# Patient Record
Sex: FEMALE | Race: WHITE | Employment: OTHER | URBAN - METROPOLITAN AREA
[De-identification: names, ages, dates, MRNs, and addresses within clinical notes are randomized per-mention and may not be internally consistent; named-entity substitution may affect disease eponyms.]

---

## 2017-10-18 ENCOUNTER — IP HISTORICAL/CONVERTED ENCOUNTER (OUTPATIENT)
Dept: OTHER | Age: 82
End: 2017-10-18

## 2017-10-21 ENCOUNTER — HOSPITAL ENCOUNTER (OUTPATIENT)
Age: 82
Discharge: HOME | End: 2017-11-04
Attending: PHYSICAL MEDICINE & REHABILITATION | Admitting: PHYSICAL MEDICINE & REHABILITATION

## 2017-10-21 LAB
APPEARANCE UR: ABNORMAL
BACTERIA URNS QL MICRO: NEGATIVE /HPF
BILIRUB UR QL: NEGATIVE
COLOR UR: ABNORMAL
EPITH CASTS URNS QL MICRO: ABNORMAL /LPF
GLUCOSE UR STRIP.AUTO-MCNC: NEGATIVE MG/DL
HGB UR QL STRIP: NEGATIVE
KETONES UR QL STRIP.AUTO: NEGATIVE MG/DL
LEUKOCYTE ESTERASE UR QL STRIP.AUTO: ABNORMAL
NITRITE UR QL STRIP.AUTO: NEGATIVE
PH UR STRIP: 5.5 [PH] (ref 5–8)
PROT UR STRIP-MCNC: ABNORMAL MG/DL
RBC #/AREA URNS HPF: ABNORMAL /HPF (ref 0–5)
SP GR UR REFRACTOMETRY: 1.02 (ref 1–1.03)
UROBILINOGEN UR QL STRIP.AUTO: 0.2 EU/DL (ref 0.2–1)
WBC URNS QL MICRO: ABNORMAL /HPF (ref 0–4)

## 2017-10-21 PROCEDURE — 81001 URINALYSIS AUTO W/SCOPE: CPT | Performed by: PHYSICAL MEDICINE & REHABILITATION

## 2017-10-21 PROCEDURE — 87086 URINE CULTURE/COLONY COUNT: CPT | Performed by: PHYSICAL MEDICINE & REHABILITATION

## 2017-10-21 PROCEDURE — 74011250637 HC RX REV CODE- 250/637: Performed by: PHYSICAL MEDICINE & REHABILITATION

## 2017-10-21 RX ORDER — DEXTROSE 50 % IN WATER (D50W) INTRAVENOUS SYRINGE
25 AS NEEDED
Status: DISCONTINUED | OUTPATIENT
Start: 2017-10-21 | End: 2017-11-04 | Stop reason: HOSPADM

## 2017-10-21 RX ORDER — INSULIN LISPRO 100 [IU]/ML
INJECTION, SOLUTION INTRAVENOUS; SUBCUTANEOUS
Status: DISCONTINUED | OUTPATIENT
Start: 2017-10-21 | End: 2017-11-04 | Stop reason: HOSPADM

## 2017-10-21 RX ORDER — DIAZEPAM 2 MG/1
2 TABLET ORAL
Status: DISCONTINUED | OUTPATIENT
Start: 2017-10-21 | End: 2017-11-04 | Stop reason: HOSPADM

## 2017-10-21 RX ORDER — FACIAL-BODY WIPES
10 EACH TOPICAL DAILY PRN
Status: DISCONTINUED | OUTPATIENT
Start: 2017-10-21 | End: 2017-11-04 | Stop reason: HOSPADM

## 2017-10-21 RX ORDER — TRAMADOL HYDROCHLORIDE 50 MG/1
50 TABLET ORAL
Status: DISCONTINUED | OUTPATIENT
Start: 2017-10-21 | End: 2017-11-04 | Stop reason: HOSPADM

## 2017-10-21 RX ORDER — GLIMEPIRIDE 2 MG/1
2 TABLET ORAL
Status: DISCONTINUED | OUTPATIENT
Start: 2017-10-22 | End: 2017-10-26

## 2017-10-21 RX ORDER — DOCUSATE SODIUM 100 MG/1
100 CAPSULE, LIQUID FILLED ORAL 2 TIMES DAILY
Status: DISCONTINUED | OUTPATIENT
Start: 2017-10-21 | End: 2017-10-30

## 2017-10-21 RX ORDER — POLYETHYLENE GLYCOL 3350 17 G/17G
17 POWDER, FOR SOLUTION ORAL DAILY
Status: DISCONTINUED | OUTPATIENT
Start: 2017-10-22 | End: 2017-10-30

## 2017-10-21 RX ORDER — AMLODIPINE BESYLATE 5 MG/1
5 TABLET ORAL DAILY
Status: DISCONTINUED | OUTPATIENT
Start: 2017-10-22 | End: 2017-10-26

## 2017-10-21 RX ORDER — AMOXICILLIN 250 MG
1 CAPSULE ORAL 2 TIMES DAILY
Status: DISCONTINUED | OUTPATIENT
Start: 2017-10-21 | End: 2017-10-30

## 2017-10-21 RX ORDER — ONDANSETRON 4 MG/1
4 TABLET, ORALLY DISINTEGRATING ORAL
Status: DISCONTINUED | OUTPATIENT
Start: 2017-10-21 | End: 2017-11-04 | Stop reason: HOSPADM

## 2017-10-21 RX ORDER — PIOGLITAZONEHYDROCHLORIDE 15 MG/1
15 TABLET ORAL DAILY
Status: DISCONTINUED | OUTPATIENT
Start: 2017-10-22 | End: 2017-10-23

## 2017-10-21 RX ORDER — TRAMADOL HYDROCHLORIDE 50 MG/1
100 TABLET ORAL
Status: DISCONTINUED | OUTPATIENT
Start: 2017-10-21 | End: 2017-11-04 | Stop reason: HOSPADM

## 2017-10-21 RX ORDER — ACETAMINOPHEN 500 MG
1000 TABLET ORAL EVERY 6 HOURS
Status: DISCONTINUED | OUTPATIENT
Start: 2017-10-21 | End: 2017-11-04 | Stop reason: HOSPADM

## 2017-10-21 RX ORDER — METOPROLOL SUCCINATE 50 MG/1
50 TABLET, EXTENDED RELEASE ORAL 2 TIMES DAILY
Status: DISCONTINUED | OUTPATIENT
Start: 2017-10-21 | End: 2017-11-04 | Stop reason: HOSPADM

## 2017-10-21 RX ORDER — ENOXAPARIN SODIUM 100 MG/ML
40 INJECTION SUBCUTANEOUS EVERY 24 HOURS
Status: DISCONTINUED | OUTPATIENT
Start: 2017-10-22 | End: 2017-10-22

## 2017-10-21 RX ORDER — MAGNESIUM SULFATE 100 %
16 CRYSTALS MISCELLANEOUS AS NEEDED
Status: DISCONTINUED | OUTPATIENT
Start: 2017-10-21 | End: 2017-11-04 | Stop reason: HOSPADM

## 2017-10-21 RX ADMIN — METOPROLOL SUCCINATE 50 MG: 50 TABLET, FILM COATED, EXTENDED RELEASE ORAL at 21:26

## 2017-10-21 RX ADMIN — ACETAMINOPHEN 1000 MG: 500 TABLET ORAL at 17:34

## 2017-10-21 RX ADMIN — DOCUSATE SODIUM 100 MG: 100 CAPSULE, LIQUID FILLED ORAL at 21:26

## 2017-10-21 RX ADMIN — DOCUSATE SODIUM AND SENNOSIDES 1 TABLET: 8.6; 5 TABLET, FILM COATED ORAL at 21:26

## 2017-10-21 RX ADMIN — TRAMADOL HYDROCHLORIDE 50 MG: 50 TABLET, FILM COATED ORAL at 21:26

## 2017-10-22 LAB
25(OH)D3 SERPL-MCNC: 9.9 NG/ML (ref 30–100)
ALBUMIN SERPL-MCNC: 2.6 G/DL (ref 3.5–5)
ALBUMIN/GLOB SERPL: 0.8 {RATIO} (ref 1.1–2.2)
ALP SERPL-CCNC: 48 U/L (ref 45–117)
ALT SERPL-CCNC: 13 U/L (ref 12–78)
ANION GAP SERPL CALC-SCNC: 6 MMOL/L (ref 5–15)
AST SERPL-CCNC: 23 U/L (ref 15–37)
BILIRUB SERPL-MCNC: 0.4 MG/DL (ref 0.2–1)
BUN SERPL-MCNC: 31 MG/DL (ref 6–20)
BUN/CREAT SERPL: 16 (ref 12–20)
CALCIUM SERPL-MCNC: 8.4 MG/DL (ref 8.5–10.1)
CHLORIDE SERPL-SCNC: 107 MMOL/L (ref 97–108)
CO2 SERPL-SCNC: 25 MMOL/L (ref 21–32)
CREAT SERPL-MCNC: 1.92 MG/DL (ref 0.55–1.02)
ERYTHROCYTE [DISTWIDTH] IN BLOOD BY AUTOMATED COUNT: 13.9 % (ref 11.5–14.5)
GLOBULIN SER CALC-MCNC: 3.4 G/DL (ref 2–4)
GLUCOSE SERPL-MCNC: 55 MG/DL (ref 65–100)
HCT VFR BLD AUTO: 29.1 % (ref 35–47)
HGB BLD-MCNC: 9.6 G/DL (ref 11.5–16)
MAGNESIUM SERPL-MCNC: 2.2 MG/DL (ref 1.6–2.4)
MCH RBC QN AUTO: 29.8 PG (ref 26–34)
MCHC RBC AUTO-ENTMCNC: 33 G/DL (ref 30–36.5)
MCV RBC AUTO: 90.4 FL (ref 80–99)
PLATELET # BLD AUTO: 147 K/UL (ref 150–400)
POTASSIUM SERPL-SCNC: 3.6 MMOL/L (ref 3.5–5.1)
PROT SERPL-MCNC: 6 G/DL (ref 6.4–8.2)
RBC # BLD AUTO: 3.22 M/UL (ref 3.8–5.2)
SODIUM SERPL-SCNC: 138 MMOL/L (ref 136–145)
WBC # BLD AUTO: 7 K/UL (ref 3.6–11)

## 2017-10-22 PROCEDURE — 80053 COMPREHEN METABOLIC PANEL: CPT | Performed by: PHYSICAL MEDICINE & REHABILITATION

## 2017-10-22 PROCEDURE — 74011250636 HC RX REV CODE- 250/636: Performed by: PHYSICAL MEDICINE & REHABILITATION

## 2017-10-22 PROCEDURE — 36415 COLL VENOUS BLD VENIPUNCTURE: CPT | Performed by: PHYSICAL MEDICINE & REHABILITATION

## 2017-10-22 PROCEDURE — 83735 ASSAY OF MAGNESIUM: CPT | Performed by: PHYSICAL MEDICINE & REHABILITATION

## 2017-10-22 PROCEDURE — 82306 VITAMIN D 25 HYDROXY: CPT | Performed by: PHYSICAL MEDICINE & REHABILITATION

## 2017-10-22 PROCEDURE — 74011250637 HC RX REV CODE- 250/637: Performed by: PHYSICAL MEDICINE & REHABILITATION

## 2017-10-22 PROCEDURE — 85027 COMPLETE CBC AUTOMATED: CPT | Performed by: PHYSICAL MEDICINE & REHABILITATION

## 2017-10-22 RX ORDER — ERGOCALCIFEROL 1.25 MG/1
50000 CAPSULE ORAL
Status: DISCONTINUED | OUTPATIENT
Start: 2017-10-22 | End: 2017-11-04 | Stop reason: HOSPADM

## 2017-10-22 RX ORDER — ENOXAPARIN SODIUM 100 MG/ML
30 INJECTION SUBCUTANEOUS EVERY 24 HOURS
Status: DISCONTINUED | OUTPATIENT
Start: 2017-10-23 | End: 2017-11-04 | Stop reason: HOSPADM

## 2017-10-22 RX ADMIN — DOCUSATE SODIUM 100 MG: 100 CAPSULE, LIQUID FILLED ORAL at 09:01

## 2017-10-22 RX ADMIN — ACETAMINOPHEN 1000 MG: 500 TABLET ORAL at 23:46

## 2017-10-22 RX ADMIN — ACETAMINOPHEN 1000 MG: 500 TABLET ORAL at 17:20

## 2017-10-22 RX ADMIN — ACETAMINOPHEN 1000 MG: 500 TABLET ORAL at 05:40

## 2017-10-22 RX ADMIN — GLIMEPIRIDE 2 MG: 2 TABLET ORAL at 09:01

## 2017-10-22 RX ADMIN — METOPROLOL SUCCINATE 50 MG: 50 TABLET, FILM COATED, EXTENDED RELEASE ORAL at 21:04

## 2017-10-22 RX ADMIN — ENOXAPARIN SODIUM 40 MG: 40 INJECTION SUBCUTANEOUS at 09:01

## 2017-10-22 RX ADMIN — ERGOCALCIFEROL 50000 UNITS: 1.25 CAPSULE ORAL at 17:20

## 2017-10-22 RX ADMIN — ACETAMINOPHEN 1000 MG: 500 TABLET ORAL at 12:31

## 2017-10-22 RX ADMIN — AMLODIPINE BESYLATE 5 MG: 5 TABLET ORAL at 09:01

## 2017-10-22 RX ADMIN — DOCUSATE SODIUM AND SENNOSIDES 1 TABLET: 8.6; 5 TABLET, FILM COATED ORAL at 09:01

## 2017-10-22 RX ADMIN — TRAMADOL HYDROCHLORIDE 50 MG: 50 TABLET, FILM COATED ORAL at 21:04

## 2017-10-22 RX ADMIN — TRAMADOL HYDROCHLORIDE 50 MG: 50 TABLET, FILM COATED ORAL at 11:06

## 2017-10-22 RX ADMIN — PIOGLITAZONE 15 MG: 15 TABLET ORAL at 09:00

## 2017-10-22 RX ADMIN — METOPROLOL SUCCINATE 50 MG: 50 TABLET, FILM COATED, EXTENDED RELEASE ORAL at 09:00

## 2017-10-23 LAB
BACTERIA SPEC CULT: NORMAL
CC UR VC: NORMAL
SERVICE CMNT-IMP: NORMAL

## 2017-10-23 PROCEDURE — 74011250637 HC RX REV CODE- 250/637: Performed by: PHYSICAL MEDICINE & REHABILITATION

## 2017-10-23 PROCEDURE — 74011250636 HC RX REV CODE- 250/636: Performed by: PHYSICAL MEDICINE & REHABILITATION

## 2017-10-23 RX ADMIN — TRAMADOL HYDROCHLORIDE 50 MG: 50 TABLET, FILM COATED ORAL at 09:26

## 2017-10-23 RX ADMIN — ENOXAPARIN SODIUM 30 MG: 30 INJECTION SUBCUTANEOUS at 09:25

## 2017-10-23 RX ADMIN — METOPROLOL SUCCINATE 50 MG: 50 TABLET, FILM COATED, EXTENDED RELEASE ORAL at 21:09

## 2017-10-23 RX ADMIN — AMLODIPINE BESYLATE 5 MG: 5 TABLET ORAL at 09:25

## 2017-10-23 RX ADMIN — ACETAMINOPHEN 1000 MG: 500 TABLET ORAL at 05:54

## 2017-10-23 RX ADMIN — ACETAMINOPHEN 1000 MG: 500 TABLET ORAL at 23:28

## 2017-10-23 RX ADMIN — ACETAMINOPHEN 1000 MG: 500 TABLET ORAL at 17:21

## 2017-10-23 RX ADMIN — TRAMADOL HYDROCHLORIDE 50 MG: 50 TABLET, FILM COATED ORAL at 15:02

## 2017-10-23 RX ADMIN — METOPROLOL SUCCINATE 50 MG: 50 TABLET, FILM COATED, EXTENDED RELEASE ORAL at 09:26

## 2017-10-23 RX ADMIN — ACETAMINOPHEN 1000 MG: 500 TABLET ORAL at 12:49

## 2017-10-23 RX ADMIN — DOCUSATE SODIUM AND SENNOSIDES 1 TABLET: 8.6; 5 TABLET, FILM COATED ORAL at 09:26

## 2017-10-23 RX ADMIN — GLIMEPIRIDE 2 MG: 2 TABLET ORAL at 09:26

## 2017-10-23 RX ADMIN — PIOGLITAZONE 15 MG: 15 TABLET ORAL at 09:26

## 2017-10-24 PROCEDURE — 74011636637 HC RX REV CODE- 636/637: Performed by: PHYSICAL MEDICINE & REHABILITATION

## 2017-10-24 PROCEDURE — 74011250637 HC RX REV CODE- 250/637: Performed by: PHYSICAL MEDICINE & REHABILITATION

## 2017-10-24 PROCEDURE — 74011250636 HC RX REV CODE- 250/636: Performed by: PHYSICAL MEDICINE & REHABILITATION

## 2017-10-24 RX ADMIN — TRAMADOL HYDROCHLORIDE 50 MG: 50 TABLET, FILM COATED ORAL at 01:00

## 2017-10-24 RX ADMIN — METOPROLOL SUCCINATE 50 MG: 50 TABLET, FILM COATED, EXTENDED RELEASE ORAL at 21:17

## 2017-10-24 RX ADMIN — ENOXAPARIN SODIUM 30 MG: 30 INJECTION SUBCUTANEOUS at 08:47

## 2017-10-24 RX ADMIN — TRAMADOL HYDROCHLORIDE 50 MG: 50 TABLET, FILM COATED ORAL at 09:26

## 2017-10-24 RX ADMIN — ACETAMINOPHEN 1000 MG: 500 TABLET ORAL at 05:45

## 2017-10-24 RX ADMIN — ACETAMINOPHEN 1000 MG: 500 TABLET ORAL at 12:28

## 2017-10-24 RX ADMIN — ACETAMINOPHEN 1000 MG: 500 TABLET ORAL at 23:33

## 2017-10-24 RX ADMIN — TRAMADOL HYDROCHLORIDE 100 MG: 50 TABLET, FILM COATED ORAL at 21:17

## 2017-10-24 RX ADMIN — DOCUSATE SODIUM AND SENNOSIDES 1 TABLET: 8.6; 5 TABLET, FILM COATED ORAL at 21:16

## 2017-10-24 RX ADMIN — METOPROLOL SUCCINATE 50 MG: 50 TABLET, FILM COATED, EXTENDED RELEASE ORAL at 08:48

## 2017-10-24 RX ADMIN — INSULIN LISPRO 2 UNITS: 100 INJECTION, SOLUTION INTRAVENOUS; SUBCUTANEOUS at 21:36

## 2017-10-24 RX ADMIN — DOCUSATE SODIUM 100 MG: 100 CAPSULE, LIQUID FILLED ORAL at 21:16

## 2017-10-24 RX ADMIN — INSULIN LISPRO 2 UNITS: 100 INJECTION, SOLUTION INTRAVENOUS; SUBCUTANEOUS at 17:16

## 2017-10-24 RX ADMIN — GLIMEPIRIDE 2 MG: 2 TABLET ORAL at 08:48

## 2017-10-24 RX ADMIN — AMLODIPINE BESYLATE 5 MG: 5 TABLET ORAL at 08:48

## 2017-10-24 RX ADMIN — DOCUSATE SODIUM 100 MG: 100 CAPSULE, LIQUID FILLED ORAL at 08:47

## 2017-10-24 RX ADMIN — ACETAMINOPHEN 1000 MG: 500 TABLET ORAL at 17:16

## 2017-10-25 LAB
ALBUMIN SERPL-MCNC: 2.6 G/DL (ref 3.5–5)
ANION GAP SERPL CALC-SCNC: 8 MMOL/L (ref 5–15)
BUN SERPL-MCNC: 25 MG/DL (ref 6–20)
BUN/CREAT SERPL: 17 (ref 12–20)
CALCIUM SERPL-MCNC: 8.9 MG/DL (ref 8.5–10.1)
CHLORIDE SERPL-SCNC: 109 MMOL/L (ref 97–108)
CO2 SERPL-SCNC: 25 MMOL/L (ref 21–32)
CREAT SERPL-MCNC: 1.46 MG/DL (ref 0.55–1.02)
GLUCOSE SERPL-MCNC: 109 MG/DL (ref 65–100)
PHOSPHATE SERPL-MCNC: 3.2 MG/DL (ref 2.6–4.7)
POTASSIUM SERPL-SCNC: 3.9 MMOL/L (ref 3.5–5.1)
SODIUM SERPL-SCNC: 142 MMOL/L (ref 136–145)

## 2017-10-25 PROCEDURE — 74011250636 HC RX REV CODE- 250/636: Performed by: PHYSICAL MEDICINE & REHABILITATION

## 2017-10-25 PROCEDURE — 74011250637 HC RX REV CODE- 250/637: Performed by: PHYSICAL MEDICINE & REHABILITATION

## 2017-10-25 PROCEDURE — 80069 RENAL FUNCTION PANEL: CPT | Performed by: PHYSICAL MEDICINE & REHABILITATION

## 2017-10-25 RX ADMIN — DOCUSATE SODIUM AND SENNOSIDES 1 TABLET: 8.6; 5 TABLET, FILM COATED ORAL at 21:01

## 2017-10-25 RX ADMIN — TRAMADOL HYDROCHLORIDE 50 MG: 50 TABLET, FILM COATED ORAL at 21:00

## 2017-10-25 RX ADMIN — ACETAMINOPHEN 1000 MG: 500 TABLET ORAL at 06:02

## 2017-10-25 RX ADMIN — ENOXAPARIN SODIUM 30 MG: 30 INJECTION SUBCUTANEOUS at 08:32

## 2017-10-25 RX ADMIN — METOPROLOL SUCCINATE 50 MG: 50 TABLET, FILM COATED, EXTENDED RELEASE ORAL at 21:01

## 2017-10-25 RX ADMIN — TRAMADOL HYDROCHLORIDE 50 MG: 50 TABLET, FILM COATED ORAL at 08:31

## 2017-10-25 RX ADMIN — AMLODIPINE BESYLATE 5 MG: 5 TABLET ORAL at 08:32

## 2017-10-25 RX ADMIN — GLIMEPIRIDE 2 MG: 2 TABLET ORAL at 08:43

## 2017-10-25 RX ADMIN — DOCUSATE SODIUM 100 MG: 100 CAPSULE, LIQUID FILLED ORAL at 08:31

## 2017-10-25 RX ADMIN — DOCUSATE SODIUM AND SENNOSIDES 1 TABLET: 8.6; 5 TABLET, FILM COATED ORAL at 08:32

## 2017-10-25 RX ADMIN — ACETAMINOPHEN 1000 MG: 500 TABLET ORAL at 17:22

## 2017-10-25 RX ADMIN — METOPROLOL SUCCINATE 50 MG: 50 TABLET, FILM COATED, EXTENDED RELEASE ORAL at 08:32

## 2017-10-25 RX ADMIN — ACETAMINOPHEN 1000 MG: 500 TABLET ORAL at 12:50

## 2017-10-25 RX ADMIN — ACETAMINOPHEN 1000 MG: 500 TABLET ORAL at 23:16

## 2017-10-26 PROCEDURE — 74011250637 HC RX REV CODE- 250/637: Performed by: PHYSICAL MEDICINE & REHABILITATION

## 2017-10-26 PROCEDURE — 74011250636 HC RX REV CODE- 250/636: Performed by: PHYSICAL MEDICINE & REHABILITATION

## 2017-10-26 RX ORDER — HYDRALAZINE HYDROCHLORIDE 25 MG/1
25 TABLET, FILM COATED ORAL
Status: DISCONTINUED | OUTPATIENT
Start: 2017-10-26 | End: 2017-11-04 | Stop reason: HOSPADM

## 2017-10-26 RX ORDER — HYDRALAZINE HYDROCHLORIDE 10 MG/1
10 TABLET, FILM COATED ORAL
Status: DISCONTINUED | OUTPATIENT
Start: 2017-10-26 | End: 2017-10-26

## 2017-10-26 RX ORDER — AMLODIPINE BESYLATE 5 MG/1
5 TABLET ORAL
Status: COMPLETED | OUTPATIENT
Start: 2017-10-26 | End: 2017-10-26

## 2017-10-26 RX ORDER — AMLODIPINE BESYLATE 5 MG/1
10 TABLET ORAL DAILY
Status: DISCONTINUED | OUTPATIENT
Start: 2017-10-27 | End: 2017-11-04 | Stop reason: HOSPADM

## 2017-10-26 RX ORDER — GLIMEPIRIDE 2 MG/1
4 TABLET ORAL
Status: DISCONTINUED | OUTPATIENT
Start: 2017-10-27 | End: 2017-10-30

## 2017-10-26 RX ORDER — HYDRALAZINE HYDROCHLORIDE 10 MG/1
10 TABLET, FILM COATED ORAL
Status: COMPLETED | OUTPATIENT
Start: 2017-10-26 | End: 2017-10-26

## 2017-10-26 RX ADMIN — ENOXAPARIN SODIUM 30 MG: 30 INJECTION SUBCUTANEOUS at 08:34

## 2017-10-26 RX ADMIN — GLIMEPIRIDE 2 MG: 2 TABLET ORAL at 08:34

## 2017-10-26 RX ADMIN — ACETAMINOPHEN 1000 MG: 500 TABLET ORAL at 06:53

## 2017-10-26 RX ADMIN — HYDRALAZINE HYDROCHLORIDE 10 MG: 10 TABLET ORAL at 15:02

## 2017-10-26 RX ADMIN — METOPROLOL SUCCINATE 50 MG: 50 TABLET, FILM COATED, EXTENDED RELEASE ORAL at 22:01

## 2017-10-26 RX ADMIN — ACETAMINOPHEN 1000 MG: 500 TABLET ORAL at 13:37

## 2017-10-26 RX ADMIN — ACETAMINOPHEN 1000 MG: 500 TABLET ORAL at 17:28

## 2017-10-26 RX ADMIN — HYDRALAZINE HYDROCHLORIDE 10 MG: 10 TABLET ORAL at 12:14

## 2017-10-26 RX ADMIN — METOPROLOL SUCCINATE 50 MG: 50 TABLET, FILM COATED, EXTENDED RELEASE ORAL at 01:00

## 2017-10-26 RX ADMIN — AMLODIPINE BESYLATE 5 MG: 5 TABLET ORAL at 08:34

## 2017-10-26 RX ADMIN — AMLODIPINE BESYLATE 5 MG: 5 TABLET ORAL at 12:14

## 2017-10-26 RX ADMIN — TRAMADOL HYDROCHLORIDE 100 MG: 50 TABLET, FILM COATED ORAL at 11:00

## 2017-10-27 LAB
ALBUMIN SERPL-MCNC: 2.9 G/DL (ref 3.5–5)
ANION GAP SERPL CALC-SCNC: 8 MMOL/L (ref 5–15)
BUN SERPL-MCNC: 22 MG/DL (ref 6–20)
BUN/CREAT SERPL: 16 (ref 12–20)
CALCIUM SERPL-MCNC: 9.2 MG/DL (ref 8.5–10.1)
CHLORIDE SERPL-SCNC: 108 MMOL/L (ref 97–108)
CO2 SERPL-SCNC: 27 MMOL/L (ref 21–32)
CREAT SERPL-MCNC: 1.41 MG/DL (ref 0.55–1.02)
ERYTHROCYTE [DISTWIDTH] IN BLOOD BY AUTOMATED COUNT: 14.5 % (ref 11.5–14.5)
GLUCOSE SERPL-MCNC: 87 MG/DL (ref 65–100)
HCT VFR BLD AUTO: 32.4 % (ref 35–47)
HGB BLD-MCNC: 10.3 G/DL (ref 11.5–16)
MCH RBC QN AUTO: 29.3 PG (ref 26–34)
MCHC RBC AUTO-ENTMCNC: 31.8 G/DL (ref 30–36.5)
MCV RBC AUTO: 92 FL (ref 80–99)
PHOSPHATE SERPL-MCNC: 3.2 MG/DL (ref 2.6–4.7)
PLATELET # BLD AUTO: 269 K/UL (ref 150–400)
POTASSIUM SERPL-SCNC: 3.4 MMOL/L (ref 3.5–5.1)
RBC # BLD AUTO: 3.52 M/UL (ref 3.8–5.2)
SODIUM SERPL-SCNC: 143 MMOL/L (ref 136–145)
WBC # BLD AUTO: 9.1 K/UL (ref 3.6–11)

## 2017-10-27 PROCEDURE — 85027 COMPLETE CBC AUTOMATED: CPT | Performed by: PHYSICAL MEDICINE & REHABILITATION

## 2017-10-27 PROCEDURE — 74011250637 HC RX REV CODE- 250/637: Performed by: PHYSICAL MEDICINE & REHABILITATION

## 2017-10-27 PROCEDURE — 80069 RENAL FUNCTION PANEL: CPT | Performed by: PHYSICAL MEDICINE & REHABILITATION

## 2017-10-27 PROCEDURE — 74011250636 HC RX REV CODE- 250/636: Performed by: PHYSICAL MEDICINE & REHABILITATION

## 2017-10-27 PROCEDURE — 36415 COLL VENOUS BLD VENIPUNCTURE: CPT | Performed by: PHYSICAL MEDICINE & REHABILITATION

## 2017-10-27 RX ORDER — LOPERAMIDE HYDROCHLORIDE 2 MG/1
4 CAPSULE ORAL AS NEEDED
Status: DISCONTINUED | OUTPATIENT
Start: 2017-10-27 | End: 2017-11-04 | Stop reason: HOSPADM

## 2017-10-27 RX ORDER — LOPERAMIDE HYDROCHLORIDE 2 MG/1
2 CAPSULE ORAL AS NEEDED
Status: DISCONTINUED | OUTPATIENT
Start: 2017-10-27 | End: 2017-11-04 | Stop reason: HOSPADM

## 2017-10-27 RX ADMIN — METOPROLOL SUCCINATE 50 MG: 50 TABLET, FILM COATED, EXTENDED RELEASE ORAL at 21:50

## 2017-10-27 RX ADMIN — LOPERAMIDE HYDROCHLORIDE 2 MG: 2 CAPSULE ORAL at 13:10

## 2017-10-27 RX ADMIN — ENOXAPARIN SODIUM 30 MG: 30 INJECTION SUBCUTANEOUS at 09:24

## 2017-10-27 RX ADMIN — TRAMADOL HYDROCHLORIDE 100 MG: 50 TABLET, FILM COATED ORAL at 21:50

## 2017-10-27 RX ADMIN — AMLODIPINE BESYLATE 10 MG: 5 TABLET ORAL at 09:25

## 2017-10-27 RX ADMIN — GLIMEPIRIDE 4 MG: 2 TABLET ORAL at 09:25

## 2017-10-27 RX ADMIN — ACETAMINOPHEN 1000 MG: 500 TABLET ORAL at 13:10

## 2017-10-27 RX ADMIN — ACETAMINOPHEN 1000 MG: 500 TABLET ORAL at 00:06

## 2017-10-27 RX ADMIN — HYDRALAZINE HYDROCHLORIDE 25 MG: 25 TABLET, FILM COATED ORAL at 00:11

## 2017-10-27 RX ADMIN — ACETAMINOPHEN 1000 MG: 500 TABLET ORAL at 23:40

## 2017-10-27 RX ADMIN — METOPROLOL SUCCINATE 50 MG: 50 TABLET, FILM COATED, EXTENDED RELEASE ORAL at 09:25

## 2017-10-27 RX ADMIN — ACETAMINOPHEN 1000 MG: 500 TABLET ORAL at 06:33

## 2017-10-28 PROCEDURE — 74011250637 HC RX REV CODE- 250/637: Performed by: PHYSICAL MEDICINE & REHABILITATION

## 2017-10-28 PROCEDURE — 74011250636 HC RX REV CODE- 250/636: Performed by: PHYSICAL MEDICINE & REHABILITATION

## 2017-10-28 PROCEDURE — 74011636637 HC RX REV CODE- 636/637: Performed by: PHYSICAL MEDICINE & REHABILITATION

## 2017-10-28 RX ADMIN — TRAMADOL HYDROCHLORIDE 100 MG: 50 TABLET, FILM COATED ORAL at 21:39

## 2017-10-28 RX ADMIN — ENOXAPARIN SODIUM 30 MG: 30 INJECTION SUBCUTANEOUS at 08:00

## 2017-10-28 RX ADMIN — TRAMADOL HYDROCHLORIDE 50 MG: 50 TABLET, FILM COATED ORAL at 08:25

## 2017-10-28 RX ADMIN — INSULIN LISPRO 4 UNITS: 100 INJECTION, SOLUTION INTRAVENOUS; SUBCUTANEOUS at 21:39

## 2017-10-28 RX ADMIN — AMLODIPINE BESYLATE 10 MG: 5 TABLET ORAL at 08:26

## 2017-10-28 RX ADMIN — ACETAMINOPHEN 1000 MG: 500 TABLET ORAL at 16:57

## 2017-10-28 RX ADMIN — METOPROLOL SUCCINATE 50 MG: 50 TABLET, FILM COATED, EXTENDED RELEASE ORAL at 21:39

## 2017-10-28 RX ADMIN — GLIMEPIRIDE 4 MG: 2 TABLET ORAL at 08:26

## 2017-10-28 RX ADMIN — METOPROLOL SUCCINATE 50 MG: 50 TABLET, FILM COATED, EXTENDED RELEASE ORAL at 08:26

## 2017-10-29 PROCEDURE — 74011250637 HC RX REV CODE- 250/637: Performed by: PHYSICAL MEDICINE & REHABILITATION

## 2017-10-29 PROCEDURE — 74011250636 HC RX REV CODE- 250/636: Performed by: PHYSICAL MEDICINE & REHABILITATION

## 2017-10-29 RX ADMIN — ENOXAPARIN SODIUM 30 MG: 30 INJECTION SUBCUTANEOUS at 08:44

## 2017-10-29 RX ADMIN — AMLODIPINE BESYLATE 10 MG: 5 TABLET ORAL at 08:45

## 2017-10-29 RX ADMIN — GLIMEPIRIDE 4 MG: 2 TABLET ORAL at 08:45

## 2017-10-29 RX ADMIN — ERGOCALCIFEROL 50000 UNITS: 1.25 CAPSULE ORAL at 08:45

## 2017-10-29 RX ADMIN — METOPROLOL SUCCINATE 50 MG: 50 TABLET, FILM COATED, EXTENDED RELEASE ORAL at 21:13

## 2017-10-29 RX ADMIN — DOCUSATE SODIUM 100 MG: 100 CAPSULE, LIQUID FILLED ORAL at 21:13

## 2017-10-29 RX ADMIN — DOCUSATE SODIUM AND SENNOSIDES 1 TABLET: 8.6; 5 TABLET, FILM COATED ORAL at 08:45

## 2017-10-29 RX ADMIN — DOCUSATE SODIUM AND SENNOSIDES 1 TABLET: 8.6; 5 TABLET, FILM COATED ORAL at 21:13

## 2017-10-29 RX ADMIN — TRAMADOL HYDROCHLORIDE 100 MG: 50 TABLET, FILM COATED ORAL at 06:03

## 2017-10-29 RX ADMIN — METOPROLOL SUCCINATE 50 MG: 50 TABLET, FILM COATED, EXTENDED RELEASE ORAL at 08:45

## 2017-10-29 RX ADMIN — ACETAMINOPHEN 1000 MG: 500 TABLET ORAL at 17:10

## 2017-10-29 RX ADMIN — ACETAMINOPHEN 1000 MG: 500 TABLET ORAL at 06:03

## 2017-10-29 RX ADMIN — TRAMADOL HYDROCHLORIDE 50 MG: 50 TABLET, FILM COATED ORAL at 21:13

## 2017-10-30 LAB
ALBUMIN SERPL-MCNC: 2.6 G/DL (ref 3.5–5)
ANION GAP SERPL CALC-SCNC: 11 MMOL/L (ref 5–15)
BUN SERPL-MCNC: 32 MG/DL (ref 6–20)
BUN/CREAT SERPL: 21 (ref 12–20)
CALCIUM SERPL-MCNC: 8.7 MG/DL (ref 8.5–10.1)
CHLORIDE SERPL-SCNC: 109 MMOL/L (ref 97–108)
CO2 SERPL-SCNC: 24 MMOL/L (ref 21–32)
CREAT SERPL-MCNC: 1.56 MG/DL (ref 0.55–1.02)
ERYTHROCYTE [DISTWIDTH] IN BLOOD BY AUTOMATED COUNT: 14.8 % (ref 11.5–14.5)
GLUCOSE SERPL-MCNC: 62 MG/DL (ref 65–100)
HCT VFR BLD AUTO: 27.6 % (ref 35–47)
HGB BLD-MCNC: 9 G/DL (ref 11.5–16)
MCH RBC QN AUTO: 29.8 PG (ref 26–34)
MCHC RBC AUTO-ENTMCNC: 32.6 G/DL (ref 30–36.5)
MCV RBC AUTO: 91.4 FL (ref 80–99)
PHOSPHATE SERPL-MCNC: 4.3 MG/DL (ref 2.6–4.7)
PLATELET # BLD AUTO: 223 K/UL (ref 150–400)
POTASSIUM SERPL-SCNC: 3.9 MMOL/L (ref 3.5–5.1)
RBC # BLD AUTO: 3.02 M/UL (ref 3.8–5.2)
SODIUM SERPL-SCNC: 144 MMOL/L (ref 136–145)
WBC # BLD AUTO: 6.1 K/UL (ref 3.6–11)

## 2017-10-30 PROCEDURE — 36415 COLL VENOUS BLD VENIPUNCTURE: CPT | Performed by: PHYSICAL MEDICINE & REHABILITATION

## 2017-10-30 PROCEDURE — 74011250637 HC RX REV CODE- 250/637: Performed by: PHYSICAL MEDICINE & REHABILITATION

## 2017-10-30 PROCEDURE — 74011250636 HC RX REV CODE- 250/636: Performed by: PHYSICAL MEDICINE & REHABILITATION

## 2017-10-30 PROCEDURE — 85027 COMPLETE CBC AUTOMATED: CPT | Performed by: PHYSICAL MEDICINE & REHABILITATION

## 2017-10-30 PROCEDURE — 80069 RENAL FUNCTION PANEL: CPT | Performed by: PHYSICAL MEDICINE & REHABILITATION

## 2017-10-30 RX ORDER — GLIMEPIRIDE 2 MG/1
2 TABLET ORAL
Status: DISCONTINUED | OUTPATIENT
Start: 2017-10-31 | End: 2017-10-31 | Stop reason: DRUGHIGH

## 2017-10-30 RX ORDER — LISINOPRIL 5 MG/1
10 TABLET ORAL DAILY
Status: DISCONTINUED | OUTPATIENT
Start: 2017-10-31 | End: 2017-11-02

## 2017-10-30 RX ADMIN — ENOXAPARIN SODIUM 30 MG: 30 INJECTION SUBCUTANEOUS at 09:03

## 2017-10-30 RX ADMIN — METOPROLOL SUCCINATE 50 MG: 50 TABLET, FILM COATED, EXTENDED RELEASE ORAL at 21:41

## 2017-10-30 RX ADMIN — ACETAMINOPHEN 1000 MG: 500 TABLET ORAL at 05:29

## 2017-10-30 RX ADMIN — ACETAMINOPHEN 1000 MG: 500 TABLET ORAL at 17:39

## 2017-10-30 RX ADMIN — TRAMADOL HYDROCHLORIDE 50 MG: 50 TABLET, FILM COATED ORAL at 09:59

## 2017-10-30 RX ADMIN — TRAMADOL HYDROCHLORIDE 100 MG: 50 TABLET, FILM COATED ORAL at 21:41

## 2017-10-30 RX ADMIN — ACETAMINOPHEN 1000 MG: 500 TABLET ORAL at 12:07

## 2017-10-30 RX ADMIN — METOPROLOL SUCCINATE 50 MG: 50 TABLET, FILM COATED, EXTENDED RELEASE ORAL at 09:03

## 2017-10-30 RX ADMIN — AMLODIPINE BESYLATE 10 MG: 5 TABLET ORAL at 09:04

## 2017-10-30 RX ADMIN — ACETAMINOPHEN 1000 MG: 500 TABLET ORAL at 00:13

## 2017-10-30 RX ADMIN — GLIMEPIRIDE 4 MG: 2 TABLET ORAL at 09:04

## 2017-10-31 PROCEDURE — 74011250637 HC RX REV CODE- 250/637: Performed by: PHYSICAL MEDICINE & REHABILITATION

## 2017-10-31 PROCEDURE — 74011250636 HC RX REV CODE- 250/636: Performed by: PHYSICAL MEDICINE & REHABILITATION

## 2017-10-31 RX ORDER — GLIMEPIRIDE 2 MG/1
2 TABLET ORAL
Status: DISCONTINUED | OUTPATIENT
Start: 2017-11-01 | End: 2017-11-04 | Stop reason: HOSPADM

## 2017-10-31 RX ADMIN — LISINOPRIL 10 MG: 5 TABLET ORAL at 08:49

## 2017-10-31 RX ADMIN — METOPROLOL SUCCINATE 50 MG: 50 TABLET, FILM COATED, EXTENDED RELEASE ORAL at 08:50

## 2017-10-31 RX ADMIN — METOPROLOL SUCCINATE 50 MG: 50 TABLET, FILM COATED, EXTENDED RELEASE ORAL at 21:46

## 2017-10-31 RX ADMIN — ACETAMINOPHEN 1000 MG: 500 TABLET ORAL at 21:48

## 2017-10-31 RX ADMIN — ACETAMINOPHEN 1000 MG: 500 TABLET ORAL at 17:13

## 2017-10-31 RX ADMIN — ENOXAPARIN SODIUM 30 MG: 30 INJECTION SUBCUTANEOUS at 08:15

## 2017-10-31 RX ADMIN — ACETAMINOPHEN 1000 MG: 500 TABLET ORAL at 05:00

## 2017-10-31 RX ADMIN — ACETAMINOPHEN 1000 MG: 500 TABLET ORAL at 12:29

## 2017-10-31 RX ADMIN — AMLODIPINE BESYLATE 10 MG: 5 TABLET ORAL at 08:50

## 2017-10-31 RX ADMIN — TRAMADOL HYDROCHLORIDE 50 MG: 50 TABLET, FILM COATED ORAL at 08:51

## 2017-11-01 LAB
ANION GAP SERPL CALC-SCNC: 8 MMOL/L (ref 5–15)
BUN SERPL-MCNC: 28 MG/DL (ref 6–20)
BUN/CREAT SERPL: 19 (ref 12–20)
CALCIUM SERPL-MCNC: 9.2 MG/DL (ref 8.5–10.1)
CHLORIDE SERPL-SCNC: 110 MMOL/L (ref 97–108)
CO2 SERPL-SCNC: 24 MMOL/L (ref 21–32)
CREAT SERPL-MCNC: 1.48 MG/DL (ref 0.55–1.02)
GLUCOSE SERPL-MCNC: 94 MG/DL (ref 65–100)
POTASSIUM SERPL-SCNC: 3.8 MMOL/L (ref 3.5–5.1)
SODIUM SERPL-SCNC: 142 MMOL/L (ref 136–145)

## 2017-11-01 PROCEDURE — 36415 COLL VENOUS BLD VENIPUNCTURE: CPT | Performed by: PHYSICAL MEDICINE & REHABILITATION

## 2017-11-01 PROCEDURE — 80048 BASIC METABOLIC PNL TOTAL CA: CPT | Performed by: PHYSICAL MEDICINE & REHABILITATION

## 2017-11-01 PROCEDURE — 85027 COMPLETE CBC AUTOMATED: CPT | Performed by: PHYSICAL MEDICINE & REHABILITATION

## 2017-11-01 PROCEDURE — 74011250636 HC RX REV CODE- 250/636: Performed by: PHYSICAL MEDICINE & REHABILITATION

## 2017-11-01 PROCEDURE — 80069 RENAL FUNCTION PANEL: CPT | Performed by: PHYSICAL MEDICINE & REHABILITATION

## 2017-11-01 PROCEDURE — 74011250637 HC RX REV CODE- 250/637: Performed by: PHYSICAL MEDICINE & REHABILITATION

## 2017-11-01 RX ADMIN — ACETAMINOPHEN 1000 MG: 500 TABLET ORAL at 05:56

## 2017-11-01 RX ADMIN — LISINOPRIL 10 MG: 5 TABLET ORAL at 08:40

## 2017-11-01 RX ADMIN — METOPROLOL SUCCINATE 50 MG: 50 TABLET, FILM COATED, EXTENDED RELEASE ORAL at 08:39

## 2017-11-01 RX ADMIN — TRAMADOL HYDROCHLORIDE 100 MG: 50 TABLET, FILM COATED ORAL at 12:00

## 2017-11-01 RX ADMIN — METOPROLOL SUCCINATE 50 MG: 50 TABLET, FILM COATED, EXTENDED RELEASE ORAL at 21:48

## 2017-11-01 RX ADMIN — ENOXAPARIN SODIUM 30 MG: 30 INJECTION SUBCUTANEOUS at 08:39

## 2017-11-01 RX ADMIN — TRAMADOL HYDROCHLORIDE 100 MG: 50 TABLET, FILM COATED ORAL at 18:07

## 2017-11-01 RX ADMIN — AMLODIPINE BESYLATE 10 MG: 5 TABLET ORAL at 08:40

## 2017-11-01 RX ADMIN — GLIMEPIRIDE 2 MG: 2 TABLET ORAL at 08:40

## 2017-11-02 PROCEDURE — 74011250637 HC RX REV CODE- 250/637: Performed by: PHYSICAL MEDICINE & REHABILITATION

## 2017-11-02 PROCEDURE — 74011250636 HC RX REV CODE- 250/636: Performed by: PHYSICAL MEDICINE & REHABILITATION

## 2017-11-02 RX ORDER — LISINOPRIL 5 MG/1
5 TABLET ORAL ONCE
Status: COMPLETED | OUTPATIENT
Start: 2017-11-02 | End: 2017-11-02

## 2017-11-02 RX ORDER — LISINOPRIL 5 MG/1
15 TABLET ORAL DAILY
Status: DISCONTINUED | OUTPATIENT
Start: 2017-11-03 | End: 2017-11-04 | Stop reason: HOSPADM

## 2017-11-02 RX ADMIN — ACETAMINOPHEN 1000 MG: 500 TABLET ORAL at 00:11

## 2017-11-02 RX ADMIN — ENOXAPARIN SODIUM 30 MG: 30 INJECTION SUBCUTANEOUS at 08:38

## 2017-11-02 RX ADMIN — METOPROLOL SUCCINATE 50 MG: 50 TABLET, FILM COATED, EXTENDED RELEASE ORAL at 20:40

## 2017-11-02 RX ADMIN — ACETAMINOPHEN 1000 MG: 500 TABLET ORAL at 05:52

## 2017-11-02 RX ADMIN — METOPROLOL SUCCINATE 50 MG: 50 TABLET, FILM COATED, EXTENDED RELEASE ORAL at 08:41

## 2017-11-02 RX ADMIN — LISINOPRIL 10 MG: 5 TABLET ORAL at 08:41

## 2017-11-02 RX ADMIN — GLIMEPIRIDE 2 MG: 2 TABLET ORAL at 08:41

## 2017-11-02 RX ADMIN — LISINOPRIL 5 MG: 5 TABLET ORAL at 12:08

## 2017-11-02 RX ADMIN — ACETAMINOPHEN 1000 MG: 500 TABLET ORAL at 12:08

## 2017-11-02 RX ADMIN — TRAMADOL HYDROCHLORIDE 100 MG: 50 TABLET, FILM COATED ORAL at 14:41

## 2017-11-02 RX ADMIN — AMLODIPINE BESYLATE 10 MG: 5 TABLET ORAL at 08:40

## 2017-11-02 RX ADMIN — ACETAMINOPHEN 1000 MG: 500 TABLET ORAL at 17:15

## 2017-11-03 LAB
ALBUMIN SERPL-MCNC: 3 G/DL (ref 3.5–5)
ANION GAP SERPL CALC-SCNC: 11 MMOL/L (ref 5–15)
BUN SERPL-MCNC: 25 MG/DL (ref 6–20)
BUN/CREAT SERPL: 17 (ref 12–20)
CALCIUM SERPL-MCNC: 9.5 MG/DL (ref 8.5–10.1)
CHLORIDE SERPL-SCNC: 109 MMOL/L (ref 97–108)
CO2 SERPL-SCNC: 22 MMOL/L (ref 21–32)
CREAT SERPL-MCNC: 1.51 MG/DL (ref 0.55–1.02)
ERYTHROCYTE [DISTWIDTH] IN BLOOD BY AUTOMATED COUNT: 14.9 % (ref 11.5–14.5)
GLUCOSE SERPL-MCNC: 61 MG/DL (ref 65–100)
HCT VFR BLD AUTO: 29.2 % (ref 35–47)
HGB BLD-MCNC: 9.6 G/DL (ref 11.5–16)
MCH RBC QN AUTO: 29.9 PG (ref 26–34)
MCHC RBC AUTO-ENTMCNC: 32.9 G/DL (ref 30–36.5)
MCV RBC AUTO: 91 FL (ref 80–99)
PHOSPHATE SERPL-MCNC: 4.1 MG/DL (ref 2.6–4.7)
PLATELET # BLD AUTO: 247 K/UL (ref 150–400)
POTASSIUM SERPL-SCNC: 4 MMOL/L (ref 3.5–5.1)
RBC # BLD AUTO: 3.21 M/UL (ref 3.8–5.2)
SODIUM SERPL-SCNC: 142 MMOL/L (ref 136–145)
WBC # BLD AUTO: 7.6 K/UL (ref 3.6–11)

## 2017-11-03 PROCEDURE — 74011250637 HC RX REV CODE- 250/637: Performed by: PHYSICAL MEDICINE & REHABILITATION

## 2017-11-03 PROCEDURE — 74011250636 HC RX REV CODE- 250/636: Performed by: PHYSICAL MEDICINE & REHABILITATION

## 2017-11-03 RX ADMIN — METOPROLOL SUCCINATE 50 MG: 50 TABLET, FILM COATED, EXTENDED RELEASE ORAL at 21:41

## 2017-11-03 RX ADMIN — AMLODIPINE BESYLATE 10 MG: 5 TABLET ORAL at 09:24

## 2017-11-03 RX ADMIN — ACETAMINOPHEN 1000 MG: 500 TABLET ORAL at 17:20

## 2017-11-03 RX ADMIN — TRAMADOL HYDROCHLORIDE 50 MG: 50 TABLET, FILM COATED ORAL at 12:27

## 2017-11-03 RX ADMIN — GLIMEPIRIDE 2 MG: 2 TABLET ORAL at 09:24

## 2017-11-03 RX ADMIN — ACETAMINOPHEN 1000 MG: 500 TABLET ORAL at 06:26

## 2017-11-03 RX ADMIN — ACETAMINOPHEN 1000 MG: 500 TABLET ORAL at 00:09

## 2017-11-03 RX ADMIN — METOPROLOL SUCCINATE 50 MG: 50 TABLET, FILM COATED, EXTENDED RELEASE ORAL at 09:25

## 2017-11-03 RX ADMIN — ENOXAPARIN SODIUM 30 MG: 30 INJECTION SUBCUTANEOUS at 09:26

## 2017-11-03 RX ADMIN — LISINOPRIL 15 MG: 5 TABLET ORAL at 09:24

## 2017-11-04 VITALS
HEIGHT: 63 IN | WEIGHT: 175.31 LBS | SYSTOLIC BLOOD PRESSURE: 166 MMHG | DIASTOLIC BLOOD PRESSURE: 67 MMHG | BODY MASS INDEX: 31.06 KG/M2 | HEART RATE: 66 BPM

## 2017-11-04 PROCEDURE — 74011250637 HC RX REV CODE- 250/637: Performed by: PHYSICAL MEDICINE & REHABILITATION

## 2017-11-04 PROCEDURE — 74011250636 HC RX REV CODE- 250/636: Performed by: PHYSICAL MEDICINE & REHABILITATION

## 2017-11-04 RX ADMIN — AMLODIPINE BESYLATE 10 MG: 5 TABLET ORAL at 10:06

## 2017-11-04 RX ADMIN — METOPROLOL SUCCINATE 50 MG: 50 TABLET, FILM COATED, EXTENDED RELEASE ORAL at 10:05

## 2017-11-04 RX ADMIN — LOPERAMIDE HYDROCHLORIDE 2 MG: 2 CAPSULE ORAL at 10:05

## 2017-11-04 RX ADMIN — ACETAMINOPHEN 1000 MG: 500 TABLET ORAL at 00:11

## 2017-11-04 RX ADMIN — ENOXAPARIN SODIUM 30 MG: 30 INJECTION SUBCUTANEOUS at 10:06

## 2017-11-04 RX ADMIN — GLIMEPIRIDE 2 MG: 2 TABLET ORAL at 10:06

## 2017-11-04 RX ADMIN — LISINOPRIL 15 MG: 5 TABLET ORAL at 10:05

## 2017-11-04 RX ADMIN — ACETAMINOPHEN 1000 MG: 500 TABLET ORAL at 05:10

## 2021-07-25 ENCOUNTER — APPOINTMENT (OUTPATIENT)
Dept: GENERAL RADIOLOGY | Age: 86
End: 2021-07-25
Attending: EMERGENCY MEDICINE
Payer: MEDICARE

## 2021-07-25 ENCOUNTER — HOSPITAL ENCOUNTER (EMERGENCY)
Age: 86
Discharge: HOME | End: 2021-07-25
Attending: EMERGENCY MEDICINE
Payer: MEDICARE

## 2021-07-25 VITALS
OXYGEN SATURATION: 96 % | HEART RATE: 79 BPM | WEIGHT: 145 LBS | HEIGHT: 62 IN | SYSTOLIC BLOOD PRESSURE: 138 MMHG | RESPIRATION RATE: 18 BRPM | TEMPERATURE: 98.3 F | BODY MASS INDEX: 26.68 KG/M2 | DIASTOLIC BLOOD PRESSURE: 71 MMHG

## 2021-07-25 DIAGNOSIS — J20.9 ACUTE BRONCHITIS, UNSPECIFIED ORGANISM: Primary | ICD-10-CM

## 2021-07-25 LAB
ALBUMIN SERPL-MCNC: 3.7 G/DL (ref 3.5–5)
ALBUMIN/GLOB SERPL: 1.1 {RATIO} (ref 1.1–2.2)
ALP SERPL-CCNC: 73 U/L (ref 45–117)
ALT SERPL-CCNC: 18 U/L (ref 12–78)
ANION GAP SERPL CALC-SCNC: 4 MMOL/L (ref 5–15)
AST SERPL-CCNC: 19 U/L (ref 15–37)
BASOPHILS # BLD: 0.1 K/UL (ref 0–0.1)
BASOPHILS NFR BLD: 1 % (ref 0–1)
BILIRUB SERPL-MCNC: 0.5 MG/DL (ref 0.2–1)
BUN SERPL-MCNC: 21 MG/DL (ref 6–20)
BUN/CREAT SERPL: 9 (ref 12–20)
CALCIUM SERPL-MCNC: 8.5 MG/DL (ref 8.5–10.1)
CHLORIDE SERPL-SCNC: 107 MMOL/L (ref 97–108)
CO2 SERPL-SCNC: 30 MMOL/L (ref 21–32)
COMMENT, HOLDF: NORMAL
CREAT SERPL-MCNC: 2.32 MG/DL (ref 0.55–1.02)
DIFFERENTIAL METHOD BLD: NORMAL
EOSINOPHIL # BLD: 0.3 K/UL (ref 0–0.4)
EOSINOPHIL NFR BLD: 4 % (ref 0–7)
ERYTHROCYTE [DISTWIDTH] IN BLOOD BY AUTOMATED COUNT: 13 % (ref 11.5–14.5)
GLOBULIN SER CALC-MCNC: 3.5 G/DL (ref 2–4)
GLUCOSE SERPL-MCNC: 162 MG/DL (ref 65–100)
HCT VFR BLD AUTO: 36.7 % (ref 35–47)
HGB BLD-MCNC: 12 G/DL (ref 11.5–16)
IMM GRANULOCYTES # BLD AUTO: 0 K/UL (ref 0–0.04)
IMM GRANULOCYTES NFR BLD AUTO: 0 % (ref 0–0.5)
LYMPHOCYTES # BLD: 1.7 K/UL (ref 0.8–3.5)
LYMPHOCYTES NFR BLD: 24 % (ref 12–49)
MAGNESIUM SERPL-MCNC: 2.8 MG/DL (ref 1.6–2.4)
MCH RBC QN AUTO: 29.9 PG (ref 26–34)
MCHC RBC AUTO-ENTMCNC: 32.7 G/DL (ref 30–36.5)
MCV RBC AUTO: 91.3 FL (ref 80–99)
MONOCYTES # BLD: 0.6 K/UL (ref 0–1)
MONOCYTES NFR BLD: 8 % (ref 5–13)
NEUTS SEG # BLD: 4.7 K/UL (ref 1.8–8)
NEUTS SEG NFR BLD: 63 % (ref 32–75)
NRBC # BLD: 0 K/UL (ref 0–0.01)
NRBC BLD-RTO: 0 PER 100 WBC
PLATELET # BLD AUTO: 175 K/UL (ref 150–400)
PMV BLD AUTO: 11.5 FL (ref 8.9–12.9)
POTASSIUM SERPL-SCNC: 3.6 MMOL/L (ref 3.5–5.1)
PROT SERPL-MCNC: 7.2 G/DL (ref 6.4–8.2)
RBC # BLD AUTO: 4.02 M/UL (ref 3.8–5.2)
SAMPLES BEING HELD,HOLD: NORMAL
SODIUM SERPL-SCNC: 141 MMOL/L (ref 136–145)
TROPONIN I SERPL-MCNC: <0.05 NG/ML
WBC # BLD AUTO: 7.4 K/UL (ref 3.6–11)

## 2021-07-25 PROCEDURE — 74011636637 HC RX REV CODE- 636/637: Performed by: EMERGENCY MEDICINE

## 2021-07-25 PROCEDURE — 83735 ASSAY OF MAGNESIUM: CPT

## 2021-07-25 PROCEDURE — 71046 X-RAY EXAM CHEST 2 VIEWS: CPT

## 2021-07-25 PROCEDURE — 84484 ASSAY OF TROPONIN QUANT: CPT

## 2021-07-25 PROCEDURE — 85025 COMPLETE CBC W/AUTO DIFF WBC: CPT

## 2021-07-25 PROCEDURE — 74011250637 HC RX REV CODE- 250/637: Performed by: EMERGENCY MEDICINE

## 2021-07-25 PROCEDURE — 80053 COMPREHEN METABOLIC PANEL: CPT

## 2021-07-25 PROCEDURE — 99284 EMERGENCY DEPT VISIT MOD MDM: CPT

## 2021-07-25 PROCEDURE — 93005 ELECTROCARDIOGRAM TRACING: CPT

## 2021-07-25 PROCEDURE — 36415 COLL VENOUS BLD VENIPUNCTURE: CPT

## 2021-07-25 RX ORDER — PREDNISONE 20 MG/1
60 TABLET ORAL
Status: COMPLETED | OUTPATIENT
Start: 2021-07-25 | End: 2021-07-25

## 2021-07-25 RX ORDER — PREDNISONE 10 MG/1
TABLET ORAL
Qty: 21 TABLET | Refills: 0 | Status: SHIPPED | OUTPATIENT
Start: 2021-07-25 | End: 2021-10-18

## 2021-07-25 RX ORDER — ALBUTEROL SULFATE 90 UG/1
6 AEROSOL, METERED RESPIRATORY (INHALATION) ONCE
Status: COMPLETED | OUTPATIENT
Start: 2021-07-25 | End: 2021-07-25

## 2021-07-25 RX ORDER — ALBUTEROL SULFATE 90 UG/1
2 AEROSOL, METERED RESPIRATORY (INHALATION)
Qty: 1 INHALER | Refills: 0 | Status: SHIPPED | OUTPATIENT
Start: 2021-07-25 | End: 2022-06-09

## 2021-07-25 RX ADMIN — ALBUTEROL SULFATE 6 PUFF: 90 AEROSOL, METERED RESPIRATORY (INHALATION) at 21:11

## 2021-07-25 RX ADMIN — PREDNISONE 60 MG: 20 TABLET ORAL at 21:11

## 2021-07-26 LAB
ATRIAL RATE: 78 BPM
CALCULATED P AXIS, ECG09: 46 DEGREES
CALCULATED R AXIS, ECG10: -8 DEGREES
CALCULATED T AXIS, ECG11: 30 DEGREES
DIAGNOSIS, 93000: NORMAL
P-R INTERVAL, ECG05: 172 MS
Q-T INTERVAL, ECG07: 446 MS
QRS DURATION, ECG06: 94 MS
QTC CALCULATION (BEZET), ECG08: 508 MS
VENTRICULAR RATE, ECG03: 78 BPM

## 2021-07-26 NOTE — ED NOTES
Pulse oximetry assessment   94% at rest on room air (if 88% or less, skip next steps)  94% while ambulating on room air.

## 2021-07-26 NOTE — ED PROVIDER NOTES
The history is provided by the patient. Shortness of Breath  This is a new problem. The problem occurs continuously. The current episode started yesterday. The problem has been gradually worsening. Associated symptoms include sore throat, cough and wheezing. Pertinent negatives include no fever, no chest pain, no vomiting, no abdominal pain and no leg swelling. It is unknown what precipitated the problem. She has tried nothing for the symptoms. Associated medical issues include CAD. Associated medical issues do not include asthma or COPD. No past medical history on file. No past surgical history on file. No family history on file. Social History     Socioeconomic History    Marital status:      Spouse name: Not on file    Number of children: Not on file    Years of education: Not on file    Highest education level: Not on file   Occupational History    Not on file   Tobacco Use    Smoking status: Not on file   Substance and Sexual Activity    Alcohol use: Not on file    Drug use: Not on file    Sexual activity: Not on file   Other Topics Concern    Not on file   Social History Narrative    Not on file     Social Determinants of Health     Financial Resource Strain:     Difficulty of Paying Living Expenses:    Food Insecurity:     Worried About Running Out of Food in the Last Year:     [address] in the Last Year:    Transportation Needs:     Lack of Transportation (Medical):      Lack of Transportation (Non-Medical):    Physical Activity:     Days of Exercise per Week:     Minutes of Exercise per Session:    Stress:     Feeling of Stress :    Social Connections:     Frequency of Communication with Friends and Family:     Frequency of Social Gatherings with Friends and Family:     Attends [employer] Services:     Active Member of Clubs or Organizations:     Attends Club or Organization Meetings:     Marital Status:    Intimate Partner Violence:     Fear of Current or Ex-Partner:     Emotionally Abused:     Physically Abused:     Sexually Abused: ALLERGIES: Codeine, Compazine [prochlorperazine], Dilaudid [hydromorphone], Morphine, and Sulfa (sulfonamide antibiotics)    Review of Systems   Constitutional: Negative for chills and fever. HENT: Positive for sore throat. Respiratory: Positive for cough, shortness of breath and wheezing. Cardiovascular: Negative for chest pain and leg swelling. Gastrointestinal: Negative for abdominal pain, constipation, diarrhea and vomiting. Neurological: Negative for dizziness and light-headedness. All other systems reviewed and are negative. Vitals:    07/25/21 2029   BP: (!) 191/79   Pulse: 81   Resp: 20   Temp: 97.5 °F (36.4 °C)   SpO2: 97%   Weight: 65.8 kg (145 lb)   Height: 5' 2\" (1.575 m)            Physical Exam  Vitals and nursing note reviewed. Constitutional:       Appearance: She is well-developed. HENT:      Head: Normocephalic and atraumatic. Eyes:      Pupils: Pupils are equal, round, and reactive to light. Cardiovascular:      Rate and Rhythm: Normal rate and regular rhythm. Pulmonary:      Effort: Tachypnea and accessory muscle usage present. Breath sounds: Examination of the right-upper field reveals wheezing. Examination of the left-upper field reveals wheezing. Examination of the right-lower field reveals decreased breath sounds. Examination of the left-lower field reveals decreased breath sounds. Decreased breath sounds and wheezing present. Abdominal:      General: There is no distension. Palpations: Abdomen is soft. Tenderness: There is no abdominal tenderness. Musculoskeletal:      Cervical back: Normal range of motion and neck supple. Right lower leg: No edema. Left lower leg: No edema. Skin:     General: Skin is warm and dry. Capillary Refill: Capillary refill takes less than 2 seconds.    Neurological:      General: No focal deficit present. Mental Status: She is alert and oriented to person, place, and time. Psychiatric:         Mood and Affect: Mood normal.         Behavior: Behavior normal.          MDM       Procedures    EKG performed at 8:37 PM  Normal sinus rhythm, frequent PVCs, 78 bpm, prolonged QT, no STEMI  EKG interpreted by Hafsa Patton MD          The patient is resting comfortably and feels better, is alert and in no distress. The repeat examination is unremarkable and benign. The electrocardiogram shows no signs of acute ischemia and the history, exam, diagnostic testing and current condition do not suggest that this patient is having an acute myocardial infarction, significant arrhythmia, unstable angina, esophageal perforation, pulmonary embolism, aortic dissection, pneumothorax, severe pneumonia, sepsis or other significant pathology that would warrant further testing, continued ED treatment, admission, or cardiology or other specialist consultation at this point. The patient is ambulatory without severe shortness of breath or desaturation. The vital signs have been stable. The patient's condition is stable and appropriate for discharge. The patient will pursue further outpatient evaluation with the primary care physician, other designated physician or cardiologist. The patient and/or caregivers have expressed a clear and thorough understanding and agree to follow up as instructed. MEDICATIONS GIVEN:  Medications   predniSONE (DELTASONE) tablet 60 mg (60 mg Oral Given 7/25/21 2111)   albuterol (PROVENTIL HFA, VENTOLIN HFA, PROAIR HFA) inhaler 6 Puff (6 Puffs Inhalation Given 7/25/21 2111)       IMPRESSION:  1.  Acute bronchitis, unspecified organism

## 2021-07-26 NOTE — ED TRIAGE NOTES
Pt ambulatory to triage with mask, c/o SOB. Family reports recent sore throat, treated with OTC medications and relieved. Daughter reports giving her neb at home without relief. Unknown sick contacts, states family had nasal congestion recently. States covid vaccines UTD.   States hx of HTN, took medications today

## 2021-10-17 ENCOUNTER — HOSPITAL ENCOUNTER (INPATIENT)
Age: 86
LOS: 11 days | Discharge: NURSING, REHAB OR LONG-TERM CARE | DRG: 958 | End: 2021-10-29
Attending: EMERGENCY MEDICINE | Admitting: INTERNAL MEDICINE
Payer: MEDICARE

## 2021-10-17 DIAGNOSIS — N18.9 CHRONIC RENAL FAILURE, UNSPECIFIED CKD STAGE: ICD-10-CM

## 2021-10-17 DIAGNOSIS — S32.591A CLOSED FRACTURE OF MULTIPLE PUBIC RAMI, RIGHT, INITIAL ENCOUNTER (HCC): ICD-10-CM

## 2021-10-17 DIAGNOSIS — S42.211A CLOSED DISPLACED FRACTURE OF SURGICAL NECK OF RIGHT HUMERUS, UNSPECIFIED FRACTURE MORPHOLOGY, INITIAL ENCOUNTER: ICD-10-CM
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
## 2021-10-17 PROCEDURE — 99283 EMERGENCY DEPT VISIT LOW MDM: CPT

## 2021-10-18 ENCOUNTER — APPOINTMENT (OUTPATIENT)
Dept: CT IMAGING | Age: 86
DRG: 958 | End: 2021-10-18
Attending: EMERGENCY MEDICINE
Payer: MEDICARE

## 2021-10-18 ENCOUNTER — APPOINTMENT (OUTPATIENT)
Dept: GENERAL RADIOLOGY | Age: 86
DRG: 958 | End: 2021-10-18
Attending: EMERGENCY MEDICINE
Payer: MEDICARE

## 2021-10-18 ENCOUNTER — APPOINTMENT (OUTPATIENT)
Dept: GENERAL RADIOLOGY | Age: 86
DRG: 958 | End: 2021-10-18
Attending: INTERNAL MEDICINE
Payer: MEDICARE

## 2021-10-18 PROBLEM — S42.213A FX HUMERAL NECK: Status: ACTIVE | Noted: 2021-10-18

## 2021-10-18 PROBLEM — N18.30 CKD (CHRONIC KIDNEY DISEASE) STAGE 3, GFR 30-59 ML/MIN (HCC): Status: ACTIVE | Noted: 2021-10-18

## 2021-10-18 PROBLEM — E11.9 DM (DIABETES MELLITUS) (HCC): Status: ACTIVE | Noted: 2021-10-18

## 2021-10-18 PROBLEM — I10 HTN (HYPERTENSION): Status: ACTIVE | Noted: 2021-10-18

## 2021-10-18 PROBLEM — S42.213A HUMERAL SURGICAL NECK FRACTURE: Status: ACTIVE | Noted: 2021-10-18

## 2021-10-18 PROBLEM — E87.6 HYPOKALEMIA: Status: ACTIVE | Noted: 2021-10-18

## 2021-10-18 PROBLEM — N18.9 CKD (CHRONIC KIDNEY DISEASE): Status: ACTIVE | Noted: 2021-10-18

## 2021-10-18 PROBLEM — D72.829 LEUKOCYTOSIS: Status: ACTIVE | Noted: 2021-10-18

## 2021-10-18 PROBLEM — S32.599A CLOSED FRACTURE OF MULTIPLE PUBIC RAMI (HCC): Status: ACTIVE | Noted: 2021-10-18

## 2021-10-18 LAB
ALBUMIN SERPL-MCNC: 3.5 G/DL (ref 3.5–5)
ALBUMIN/GLOB SERPL: 0.9 {RATIO} (ref 1.1–2.2)
ALP SERPL-CCNC: 67 U/L (ref 45–117)
ALT SERPL-CCNC: 29 U/L (ref 12–78)
ANION GAP SERPL CALC-SCNC: 8 MMOL/L (ref 5–15)
AST SERPL-CCNC: 22 U/L (ref 15–37)
BASOPHILS # BLD: 0 K/UL (ref 0–0.1)
BASOPHILS NFR BLD: 0 % (ref 0–1)
BILIRUB SERPL-MCNC: 1 MG/DL (ref 0.2–1)
BUN SERPL-MCNC: 23 MG/DL (ref 6–20)
BUN/CREAT SERPL: 11 (ref 12–20)
CALCIUM SERPL-MCNC: 8.3 MG/DL (ref 8.5–10.1)
CHLORIDE SERPL-SCNC: 98 MMOL/L (ref 97–108)
CO2 SERPL-SCNC: 28 MMOL/L (ref 21–32)
COMMENT, HOLDF: NORMAL
CREAT SERPL-MCNC: 2.15 MG/DL (ref 0.55–1.02)
DIFFERENTIAL METHOD BLD: ABNORMAL
EOSINOPHIL # BLD: 0.1 K/UL (ref 0–0.4)
EOSINOPHIL NFR BLD: 1 % (ref 0–7)
ERYTHROCYTE [DISTWIDTH] IN BLOOD BY AUTOMATED COUNT: 12.6 % (ref 11.5–14.5)
EST. AVERAGE GLUCOSE BLD GHB EST-MCNC: 128 MG/DL
GLOBULIN SER CALC-MCNC: 3.9 G/DL (ref 2–4)
GLUCOSE BLD STRIP.AUTO-MCNC: 150 MG/DL (ref 65–117)
GLUCOSE BLD STRIP.AUTO-MCNC: 160 MG/DL (ref 65–117)
GLUCOSE BLD STRIP.AUTO-MCNC: 80 MG/DL (ref 65–117)
GLUCOSE BLD STRIP.AUTO-MCNC: 90 MG/DL (ref 65–117)
GLUCOSE SERPL-MCNC: 167 MG/DL (ref 65–100)
HBA1C MFR BLD: 6.1 % (ref 4–5.6)
HCT VFR BLD AUTO: 36.8 % (ref 35–47)
HGB BLD-MCNC: 12.6 G/DL (ref 11.5–16)
IMM GRANULOCYTES # BLD AUTO: 0.1 K/UL (ref 0–0.04)
IMM GRANULOCYTES NFR BLD AUTO: 1 % (ref 0–0.5)
LYMPHOCYTES # BLD: 1.2 K/UL (ref 0.8–3.5)
LYMPHOCYTES NFR BLD: 10 % (ref 12–49)
MAGNESIUM SERPL-MCNC: 2.4 MG/DL (ref 1.6–2.4)
MCH RBC QN AUTO: 31.1 PG (ref 26–34)
MCHC RBC AUTO-ENTMCNC: 34.2 G/DL (ref 30–36.5)
MCV RBC AUTO: 90.9 FL (ref 80–99)
MONOCYTES # BLD: 0.5 K/UL (ref 0–1)
MONOCYTES NFR BLD: 4 % (ref 5–13)
NEUTS SEG # BLD: 10.2 K/UL (ref 1.8–8)
NEUTS SEG NFR BLD: 84 % (ref 32–75)
NRBC # BLD: 0 K/UL (ref 0–0.01)
NRBC BLD-RTO: 0 PER 100 WBC
PLATELET # BLD AUTO: 173 K/UL (ref 150–400)
PMV BLD AUTO: 11.5 FL (ref 8.9–12.9)
POTASSIUM SERPL-SCNC: 2.8 MMOL/L (ref 3.5–5.1)
PROCALCITONIN SERPL-MCNC: <0.05 NG/ML
PROT SERPL-MCNC: 7.4 G/DL (ref 6.4–8.2)
RBC # BLD AUTO: 4.05 M/UL (ref 3.8–5.2)
SAMPLES BEING HELD,HOLD: NORMAL
SERVICE CMNT-IMP: ABNORMAL
SERVICE CMNT-IMP: ABNORMAL
SERVICE CMNT-IMP: NORMAL
SERVICE CMNT-IMP: NORMAL
SODIUM SERPL-SCNC: 134 MMOL/L (ref 136–145)
WBC # BLD AUTO: 12.1 K/UL (ref 3.6–11)

## 2021-10-18 PROCEDURE — 74011636637 HC RX REV CODE- 636/637: Performed by: INTERNAL MEDICINE

## 2021-10-18 PROCEDURE — 72192 CT PELVIS W/O DYE: CPT

## 2021-10-18 PROCEDURE — 83735 ASSAY OF MAGNESIUM: CPT

## 2021-10-18 PROCEDURE — 83036 HEMOGLOBIN GLYCOSYLATED A1C: CPT

## 2021-10-18 PROCEDURE — 73030 X-RAY EXAM OF SHOULDER: CPT

## 2021-10-18 PROCEDURE — 74011250636 HC RX REV CODE- 250/636: Performed by: INTERNAL MEDICINE

## 2021-10-18 PROCEDURE — 84145 PROCALCITONIN (PCT): CPT

## 2021-10-18 PROCEDURE — 80053 COMPREHEN METABOLIC PANEL: CPT

## 2021-10-18 PROCEDURE — 65270000029 HC RM PRIVATE

## 2021-10-18 PROCEDURE — 72190 X-RAY EXAM OF PELVIS: CPT

## 2021-10-18 PROCEDURE — 74011250637 HC RX REV CODE- 250/637: Performed by: INTERNAL MEDICINE

## 2021-10-18 PROCEDURE — 36415 COLL VENOUS BLD VENIPUNCTURE: CPT

## 2021-10-18 PROCEDURE — 85025 COMPLETE CBC W/AUTO DIFF WBC: CPT

## 2021-10-18 PROCEDURE — 73200 CT UPPER EXTREMITY W/O DYE: CPT

## 2021-10-18 PROCEDURE — 71045 X-RAY EXAM CHEST 1 VIEW: CPT

## 2021-10-18 PROCEDURE — 73502 X-RAY EXAM HIP UNI 2-3 VIEWS: CPT

## 2021-10-18 PROCEDURE — 82962 GLUCOSE BLOOD TEST: CPT

## 2021-10-18 PROCEDURE — 74011250637 HC RX REV CODE- 250/637: Performed by: EMERGENCY MEDICINE

## 2021-10-18 RX ORDER — HYDRALAZINE HYDROCHLORIDE 20 MG/ML
10 INJECTION INTRAMUSCULAR; INTRAVENOUS
Status: DISCONTINUED | OUTPATIENT
Start: 2021-10-18 | End: 2021-10-29 | Stop reason: HOSPADM

## 2021-10-18 RX ORDER — MAGNESIUM SULFATE 100 %
4 CRYSTALS MISCELLANEOUS AS NEEDED
Status: DISCONTINUED | OUTPATIENT
Start: 2021-10-18 | End: 2021-10-23

## 2021-10-18 RX ORDER — DEXTROSE 50 % IN WATER (D50W) INTRAVENOUS SYRINGE
12.5-25 AS NEEDED
Status: DISCONTINUED | OUTPATIENT
Start: 2021-10-18 | End: 2021-10-29 | Stop reason: HOSPADM

## 2021-10-18 RX ORDER — FUROSEMIDE 40 MG/1
40 TABLET ORAL 2 TIMES DAILY
COMMUNITY
End: 2021-12-27

## 2021-10-18 RX ORDER — SODIUM CHLORIDE 0.9 % (FLUSH) 0.9 %
5-40 SYRINGE (ML) INJECTION EVERY 8 HOURS
Status: DISCONTINUED | OUTPATIENT
Start: 2021-10-18 | End: 2021-10-29 | Stop reason: HOSPADM

## 2021-10-18 RX ORDER — INSULIN LISPRO 100 [IU]/ML
INJECTION, SOLUTION INTRAVENOUS; SUBCUTANEOUS
Status: DISCONTINUED | OUTPATIENT
Start: 2021-10-18 | End: 2021-10-21

## 2021-10-18 RX ORDER — MORPHINE SULFATE 2 MG/ML
2 INJECTION, SOLUTION INTRAMUSCULAR; INTRAVENOUS
Status: DISCONTINUED | OUTPATIENT
Start: 2021-10-18 | End: 2021-10-29 | Stop reason: HOSPADM

## 2021-10-18 RX ORDER — SODIUM CHLORIDE 0.9 % (FLUSH) 0.9 %
5-40 SYRINGE (ML) INJECTION AS NEEDED
Status: DISCONTINUED | OUTPATIENT
Start: 2021-10-18 | End: 2021-10-29 | Stop reason: HOSPADM

## 2021-10-18 RX ORDER — AMLODIPINE BESYLATE 2.5 MG/1
2.5 TABLET ORAL DAILY
Status: ON HOLD | COMMUNITY
End: 2021-12-25 | Stop reason: SDUPTHER

## 2021-10-18 RX ORDER — ALBUTEROL SULFATE 0.83 MG/ML
2.5 SOLUTION RESPIRATORY (INHALATION)
COMMUNITY
End: 2022-06-09

## 2021-10-18 RX ORDER — HEPARIN SODIUM 5000 [USP'U]/ML
5000 INJECTION, SOLUTION INTRAVENOUS; SUBCUTANEOUS EVERY 8 HOURS
Status: DISPENSED | OUTPATIENT
Start: 2021-10-18 | End: 2021-10-24

## 2021-10-18 RX ORDER — ACETAMINOPHEN 325 MG/1
650 TABLET ORAL EVERY 6 HOURS
Status: DISCONTINUED | OUTPATIENT
Start: 2021-10-18 | End: 2021-10-29 | Stop reason: HOSPADM

## 2021-10-18 RX ORDER — AMLODIPINE BESYLATE 5 MG/1
2.5 TABLET ORAL DAILY
Status: DISCONTINUED | OUTPATIENT
Start: 2021-10-18 | End: 2021-10-21

## 2021-10-18 RX ORDER — OXYCODONE HYDROCHLORIDE 5 MG/1
5 TABLET ORAL
Status: DISCONTINUED | OUTPATIENT
Start: 2021-10-18 | End: 2021-10-29 | Stop reason: HOSPADM

## 2021-10-18 RX ORDER — ONDANSETRON 2 MG/ML
4 INJECTION INTRAMUSCULAR; INTRAVENOUS
Status: DISCONTINUED | OUTPATIENT
Start: 2021-10-18 | End: 2021-10-29 | Stop reason: HOSPADM

## 2021-10-18 RX ORDER — GLIMEPIRIDE 4 MG/1
4 TABLET ORAL
COMMUNITY
End: 2021-12-27

## 2021-10-18 RX ORDER — SODIUM CHLORIDE 9 MG/ML
125 INJECTION, SOLUTION INTRAVENOUS ONCE
Status: DISCONTINUED | OUTPATIENT
Start: 2021-10-18 | End: 2021-10-18

## 2021-10-18 RX ORDER — SODIUM CHLORIDE 9 MG/ML
75 INJECTION, SOLUTION INTRAVENOUS CONTINUOUS
Status: DISCONTINUED | OUTPATIENT
Start: 2021-10-18 | End: 2021-10-18

## 2021-10-18 RX ORDER — POTASSIUM CHLORIDE 750 MG/1
40 TABLET, FILM COATED, EXTENDED RELEASE ORAL
Status: COMPLETED | OUTPATIENT
Start: 2021-10-18 | End: 2021-10-18

## 2021-10-18 RX ORDER — ACETAMINOPHEN 500 MG
1000 TABLET ORAL
Status: COMPLETED | OUTPATIENT
Start: 2021-10-18 | End: 2021-10-18

## 2021-10-18 RX ORDER — NALOXONE HYDROCHLORIDE 0.4 MG/ML
0.4 INJECTION, SOLUTION INTRAMUSCULAR; INTRAVENOUS; SUBCUTANEOUS AS NEEDED
Status: DISCONTINUED | OUTPATIENT
Start: 2021-10-18 | End: 2021-10-26 | Stop reason: SDUPTHER

## 2021-10-18 RX ADMIN — ACETAMINOPHEN 1000 MG: 500 TABLET ORAL at 04:13

## 2021-10-18 RX ADMIN — INSULIN LISPRO 2 UNITS: 100 INJECTION, SOLUTION INTRAVENOUS; SUBCUTANEOUS at 16:30

## 2021-10-18 RX ADMIN — OXYCODONE 5 MG: 5 TABLET ORAL at 20:49

## 2021-10-18 RX ADMIN — HEPARIN SODIUM 5000 UNITS: 5000 INJECTION INTRAVENOUS; SUBCUTANEOUS at 05:14

## 2021-10-18 RX ADMIN — INSULIN LISPRO 2 UNITS: 100 INJECTION, SOLUTION INTRAVENOUS; SUBCUTANEOUS at 07:30

## 2021-10-18 RX ADMIN — POTASSIUM CHLORIDE 40 MEQ: 750 TABLET, FILM COATED, EXTENDED RELEASE ORAL at 04:12

## 2021-10-18 RX ADMIN — Medication 10 ML: at 23:19

## 2021-10-18 RX ADMIN — ONDANSETRON 4 MG: 2 INJECTION INTRAMUSCULAR; INTRAVENOUS at 20:48

## 2021-10-18 RX ADMIN — SODIUM CHLORIDE 75 ML/HR: 9 INJECTION, SOLUTION INTRAVENOUS at 17:22

## 2021-10-18 RX ADMIN — SODIUM CHLORIDE 75 ML/HR: 9 INJECTION, SOLUTION INTRAVENOUS at 02:38

## 2021-10-18 RX ADMIN — ACETAMINOPHEN 650 MG: 325 TABLET ORAL at 13:05

## 2021-10-18 RX ADMIN — AMLODIPINE BESYLATE 2.5 MG: 5 TABLET ORAL at 13:05

## 2021-10-18 RX ADMIN — ONDANSETRON 4 MG: 2 INJECTION INTRAMUSCULAR; INTRAVENOUS at 04:12

## 2021-10-18 NOTE — PROGRESS NOTES
[facility]: MED RECONCILIATION    Comments/Recommendations:   PTA medications reviewed with patient at bedside, patient is a good historian  Reviewed history lisinopril 5 mg; metoprolol XL 50 mg; HCTZ 12.5 mg; Actos 15 mg--> Patient states these medications have been discontinued. Patient does not use a maintenance inhaler    Medications added:     Albuterol nebs  Amlodipine 2.5 mg daily  Lasix 40 mg BID  Amaryl 4 mg daily    Medications removed:    Prednisone    Information obtained from: Patient, RxQuery, Chart review    Allergies: Codeine, Compazine [prochlorperazine], Dilaudid [hydromorphone], Morphine, and Sulfa (sulfonamide antibiotics)    Prior to Admission Medications:     Medication Documentation Review Audit       Reviewed by Lark Kussmaul, PHARMD (Pharmacist) on 10/18/21 at 7286      Medication Sig Documenting Provider Last Dose Status Taking? albuterol (PROVENTIL HFA, VENTOLIN HFA, PROAIR HFA) 90 mcg/actuation inhaler Take 2 Puffs by inhalation every four (4) hours as needed for Wheezing. Allan Perla MD  Active Yes   albuterol (PROVENTIL VENTOLIN) 2.5 mg /3 mL (0.083 %) nebu 2.5 mg by Nebulization route every four (4) hours as needed for Wheezing or Shortness of Breath. Provider, Historical  Active Yes   amLODIPine (NORVASC) 2.5 mg tablet Take 2.5 mg by mouth daily. Provider, Historical  Active Yes   furosemide (Lasix) 40 mg tablet Take 40 mg by mouth two (2) times a day. Provider, Historical  Active Yes   glimepiride (AMARYL) 4 mg tablet Take 4 mg by mouth every morning.  Provider, Historical  Active Yes                  Kelly Ewing [facility]   Contact: [phone]

## 2021-10-18 NOTE — ED NOTES
Bedside and Verbal shift change report given to Prasad Verdin (oncoming nurse) by Julio C Morales (offgoing nurse). Report included the following information SBAR, Kardex, ED Summary, Intake/Output, MAR and Recent Results.

## 2021-10-18 NOTE — H&P
[facility]  [address], [facility] [address]  [phone]    Admission History and Physical      NAME:  Maria Eugenia Boyce   :   1934   MRN:  [id number]     PCP:  Clyde Luo NP     Date/Time:  10/18/2021         Subjective:     CHIEF COMPLAINT: \"I fell\"     HISTORY OF PRESENT ILLNESS:     Ms. Moses Azul is a 80 y.o.  female with PMH of DM, CKD admitted s/p fall for eval. Noted to have femoral neck fracture and pubic rami fracture. Pt's only complaint is RUE pain that is currently controlled with Tylenol. Lives with her daughter. Typically walks with a walker. PMH  DM   CKD   HTN     No past surgical history on file. SH:  Denies tobacco abuse     FH  XOL     Allergies   Allergen Reactions    Codeine Nausea and Vomiting    Compazine [Prochlorperazine] Other (comments)     Facial droop    Dilaudid [Hydromorphone] Nausea and Vomiting    Morphine Nausea and Vomiting    Sulfa (Sulfonamide Antibiotics) Nausea and Vomiting        Prior to Admission medications    Medication Sig Start Date End Date Taking? Authorizing Provider   albuterol (PROVENTIL HFA, VENTOLIN HFA, PROAIR HFA) 90 mcg/actuation inhaler Take 2 Puffs by inhalation every four (4) hours as needed for Wheezing. 21   Adriana Barlow MD   inhalational spacing device 1 Each by Does Not Apply route as needed (wheezing).  21   Adriana Barlow MD   predniSONE (STERAPRED DS) 10 mg dose pack See administration instruction per 10mg dose pack 21   Adriana Barlow MD         Review of Systems:  (bold if positive, if negative)    Gen:  Eyes:  ENT:  CVS:  Pulm:  GI:    :    MS:  Skin:  Psych:  Endo:    Hem:  Renal:    Neuro:     R arm pain        Objective:      VITALS:    Vital signs reviewed; most recent are:    Visit Vitals  BP (!) 172/89 (BP 1 Location: Left upper arm, BP Patient Position: At rest)   Pulse 83   Temp 97.7 °F (36.5 °C)   Resp 20   Ht 5' 2\" (1.575 m)   Wt 63.5 kg (140 lb)   SpO2 98%   BMI 25.61 kg/m²     SpO2 Readings from Last 6 Encounters:   10/17/21 98%   07/25/21 96%        No intake or output data in the 24 hours ending 10/18/21 0520         Exam:     Physical Exam:    Gen:  Well-developed, well-nourished, in no acute distress  HEENT: Slight right sided eyelid droop. No facial asymmetry otherwise   Neck:  Supple, without masses, thyroid non-tender  Resp:  No accessory muscle use, clear breath sounds without wheezes rales or rhonchi  Card:  No murmurs, normal S1, S2 without thrills, bruits or peripheral edema  Abd:  Soft, non-tender, non-distended, normoactive bowel sounds are present, no palpable organomegaly  Lymph:  No cervical adenopathy  Musc:  No cyanosis or clubbing  Skin:  No rashes or ulcers, skin turgor is good  Neuro:  RUE in sling. Note eyelid drooping above. No other focal deficits   Psych:  Alert with good insight. Oriented to person, place, and time       Labs:    Recent Labs     10/18/21  0222   WBC 12.1*   HGB 12.6   HCT 36.8        Recent Labs     10/18/21  0222   *   K 2.8*   CL 98   CO2 28   *   BUN 23*   CREA 2.15*   CA 8.3*   MG 2.4   ALB 3.5   ALT 29     No components found for: GLPOC  No results for input(s): PH, PCO2, PO2, HCO3, FIO2 in the last 72 hours. No results for input(s): INR, INREXT in the last 72 hours. CT pelvis =>   Acute nondisplaced right superior and inferior pubic rami fractures. Proximal  right femoral intramedullary hardware is intact. XR shoulder =>   1. Acute comminuted right humeral neck and greater tuberosity fracture with mild  displacement.     2. Age-indeterminate nondisplaced fractures of the superior and inferior right  pubic rami that are new since 10/18/2017. Intact proximal femoral intramedullary  hardware.      Assessment/Plan:     Humeral surgical neck fracture (10/18/2021)  -defer surgical repair to ortho  -immobilize in sling for now   -scheduled Tylenol       Closed fracture of multiple pubic rami ([facility] [address] (10/18/2021)  -supportive care   -pain management   -PT/OT   -likely will need placement after #1 repair       HTN (hypertension) (10/18/2021)  -confirm outpt meds; per [facility] on metoprolol, ACE I, HCTZ.  If this is the case, with CKD, would stop HCTZ       DM (diabetes mellitus) ([facility] [address] (10/18/2021)  -ISS   -BG checks AC TID and qHS       Leukocytosis (10/18/2021) - ?reactive to fracture   -check UA, CXR   -procalcitonin       CKD (chronic kidney disease) (10/18/2021) - ?baseline   -hold HCTZ for now and would not continue considering CKD  -on ACE I; hold for now until able to delineate baseline (appears to be 1.4-1.5)       Hypokalemia (10/18/2021)  -replete     Surrogate decision maker: Daughter     Total time spent with patient: 79 [address] discussed with: Patient and Nursing Staff    Discussed:  Care Plan    Prophylaxis:  Hep SQ    Probable Disposition:  SNF/LTC           ___________________________________________________    Attending Physician: Domenica Li MD

## 2021-10-18 NOTE — ED PROVIDER NOTES
Patient is an 80-year-old female who presents emergency department for evaluation of right shoulder and hip pain after a mechanical fall. She states she slipped causing her to fall on her right side. Denies hitting her head or having any neck or back pain. Denies any shortness of breath or chest pain. Denies any abdominal pain. She states she was helped up and able to bear weight with some pain on her right leg. No past medical history on file. No past surgical history on file. No family history on file. Social History     Socioeconomic History    Marital status:      Spouse name: Not on file    Number of children: Not on file    Years of education: Not on file    Highest education level: Not on file   Occupational History    Not on file   Tobacco Use    Smoking status: Not on file   Substance and Sexual Activity    Alcohol use: Not on file    Drug use: Not on file    Sexual activity: Not on file   Other Topics Concern    Not on file   Social History Narrative    Not on file     Social Determinants of Health     Financial Resource Strain:     Difficulty of Paying Living Expenses:    Food Insecurity:     Worried About Running Out of Food in the Last Year:     [address] in the Last Year:    Transportation Needs:     Lack of Transportation (Medical):  Lack of Transportation (Non-Medical):    Physical Activity:     Days of Exercise per Week:     Minutes of Exercise per Session:    Stress:     Feeling of Stress :    Social Connections:     Frequency of Communication with Friends and Family:     Frequency of Social Gatherings with Friends and Family:     Attends [employer] Services:     Active Member of Clubs or Organizations:     Attends Club or Organization Meetings:     Marital Status:    Intimate Partner Violence:     Fear of Current or Ex-Partner:     Emotionally Abused:     Physically Abused:     Sexually Abused:           ALLERGIES: Codeine, Compazine [prochlorperazine], Dilaudid [hydromorphone], Morphine, and Sulfa (sulfonamide antibiotics)    Review of Systems   Constitutional: Negative for diaphoresis and fever. HENT: Negative for drooling. Eyes: Negative for photophobia. Respiratory: Negative for shortness of breath and stridor. Cardiovascular: Negative for chest pain. Gastrointestinal: Negative for abdominal pain, nausea and vomiting. Musculoskeletal: Positive for arthralgias. Negative for back pain, neck pain and neck stiffness. Skin: Negative for rash and wound. Neurological: Negative for dizziness, seizures, syncope, speech difficulty, weakness, light-headedness, numbness and headaches. Psychiatric/Behavioral: Negative. Vitals:    10/17/21 2354   BP: (!) 172/89   Pulse: 83   Resp: 20   Temp: 97.7 °F (36.5 °C)   SpO2: 98%   Weight: 63.5 kg (140 lb)   Height: 5' 2\" (1.575 m)            Physical Exam  Vitals and nursing note reviewed. Constitutional:       Appearance: She is not toxic-appearing or diaphoretic. HENT:      Head: Normocephalic and atraumatic. Right Ear: External ear normal.      Left Ear: External ear normal.      Nose: Nose normal.      Mouth/Throat:      Comments: Mask in place  Eyes:      General: No scleral icterus. Cardiovascular:      Rate and Rhythm: Normal rate. Pulses: Normal pulses. Pulmonary:      Effort: Pulmonary effort is normal.      Breath sounds: Normal breath sounds. Chest:      Chest wall: No tenderness. Abdominal:      Palpations: Abdomen is soft. Tenderness: There is no abdominal tenderness. There is no guarding or rebound. Musculoskeletal:         General: No deformity. Right shoulder: Tenderness present. No deformity or crepitus. Decreased range of motion.       Left shoulder: Normal.      Right elbow: Normal.      Left elbow: Normal.      Right wrist: Normal.      Left wrist: Normal.      Right hand: Normal.      Left hand: Normal.      Cervical back: Normal range of motion. No tenderness. Right hip: Tenderness present. No deformity, lacerations or crepitus. Decreased range of motion. Left hip: Normal.      Right knee: Normal range of motion. No tenderness. Left knee: Normal range of motion. No tenderness. Right lower leg: Edema present. Left lower leg: Edema present. Comments: 1 + pitting edema bilaterally   Neurological:      Mental Status: She is alert and oriented to person, place, and time. Psychiatric:         Mood and Affect: Mood normal.         Behavior: Behavior normal.         Thought Content: Thought content normal.         Judgment: Judgment normal.          MDM  Number of Diagnoses or Management Options  Chronic renal failure, unspecified CKD stage  Closed displaced fracture of surgical neck of right humerus, unspecified fracture morphology, initial encounter  Closed fracture of multiple pubic rami, right, initial encounter ([facility] [address]  Fall, initial encounter  Diagnosis management comments: Discussed with hospitalist to admit the patient for orthopedics.        Amount and/or Complexity of Data Reviewed  Clinical lab tests: ordered and reviewed  Tests in the radiology section of CPT®: ordered and reviewed  Decide to obtain previous medical records or to obtain history from someone other than the patient: yes  Discuss the patient with other providers: yes           Procedures

## 2021-10-18 NOTE — PROGRESS NOTES
Paged Derek via [employer], \"Pt requesting diet order, no note from ortho re status of sx and/or plan of care\"

## 2021-10-18 NOTE — PROGRESS NOTES
Occupational Therapy Note:    Orders acknowledged and chart reviewed, spoke to pt's nurse. Pt with R humeral neck fx and pubic rami fx after GLF, pending ortho consult. Will defer OT evaluation at this time and follow up as appropriate. Thank you.     Slick Gómez, OTR/L

## 2021-10-18 NOTE — CONSULTS
ORTHOPAEDIC CONSULT NOTE    Subjective:     Date of Consultation:  October 18, 2021      Carol Christy is a 80 y.o. female who is being seen for R shoulder pain and R hip pain s/p GLF last PM. Work up has reveled R proximal humeral neck with greater tuberosity fracture with R sup/inf pubic rami fracture. Pt reports she lives at home one of her daughters and is active and independent with ADLs normally. R hand dominant, denies R hand numbness/tingling. Pt's family at bedside during exam. Plan of care discussed with pt and family, all questions/concerns addressed and pt and family verbalized understanding of plan of care. PMH of R IMN and R sided pubuc rami fx in 2010    Patient Active Problem List    Diagnosis Date Noted    Humeral surgical neck fracture 10/18/2021    Closed fracture of multiple pubic rami ([facility] [address] 10/18/2021    Fx humeral neck 10/18/2021    HTN (hypertension) 10/18/2021    DM (diabetes mellitus) ([facility] [address] 10/18/2021    Leukocytosis 10/18/2021    CKD (chronic kidney disease) 10/18/2021    Hypokalemia 10/18/2021     No family history on file. Social History     Tobacco Use    Smoking status: Not on file   Substance Use Topics    Alcohol use: Not on file     No past medical history on file. No past surgical history on file. Prior to Admission medications    Medication Sig Start Date End Date Taking? Authorizing Provider   albuterol (PROVENTIL VENTOLIN) 2.5 mg /3 mL (0.083 %) nebu 2.5 mg by Nebulization route every four (4) hours as needed for Wheezing or Shortness of Breath. Yes Provider, Historical   amLODIPine (NORVASC) 2.5 mg tablet Take 2.5 mg by mouth daily. Yes Provider, Historical   furosemide (Lasix) 40 mg tablet Take 40 mg by mouth two (2) times a day. Yes Provider, Historical   glimepiride (AMARYL) 4 mg tablet Take 4 mg by mouth every morning.    Yes Provider, Historical   albuterol (PROVENTIL HFA, VENTOLIN HFA, PROAIR HFA) 90 mcg/actuation inhaler Take 2 Puffs by inhalation every four (4) hours as needed for Wheezing. 7/25/21  Yes Claudia Cartwright MD     Current Facility-Administered Medications   Medication Dose Route Frequency    acetaminophen (TYLENOL) tablet 650 mg  650 mg Oral Q6H    sodium chloride (NS) flush 5-40 mL  5-40 mL IntraVENous Q8H    sodium chloride (NS) flush 5-40 mL  5-40 mL IntraVENous PRN    morphine injection 2 mg  2 mg IntraVENous Q4H PRN    naloxone (NARCAN) injection 0.4 mg  0.4 mg IntraVENous PRN    ondansetron (ZOFRAN) injection 4 mg  4 mg IntraVENous Q4H PRN    [Held by provider] heparin (porcine) injection 5,000 Units  5,000 Units SubCUTAneous Q8H    0.9% sodium chloride infusion  75 mL/hr IntraVENous CONTINUOUS    oxyCODONE IR (ROXICODONE) tablet 5 mg  5 mg Oral Q4H PRN    insulin lispro (HUMALOG) injection   SubCUTAneous AC&HS    glucose chewable tablet 16 g  4 Tablet Oral PRN    dextrose (D50W) injection syrg 12.5-25 g  12.5-25 g IntraVENous PRN    glucagon (GLUCAGEN) injection 1 mg  1 mg IntraMUSCular PRN    hydrALAZINE (APRESOLINE) 20 mg/mL injection 10 mg  10 mg IntraVENous Q6H PRN     Current Outpatient Medications   Medication Sig    albuterol (PROVENTIL VENTOLIN) 2.5 mg /3 mL (0.083 %) nebu 2.5 mg by Nebulization route every four (4) hours as needed for Wheezing or Shortness of Breath.  amLODIPine (NORVASC) 2.5 mg tablet Take 2.5 mg by mouth daily.  furosemide (Lasix) 40 mg tablet Take 40 mg by mouth two (2) times a day.  glimepiride (AMARYL) 4 mg tablet Take 4 mg by mouth every morning.  albuterol (PROVENTIL HFA, VENTOLIN HFA, PROAIR HFA) 90 mcg/actuation inhaler Take 2 Puffs by inhalation every four (4) hours as needed for Wheezing.       Allergies   Allergen Reactions    Codeine Nausea and Vomiting    Compazine [Prochlorperazine] Other (comments)     Facial droop    Dilaudid [Hydromorphone] Nausea and Vomiting    Morphine Nausea and Vomiting    Sulfa (Sulfonamide Antibiotics) Nausea and Vomiting Review of Systems:  A comprehensive review of systems was negative except for that written in the HPI. Mental Status: no dementia    Objective:     No data found. Temp (24hrs), Av.7 °F (36.5 °C), Min:97.7 °F (36.5 °C), Max:97.7 °F (36.5 °C)      Gen: Well-developed,  in no acute distress    Musc: RUE - sling in place, no hand edema noted with + ROM of fingers and wrist, + mild shoulder edema, NVI     + TTP of R pelvis with pain of ROM RLE, bilat LE NVI    Skin: No skin breakdown noted. Skin warm, pink, dry  Neuro: Cranial nerves are grossly intact, no focal motor weakness, follows commands appropriately   Psych: Good insight, oriented to person, place and time, alert    Imaging Review:   Narrative & Impression   EXAM:  XR PELV 3 V     INDICATION: Right hip pain after fall     COMPARISON: CT and radiographs 10/18/2021.     TECHNIQUE: 3 views pelvis     FINDINGS: There are acute nondisplaced fractures of the right superior and  inferior pubic rami. Sacroiliac and hip joint spaces are maintained. Proximal  right femoral intramedullary hardware is intact.     IMPRESSION  Acute nondisplaced fractures of the right superior and inferior  pubic rami.         Narrative & Impression   EXAM:  CR hip right with without pelvis 2-3 views     INDICATION: Right shoulder and hip pain after fall     COMPARISON: Chest radiographs 2021. Right hip CT 10/18/2017.     FINDINGS:   3 views right shoulder. There is an acute comminuted mildly displaced humeral  neck and greater tuberosity fracture. The joint spaces are maintained.     Frontal pelvis view and lateral view right hip. There are age-indeterminate  nondisplaced fractures of the superior and inferior right pubic rami that are  new since 10/18/2017. Proximal femoral intramedullary hardware is intact. The  sacroiliac hip joint spaces are maintained. Pelvic phleboliths are noted.     IMPRESSION  1.  Acute comminuted right humeral neck and greater tuberosity fracture with mild  displacement.     2. Age-indeterminate nondisplaced fractures of the superior and inferior right  pubic rami that are new since 10/18/2017. Intact proximal femoral intramedullary  hardware. Labs:   Recent Results (from the past 24 hour(s))   CBC WITH AUTOMATED DIFF    Collection Time: 10/18/21  2:22 AM   Result Value Ref Range    WBC 12.1 (H) 3.6 - 11.0 K/uL    RBC 4.05 3.80 - 5.20 M/uL    HGB 12.6 11.5 - 16.0 g/dL    HCT 36.8 35.0 - 47.0 %    MCV 90.9 80.0 - 99.0 FL    MCH 31.1 26.0 - 34.0 PG    MCHC 34.2 30.0 - 36.5 g/dL    RDW 12.6 11.5 - 14.5 %    PLATELET 254 133 - 939 K/uL    MPV 11.5 8.9 - 12.9 FL    NRBC 0.0 0  WBC    ABSOLUTE NRBC 0.00 0.00 - 0.01 K/uL    NEUTROPHILS 84 (H) 32 - 75 %    LYMPHOCYTES 10 (L) 12 - 49 %    MONOCYTES 4 (L) 5 - 13 %    EOSINOPHILS 1 0 - 7 %    BASOPHILS 0 0 - 1 %    IMMATURE GRANULOCYTES 1 (H) 0.0 - 0.5 %    ABS. NEUTROPHILS 10.2 (H) 1.8 - 8.0 K/UL    ABS. LYMPHOCYTES 1.2 0.8 - 3.5 K/UL    ABS. MONOCYTES 0.5 0.0 - 1.0 K/UL    ABS. EOSINOPHILS 0.1 0.0 - 0.4 K/UL    ABS. BASOPHILS 0.0 0.0 - 0.1 K/UL    ABS. IMM. GRANS. 0.1 (H) 0.00 - 0.04 K/UL    DF AUTOMATED     METABOLIC PANEL, COMPREHENSIVE    Collection Time: 10/18/21  2:22 AM   Result Value Ref Range    Sodium 134 (L) 136 - 145 mmol/L    Potassium 2.8 (L) 3.5 - 5.1 mmol/L    Chloride 98 97 - 108 mmol/L    CO2 28 21 - 32 mmol/L    Anion gap 8 5 - 15 mmol/L    Glucose 167 (H) 65 - 100 mg/dL    BUN 23 (H) 6 - 20 MG/DL    Creatinine 2.15 (H) 0.55 - 1.02 MG/DL    BUN/Creatinine ratio 11 (L) 12 - 20      GFR est AA 26 (L) >60 ml/min/1.73m2    GFR est non-AA 22 (L) >60 ml/min/1.73m2    Calcium 8.3 (L) 8.5 - 10.1 MG/DL    Bilirubin, total 1.0 0.2 - 1.0 MG/DL    ALT (SGPT) 29 12 - 78 U/L    AST (SGOT) 22 15 - 37 U/L    Alk.  phosphatase 67 45 - 117 U/L    Protein, total 7.4 6.4 - 8.2 g/dL    Albumin 3.5 3.5 - 5.0 g/dL    Globulin 3.9 2.0 - 4.0 g/dL    A-G Ratio 0.9 (L) 1.1 - 2.2     MAGNESIUM    Collection Time: 10/18/21  2:22 AM   Result Value Ref Range    Magnesium 2.4 1.6 - 2.4 mg/dL   SAMPLES BEING HELD    Collection Time: 10/18/21  2:22 AM   Result Value Ref Range    SAMPLES BEING HELD 1SST,1RED,DRKGRN     COMMENT        Add-on orders for these samples will be processed based on acceptable specimen integrity and analyte stability, which may vary by analyte. PROCALCITONIN    Collection Time: 10/18/21  2:22 AM   Result Value Ref Range    Procalcitonin <0.05 ng/mL   GLUCOSE, POC    Collection Time: 10/18/21  8:50 AM   Result Value Ref Range    Glucose (POC) 160 (H) 65 - 117 mg/dL    Performed by Netta Garcia          Impression:     Patient Active Problem List    Diagnosis Date Noted    Humeral surgical neck fracture 10/18/2021    Closed fracture of multiple pubic rami ([address] 10/18/2021    Fx humeral neck 10/18/2021    HTN (hypertension) 10/18/2021    DM (diabetes mellitus) ([address] 10/18/2021    Leukocytosis 10/18/2021    CKD (chronic kidney disease) 10/18/2021    Hypokalemia 10/18/2021     Principal Problem:    Humeral surgical neck fracture (10/18/2021)    Active Problems:    Closed fracture of multiple pubic rami (HCC) (10/18/2021)      Fx humeral neck (10/18/2021)      HTN (hypertension) (10/18/2021)      DM (diabetes mellitus) ([address] (10/18/2021)      Leukocytosis (10/18/2021)      CKD (chronic kidney disease) (10/18/2021)      Hypokalemia (10/18/2021)        Plan:   -  Pt is stable orthopaedically, Non-Operative management at this time  -  R proximal fracture - continue sling and NWB RUE with plan for Dr. Janelle Vásquez to see the pt to discuss reverse TSA with timing to be determined   -  R sup/inf pubic rami fracture - WBAT with alesia Graham aware and agrees with plan as above.         Gennaro Jon, NP  Orthopedic Nurse Practitioner   [place]

## 2021-10-18 NOTE — PROGRESS NOTES
Chart reviewed. Awaiting orthopedics consult. Reviewed home med list; add home amlodipine due to hypertension. Continue pain control.

## 2021-10-19 LAB
ANION GAP SERPL CALC-SCNC: 7 MMOL/L (ref 5–15)
BASOPHILS # BLD: 0.1 K/UL (ref 0–0.1)
BASOPHILS NFR BLD: 1 % (ref 0–1)
BUN SERPL-MCNC: 30 MG/DL (ref 6–20)
BUN/CREAT SERPL: 11 (ref 12–20)
CALCIUM SERPL-MCNC: 8 MG/DL (ref 8.5–10.1)
CHLORIDE SERPL-SCNC: 107 MMOL/L (ref 97–108)
CO2 SERPL-SCNC: 26 MMOL/L (ref 21–32)
CREAT SERPL-MCNC: 2.8 MG/DL (ref 0.55–1.02)
DIFFERENTIAL METHOD BLD: ABNORMAL
EOSINOPHIL # BLD: 0.1 K/UL (ref 0–0.4)
EOSINOPHIL NFR BLD: 1 % (ref 0–7)
ERYTHROCYTE [DISTWIDTH] IN BLOOD BY AUTOMATED COUNT: 13 % (ref 11.5–14.5)
GLUCOSE BLD STRIP.AUTO-MCNC: 102 MG/DL (ref 65–117)
GLUCOSE BLD STRIP.AUTO-MCNC: 111 MG/DL (ref 65–117)
GLUCOSE BLD STRIP.AUTO-MCNC: 135 MG/DL (ref 65–117)
GLUCOSE BLD STRIP.AUTO-MCNC: 178 MG/DL (ref 65–117)
GLUCOSE BLD STRIP.AUTO-MCNC: 56 MG/DL (ref 65–117)
GLUCOSE BLD STRIP.AUTO-MCNC: 62 MG/DL (ref 65–117)
GLUCOSE SERPL-MCNC: 127 MG/DL (ref 65–100)
HCT VFR BLD AUTO: 31.2 % (ref 35–47)
HGB BLD-MCNC: 10.3 G/DL (ref 11.5–16)
IMM GRANULOCYTES # BLD AUTO: 0 K/UL (ref 0–0.04)
IMM GRANULOCYTES NFR BLD AUTO: 0 % (ref 0–0.5)
LYMPHOCYTES # BLD: 1.3 K/UL (ref 0.8–3.5)
LYMPHOCYTES NFR BLD: 14 % (ref 12–49)
MAGNESIUM SERPL-MCNC: 2.6 MG/DL (ref 1.6–2.4)
MCH RBC QN AUTO: 29.9 PG (ref 26–34)
MCHC RBC AUTO-ENTMCNC: 33 G/DL (ref 30–36.5)
MCV RBC AUTO: 90.7 FL (ref 80–99)
MONOCYTES # BLD: 0.6 K/UL (ref 0–1)
MONOCYTES NFR BLD: 6 % (ref 5–13)
NEUTS SEG # BLD: 7.1 K/UL (ref 1.8–8)
NEUTS SEG NFR BLD: 78 % (ref 32–75)
NRBC # BLD: 0 K/UL (ref 0–0.01)
NRBC BLD-RTO: 0 PER 100 WBC
PLATELET # BLD AUTO: 162 K/UL (ref 150–400)
PMV BLD AUTO: 12 FL (ref 8.9–12.9)
POTASSIUM SERPL-SCNC: 3.5 MMOL/L (ref 3.5–5.1)
RBC # BLD AUTO: 3.44 M/UL (ref 3.8–5.2)
SERVICE CMNT-IMP: ABNORMAL
SERVICE CMNT-IMP: NORMAL
SERVICE CMNT-IMP: NORMAL
SODIUM SERPL-SCNC: 140 MMOL/L (ref 136–145)
WBC # BLD AUTO: 9.2 K/UL (ref 3.6–11)

## 2021-10-19 PROCEDURE — 74011250637 HC RX REV CODE- 250/637: Performed by: INTERNAL MEDICINE

## 2021-10-19 PROCEDURE — 80048 BASIC METABOLIC PNL TOTAL CA: CPT

## 2021-10-19 PROCEDURE — 97165 OT EVAL LOW COMPLEX 30 MIN: CPT

## 2021-10-19 PROCEDURE — 74011000250 HC RX REV CODE- 250: Performed by: INTERNAL MEDICINE

## 2021-10-19 PROCEDURE — 77030038269 HC DRN EXT URIN PURWCK BARD -A

## 2021-10-19 PROCEDURE — 85025 COMPLETE CBC W/AUTO DIFF WBC: CPT

## 2021-10-19 PROCEDURE — 97110 THERAPEUTIC EXERCISES: CPT

## 2021-10-19 PROCEDURE — 74011250636 HC RX REV CODE- 250/636: Performed by: INTERNAL MEDICINE

## 2021-10-19 PROCEDURE — 97535 SELF CARE MNGMENT TRAINING: CPT

## 2021-10-19 PROCEDURE — 65270000029 HC RM PRIVATE

## 2021-10-19 PROCEDURE — 36415 COLL VENOUS BLD VENIPUNCTURE: CPT

## 2021-10-19 PROCEDURE — 82962 GLUCOSE BLOOD TEST: CPT

## 2021-10-19 PROCEDURE — 83735 ASSAY OF MAGNESIUM: CPT

## 2021-10-19 RX ORDER — SODIUM CHLORIDE 9 MG/ML
75 INJECTION, SOLUTION INTRAVENOUS CONTINUOUS
Status: DISPENSED | OUTPATIENT
Start: 2021-10-19 | End: 2021-10-20

## 2021-10-19 RX ADMIN — Medication 16 G: at 16:50

## 2021-10-19 RX ADMIN — ACETAMINOPHEN 650 MG: 325 TABLET ORAL at 12:00

## 2021-10-19 RX ADMIN — Medication 10 ML: at 07:09

## 2021-10-19 RX ADMIN — ACETAMINOPHEN 650 MG: 325 TABLET ORAL at 22:39

## 2021-10-19 RX ADMIN — ACETAMINOPHEN 650 MG: 325 TABLET ORAL at 07:09

## 2021-10-19 RX ADMIN — SODIUM CHLORIDE 75 ML/HR: 9 INJECTION, SOLUTION INTRAVENOUS at 10:42

## 2021-10-19 RX ADMIN — AMLODIPINE BESYLATE 2.5 MG: 5 TABLET ORAL at 10:07

## 2021-10-19 RX ADMIN — Medication 10 ML: at 13:21

## 2021-10-19 RX ADMIN — CEFTRIAXONE 1 G: 1 INJECTION, POWDER, FOR SOLUTION INTRAMUSCULAR; INTRAVENOUS at 11:56

## 2021-10-19 NOTE — PROGRESS NOTES
CARE MANAGEMENT INITIAL ASSESSEMENT      NAME:   Samy Díaz   :     1934   MRN:     [id number]       Emergency Contact:  Extended Emergency Contact Information  Primary Emergency Contact: Cassia Lentz, [address] Phone: [phone]  Relation: Daughter   needed? No  Secondary Emergency Contact: [address] Phone: [phone]  Relation: Spouse   needed? No    Advance Directive:  Full Code, has an advance directive. Copy not available. [facility] Decision Maker:   Dorna Homans- daughter- [phone]    Reason for Admission:  Ms. John Paul Patel is a 80 y.o. female with history that includes DM, CKD and HTN  who was emergently admitted for:  humeral surgical neck fracture    Patient Active Problem List   Diagnosis Code    Humeral surgical neck fracture S42.213A    Closed fracture of multiple pubic rami ([facility] [address] S32.599A    HTN (hypertension), benign I10    DM (diabetes mellitus), type 2 ([facility] [address] E11.9    Leukocytosis D72.829    CKD (chronic kidney disease) stage 3, GFR 30-59 ml/min ([facility]) N18.30    Hypokalemia due to loss of potassium E87.6       Assessment: In person with patient. RUR:  16%  Risk Level:  Low  Value-based purchasing:   No  Bundle patient:  No    Residency:  Private residence  Exterior Steps:  None  Interior Steps:  None    Lives With:  Adult children Melodie    Prior functioning:  Independent. Patient requires assistance with:  N/A    Prior DME required:  Rolling walker    DME available:  Rolling walker    Rehab history:  IPR:  Provider - [facility]  Date - 4 years ago    Discharge Concerns:  None      Insurer:  Payor: Haydee Aguilar / Plan: [address] [place] [address] / Product Type: Medicare /     PCP: Adriana Hutton NP   Name of Practice:  Pending sale to [facility]   Current patient: Yes   Approximate date of last visit: 6 months   Access to virtual PCP visits:  Yes    Pharmacy:  [facility]. Unsure which locations. Pt denies any problems obtaining/taking medications. COVID-19 vaccination status:  Fully vaccinated- Pt can't recall date    DC Transport:  TBD      Transition of care plan:  Unable to determine at this time. Awaiting clinical progress, and disposition recommendations     Comments:   CM completed initial assessment with Pt. Pt states that she lives at home with her daughter, Sol Khalil. Pt has no hx of HH or home O2. Pt has a walker that she uses at all times to assist with ambulation. Pt reports that she is independent with ADLs. Pt denies problems with ADLs and ambulation. CM discussed likely need for rehab upon discharge. Pt states she would like to return to [address] [place] [address]. CM informed Pt that PT and OT would make recommendations after evaluation. Pt voiced understanding. CM will continue to follow. _____________________________________  ISMAEL Blanc - Care Management  10/18/2021   11:22 PM      Care Management Interventions  PCP Verified by CM: Yes Ashanti Hickey)  Mode of Transport at Discharge:  Other (see comment) (TBD)  Transition of Care Consult (CM Consult): Discharge Planning  MyChart Signup: No  Discharge Durable Medical Equipment: No  Physical Therapy Consult: Yes  Occupational Therapy Consult: Yes  Speech Therapy Consult: No  Support Systems: Child(watson)  Confirm Follow Up Transport: Family  Discharge Location  Discharge Placement: Rehab hospital/unit acute

## 2021-10-19 NOTE — PROGRESS NOTES
Problem: Self Care Deficits Care Plan (Adult)  Goal: *Acute Goals and Plan of Care (Insert Text)  Description:   FUNCTIONAL STATUS PRIOR TO ADMISSION: Patient was modified independent using a rollator for functional mobility. HOME SUPPORT: The patient lived with daughter but did not require assist.    Occupational Therapy Goals  Initiated 10/19/2021  1. Patient will perform lower body dressing with minimal assistance/contact guard assist within 7 day(s). 2.  Patient will perform grooming with supervision/set-up within 7 day(s). 3.  Patient will perform anterior body bathing with minimal assistance/contact guard assist within 7 day(s). 4.  Patient will perform toilet transfers with minimal assistance/contact guard assist within 7 day(s). 5.  Patient will perform all aspects of toileting with minimal assistance/contact guard assist within 7 day(s). Outcome: Progressing Towards Goal   OCCUPATIONAL THERAPY EVALUATION  Patient: Adolph Garcia (89 y.o. female)  Date: 10/19/2021  Primary Diagnosis: Fx humeral neck [S42.213A]        Precautions: fall, NWB RUE        ASSESSMENT  Based on the objective data described below, the patient presents with hospital admission secondary to fall resulting in R inferior and superior pubic rami fractures and R humerus fracture. Patient today demonstrates decreased functional mobility, decreased activity tolerance, general weakness and pain with mobility. Patient requires mod x 2 to EOB and able to henrry with min x 2. She has difficulty performing side steps with use of alondra walker and assist x 2. She reports dizziness in standing and reports need to sit. Patient reports only minimally improved symptoms in sitting and agreeable to return to supine. She requires max x 2 to do so. Patient very pleasant and motivated throughout session.    .    Current Level of Function Impacting Discharge (ADLs/self-care): mod assist x 2 bed mobility, up to max assist for ADLs    Functional Outcome Measure: The patient scored 20/100 on the Barthel INdex  outcome measure. Other factors to consider for discharge: lives with daughter     Patient will benefit from skilled therapy intervention to address the above noted impairments. PLAN :  Recommendations and Planned Interventions: self care training, functional mobility training, therapeutic exercise, balance training, therapeutic activities, endurance activities, patient education, home safety training, and family training/education    Frequency/Duration: Patient will be followed by occupational therapy 5 times a week to address goals. Recommendation for discharge: (in order for the patient to meet his/her long term goals)  Therapy 3 hours per day 5-7 days per week    This discharge recommendation:  Has been made in collaboration with the attending provider and/or case management    IF patient discharges home will need the following DME: TBD       SUBJECTIVE:   Patient stated I think they may do surgery on my arm.     OBJECTIVE DATA SUMMARY:   HISTORY:   No past medical history on file. No past surgical history on file.     Expanded or extensive additional review of patient history:     Home Situation  Home Environment:  (condo)  # Steps to Enter: 1  One/Two Story Residence: Two story, live on 1st floor  Living Alone: No  Support Systems:  (daughter)  Current DME Used/Available at Home: Walker, rolling, Cane, straight, Safety frame toliet  Tub or Shower Type: Shower    Hand dominance: Right    EXAMINATION OF PERFORMANCE DEFICITS:  Cognitive/Behavioral Status:  Neurologic State: Alert  Orientation Level: Oriented X4  Cognition: Follows commands  Perception: Appears intact  Perseveration: No perseveration noted  Safety/Judgement: Awareness of environment    Skin: intact as seen    Edema: none noted     Hearing:       Vision/Perceptual:                                     Range of Motion:  AROM: Generally decreased, functional  PROM: Generally decreased, functional                      Strength:  Strength: Generally decreased, functional                Coordination:  Coordination: Within functional limits            Tone & Sensation:  Tone: Normal  Sensation: Intact                      Balance:  Sitting: Intact  Standing: Impaired  Standing - Static: Constant support; Fair  Standing - Dynamic : Constant support; Fair    Functional Mobility and Transfers for ADLs:  Bed Mobility:  Supine to Sit: Moderate assistance;Assist x2  Sit to Supine: Maximum assistance;Assist x2  Scooting: Minimum assistance    Transfers:  Sit to Stand: Minimum assistance;Assist x2  Stand to Sit: Minimum assistance;Assist x2    ADL Assessment:  Feeding: Setup    Oral Facial Hygiene/Grooming: Minimum assistance    Bathing: Moderate assistance    Upper Body Dressing: Maximum assistance    Lower Body Dressing: Maximum assistance    Toileting: Maximum assistance                ADL Intervention and task modifications:                                     Cognitive Retraining  Safety/Judgement: Awareness of environment    Therapeutic Exercise:     Functional Measure:    Barthel Index:  Bathin  Bladder: 5  Bowels: 5  Groomin  Dressin  Feedin  Mobility: 0  Stairs: 0  Toilet Use: 0  Transfer (Bed to Chair and Back): 0  Total: 20/100      The Barthel ADL Index: Guidelines  1. The index should be used as a record of what a patient does, not as a record of what a patient could do. 2. The main aim is to establish degree of independence from any help, physical or verbal, however minor and for whatever reason. 3. The need for supervision renders the patient not independent. 4. A patient's performance should be established using the best available evidence. Asking the patient, friends/relatives and nurses are the usual sources, but direct observation and common sense are also important. However direct testing is not needed.   5. Usually the patient's performance over the preceding 24-48 hours is important, but occasionally longer periods will be relevant. 6. Middle categories imply that the patient supplies over 50 per cent of the effort. 7. Use of aids to be independent is allowed. Score Interpretation (from [address] [facility])    Independent   60-79 Minimally independent   40-59 Partially dependent   20-39 Very dependent   <20 Totally dependent     -Hua Aden., BarthelCHANDANA. (1965). Functional evaluation: the Barthel Index. [address] ([address] (1997). The Barthel activities of daily living index: self-reporting versus actual performance in the old (> or = 75 years). Journal of [address] [place] [address] 45(7), [facility], RIGOBERTO, Maciel Mckeon., May Thanh. (1999). Measuring the change in disability after inpatient rehabilitation; comparison of the responsiveness of the Barthel Index and Functional [place] Measure. Journal of Neurology, Neurosurgery, and Psychiatry, 66(4), 755-355. MARNIE Rodriguez, MARLYS Dotson, & Zoya Nowak M.A. (2004) Assessment of post-stroke quality of life in cost-effectiveness studies: The usefulness of the Barthel Index and the EuroQoL-5D.  Quality of Life Research, 15, 153-61         Occupational Therapy Evaluation Charge Determination   History Examination Decision-Making   LOW Complexity : Brief history review  LOW Complexity : 1-3 performance deficits relating to physical, cognitive , or psychosocial skils that result in activity limitations and / or participation restrictions  LOW Complexity : No comorbidities that affect functional and no verbal or physical assistance needed to complete eval tasks       Based on the above components, the patient evaluation is determined to be of the following complexity level: LOW   Pain Rating:      Activity Tolerance:   Good    After treatment patient left in no apparent distress:    Supine in bed, Call bell within reach, Bed / chair alarm activated, and Side rails x 3    COMMUNICATION/EDUCATION:   The patients plan of care was discussed with: Physical therapist and Registered nurse. Home safety education was provided and the patient/caregiver indicated understanding., Patient/family have participated as able in goal setting and plan of care. , and Patient/family agree to work toward stated goals and plan of care. This patients plan of care is appropriate for delegation to [facility].     Thank you for this referral.  Philip Fortune OTR/L  Time Calculation: 26 mins

## 2021-10-19 NOTE — PROGRESS NOTES
[facility] [address]  [address] [facility], [place], [address] [facility]  [phone]      Medical Progress Note      NAME:         Shirley Cao   :        1934  MRM:        [id number]    Date of service: 10/19/2021      Subjective: Patient has been seen and examined as a follow up for multiple medical issues. Chart, labs, diagnostics reviewed. She says she has some aching discomfort right shoulder area, with movement. No fever or chills. Objective:    Vital Signs:    Visit Vitals  BP (!) 115/51 (BP 1 Location: Left upper arm, BP Patient Position: At rest) Comment: RN Bruce Colbert Notified    Pulse 64   Temp 99 °F (37.2 °C)   Resp 18   Ht 5' 2\" (1.575 m)   Wt 63.5 kg (140 lb)   SpO2 92%   BMI 25.61 kg/m²      No intake or output data in the 24 hours ending 10/19/21 1012     Physical Examination:    General:   Weak and frail looking, not in any distress   Eyes:   pink conjunctivae, PERRLA with no discharge. ENT:   no ottorrhea or rhinorrhea with dry mucous membranes  Neck: no masses, thyroid non-tender and trachea central.  Pulm:  decreased but clear breath sounds without crackles or wheezes  Card:  no JVD or murmurs, has regular and normal S1, S2 without thrills, bruits or peripheral edema  Abd:  Soft, non-tender, non-distended, normoactive bowel sounds with no palpable organomegaly  Musc:  No cyanosis, clubbing, atrophy or deformities. Arm sling RUE  Skin:  No rashes, bruising or ulcers. Neuro: Awake and alert.  Generally a non focal exam. Follows commands appropriately  Psych:  Has a fair insight to her illness     Current Facility-Administered Medications   Medication Dose Route Frequency    acetaminophen (TYLENOL) tablet 650 mg  650 mg Oral Q6H    sodium chloride (NS) flush 5-40 mL  5-40 mL IntraVENous Q8H    sodium chloride (NS) flush 5-40 mL  5-40 mL IntraVENous PRN    morphine injection 2 mg  2 mg IntraVENous Q4H PRN    naloxone (NARCAN) injection 0.4 mg  0.4 mg IntraVENous PRN    ondansetron (ZOFRAN) injection 4 mg  4 mg IntraVENous Q4H PRN    [Held by provider] heparin (porcine) injection 5,000 Units  5,000 Units SubCUTAneous Q8H    oxyCODONE IR (ROXICODONE) tablet 5 mg  5 mg Oral Q4H PRN    insulin lispro (HUMALOG) injection   SubCUTAneous AC&HS    glucose chewable tablet 16 g  4 Tablet Oral PRN    dextrose (D50W) injection syrg 12.5-25 g  12.5-25 g IntraVENous PRN    glucagon (GLUCAGEN) injection 1 mg  1 mg IntraMUSCular PRN    hydrALAZINE (APRESOLINE) 20 mg/mL injection 10 mg  10 mg IntraVENous Q6H PRN    amLODIPine (NORVASC) tablet 2.5 mg  2.5 mg Oral DAILY        Laboratory data and review:    Recent Labs     10/19/21  0449 10/18/21  0222   WBC 9.2 12.1*   HGB 10.3* 12.6   HCT 31.2* 36.8    173     Recent Labs     10/19/21  0449 10/18/21  0222    134*   K 3.5 2.8*    98   CO2 26 28   * 167*   BUN 30* 23*   CREA 2.80* 2.15*   CA 8.0* 8.3*   MG 2.6* 2.4   ALB  --  3.5   ALT  --  29     No components found for: [place]    Diagnostics: Imaging studies have been reviewed    Assessment and Plan:    Humeral surgical neck fracture (10/18/2021) POA: CT upper extremity showed a comminuted fracture involving the humeral neck and the greater tuberosity. Seen by orthopedic surgery who deem her stable. Non operative management recommended at this time. Continue an arm sling and NWB RUE. Follow up with Dr Mary Vásquez for further management    Closed fracture of multiple pubic rami ([address] (10/18/2021) POA: CT pelvis showed an acute nondisplaced right superior and inferior pubic rami fractures. Proximal right femoral intramedullary hardware is intact. Reviewed by ortho. Continue WBAT with walker. PT, OT following. CM for discharge planning    HTN (hypertension), benign (10/18/2021) POA: BP stable. Continue Amlodipine    DM (diabetes mellitus), type 2 ([facility] (10/18/2021) POA: A1c 6.1. SSi per protocol for now. Resume Amaryl at discharge    Leukocytosis (10/18/2021) POA: CXR clear. WBCs better. Afebrile. Monitor for now. CKD (chronic kidney disease) stage 3, GFR 30-59 ml/min ([facility]) (10/18/2021) / Hypokalemia due to loss of potassium (10/18/2021) POA: K+ better. SCr slightly high and likely has stage 4 disease.  Start gentle hydration for the next day    Total time spent for the patient's care: [address] discussed with: Patient, Care Manager and Nursing Staff    Discussed:  Care Plan and D/C Planning    Prophylaxis:  SCD's    Anticipated Disposition:  SNF/LTC           ___________________________________________________    Attending Physician:   Jose Merritt MD

## 2021-10-19 NOTE — PROGRESS NOTES
Bedside and Verbal shift change report given to Chelly Ozuna RN (oncoming nurse) by Aide Edwards RN (offgoing nurse). Report included the following information SBAR, Kardex, Intake/Output, MAR, Accordion, Recent Results and Med Rec Status.

## 2021-10-19 NOTE — PROGRESS NOTES
Problem: Mobility Impaired (Adult and Pediatric)  Goal: *Acute Goals and Plan of Care (Insert Text)  Description: FUNCTIONAL STATUS PRIOR TO ADMISSION: Patient was modified independent using a rolling walker for functional mobility. HOME SUPPORT PRIOR TO ADMISSION: The patient lived with daughter but did not require assist.    Physical Therapy Goals  Initiated 10/19/2021  1. Patient will move from supine to sit and sit to supine  in bed with minimal assistance/contact guard assist within 7 day(s). 2.  Patient will transfer from bed to chair and chair to bed with minimal assistance/contact guard assist using the least restrictive device within 7 day(s). 3.  Patient will perform sit to stand with supervision/set-up within 7 day(s). 4.  Patient will ambulate with minimal assistance/contact guard assist for 50 feet with the least restrictive device within 7 day(s). 5.  Patient will ascend/descend 1 stairs with 1 handrail(s) with minimal assistance/contact guard assist within 7 day(s). 10/19/2021 1519 by Italo Solo  Outcome: Progressing Towards Goal    PHYSICAL THERAPY EVALUATION  Patient: Shirley Cao (78 y.o. female)  Date: 10/19/2021  Primary Diagnosis: Fx humeral neck [S42.213A]        Precautions: WBAT BLE, NWB RUE       ASSESSMENT  Based on the objective data described below, the patient presents with pain limiting function, generalized weakness, decreased activity tolerance and mobility limitation d/t new WBing precautions-NWB RUE. Pt admitted to the acute setting s/p fall with found R inf and sup pubic rami fractures and R humeral fracture. Currently, pt is requiring Mod A x 2 for supine to sit transfers, and Min A x 2 for sit<>stand transfers with use of hemiwalker on L. After standing and attempting steps (1 min in standing position), pt reported dizziness and requested to sit. Once sitting symptoms minimally improved so she returned to supine with Max A x 2.  Once supine, dizziness symptoms resolved. She was positioned up in bed with all needs in reach. Acute PT will continue to follow pt while she remains in the acute setting. Rec IPR for continued therapy once medically stable to maximize function, safety, independence and tolerance prior to dc to home. Current Level of Function Impacting Discharge (mobility/balance): Mod A x 2 bed mobility, Min A x 2 sit<>stand    Functional Outcome Measure: The patient scored 20/100 on the Barthel Index outcome measure which is indicative of 80% functional impairment. Other factors to consider for discharge:      Patient will benefit from skilled therapy intervention to address the above noted impairments. PLAN :  Recommendations and Planned Interventions: bed mobility training, transfer training, gait training, therapeutic exercises, patient and family training/education, and therapeutic activities      Frequency/Duration: Patient will be followed by physical therapy:  twice daily to address goals. Recommendation for discharge: (in order for the patient to meet his/her long term goals)  Therapy 3 hours per day 5-7 days per week    This discharge recommendation:  Has been made in collaboration with the attending provider and/or case management    IF patient discharges home will need the following DME: to be determined (TBD)         SUBJECTIVE:   Patient stated I don't think I can walk but I'll do what I can.     OBJECTIVE DATA SUMMARY:   HISTORY:    No past medical history on file. No past surgical history on file.     Personal factors and/or comorbidities impacting plan of care:     Home Situation  Home Environment:  (condo)  # Steps to Enter: 1  One/Two Story Residence: Two story, live on 1st floor  Living Alone: No  Support Systems:  (daughter)  Current DME Used/Available at Home: Walker, rolling, Cane, straight, Safety frame toliet  Tub or Shower Type: Shower    EXAMINATION/PRESENTATION/DECISION MAKING:   Critical Behavior:  Neurologic State: Alert  Orientation Level: Oriented X4        Hearing:     Skin:  Defer to RN notes  Edema: Defer to RN notes  Range Of Motion:  AROM: Generally decreased, functional           PROM: Generally decreased, functional           Strength:    Strength: Generally decreased, functional                    Tone & Sensation:   Tone: Normal              Sensation: Intact               Coordination:  Coordination: Within functional limits  Vision:      Functional Mobility:  Bed Mobility:     Supine to Sit: Moderate assistance;Assist x2  Sit to Supine: Maximum assistance;Assist x2  Scooting: Minimum assistance  Transfers:  Sit to Stand: Minimum assistance;Assist x2  Stand to Sit: Minimum assistance;Assist x2                       Balance:   Sitting: Intact  Standing: Impaired  Standing - Static: Constant support; Fair  Standing - Dynamic : Constant support; Fair  Ambulation/Gait Training:                    Right Side Weight Bearing: As tolerated          Functional Measure:  Barthel Index:    Bathin  Bladder: 5  Bowels: 5  Groomin  Dressin  Feedin  Mobility: 0  Stairs: 0  Toilet Use: 0  Transfer (Bed to Chair and Back): 0  Total: 20/100       The Barthel ADL Index: Guidelines  1. The index should be used as a record of what a patient does, not as a record of what a patient could do. 2. The main aim is to establish degree of independence from any help, physical or verbal, however minor and for whatever reason. 3. The need for supervision renders the patient not independent. 4. A patient's performance should be established using the best available evidence. Asking the patient, friends/relatives and nurses are the usual sources, but direct observation and common sense are also important. However direct testing is not needed. 5. Usually the patient's performance over the preceding 24-48 hours is important, but occasionally longer periods will be relevant.   6. Middle categories imply that the patient supplies over 50 per cent of the effort. 7. Use of aids to be independent is allowed. Woody Amos., Barthel, D.W. (3529). Functional evaluation: the Barthel Index. [facility] (14)2. RIGOBERTO Davenport, Cayden Worley., Gareth Greyon., Neil, [address] Measuring the change indisability after inpatient rehabilitation; comparison of the responsiveness of the Barthel Index and Functional [place] Measure. Journal of Neurology, Neurosurgery, and Psychiatry, 66(4), 598-256. MARNIE Adrian, MARLYS Dotson, & Dre Mortensen M.A. (2004.) Assessment of post-stroke quality of life in cost-effectiveness studies: The usefulness of the Barthel Index and the EuroQoL-5D. Quality of Life Research, 15, 439-20           Physical Therapy Evaluation Charge Determination   History Examination Presentation Decision-Making   MEDIUM  Complexity : 1-2 comorbidities / personal factors will impact the outcome/ POC  MEDIUM Complexity : 3 Standardized tests and measures addressing body structure, function, activity limitation and / or participation in recreation  MEDIUM Complexity : Evolving with changing characteristics  MEDIUM Complexity : FOTO score of 26-74      Based on the above components, the patient evaluation is determined to be of the following complexity level: MEDIUM    Pain Ratin/10 RUE pain    Activity Tolerance:   Fair, requires frequent rest breaks, and signs and symptoms of orthostatic hypotension    After treatment patient left in no apparent distress:   Supine in bed and Call bell within reach    COMMUNICATION/EDUCATION:   The patients plan of care was discussed with: Occupational therapist and Registered nurse. Fall prevention education was provided and the patient/caregiver indicated understanding., Patient/family have participated as able in goal setting and plan of care. , and Patient/family agree to work toward stated goals and plan of care.     Thank you for this referral.  [place]rier Snowball Ethan PT,DPT   Time Calculation: 27 mins

## 2021-10-19 NOTE — PROGRESS NOTES
Problem: Mobility Impaired (Adult and Pediatric)  Goal: *Acute Goals and Plan of Care (Insert Text)  Description: FUNCTIONAL STATUS PRIOR TO ADMISSION: Patient was modified independent using a rolling walker for functional mobility. HOME SUPPORT PRIOR TO ADMISSION: The patient lived with daughter but did not require assist.    Physical Therapy Goals  Initiated 10/19/2021  1. Patient will move from supine to sit and sit to supine  in bed with minimal assistance/contact guard assist within 7 day(s). 2.  Patient will transfer from bed to chair and chair to bed with minimal assistance/contact guard assist using the least restrictive device within 7 day(s). 3.  Patient will perform sit to stand with supervision/set-up within 7 day(s). 4.  Patient will ambulate with minimal assistance/contact guard assist for 50 feet with the least restrictive device within 7 day(s). 5.  Patient will ascend/descend 1 stairs with 1 handrail(s) with minimal assistance/contact guard assist within 7 day(s). Note:   PHYSICAL THERAPY TREATMENT  Patient: Bekah Goldberg (83 y.o. female)  Date: 10/19/2021  Diagnosis: Fx humeral neck [S42.213A] Humeral surgical neck fracture       Precautions:    Chart, physical therapy assessment, plan of care and goals were reviewed. ASSESSMENT  Patient continues with skilled PT services. Pt agreeable to LE heel slides ankle pumps and hip abd/add to right LE with min assist and cues. Pt tolerated treatment well. PT will follow in AM.         PLAN :  Patient continues to benefit from skilled intervention to address the above impairments. Continue treatment per established plan of care. to address goals.     Recommendation for discharge: (in order for the patient to meet his/her long term goals)  To be determined    This discharge recommendation:  Has been made in collaboration with the attending provider and/or case management    IF patient discharges home will need the following DME: rolling walker       SUBJECTIVE:       OBJECTIVE DATA SUMMARY:   Critical Behavior:  Neurologic State: Alert  Orientation Level: Oriented X4  Cognition: Follows commands  Safety/Judgement: Awareness of environment               Therapeutic Exercises:   Heel slides ankle pumps hip abd/add      Activity Tolerance:   Good    After treatment patient left in no apparent distress:   Supine in bed    COMMUNICATION/COLLABORATION:   The patients plan of care was discussed with: Physical therapist.     Jj Hernandes PTA   Time Calculation: 12 mins

## 2021-10-19 NOTE — PROGRESS NOTES
10/19/2021   CARE MANAGEMENT NOTE:  CM reviewed EMR and handoff received from ER  Kamaljit Mckinney). Pt was admitted with humeral neck fx, closed fx of multiple pubic rami, HTN, DM, and CKD3. Pt's  is . Reportedly, pt resides with her dtr Cinthia Gupta ([phone]). Chanoerd Flow will be out of town in [place] until this weekend. Hien Menchaca ([phone]) is pt's granddtr and a secondary family contact. RUR 18%    Transition Plan of Care:  1. Ortho is following for medical management  2. PT/OT evals are pending; await recs  Dtr would like pt to go to [facility] for inpt rehab (pt has been there in the past). Dtr states that she will decline any other options. 3.  Outpt f/u  4. Mode of transportation upon discharge to be determined. CM will continue to follow pt until discharged.   Joana

## 2021-10-19 NOTE — PROGRESS NOTES
Paged Mart Sosa via [address] [place] [address] \"Pt's daughter asking about Lasix, reports she takes 40mg BID.  Has not been given here\"

## 2021-10-19 NOTE — ED NOTES
TRANSFER - OUT REPORT:    Verbal report given to Sujatha(name) on Manuel Mullen  being transferred to [facility](unit) for routine progression of care       Report consisted of patients Situation, Background, Assessment and   Recommendations(SBAR). Information from the following report(s) SBAR, ED Summary, [facility] and Recent Results was reviewed with the receiving nurse. Lines:   Peripheral IV 10/18/21 Left Antecubital (Active)   Site Assessment Clean, dry, & intact 10/18/21 0240   Phlebitis Assessment 0 10/18/21 0240   Infiltration Assessment 0 10/18/21 0240   Dressing Status Clean, dry, & intact 10/18/21 0240   Dressing Type Tape;Transparent 10/18/21 0240   Hub Color/Line Status Pink;Flushed;Patent 10/18/21 0240   Action Taken Blood drawn 10/18/21 0240        Opportunity for questions and clarification was provided.       Patient transported with:   [facility]

## 2021-10-20 LAB
ANION GAP SERPL CALC-SCNC: 9 MMOL/L (ref 5–15)
BUN SERPL-MCNC: 36 MG/DL (ref 6–20)
BUN/CREAT SERPL: 13 (ref 12–20)
CALCIUM SERPL-MCNC: 7.7 MG/DL (ref 8.5–10.1)
CHLORIDE SERPL-SCNC: 107 MMOL/L (ref 97–108)
CO2 SERPL-SCNC: 25 MMOL/L (ref 21–32)
COMMENT, HOLDF: NORMAL
CREAT SERPL-MCNC: 2.77 MG/DL (ref 0.55–1.02)
GLUCOSE BLD STRIP.AUTO-MCNC: 102 MG/DL (ref 65–117)
GLUCOSE BLD STRIP.AUTO-MCNC: 62 MG/DL (ref 65–117)
GLUCOSE BLD STRIP.AUTO-MCNC: 70 MG/DL (ref 65–117)
GLUCOSE BLD STRIP.AUTO-MCNC: 91 MG/DL (ref 65–117)
GLUCOSE BLD STRIP.AUTO-MCNC: 95 MG/DL (ref 65–117)
GLUCOSE BLD STRIP.AUTO-MCNC: 97 MG/DL (ref 65–117)
GLUCOSE SERPL-MCNC: 93 MG/DL (ref 65–100)
POTASSIUM SERPL-SCNC: 3.2 MMOL/L (ref 3.5–5.1)
SAMPLES BEING HELD,HOLD: NORMAL
SERVICE CMNT-IMP: ABNORMAL
SERVICE CMNT-IMP: NORMAL
SODIUM SERPL-SCNC: 141 MMOL/L (ref 136–145)

## 2021-10-20 PROCEDURE — 74011250636 HC RX REV CODE- 250/636: Performed by: INTERNAL MEDICINE

## 2021-10-20 PROCEDURE — 97110 THERAPEUTIC EXERCISES: CPT

## 2021-10-20 PROCEDURE — 74011250637 HC RX REV CODE- 250/637: Performed by: INTERNAL MEDICINE

## 2021-10-20 PROCEDURE — 77030038269 HC DRN EXT URIN PURWCK BARD -A

## 2021-10-20 PROCEDURE — 80048 BASIC METABOLIC PNL TOTAL CA: CPT

## 2021-10-20 PROCEDURE — 82962 GLUCOSE BLOOD TEST: CPT

## 2021-10-20 PROCEDURE — 36415 COLL VENOUS BLD VENIPUNCTURE: CPT

## 2021-10-20 PROCEDURE — 65270000029 HC RM PRIVATE

## 2021-10-20 RX ADMIN — HEPARIN SODIUM 5000 UNITS: 5000 INJECTION INTRAVENOUS; SUBCUTANEOUS at 14:13

## 2021-10-20 RX ADMIN — OXYCODONE 5 MG: 5 TABLET ORAL at 14:12

## 2021-10-20 RX ADMIN — Medication 10 ML: at 14:13

## 2021-10-20 RX ADMIN — HEPARIN SODIUM 5000 UNITS: 5000 INJECTION INTRAVENOUS; SUBCUTANEOUS at 21:42

## 2021-10-20 RX ADMIN — AMLODIPINE BESYLATE 2.5 MG: 5 TABLET ORAL at 09:52

## 2021-10-20 RX ADMIN — OXYCODONE 5 MG: 5 TABLET ORAL at 09:51

## 2021-10-20 RX ADMIN — ACETAMINOPHEN 650 MG: 325 TABLET ORAL at 23:05

## 2021-10-20 NOTE — PROGRESS NOTES
10/20/2021   CARE MANAGEMENT NOTE:  CM reviewed EMR. Pt was admitted with humeral neck fx, closed fx of multiple pubic rami, HTN, DM, and CKD3. Pt's  is . Reportedly, pt resides with her dtr Amalia Javier ([phone]). Amalia Javier will be out of town in [place] until this weekend. Alfa Bernstein ([phone]) is pt's granddtr and a secondary family contact.      RUR 18%     Transition Plan of Care:  1. Ortho is following for medical management - OR to be posted (pt will remain inpt until surgery)  2. PT/OT evals complete; pt was mod assist with bed mobility on 10/19 and IPR was recommended  Dtr would like pt to go to [facility] for inpt rehab. 3.  PCR Covid 19 test for placement will be needed closer to  discharge  4. Outpt f/u  5. Mode of transportation upon discharge to be determined.     CM will continue to follow pt until discharged.   Joana

## 2021-10-20 NOTE — PROGRESS NOTES
Problem: Self Care Deficits Care Plan (Adult)  Goal: *Acute Goals and Plan of Care (Insert Text)  Description:   FUNCTIONAL STATUS PRIOR TO ADMISSION: Patient was modified independent using a rollator for functional mobility. HOME SUPPORT: The patient lived with daughter but did not require assist.    Occupational Therapy Goals  Initiated 10/19/2021  1. Patient will perform lower body dressing with minimal assistance/contact guard assist within 7 day(s). 2.  Patient will perform grooming with supervision/set-up within 7 day(s). 3.  Patient will perform anterior body bathing with minimal assistance/contact guard assist within 7 day(s). 4.  Patient will perform toilet transfers with minimal assistance/contact guard assist within 7 day(s). 5.  Patient will perform all aspects of toileting with minimal assistance/contact guard assist within 7 day(s). Outcome: Progressing Towards Goal   OCCUPATIONAL THERAPY TREATMENT  Patient: Bekah Goldberg (86 y.o. female)  Date: 10/20/2021  Diagnosis: Fx humeral neck [S42.213A] Humeral surgical neck fracture  Procedure(s) (LRB):  RIGHT REVERSE TOTAL SHOULDER ARTHROPLASTY (GENERAL WITH REGIONAL BLOCK) (Right)    Precautions:    Chart, occupational therapy assessment, plan of care, and goals were reviewed. ASSESSMENT  Patient continues with skilled OT services and is not progressing towards goals. Pt having increased pain R knee, communicated to RN. R knee edematous, daughter present and confirmed this. Pt declined sitting edge of bed or to transfer to chair as she was having increased fatigue and pain today. Pt was able to participate with R hand flex/ext as well as wrist flex/ext, she was able to perform L UE shoulder flex/ext as well. Sling present on R UE with pt declining elevation on pillow but prefers towel for support instead.     Current Level of Function Impacting Discharge (ADLs): max assist bathe and dress UB, max assist toileting    Other factors to consider for discharge:          PLAN :  Patient continues to benefit from skilled intervention to address the above impairments. Continue treatment per established plan of care to address goals. Recommend with staff: out of bed to chair assist x 2 for ADL's, meals, there ex, there act    Recommend next OT session: cont towards goals    Recommendation for discharge: (in order for the patient to meet his/her long term goals)  Therapy 3 hours per day 5-7 days per week    This discharge recommendation:  Has not yet been discussed the attending provider and/or case management    IF patient discharges home will need the following DME:        SUBJECTIVE:   Patient stated I have pain after moving around .     OBJECTIVE DATA SUMMARY:   Cognitive/Behavioral Status:  Neurologic State: Alert  Orientation Level: Oriented X4  Cognition: Appropriate decision making; Appropriate for age attention/concentration; Appropriate safety awareness; Follows commands             Functional Mobility and Transfers for ADLs:  Bed Mobility:   Not tested    Transfers:      Not tested       Balance: impaired       ADL Intervention:     Max assist UB bathe and dress  Max assist toileting  Max assist LB bathe and dress          Therapeutic Exercises:     EXERCISE   Sets   Reps   Active Active Assist   Passive   Comments   Shoulder flex/ext 1 10 [x]           []           []           L UE only   Right hand flex/ext 1 10 [x]           []           []              Right wrist flex/ext 1 10 [x]           []           []                 []           []           []                 []           []           []                 []           []           []                 []           []           []                 []           []           []                 []           []           []                 []           []           []                 []           []           []                   Activity Tolerance:   Poor    After treatment patient left in no apparent distress:   Supine in bed    COMMUNICATION/COLLABORATION:   The patients plan of care was discussed with: Physical therapist, Occupational therapist, and Registered nurse.      MONICA Lou/L  Time Calculation: 22 mins

## 2021-10-20 NOTE — PROGRESS NOTES
Problem: Mobility Impaired (Adult and Pediatric)  Goal: *Acute Goals and Plan of Care (Insert Text)  Description: FUNCTIONAL STATUS PRIOR TO ADMISSION: Patient was modified independent using a rolling walker for functional mobility. HOME SUPPORT PRIOR TO ADMISSION: The patient lived with daughter but did not require assist.    Physical Therapy Goals  Initiated 10/19/2021  1. Patient will move from supine to sit and sit to supine  in bed with minimal assistance/contact guard assist within 7 day(s). 2.  Patient will transfer from bed to chair and chair to bed with minimal assistance/contact guard assist using the least restrictive device within 7 day(s). 3.  Patient will perform sit to stand with supervision/set-up within 7 day(s). 4.  Patient will ambulate with minimal assistance/contact guard assist for 50 feet with the least restrictive device within 7 day(s). 5.  Patient will ascend/descend 1 stairs with 1 handrail(s) with minimal assistance/contact guard assist within 7 day(s). Outcome: Progressing Towards Goal   PHYSICAL THERAPY TREATMENT  Patient: Samy Díaz (57 y.o. female)  Date: 10/20/2021  Diagnosis: Fx humeral neck [S42.213A] Humeral surgical neck fracture  Procedure(s) (LRB):  RIGHT REVERSE TOTAL SHOULDER ARTHROPLASTY (GENERAL WITH REGIONAL BLOCK) (Right)    Precautions:  NWB RUE  Chart, physical therapy assessment, plan of care and goals were reviewed. ASSESSMENT  Patient continues with skilled PT services and is not progressing towards goals today due to new onset L knee pain with edema. Pt received supine in bed with R sling on UE/NWB. Sup/inf R pubic rami fx. Pt c/o 10/10 pain with attempted knee flexion. RN alerted to request xray. Performed bed ex of L heel slides, B quad/glut set and modified SLR's on L only AAROM. Handout provided with dtr present. Rehab indicated.   Current Level of Function Impacting Discharge (mobility/balance): did not perform today    Other factors to consider for discharge: per above         PLAN :  Patient continues to benefit from skilled intervention to address the above impairments. Continue treatment per established plan of care. to address goals. Recommendation for discharge: (in order for the patient to meet his/her long term goals)  Therapy up to 5 days/week in SNF setting    This discharge recommendation:  Has been made in collaboration with the attending provider and/or case management    IF patient discharges home will need the following DME: to be determined (TBD)       SUBJECTIVE:   Patient stated I cant do rehab now.     OBJECTIVE DATA SUMMARY:   Critical Behavior:  Neurologic State: Alert  Orientation Level: Oriented X4  Cognition: Appropriate decision making, Appropriate for age attention/concentration, Appropriate safety awareness, Follows commands  Safety/Judgement: Awareness of environment  Functional Mobility Training:  Min A x2 last tx      Therapeutic Exercises:   Per above  Pain Rating:  10/10 R knee    Activity Tolerance:   Poor    After treatment patient left in no apparent distress:   Supine in bed    COMMUNICATION/COLLABORATION:   The patients plan of care was discussed with: Registered nurse.      Fortino Brittle, PT   Time Calculation: 18 mins

## 2021-10-20 NOTE — PROGRESS NOTES
Bedside and Verbal shift change report given to eDnnys Olson RN (oncoming nurse) by Tamia Montez RN (offgoing nurse). Report included the following information SBAR, Kardex, Intake/Output, MAR, Accordion, Recent Results and Med Rec Status.

## 2021-10-20 NOTE — PROGRESS NOTES
[facility] [place] [facility]  [address] [facility], [address] [place] [address]  [phone]      Medical Progress Note      NAME:         Kemar Landon   :        1934  MRM:        [id number]    Date of service: 10/20/2021      Subjective: Patient has been seen and examined as a follow up for multiple medical issues. Chart, labs, diagnostics reviewed. She feels well, weak with a mild aching discomfort right shoulder. No fever or chills. Objective:    Vital Signs:    Visit Vitals  BP (!) 155/69 (BP 1 Location: Left upper arm, BP Patient Position: At rest)   Pulse 88   Temp 97.6 °F (36.4 °C)   Resp 18   Ht 5' 2\" (1.575 m)   Wt 63.5 kg (140 lb)   SpO2 94%   BMI 25.61 kg/m²          Intake/Output Summary (Last 24 hours) at 10/20/2021 0848  Last data filed at 10/19/2021 2048  Gross per 24 hour   Intake 757.5 ml   Output 0 ml   Net 757.5 ml        Physical Examination:    General:   Weak and frail looking, not in any distress   Eyes:   pink conjunctivae, PERRLA with no discharge. ENT:   no ottorrhea or rhinorrhea with dry mucous membranes  Neck: no masses, thyroid non-tender and trachea central.  Pulm:  decreased but clear breath sounds without crackles or wheezes  Card:  has regular and normal S1, S2 without thrills, bruits or peripheral edema  Abd:  Soft, non-tender, non-distended, normoactive bowel sounds   Musc:  No cyanosis, clubbing, atrophy or deformities. Arm sling RUE  Skin:  No rashes, bruising or ulcers. Neuro: Awake and alert.  Generally a non focal exam. Follows commands appropriately  Psych:  Has a fair insight to her illness     Current Facility-Administered Medications   Medication Dose Route Frequency    0.9% sodium chloride infusion  75 mL/hr IntraVENous CONTINUOUS    acetaminophen (TYLENOL) tablet 650 mg  650 mg Oral Q6H    sodium chloride (NS) flush 5-40 mL  5-40 mL IntraVENous Q8H    sodium chloride (NS) flush 5-40 mL  5-40 mL IntraVENous PRN    morphine injection 2 mg  2 mg IntraVENous Q4H PRN    naloxone (NARCAN) injection 0.4 mg  0.4 mg IntraVENous PRN    ondansetron (ZOFRAN) injection 4 mg  4 mg IntraVENous Q4H PRN    [Held by provider] heparin (porcine) injection 5,000 Units  5,000 Units SubCUTAneous Q8H    oxyCODONE IR (ROXICODONE) tablet 5 mg  5 mg Oral Q4H PRN    insulin lispro (HUMALOG) injection   SubCUTAneous AC&HS    glucose chewable tablet 16 g  4 Tablet Oral PRN    dextrose (D50W) injection syrg 12.5-25 g  12.5-25 g IntraVENous PRN    glucagon (GLUCAGEN) injection 1 mg  1 mg IntraMUSCular PRN    hydrALAZINE (APRESOLINE) 20 mg/mL injection 10 mg  10 mg IntraVENous Q6H PRN    amLODIPine (NORVASC) tablet 2.5 mg  2.5 mg Oral DAILY        Laboratory data and review:    Recent Labs     10/19/21  0449 10/18/21  0222   WBC 9.2 12.1*   HGB 10.3* 12.6   HCT 31.2* 36.8    173     Recent Labs     10/20/21  0800 10/19/21  0449 10/18/21  0222    140 134*   K 3.2* 3.5 2.8*    107 98   CO2 25 26 28   GLU 93 127* 167*   BUN 36* 30* 23*   CREA 2.77* 2.80* 2.15*   CA 7.7* 8.0* 8.3*   MG  --  2.6* 2.4   ALB  --   --  3.5   ALT  --   --  29     No components found for: [place]    Diagnostics: Imaging studies have been reviewed    Assessment and Plan:    Humeral surgical neck fracture (10/18/2021) POA: CT upper extremity showed a comminuted fracture involving the humeral neck and the greater tuberosity. Seen by orthopedic surgery who deem her stable. Non operative management recommended at this time but I have discussed with ortho and Dr Lyndon Lemon for surgery on Monday 10/25. Continue an arm sling and NWB RUE, pain control. PT, OT. CM for discharge planning     Closed fracture of multiple pubic rami ([address] (10/18/2021) POA: CT pelvis showed an acute nondisplaced right superior and inferior pubic rami fractures. Proximal right femoral intramedullary hardware is intact. Reviewed by ortho.  Continue WBAT with walker. PT, OT following. HTN (hypertension), benign (10/18/2021) POA: BP stable. Continue Amlodipine    DM (diabetes mellitus), type 2 ([address] (10/18/2021) POA: A1c 6.1. SSi per protocol. Resume Amaryl at discharge    Leukocytosis (10/18/2021) POA: CXR clear. UA not impressive. WBCs better. Afebrile. Monitor. CKD (chronic kidney disease) stage 3, GFR 30-59 ml/min ([facility]) (10/18/2021) / Hypokalemia due to loss of potassium (10/18/2021) POA: K+ better. SCr slightly high and likely has stage 4 disease.  Start gentle hydration for the next day    Total time spent for the patient's care: [facility] discussed with: Patient, Care Manager and Nursing Staff    Discussed:  Care Plan and D/C Planning    Prophylaxis:  SCD's    Anticipated Disposition:  SNF/LTC vs [facility]           ___________________________________________________    Attending Physician:   Emir Gutierrez MD

## 2021-10-20 NOTE — PROGRESS NOTES
ORTHO PROGRESS NOTE      SUBJECTIVE:  York Tkn c/o right shoulder pain but analgesics help. Denies hip/groin pain at rest.    OBJECTIVE:  Patient Vitals for the past 24 hrs:   Temp Pulse BP   10/20/21 0731 97.6 °F (36.4 °C) 88 (!) 155/69   10/20/21 0355 98.2 °F (36.8 °C) 83 (!) 142/57   10/19/21 2341 99.2 °F (37.3 °C) 83 (!) 125/49   10/19/21 1954 98.4 °F (36.9 °C) 81 (!) 143/58   10/19/21 1526 98.2 °F (36.8 °C) 70 124/69   10/19/21 1110 98.2 °F (36.8 °C) (!) 57 (!) 117/54       Alert, no distress. Lying in bed. No family present. Respirations unlabored. Sling RUE intact. Skin intact. Obvious ecchymosis. Compartments compressible. Moves right digits/wrist OK. Hand/forearm SILT. Strong radial pulse. Some pain with gentle PROM right hip. SILT bilat foot. Calves chronically TTP. No lower leg edema. Moves ankles OK. Recent Labs     10/20/21  0800 10/19/21  0449 10/19/21  0449   HGB  --   --  10.3*   HCT  --   --  31.2*   PLT  --   --  162   BUN 36*   < > 30*   CREA 2.77*   < > 2.80*   GFRAA 20*   < > 19*   GFRNA 16*   < > 16*    < > = values in this interval not displayed. ASSESSMENT:  Right proximal humerus fracture  Right pubic rami fractures    PLAN:  Plan for reverse TSA 10/25 Dr. Yuki Montoya. His office will post with OR. D/W Dr. Senthil Harry who plans to keep patient as inpatient until surgery completed, then placement. PT/OT: NWB RUE. OK wrist/digit A/PROM. WBAT RLE with assistive device LUE. Analgesics: Tylenol scheduled. Limited Oxycodone prn. DVT proph: SCDs. Heparin held at this time. I d/w Dr. Senthil Harry, [facility] to cont proph Heparin dosing pre-op from Ortho perspective with last pre-op dose 10/24 evening. If converted to lovenox, recommend final pre-op dose 10/24 am.    Will follow peripherally. Please call if questions prior to 10/25.     LEE Escalera  Orthopedic Trauma Service  [facility]

## 2021-10-21 ENCOUNTER — APPOINTMENT (OUTPATIENT)
Dept: GENERAL RADIOLOGY | Age: 86
DRG: 958 | End: 2021-10-21
Attending: INTERNAL MEDICINE
Payer: MEDICARE

## 2021-10-21 ENCOUNTER — APPOINTMENT (OUTPATIENT)
Dept: VASCULAR SURGERY | Age: 86
DRG: 958 | End: 2021-10-21
Attending: INTERNAL MEDICINE
Payer: MEDICARE

## 2021-10-21 PROBLEM — M25.561 RIGHT KNEE PAIN: Status: ACTIVE | Noted: 2021-10-21

## 2021-10-21 LAB
GLUCOSE BLD STRIP.AUTO-MCNC: 100 MG/DL (ref 65–117)
GLUCOSE BLD STRIP.AUTO-MCNC: 215 MG/DL (ref 65–117)
GLUCOSE BLD STRIP.AUTO-MCNC: 45 MG/DL (ref 65–117)
GLUCOSE BLD STRIP.AUTO-MCNC: 46 MG/DL (ref 65–117)
GLUCOSE BLD STRIP.AUTO-MCNC: 81 MG/DL (ref 65–117)
GLUCOSE BLD STRIP.AUTO-MCNC: 95 MG/DL (ref 65–117)
SERVICE CMNT-IMP: ABNORMAL
SERVICE CMNT-IMP: NORMAL

## 2021-10-21 PROCEDURE — 97530 THERAPEUTIC ACTIVITIES: CPT

## 2021-10-21 PROCEDURE — 74011250636 HC RX REV CODE- 250/636: Performed by: INTERNAL MEDICINE

## 2021-10-21 PROCEDURE — 74011000258 HC RX REV CODE- 258: Performed by: INTERNAL MEDICINE

## 2021-10-21 PROCEDURE — 82962 GLUCOSE BLOOD TEST: CPT

## 2021-10-21 PROCEDURE — 97535 SELF CARE MNGMENT TRAINING: CPT

## 2021-10-21 PROCEDURE — 73560 X-RAY EXAM OF KNEE 1 OR 2: CPT

## 2021-10-21 PROCEDURE — 93971 EXTREMITY STUDY: CPT

## 2021-10-21 PROCEDURE — 74011000250 HC RX REV CODE- 250: Performed by: INTERNAL MEDICINE

## 2021-10-21 PROCEDURE — 74011250637 HC RX REV CODE- 250/637: Performed by: INTERNAL MEDICINE

## 2021-10-21 PROCEDURE — 2709999900 HC NON-CHARGEABLE SUPPLY

## 2021-10-21 PROCEDURE — 65270000029 HC RM PRIVATE

## 2021-10-21 RX ORDER — DEXTROSE MONOHYDRATE AND SODIUM CHLORIDE 5; .9 G/100ML; G/100ML
75 INJECTION, SOLUTION INTRAVENOUS CONTINUOUS
Status: DISCONTINUED | OUTPATIENT
Start: 2021-10-21 | End: 2021-10-23

## 2021-10-21 RX ORDER — AMLODIPINE BESYLATE 5 MG/1
10 TABLET ORAL DAILY
Status: DISCONTINUED | OUTPATIENT
Start: 2021-10-21 | End: 2021-10-29 | Stop reason: HOSPADM

## 2021-10-21 RX ADMIN — OXYCODONE 5 MG: 5 TABLET ORAL at 15:05

## 2021-10-21 RX ADMIN — ACETAMINOPHEN 650 MG: 325 TABLET ORAL at 12:43

## 2021-10-21 RX ADMIN — DEXTROSE AND SODIUM CHLORIDE 75 ML/HR: 5; 900 INJECTION, SOLUTION INTRAVENOUS at 10:34

## 2021-10-21 RX ADMIN — ACETAMINOPHEN 650 MG: 325 TABLET ORAL at 06:47

## 2021-10-21 RX ADMIN — DEXTROSE MONOHYDRATE 25 G: 25 INJECTION, SOLUTION INTRAVENOUS at 07:14

## 2021-10-21 RX ADMIN — Medication 10 ML: at 14:20

## 2021-10-21 RX ADMIN — Medication 10 ML: at 22:57

## 2021-10-21 RX ADMIN — OXYCODONE 5 MG: 5 TABLET ORAL at 10:43

## 2021-10-21 RX ADMIN — AMLODIPINE BESYLATE 10 MG: 5 TABLET ORAL at 10:35

## 2021-10-21 RX ADMIN — HEPARIN SODIUM 5000 UNITS: 5000 INJECTION INTRAVENOUS; SUBCUTANEOUS at 22:56

## 2021-10-21 RX ADMIN — ACETAMINOPHEN 650 MG: 325 TABLET ORAL at 17:33

## 2021-10-21 RX ADMIN — ACETAMINOPHEN 650 MG: 325 TABLET ORAL at 23:42

## 2021-10-21 RX ADMIN — AMLODIPINE BESYLATE 2.5 MG: 5 TABLET ORAL at 08:21

## 2021-10-21 RX ADMIN — HEPARIN SODIUM 5000 UNITS: 5000 INJECTION INTRAVENOUS; SUBCUTANEOUS at 14:19

## 2021-10-21 RX ADMIN — HEPARIN SODIUM 5000 UNITS: 5000 INJECTION INTRAVENOUS; SUBCUTANEOUS at 06:47

## 2021-10-21 NOTE — PROGRESS NOTES
Nutrition Note    RD added NCS restriction to diet order in attempt to aid in blood glucose management.      Electronically signed by Radha Covarrubias RD on 96/64/0608   Contact: [phone] or via [employer]

## 2021-10-21 NOTE — PROGRESS NOTES
10/21/2021   CARE MANAGEMENT NOTE:  CM reviewed EMR.  Pt was admitted with humeral neck fx, closed fx of multiple pubic rami, HTN, DM, and CKD3. Pt's  is . Reportedly, pt resides with her dtr Declan Weaver ([phone]).   Serene will be out of town in [place] until this weekend. Uri August ([phone]) is pt's granddtr and a secondary family contact.      RUR 15%     Transition Plan of Care:  1.  Ortho is following for medical management - reverse total shoulder arthroplasty planned Monday  2.  PT/OT is ongoing. Dtr would like pt to go to [employer] for [employer] 3.  PCR Covid 19 test for placement will be needed closer to  discharge  4.  Outpt f/u  5.  Mode of transportation upon discharge to be determined.     CM will continue to follow pt until discharged.   Joana

## 2021-10-21 NOTE — PROGRESS NOTES
Spiritual Care Assessment/Progress Note  [address]      NAME: Kayy Soler      MRN: [id number]  AGE: 80 y.o. SEX: female  Yarsanism Affiliation: Unknown   Language: English     10/21/2021     Total Time (in minutes): 11     Spiritual Assessment begun in [facility]  MED SURG 2 through conversation with:         [x]Patient        [] Family    [] Friend(s)        Reason for Consult: Initial/Spiritual assessment, patient floor     Spiritual beliefs: (Please include comment if needed)     [] Identifies with a kareem tradition:         [] Supported by a kareem community:            [] Claims no spiritual orientation:           [] Seeking spiritual identity:                [] Adheres to an individual form of spirituality:           [x] Not able to assess:                           Identified resources for coping:      [] Prayer                               [] Music                  [] Guided Imagery     [] Family/friends                 [] Pet visits     [] Devotional reading                         [x] Unknown     [] Other:                                               Interventions offered during this visit: (See comments for more details)    Patient Interventions: Other (comment) (unable to assess)           Plan of Care:     [] Support spiritual and/or cultural needs    [] Support AMD and/or advance care planning process      [] Support grieving process   [] Coordinate Rites and/or Rituals    [] Coordination with community clergy   [] No spiritual needs identified at this time   [] Detailed Plan of Care below (See Comments)  [] Make referral to Music Therapy  [] Make referral to Pet Therapy     [] Make referral to Addiction services  [] Make referral to [facility]  [] Make referral to Spiritual Care Partner  [] No future visits requested        [x] Follow up upon further referrals     Comments:  attempted visiting patient, Miss Zeke Gonzalez on 5 Med Surg unit, for initial spiritual assessment.  PT staff were working with patient when  arrived at her door. Spiritual care is available for support upon further staff referrals. Visited by: Zeina Connell.    Paging Service: Trino-LAUREN ([phone])

## 2021-10-21 NOTE — PROGRESS NOTES
Problem: Mobility Impaired (Adult and Pediatric)  Goal: *Acute Goals and Plan of Care (Insert Text)  Description: FUNCTIONAL STATUS PRIOR TO ADMISSION: Patient was modified independent using a rolling walker for functional mobility. HOME SUPPORT PRIOR TO ADMISSION: The patient lived with daughter but did not require assist.    Physical Therapy Goals  Initiated 10/19/2021  1. Patient will move from supine to sit and sit to supine  in bed with minimal assistance/contact guard assist within 7 day(s). 2.  Patient will transfer from bed to chair and chair to bed with minimal assistance/contact guard assist using the least restrictive device within 7 day(s). 3.  Patient will perform sit to stand with supervision/set-up within 7 day(s). 4.  Patient will ambulate with minimal assistance/contact guard assist for 50 feet with the least restrictive device within 7 day(s). 5.  Patient will ascend/descend 1 stairs with 1 handrail(s) with minimal assistance/contact guard assist within 7 day(s). Outcome: Progressing Towards Goal   PHYSICAL THERAPY TREATMENT  Patient: Leida Mccarty (3 y.o. female)  Date: 10/21/2021  Diagnosis: Fx humeral neck [S42.213A] Humeral surgical neck fracture  Procedure(s) (LRB):  RIGHT REVERSE TOTAL SHOULDER ARTHROPLASTY (GENERAL WITH REGIONAL BLOCK) (Right)    Precautions:    Chart, physical therapy assessment, plan of care and goals were reviewed. ASSESSMENT  Patient continues with skilled PT services and is progressing towards goals. She is received supine in bed. Waffle cushion is provided in order to decreased pain in sitting but patient declines attempting to use it. She is provided Max Ax2 for supine to sit with HOB elevated about 90*. Once in sitting patient demonstrates fair dynamic sitting balance while completing ADLs. VC are provided for anterior weight shift. Patient tolerates sitting well and is agreeable to standing.  Bed height is elevated slightly and patient is able to scoot forward and push to standing from bed rail. She tolerates standing well and is able to transition to A from therapist. Patient stands for several minutes with no complaints of leg or knee pain. Provided one UE assist and Min A x2 patient is able to take on step backwards. Current Level of Function Impacting Discharge (mobility/balance): Mod- Max Ax2 bed mobility, Min A x2 sit<>stand     Other factors to consider for discharge: medical stability, R total shoulder surgery 10/25/21         PLAN :  Patient continues to benefit from skilled intervention to address the above impairments. Continue treatment per established plan of care. to address goals. Recommendation for discharge: (in order for the patient to meet his/her long term goals)  Therapy up to 5 days/week in SNF setting progress with acute care    This discharge recommendation:  Has been made in collaboration with the attending provider and/or case management    IF patient discharges home will need the following DME: to be determined (TBD)       SUBJECTIVE:   Patient stated I would rather try without the cushion.     OBJECTIVE DATA SUMMARY:   Critical Behavior:  Neurologic State: Alert  Orientation Level: Oriented X4  Cognition: Follows commands  Safety/Judgement: Awareness of environment  Functional Mobility Training:  Bed Mobility:     Supine to Sit: Maximum assistance;Assist x2;Bed Modified  Sit to Supine: Total assistance  Scooting: Stand-by assistance        Transfers:  Sit to Stand: Minimum assistance;Assist x2  Stand to Sit: Minimum assistance;Assist x2                             Balance:  Sitting: Intact  Standing: Impaired  Standing - Static: Constant support; Fair  Standing - Dynamic : Constant support; Fair  Ambulation/Gait Training:                                                        Stairs:               Therapeutic Exercises:     Pain Rating:      Activity Tolerance:   Good    After treatment patient left in no apparent distress:   Supine in bed, Call bell within reach, Bed / chair alarm activated, Caregiver / family present, and Side rails x 3    COMMUNICATION/COLLABORATION:   The patients plan of care was discussed with: Occupational therapy assistant and Registered nurse.      Jennifer Russell, PT   Time Calculation: 34 mins

## 2021-10-21 NOTE — PROGRESS NOTES
Problem: Self Care Deficits Care Plan (Adult)  Goal: *Acute Goals and Plan of Care (Insert Text)  Description:   FUNCTIONAL STATUS PRIOR TO ADMISSION: Patient was modified independent using a rollator for functional mobility. HOME SUPPORT: The patient lived with daughter but did not require assist.    Occupational Therapy Goals  Initiated 10/19/2021  1. Patient will perform lower body dressing with minimal assistance/contact guard assist within 7 day(s). 2.  Patient will perform grooming with supervision/set-up within 7 day(s). 3.  Patient will perform anterior body bathing with minimal assistance/contact guard assist within 7 day(s). 4.  Patient will perform toilet transfers with minimal assistance/contact guard assist within 7 day(s). 5.  Patient will perform all aspects of toileting with minimal assistance/contact guard assist within 7 day(s). Outcome: Progressing Towards Goal   OCCUPATIONAL THERAPY TREATMENT  Patient: Maria Eugenia Boyce (89 y.o. female)  Date: 10/21/2021  Diagnosis: Fx humeral neck [S42.213A] Humeral surgical neck fracture  Procedure(s) (LRB):  RIGHT REVERSE TOTAL SHOULDER ARTHROPLASTY (GENERAL WITH REGIONAL BLOCK) (Right)    Precautions:    Chart, occupational therapy assessment, plan of care, and goals were reviewed. ASSESSMENT  Patient continues with skilled OT services and is slowly progressing towards goals. Improved pain tolerance today for OT. Pt sat edge of bed assist x 2 in prep for ADl's. She was able to comb her hair and wash her face with contact guard. Able to stand hand held assist x 2 to side step to head of bed. Current Level of Function Impacting Discharge (ADLs): Contact guard grooming seated edge of bed, assist x 2 to stand and for supine to sit    Other factors to consider for discharge:          PLAN :  Patient continues to benefit from skilled intervention to address the above impairments.   Continue treatment per established plan of care to address goals. Recommend with staff: out of bed to chair for ADL's, there ex, there act, meals    Recommend next OT session: cont towards goals    Recommendation for discharge: (in order for the patient to meet his/her long term goals)  Therapy 3 hours per day 5-7 days per week    This discharge recommendation:  Has not yet been discussed the attending provider and/or case management    IF patient discharges home will need the following DME:        SUBJECTIVE:   Patient stated I raised 5 children.     OBJECTIVE DATA SUMMARY:   Cognitive/Behavioral Status:  Neurologic State: Alert  Orientation Level: Oriented X4  Cognition: Follows commands             Functional Mobility and Transfers for ADLs:  Bed Mobility:  Supine to Sit: Maximum assistance;Assist x2;Bed Modified  Sit to Supine: Total assistance  Scooting: Stand-by assistance    Transfers:  Sit to Stand: Minimum assistance;Assist x2          Balance:  Sitting: Intact  Standing: Impaired  Standing - Static: Constant support; Fair  Standing - Dynamic : Constant support; Fair    ADL Intervention:       Grooming  Grooming Assistance: Contact guard assistance  Position Performed: Seated edge of bed  Brushing/Combing Hair: Contact guard assistance       Activity Tolerance:   Fair    After treatment patient left in no apparent distress:   Supine in bed    COMMUNICATION/COLLABORATION:   The patients plan of care was discussed with: Physical therapist, Occupational therapist, and Registered nurse.      MONICA Lou/L  Time Calculation: 35 mins

## 2021-10-21 NOTE — PROGRESS NOTES
Bedside and Verbal shift change report given to VICTOR M Maldonado (oncoming nurse) by Bal Saavedra RN (offgoing nurse). Report included the following information SBAR, Kardex, Intake/Output, MAR, Accordion, Recent Results and Med Rec Status.

## 2021-10-21 NOTE — PROGRESS NOTES
Bedside shift change report given to Oliver Pastor (oncoming nurse) by [employer] (offgoing nurse). Report included the following information SBAR, Kardex, MAR and Recent Results.

## 2021-10-21 NOTE — PROGRESS NOTES
Jan Mondragon [facility] 79  [address] [facility], [place], [address] [place] [address]  [phone]      Medical Progress Note      NAME:         Jose Pelaez   :        1934  MRM:        [id number]    Date of service: 10/21/2021      Subjective: Patient has been seen and examined as a follow up for multiple medical issues. Chart, labs, diagnostics reviewed. She feels well, weak with a mild aching discomfort right shoulder and now right knee. Had a low blood glucose last night. No fever or chills. Objective:    Vital Signs:    Visit Vitals  BP (!) 175/67 (BP 1 Location: Left upper arm, BP Patient Position: At rest)   Pulse 76   Temp 98 °F (36.7 °C)   Resp 19   Ht 5' 2\" (1.575 m)   Wt 63.5 kg (140 lb)   SpO2 97%   BMI 25.61 kg/m²          Intake/Output Summary (Last 24 hours) at 10/21/2021 0958  Last data filed at 10/20/2021 1049  Gross per 24 hour   Intake --   Output 400 ml   Net -400 ml        Physical Examination:    General:   Weak and frail looking, not in any distress   Eyes:   pink conjunctivae, PERRLA with no discharge. ENT:   no ottorrhea or rhinorrhea with dry mucous membranes  Neck: no masses, thyroid non-tender and trachea central.  Pulm:  decreased but clear breath sounds without crackles or wheezes  Card:  has regular and normal S1, S2 without thrills, bruits or peripheral edema  Abd:  Soft, non-tender, non-distended, normoactive bowel sounds   Musc:  Arm sling RUE. Swollen tender right knee  Skin:  No rashes, bruising or ulcers. Neuro: Awake and alert.  Generally a non focal exam. Follows commands appropriately  Psych:  Has a fair insight to her illness     Current Facility-Administered Medications   Medication Dose Route Frequency    dextrose 5% and 0.9% NaCl infusion  75 mL/hr IntraVENous CONTINUOUS    acetaminophen (TYLENOL) tablet 650 mg  650 mg Oral Q6H    sodium chloride (NS) flush 5-40 mL  5-40 mL IntraVENous Q8H  sodium chloride (NS) flush 5-40 mL  5-40 mL IntraVENous PRN    morphine injection 2 mg  2 mg IntraVENous Q4H PRN    naloxone (NARCAN) injection 0.4 mg  0.4 mg IntraVENous PRN    ondansetron (ZOFRAN) injection 4 mg  4 mg IntraVENous Q4H PRN    heparin (porcine) injection 5,000 Units  5,000 Units SubCUTAneous Q8H    oxyCODONE IR (ROXICODONE) tablet 5 mg  5 mg Oral Q4H PRN    glucose chewable tablet 16 g  4 Tablet Oral PRN    dextrose (D50W) injection syrg 12.5-25 g  12.5-25 g IntraVENous PRN    glucagon (GLUCAGEN) injection 1 mg  1 mg IntraMUSCular PRN    hydrALAZINE (APRESOLINE) 20 mg/mL injection 10 mg  10 mg IntraVENous Q6H PRN    amLODIPine (NORVASC) tablet 2.5 mg  2.5 mg Oral DAILY        Laboratory data and review:    Recent Labs     10/19/21  0449   WBC 9.2   HGB 10.3*   HCT 31.2*        Recent Labs     10/20/21  0800 10/19/21  0449    140   K 3.2* 3.5    107   CO2 25 26   GLU 93 127*   BUN 36* 30*   CREA 2.77* 2.80*   CA 7.7* 8.0*   MG  --  2.6*     No components found for: [place]    Diagnostics: Imaging studies have been reviewed    Assessment and Plan:    Humeral surgical neck fracture (10/18/2021) POA: CT upper extremity showed a comminuted fracture involving the humeral neck and the greater tuberosity. Seen by orthopedic surgery who deem her stable. Initially non operative management recommended but she is now scheduled for surgery Monday 10/25. Continue an arm sling and NWB RUE, pain control. PT, OT. CM for ultimate discharge planning. Family want to only go to [facility]    Closed fracture of multiple pubic rami ([facility] [address] (10/18/2021) POA: CT pelvis showed an acute nondisplaced right superior and inferior pubic rami fractures. Proximal right femoral intramedullary hardware is intact. Reviewed by ortho. Continue WBAT with walker. PT, OT following. Right knee pain POA: has associated swelling. Get an xray right knee as well as a venous doppler ultrasound.  Pain control    HTN (hypertension), benign (10/18/2021) POA: BP stable. Continue Amlodipine    DM (diabetes mellitus), type 2 ([address] (10/18/2021) POA: A1c 6.1. Had a low BG last night. DC SSi for now. DM diet as tolerated. Home Amaryl on hold. Leukocytosis (10/18/2021) POA: CXR clear. UA not impressive. WBCs better. Afebrile. Continue to monitor       CKD (chronic kidney disease) stage 3, GFR 30-59 ml/min ([facility]) (10/18/2021) / Hypokalemia due to loss of potassium (10/18/2021) POA: K+ better. SCr slightly high and likely has stage 4 disease. Continue gentle hydration.      Total time spent for the patient's care: [address] [facility] discussed with: Patient, Care Manager, Nursing Staff and orthopedic surgery    Discussed:  Care Plan and D/C Planning    Prophylaxis:  SCD's    Anticipated Disposition:  SNF/LTC vs [facility]           ___________________________________________________    Attending Physician:   Olegario Smith MD

## 2021-10-21 NOTE — PROGRESS NOTES
Orthopaedic Progress Note    2021 11:07 AM     Patient: Adolph Garcia MRN: [id number]  SSN: [id number]    YOB: 1934  Age: 80 y.o. Sex: female      Admit date:  10/17/2021  Admitting Physician:  Amber Nye MD   Surgeon:  Taylor Cote) and Role:     * Munira Thompson MD - Primary    Consulting Physician(s): Treatment Team: Attending Provider: Sadie Cano MD; Consulting Provider: Miguel Gonsalves MD; Consulting Provider: Yolanda Carlin NP; Utilization Review: Buzz Muse; Consulting Provider: Munira Thompson MD; Care Manager: Jorge L Mendenhall Primary Nurse: Sima Mosley RN    SUBJECTIVE:     Adolph Garcia is a 80 y.o. female is resting in bed complaining of right shoulder and right knee pain. She reports a 3 days history of right knee pain. It started after her fall. She had imaging done this morning. No complaints of nausea, vomiting, dizziness, lightheadedness, chest pain, or shortness of breath. OBJECTIVE:       Physical Exam:  General: Alert, cooperative, no distress. Respiratory: Respirations unlabored  Neurological:  Neurovascular exam within normal limits. Motor: + DF/PF. Musculoskeletal: Calves soft, supple, non-tender upon palpation. Right knee with large effusion. PROM: 0-40 degrees of flexion. Negative valgus and varus stress test at 0 and 30 degrees of flexion. Severe pain to palpation of medial joint line. Can not test anterior or posterior drawer. Negative Lachman. Skin is intact right knee. No ecchymosis. No defect at the quadricep insertion on patella or the distal pole of the patella.       Vital Signs:      Patient Vitals for the past 8 hrs:   BP Temp Pulse Resp SpO2   10/21/21 0826 (!) 175/67 98 °F (36.7 °C) 76 19 97 %   10/21/21 0411 (!) 160/77 98.2 °F (36.8 °C) 65 18 95 %                                          Temp (24hrs), Av.5 °F (36.9 °C), Min:97.6 °F (36.4 °C), Max:99.1 °F (37.3 °C)      Labs: Recent Labs     10/19/21  0449   HCT 31.2*   HGB 10.3*     Lab Results   Component Value Date/Time    Sodium 141 10/20/2021 08:00 AM    Potassium 3.2 (L) 10/20/2021 08:00 AM    Chloride 107 10/20/2021 08:00 AM    CO2 25 10/20/2021 08:00 AM    Glucose 93 10/20/2021 08:00 AM    BUN 36 (H) 10/20/2021 08:00 AM    Creatinine 2.77 (H) 10/20/2021 08:00 AM    Calcium 7.7 (L) 10/20/2021 08:00 AM       PT/OT:                Patient mobility  Bed Mobility Training  Supine to Sit: Moderate assistance, Assist x2  Sit to Supine: Maximum assistance, Assist x2  Scooting: Minimum assistance  Transfer Training  Sit to Stand: Minimum assistance, Assist x2  Stand to Sit: Minimum assistance, Assist x2          Weight Bearing Status  Right Side Weight Bearing: As tolerated        ASSESSMENT / PLAN:   Principal Problem:    Humeral surgical neck fracture (10/18/2021)    Active Problems:    Closed fracture of multiple pubic rami ([facility] [address] (10/18/2021)      HTN (hypertension), benign (10/18/2021)      DM (diabetes mellitus), type 2 ([address] (10/18/2021)      Leukocytosis (10/18/2021)      CKD (chronic kidney disease) stage 3, GFR 30-59 ml/min ([facility]) (10/18/2021)      Hypokalemia due to loss of potassium (10/18/2021)      Right knee pain (10/21/2021)          A: 1. Displaced right proximal humerus fracture  2. Right superior and inferior pubic rami fractures  3. Right knee endstage osteoarthritis with contusion    P: 1. Right shoulder: continue sling and ice. Recommend gentle hand and elbow ROM to prevent edema. NWB Right shoulder. Sling refit as it was not placed properly. 2. Right pubic rami fx: nonoperative management. WBAT with therapy  3. Right knee: She has endstage OA on imaging. There is no appreciable fracture, but effusion likely from subchondral injury from the fall. If she has worsening pain and unable to get ambulate with PT, I would obtain a CT of the right knee. Ice. Elevation.     4. Plan for surgery on Monday with  Tessie for reverse total shoulder arthroplasty. NPO after midnight Sunday. 5. Orthopedics is following.          Signed By:  Jaleesa Lamar, [address] [address]

## 2021-10-22 ENCOUNTER — ANESTHESIA EVENT (OUTPATIENT)
Dept: SURGERY | Age: 86
DRG: 958 | End: 2021-10-22
Payer: MEDICARE

## 2021-10-22 LAB
ANION GAP SERPL CALC-SCNC: 5 MMOL/L (ref 5–15)
BASOPHILS # BLD: 0 K/UL (ref 0–0.1)
BASOPHILS NFR BLD: 1 % (ref 0–1)
BUN SERPL-MCNC: 38 MG/DL (ref 6–20)
BUN/CREAT SERPL: 16 (ref 12–20)
CALCIUM SERPL-MCNC: 7.9 MG/DL (ref 8.5–10.1)
CHLORIDE SERPL-SCNC: 111 MMOL/L (ref 97–108)
CO2 SERPL-SCNC: 22 MMOL/L (ref 21–32)
CREAT SERPL-MCNC: 2.35 MG/DL (ref 0.55–1.02)
DIFFERENTIAL METHOD BLD: ABNORMAL
EOSINOPHIL # BLD: 0.5 K/UL (ref 0–0.4)
EOSINOPHIL NFR BLD: 8 % (ref 0–7)
ERYTHROCYTE [DISTWIDTH] IN BLOOD BY AUTOMATED COUNT: 13.1 % (ref 11.5–14.5)
GLUCOSE BLD STRIP.AUTO-MCNC: 113 MG/DL (ref 65–117)
GLUCOSE BLD STRIP.AUTO-MCNC: 161 MG/DL (ref 65–117)
GLUCOSE BLD STRIP.AUTO-MCNC: 180 MG/DL (ref 65–117)
GLUCOSE BLD STRIP.AUTO-MCNC: 183 MG/DL (ref 65–117)
GLUCOSE SERPL-MCNC: 93 MG/DL (ref 65–100)
HCT VFR BLD AUTO: 26.4 % (ref 35–47)
HGB BLD-MCNC: 8.6 G/DL (ref 11.5–16)
IMM GRANULOCYTES # BLD AUTO: 0 K/UL (ref 0–0.04)
IMM GRANULOCYTES NFR BLD AUTO: 0 % (ref 0–0.5)
LYMPHOCYTES # BLD: 1.4 K/UL (ref 0.8–3.5)
LYMPHOCYTES NFR BLD: 23 % (ref 12–49)
MAGNESIUM SERPL-MCNC: 2.6 MG/DL (ref 1.6–2.4)
MCH RBC QN AUTO: 30.3 PG (ref 26–34)
MCHC RBC AUTO-ENTMCNC: 32.6 G/DL (ref 30–36.5)
MCV RBC AUTO: 93 FL (ref 80–99)
MONOCYTES # BLD: 0.6 K/UL (ref 0–1)
MONOCYTES NFR BLD: 10 % (ref 5–13)
NEUTS SEG # BLD: 3.5 K/UL (ref 1.8–8)
NEUTS SEG NFR BLD: 58 % (ref 32–75)
NRBC # BLD: 0 K/UL (ref 0–0.01)
NRBC BLD-RTO: 0 PER 100 WBC
PHOSPHATE SERPL-MCNC: 3.9 MG/DL (ref 2.6–4.7)
PLATELET # BLD AUTO: 138 K/UL (ref 150–400)
PMV BLD AUTO: 12 FL (ref 8.9–12.9)
POTASSIUM SERPL-SCNC: 3.7 MMOL/L (ref 3.5–5.1)
RBC # BLD AUTO: 2.84 M/UL (ref 3.8–5.2)
SERVICE CMNT-IMP: ABNORMAL
SERVICE CMNT-IMP: NORMAL
SODIUM SERPL-SCNC: 138 MMOL/L (ref 136–145)
WBC # BLD AUTO: 6 K/UL (ref 3.6–11)

## 2021-10-22 PROCEDURE — 74011250636 HC RX REV CODE- 250/636: Performed by: INTERNAL MEDICINE

## 2021-10-22 PROCEDURE — 85025 COMPLETE CBC W/AUTO DIFF WBC: CPT

## 2021-10-22 PROCEDURE — 97110 THERAPEUTIC EXERCISES: CPT

## 2021-10-22 PROCEDURE — 83735 ASSAY OF MAGNESIUM: CPT

## 2021-10-22 PROCEDURE — 82962 GLUCOSE BLOOD TEST: CPT

## 2021-10-22 PROCEDURE — 74011250637 HC RX REV CODE- 250/637: Performed by: INTERNAL MEDICINE

## 2021-10-22 PROCEDURE — 84100 ASSAY OF PHOSPHORUS: CPT

## 2021-10-22 PROCEDURE — 80048 BASIC METABOLIC PNL TOTAL CA: CPT

## 2021-10-22 PROCEDURE — 65270000029 HC RM PRIVATE

## 2021-10-22 PROCEDURE — 36415 COLL VENOUS BLD VENIPUNCTURE: CPT

## 2021-10-22 PROCEDURE — 77030038269 HC DRN EXT URIN PURWCK BARD -A

## 2021-10-22 PROCEDURE — 74011000258 HC RX REV CODE- 258: Performed by: INTERNAL MEDICINE

## 2021-10-22 RX ADMIN — DEXTROSE AND SODIUM CHLORIDE 75 ML/HR: 5; 900 INJECTION, SOLUTION INTRAVENOUS at 20:53

## 2021-10-22 RX ADMIN — ACETAMINOPHEN 650 MG: 325 TABLET ORAL at 23:20

## 2021-10-22 RX ADMIN — ACETAMINOPHEN 650 MG: 325 TABLET ORAL at 12:49

## 2021-10-22 RX ADMIN — OXYCODONE 5 MG: 5 TABLET ORAL at 17:15

## 2021-10-22 RX ADMIN — AMLODIPINE BESYLATE 10 MG: 5 TABLET ORAL at 08:15

## 2021-10-22 RX ADMIN — HEPARIN SODIUM 5000 UNITS: 5000 INJECTION INTRAVENOUS; SUBCUTANEOUS at 05:56

## 2021-10-22 RX ADMIN — ACETAMINOPHEN 650 MG: 325 TABLET ORAL at 05:56

## 2021-10-22 RX ADMIN — ONDANSETRON 4 MG: 2 INJECTION INTRAMUSCULAR; INTRAVENOUS at 13:02

## 2021-10-22 RX ADMIN — DEXTROSE AND SODIUM CHLORIDE 75 ML/HR: 5; 900 INJECTION, SOLUTION INTRAVENOUS at 05:56

## 2021-10-22 RX ADMIN — HEPARIN SODIUM 5000 UNITS: 5000 INJECTION INTRAVENOUS; SUBCUTANEOUS at 20:54

## 2021-10-22 RX ADMIN — Medication 10 ML: at 13:03

## 2021-10-22 RX ADMIN — ONDANSETRON 4 MG: 2 INJECTION INTRAMUSCULAR; INTRAVENOUS at 20:54

## 2021-10-22 RX ADMIN — HEPARIN SODIUM 5000 UNITS: 5000 INJECTION INTRAVENOUS; SUBCUTANEOUS at 13:13

## 2021-10-22 RX ADMIN — OXYCODONE 5 MG: 5 TABLET ORAL at 10:37

## 2021-10-22 RX ADMIN — Medication 10 ML: at 20:54

## 2021-10-22 RX ADMIN — MORPHINE SULFATE 2 MG: 2 INJECTION, SOLUTION INTRAMUSCULAR; INTRAVENOUS at 13:02

## 2021-10-22 RX ADMIN — ACETAMINOPHEN 650 MG: 325 TABLET ORAL at 18:15

## 2021-10-22 NOTE — PROGRESS NOTES
Problem: Mobility Impaired (Adult and Pediatric)  Goal: *Acute Goals and Plan of Care (Insert Text)  Description: FUNCTIONAL STATUS PRIOR TO ADMISSION: Patient was modified independent using a rolling walker for functional mobility. HOME SUPPORT PRIOR TO ADMISSION: The patient lived with daughter but did not require assist.    Physical Therapy Goals  Initiated 10/19/2021  1. Patient will move from supine to sit and sit to supine  in bed with minimal assistance/contact guard assist within 7 day(s). 2.  Patient will transfer from bed to chair and chair to bed with minimal assistance/contact guard assist using the least restrictive device within 7 day(s). 3.  Patient will perform sit to stand with supervision/set-up within 7 day(s). 4.  Patient will ambulate with minimal assistance/contact guard assist for 50 feet with the least restrictive device within 7 day(s). 5.  Patient will ascend/descend 1 stairs with 1 handrail(s) with minimal assistance/contact guard assist within 7 day(s). Note:   PHYSICAL THERAPY TREATMENT  Patient: Adolph Garcia (84 y.o. female)  Date: 10/22/2021  Diagnosis: Fx humeral neck [S42.213A] Humeral surgical neck fracture  Procedure(s) (LRB):  RIGHT REVERSE TOTAL SHOULDER ARTHROPLASTY (GENERAL WITH REGIONAL BLOCK) (Right)    Precautions:  NWB RUE  Chart, physical therapy assessment, plan of care and goals were reviewed. ASSESSMENT  Patient continues with skilled PT services and is not progressing towards goals. Patient to have shoulder surgery on Monday 10/25. Her pain uncontrolled at this time and patient and her daughter decline PT. Looked at patient's [facility] and she went from 3:30 pm Thursday 10/21 to 10:30 Friday before having another pain pill. Discussed with patient requesting pain medication from nurse and not going 20 hrs without pain medicine beyond tylenol.  Reviewed bed exercises with patient: BLEs 20 reps each ankle pumps,  quad sets, gluteal sets, heel slides on left and SLR on left. Patient unable to flex right knee and hip- pelvic pain. Encouraged to do exercises repeatedly throughout the day. Posted reminder sign in patient's room regarding pain medicine and exercises. Current Level of Function Impacting Discharge (mobility/balance): bed exercises only due to high level of pain  Other factors to consider for discharge:          PLAN :  Patient continues to benefit from skilled intervention to address the above impairments. Continue treatment per established plan of care. to address goals. Recommendation for discharge: (in order for the patient to meet his/her long term goals)  Physical therapy at least 2 days/week in the home     This discharge recommendation:  Has not yet been discussed the attending provider and/or case management    IF patient discharges home will need the following DME: none       SUBJECTIVE:   Patient stated .    OBJECTIVE DATA SUMMARY:   Critical Behavior:  Neurologic State: Alert  Orientation Level: Oriented X4  Cognition: Follows commands  Safety/Judgement: Awareness of environment      Therapeutic Exercises:   See above  Pain Ratin-8/10 R shoulder and R pelvis, leg , knee    Activity Tolerance:   NT    After treatment patient left in no apparent distress:   Supine in bed and Call bell within reach    COMMUNICATION/COLLABORATION:   The patients plan of care was discussed with: Registered nurse.      Stephanie Cárdenas, PT, DPT   Time Calculation: 25 mins

## 2021-10-22 NOTE — PROGRESS NOTES
Bedside and Verbal shift change report given to VICTOR M Maldonado (oncoming nurse) by Renetta Chandra RN (offgoing nurse).  Report included the following information SBAR, Kardex, Intake/Output, MAR, Accordion, Recent Results and Med Rec Status. '

## 2021-10-22 NOTE — PROGRESS NOTES
Daily Progress Note: 10/22/2021  Steph Byrne NP    Assessment/Plan:   Humeral surgical neck fracture (10/18/2021) POA: CT upper extremity showed a comminuted fracture involving the humeral neck and the greater tuberosity.   -Seen by orthopedic surgery who deem her stable. '  -Iinitially non operative management recommended but she is now scheduled for surgery Monday 10/25.   -Continue an arm sling and NWB RUE, pain control. PT, OT.   -CM for ultimate discharge planning.   -Family want to only go to [address]     Closed fracture of multiple pubic rami ([facility] [address] (10/18/2021) POA: CT pelvis showed an acute nondisplaced right superior and inferior pubic rami fractures. Proximal right femoral intramedullary hardware is intact. -Reviewed by ortho. -Continue WBAT with walker.   -PT, OT following.      Right knee pain POA: has associated swelling.   -Get an xray right knee as well as a venous doppler ultrasound.   -Pain control     HTN (hypertension), benign (10/18/2021) POA: BP stable. -Continue Amlodipine     DM (diabetes mellitus), type 2 ([facility] [address] (10/18/2021) POA: A1c 6.1.   -Had a low BG last night.   -DC SSi for now. -DM diet as tolerated. -Home Amaryl on hold.      Leukocytosis (10/18/2021) POA: CXR clear. UA not impressive. WBCs better. Afebrile.   -Continue to monitor        CKD (chronic kidney disease) stage 3, GFR 30-59 ml/min ([facility]) (10/18/2021) / Hypokalemia due to loss of potassium (10/18/2021) POA:   -K+ better. -SCr slightly high and likely has stage 4 disease.   -Continue gentle hydration.         Problem List:  Problem List as of 10/22/2021 Date Reviewed: 10/18/2021        Codes Class Noted - Resolved    Right knee pain ICD-10-CM: M25.561  ICD-9-CM: 719.46  10/21/2021 - Present        * (Principal) Humeral surgical neck fracture ICD-10-CM: S42.213A  ICD-9-CM: 812.01  10/18/2021 - Present        Closed fracture of multiple pubic rami ([facility] [address] ICD-10-CM: Z36.393E  ICD-9-CM: 808.2  10/18/2021 - Present        HTN (hypertension), benign ICD-10-CM: I10  ICD-9-CM: 401.1  10/18/2021 - Present        DM (diabetes mellitus), type 2 ([facility] ICD-10-CM: E11.9  ICD-9-CM: 250.00  10/18/2021 - Present        Leukocytosis ICD-10-CM: L74.007  ICD-9-CM: 288.60  10/18/2021 - Present        CKD (chronic kidney disease) stage 3, GFR 30-59 ml/min ([facility]) ICD-10-CM: N18.30  ICD-9-CM: 585.3  10/18/2021 - Present        Hypokalemia due to loss of potassium ICD-10-CM: E87.6  ICD-9-CM: 276.8  10/18/2021 - Present              HPI:   Ms. Ubaldo Villalta is an 87y.o.  female with PMH of DM, CKD admitted s/p fall for eval. Noted to have femoral neck fracture and pubic rami fracture. Pt's only complaint is RUE pain that is currently controlled with Tylenol. Lives with her daughter. Typically walks with a walker. (Dr Margareth Pereyra)     10/22: Didn't sleep well last night. Xray of knee is ok except for an effusion. US neg for DVT. Cr 2.35. Pain is controlled. Surgery scheduled for Monday. Review of Systems:   A comprehensive review of systems was negative except for that written in the HPI. Objective:   Physical Exam:     Visit Vitals  /61 (BP 1 Location: Left lower arm, BP Patient Position: At rest)   Pulse 86   Temp 97.8 °F (36.6 °C)   Resp 17   Ht 5' 2\" (1.575 m)   Wt 140 lb (63.5 kg)   SpO2 91%   BMI 25.61 kg/m²      O2 Device: None (Room air)    Temp (24hrs), Av.2 °F (36.8 °C), Min:97.8 °F (36.6 °C), Max:98.6 °F (37 °C)    No intake/output data recorded. 10/20 1901 - 10/22 0700  In: 9976 [P.O.:300; I.V.:745]  Out: 600 [Urine:600]    General:  Alert, cooperative, no distress, appears stated age. Head:  Normocephalic, without obvious abnormality, atraumatic. Eyes:  Conjunctivae/corneas clear. PERRL, EOMs intact. Nose: Nares normal. Septum midline. Mucosa normal. No drainage or sinus tenderness.    Throat: Lips, mucosa, and tongue normal.   Neck: Supple, symmetrical, trachea midline, no adenopathy, thyroid: no enlargement/tenderness/nodules, no carotid bruit and no JVD. Back:   Symmetric, no curvature. ROM normal. No CVA tenderness. Lungs:   Clear to auscultation bilaterally. Chest wall:  No tenderness or deformity. Heart:  Regular rate and rhythm, S1, S2 normal, no murmur, click, rub or gallop. Abdomen:   Soft, non-tender. Bowel sounds normal. No masses,  No organomegaly. Extremities: Right UE in sling,  no cyanosis or edema. No calf tenderness or cords. Pulses: 2+ and symmetric all extremities. Skin: Skin color, texture, turgor normal. No rashes or lesions   Neurologic: CNII-XII intact. Alert and oriented X 3. Fine motor of hands and fingers normal.   equal.  No cogwheeling or rigidity. Gait not tested at this time. Sensation grossly normal to touch. Gross motor of extremities normal.       Data Review:   X-ray Right knee 10/21/21    IMPRESSION  1. No definite evidence of acute traumatic injury involving the knee. 2. Evidence of degenerative change as described above. 3. Presence of a moderate-sized knee joint effusion. Duplex Lower Ext 10/21/21  Interpretation Summary    Right lower extremity venous duplex negative for deep venous thrombosis or thrombophlebitis. Left common femoral vein is thrombus free. Lower Extremity Venous Findings    Right Lower Venous    No evidence of deep vein thrombosis in the common femoral, profunda femoral, femoral, popliteal, posterior tibial, and peroneal veins. The veins were imaged in the transverse and longitudinal planes. The vessels showed normal color filling and compressibility. Doppler interrogation showed phasic and spontaneous flow. Left Lower Venous    For comparison purposes, the left common femoral vein was briefly interrogated. These vein demonstrates normal color filing and compressibility. Doppler flow was phasic and spontaneous.        Recent Days:  Recent Labs     10/22/21  0349   WBC 6.0   HGB 8.6*   HCT 26.4*   *     Recent Labs     10/22/21  0349 10/20/21  0800    141   K 3.7 3.2*   * 107   CO2 22 25   GLU 93 93   BUN 38* 36*   CREA 2.35* 2.77*   CA 7.9* 7.7*   MG 2.6*  --    PHOS 3.9  --        24 Hour Results:  Recent Results (from the past 24 hour(s))   GLUCOSE, POC    Collection Time: 10/21/21  7:31 AM   Result Value Ref Range    Glucose (POC) 215 (H) 65 - 117 mg/dL    Performed by Tricia Lockwood (PCT)    GLUCOSE, POC    Collection Time: 10/21/21 12:36 PM   Result Value Ref Range    Glucose (POC) 95 65 - 117 mg/dL    Performed by Pam Franklin    GLUCOSE, POC    Collection Time: 10/21/21  5:35 PM   Result Value Ref Range    Glucose (POC) 100 65 - 117 mg/dL    Performed by Connie Garza    GLUCOSE, POC    Collection Time: 10/21/21  8:46 PM   Result Value Ref Range    Glucose (POC) 81 65 - 117 mg/dL    Performed by Tricia Lockwood (PCT)    METABOLIC PANEL, BASIC    Collection Time: 10/22/21  3:49 AM   Result Value Ref Range    Sodium 138 136 - 145 mmol/L    Potassium 3.7 3.5 - 5.1 mmol/L    Chloride 111 (H) 97 - 108 mmol/L    CO2 22 21 - 32 mmol/L    Anion gap 5 5 - 15 mmol/L    Glucose 93 65 - 100 mg/dL    BUN 38 (H) 6 - 20 MG/DL    Creatinine 2.35 (H) 0.55 - 1.02 MG/DL    BUN/Creatinine ratio 16 12 - 20      GFR est AA 24 (L) >60 ml/min/1.73m2    GFR est non-AA 20 (L) >60 ml/min/1.73m2    Calcium 7.9 (L) 8.5 - 10.1 MG/DL   CBC WITH AUTOMATED DIFF    Collection Time: 10/22/21  3:49 AM   Result Value Ref Range    WBC 6.0 3.6 - 11.0 K/uL    RBC 2.84 (L) 3.80 - 5.20 M/uL    HGB 8.6 (L) 11.5 - 16.0 g/dL    HCT 26.4 (L) 35.0 - 47.0 %    MCV 93.0 80.0 - 99.0 FL    MCH 30.3 26.0 - 34.0 PG    MCHC 32.6 30.0 - 36.5 g/dL    RDW 13.1 11.5 - 14.5 %    PLATELET 260 (L) 935 - 400 K/uL    MPV 12.0 8.9 - 12.9 FL    NRBC 0.0 0  WBC    ABSOLUTE NRBC 0.00 0.00 - 0.01 K/uL    NEUTROPHILS 58 32 - 75 %    LYMPHOCYTES 23 12 - 49 %    MONOCYTES 10 5 - 13 %    EOSINOPHILS 8 (H) 0 - 7 %    BASOPHILS 1 0 - 1 %    IMMATURE GRANULOCYTES 0 0.0 - 0.5 %    ABS. NEUTROPHILS 3.5 1.8 - 8.0 K/UL    ABS. LYMPHOCYTES 1.4 0.8 - 3.5 K/UL    ABS. MONOCYTES 0.6 0.0 - 1.0 K/UL    ABS. EOSINOPHILS 0.5 (H) 0.0 - 0.4 K/UL    ABS. BASOPHILS 0.0 0.0 - 0.1 K/UL    ABS. IMM. GRANS. 0.0 0.00 - 0.04 K/UL    DF AUTOMATED     MAGNESIUM    Collection Time: 10/22/21  3:49 AM   Result Value Ref Range    Magnesium 2.6 (H) 1.6 - 2.4 mg/dL   PHOSPHORUS    Collection Time: 10/22/21  3:49 AM   Result Value Ref Range    Phosphorus 3.9 2.6 - 4.7 MG/DL   GLUCOSE, POC    Collection Time: 10/22/21  7:14 AM   Result Value Ref Range    Glucose (POC) 113 65 - 117 mg/dL    Performed by Allyson Rhodes (PCT)        Medications reviewed  Current Facility-Administered Medications   Medication Dose Route Frequency    dextrose 5% and 0.9% NaCl infusion  75 mL/hr IntraVENous CONTINUOUS    amLODIPine (NORVASC) tablet 10 mg  10 mg Oral DAILY    acetaminophen (TYLENOL) tablet 650 mg  650 mg Oral Q6H    sodium chloride (NS) flush 5-40 mL  5-40 mL IntraVENous Q8H    sodium chloride (NS) flush 5-40 mL  5-40 mL IntraVENous PRN    morphine injection 2 mg  2 mg IntraVENous Q4H PRN    naloxone (NARCAN) injection 0.4 mg  0.4 mg IntraVENous PRN    ondansetron (ZOFRAN) injection 4 mg  4 mg IntraVENous Q4H PRN    heparin (porcine) injection 5,000 Units  5,000 Units SubCUTAneous Q8H    oxyCODONE IR (ROXICODONE) tablet 5 mg  5 mg Oral Q4H PRN    glucose chewable tablet 16 g  4 Tablet Oral PRN    dextrose (D50W) injection syrg 12.5-25 g  12.5-25 g IntraVENous PRN    glucagon (GLUCAGEN) injection 1 mg  1 mg IntraMUSCular PRN    hydrALAZINE (APRESOLINE) 20 mg/mL injection 10 mg  10 mg IntraVENous Q6H PRN       Care Plan discussed with: Patient/Family    Total time spent with patient and review of records: 30 minutes.     Amena Servin MD

## 2021-10-22 NOTE — PROGRESS NOTES
Comprehensive Nutrition Assessment    Type and Reason for Visit: Initial, RD nutrition re-screen/LOS    Nutrition Recommendations/Plan:   1. Continue current diet order (3 Carb, NCS)  2. Begin Gelatein once daily to aid in kcal/protein intake  3. No BM x 5 days - consider modification of bowel regimen     Nutrition Assessment:    Pt is an 80year old female admitted with Fx humeral neck [S42.213A]. She  has no past medical history on file. Daughter at bedside at time of RD visit. Both patient and daughter unsure of UBW, but stated that within last year they have noticed gradual slight weight loss (pt went from pant size 12 to pant size 10). Denies decreased appetite - states she usually eats small portions, and is consuming her normal amount. No chewing/swallowing problems. Uses top dentures and has them with her. No N/V/D at this time. NKFA. Voiced acceptance of gelatein once daily - has had Glucerna and Ensure in the past and dislikes them. Patient Vitals for the past 168 hrs:   % Diet Eaten   10/22/21 0855 26 - 50%   10/21/21 1839 26 - 50%   10/21/21 1240 26 - 50%       Wt Readings from Last 10 Encounters:   10/17/21 63.5 kg (140 lb)   07/25/21 65.8 kg (145 lb)   10/22/17 79.5 kg (175 lb 5 oz)     Malnutrition Assessment:  Malnutrition Status:  No malnutrition    Context:  Acute illness     Findings of the 6 clinical characteristics of malnutrition:   Energy Intake:  No significant decrease in energy intake  Weight Loss:  No significant weight loss     Body Fat Loss:  No significant body fat loss,     Muscle Mass Loss:  No significant muscle mass loss,    Fluid Accumulation:  No significant fluid accumulation,     Strength:  Normal  strength     Estimated Daily Nutrient Needs:  Energy (kcal): 6936-3143 (25-30); Weight Used for Energy Requirements: Current  Protein (g): 64-78 (1.0-1.2);  Weight Used for Protein Requirements: Current  Fluid (ml/day): 6845-8424; Method Used for Fluid Requirements: 1 ml/kcal    Nutrition Related Findings:    ABD: WNL with active bowel sounds 10/22  Last BM:  PTA - none documented  Edema: None noted 10/22    Nutr. Labs:  Phos 2.6 (H)  Lab Results   Component Value Date/Time    GFR est AA 24 (L) 10/22/2021 03:49 AM    GFR est non-AA 20 (L) 10/22/2021 03:49 AM    Creatinine 2.35 (H) 10/22/2021 03:49 AM    BUN 38 (H) 10/22/2021 03:49 AM    Sodium 138 10/22/2021 03:49 AM    Potassium 3.7 10/22/2021 03:49 AM    Chloride 111 (H) 10/22/2021 03:49 AM    CO2 22 10/22/2021 03:49 AM     Lab Results   Component Value Date/Time    Glucose 93 10/22/2021 03:49 AM    Glucose (POC) 113 10/22/2021 07:14 AM     Lab Results   Component Value Date/Time    Hemoglobin A1c 6.1 (H) 10/18/2021 02:22 AM     Nutr. Meds:  Norvasc    Wounds:    None       Current Nutrition Therapies:  ADULT DIET Regular; 3 carb choices (45 gm/meal); No Concentrated Sweets  DIET NPO Sips of Water with Meds    Anthropometric Measures:  · Height:  5' 2.01\" (157.5 cm)  · Current Body Wt:  63.5 kg (140 lb)   · Ideal Body Wt:  110 lbs:  127.3 %   · Body mass index is 25.6 kg/m². · BMI Category: Overweight (BMI 25.0-29. 9)       Nutrition Diagnosis:   · Increased nutrient needs related to acute injury/trauma as evidenced by multiple fractures    Nutrition Interventions:   Food and/or Nutrient Delivery: Continue current diet, Start oral nutrition supplement  Nutrition Education and Counseling: No recommendations at this time  Coordination of Nutrition Care: Continue to monitor while inpatient, Interdisciplinary rounds    Goals:  PO intake >/= 50% of all meals and supplements within 3-5 days       Nutrition Monitoring and Evaluation:   Behavioral-Environmental Outcomes: None identified  Food/Nutrient Intake Outcomes: Food and nutrient intake, Supplement intake  Physical Signs/Symptoms Outcomes: Biochemical data    Discharge Planning:     Too soon to determine     Electronically signed by Ceci Gomez RD on 12/46/0827  Contact: [phone] or via [employer]

## 2021-10-22 NOTE — PROGRESS NOTES
Problem: Self Care Deficits Care Plan (Adult)  Goal: *Acute Goals and Plan of Care (Insert Text)  Description:   FUNCTIONAL STATUS PRIOR TO ADMISSION: Patient was modified independent using a rollator for functional mobility. HOME SUPPORT: The patient lived with daughter but did not require assist.    Occupational Therapy Goals  Initiated 10/19/2021  1. Patient will perform lower body dressing with minimal assistance/contact guard assist within 7 day(s). 2.  Patient will perform grooming with supervision/set-up within 7 day(s). 3.  Patient will perform anterior body bathing with minimal assistance/contact guard assist within 7 day(s). 4.  Patient will perform toilet transfers with minimal assistance/contact guard assist within 7 day(s). 5.  Patient will perform all aspects of toileting with minimal assistance/contact guard assist within 7 day(s). Outcome: Not Progressing Towards Goal   OCCUPATIONAL THERAPY TREATMENT  Patient: Leida Mccarty (54 y.o. female)  Date: 10/22/2021  Diagnosis: Fx humeral neck [S42.213A] Humeral surgical neck fracture  Procedure(s) (LRB):  RIGHT REVERSE TOTAL SHOULDER ARTHROPLASTY (GENERAL WITH REGIONAL BLOCK) (Right)    Precautions:    Chart, occupational therapy assessment, plan of care, and goals were reviewed. ASSESSMENT  Patient continues with skilled OT services and is not progressing towards goals. She recently received morphine and only willing to work on bed exercises. Pt able to flex/extend R hand and wrist, supported in sling. Pt encouraged to perform 3 x a day at least. She is scheduled for R shoulder surgery Monday. Current Level of Function Impacting Discharge (ADLs): Dep LB bathe,dress, toileting     Other factors to consider for discharge:          PLAN :  Patient continues to benefit from skilled intervention to address the above impairments. Continue treatment per established plan of care to address goals.     Recommend with staff: Bed in chair position for ADL's, there ex, there act, meals    Recommend next OT session: cont towards goals    Recommendation for discharge: (in order for the patient to meet his/her long term goals)  Therapy 3 hours per day 5-7 days per week    This discharge recommendation:  Has not yet been discussed the attending provider and/or case management    IF patient discharges home will need the following DME:        SUBJECTIVE:   Patient stated I am sleepy    OBJECTIVE DATA SUMMARY:   Cognitive/Behavioral Status:  Neurologic State: Drowsy  Orientation Level: Oriented X4  Cognition: Follows commands             Functional Mobility and Transfers for ADLs:  Bed Mobility:   Max assist    Transfers:   Max assist          Balance:Impaired standing balance       ADL Intervention:     Max assist LB bathe, dress, toileting          Therapeutic Exercises:     EXERCISE   Sets   Reps   Active Active Assist   Passive   Comments   Finger flex/ext 3 10 [x]           []           []              Wrist flex/ext 3 10 [x]           []           []                 []           []           []                 []           []           []                 []           []           []                 []           []           []                 []           []           []                 []           []           []                 []           []           []                 []           []           []                 []           []           []                   Activity Tolerance:   Poor    After treatment patient left in no apparent distress:   Supine in bed    COMMUNICATION/COLLABORATION:   The patients plan of care was discussed with: Occupational therapist.     BAY Schafer  Time Calculation: 8 mins

## 2021-10-22 NOTE — PROGRESS NOTES
ORTHO PROGRESS NOTE      SUBJECTIVE:  Sula Rinne states her pain is controlled at this time. OBJECTIVE:  Patient Vitals for the past 24 hrs:   Temp Pulse BP   10/22/21 1122 98 °F (36.7 °C) 85 (!) 167/70   10/22/21 0724 97.8 °F (36.6 °C) 86 120/61   10/22/21 0513 98.2 °F (36.8 °C) 84 (!) 157/71   10/22/21 0028 98.6 °F (37 °C) 81 (!) 161/62   10/21/21 2041 98.6 °F (37 °C) 84 (!) 130/51       Alert, no distress. Lying in bed, eating. Family present. Respirations unlabored. Sling RUE CDI. Obvious ecchymosis and edema but soft compartments. Moves wrist/digits OK. SILT. Hand WWP. Right knee effusion but no obvious bone TTP. Attempts PROM cause pelvis pain but no knee. SILT bilat foot. Calves non-tender. Moves ankles OK. Recent Labs     10/22/21  0349   HGB 8.6*   HCT 26.4*   *   BUN 38*   CREA 2.35*   GFRAA 24*   GFRNA 20*       ASSESSMENT:  Proximal right humerus fracture for reverse TSA 10/25  Right pelvic rami fractures  Right knee effusion with DJD (severe medial compartment DJD). No fracture on xray    PLAN:  1. Right shoulder: continue sling and ice. Recommend gentle hand and elbow ROM to prevent edema. NWB Right shoulder. 2. Right pubic rami fx: nonoperative management. WBAT with therapy  3. Right knee: She has endstage OA on imaging. There is no appreciable fracture, but effusion likely from subchondral injury from the fall. If she has worsening pain and unable to get ambulate with PT, I would obtain a CT of the right knee. Ice. Elevation. 4. Plan for surgery on Monday with Dr. Anish Harris for reverse total shoulder arthroplasty. NPO after midnight Sunday. Please call if questions over the weekend.     Oscar Lundborg, PA  Orthopedic Trauma Service  [facility]

## 2021-10-22 NOTE — PROGRESS NOTES
10/22/2021  2:43 PM    RUR:  17%  Risk Level: []Low [x]Moderate []High  Value-based purchasing: [] Yes [x] No  Bundle patient: [] Yes [x] No   Specify:     Transition of care plan:  1.  Ortho is following for medical management - reverse total shoulder arthroplasty planned Monday  2.  PT/OT is ongoing. Dtr would like pt to go to [facility] for [employer] 3.  PCR Covid 19 test for placement will be needed closer to [place] [address] f/u  5.  Mode of transportation upon discharge to be determined.     Thony Rodriguez RN

## 2021-10-23 LAB
GLUCOSE BLD STRIP.AUTO-MCNC: 105 MG/DL (ref 65–117)
GLUCOSE BLD STRIP.AUTO-MCNC: 114 MG/DL (ref 65–117)
GLUCOSE BLD STRIP.AUTO-MCNC: 144 MG/DL (ref 65–117)
GLUCOSE BLD STRIP.AUTO-MCNC: 267 MG/DL (ref 65–117)
SERVICE CMNT-IMP: ABNORMAL
SERVICE CMNT-IMP: ABNORMAL
SERVICE CMNT-IMP: NORMAL
SERVICE CMNT-IMP: NORMAL

## 2021-10-23 PROCEDURE — 74011250637 HC RX REV CODE- 250/637: Performed by: INTERNAL MEDICINE

## 2021-10-23 PROCEDURE — 74011636637 HC RX REV CODE- 636/637: Performed by: FAMILY MEDICINE

## 2021-10-23 PROCEDURE — 97530 THERAPEUTIC ACTIVITIES: CPT

## 2021-10-23 PROCEDURE — 65270000029 HC RM PRIVATE

## 2021-10-23 PROCEDURE — 74011250636 HC RX REV CODE- 250/636: Performed by: FAMILY MEDICINE

## 2021-10-23 PROCEDURE — 82962 GLUCOSE BLOOD TEST: CPT

## 2021-10-23 PROCEDURE — 74011250636 HC RX REV CODE- 250/636: Performed by: INTERNAL MEDICINE

## 2021-10-23 RX ORDER — INSULIN LISPRO 100 [IU]/ML
INJECTION, SOLUTION INTRAVENOUS; SUBCUTANEOUS
Status: DISCONTINUED | OUTPATIENT
Start: 2021-10-23 | End: 2021-10-29 | Stop reason: HOSPADM

## 2021-10-23 RX ORDER — SODIUM CHLORIDE 9 MG/ML
50 INJECTION, SOLUTION INTRAVENOUS CONTINUOUS
Status: DISCONTINUED | OUTPATIENT
Start: 2021-10-23 | End: 2021-10-28

## 2021-10-23 RX ORDER — DEXTROSE 50 % IN WATER (D50W) INTRAVENOUS SYRINGE
12.5-25 AS NEEDED
Status: DISCONTINUED | OUTPATIENT
Start: 2021-10-23 | End: 2021-10-26 | Stop reason: SDUPTHER

## 2021-10-23 RX ORDER — MAGNESIUM SULFATE 100 %
4 CRYSTALS MISCELLANEOUS AS NEEDED
Status: DISCONTINUED | OUTPATIENT
Start: 2021-10-23 | End: 2021-10-29 | Stop reason: HOSPADM

## 2021-10-23 RX ADMIN — Medication 10 ML: at 06:09

## 2021-10-23 RX ADMIN — SODIUM CHLORIDE 50 ML/HR: 9 INJECTION, SOLUTION INTRAVENOUS at 13:11

## 2021-10-23 RX ADMIN — ACETAMINOPHEN 650 MG: 325 TABLET ORAL at 12:14

## 2021-10-23 RX ADMIN — HEPARIN SODIUM 5000 UNITS: 5000 INJECTION INTRAVENOUS; SUBCUTANEOUS at 13:11

## 2021-10-23 RX ADMIN — ACETAMINOPHEN 650 MG: 325 TABLET ORAL at 23:56

## 2021-10-23 RX ADMIN — Medication 10 ML: at 13:11

## 2021-10-23 RX ADMIN — INSULIN LISPRO 2 UNITS: 100 INJECTION, SOLUTION INTRAVENOUS; SUBCUTANEOUS at 17:07

## 2021-10-23 RX ADMIN — AMLODIPINE BESYLATE 10 MG: 5 TABLET ORAL at 08:45

## 2021-10-23 RX ADMIN — ACETAMINOPHEN 650 MG: 325 TABLET ORAL at 06:08

## 2021-10-23 RX ADMIN — OXYCODONE 5 MG: 5 TABLET ORAL at 22:33

## 2021-10-23 RX ADMIN — OXYCODONE 5 MG: 5 TABLET ORAL at 08:46

## 2021-10-23 RX ADMIN — OXYCODONE 5 MG: 5 TABLET ORAL at 03:03

## 2021-10-23 RX ADMIN — HEPARIN SODIUM 5000 UNITS: 5000 INJECTION INTRAVENOUS; SUBCUTANEOUS at 22:33

## 2021-10-23 RX ADMIN — ACETAMINOPHEN 650 MG: 325 TABLET ORAL at 17:07

## 2021-10-23 RX ADMIN — HEPARIN SODIUM 5000 UNITS: 5000 INJECTION INTRAVENOUS; SUBCUTANEOUS at 06:08

## 2021-10-23 NOTE — PROGRESS NOTES
Bedside and Verbal shift change report given to Karly Montemayor RN (oncoming nurse) by Poli Finch RN (offgoing nurse). Report included the following information SBAR, Kardex, Intake/Output, MAR and Recent Results.

## 2021-10-23 NOTE — PROGRESS NOTES
Bedside shift change report given to Triston Overton (oncoming nurse) by Nico Hopper (offgoing nurse). Report included the following information SBAR, Kardex, MAR and Recent Results.

## 2021-10-23 NOTE — PROGRESS NOTES
Problem: Mobility Impaired (Adult and Pediatric)  Goal: *Acute Goals and Plan of Care (Insert Text)  Description: FUNCTIONAL STATUS PRIOR TO ADMISSION: Patient was modified independent using a rolling walker for functional mobility. HOME SUPPORT PRIOR TO ADMISSION: The patient lived with daughter but did not require assist.    Physical Therapy Goals  Initiated 10/19/2021  1. Patient will move from supine to sit and sit to supine  in bed with minimal assistance/contact guard assist within 7 day(s). 2.  Patient will transfer from bed to chair and chair to bed with minimal assistance/contact guard assist using the least restrictive device within 7 day(s). 3.  Patient will perform sit to stand with supervision/set-up within 7 day(s). 4.  Patient will ambulate with minimal assistance/contact guard assist for 50 feet with the least restrictive device within 7 day(s). 5.  Patient will ascend/descend 1 stairs with 1 handrail(s) with minimal assistance/contact guard assist within 7 day(s). Outcome: Progressing Towards Goal   PHYSICAL THERAPY TREATMENT  Patient: Rufino Yung (32 y.o. female)  Date: 10/23/2021  Diagnosis: Fx humeral neck [S42.213A] Humeral surgical neck fracture  Procedure(s) (LRB):  RIGHT REVERSE TOTAL SHOULDER ARTHROPLASTY (GENERAL WITH REGIONAL BLOCK) (Right)    Precautions:  NWB RUE (has sling); WBAT RLE, falls  Chart, physical therapy assessment, plan of care and goals were reviewed. ASSESSMENT  Patient continues with skilled PT services and is not progressing towards goals. Patient continues to have pain with mobility efforts to RLE and right shoulder. Rates pain 5/10 and had pain medicine an hour ago. She is awaiting reverse TSA on Monday 10/25. Patient may need rehab. Current Level of Function Impacting Discharge (mobility/balance): Required +2 max assist to sit edge of bed. Good sitting balance once up.   She attempted standing X2 with hemiwalker , +2 mx assist but unable to clear her bottom from  the bed this session. Returned to supine +2 total assist.  Granddaughter at bedside. Vitals stable. Other factors to consider for discharge: pain with mobility         PLAN :  Patient continues to benefit from skilled intervention to address the above impairments. Continue treatment per established plan of care. to address goals. Recommendation for discharge: (in order for the patient to meet his/her long term goals)  To be determined: Rehab may be helpful. This discharge recommendation:  Has not yet been discussed the attending provider and/or case management    IF patient discharges home will need the following DME: to be determined (TBD)       SUBJECTIVE:   Patient stated I want to try but I don't know what I can do today.     OBJECTIVE DATA SUMMARY:   Critical Behavior:  Neurologic State: Alert  Orientation Level: Oriented X4  Cognition: Appropriate decision making, Appropriate for age attention/concentration, Appropriate safety awareness, Follows commands  Safety/Judgement: Awareness of environment  Functional Mobility Training:  Bed Mobility:     Supine to Sit: Assist x2; Additional time;Maximum assistance  Sit to Supine: Assist x2;Total assistance  Scooting: Maximum assistance;Assist x2        Transfers:  Sit to Stand: Assist x2;Maximum assistance (unable to clear her bottom from the bed)  Stand to Sit: Assist x2; Moderate assistance                             Balance:  Sitting: Intact; High guard  Standing: Impaired  Standing - Static: Constant support  Ambulation/Gait Training:                    Right Side Weight Bearing: As tolerated         Attempted standing x2 but unable to fully clear her bottom from the bed. Therapeutic Exercises:    Ankle pumps  Pain Ratin/10    Activity Tolerance:   Poor    After treatment patient left in no apparent distress:   Supine in bed, Call bell within reach, Bed / chair alarm activated, Caregiver / family present, and Side rails x 3    COMMUNICATION/COLLABORATION:   The patients plan of care was discussed with: Registered nurse.      Katie Huizar, PT   Time Calculation: 25 mins

## 2021-10-23 NOTE — PROGRESS NOTES
Daily Progress Note: 10/23/2021  Brittni Hardy NP    Assessment/Plan:   Humeral surgical neck fracture (10/18/2021) POA: CT upper extremity showed a comminuted fracture involving the humeral neck and the greater tuberosity.   -Seen by orthopedic surgery who deem her stable. '  -Iinitially non operative management recommended but she is now scheduled for surgery Monday 10/25.   -Continue an arm sling and NWB RUE, pain control. PT, OT.   -CM for ultimate discharge planning.   -Family want to only go to [address]     Closed fracture of multiple pubic rami ([facility] (10/18/2021) POA: CT pelvis showed an acute nondisplaced right superior and inferior pubic rami fractures. Proximal right femoral intramedullary hardware is intact. -Reviewed by ortho. -Continue WBAT with walker.   -PT, OT following.      Right knee pain POA: has associated swelling.   -Get an xray right knee as well as a venous doppler ultrasound.   -Pain control     HTN (hypertension), benign (10/18/2021) POA: BP stable. -Continue Amlodipine     DM (diabetes mellitus), type 2 ([facility] (10/18/2021) POA: A1c 6.1.   -Had a low BG last night.   -DC SSi for now. -DM diet as tolerated. -Home Amaryl on hold.      Leukocytosis (10/18/2021) POA: CXR clear. UA not impressive. WBCs better. Afebrile.   -Continue to monitor        CKD (chronic kidney disease) stage 3, GFR 30-59 ml/min ([facility]) (10/18/2021) / Hypokalemia due to loss of potassium (10/18/2021) POA:   -K+ better. -SCr slightly high and likely has stage 4 disease.   -Continue gentle hydration.         Problem List:  Problem List as of 10/23/2021 Date Reviewed: 10/18/2021        Codes Class Noted - Resolved    Right knee pain ICD-10-CM: M25.561  ICD-9-CM: 719.46  10/21/2021 - Present        * (Principal) Humeral surgical neck fracture ICD-10-CM: S42.213A  ICD-9-CM: 812.01  10/18/2021 - Present        Closed fracture of multiple pubic rami ([facility] [address] ICD-10-CM: B81.307Y  ICD-9-CM: 808.2  10/18/2021 - Present        HTN (hypertension), benign ICD-10-CM: I10  ICD-9-CM: 401.1  10/18/2021 - Present        DM (diabetes mellitus), type 2 ([facility] ICD-10-CM: E11.9  ICD-9-CM: 250.00  10/18/2021 - Present        Leukocytosis ICD-10-CM: G54.551  ICD-9-CM: 288.60  10/18/2021 - Present        CKD (chronic kidney disease) stage 3, GFR 30-59 ml/min ([facility]) ICD-10-CM: N18.30  ICD-9-CM: 585.3  10/18/2021 - Present        Hypokalemia due to loss of potassium ICD-10-CM: E87.6  ICD-9-CM: 276.8  10/18/2021 - Present              HPI:   Ms. Michael Martel is an 87y.o.  female with PMH of DM, CKD admitted s/p fall for eval. Noted to have femoral neck fracture and pubic rami fracture. Pt's only complaint is RUE pain that is currently controlled with Tylenol. Lives with her daughter. Typically walks with a walker. (Dr Marion Norwood)     10/22: Didn't sleep well last night. Xray of knee is ok except for an effusion. US neg for DVT. Cr 2.35. Pain is controlled. Surgery scheduled for Monday. 10/23:  Doing well. Pain moderate level. Plan is for surgery Monday. Review of Systems:   A comprehensive review of systems was negative except for that written in the HPI. Objective:   Physical Exam:     Visit Vitals  BP (!) 144/60 (BP 1 Location: Right lower arm, BP Patient Position: At rest)   Pulse 73   Temp 98.8 °F (37.1 °C)   Resp 16   Ht 5' 2.01\" (1.575 m)   Wt 63.5 kg (140 lb)   SpO2 96%   BMI 25.60 kg/m²      O2 Device: None (Room air)    Temp (24hrs), Av.4 °F (36.9 °C), Min:97.8 °F (36.6 °C), Max:98.8 °F (37.1 °C)    No intake/output data recorded. 10/21 0701 - 10/22 1900  In: 1838.8 [P.O.:420; I.V.:1418.8]  Out: 1000 [Urine:1000]    General:  Alert, cooperative, no distress, appears stated age. Head:  Normocephalic, without obvious abnormality, atraumatic. Eyes:  Conjunctivae/corneas clear. PERRL, EOMs intact. Nose: Nares normal. Septum midline. Mucosa normal. No drainage or sinus tenderness.    Throat: Lips, mucosa, and tongue normal.   Neck: Supple, symmetrical, trachea midline, no adenopathy, thyroid: no enlargement/tenderness/nodules, no carotid bruit and no JVD. Back:   Symmetric, no curvature. ROM normal. No CVA tenderness. Lungs:   Clear to auscultation bilaterally. Chest wall:  No tenderness or deformity. Heart:  Regular rate and rhythm, S1, S2 normal, no murmur, click, rub or gallop. Abdomen:   Soft, non-tender. Bowel sounds normal. No masses,  No organomegaly. Extremities: Right UE in sling,  no cyanosis or edema. No calf tenderness or cords. Pulses: 2+ and symmetric all extremities. Skin: Skin color, texture, turgor normal. No rashes or lesions   Neurologic: CNII-XII intact. Alert and oriented X 3. Fine motor of hands and fingers normal.   equal.  No cogwheeling or rigidity. Gait not tested at this time. Sensation grossly normal to touch. Gross motor of extremities normal.       Data Review:   X-ray Right knee 10/21/21    IMPRESSION  1. No definite evidence of acute traumatic injury involving the knee. 2. Evidence of degenerative change as described above. 3. Presence of a moderate-sized knee joint effusion. Duplex Lower Ext 10/21/21  Interpretation Summary    Right lower extremity venous duplex negative for deep venous thrombosis or thrombophlebitis. Left common femoral vein is thrombus free. Lower Extremity Venous Findings    Right Lower Venous    No evidence of deep vein thrombosis in the common femoral, profunda femoral, femoral, popliteal, posterior tibial, and peroneal veins. The veins were imaged in the transverse and longitudinal planes. The vessels showed normal color filling and compressibility. Doppler interrogation showed phasic and spontaneous flow. Left Lower Venous    For comparison purposes, the left common femoral vein was briefly interrogated. These vein demonstrates normal color filing and compressibility. Doppler flow was phasic and spontaneous.        Recent Days:  Recent Labs     10/22/21  0349   WBC 6.0   HGB 8.6*   HCT 26.4*   *     Recent Labs     10/22/21  0349 10/20/21  0800    141   K 3.7 3.2*   * 107   CO2 22 25   GLU 93 93   BUN 38* 36*   CREA 2.35* 2.77*   CA 7.9* 7.7*   MG 2.6*  --    PHOS 3.9  --        24 Hour Results:  Recent Results (from the past 24 hour(s))   GLUCOSE, POC    Collection Time: 10/22/21  7:14 AM   Result Value Ref Range    Glucose (POC) 113 65 - 117 mg/dL    Performed by Kelsy Hearn (PCT)    GLUCOSE, POC    Collection Time: 10/22/21 11:03 AM   Result Value Ref Range    Glucose (POC) 180 (H) 65 - 117 mg/dL    Performed by Nitza Wolff (PCT)    GLUCOSE, POC    Collection Time: 10/22/21  4:57 PM   Result Value Ref Range    Glucose (POC) 161 (H) 65 - 117 mg/dL    Performed by Anay Leiva    GLUCOSE, POC    Collection Time: 10/22/21  9:04 PM   Result Value Ref Range    Glucose (POC) 183 (H) 65 - 117 mg/dL    Performed by Elisabet Benítez        Medications reviewed  Current Facility-Administered Medications   Medication Dose Route Frequency    dextrose 5% and 0.9% NaCl infusion  75 mL/hr IntraVENous CONTINUOUS    amLODIPine (NORVASC) tablet 10 mg  10 mg Oral DAILY    acetaminophen (TYLENOL) tablet 650 mg  650 mg Oral Q6H    sodium chloride (NS) flush 5-40 mL  5-40 mL IntraVENous Q8H    sodium chloride (NS) flush 5-40 mL  5-40 mL IntraVENous PRN    morphine injection 2 mg  2 mg IntraVENous Q4H PRN    naloxone (NARCAN) injection 0.4 mg  0.4 mg IntraVENous PRN    ondansetron (ZOFRAN) injection 4 mg  4 mg IntraVENous Q4H PRN    heparin (porcine) injection 5,000 Units  5,000 Units SubCUTAneous Q8H    oxyCODONE IR (ROXICODONE) tablet 5 mg  5 mg Oral Q4H PRN    glucose chewable tablet 16 g  4 Tablet Oral PRN    dextrose (D50W) injection syrg 12.5-25 g  12.5-25 g IntraVENous PRN    glucagon (GLUCAGEN) injection 1 mg  1 mg IntraMUSCular PRN    hydrALAZINE (APRESOLINE) 20 mg/mL injection 10 mg  10 mg IntraVENous Q6H PRN Care Plan discussed with: Patient/Family    Total time spent with patient and review of records: 30 minutes.     Mellisa Harvey MD

## 2021-10-23 NOTE — PROGRESS NOTES
Bedside and Verbal shift change report given to Corwin Manzano RN (oncoming nurse) by Sandhya Choudhury RN (offgoing nurse). Report included the following information SBAR, Kardex, Intake/Output, MAR, Accordion and Recent Results.

## 2021-10-24 LAB
ALBUMIN SERPL-MCNC: 2.1 G/DL (ref 3.5–5)
ALBUMIN/GLOB SERPL: 0.5 {RATIO} (ref 1.1–2.2)
ALP SERPL-CCNC: 76 U/L (ref 45–117)
ALT SERPL-CCNC: 20 U/L (ref 12–78)
ANION GAP SERPL CALC-SCNC: 4 MMOL/L (ref 5–15)
AST SERPL-CCNC: 25 U/L (ref 15–37)
BASOPHILS # BLD: 0.1 K/UL (ref 0–0.1)
BASOPHILS NFR BLD: 1 % (ref 0–1)
BILIRUB SERPL-MCNC: 0.6 MG/DL (ref 0.2–1)
BUN SERPL-MCNC: 41 MG/DL (ref 6–20)
BUN/CREAT SERPL: 19 (ref 12–20)
CALCIUM SERPL-MCNC: 8.2 MG/DL (ref 8.5–10.1)
CHLORIDE SERPL-SCNC: 113 MMOL/L (ref 97–108)
CO2 SERPL-SCNC: 23 MMOL/L (ref 21–32)
CREAT SERPL-MCNC: 2.11 MG/DL (ref 0.55–1.02)
DIFFERENTIAL METHOD BLD: ABNORMAL
EOSINOPHIL # BLD: 0.5 K/UL (ref 0–0.4)
EOSINOPHIL NFR BLD: 8 % (ref 0–7)
ERYTHROCYTE [DISTWIDTH] IN BLOOD BY AUTOMATED COUNT: 13.2 % (ref 11.5–14.5)
GLOBULIN SER CALC-MCNC: 3.9 G/DL (ref 2–4)
GLUCOSE BLD STRIP.AUTO-MCNC: 102 MG/DL (ref 65–117)
GLUCOSE BLD STRIP.AUTO-MCNC: 113 MG/DL (ref 65–117)
GLUCOSE BLD STRIP.AUTO-MCNC: 80 MG/DL (ref 65–117)
GLUCOSE BLD STRIP.AUTO-MCNC: 89 MG/DL (ref 65–117)
GLUCOSE BLD STRIP.AUTO-MCNC: 91 MG/DL (ref 65–117)
GLUCOSE SERPL-MCNC: 94 MG/DL (ref 65–100)
HCT VFR BLD AUTO: 28 % (ref 35–47)
HGB BLD-MCNC: 9 G/DL (ref 11.5–16)
IMM GRANULOCYTES # BLD AUTO: 0 K/UL (ref 0–0.04)
IMM GRANULOCYTES NFR BLD AUTO: 0 % (ref 0–0.5)
LYMPHOCYTES # BLD: 0.8 K/UL (ref 0.8–3.5)
LYMPHOCYTES NFR BLD: 13 % (ref 12–49)
MCH RBC QN AUTO: 30.2 PG (ref 26–34)
MCHC RBC AUTO-ENTMCNC: 32.1 G/DL (ref 30–36.5)
MCV RBC AUTO: 94 FL (ref 80–99)
MONOCYTES # BLD: 0.5 K/UL (ref 0–1)
MONOCYTES NFR BLD: 8 % (ref 5–13)
NEUTS SEG # BLD: 4 K/UL (ref 1.8–8)
NEUTS SEG NFR BLD: 70 % (ref 32–75)
NRBC # BLD: 0 K/UL (ref 0–0.01)
NRBC BLD-RTO: 0 PER 100 WBC
PLATELET # BLD AUTO: 168 K/UL (ref 150–400)
PMV BLD AUTO: 10.8 FL (ref 8.9–12.9)
POTASSIUM SERPL-SCNC: 4.2 MMOL/L (ref 3.5–5.1)
PROT SERPL-MCNC: 6 G/DL (ref 6.4–8.2)
RBC # BLD AUTO: 2.98 M/UL (ref 3.8–5.2)
RBC MORPH BLD: ABNORMAL
SERVICE CMNT-IMP: NORMAL
SODIUM SERPL-SCNC: 140 MMOL/L (ref 136–145)
WBC # BLD AUTO: 5.9 K/UL (ref 3.6–11)

## 2021-10-24 PROCEDURE — 65270000029 HC RM PRIVATE

## 2021-10-24 PROCEDURE — 85025 COMPLETE CBC W/AUTO DIFF WBC: CPT

## 2021-10-24 PROCEDURE — 51798 US URINE CAPACITY MEASURE: CPT

## 2021-10-24 PROCEDURE — 82962 GLUCOSE BLOOD TEST: CPT

## 2021-10-24 PROCEDURE — 77030038269 HC DRN EXT URIN PURWCK BARD -A

## 2021-10-24 PROCEDURE — 80053 COMPREHEN METABOLIC PANEL: CPT

## 2021-10-24 PROCEDURE — 74011250637 HC RX REV CODE- 250/637: Performed by: INTERNAL MEDICINE

## 2021-10-24 PROCEDURE — 36415 COLL VENOUS BLD VENIPUNCTURE: CPT

## 2021-10-24 PROCEDURE — 74011250636 HC RX REV CODE- 250/636: Performed by: FAMILY MEDICINE

## 2021-10-24 PROCEDURE — 74011250636 HC RX REV CODE- 250/636: Performed by: INTERNAL MEDICINE

## 2021-10-24 RX ADMIN — ACETAMINOPHEN 650 MG: 325 TABLET ORAL at 05:51

## 2021-10-24 RX ADMIN — ACETAMINOPHEN 650 MG: 325 TABLET ORAL at 12:37

## 2021-10-24 RX ADMIN — SODIUM CHLORIDE 50 ML/HR: 9 INJECTION, SOLUTION INTRAVENOUS at 09:08

## 2021-10-24 RX ADMIN — HEPARIN SODIUM 5000 UNITS: 5000 INJECTION INTRAVENOUS; SUBCUTANEOUS at 05:51

## 2021-10-24 RX ADMIN — ACETAMINOPHEN 650 MG: 325 TABLET ORAL at 18:28

## 2021-10-24 RX ADMIN — HEPARIN SODIUM 5000 UNITS: 5000 INJECTION INTRAVENOUS; SUBCUTANEOUS at 14:18

## 2021-10-24 RX ADMIN — AMLODIPINE BESYLATE 10 MG: 5 TABLET ORAL at 08:59

## 2021-10-24 RX ADMIN — OXYCODONE 5 MG: 5 TABLET ORAL at 12:37

## 2021-10-24 NOTE — PROGRESS NOTES
Bedside shift change report given to [facility] (oncoming nurse) by Evaristo Moreau (offgoing nurse). Report included the following information SBAR, Kardex, Intake/Output and Recent Results.

## 2021-10-24 NOTE — PROGRESS NOTES
Daily Progress Note: 10/24/2021  Ray Anglin NP    Assessment/Plan:   Humeral surgical neck fracture (10/18/2021) POA: CT upper extremity showed a comminuted fracture involving the humeral neck and the greater tuberosity.   -Seen by orthopedic surgery who deem her stable. -Iinitially non operative management recommended but she is now scheduled for surgery Monday 10/25.   -Continue an arm sling and NWB RUE, pain control. PT, OT.   -CM for ultimate discharge planning.   -Family want to only go to [facility]     Closed fracture of multiple pubic rami ([facility] [address] (10/18/2021) POA: CT pelvis showed an acute nondisplaced right superior and inferior pubic rami fractures. Proximal right femoral intramedullary hardware is intact. -Reviewed by ortho. -Continue WBAT with walker.   -PT, OT following.      Right knee pain POA: has associated swelling.   -Get an xray right knee as well as a venous doppler ultrasound.   -Pain control     HTN (hypertension), benign (10/18/2021) POA: BP stable. -Continue Amlodipine     DM (diabetes mellitus), type 2 ([facility] [address] (10/18/2021) POA: A1c 6.1.   -Restart SSI  -DM diet as tolerated. -Home Amaryl on hold.      Leukocytosis (10/18/2021) POA: CXR clear. UA not impressive. WBCs better. Afebrile.   -Continue to monitor        CKD (chronic kidney disease) stage 3, GFR 30-59 ml/min ([facility]) (10/18/2021) / Hypokalemia due to loss of potassium (10/18/2021) POA:   -K+ better. -SCr slightly high and likely has stage 4 disease.   -Continue gentle hydration.         Problem List:  Problem List as of 10/24/2021 Date Reviewed: 10/18/2021        Codes Class Noted - Resolved    Right knee pain ICD-10-CM: M25.561  ICD-9-CM: 719.46  10/21/2021 - Present        * (Principal) Humeral surgical neck fracture ICD-10-CM: S42.213A  ICD-9-CM: 812.01  10/18/2021 - Present        [facility] rami [facility] ICD-10-CM: I20.097Y  ICD-9-CM: 808.2  10/18/2021 - Present        HTN (hypertension), benign ICD-10-CM: I10  ICD-9-CM: 401.1  10/18/2021 - Present        DM (diabetes mellitus), type 2 ([facility] ICD-10-CM: E11.9  ICD-9-CM: 250.00  10/18/2021 - Present        Leukocytosis ICD-10-CM: T77.257  ICD-9-CM: 288.60  10/18/2021 - Present        CKD (chronic kidney disease) stage 3, GFR 30-59 ml/min ([facility]) ICD-10-CM: N18.30  ICD-9-CM: 585.3  10/18/2021 - Present        Hypokalemia due to loss of potassium ICD-10-CM: E87.6  ICD-9-CM: 276.8  10/18/2021 - Present              HPI:   Ms. Valeria Mcmullen is an 87y.o.  female with PMH of DM, CKD admitted s/p fall for eval. Noted to have femoral neck fracture and pubic rami fracture. Pt's only complaint is RUE pain that is currently controlled with Tylenol. Lives with her daughter. Typically walks with a walker. (Dr Opal Byrd)     10/22: Didn't sleep well last night. Xray of knee is ok except for an effusion. US neg for DVT. Cr 2.35. Pain is controlled. Surgery scheduled for Monday. 10/23:  Doing well. Pain moderate level. Plan is for surgery Monday. 10/24: No complaints this am. BS are better with SSI. Holding home meds. AM labs pending. Review of Systems:   A comprehensive review of systems was negative except for that written in the HPI. Objective:   Physical Exam:     Visit Vitals  BP (!) 127/56 (BP 1 Location: Left lower arm, BP Patient Position: At rest)   Pulse 81   Temp 98.8 °F (37.1 °C)   Resp 20   Ht 5' 2.01\" (1.575 m)   Wt 140 lb (63.5 kg)   SpO2 92%   BMI 25.60 kg/m²      O2 Device: None (Room air)    Temp (24hrs), Av.6 °F (37 °C), Min:97.6 °F (36.4 °C), Max:99.7 °F (37.6 °C)    No intake/output data recorded. 10/22 190 - 10/24 0700  In: 840 [I.V.:840]  Out: 700 [Urine:700]    General:  Alert, cooperative, no distress, appears stated age. Head:  Normocephalic, without obvious abnormality, atraumatic. Eyes:  Conjunctivae/corneas clear. PERRL, EOMs intact. Nose: Nares normal. Septum midline.  Mucosa normal. No drainage or sinus tenderness. Throat: Lips, mucosa, and tongue normal.   Neck: Supple, symmetrical, trachea midline, no adenopathy, thyroid: no enlargement/tenderness/nodules, no carotid bruit and no JVD. Back:   Symmetric, no curvature. ROM normal. No CVA tenderness. Lungs:   Clear to auscultation bilaterally. Chest wall:  No tenderness or deformity. Heart:  Regular rate and rhythm, S1, S2 normal, no murmur, click, rub or gallop. Abdomen:   Soft, non-tender. Bowel sounds normal. No masses,  No organomegaly. Extremities: Right UE in sling,  no cyanosis or edema. No calf tenderness or cords. Pulses: 2+ and symmetric all extremities. Skin: Skin color, texture, turgor normal. No rashes or lesions   Neurologic: CNII-XII intact. Alert and oriented X 3. Fine motor of hands and fingers normal.   equal.  No cogwheeling or rigidity. Gait not tested at this time. Sensation grossly normal to touch. Gross motor of extremities normal.       Data Review:   X-ray Right knee 10/21/21    IMPRESSION  1. No definite evidence of acute traumatic injury involving the knee. 2. Evidence of degenerative change as described above. 3. Presence of a moderate-sized knee joint effusion. Duplex Lower Ext 10/21/21  Interpretation Summary    Right lower extremity venous duplex negative for deep venous thrombosis or thrombophlebitis. Left common femoral vein is thrombus free. Lower Extremity Venous Findings    Right Lower Venous    No evidence of deep vein thrombosis in the common femoral, profunda femoral, femoral, popliteal, posterior tibial, and peroneal veins. The veins were imaged in the transverse and longitudinal planes. The vessels showed normal color filling and compressibility. Doppler interrogation showed phasic and spontaneous flow. Left Lower Venous    For comparison purposes, the left common femoral vein was briefly interrogated. These vein demonstrates normal color filing and compressibility.  Doppler flow was phasic and spontaneous.        Recent Days:  Recent Labs     10/22/21  0349   WBC 6.0   HGB 8.6*   HCT 26.4*   *     Recent Labs     10/22/21  0349      K 3.7   *   CO2 22   GLU 93   BUN 38*   CREA 2.35*   CA 7.9*   MG 2.6*   PHOS 3.9       24 Hour Results:  Recent Results (from the past 24 hour(s))   GLUCOSE, POC    Collection Time: 10/23/21  7:08 AM   Result Value Ref Range    Glucose (POC) 105 65 - 117 mg/dL    Performed by [address] [place] [address], POC    Collection Time: 10/23/21 11:44 AM   Result Value Ref Range    Glucose (POC) 267 (H) 65 - 117 mg/dL    Performed by Bran Greyial    GLUCOSE, POC    Collection Time: 10/23/21  4:42 PM   Result Value Ref Range    Glucose (POC) 144 (H) 65 - 117 mg/dL    Performed by Bran Greyial    GLUCOSE, POC    Collection Time: 10/23/21  9:28 PM   Result Value Ref Range    Glucose (POC) 114 65 - 117 mg/dL    Performed by Aneta Uribe    GLUCOSE, POC    Collection Time: 10/24/21  6:38 AM   Result Value Ref Range    Glucose (POC) 80 65 - 117 mg/dL    Performed by Aneta Uribe        Medications reviewed  Current Facility-Administered Medications   Medication Dose Route Frequency    insulin lispro (HUMALOG) injection   SubCUTAneous AC&HS    glucose chewable tablet 16 g  4 Tablet Oral PRN    dextrose (D50W) injection syrg 12.5-25 g  12.5-25 g IntraVENous PRN    glucagon (GLUCAGEN) injection 1 mg  1 mg IntraMUSCular PRN    0.9% sodium chloride infusion  50 mL/hr IntraVENous CONTINUOUS    amLODIPine (NORVASC) tablet 10 mg  10 mg Oral DAILY    acetaminophen (TYLENOL) tablet 650 mg  650 mg Oral Q6H    sodium chloride (NS) flush 5-40 mL  5-40 mL IntraVENous Q8H    sodium chloride (NS) flush 5-40 mL  5-40 mL IntraVENous PRN    morphine injection 2 mg  2 mg IntraVENous Q4H PRN    naloxone (NARCAN) injection 0.4 mg  0.4 mg IntraVENous PRN    ondansetron (ZOFRAN) injection 4 mg  4 mg IntraVENous Q4H PRN    heparin (porcine) injection 5,000 Units 5,000 Units SubCUTAneous Q8H    oxyCODONE IR (ROXICODONE) tablet 5 mg  5 mg Oral Q4H PRN    dextrose (D50W) injection syrg 12.5-25 g  12.5-25 g IntraVENous PRN    hydrALAZINE (APRESOLINE) 20 mg/mL injection 10 mg  10 mg IntraVENous Q6H PRN       Care Plan discussed with: Patient/Family    Total time spent with patient and review of records: 30 minutes.     Elmer Valdovinos MD

## 2021-10-24 NOTE — PROGRESS NOTES
Orthopedic NP Progress Note  Post Op day: * No surgery date entered *    October 24, 2021 12:03 PM     Meagan Hassan    Attending Physician: Treatment Team: Attending Provider: Luci De León MD; Consulting Provider: Jonathon Kothari MD; Consulting Provider: Ting Peter NP; Utilization Review: Vangie Menchaca; Consulting Provider: Nick Stephens MD; Care Manager: Brain Yepez Primary Nurse: Haydee Winkler, RN; Physician: Luci De León MD; Primary Nurse: Carol Medrano RN     Vital Signs:    Patient Vitals for the past 8 hrs:   BP Temp Pulse Resp SpO2   10/24/21 1127 138/61 99 °F (37.2 °C) 86 18 93 %   10/24/21 0859 127/80 -- 87 -- --   10/24/21 0831 (!) 136/57 98.4 °F (36.9 °C) 79 18 93 %     BMI (calculated): 25.6 (10/17/21 2354)    Intake/Output:  No intake/output data recorded. 10/22 1901 - 10/24 0700  In: 840 [I.V.:840]  Out: 700 [Urine:700]    Pain Control:   Pain Assessment  Pain Scale 1: Numeric (0 - 10)  Pain Intensity 1: 0  Pain Onset 1: acute  Pain Location 1: Arm, Leg  Pain Orientation 1: Right  Pain Description 1: Constant, Aching  Pain Intervention(s) 1: Medication (see MAR)    LAB:    Recent Labs     10/24/21  1118   HCT 28.0*   HGB 9.0*     Lab Results   Component Value Date/Time    Sodium 140 10/24/2021 11:18 AM    Potassium 4.2 10/24/2021 11:18 AM    Chloride 113 (H) 10/24/2021 11:18 AM    CO2 23 10/24/2021 11:18 AM    Glucose 94 10/24/2021 11:18 AM    BUN 41 (H) 10/24/2021 11:18 AM    Creatinine 2.11 (H) 10/24/2021 11:18 AM    Calcium 8.2 (L) 10/24/2021 11:18 AM       Subjective:  Meagan Hassan is a 80 y.o. female s/p a  Procedure(s):  RIGHT REVERSE TOTAL SHOULDER ARTHROPLASTY (GENERAL WITH REGIONAL BLOCK)   Procedure(s):  RIGHT REVERSE TOTAL SHOULDER ARTHROPLASTY (GENERAL WITH REGIONAL BLOCK). Tolerating diet. Pain is well managed and is ready for surgery tomorrow        Objective: General: alert, cooperative, no distress. Neuro/Vascular: CNS Intact. Sensation stable. Brisk cap refill, 2+ pulses UE/LE  Musculoskeletal:  +ROM LUE/LE with sling to RUE, NVI .     Skin: warm and dry   Benedict - n  Drain - n       PT/OT:   Gait:                      Assessment:    s/p Procedure(s):  RIGHT REVERSE TOTAL SHOULDER ARTHROPLASTY (GENERAL WITH REGIONAL BLOCK)    Principal Problem:    Humeral surgical neck fracture (10/18/2021)    Active Problems:    Closed fracture of multiple pubic rami ([facility] [address] (10/18/2021)      HTN (hypertension), benign (10/18/2021)      DM (diabetes mellitus), type 2 ([facility] (10/18/2021)      Leukocytosis (10/18/2021)      CKD (chronic kidney disease) stage 3, GFR 30-59 ml/min ([facility]) (10/18/2021)      Hypokalemia due to loss of potassium (10/18/2021)      Right knee pain (10/21/2021)         Plan:   -  Plan for TSA Monday 10/25 at 1230 with Dr. Kaye Tapia after midnight    Signed By: Carol Ann Gonzalez NP    Orthopedic Nurse Practitioner

## 2021-10-25 ENCOUNTER — APPOINTMENT (OUTPATIENT)
Dept: GENERAL RADIOLOGY | Age: 86
DRG: 958 | End: 2021-10-25
Attending: ANESTHESIOLOGY
Payer: MEDICARE

## 2021-10-25 ENCOUNTER — APPOINTMENT (OUTPATIENT)
Dept: GENERAL RADIOLOGY | Age: 86
DRG: 958 | End: 2021-10-25
Attending: ORTHOPAEDIC SURGERY
Payer: MEDICARE

## 2021-10-25 ENCOUNTER — ANESTHESIA (OUTPATIENT)
Dept: SURGERY | Age: 86
DRG: 958 | End: 2021-10-25
Payer: MEDICARE

## 2021-10-25 LAB
ALBUMIN SERPL-MCNC: 1.9 G/DL (ref 3.5–5)
ALBUMIN/GLOB SERPL: 0.5 {RATIO} (ref 1.1–2.2)
ALP SERPL-CCNC: 77 U/L (ref 45–117)
ALT SERPL-CCNC: 19 U/L (ref 12–78)
ANION GAP SERPL CALC-SCNC: 7 MMOL/L (ref 5–15)
AST SERPL-CCNC: 18 U/L (ref 15–37)
BASOPHILS # BLD: 0 K/UL (ref 0–0.1)
BASOPHILS NFR BLD: 1 % (ref 0–1)
BILIRUB SERPL-MCNC: 0.6 MG/DL (ref 0.2–1)
BUN SERPL-MCNC: 40 MG/DL (ref 6–20)
BUN/CREAT SERPL: 21 (ref 12–20)
CALCIUM SERPL-MCNC: 8.4 MG/DL (ref 8.5–10.1)
CHLORIDE SERPL-SCNC: 114 MMOL/L (ref 97–108)
CO2 SERPL-SCNC: 21 MMOL/L (ref 21–32)
COVID-19 RAPID TEST, COVR: NOT DETECTED
CREAT SERPL-MCNC: 1.93 MG/DL (ref 0.55–1.02)
DIFFERENTIAL METHOD BLD: ABNORMAL
EOSINOPHIL # BLD: 0.6 K/UL (ref 0–0.4)
EOSINOPHIL NFR BLD: 10 % (ref 0–7)
ERYTHROCYTE [DISTWIDTH] IN BLOOD BY AUTOMATED COUNT: 13.2 % (ref 11.5–14.5)
ERYTHROCYTE [DISTWIDTH] IN BLOOD BY AUTOMATED COUNT: 13.4 % (ref 11.5–14.5)
GLOBULIN SER CALC-MCNC: 3.9 G/DL (ref 2–4)
GLUCOSE BLD STRIP.AUTO-MCNC: 100 MG/DL (ref 65–117)
GLUCOSE BLD STRIP.AUTO-MCNC: 104 MG/DL (ref 65–117)
GLUCOSE BLD STRIP.AUTO-MCNC: 195 MG/DL (ref 65–117)
GLUCOSE BLD STRIP.AUTO-MCNC: 80 MG/DL (ref 65–117)
GLUCOSE SERPL-MCNC: 80 MG/DL (ref 65–100)
HCT VFR BLD AUTO: 26.9 % (ref 35–47)
HCT VFR BLD AUTO: 31.9 % (ref 35–47)
HGB BLD-MCNC: 10.4 G/DL (ref 11.5–16)
HGB BLD-MCNC: 8.5 G/DL (ref 11.5–16)
HISTORY CHECKED?,CKHIST: NORMAL
IMM GRANULOCYTES # BLD AUTO: 0 K/UL (ref 0–0.04)
IMM GRANULOCYTES NFR BLD AUTO: 1 % (ref 0–0.5)
LYMPHOCYTES # BLD: 0.8 K/UL (ref 0.8–3.5)
LYMPHOCYTES NFR BLD: 13 % (ref 12–49)
MCH RBC QN AUTO: 29.8 PG (ref 26–34)
MCH RBC QN AUTO: 30.4 PG (ref 26–34)
MCHC RBC AUTO-ENTMCNC: 31.6 G/DL (ref 30–36.5)
MCHC RBC AUTO-ENTMCNC: 32.6 G/DL (ref 30–36.5)
MCV RBC AUTO: 93.3 FL (ref 80–99)
MCV RBC AUTO: 94.4 FL (ref 80–99)
MONOCYTES # BLD: 0.4 K/UL (ref 0–1)
MONOCYTES NFR BLD: 7 % (ref 5–13)
NEUTS SEG # BLD: 4 K/UL (ref 1.8–8)
NEUTS SEG NFR BLD: 69 % (ref 32–75)
NRBC # BLD: 0 K/UL (ref 0–0.01)
NRBC # BLD: 0 K/UL (ref 0–0.01)
NRBC BLD-RTO: 0 PER 100 WBC
NRBC BLD-RTO: 0 PER 100 WBC
PLATELET # BLD AUTO: 193 K/UL (ref 150–400)
PLATELET # BLD AUTO: 233 K/UL (ref 150–400)
PMV BLD AUTO: 10.6 FL (ref 8.9–12.9)
PMV BLD AUTO: 10.6 FL (ref 8.9–12.9)
POTASSIUM SERPL-SCNC: 4 MMOL/L (ref 3.5–5.1)
PROT SERPL-MCNC: 5.8 G/DL (ref 6.4–8.2)
RBC # BLD AUTO: 2.85 M/UL (ref 3.8–5.2)
RBC # BLD AUTO: 3.42 M/UL (ref 3.8–5.2)
SERVICE CMNT-IMP: ABNORMAL
SERVICE CMNT-IMP: NORMAL
SODIUM SERPL-SCNC: 142 MMOL/L (ref 136–145)
SOURCE, COVRS: NORMAL
WBC # BLD AUTO: 5.8 K/UL (ref 3.6–11)
WBC # BLD AUTO: 8.9 K/UL (ref 3.6–11)

## 2021-10-25 PROCEDURE — 86923 COMPATIBILITY TEST ELECTRIC: CPT

## 2021-10-25 PROCEDURE — 0RRJ00Z REPLACEMENT OF RIGHT SHOULDER JOINT WITH REVERSE BALL AND SOCKET SYNTHETIC SUBSTITUTE, OPEN APPROACH: ICD-10-PCS | Performed by: ORTHOPAEDIC SURGERY

## 2021-10-25 PROCEDURE — 73020 X-RAY EXAM OF SHOULDER: CPT

## 2021-10-25 PROCEDURE — 74011250636 HC RX REV CODE- 250/636: Performed by: NURSE ANESTHETIST, CERTIFIED REGISTERED

## 2021-10-25 PROCEDURE — 82962 GLUCOSE BLOOD TEST: CPT

## 2021-10-25 PROCEDURE — 36415 COLL VENOUS BLD VENIPUNCTURE: CPT

## 2021-10-25 PROCEDURE — 74011250636 HC RX REV CODE- 250/636: Performed by: ORTHOPAEDIC SURGERY

## 2021-10-25 PROCEDURE — 80053 COMPREHEN METABOLIC PANEL: CPT

## 2021-10-25 PROCEDURE — C1776 JOINT DEVICE (IMPLANTABLE): HCPCS | Performed by: ORTHOPAEDIC SURGERY

## 2021-10-25 PROCEDURE — 77030002922 HC SUT FBRWRE ARTH -B: Performed by: ORTHOPAEDIC SURGERY

## 2021-10-25 PROCEDURE — 76060000064 HC AMB SURG ANES 2 TO 2.5 HR: Performed by: ORTHOPAEDIC SURGERY

## 2021-10-25 PROCEDURE — 77030020471 HC BIT DRL CREV ZIMM -C: Performed by: ORTHOPAEDIC SURGERY

## 2021-10-25 PROCEDURE — 3E0T3BZ INTRODUCTION OF ANESTHETIC AGENT INTO PERIPHERAL NERVES AND PLEXI, PERCUTANEOUS APPROACH: ICD-10-PCS | Performed by: ANESTHESIOLOGY

## 2021-10-25 PROCEDURE — 77030038692 HC WND DEB SYS IRMX -B: Performed by: ORTHOPAEDIC SURGERY

## 2021-10-25 PROCEDURE — 76210000036 HC AMBSU PH I REC 1.5 TO 2 HR: Performed by: ORTHOPAEDIC SURGERY

## 2021-10-25 PROCEDURE — 77030002933 HC SUT MCRYL J&J -A: Performed by: ORTHOPAEDIC SURGERY

## 2021-10-25 PROCEDURE — 77030005513 HC CATH URETH FOL11 MDII -B

## 2021-10-25 PROCEDURE — 74011000250 HC RX REV CODE- 250: Performed by: PHYSICIAN ASSISTANT

## 2021-10-25 PROCEDURE — 74011000250 HC RX REV CODE- 250: Performed by: NURSE ANESTHETIST, CERTIFIED REGISTERED

## 2021-10-25 PROCEDURE — 74011000250 HC RX REV CODE- 250: Performed by: ORTHOPAEDIC SURGERY

## 2021-10-25 PROCEDURE — 85025 COMPLETE CBC W/AUTO DIFF WBC: CPT

## 2021-10-25 PROCEDURE — 77030027138 HC INCENT SPIROMETER -A

## 2021-10-25 PROCEDURE — 02HV33Z INSERTION OF INFUSION DEVICE INTO SUPERIOR VENA CAVA, PERCUTANEOUS APPROACH: ICD-10-PCS | Performed by: ANESTHESIOLOGY

## 2021-10-25 PROCEDURE — 71045 X-RAY EXAM CHEST 1 VIEW: CPT

## 2021-10-25 PROCEDURE — C1713 ANCHOR/SCREW BN/BN,TIS/BN: HCPCS | Performed by: ORTHOPAEDIC SURGERY

## 2021-10-25 PROCEDURE — 77030028700 HC BLD TISS PLSM MEDT -E: Performed by: ORTHOPAEDIC SURGERY

## 2021-10-25 PROCEDURE — 77030003601 HC NDL NRV BLK BBMI -A

## 2021-10-25 PROCEDURE — 87635 SARS-COV-2 COVID-19 AMP PRB: CPT

## 2021-10-25 PROCEDURE — 85027 COMPLETE CBC AUTOMATED: CPT

## 2021-10-25 PROCEDURE — 76030000021 HC AMB SURG 2 TO 2.5 HR INTENSV-TIER 1: Performed by: ORTHOPAEDIC SURGERY

## 2021-10-25 PROCEDURE — 65270000029 HC RM PRIVATE

## 2021-10-25 PROCEDURE — 74011250636 HC RX REV CODE- 250/636: Performed by: ANESTHESIOLOGY

## 2021-10-25 PROCEDURE — P9016 RBC LEUKOCYTES REDUCED: HCPCS

## 2021-10-25 PROCEDURE — 77030032497 HC WRP SHLDR WO BGS SOLM -B

## 2021-10-25 PROCEDURE — 86901 BLOOD TYPING SEROLOGIC RH(D): CPT

## 2021-10-25 PROCEDURE — 77030008684 HC TU ET CUF COVD -B: Performed by: NURSE ANESTHETIST, CERTIFIED REGISTERED

## 2021-10-25 PROCEDURE — 77030018673: Performed by: ORTHOPAEDIC SURGERY

## 2021-10-25 PROCEDURE — 74011250636 HC RX REV CODE- 250/636: Performed by: PHYSICIAN ASSISTANT

## 2021-10-25 PROCEDURE — 74011250637 HC RX REV CODE- 250/637: Performed by: INTERNAL MEDICINE

## 2021-10-25 PROCEDURE — 77030006835 HC BLD SAW SAG STRY -B: Performed by: ORTHOPAEDIC SURGERY

## 2021-10-25 PROCEDURE — 77030031139 HC SUT VCRL2 J&J -A: Performed by: ORTHOPAEDIC SURGERY

## 2021-10-25 PROCEDURE — 74011250636 HC RX REV CODE- 250/636: Performed by: INTERNAL MEDICINE

## 2021-10-25 PROCEDURE — 2709999900 HC NON-CHARGEABLE SUPPLY: Performed by: ORTHOPAEDIC SURGERY

## 2021-10-25 DEVICE — IMPLANTABLE DEVICE
Type: IMPLANTABLE DEVICE | Site: SHOULDER | Status: FUNCTIONAL
Brand: COMPREHENSIVE® REVERSE SHOULDER

## 2021-10-25 DEVICE — IMPLANTABLE DEVICE
Type: IMPLANTABLE DEVICE | Site: SHOULDER | Status: FUNCTIONAL
Brand: COMPREHENSIVE®

## 2021-10-25 DEVICE — IMPLANTABLE DEVICE
Type: IMPLANTABLE DEVICE | Site: SHOULDER | Status: FUNCTIONAL
Brand: COMPREHENSIVE® VIVACIT-E®

## 2021-10-25 DEVICE — IMPLANTABLE DEVICE
Type: IMPLANTABLE DEVICE | Site: SHOULDER | Status: FUNCTIONAL
Brand: COMPREHENSIVE REVERSE SHOULDER

## 2021-10-25 DEVICE — SHOULDR S3 TOT ADV REVRS IMPL CAPPED S3: Type: IMPLANTABLE DEVICE | Status: FUNCTIONAL

## 2021-10-25 RX ORDER — DEXAMETHASONE SODIUM PHOSPHATE 4 MG/ML
INJECTION, SOLUTION INTRA-ARTICULAR; INTRALESIONAL; INTRAMUSCULAR; INTRAVENOUS; SOFT TISSUE AS NEEDED
Status: DISCONTINUED | OUTPATIENT
Start: 2021-10-25 | End: 2021-10-25 | Stop reason: HOSPADM

## 2021-10-25 RX ORDER — FENTANYL CITRATE 50 UG/ML
INJECTION, SOLUTION INTRAMUSCULAR; INTRAVENOUS AS NEEDED
Status: DISCONTINUED | OUTPATIENT
Start: 2021-10-25 | End: 2021-10-25

## 2021-10-25 RX ORDER — ROCURONIUM BROMIDE 10 MG/ML
INJECTION, SOLUTION INTRAVENOUS AS NEEDED
Status: DISCONTINUED | OUTPATIENT
Start: 2021-10-25 | End: 2021-10-25 | Stop reason: HOSPADM

## 2021-10-25 RX ORDER — SODIUM CHLORIDE 9 MG/ML
250 INJECTION, SOLUTION INTRAVENOUS
Status: DISCONTINUED | OUTPATIENT
Start: 2021-10-25 | End: 2021-10-25

## 2021-10-25 RX ORDER — SODIUM CHLORIDE, SODIUM LACTATE, POTASSIUM CHLORIDE, CALCIUM CHLORIDE 600; 310; 30; 20 MG/100ML; MG/100ML; MG/100ML; MG/100ML
100 INJECTION, SOLUTION INTRAVENOUS CONTINUOUS
Status: DISPENSED | OUTPATIENT
Start: 2021-10-25 | End: 2021-10-26

## 2021-10-25 RX ORDER — SODIUM CHLORIDE 9 MG/ML
250 INJECTION, SOLUTION INTRAVENOUS AS NEEDED
Status: DISCONTINUED | OUTPATIENT
Start: 2021-10-25 | End: 2021-10-25

## 2021-10-25 RX ORDER — ONDANSETRON 2 MG/ML
INJECTION INTRAMUSCULAR; INTRAVENOUS AS NEEDED
Status: DISCONTINUED | OUTPATIENT
Start: 2021-10-25 | End: 2021-10-25 | Stop reason: HOSPADM

## 2021-10-25 RX ORDER — FENTANYL CITRATE 50 UG/ML
INJECTION, SOLUTION INTRAMUSCULAR; INTRAVENOUS AS NEEDED
Status: DISCONTINUED | OUTPATIENT
Start: 2021-10-25 | End: 2021-10-25 | Stop reason: HOSPADM

## 2021-10-25 RX ORDER — NEOSTIGMINE METHYLSULFATE 1 MG/ML
INJECTION, SOLUTION INTRAVENOUS AS NEEDED
Status: DISCONTINUED | OUTPATIENT
Start: 2021-10-25 | End: 2021-10-25 | Stop reason: HOSPADM

## 2021-10-25 RX ORDER — HYDROMORPHONE HYDROCHLORIDE 1 MG/ML
.25-1 INJECTION, SOLUTION INTRAMUSCULAR; INTRAVENOUS; SUBCUTANEOUS
Status: DISCONTINUED | OUTPATIENT
Start: 2021-10-25 | End: 2021-10-25 | Stop reason: HOSPADM

## 2021-10-25 RX ORDER — PHENYLEPHRINE HCL IN 0.9% NACL 0.4MG/10ML
SYRINGE (ML) INTRAVENOUS AS NEEDED
Status: DISCONTINUED | OUTPATIENT
Start: 2021-10-25 | End: 2021-10-25 | Stop reason: HOSPADM

## 2021-10-25 RX ORDER — SODIUM CHLORIDE, SODIUM LACTATE, POTASSIUM CHLORIDE, CALCIUM CHLORIDE 600; 310; 30; 20 MG/100ML; MG/100ML; MG/100ML; MG/100ML
100 INJECTION, SOLUTION INTRAVENOUS CONTINUOUS
Status: DISCONTINUED | OUTPATIENT
Start: 2021-10-25 | End: 2021-10-25 | Stop reason: HOSPADM

## 2021-10-25 RX ORDER — GLYCOPYRROLATE 0.2 MG/ML
INJECTION INTRAMUSCULAR; INTRAVENOUS AS NEEDED
Status: DISCONTINUED | OUTPATIENT
Start: 2021-10-25 | End: 2021-10-25 | Stop reason: HOSPADM

## 2021-10-25 RX ORDER — ACETAMINOPHEN 325 MG/1
975 TABLET ORAL ONCE
Status: DISPENSED | OUTPATIENT
Start: 2021-10-25 | End: 2021-10-25

## 2021-10-25 RX ORDER — MIDAZOLAM HYDROCHLORIDE 1 MG/ML
INJECTION, SOLUTION INTRAMUSCULAR; INTRAVENOUS AS NEEDED
Status: DISCONTINUED | OUTPATIENT
Start: 2021-10-25 | End: 2021-10-25 | Stop reason: HOSPADM

## 2021-10-25 RX ORDER — SODIUM CHLORIDE 9 MG/ML
250 INJECTION, SOLUTION INTRAVENOUS AS NEEDED
Status: CANCELLED | OUTPATIENT
Start: 2021-10-25

## 2021-10-25 RX ORDER — PROPOFOL 10 MG/ML
INJECTION, EMULSION INTRAVENOUS AS NEEDED
Status: DISCONTINUED | OUTPATIENT
Start: 2021-10-25 | End: 2021-10-25 | Stop reason: HOSPADM

## 2021-10-25 RX ORDER — FENTANYL CITRATE 50 UG/ML
25 INJECTION, SOLUTION INTRAMUSCULAR; INTRAVENOUS
Status: DISCONTINUED | OUTPATIENT
Start: 2021-10-25 | End: 2021-10-25 | Stop reason: HOSPADM

## 2021-10-25 RX ORDER — HEPARIN SODIUM 5000 [USP'U]/ML
5000 INJECTION, SOLUTION INTRAVENOUS; SUBCUTANEOUS EVERY 8 HOURS
Status: DISCONTINUED | OUTPATIENT
Start: 2021-10-25 | End: 2021-10-29 | Stop reason: HOSPADM

## 2021-10-25 RX ORDER — VANCOMYCIN HYDROCHLORIDE 1 G/20ML
INJECTION, POWDER, LYOPHILIZED, FOR SOLUTION INTRAVENOUS AS NEEDED
Status: DISCONTINUED | OUTPATIENT
Start: 2021-10-25 | End: 2021-10-25 | Stop reason: HOSPADM

## 2021-10-25 RX ORDER — SODIUM CHLORIDE 9 MG/ML
250 INJECTION, SOLUTION INTRAVENOUS
Status: DISPENSED | OUTPATIENT
Start: 2021-10-25 | End: 2021-10-26

## 2021-10-25 RX ORDER — ROPIVACAINE HYDROCHLORIDE 5 MG/ML
INJECTION, SOLUTION EPIDURAL; INFILTRATION; PERINEURAL AS NEEDED
Status: DISCONTINUED | OUTPATIENT
Start: 2021-10-25 | End: 2021-10-25 | Stop reason: HOSPADM

## 2021-10-25 RX ORDER — LIDOCAINE HYDROCHLORIDE 10 MG/ML
0.1 INJECTION, SOLUTION EPIDURAL; INFILTRATION; INTRACAUDAL; PERINEURAL AS NEEDED
Status: DISCONTINUED | OUTPATIENT
Start: 2021-10-25 | End: 2021-10-25 | Stop reason: HOSPADM

## 2021-10-25 RX ADMIN — Medication 80 MCG: at 14:42

## 2021-10-25 RX ADMIN — ACETAMINOPHEN 650 MG: 325 TABLET ORAL at 19:02

## 2021-10-25 RX ADMIN — MIDAZOLAM 0.5 MG: 1 INJECTION, SOLUTION INTRAMUSCULAR; INTRAVENOUS at 13:06

## 2021-10-25 RX ADMIN — PROPOFOL 100 MG: 10 INJECTION, EMULSION INTRAVENOUS at 14:00

## 2021-10-25 RX ADMIN — Medication 40 MCG: at 14:27

## 2021-10-25 RX ADMIN — AMLODIPINE BESYLATE 10 MG: 5 TABLET ORAL at 09:15

## 2021-10-25 RX ADMIN — Medication 40 MCG: at 14:37

## 2021-10-25 RX ADMIN — MIDAZOLAM 0.5 MG: 1 INJECTION, SOLUTION INTRAMUSCULAR; INTRAVENOUS at 13:01

## 2021-10-25 RX ADMIN — HEPARIN SODIUM 5000 UNITS: 5000 INJECTION INTRAVENOUS; SUBCUTANEOUS at 20:54

## 2021-10-25 RX ADMIN — GLYCOPYRROLATE 0.4 MG: 0.2 INJECTION INTRAMUSCULAR; INTRAVENOUS at 15:35

## 2021-10-25 RX ADMIN — Medication 10 ML: at 20:50

## 2021-10-25 RX ADMIN — CEFAZOLIN 2 G: 1 INJECTION, POWDER, FOR SOLUTION INTRAMUSCULAR; INTRAVENOUS at 20:48

## 2021-10-25 RX ADMIN — ROCURONIUM BROMIDE 30 MG: 10 INJECTION INTRAVENOUS at 14:01

## 2021-10-25 RX ADMIN — DEXAMETHASONE SODIUM PHOSPHATE 4 MG: 4 INJECTION, SOLUTION INTRAMUSCULAR; INTRAVENOUS at 14:44

## 2021-10-25 RX ADMIN — Medication 10 ML: at 20:54

## 2021-10-25 RX ADMIN — HYDRALAZINE HYDROCHLORIDE 10 MG: 20 INJECTION INTRAMUSCULAR; INTRAVENOUS at 09:15

## 2021-10-25 RX ADMIN — ROPIVACAINE HYDROCHLORIDE 30 ML: 5 INJECTION, SOLUTION EPIDURAL; INFILTRATION; PERINEURAL at 13:21

## 2021-10-25 RX ADMIN — ACETAMINOPHEN 650 MG: 325 TABLET ORAL at 00:00

## 2021-10-25 RX ADMIN — Medication 40 MCG/MIN: at 14:47

## 2021-10-25 RX ADMIN — MIDAZOLAM 0.5 MG: 1 INJECTION, SOLUTION INTRAMUSCULAR; INTRAVENOUS at 12:58

## 2021-10-25 RX ADMIN — OXYCODONE 5 MG: 5 TABLET ORAL at 19:02

## 2021-10-25 RX ADMIN — ONDANSETRON 4 MG: 2 INJECTION INTRAMUSCULAR; INTRAVENOUS at 20:50

## 2021-10-25 RX ADMIN — OXYCODONE 5 MG: 5 TABLET ORAL at 23:25

## 2021-10-25 RX ADMIN — Medication 10 ML: at 16:54

## 2021-10-25 RX ADMIN — ACETAMINOPHEN 650 MG: 325 TABLET ORAL at 23:24

## 2021-10-25 RX ADMIN — MORPHINE SULFATE 2 MG: 2 INJECTION, SOLUTION INTRAMUSCULAR; INTRAVENOUS at 20:47

## 2021-10-25 RX ADMIN — CEFAZOLIN SODIUM 2 G: 1 POWDER, FOR SOLUTION INTRAMUSCULAR; INTRAVENOUS at 14:11

## 2021-10-25 RX ADMIN — Medication 3 MG: at 15:35

## 2021-10-25 RX ADMIN — FENTANYL CITRATE 50 MCG: 50 INJECTION, SOLUTION INTRAMUSCULAR; INTRAVENOUS at 12:58

## 2021-10-25 RX ADMIN — ONDANSETRON HYDROCHLORIDE 4 MG: 2 SOLUTION INTRAMUSCULAR; INTRAVENOUS at 15:35

## 2021-10-25 NOTE — PERIOP NOTES
POST ANESTHESIA CARE    DISCHARGE / TRANSFER NOTE  TRANSFER - OUT REPORT:    [] Verbal / Bedside  [x] Phone      report  was / will-be given at 5:30 PM to   Ike Casarez Lul   and being transferred to    [] L&D  [] ICU  [] [address]  [] MIU  [] 4th Ortho/Surgical  [x] 5th Medical/Surgical     for routine post - op  Following General for Procedure(s) (LRB):  RIGHT REVERSE TOTAL SHOULDER ARTHROPLASTY (GENERAL WITH REGIONAL BLOCK) (Right) by  Jose Walden MD.    Patient Situation, Background, Assessment and Recommendations (SBAR) was provided and included information from the following SBAR report(s) (SBAR, OR Summary, MAR, Recent Results, Cardiac Rhythm NS and Quality Measures) which were reviewed with the receiving nurse. Lines:   Triple Lumen 10/25/21 Left (Active)   Central Line Being Utilized Yes 10/25/21 1610   Criteria for Appropriate Use Limited/no vessel suitable for conventional peripheral access 10/25/21 1610   Infiltration Assessment 0 10/25/21 1610   Affected Extremity/Extremities Color distal to insertion site pink (or appropriate for race) 10/25/21 1610   Date of Last Dressing Change 10/25/21 10/25/21 1610   Dressing Status Clean, dry, & intact 10/25/21 1610   Dressing Type Transparent;Disk with Chlorhexadine gluconate (CHG); Tape 10/25/21 1610   Action Taken Open ports on tubing capped 10/25/21 1610   Proximal Hub Color/Line Status White 10/25/21 1610   Positive Blood Return (Medial Site) Yes 10/25/21 1610   Medial Hub Color/Line Status Blue 10/25/21 1610   Positive Blood Return (Lateral Site) Yes 10/25/21 1610   Distal Hub Color/Line Status Brown 10/25/21 1610   Positive Blood Return (Site #3) Yes 10/25/21 1610   Alcohol Cap Used Yes 10/25/21 1610       Peripheral IV 10/23/21 Left Antecubital (Active)   Site Assessment Clean, dry, & intact 10/25/21 1610   Phlebitis Assessment 0 10/25/21 1610   Infiltration Assessment 0 10/25/21 1610   Dressing Status Clean, dry, & intact 10/25/21 1610   Dressing Type Transparent;Tape 10/25/21 1610   Hub Color/Line Status Blue;Flushed;Capped 10/25/21 1610   Action Taken Open ports on tubing capped 10/25/21 1110   Alcohol Cap Used Yes 10/25/21 1110      Confirmed with receiving RN Sánchez Hubbard that Benedict was placed PM shift 10/24-25/21 due to inability to void. Informed her that there was no order or LDA documentation, and that the RN who placed line needs to remedy LDA delinquencies. EMAR was updated to the extent I could validate data to allow for proper documentation. Also Reviewed (checked if applicable):   [] NO ADDITIONAL REVIEWS  [] PCA Drug and Settings     [x] Cold Therapy with extra gels     [] On-Q @  ml/hr   [] Drains x       [] Significant Wound care/devices (e.g. Wound vac, etc.)     [] Holding pt in PACU awaiting bed availability - additional care provided and eMAR review  [] Vent Settings      [] Critical Care Drips  Contact Precautions: There are currently no Active Isolations  Patient transported with:  Registered Nurse    Edna Baer was:    [] discharged        via   [] Wheelchair          to [] Private Vehicle     [x] transferred   [] Carried   [] Taxi / Vehicle \"for Hire\"  [] Walk out  [] Ambulance / Medical Transportation   [] Stretcher  [x] Hospital room _534_          [x] Bed      Patient was escorted by:      [x] Nurse   [] Volunteer [] Transporter / Technician  [] Parent      [] Spouse / Family /      Patient verbalized     [x] appreciation and was very pleased with care received   [] frustration with care received       throughout their stay. Patient was discharged in     [x] pleasant mood  [] sad mood  [] mad mood . Pain at discharge/transfer was      2  /10.     Discharge, medication and follow-up instructions were verbalized as understood prior to discharge  (if applicable for same-day procedures being discharged.)    All personal belongings have been returned to patient, and patient/family verbally confirm receiving belongings as all present. Additional Information:Interval bedside SBAR report was completed at 1815. There were:  [x] No changes to patient condition since last assessment and/or communication with receiving RN.    [] There were changes to patient care/condition since last communication with receiving RN and were reviewed at        verbal bedside SBAR change of staff. Patient is in:   [x] No Acute Distress     [] Mild/Moderate Acute discomfort - treated and controlled  [] Acute dicomfort/distress - treated but still not fully controlled  [] Acute dicomfort/distress reported, however maximum allowable dosing has been achieved and no further        doses allowed    Vital Signs are: [x] Stable - consistent with end of PACU care. [] Unstable -  receiving continued care in appropriate setting    [] 2 RN skin check completed with receiving RN. [x] Spouse/family/caregiver at bedside during/after staff handoff of care. [] No Spouse/family/caregiver with the patient at this time. Encouraged family to be cough/deep breathe advocate and cheer leader. Also informed them of the need for incentive spirometry and to encourage frequent hourly use. Also discussed pain management and provided block education. After report, opportunity for questions and clarification was provided.     Signed: [facility] BSN RN-BC

## 2021-10-25 NOTE — PERIOP NOTES
1059 Assisted transport with patient from 534 to ASU pre op. 1130 Attempted x 2 to gain access to draw labs. 1200: Covid swab completed. 1300 Negative result noted on Covid swab. Dr Isaias Hickman MD with anesthesia placed central line. On assessment pitting edema noted in LE. Left arm near antecubital red and edematous. Right leg redness upper thigh noted. Patient unsure of LBM. Patient alert and oriented x 4. Daughter Melodie aware of wedding band on left ring finger, taped. 1325 Updated daughter, Darnell Palmer.

## 2021-10-25 NOTE — PROGRESS NOTES
PT BP /66. SPOKE WITH DR. Bridgett Mitchell WHO INSTRUCTED TO ADMIN HYDRALAZINE 10MG IV AND GIVE NORVASC PO AS SCHEDULE WITH A SIP OF H20.

## 2021-10-25 NOTE — PROGRESS NOTES
Physical Therapy Note    Chart reviewed in preparation for physical therapy treatment. Spoke with RN. Patient currently off the floor for R reverse LARISSA operation. Will follow up for PT re-evaluation post op as indicated.     Thank you,   Cayla Peres, PT, DPT

## 2021-10-25 NOTE — PROGRESS NOTES
Occupational Therapy Note     Chart reviewed in preparation for OT  treatment. Patient currently off the floor for R reverse LARISSA operation.  Will follow up for OT re-evaluation post op as indicated

## 2021-10-25 NOTE — PROGRESS NOTES
Daily Progress Note: 10/25/2021  Shakira Montalvo NP    Assessment/Plan:   Humeral surgical neck fracture (10/18/2021) POA: CT upper extremity showed a comminuted fracture involving the humeral neck and the greater tuberosity.   -Seen by orthopedic surgery who deem her stable. -Iinitially non operative management recommended but she is now scheduled for surgery today, Monday 10/25.   -Continue an arm sling and NWB RUE, pain control. PT, OT.   -CM for ultimate discharge planning.   -Family want to only go to [facility]     Closed fracture of multiple pubic rami ([facility] [address] (10/18/2021) POA: CT pelvis showed an acute nondisplaced right superior and inferior pubic rami fractures. Proximal right femoral intramedullary hardware is intact. -Reviewed by ortho. -Continue WBAT with walker.   -PT, OT following.      Right knee pain POA: has associated swelling.   -Get an xray right knee (moderate joint effusion but no fx) as well as a venous doppler ultrasound (negative for DVT)  -Pain control     HTN (hypertension), benign (10/18/2021) POA: BP stable. -Continue Amlodipine     DM (diabetes mellitus), type 2 ([facility] [address] (10/18/2021) POA: A1c 6.1.   -Restart SSI  -DM diet as tolerated. -Home Amaryl on hold.      Leukocytosis (10/18/2021) POA: CXR clear. UA not impressive. WBCs better. Afebrile.   -resolved     CKD (chronic kidney disease) stage 3, GFR 30-59 ml/min (HCC) (10/18/2021) / Hypokalemia due to loss of potassium (10/18/2021) POA:   -K+ better. -SCr slightly high and likely has stage 4 disease now  -Continue gentle hydration.    -ARF POA, improving with hydration, baseline creatinine       Problem List:  Problem List as of 10/25/2021 Date Reviewed: 10/18/2021        Codes Class Noted - Resolved    Right knee pain ICD-10-CM: M25.561  ICD-9-CM: 719.46  10/21/2021 - Present        * (Principal) Humeral surgical neck fracture ICD-10-CM: X01.082H  ICD-9-CM: 812.01  10/18/2021 - Present        Closed fracture of multiple pubic rami [facility]) ICD-10-CM: H41.983Y  ICD-9-CM: 808.2  10/18/2021 - Present        HTN (hypertension), benign ICD-10-CM: I10  ICD-9-CM: 401.1  10/18/2021 - Present        DM (diabetes mellitus), type 2 ([address] ICD-10-CM: E11.9  ICD-9-CM: 250.00  10/18/2021 - Present        Leukocytosis ICD-10-CM: V51.072  ICD-9-CM: 288.60  10/18/2021 - Present        CKD (chronic kidney disease) stage 3, GFR 30-59 ml/min ([facility]) ICD-10-CM: N18.30  ICD-9-CM: 585.3  10/18/2021 - Present        Hypokalemia due to loss of potassium ICD-10-CM: E87.6  ICD-9-CM: 276.8  10/18/2021 - Present              HPI:   Ms. Swetha Be is an 87y.o.  female with PMH of DM, CKD admitted s/p fall for eval. Noted to have femoral neck fracture and pubic rami fracture. Pt's only complaint is RUE pain that is currently controlled with Tylenol. Lives with her daughter. Typically walks with a walker. (Dr Rodríguez Alejandre)     10/22: Didn't sleep well last night. Xray of knee is ok except for an effusion. US neg for DVT. Cr 2.35. Pain is controlled. Surgery scheduled for Monday. 10/23:  Doing well. Pain moderate level. Plan is for surgery Monday. 10/24: No complaints this am. BS are better with SSI. Holding home meds. AM labs pending. 10/25: Surgery planned for later today. AM labs stable, creatinine continues to slowly improve. She denies pain at this time, is looking forward to getting her shoulder repaired. She understands the pelvic fractures cannot be surgically repaired and will take weeks/months to heal.      Review of Systems:   A comprehensive review of systems was negative except for that written in the HPI.     Objective:   Physical Exam:     Visit Vitals  BP (!) 145/65 (BP 1 Location: Left lower arm, BP Patient Position: At rest)   Pulse 80   Temp 97.7 °F (36.5 °C)   Resp 18   Ht 5' 2.01\" (1.575 m)   Wt 63.5 kg (140 lb)   SpO2 91%   BMI 25.60 kg/m²      O2 Device: None (Room air)    Temp (24hrs), Av.5 °F (36.9 °C), Min:97.7 °F (36.5 °C), Max:99 °F (37.2 °C)    10/24 1901 - 10/25 0700  In: -   Out: 4225 [Urine:2350]   10/23 0701 - 10/24 1900  In: 1200 [P.O.:600; I.V.:600]  Out: 450 [Urine:450]    General:  Alert, cooperative, no distress, appears stated age. Head:  Normocephalic, without obvious abnormality, atraumatic. Eyes:  Conjunctivae/corneas clear. PERRL, EOMs intact. Nose: Nares normal. Septum midline. Mucosa normal. No drainage or sinus tenderness. Throat: Lips, mucosa, and tongue normal.   Neck: Supple, symmetrical, trachea midline, no adenopathy, thyroid: no enlargement/tenderness/nodules, no carotid bruit and no JVD. Back:   Symmetric, no curvature. ROM normal. No CVA tenderness. Lungs:   Clear to auscultation bilaterally. Chest wall:  No tenderness or deformity. Heart:  Regular rate and rhythm, S1, S2 normal, no murmur, click, rub or gallop. Abdomen:   Soft, non-tender. Bowel sounds normal. No masses,  No organomegaly. Extremities: Right UE in sling,  no cyanosis or edema. No calf tenderness or cords. Pulses: 2+ and symmetric all extremities. Skin: Skin color, texture, turgor normal. No rashes or lesions   Neurologic: CNII-XII intact. Alert and oriented X 3. Fine motor of hands and fingers normal.   equal.  No cogwheeling or rigidity. Gait not tested at this time. Sensation grossly normal to touch. Gross motor of extremities normal.       Data Review:   X-ray Right knee 10/21/21    IMPRESSION  1. No definite evidence of acute traumatic injury involving the knee. 2. Evidence of degenerative change as described above. 3. Presence of a moderate-sized knee joint effusion. Duplex Lower Ext 10/21/21  Interpretation Summary    Right lower extremity venous duplex negative for deep venous thrombosis or thrombophlebitis. Left common femoral vein is thrombus free.    Lower Extremity Venous Findings    Right Lower Venous    No evidence of deep vein thrombosis in the common femoral, profunda femoral, femoral, popliteal, posterior tibial, and peroneal veins. The veins were imaged in the transverse and longitudinal planes. The vessels showed normal color filling and compressibility. Doppler interrogation showed phasic and spontaneous flow. Left Lower Venous    For comparison purposes, the left common femoral vein was briefly interrogated. These vein demonstrates normal color filing and compressibility. Doppler flow was phasic and spontaneous. Recent Days:  Recent Labs     10/25/21  0356 10/24/21  1118   WBC 5.8 5.9   HGB 8.5* 9.0*   HCT 26.9* 28.0*    168     Recent Labs     10/25/21  0356 10/24/21  1118    140   K 4.0 4.2   * 113*   CO2 21 23   GLU 80 94   BUN 40* 41*   CREA 1.93* 2.11*   CA 8.4* 8.2*   ALB 1.9* 2.1*   TBILI 0.6 0.6   ALT 19 20       24 Hour Results:  Recent Results (from the past 24 hour(s))   GLUCOSE, POC    Collection Time: 10/24/21  6:38 AM   Result Value Ref Range    Glucose (POC) 80 65 - 117 mg/dL    Performed by Trinh Cavazos    GLUCOSE, POC    Collection Time: 10/24/21  8:33 AM   Result Value Ref Range    Glucose (POC) 113 65 - 117 mg/dL    Performed by Abby Carey    CBC WITH AUTOMATED DIFF    Collection Time: 10/24/21 11:18 AM   Result Value Ref Range    WBC 5.9 3.6 - 11.0 K/uL    RBC 2.98 (L) 3.80 - 5.20 M/uL    HGB 9.0 (L) 11.5 - 16.0 g/dL    HCT 28.0 (L) 35.0 - 47.0 %    MCV 94.0 80.0 - 99.0 FL    MCH 30.2 26.0 - 34.0 PG    MCHC 32.1 30.0 - 36.5 g/dL    RDW 13.2 11.5 - 14.5 %    PLATELET 645 101 - 385 K/uL    MPV 10.8 8.9 - 12.9 FL    NRBC 0.0 0  WBC    ABSOLUTE NRBC 0.00 0.00 - 0.01 K/uL    NEUTROPHILS 70 32 - 75 %    LYMPHOCYTES 13 12 - 49 %    MONOCYTES 8 5 - 13 %    EOSINOPHILS 8 (H) 0 - 7 %    BASOPHILS 1 0 - 1 %    IMMATURE GRANULOCYTES 0 0.0 - 0.5 %    ABS. NEUTROPHILS 4.0 1.8 - 8.0 K/UL    ABS. LYMPHOCYTES 0.8 0.8 - 3.5 K/UL    ABS. MONOCYTES 0.5 0.0 - 1.0 K/UL    ABS. EOSINOPHILS 0.5 (H) 0.0 - 0.4 K/UL    ABS.  BASOPHILS 0.1 0.0 - 0.1 K/UL    ABS. IMM. GRANS. 0.0 0.00 - 0.04 K/UL    DF SMEAR SCANNED      RBC COMMENTS NORMOCYTIC, NORMOCHROMIC     METABOLIC PANEL, COMPREHENSIVE    Collection Time: 10/24/21 11:18 AM   Result Value Ref Range    Sodium 140 136 - 145 mmol/L    Potassium 4.2 3.5 - 5.1 mmol/L    Chloride 113 (H) 97 - 108 mmol/L    CO2 23 21 - 32 mmol/L    Anion gap 4 (L) 5 - 15 mmol/L    Glucose 94 65 - 100 mg/dL    BUN 41 (H) 6 - 20 MG/DL    Creatinine 2.11 (H) 0.55 - 1.02 MG/DL    BUN/Creatinine ratio 19 12 - 20      GFR est AA 27 (L) >60 ml/min/1.73m2    GFR est non-AA 22 (L) >60 ml/min/1.73m2    Calcium 8.2 (L) 8.5 - 10.1 MG/DL    Bilirubin, total 0.6 0.2 - 1.0 MG/DL    ALT (SGPT) 20 12 - 78 U/L    AST (SGOT) 25 15 - 37 U/L    Alk.  phosphatase 76 45 - 117 U/L    Protein, total 6.0 (L) 6.4 - 8.2 g/dL    Albumin 2.1 (L) 3.5 - 5.0 g/dL    Globulin 3.9 2.0 - 4.0 g/dL    A-G Ratio 0.5 (L) 1.1 - 2.2     GLUCOSE, POC    Collection Time: 10/24/21 11:28 AM   Result Value Ref Range    Glucose (POC) 91 65 - 117 mg/dL    Performed by Luis Fernando 51, POC    Collection Time: 10/24/21  4:18 PM   Result Value Ref Range    Glucose (POC) 89 65 - 117 mg/dL    Performed by Luis Fernando 51, POC    Collection Time: 10/24/21  9:07 PM   Result Value Ref Range    Glucose (POC) 102 65 - 117 mg/dL    Performed by Boone Palomo    CBC WITH AUTOMATED DIFF    Collection Time: 10/25/21  3:56 AM   Result Value Ref Range    WBC 5.8 3.6 - 11.0 K/uL    RBC 2.85 (L) 3.80 - 5.20 M/uL    HGB 8.5 (L) 11.5 - 16.0 g/dL    HCT 26.9 (L) 35.0 - 47.0 %    MCV 94.4 80.0 - 99.0 FL    MCH 29.8 26.0 - 34.0 PG    MCHC 31.6 30.0 - 36.5 g/dL    RDW 13.2 11.5 - 14.5 %    PLATELET 837 005 - 624 K/uL    MPV 10.6 8.9 - 12.9 FL    NRBC 0.0 0  WBC    ABSOLUTE NRBC 0.00 0.00 - 0.01 K/uL    NEUTROPHILS 69 32 - 75 %    LYMPHOCYTES 13 12 - 49 %    MONOCYTES 7 5 - 13 %    EOSINOPHILS 10 (H) 0 - 7 %    BASOPHILS 1 0 - 1 %    IMMATURE GRANULOCYTES 1 (H) 0.0 - 0.5 % ABS. NEUTROPHILS 4.0 1.8 - 8.0 K/UL    ABS. LYMPHOCYTES 0.8 0.8 - 3.5 K/UL    ABS. MONOCYTES 0.4 0.0 - 1.0 K/UL    ABS. EOSINOPHILS 0.6 (H) 0.0 - 0.4 K/UL    ABS. BASOPHILS 0.0 0.0 - 0.1 K/UL    ABS. IMM. GRANS. 0.0 0.00 - 0.04 K/UL    DF AUTOMATED     METABOLIC PANEL, COMPREHENSIVE    Collection Time: 10/25/21  3:56 AM   Result Value Ref Range    Sodium 142 136 - 145 mmol/L    Potassium 4.0 3.5 - 5.1 mmol/L    Chloride 114 (H) 97 - 108 mmol/L    CO2 21 21 - 32 mmol/L    Anion gap 7 5 - 15 mmol/L    Glucose 80 65 - 100 mg/dL    BUN 40 (H) 6 - 20 MG/DL    Creatinine 1.93 (H) 0.55 - 1.02 MG/DL    BUN/Creatinine ratio 21 (H) 12 - 20      GFR est AA 30 (L) >60 ml/min/1.73m2    GFR est non-AA 25 (L) >60 ml/min/1.73m2    Calcium 8.4 (L) 8.5 - 10.1 MG/DL    Bilirubin, total 0.6 0.2 - 1.0 MG/DL    ALT (SGPT) 19 12 - 78 U/L    AST (SGOT) 18 15 - 37 U/L    Alk.  phosphatase 77 45 - 117 U/L    Protein, total 5.8 (L) 6.4 - 8.2 g/dL    Albumin 1.9 (L) 3.5 - 5.0 g/dL    Globulin 3.9 2.0 - 4.0 g/dL    A-G Ratio 0.5 (L) 1.1 - 2.2         Medications reviewed  Current Facility-Administered Medications   Medication Dose Route Frequency    insulin lispro (HUMALOG) injection   SubCUTAneous AC&HS    glucose chewable tablet 16 g  4 Tablet Oral PRN    dextrose (D50W) injection syrg 12.5-25 g  12.5-25 g IntraVENous PRN    glucagon (GLUCAGEN) injection 1 mg  1 mg IntraMUSCular PRN    0.9% sodium chloride infusion  50 mL/hr IntraVENous CONTINUOUS    amLODIPine (NORVASC) tablet 10 mg  10 mg Oral DAILY    acetaminophen (TYLENOL) tablet 650 mg  650 mg Oral Q6H    sodium chloride (NS) flush 5-40 mL  5-40 mL IntraVENous Q8H    sodium chloride (NS) flush 5-40 mL  5-40 mL IntraVENous PRN    morphine injection 2 mg  2 mg IntraVENous Q4H PRN    naloxone (NARCAN) injection 0.4 mg  0.4 mg IntraVENous PRN    ondansetron (ZOFRAN) injection 4 mg  4 mg IntraVENous Q4H PRN    oxyCODONE IR (ROXICODONE) tablet 5 mg  5 mg Oral Q4H PRN    dextrose (D50W) injection syrg 12.5-25 g  12.5-25 g IntraVENous PRN    hydrALAZINE (APRESOLINE) 20 mg/mL injection 10 mg  10 mg IntraVENous Q6H PRN       Care Plan discussed with: Patient/Family    Total time spent with patient and review of records: 30 minutes.     Ghassan Montalvo MD

## 2021-10-25 NOTE — ANESTHESIA PROCEDURE NOTES
Peripheral Block    Start time: 10/25/2021 1:16 PM  End time: 10/25/2021 1:21 PM  Performed by: Fabian Jacinto CRNA  Authorized by: Meghan Mcgregor MD       Pre-procedure: Indications: at surgeon's request and post-op pain management    Preanesthetic Checklist: patient identified, risks and benefits discussed, site marked, timeout performed, anesthesia consent given and patient being monitored    Timeout Time: 13:16 EDT          Block Type:   Block Type:   Interscalene  Laterality:  Right  Monitoring:  Continuous pulse ox, frequent vital sign checks, heart rate, responsive to questions and oxygen  Injection Technique:  Single shot  Procedures: ultrasound guided and nerve stimulator    Patient Position: supine  Prep: chlorhexidine    Location:  Interscalene  Needle Type:  Stimuplex  Needle Gauge:  22 G  Needle Localization:  Nerve stimulator and ultrasound guidance    Assessment:  Number of attempts:  1  Injection Assessment:  Incremental injection every 5 mL, local visualized surrounding nerve on ultrasound, negative aspiration for blood, no paresthesia and no intravascular symptoms  Patient tolerance:  Patient tolerated the procedure well with no immediate complications

## 2021-10-25 NOTE — PROGRESS NOTES
Pt voided 100 ml via purwick. Dr. Analia Omalley paged at [phone] left a message on voice mail. No return call. Dr. Analia Omalley called a 2nd time at 2010. New order received to place to [facility] for retention and pre op surgery on 10/25/21. Treva ( brad rn) will insert craig per order.

## 2021-10-25 NOTE — ANESTHESIA PROCEDURE NOTES
Central Line Placement    Start time: 10/25/2021 1:00 PM  End time: 10/25/2021 1:20 PM  Performed by: Raffaele Alejo MD  Authorized by: Vesta Smith MD     Indications: vascular access  Preanesthetic Checklist: patient identified, risks and benefits discussed, anesthesia consent, site marked, patient being monitored and timeout performed    Timeout Time: 13:00 EDT       Pre-procedure: All elements of maximal sterile barrier technique followed?  Yes    2% Chlorhexidine for cutaneous antisepsis, Hand hygiene performed prior to catheter insertion and Ultrasound guidance    Sterile Ultrasound Technique followed?: Yes            Procedure:   Prep:  ChloraPrep  Location: internal jugular  Orientation:  Left  Patient position:  Trendelenburg  Catheter type:  Triple lumen  Catheter size:  7 Fr  Catheter length:  20 cm  Number of attempts:  1  Successful placement: Yes      Assessment:   Post-procedure:  Catheter secured, sterile dressing applied and sterile dressing with CHG applied  Assessment:  Blood return through all ports and guidewire removal verified  Insertion:  Uncomplicated  Patient tolerance:  Patient tolerated the procedure well with no immediate complications  CXR post op

## 2021-10-25 NOTE — ANESTHESIA PREPROCEDURE EVALUATION
Relevant Problems   CARDIOVASCULAR   (+) HTN (hypertension), benign      RENAL FAILURE   (+) CKD (chronic kidney disease) stage 3, GFR 30-59 ml/min (HCC)      ENDOCRINE   (+) DM (diabetes mellitus), type 2 (HCC)       Anesthetic History   No history of anesthetic complications            Review of Systems / Medical History  Patient summary reviewed, nursing notes reviewed and pertinent labs reviewed    Pulmonary  Within defined limits                 Neuro/Psych   Within defined limits           Cardiovascular    Hypertension                   GI/Hepatic/Renal         Renal disease: CRI       Endo/Other    Diabetes    Anemia     Other Findings   Comments: Hg=8.5           Physical Exam    Airway  Mallampati: II  TM Distance: 4 - 6 cm  Neck ROM: normal range of motion   Mouth opening: Normal     Cardiovascular    Rhythm: regular  Rate: normal         Dental    Dentition: Edentulous     Pulmonary  Breath sounds clear to auscultation              Comments: Lungs clear anteriorly, difficult to examine due to current positioning, breathing appears slightly labored, but does not report any dyspnea. CXR ordered given she has been in hospital for week, various IV fluids and renal insufficiency.  Abdominal         Other Findings            Anesthetic Plan    ASA: 3  Anesthesia type: general      Post-op pain plan if not by surgeon: peripheral nerve block single    Induction: Intravenous  Anesthetic plan and risks discussed with: Patient

## 2021-10-25 NOTE — PERIOP NOTES
PACU IN REPORT FROM ANESTHESIA    Verbal report received from   Bertin Segura   [] MD/DO-Anesthesiologist    [x] CRNA   [x] with student    CHOICE ANESTHESIA:  [x] GENERAL  [] TIVA  [] MAC  [] LOCAL  [x] REGIONAL  [] SPINAL   [] EPIDURAL   **Note the anesthesia record for medications given intraoperatively. **           [] E.R.A.S. PROTOCOL    SURGICAL PROCEDURE: Procedure(s) (LRB):  RIGHT REVERSE TOTAL SHOULDER ARTHROPLASTY (GENERAL WITH REGIONAL BLOCK) (Right)     SURGEON: Reggie Rodriguez MD.    Brief Initial Visual Assessment:    Patient Age: [] Infant(1-12mo)      []Pediatric(1-13yrs)    [] Adolescent(13-18yrs)    [] Adult(18-65yrs)      [x]Geriatric Adult(>65yrs). Patient    [] Alert           [x]Calm & Cooperative      [] Anxious  Appearance: [x] Drowsy      [x] Sedated      [] Unresponsive     Oriented x  1            Airway:     [x] Patent                          [] Obstructs easily/Obstructed on arrival   [] \"Difficult Airway\" report by Anesthesia                        [] Airway improved with head/airway repositioning                       Airway Adjuncts Present: [] Oral Airway    [] Nasal Trumpet    [] ETT    [] LMA            Respiratory  [x] Even   [] Labored   [x] Shallow   [x] Tachypnea   [] Bradypnea  Pattern:    [x] Non-Labored  [] VENT and/or respiratory assistance     being provided. Skin:     [x] Pink [] Dusky    [] Pale        [x] Warm    [] Hot [] Cool       [] Cold   [x]Dry [] Moist [] Diaphoretic     Membranes:  [x] Pink [] Pale       [x] Moist [] Dry     [] Crusty     Pain:   [x] No Acute Discomfort. 0  /10 Scale [x] Verbal Numeric   [] Moderate Discomfort.     [] V.A.S. [] Acute Discomfort.                [] A.N.V.    [] Chronic-Issue Related Discomfort.   [] F.L.A.C.C. Note E-MAR for medications administered. []Faces, Davis/Baker    Note assessments documented in flowsheets; any assessment variants to be found in comments or narrative perioperative nurse notes. Post-anesthesia care now assumed by [facility] BSN, RN-BC

## 2021-10-25 NOTE — ANESTHESIA POSTPROCEDURE EVALUATION
Procedure(s):  RIGHT REVERSE TOTAL SHOULDER ARTHROPLASTY (GENERAL WITH REGIONAL BLOCK). general    Anesthesia Post Evaluation      Multimodal analgesia: multimodal analgesia used between 6 hours prior to anesthesia start to PACU discharge  Patient location during evaluation: bedside  Patient participation: complete - patient participated  Level of consciousness: awake  Pain management: adequate  Airway patency: patent  Anesthetic complications: no  Cardiovascular status: acceptable  Respiratory status: acceptable  Hydration status: acceptable        INITIAL Post-op Vital signs:   Vitals Value Taken Time   /49 10/25/21 1741   Temp 36.7 °C (98.1 °F) 10/25/21 1605   Pulse 85 10/25/21 1743   Resp 22 10/25/21 1743   SpO2 92 % 10/25/21 1743   Vitals shown include unvalidated device data. Patient appears to be clinically as she was pre-op, slight tachypnea but SpO2 well.

## 2021-10-26 LAB
ABO + RH BLD: NORMAL
ALBUMIN SERPL-MCNC: 2 G/DL (ref 3.5–5)
ALBUMIN/GLOB SERPL: 0.5 {RATIO} (ref 1.1–2.2)
ALP SERPL-CCNC: 85 U/L (ref 45–117)
ALT SERPL-CCNC: 22 U/L (ref 12–78)
ANION GAP SERPL CALC-SCNC: 9 MMOL/L (ref 5–15)
AST SERPL-CCNC: 21 U/L (ref 15–37)
BASOPHILS # BLD: 0 K/UL (ref 0–0.1)
BASOPHILS NFR BLD: 0 % (ref 0–1)
BILIRUB SERPL-MCNC: 0.7 MG/DL (ref 0.2–1)
BLD PROD TYP BPU: NORMAL
BLOOD GROUP ANTIBODIES SERPL: NORMAL
BPU ID: NORMAL
BUN SERPL-MCNC: 39 MG/DL (ref 6–20)
BUN/CREAT SERPL: 22 (ref 12–20)
CALCIUM SERPL-MCNC: 8.5 MG/DL (ref 8.5–10.1)
CHLORIDE SERPL-SCNC: 113 MMOL/L (ref 97–108)
CO2 SERPL-SCNC: 19 MMOL/L (ref 21–32)
CREAT SERPL-MCNC: 1.77 MG/DL (ref 0.55–1.02)
CROSSMATCH RESULT,%XM: NORMAL
DIFFERENTIAL METHOD BLD: ABNORMAL
EOSINOPHIL # BLD: 0 K/UL (ref 0–0.4)
EOSINOPHIL NFR BLD: 0 % (ref 0–7)
ERYTHROCYTE [DISTWIDTH] IN BLOOD BY AUTOMATED COUNT: 13.6 % (ref 11.5–14.5)
GLOBULIN SER CALC-MCNC: 3.9 G/DL (ref 2–4)
GLUCOSE BLD STRIP.AUTO-MCNC: 133 MG/DL (ref 65–117)
GLUCOSE BLD STRIP.AUTO-MCNC: 157 MG/DL (ref 65–117)
GLUCOSE BLD STRIP.AUTO-MCNC: 164 MG/DL (ref 65–117)
GLUCOSE BLD STRIP.AUTO-MCNC: 171 MG/DL (ref 65–117)
GLUCOSE SERPL-MCNC: 194 MG/DL (ref 65–100)
HCT VFR BLD AUTO: 29.3 % (ref 35–47)
HGB BLD-MCNC: 9.5 G/DL (ref 11.5–16)
IMM GRANULOCYTES # BLD AUTO: 0.1 K/UL (ref 0–0.04)
IMM GRANULOCYTES NFR BLD AUTO: 1 % (ref 0–0.5)
LYMPHOCYTES # BLD: 0.5 K/UL (ref 0.8–3.5)
LYMPHOCYTES NFR BLD: 7 % (ref 12–49)
MCH RBC QN AUTO: 30.4 PG (ref 26–34)
MCHC RBC AUTO-ENTMCNC: 32.4 G/DL (ref 30–36.5)
MCV RBC AUTO: 93.6 FL (ref 80–99)
MONOCYTES # BLD: 0.1 K/UL (ref 0–1)
MONOCYTES NFR BLD: 2 % (ref 5–13)
NEUTS SEG # BLD: 6.7 K/UL (ref 1.8–8)
NEUTS SEG NFR BLD: 90 % (ref 32–75)
NRBC # BLD: 0 K/UL (ref 0–0.01)
NRBC BLD-RTO: 0 PER 100 WBC
PLATELET # BLD AUTO: 225 K/UL (ref 150–400)
PMV BLD AUTO: 10.9 FL (ref 8.9–12.9)
POTASSIUM SERPL-SCNC: 4.9 MMOL/L (ref 3.5–5.1)
PROT SERPL-MCNC: 5.9 G/DL (ref 6.4–8.2)
RBC # BLD AUTO: 3.13 M/UL (ref 3.8–5.2)
SERVICE CMNT-IMP: ABNORMAL
SODIUM SERPL-SCNC: 141 MMOL/L (ref 136–145)
SPECIMEN EXP DATE BLD: NORMAL
STATUS OF UNIT,%ST: NORMAL
UNIT DIVISION, %UDIV: 0
WBC # BLD AUTO: 7.4 K/UL (ref 3.6–11)

## 2021-10-26 PROCEDURE — 97535 SELF CARE MNGMENT TRAINING: CPT

## 2021-10-26 PROCEDURE — 74011250637 HC RX REV CODE- 250/637: Performed by: ORTHOPAEDIC SURGERY

## 2021-10-26 PROCEDURE — 65270000029 HC RM PRIVATE

## 2021-10-26 PROCEDURE — 74011250636 HC RX REV CODE- 250/636: Performed by: ANESTHESIOLOGY

## 2021-10-26 PROCEDURE — 82962 GLUCOSE BLOOD TEST: CPT

## 2021-10-26 PROCEDURE — 85025 COMPLETE CBC W/AUTO DIFF WBC: CPT

## 2021-10-26 PROCEDURE — 74011250637 HC RX REV CODE- 250/637: Performed by: INTERNAL MEDICINE

## 2021-10-26 PROCEDURE — 74011250636 HC RX REV CODE- 250/636: Performed by: ORTHOPAEDIC SURGERY

## 2021-10-26 PROCEDURE — 97168 OT RE-EVAL EST PLAN CARE: CPT

## 2021-10-26 PROCEDURE — 97110 THERAPEUTIC EXERCISES: CPT

## 2021-10-26 PROCEDURE — 36415 COLL VENOUS BLD VENIPUNCTURE: CPT

## 2021-10-26 PROCEDURE — 74011250636 HC RX REV CODE- 250/636: Performed by: PHYSICIAN ASSISTANT

## 2021-10-26 PROCEDURE — 97530 THERAPEUTIC ACTIVITIES: CPT

## 2021-10-26 PROCEDURE — 74011000250 HC RX REV CODE- 250: Performed by: ORTHOPAEDIC SURGERY

## 2021-10-26 PROCEDURE — 97164 PT RE-EVAL EST PLAN CARE: CPT

## 2021-10-26 PROCEDURE — 74011636637 HC RX REV CODE- 636/637: Performed by: ORTHOPAEDIC SURGERY

## 2021-10-26 PROCEDURE — 74011250636 HC RX REV CODE- 250/636: Performed by: INTERNAL MEDICINE

## 2021-10-26 PROCEDURE — 80053 COMPREHEN METABOLIC PANEL: CPT

## 2021-10-26 RX ORDER — AMOXICILLIN 250 MG
1 CAPSULE ORAL 2 TIMES DAILY
Status: DISCONTINUED | OUTPATIENT
Start: 2021-10-26 | End: 2021-10-29 | Stop reason: HOSPADM

## 2021-10-26 RX ORDER — DIPHENHYDRAMINE HCL 12.5MG/5ML
12.5 ELIXIR ORAL
Status: DISPENSED | OUTPATIENT
Start: 2021-10-26 | End: 2021-10-27

## 2021-10-26 RX ORDER — ONDANSETRON 2 MG/ML
4 INJECTION INTRAMUSCULAR; INTRAVENOUS
Status: DISCONTINUED | OUTPATIENT
Start: 2021-10-26 | End: 2021-10-26 | Stop reason: SDUPTHER

## 2021-10-26 RX ORDER — FAMOTIDINE 20 MG/1
20 TABLET, FILM COATED ORAL DAILY
Status: DISCONTINUED | OUTPATIENT
Start: 2021-10-26 | End: 2021-10-29 | Stop reason: HOSPADM

## 2021-10-26 RX ORDER — FAMOTIDINE 20 MG/1
20 TABLET, FILM COATED ORAL 2 TIMES DAILY
Status: DISCONTINUED | OUTPATIENT
Start: 2021-10-26 | End: 2021-10-26

## 2021-10-26 RX ORDER — NALOXONE HYDROCHLORIDE 0.4 MG/ML
0.4 INJECTION, SOLUTION INTRAMUSCULAR; INTRAVENOUS; SUBCUTANEOUS AS NEEDED
Status: DISCONTINUED | OUTPATIENT
Start: 2021-10-26 | End: 2021-10-29 | Stop reason: HOSPADM

## 2021-10-26 RX ORDER — POLYETHYLENE GLYCOL 3350 17 G/17G
17 POWDER, FOR SOLUTION ORAL DAILY
Status: DISCONTINUED | OUTPATIENT
Start: 2021-10-26 | End: 2021-10-28

## 2021-10-26 RX ADMIN — INSULIN LISPRO 2 UNITS: 100 INJECTION, SOLUTION INTRAVENOUS; SUBCUTANEOUS at 08:58

## 2021-10-26 RX ADMIN — DOCUSATE SODIUM 50MG AND SENNOSIDES 8.6MG 1 TABLET: 8.6; 5 TABLET, FILM COATED ORAL at 08:58

## 2021-10-26 RX ADMIN — ACETAMINOPHEN 650 MG: 325 TABLET ORAL at 04:17

## 2021-10-26 RX ADMIN — ONDANSETRON 4 MG: 2 INJECTION INTRAMUSCULAR; INTRAVENOUS at 01:26

## 2021-10-26 RX ADMIN — HEPARIN SODIUM 5000 UNITS: 5000 INJECTION INTRAVENOUS; SUBCUTANEOUS at 13:27

## 2021-10-26 RX ADMIN — CEFAZOLIN 2 G: 1 INJECTION, POWDER, FOR SOLUTION INTRAMUSCULAR; INTRAVENOUS at 04:16

## 2021-10-26 RX ADMIN — ACETAMINOPHEN 650 MG: 325 TABLET ORAL at 23:51

## 2021-10-26 RX ADMIN — MORPHINE SULFATE 2 MG: 2 INJECTION, SOLUTION INTRAMUSCULAR; INTRAVENOUS at 07:48

## 2021-10-26 RX ADMIN — AMLODIPINE BESYLATE 10 MG: 5 TABLET ORAL at 08:58

## 2021-10-26 RX ADMIN — FAMOTIDINE 20 MG: 20 TABLET ORAL at 08:58

## 2021-10-26 RX ADMIN — MORPHINE SULFATE 2 MG: 2 INJECTION, SOLUTION INTRAMUSCULAR; INTRAVENOUS at 01:26

## 2021-10-26 RX ADMIN — INSULIN LISPRO 2 UNITS: 100 INJECTION, SOLUTION INTRAVENOUS; SUBCUTANEOUS at 12:13

## 2021-10-26 RX ADMIN — SODIUM CHLORIDE, POTASSIUM CHLORIDE, SODIUM LACTATE AND CALCIUM CHLORIDE 100 ML/HR: 600; 310; 30; 20 INJECTION, SOLUTION INTRAVENOUS at 01:27

## 2021-10-26 RX ADMIN — Medication 10 ML: at 21:33

## 2021-10-26 RX ADMIN — POLYETHYLENE GLYCOL 3350 17 G: 17 POWDER, FOR SOLUTION ORAL at 08:58

## 2021-10-26 RX ADMIN — ONDANSETRON 4 MG: 2 INJECTION INTRAMUSCULAR; INTRAVENOUS at 14:10

## 2021-10-26 RX ADMIN — HEPARIN SODIUM 5000 UNITS: 5000 INJECTION INTRAVENOUS; SUBCUTANEOUS at 21:33

## 2021-10-26 RX ADMIN — ONDANSETRON 4 MG: 2 INJECTION INTRAMUSCULAR; INTRAVENOUS at 21:33

## 2021-10-26 RX ADMIN — DOCUSATE SODIUM 50MG AND SENNOSIDES 8.6MG 1 TABLET: 8.6; 5 TABLET, FILM COATED ORAL at 17:00

## 2021-10-26 RX ADMIN — Medication 10 ML: at 13:27

## 2021-10-26 RX ADMIN — MORPHINE SULFATE 2 MG: 2 INJECTION, SOLUTION INTRAMUSCULAR; INTRAVENOUS at 21:33

## 2021-10-26 RX ADMIN — OXYCODONE 5 MG: 5 TABLET ORAL at 12:14

## 2021-10-26 RX ADMIN — SODIUM CHLORIDE 50 ML/HR: 9 INJECTION, SOLUTION INTRAVENOUS at 23:51

## 2021-10-26 RX ADMIN — ACETAMINOPHEN 650 MG: 325 TABLET ORAL at 17:02

## 2021-10-26 RX ADMIN — ACETAMINOPHEN 650 MG: 325 TABLET ORAL at 12:14

## 2021-10-26 RX ADMIN — ONDANSETRON 4 MG: 2 INJECTION INTRAMUSCULAR; INTRAVENOUS at 07:47

## 2021-10-26 RX ADMIN — MORPHINE SULFATE 2 MG: 2 INJECTION, SOLUTION INTRAMUSCULAR; INTRAVENOUS at 14:10

## 2021-10-26 RX ADMIN — HEPARIN SODIUM 5000 UNITS: 5000 INJECTION INTRAVENOUS; SUBCUTANEOUS at 04:16

## 2021-10-26 RX ADMIN — CEFAZOLIN 2 G: 1 INJECTION, POWDER, FOR SOLUTION INTRAMUSCULAR; INTRAVENOUS at 12:13

## 2021-10-26 RX ADMIN — OXYCODONE 5 MG: 5 TABLET ORAL at 04:16

## 2021-10-26 NOTE — PROGRESS NOTES
Comprehensive Nutrition Assessment    Type and Reason for Visit: Reassess    Nutrition Recommendations/Plan:   1. Continue current diet order (3 Carb, NCS)  2. Begin Glucerna once daily  3. No BM x 10 days - consider modification of bowel regimen     Nutrition Assessment:    10/26: Follow up. PO intake averaging 25-50% of all meals, baseline PO. No supplement intake documented. Daughter and patient endorse patient disliking supplement, and asking for alternatives. Open to trying Glucerna once daily. Patient and daughter unsure of last BM; none documented this admission. Obtained more meal preferences. 10/22: Pt is an 80year old female admitted with Fx humeral neck [S42.213A]. She  has a past medical history of Chronic kidney disease, Diabetes ([address] and Hypertension. Daughter at bedside at time of RD visit. Both patient and daughter unsure of UBW, but stated that within last year they have noticed gradual slight weight loss (pt went from pant size 12 to pant size 10). Denies decreased appetite - states she usually eats small portions, and is consuming her normal amount. No chewing/swallowing problems. Uses top dentures and has them with her. No N/V/D at this time. NKFA. Voiced acceptance of gelatein once daily - has had Glucerna and Ensure in the past and dislikes them.      Patient Vitals for the past 168 hrs:   % Diet Eaten   10/24/21 1850 26 - 50%   10/22/21 1316 26 - 50%   10/22/21 0855 26 - 50%   10/21/21 1839 26 - 50%   10/21/21 1240 26 - 50%       Wt Readings from Last 10 Encounters:   10/17/21 63.5 kg (140 lb)   07/25/21 65.8 kg (145 lb)   10/22/17 79.5 kg (175 lb 5 oz)     Malnutrition Assessment:  Malnutrition Status:  No malnutrition    Context:  Acute illness     Findings of the 6 clinical characteristics of malnutrition:   Energy Intake:  No significant decrease in energy intake  Weight Loss:  No significant weight loss     Body Fat Loss:  No significant body fat loss,     Muscle Mass Loss:  No significant muscle mass loss,    Fluid Accumulation:  No significant fluid accumulation,     Strength:  Normal  strength     Estimated Daily Nutrient Needs:  Energy (kcal): 1096-9895 (25-30); Weight Used for Energy Requirements: Admission  Protein (g): 64-78 (1.0-1.2); Weight Used for Protein Requirements: Admission  Fluid (ml/day): 2454-0535; Method Used for Fluid Requirements: 1 ml/kcal    Nutrition Related Findings:    ABD: WNL with active bowel sounds 10/26  Last BM:  PTA - none documented  Edema: Non-pitting generalized; +2 BLE noted 10/26    Nutr. Labs:  Lab Results   Component Value Date/Time    GFR est AA 33 (L) 10/26/2021 01:23 AM    GFR est non-AA 27 (L) 10/26/2021 01:23 AM    Creatinine 1.77 (H) 10/26/2021 01:23 AM    BUN 39 (H) 10/26/2021 01:23 AM    Sodium 141 10/26/2021 01:23 AM    Potassium 4.9 10/26/2021 01:23 AM    Chloride 113 (H) 10/26/2021 01:23 AM    CO2 19 (L) 10/26/2021 01:23 AM     Lab Results   Component Value Date/Time    Glucose 194 (H) 10/26/2021 01:23 AM    Glucose (POC) 157 (H) 10/26/2021 11:13 AM     Lab Results   Component Value Date/Time    Hemoglobin A1c 6.1 (H) 10/18/2021 02:22 AM     Nutr. Meds:  Norvasc  Pepcid  Humalog  Pericolace    Wounds:    None       Current Nutrition Therapies:  ADULT ORAL NUTRITION SUPPLEMENT Lunch; Protein Modular  ADULT DIET Regular; 3 carb choices (45 gm/meal); No Salt Added (3-4 gm)    Anthropometric Measures:  · Height:  5' 2.01\" (157.5 cm)  · Current Body Wt:  63.5 kg (140 lb)   · Ideal Body Wt:  110 lbs:  127.3 %   Body mass index is 25.6 kg/m². · BMI Category: Overweight (BMI 25.0-29. 9)       Nutrition Diagnosis:   · Increased nutrient needs related to acute injury/trauma as evidenced by multiple fractures    Nutrition Interventions:   Food and/or Nutrient Delivery: Continue current diet, Modify oral nutrition supplement  Nutrition Education and Counseling: No recommendations at this time  Coordination of Nutrition Care: Continue to monitor while inpatient, Interdisciplinary rounds    Goals:  PO intake >/= 50% of all meals and supplements within 5-7 days       Nutrition Monitoring and Evaluation:   Behavioral-Environmental Outcomes: None identified  Food/Nutrient Intake Outcomes: Food and nutrient intake, Supplement intake  Physical Signs/Symptoms Outcomes: Biochemical data, Weight    Discharge Planning:     Too soon to determine     Electronically signed by Candy Espinosa RD on 52/02/0703  Contact: [phone] or via [employer]

## 2021-10-26 NOTE — PROGRESS NOTES
Problem: Mobility Impaired (Adult and Pediatric)  Goal: *Acute Goals and Plan of Care (Insert Text)  Description: FUNCTIONAL STATUS PRIOR TO ADMISSION: Patient was modified independent using a rolling walker for functional mobility. HOME SUPPORT PRIOR TO ADMISSION: The patient lived with daughter but did not require assist.    Physical Therapy Goals  Initiated 10/19/2021; Reviewed 10/26/2021 and remain appropriate with minor adjustments as below  1. Patient will move from supine to sit and sit to supine  in bed with minimal assistance/contact guard assist within 7 day(s). 2.  Patient will transfer from bed to chair and chair to bed with minimal assistance/contact guard assist using the least restrictive device within 7 day(s). 3.  Patient will perform sit to stand with minimal assistance/contact guard assist within 7 day(s). 4.  Patient will ambulate with minimal assistance/contact guard assist for 50 feet with the least restrictive device within 7 day(s). 5.  Patient will ascend/descend 1 stairs with 1 handrail(s) with minimal assistance/contact guard assist within 7 day(s). Outcome: Not Met   PHYSICAL THERAPY REEVALUATION  Patient: Zohaib Mittal (27 y.o. female)  Date: 10/26/2021  Primary Diagnosis: Fx humeral neck [S42.213A]  Procedure(s) (LRB):  RIGHT REVERSE TOTAL SHOULDER ARTHROPLASTY (GENERAL WITH REGIONAL BLOCK) (Right) 1 Day Post-Op   Precautions:   NWB (RUE)      ASSESSMENT  Based on the objective data described below, the patient presents with poor sitting and standing balance, impaired R UE ROM, decreased strength, activity tolerance and overall functional mobility in the setting of POD1 R reverse TSA. Patient today transfers to EOB with maxAx2 and tolerates sitting at EOB for 5-8 minutes while therapists review application and use of sling and UE exercises. Patient transfers sit > stand with modAx2 and has poor initial static standing balance with alondra-walker.  Patient requires Brenda Mueller from therapists to attain central base of support and maxA to keep alondra-walker balanced. With assessment, patient unable to weight shift appropriately to lift foot off of ground to take a step. Instead, patient instructed to weight shift from L to R while lifting up heel to work on this for balance - constant support, assist x2 . After several minutes of standing, patient requesting to sit back down. She takes several shuffling steps backwards with max/modAx2 and alondra-walker. max cuing for L handplacement on bed. Recommend rehab upon d/c as patient is far below her functional baseline of Tahmina with RW. Current Level of Function Impacting Discharge (mobility/balance): max/modAx2    Functional Outcome Measure: The patient scored 30/100 on the Barthel Index outcome measure which is indicative of 70% reliance on others for functional mobility. Other factors to consider for discharge: supportive daughter present for session     Patient will benefit from skilled therapy intervention to address the above noted impairments. PLAN :  Recommendations and Planned Interventions: bed mobility training, transfer training, gait training, therapeutic exercises, orthotic/prosthetic training, edema management/control, patient and family training/education, and therapeutic activities      Frequency/Duration: Patient will be followed by physical therapy:  5 times a week to address goals. Recommendation for discharge: (in order for the patient to meet his/her long term goals)  Therapy up to 5 days/week in rehab setting    This discharge recommendation:  Has been made in collaboration with the attending provider and/or case management    Equipment recommendations for successful discharge (if) home: TBD         SUBJECTIVE:   Patient stated i'm okay.  re:upon initial standing    OBJECTIVE DATA SUMMARY:   HISTORY:    Past Medical History:   Diagnosis Date    Chronic kidney disease     Diabetes ([facility] [address]     Hypertension Past Surgical History:   Procedure Laterality Date    HX APPENDECTOMY      HX HYSTERECTOMY      HX KNEE REPLACEMENT Left     HX OTHER SURGICAL      head sx x 2 nerve related    HX OTHER SURGICAL      Back surgery x 2    [address] course since last seen and reason for reevaluation: s/p POD1 R reverse total shoulder arthroplasty    Personal factors and/or comorbidities impacting plan of care: none additional    Home Situation  Home Environment: Skilled nursing facility  # Steps to Enter: 1  One/Two Story Residence: Two story  Living Alone: No  Support Systems: Child(watson)  Patient Expects to be Discharged to[de-identified] Skilled nursing facility  Current DME Used/Available at Home: Martha Busman, rollator  Tub or Shower Type: Shower    EXAMINATION/PRESENTATION/DECISION MAKING:   Critical Behavior:  Neurologic State: Alert  Orientation Level: Oriented X4  Cognition: Follows commands  Safety/Judgement: Awareness of environment  Hearing: Auditory  Auditory Impairment: None  Skin:  all observed intact; surgical site not observed  Edema: appropriate POD1 R RTSA  Range Of Motion:  AROM: Generally decreased, functional           PROM: Generally decreased, functional           Strength:    Strength: Generally decreased, functional                    Tone & Sensation:   Tone: Normal              Sensation: Intact               Coordination:  Coordination: Generally decreased, functional  Vision:      Functional Mobility:  Bed Mobility:     Supine to Sit: Maximum assistance;Assist x2  Sit to Supine: Maximum assistance;Assist x2  Scooting: Maximum assistance;Assist x1  Transfers:  Sit to Stand: Moderate assistance;Assist x2  Stand to Sit: Moderate assistance;Assist x2                       Balance:   Sitting: Impaired; With support  Sitting - Static: Fair (occasional)  Sitting - Dynamic: Fair (occasional)  Standing: Impaired; With support  Standing - Static: Poor;Constant support  Standing - Dynamic : Not tested  Ambulation/Gait Training:  Distance (ft): 1 Feet (ft)  Assistive Device: Gait belt;Walker alondra;Brace/Splint  Ambulation - Level of Assistance: Moderate assistance;Assist x2        Gait Abnormalities: Shuffling gait        Base of Support: Narrowed; Shift to left     Speed/Tonia: Pace decreased (<100 feet/min); Shuffled  Step Length: Right shortened;Left shortened                    Therapeutic Exercises:   L and R weight shifts in standing with maxAx2    Functional Measure:  Barthel Index:    Bathin  Bladder: 5  Bowels: 10  Groomin  Dressin  Feedin  Mobility: 0  Stairs: 0  Toilet Use: 0  Transfer (Bed to Chair and Back): 5  Total: 30/100       The Barthel ADL Index: Guidelines  1. The index should be used as a record of what a patient does, not as a record of what a patient could do. 2. The main aim is to establish degree of independence from any help, physical or verbal, however minor and for whatever reason. 3. The need for supervision renders the patient not independent. 4. A patient's performance should be established using the best available evidence. Asking the patient, friends/relatives and nurses are the usual sources, but direct observation and common sense are also important. However direct testing is not needed. 5. Usually the patient's performance over the preceding 24-48 hours is important, but occasionally longer periods will be relevant. 6. Middle categories imply that the patient supplies over 50 per cent of the effort. 7. Use of aids to be independent is allowed. Carina Monetlongo., Barthel, D.W. (1710). Functional evaluation: the Barthel Index. [address] [facility] (14)2. Maia Paul shanika RIGOBERTO Gonzales, Itzel Cornell, Severa Mouse., [place], [address] [place] [address] (). Measuring the change indisability after inpatient rehabilitation; comparison of the responsiveness of the Barthel Index and Functional [place] Measure. Journal of Neurology, Neurosurgery, and Psychiatry, 66(4), 631-387.   Maia Paul Exel, N.J.A, Saida Hernandez M.A. (2004.) Assessment of post-stroke quality of life in cost-effectiveness studies: The usefulness of the Barthel Index and the EuroQoL-5D. Quality of Life Research, 13, 427-43            Pain Rating:  Patient with minimal complaints of pain during therapy    Activity Tolerance:   Good and tolerates ADLs without rest breaks    After treatment patient left in no apparent distress:   Supine in bed, Heels elevated for pressure relief, Call bell within reach, Bed / chair alarm activated, Caregiver / family present, and Side rails x 3    COMMUNICATION/EDUCATION:   The patients plan of care was discussed with: Occupational therapist and Registered nurse. Fall prevention education was provided and the patient/caregiver indicated understanding. and Patient/family have participated as able in goal setting and plan of care.     Thank you for this referral.  Eder Souza PT, DPT   Time Calculation: 44 mins

## 2021-10-26 NOTE — PROGRESS NOTES
Orthopedic NP Progress Note  Post Op day: 1 Day Post-Op    October 26, 2021 10:48 AM     Edu Orozco    Attending Physician: Treatment Team: Attending Provider: Mundo Poe MD; Consulting Provider: Evette Bethea NP; Utilization Review: Sharif Angelo; Consulting Provider: Jeffery Grady MD; Care Manager: Nolan Márquez; Physician: Shady Daugherty MD     Vital Signs:    Patient Vitals for the past 8 hrs:   BP Temp Pulse Resp SpO2   10/26/21 0818 (!) 174/66 97.6 °F (36.4 °C) 85 16 91 %   10/26/21 0434 (!) 186/66 97.5 °F (36.4 °C) 91 16 92 %     BMI (calculated): 25.6 (10/17/21 2354)    Intake/Output:  No intake/output data recorded. 10/24 1901 - 10/26 0700  In: 2245 [I.V.:1935]  Out: 5870 [Urine:5670]    Pain Control:   Pain Assessment  Pain Scale 1: Numeric (0 - 10)  Pain Intensity 1: 7  Pain Onset 1: psot op  Pain Location 1: Arm, Shoulder  Pain Orientation 1: Left, Upper  Pain Description 1: Aching  Pain Intervention(s) 1: Medication (see MAR)    LAB:    Recent Labs     10/26/21  0123   HCT 29.3*   HGB 9.5*     Lab Results   Component Value Date/Time    Sodium 141 10/26/2021 01:23 AM    Potassium 4.9 10/26/2021 01:23 AM    Chloride 113 (H) 10/26/2021 01:23 AM    CO2 19 (L) 10/26/2021 01:23 AM    Glucose 194 (H) 10/26/2021 01:23 AM    BUN 39 (H) 10/26/2021 01:23 AM    Creatinine 1.77 (H) 10/26/2021 01:23 AM    Calcium 8.5 10/26/2021 01:23 AM       Subjective:  Edu Orozco is a 80 y.o. female s/p a  Procedure(s):  RIGHT REVERSE TOTAL SHOULDER ARTHROPLASTY (GENERAL WITH REGIONAL BLOCK)   Procedure(s):  RIGHT REVERSE TOTAL SHOULDER ARTHROPLASTY (GENERAL WITH REGIONAL BLOCK). Tolerating diet. Pain is well managed this AM, had a rough night with pain per pt and daughter        Objective: General: alert, cooperative, no distress. Neuro/Vascular: CNS Intact. Sensation stable. Brisk cap refill, 2+ pulses UE/LE  Musculoskeletal:  +ROM LUE/LE with RUE sling, NVI .     Skin: Incision - clean, dry and intact. No significant erythema or swelling.     Dressing: clean, dry, and intact    Benedict - n   Drain - n       PT/OT:   Gait:                      Assessment:    s/p Procedure(s):  RIGHT REVERSE TOTAL SHOULDER ARTHROPLASTY (GENERAL WITH REGIONAL BLOCK)    Principal Problem:    Humeral surgical neck fracture (10/18/2021)    Active Problems:    Closed fracture of multiple pubic rami ([facility] [address] (10/18/2021)      HTN (hypertension), benign (10/18/2021)      DM (diabetes mellitus), type 2 ([facility] [address] (10/18/2021)      Leukocytosis (10/18/2021)      CKD (chronic kidney disease) stage 3, GFR 30-59 ml/min ([facility]) (10/18/2021)      Hypokalemia due to loss of potassium (10/18/2021)      Right knee pain (10/21/2021)         Plan:   -  Continue PT/OT - per TSA protocol   -  Continue established methods of pain control   -  VTE Prophylaxes - TEDS &/or SCDs with heparin      Discharge To:  TBD     Signed By: Bree Lund NP    Orthopedic Nurse Practitioner

## 2021-10-26 NOTE — PROGRESS NOTES
Pharmacy changed famotidine to 20 mg daily, for CrCl of 20 ml/min, per renal protocol. Will follow daily.

## 2021-10-26 NOTE — PROGRESS NOTES
Problem: Self Care Deficits Care Plan (Adult)  Goal: *Acute Goals and Plan of Care (Insert Text)  Description:   FUNCTIONAL STATUS PRIOR TO ADMISSION: Patient was modified independent using a rollator for functional mobility. HOME SUPPORT: The patient lived with daughter but did not require assist.    Occupational Therapy Goals  Re evaluation 10/26/2021- following R reverse TSA  Initiated 10/19/2021  1. Patient will perform lower body dressing with minimal assistance/contact guard assist within 7 day(s). 2.  Patient will perform grooming with supervision/set-up within 7 day(s). 3.  Patient will perform anterior body bathing with minimal assistance/contact guard assist within 7 day(s). 4.  Patient will perform toilet transfers with minimal assistance/contact guard assist within 7 day(s). 5.  Patient will perform all aspects of toileting with minimal assistance/contact guard assist within 7 day(s). 6  Patient will don/doff arm sling at minimal assistance/contact guard assist level within 7 days. 7.  Patient will perform shoulder exercises per physician order with minimal assistance/contact guard assist within 7 days. 8.  Patient will verbalize/demonstrate shoulder precautions during ADLs with 100% accuracy within 7 days. 10/26/2021 1551 by bradford Thao, OT  Outcome: Progressing Towards Goal:    OCCUPATIONAL THERAPY RE-EVALUATION  Patient: Domitila Whipple (87 y.o. female)  Date: 10/26/2021  Diagnosis: Fx humeral neck [S42.213A] Humeral surgical neck fracture  Procedure(s) (LRB):  RIGHT REVERSE TOTAL SHOULDER ARTHROPLASTY (GENERAL WITH REGIONAL BLOCK) (Right) 1 Day Post-Op  Precautions: NWB (RUE)  Chart, occupational therapy assessment, plan of care, and goals were reviewed. ASSESSMENT  Based on the objective data described below, patient received supine in bed, agreeable to activity. Patient reports increased pain to R shoulder. Noted sling not properly placed.   Patient to EOB with max assist x 2. She is able to tolerate sitting at EOB, but does complain of LE pain with prolonged sitting. Patient tolerated UE exercises as prescribed by MD.  Currently not able to tolerate pendulum secondary to inability to lean forward in sitting position and poor standing balance. She is able to tolerate PROM at shoulder, elbow, wrist and hand. Patient sling donned with max assist, education provided to daughter. Patient able to stand with assist x 2, and noted with posterior lean and pulling alondra walker off ground. Assist to correct. Patient taking few steps at EOB. She returned to supine at end of session, RUE positioned for support and sling loosened to avoid continued pressure. Current Level of Function Impacting Discharge (ADLs): up to max assist for ADLs and assist x 2 for mobility    Other factors to consider for discharge: lives with daughter         PLAN :  Recommendations and Planned Interventions: self care training, functional mobility training, therapeutic exercise, balance training, therapeutic activities, endurance activities, patient education, home safety training, and family training/education    Frequency/Duration: Patient will be followed by occupational therapy 5 times a week to address goals. Recommend with staff: ADLs as able    Recommend next OT session: progression of goals    Recommendation for discharge: (in order for the patient to meet his/her long term goals)  Therapy 5 days per week in rehab setting    This discharge recommendation:  Has been made in collaboration with the attending provider and/or case management    Equipment recommendations for successful discharge (if) home: TBD       SUBJECTIVE:   Patient stated Its sore.     OBJECTIVE DATA SUMMARY:   Hospital course since last seen and reason for reevaluation: R reverse total shoulder arthroplasty    Cognitive/Behavioral Status:  Neurologic State: Alert  Orientation Level: Oriented X4  Cognition: Follows commands Skin: intact as seen    Edema: RUE    Hearing: Auditory  Auditory Impairment: None    Vision/Perceptual:                                     Range of Motion:  AROM: Generally decreased, functional  PROM: Generally decreased, functional                      Strength:  Strength: Generally decreased, functional                Coordination:  Coordination: Generally decreased, functional            Tone & Sensation:  Tone: Normal  Sensation: Intact                        Functional Mobility and Transfers for ADLs:  Bed Mobility:  Supine to Sit: Maximum assistance;Assist x2  Sit to Supine: Maximum assistance;Assist x2  Scooting: Maximum assistance;Assist x1    Transfers:  Sit to Stand: Moderate assistance;Assist x2  Functional Transfers  Toilet Transfer : Moderate assistance;Assist x2 (inferred to [facility]- )       Balance:  Sitting: Impaired; With support  Sitting - Static: Fair (occasional)  Sitting - Dynamic: Fair (occasional)  Standing: Impaired; With support  Standing - Static: Poor;Constant support  Standing - Dynamic : Not tested    ADL Assessment:  Feeding: Setup    Oral Facial Hygiene/Grooming: Minimum assistance    Bathing: Maximum assistance    Upper Body Dressing: Maximum assistance    Lower Body Dressing: Maximum assistance    Toileting: Maximum assistance                ADL Intervention and task modifications:                                          Therapeutic Exercises:       Functional Measure:    Barthel Index:  Bathin  Bladder: 5  Bowels: 10  Groomin  Dressin  Feedin  Mobility: 0  Stairs: 0  Toilet Use: 0  Transfer (Bed to Chair and Back): 5  Total: 30/100      The Barthel ADL Index: Guidelines  1. The index should be used as a record of what a patient does, not as a record of what a patient could do. 2. The main aim is to establish degree of independence from any help, physical or verbal, however minor and for whatever reason.   3. The need for supervision renders the patient not independent. 4. A patient's performance should be established using the best available evidence. Asking the patient, friends/relatives and nurses are the usual sources, but direct observation and common sense are also important. However direct testing is not needed. 5. Usually the patient's performance over the preceding 24-48 hours is important, but occasionally longer periods will be relevant. 6. Middle categories imply that the patient supplies over 50 per cent of the effort. 7. Use of aids to be independent is allowed. Score Interpretation (from [address] [address])    Independent   60-79 Minimally independent   40-59 Partially dependent   20-39 Very dependent   <20 Totally dependent     -Hua Aden., Barthel, D.W. (1965). Functional evaluation: the Barthel Index. [address] ([address] (1997). The Barthel activities of daily living index: self-reporting versus actual performance in the old (> or = 75 years). Journal of [address] [place] [address] 45(7), [address] [facility], JTED, Cayden Bio., Yannick [facility]. (1999). Measuring the change in disability after inpatient rehabilitation; comparison of the responsiveness of the Barthel Index and Functional [place] Measure. Journal of Neurology, Neurosurgery, and Psychiatry, 66(4), 986-501. Trinh Petty, N.J.A, MARLYS Dotson, & Dre Mortensen MVIKRAM. (2004) Assessment of post-stroke quality of life in cost-effectiveness studies: The usefulness of the Barthel Index and the EuroQoL-5D.  Quality of Life Research, 13, 427-43         Pain:  Reports pelvic and R shoulder pain    Activity Tolerance:   Fair and requires rest breaks    After treatment patient left in no apparent distress:   Supine in bed, Call bell within reach, Bed / chair alarm activated, Caregiver / family present, and Side rails x 3    COMMUNICATION/COLLABORATION:   The patients plan of care was discussed with: Physical therapist and Registered nurse.      Sangeeta Sosa, OTR/L  Time Calculation: 30 mins

## 2021-10-26 NOTE — PROGRESS NOTES
Bedside and Verbal shift change report given to Lynette Owens RN (oncoming nurse) by Corwin Manzano RN (offgoing nurse). Report included the following information SBAR, Kardex, Intake/Output, MAR and Recent Results.

## 2021-10-26 NOTE — PROGRESS NOTES
10/26/2021   CARE MANAGEMENT NOTE:  CM reviewed EMR.  Pt was admitted with humeral neck fx, closed fx of multiple pubic rami, HTN, DM, and CKD3. Pt's  is . Reportedly, pt resides with her dtr Ciara Partida ([phone]).   Serene will be out of town in [place] until this weekend. Marvel Delong ([phone]) is pt's granddtr and a secondary family contact.      RUR 17%; LOS 8 days     Transition Plan of Care:  1.  Ortho is following for medical management - pt is s/p right reverse total shoulder arthroplasty on 10/25. 2.  PT/OT progress to be monitored  Dtr stated that she only wants pt to go to [facility]  3. PCR Covid 19 test for placement will be needed if rehab is recommended  4.  Outpt f/u  5.  Mode of transportation upon discharge to be determined.     CM will continue to follow pt for definitive discharge plan.   Joana

## 2021-10-26 NOTE — PROGRESS NOTES
Bedside, Verbal and Written shift change report given to [address] (oncoming nurse) by Carlos Downing (offgoing nurse). Report included the following information SBAR, Kardex, ED Summary, Procedure Summary, Intake/Output, MAR, Recent Results and Med Rec Status.

## 2021-10-26 NOTE — PROGRESS NOTES
Daily Progress Note: 10/26/2021  Birttni Hardy NP    Assessment/Plan:   Humeral surgical neck fracture (10/18/2021) POA: CT upper extremity showed a comminuted fracture involving the humeral neck and the greater tuberosity.   -Seen by orthopedic surgery who deem her stable. -Iinitially non operative management recommended but now s/p repair yesterday  -Continue an arm sling and NWB RUE, pain control. PT, OT.   -CM for ultimate discharge planning.   -Family want to only go to [facility], will need to see how she does post-op     Closed fracture of multiple pubic rami ([address] (10/18/2021) POA: CT pelvis showed an acute nondisplaced right superior and inferior pubic rami fractures. Proximal right femoral intramedullary hardware is intact. -Reviewed by ortho. -Continue WBAT with walker.   -PT, OT following.      Right knee pain POA: has associated swelling.   -Xray right knee (moderate joint effusion but no fx) as well as a venous doppler ultrasound (negative for DVT)  -Pain control     HTN (hypertension), benign (10/18/2021) POA: BP stable. -Continue Amlodipine     DM (diabetes mellitus), type 2 ([address] (10/18/2021) POA: A1c 6.1.   -Restart SSI  -DM diet as tolerated. -Home Amaryl on hold.      Leukocytosis (10/18/2021) POA: CXR clear. UA not impressive. WBCs better. Afebrile.   -resolved     CKD (chronic kidney disease) stage 3, GFR 30-59 ml/min ([facility]) (10/18/2021) / Hypokalemia due to loss of potassium (10/18/2021) POA:   -K+ better. -SCr slightly high and likely has stage 4 disease now  -Continue gentle hydration.    -ARF POA, improving with hydration, baseline creatinine unclear       Problem List:  Problem List as of 10/26/2021 Date Reviewed: 10/18/2021        Codes Class Noted - Resolved    Right knee pain ICD-10-CM: M25.561  ICD-9-CM: 719.46  10/21/2021 - Present        * (Principal) Humeral surgical neck fracture ICD-10-CM: B46.713C  ICD-9-CM: 812.01  10/18/2021 - Present        Closed fracture of multiple pubic rami [facility]) ICD-10-CM: R92.809J  ICD-9-CM: 808.2  10/18/2021 - Present        HTN (hypertension), benign ICD-10-CM: I10  ICD-9-CM: 401.1  10/18/2021 - Present        DM (diabetes mellitus), type 2 ([address] ICD-10-CM: E11.9  ICD-9-CM: 250.00  10/18/2021 - Present        Leukocytosis ICD-10-CM: G05.276  ICD-9-CM: 288.60  10/18/2021 - Present        CKD (chronic kidney disease) stage 3, GFR 30-59 ml/min ([facility]) ICD-10-CM: N18.30  ICD-9-CM: 585.3  10/18/2021 - Present        Hypokalemia due to loss of potassium ICD-10-CM: E87.6  ICD-9-CM: 276.8  10/18/2021 - Present              HPI:   Ms. Lon Jean is an 87y.o.  female with PMH of DM, CKD admitted s/p fall for eval. Noted to have femoral neck fracture and pubic rami fracture. Pt's only complaint is RUE pain that is currently controlled with Tylenol. Lives with her daughter. Typically walks with a walker. (Dr Aston Rowell)     10/22: Didn't sleep well last night. Xray of knee is ok except for an effusion. US neg for DVT. Cr 2.35. Pain is controlled. Surgery scheduled for Monday. 10/23:  Doing well. Pain moderate level. Plan is for surgery Monday. 10/24: No complaints this am. BS are better with SSI. Holding home meds. AM labs pending. 10/25: Surgery planned for later today. AM labs stable, creatinine continues to slowly improve. She denies pain at this time, is looking forward to getting her shoulder repaired. She understands the pelvic fractures cannot be surgically repaired and will take weeks/months to heal.    10/26: Tolerated surgery well, did not sleep well last night. Pain is controlled right now. Will have to wait and see how she does with therapy over the next few days to decide about disposition. Review of Systems:   A comprehensive review of systems was negative except for that written in the HPI.     Objective:   Physical Exam:     Visit Vitals  BP (!) 186/66 (BP 1 Location: Left upper arm, BP Patient Position: At rest)   Pulse 91   Temp 97.5 °F (36.4 °C)   Resp 16   Ht 5' 2.01\" (1.575 m)   Wt 63.5 kg (140 lb)   SpO2 92%   BMI 25.60 kg/m²    O2 Flow Rate (L/min): 2 l/min O2 Device: None (Room air)    Temp (24hrs), Av.4 °F (36.9 °C), Min:97.5 °F (36.4 °C), Max:99.5 °F (37.5 °C)    10/25 1901 - 10/26 0700  In: -   Out: 9580 [ADZPT:4266]   10/24 0701 - 10/25 1900  In: 9397 [P.O.:600; I.V.:2515]  Out: 4470 [Urine:4270]    General:  Alert, cooperative, no distress, appears stated age. Head:  Normocephalic, without obvious abnormality, atraumatic. Eyes:  Conjunctivae/corneas clear. PERRL, EOMs intact. Nose: Nares normal. Septum midline. Mucosa normal. No drainage or sinus tenderness. Throat: Lips, mucosa, and tongue normal.   Neck: Supple, symmetrical, trachea midline, no adenopathy, thyroid: no enlargement/tenderness/nodules, no carotid bruit and no JVD. Back:   Symmetric, no curvature. ROM normal. No CVA tenderness. Lungs:   Clear to auscultation bilaterally. Chest wall:  No tenderness or deformity. Heart:  Regular rate and rhythm, S1, S2 normal, no murmur, click, rub or gallop. Abdomen:   Soft, non-tender. Bowel sounds normal. No masses,  No organomegaly. Extremities: Right UE in sling,  no cyanosis or edema. No calf tenderness or cords. Pulses: 2+ and symmetric all extremities. Skin: Skin color, texture, turgor normal. No rashes or lesions   Neurologic: CNII-XII intact. Alert and oriented X 3. Fine motor of hands and fingers normal.   equal.  No cogwheeling or rigidity. Gait not tested at this time. Sensation grossly normal to touch. Gross motor of extremities normal.       Data Review:   X-ray Right knee 10/21/21    IMPRESSION  1. No definite evidence of acute traumatic injury involving the knee. 2. Evidence of degenerative change as described above. 3. Presence of a moderate-sized knee joint effusion.     Duplex Lower Ext 10/21/21  Interpretation Summary    Right lower extremity venous duplex negative for deep venous thrombosis or thrombophlebitis. Left common femoral vein is thrombus free. Lower Extremity Venous Findings    Right Lower Venous    No evidence of deep vein thrombosis in the common femoral, profunda femoral, femoral, popliteal, posterior tibial, and peroneal veins. The veins were imaged in the transverse and longitudinal planes. The vessels showed normal color filling and compressibility. Doppler interrogation showed phasic and spontaneous flow. Left Lower Venous    For comparison purposes, the left common femoral vein was briefly interrogated. These vein demonstrates normal color filing and compressibility. Doppler flow was phasic and spontaneous.        Recent Days:  Recent Labs     10/26/21  0123 10/25/21  1636 10/25/21  0356   WBC 7.4 8.9 5.8   HGB 9.5* 10.4* 8.5*   HCT 29.3* 31.9* 26.9*    233 193     Recent Labs     10/26/21  0123 10/25/21  0356 10/24/21  1118    142 140   K 4.9 4.0 4.2   * 114* 113*   CO2 19* 21 23   * 80 94   BUN 39* 40* 41*   CREA 1.77* 1.93* 2.11*   CA 8.5 8.4* 8.2*   ALB 2.0* 1.9* 2.1*   TBILI 0.7 0.6 0.6   ALT 22 19 20       24 Hour Results:  Recent Results (from the past 24 hour(s))   GLUCOSE, POC    Collection Time: 10/25/21  7:03 AM   Result Value Ref Range    Glucose (POC) 80 65 - 117 mg/dL    Performed by Maricruz PATRICK (CON)    COVID-19 RAPID TEST    Collection Time: 10/25/21 11:10 AM   Result Value Ref Range    Specimen source Nasopharyngeal      COVID-19 rapid test Not detected NOTD     GLUCOSE, POC    Collection Time: 10/25/21 12:05 PM   Result Value Ref Range    Glucose (POC) 100 65 - 117 mg/dL    Performed by [address] [place] [address] [address]    Collection Time: 10/25/21 12:58 PM   Result Value Ref Range    Crossmatch Expiration 10/28/2021,2359     ABO/Rh(D) A POSITIVE     Antibody screen NEG     Unit number [id number]     Blood component type RC LR     Unit division 00     Status of unit ISSUED Crossmatch result Compatible    RBC, ALLOCATE    Collection Time: 10/25/21  2:45 PM   Result Value Ref Range    HISTORY CHECKED? Historical check performed    CBC W/O DIFF    Collection Time: 10/25/21  4:36 PM   Result Value Ref Range    WBC 8.9 3.6 - 11.0 K/uL    RBC 3.42 (L) 3.80 - 5.20 M/uL    HGB 10.4 (L) 11.5 - 16.0 g/dL    HCT 31.9 (L) 35.0 - 47.0 %    MCV 93.3 80.0 - 99.0 FL    MCH 30.4 26.0 - 34.0 PG    MCHC 32.6 30.0 - 36.5 g/dL    RDW 13.4 11.5 - 14.5 %    PLATELET 132 213 - 128 K/uL    MPV 10.6 8.9 - 12.9 FL    NRBC 0.0 0  WBC    ABSOLUTE NRBC 0.00 0.00 - 0.01 K/uL   GLUCOSE, POC    Collection Time: 10/25/21  4:51 PM   Result Value Ref Range    Glucose (POC) 104 65 - 117 mg/dL    Performed by Cinda Escobedo    GLUCOSE, POC    Collection Time: 10/25/21 10:09 PM   Result Value Ref Range    Glucose (POC) 195 (H) 65 - 117 mg/dL    Performed by Boone Palomo    CBC WITH AUTOMATED DIFF    Collection Time: 10/26/21  1:23 AM   Result Value Ref Range    WBC 7.4 3.6 - 11.0 K/uL    RBC 3.13 (L) 3.80 - 5.20 M/uL    HGB 9.5 (L) 11.5 - 16.0 g/dL    HCT 29.3 (L) 35.0 - 47.0 %    MCV 93.6 80.0 - 99.0 FL    MCH 30.4 26.0 - 34.0 PG    MCHC 32.4 30.0 - 36.5 g/dL    RDW 13.6 11.5 - 14.5 %    PLATELET 232 144 - 614 K/uL    MPV 10.9 8.9 - 12.9 FL    NRBC 0.0 0  WBC    ABSOLUTE NRBC 0.00 0.00 - 0.01 K/uL    NEUTROPHILS 90 (H) 32 - 75 %    LYMPHOCYTES 7 (L) 12 - 49 %    MONOCYTES 2 (L) 5 - 13 %    EOSINOPHILS 0 0 - 7 %    BASOPHILS 0 0 - 1 %    IMMATURE GRANULOCYTES 1 (H) 0.0 - 0.5 %    ABS. NEUTROPHILS 6.7 1.8 - 8.0 K/UL    ABS. LYMPHOCYTES 0.5 (L) 0.8 - 3.5 K/UL    ABS. MONOCYTES 0.1 0.0 - 1.0 K/UL    ABS. EOSINOPHILS 0.0 0.0 - 0.4 K/UL    ABS. BASOPHILS 0.0 0.0 - 0.1 K/UL    ABS. IMM.  GRANS. 0.1 (H) 0.00 - 0.04 K/UL    DF AUTOMATED     METABOLIC PANEL, COMPREHENSIVE    Collection Time: 10/26/21  1:23 AM   Result Value Ref Range    Sodium 141 136 - 145 mmol/L    Potassium 4.9 3.5 - 5.1 mmol/L    Chloride 113 (H) 97 - 108 mmol/L    CO2 19 (L) 21 - 32 mmol/L    Anion gap 9 5 - 15 mmol/L    Glucose 194 (H) 65 - 100 mg/dL    BUN 39 (H) 6 - 20 MG/DL    Creatinine 1.77 (H) 0.55 - 1.02 MG/DL    BUN/Creatinine ratio 22 (H) 12 - 20      GFR est AA 33 (L) >60 ml/min/1.73m2    GFR est non-AA 27 (L) >60 ml/min/1.73m2    Calcium 8.5 8.5 - 10.1 MG/DL    Bilirubin, total 0.7 0.2 - 1.0 MG/DL    ALT (SGPT) 22 12 - 78 U/L    AST (SGOT) 21 15 - 37 U/L    Alk.  phosphatase 85 45 - 117 U/L    Protein, total 5.9 (L) 6.4 - 8.2 g/dL    Albumin 2.0 (L) 3.5 - 5.0 g/dL    Globulin 3.9 2.0 - 4.0 g/dL    A-G Ratio 0.5 (L) 1.1 - 2.2         Medications reviewed  Current Facility-Administered Medications   Medication Dose Route Frequency    lactated Ringers infusion  100 mL/hr IntraVENous CONTINUOUS    ceFAZolin (ANCEF) 2 g in sterile water (preservative free) 20 mL IV syringe  2 g IntraVENous Q8H    heparin (porcine) injection 5,000 Units  5,000 Units SubCUTAneous Q8H    insulin lispro (HUMALOG) injection   SubCUTAneous AC&HS    glucose chewable tablet 16 g  4 Tablet Oral PRN    dextrose (D50W) injection syrg 12.5-25 g  12.5-25 g IntraVENous PRN    glucagon (GLUCAGEN) injection 1 mg  1 mg IntraMUSCular PRN    0.9% sodium chloride infusion  50 mL/hr IntraVENous CONTINUOUS    amLODIPine (NORVASC) tablet 10 mg  10 mg Oral DAILY    acetaminophen (TYLENOL) tablet 650 mg  650 mg Oral Q6H    sodium chloride (NS) flush 5-40 mL  5-40 mL IntraVENous Q8H    sodium chloride (NS) flush 5-40 mL  5-40 mL IntraVENous PRN    morphine injection 2 mg  2 mg IntraVENous Q4H PRN    naloxone (NARCAN) injection 0.4 mg  0.4 mg IntraVENous PRN    ondansetron (ZOFRAN) injection 4 mg  4 mg IntraVENous Q4H PRN    oxyCODONE IR (ROXICODONE) tablet 5 mg  5 mg Oral Q4H PRN    dextrose (D50W) injection syrg 12.5-25 g  12.5-25 g IntraVENous PRN    hydrALAZINE (APRESOLINE) 20 mg/mL injection 10 mg  10 mg IntraVENous Q6H PRN       Care Plan discussed with: Patient/Family    Total time spent with patient and review of records: 30 minutes.     Maryse Hartman MD

## 2021-10-27 LAB
ANION GAP SERPL CALC-SCNC: 7 MMOL/L (ref 5–15)
BUN SERPL-MCNC: 36 MG/DL (ref 6–20)
BUN/CREAT SERPL: 22 (ref 12–20)
CALCIUM SERPL-MCNC: 8.4 MG/DL (ref 8.5–10.1)
CHLORIDE SERPL-SCNC: 114 MMOL/L (ref 97–108)
CO2 SERPL-SCNC: 20 MMOL/L (ref 21–32)
CREAT SERPL-MCNC: 1.67 MG/DL (ref 0.55–1.02)
ERYTHROCYTE [DISTWIDTH] IN BLOOD BY AUTOMATED COUNT: 14.1 % (ref 11.5–14.5)
GLUCOSE BLD STRIP.AUTO-MCNC: 102 MG/DL (ref 65–117)
GLUCOSE BLD STRIP.AUTO-MCNC: 115 MG/DL (ref 65–117)
GLUCOSE BLD STRIP.AUTO-MCNC: 129 MG/DL (ref 65–117)
GLUCOSE BLD STRIP.AUTO-MCNC: 136 MG/DL (ref 65–117)
GLUCOSE SERPL-MCNC: 108 MG/DL (ref 65–100)
HCT VFR BLD AUTO: 26.7 % (ref 35–47)
HGB BLD-MCNC: 8.6 G/DL (ref 11.5–16)
MCH RBC QN AUTO: 30.1 PG (ref 26–34)
MCHC RBC AUTO-ENTMCNC: 32.2 G/DL (ref 30–36.5)
MCV RBC AUTO: 93.4 FL (ref 80–99)
NRBC # BLD: 0 K/UL (ref 0–0.01)
NRBC BLD-RTO: 0 PER 100 WBC
PLATELET # BLD AUTO: 238 K/UL (ref 150–400)
PMV BLD AUTO: 10.4 FL (ref 8.9–12.9)
POTASSIUM SERPL-SCNC: 4.2 MMOL/L (ref 3.5–5.1)
RBC # BLD AUTO: 2.86 M/UL (ref 3.8–5.2)
SARS-COV-2, COV2: NORMAL
SERVICE CMNT-IMP: ABNORMAL
SERVICE CMNT-IMP: ABNORMAL
SERVICE CMNT-IMP: NORMAL
SERVICE CMNT-IMP: NORMAL
SODIUM SERPL-SCNC: 141 MMOL/L (ref 136–145)
WBC # BLD AUTO: 7.5 K/UL (ref 3.6–11)

## 2021-10-27 PROCEDURE — 74011250636 HC RX REV CODE- 250/636: Performed by: ORTHOPAEDIC SURGERY

## 2021-10-27 PROCEDURE — 74011250636 HC RX REV CODE- 250/636: Performed by: PHYSICIAN ASSISTANT

## 2021-10-27 PROCEDURE — 97110 THERAPEUTIC EXERCISES: CPT

## 2021-10-27 PROCEDURE — 65270000029 HC RM PRIVATE

## 2021-10-27 PROCEDURE — 97535 SELF CARE MNGMENT TRAINING: CPT

## 2021-10-27 PROCEDURE — 85027 COMPLETE CBC AUTOMATED: CPT

## 2021-10-27 PROCEDURE — 80048 BASIC METABOLIC PNL TOTAL CA: CPT

## 2021-10-27 PROCEDURE — 36415 COLL VENOUS BLD VENIPUNCTURE: CPT

## 2021-10-27 PROCEDURE — 74011250637 HC RX REV CODE- 250/637: Performed by: ORTHOPAEDIC SURGERY

## 2021-10-27 PROCEDURE — 82962 GLUCOSE BLOOD TEST: CPT

## 2021-10-27 PROCEDURE — 97116 GAIT TRAINING THERAPY: CPT

## 2021-10-27 PROCEDURE — 97530 THERAPEUTIC ACTIVITIES: CPT

## 2021-10-27 PROCEDURE — U0005 INFEC AGEN DETEC AMPLI PROBE: HCPCS

## 2021-10-27 RX ADMIN — HEPARIN SODIUM 5000 UNITS: 5000 INJECTION INTRAVENOUS; SUBCUTANEOUS at 06:22

## 2021-10-27 RX ADMIN — Medication 10 ML: at 06:22

## 2021-10-27 RX ADMIN — ACETAMINOPHEN 650 MG: 325 TABLET ORAL at 17:18

## 2021-10-27 RX ADMIN — ACETAMINOPHEN 650 MG: 325 TABLET ORAL at 06:22

## 2021-10-27 RX ADMIN — DOCUSATE SODIUM 50MG AND SENNOSIDES 8.6MG 1 TABLET: 8.6; 5 TABLET, FILM COATED ORAL at 08:27

## 2021-10-27 RX ADMIN — MORPHINE SULFATE 2 MG: 2 INJECTION, SOLUTION INTRAMUSCULAR; INTRAVENOUS at 04:34

## 2021-10-27 RX ADMIN — ONDANSETRON 4 MG: 2 INJECTION INTRAMUSCULAR; INTRAVENOUS at 04:34

## 2021-10-27 RX ADMIN — ACETAMINOPHEN 650 MG: 325 TABLET ORAL at 12:20

## 2021-10-27 RX ADMIN — Medication 10 ML: at 22:27

## 2021-10-27 RX ADMIN — POLYETHYLENE GLYCOL 3350 17 G: 17 POWDER, FOR SOLUTION ORAL at 08:27

## 2021-10-27 RX ADMIN — FAMOTIDINE 20 MG: 20 TABLET ORAL at 08:27

## 2021-10-27 RX ADMIN — HEPARIN SODIUM 5000 UNITS: 5000 INJECTION INTRAVENOUS; SUBCUTANEOUS at 22:26

## 2021-10-27 RX ADMIN — OXYCODONE 5 MG: 5 TABLET ORAL at 09:41

## 2021-10-27 RX ADMIN — AMLODIPINE BESYLATE 10 MG: 5 TABLET ORAL at 08:27

## 2021-10-27 RX ADMIN — DOCUSATE SODIUM 50MG AND SENNOSIDES 8.6MG 1 TABLET: 8.6; 5 TABLET, FILM COATED ORAL at 17:18

## 2021-10-27 RX ADMIN — OXYCODONE 5 MG: 5 TABLET ORAL at 22:26

## 2021-10-27 RX ADMIN — Medication 10 ML: at 13:42

## 2021-10-27 RX ADMIN — ACETAMINOPHEN 650 MG: 325 TABLET ORAL at 22:30

## 2021-10-27 RX ADMIN — HEPARIN SODIUM 5000 UNITS: 5000 INJECTION INTRAVENOUS; SUBCUTANEOUS at 13:41

## 2021-10-27 NOTE — PROGRESS NOTES
Spiritual Care Assessment/Progress Note  [address]      NAME: Adolph Garcia      MRN: [id number]  AGE: 80 y.o. SEX: female  Rastafari Affiliation: Unknown   Language: English     10/27/2021     Total Time (in minutes): 16     Spiritual Assessment begun in [facility]  MED SURG 2 through conversation with:         []Patient        [] Family    [] Friend(s)        Reason for Consult: Initial/Spiritual assessment, patient floor     Spiritual beliefs: (Please include comment if needed)     [] Identifies with a kareem tradition:         [] Supported by a kareem community:            [] Claims no spiritual orientation:           [] Seeking spiritual identity:                [] Adheres to an individual form of spirituality:           [x] Not able to assess:                           Identified resources for coping:      [] Prayer                               [] Music                  [] Guided Imagery     [] Family/friends                 [] Pet visits     [] Devotional reading                         [x] Unknown     [] Other:                                          Interventions offered during this visit: (See comments for more details)    Patient Interventions: Other (comment) (Unable to assess)           Plan of Care:     [] Support spiritual and/or cultural needs    [] Support AMD and/or advance care planning process      [] Support grieving process   [] Coordinate Rites and/or Rituals    [] Coordination with community clergy   [] No spiritual needs identified at this time   [] Detailed Plan of Care below (See Comments)  [] Make referral to Music Therapy  [] Make referral to Pet Therapy     [] Make referral to Addiction services  [] Make referral to [facility]  [] Make referral to Spiritual Care Partner  [] No future visits requested        [x] Follow up upon further referrals     Comments:  attempted to visit Mrs. Eliza Randall for an initial spiritual assessment on the Med. Surgical Unit. Mrs. Eliza Randall was being assessed by other providers at the time of the 's visit. 's are available for further support upon referral  Zohaib Zuñiga. Jean-Claude Figueroa.      Paging Service: [phone])

## 2021-10-27 NOTE — PROGRESS NOTES
Bedside and Verbal shift change report given to Mitali Pablo RN (oncoming nurse) by Aminata Harrell RN (offgoing nurse). Report included the following information SBAR, Kardex, Intake/Output, MAR and Recent Results.

## 2021-10-27 NOTE — PROGRESS NOTES
Daily Progress Note: 10/27/2021  Maine Camilo NP    Assessment/Plan:   Humeral surgical neck fracture (10/18/2021) POA: CT upper extremity showed a comminuted fracture involving the humeral neck and the greater tuberosity.   -Seen by orthopedic surgery who deem her stable. -Iinitially non operative management recommended but now s/p repair yesterday  -Continue an arm sling and NWB RUE, pain control. PT, OT.   -CM for ultimate discharge planning.   -Family want to only go to [facility], will need to see how she does post-op     Closed fracture of multiple pubic rami ([facility] [address] (10/18/2021) POA: CT pelvis showed an acute nondisplaced right superior and inferior pubic rami fractures. Proximal right femoral intramedullary hardware is intact. -Reviewed by ortho. -Continue WBAT with walker.   -PT, OT following.      Right knee pain POA: has associated swelling.   -Xray right knee (moderate joint effusion but no fx) as well as a venous doppler ultrasound (negative for DVT)  -Pain control     HTN (hypertension), benign (10/18/2021) POA: BP stable. -Continue Amlodipine     DM (diabetes mellitus), type 2 ([facility] [address] (10/18/2021) POA: A1c 6.1.   -Restarted SSI  -DM diet as tolerated. -Home Amaryl on hold.      Leukocytosis (10/18/2021) POA: CXR clear. UA not impressive. WBCs better. Afebrile.   -resolved     CKD (chronic kidney disease) stage 3, GFR 30-59 ml/min ([facility]) (10/18/2021) / Hypokalemia due to loss of potassium (10/18/2021) POA:   -K+ better. -SCr slightly high and likely has stage 4 disease now  -Continue gentle hydration.    -ARF POA, improving with hydration, baseline creatinine unclear       Problem List:  Problem List as of 10/27/2021 Date Reviewed: 10/18/2021        Codes Class Noted - Resolved    Right knee pain ICD-10-CM: M25.561  ICD-9-CM: 719.46  10/21/2021 - Present        * (Principal) Humeral surgical neck fracture ICD-10-CM: X92.523G  ICD-9-CM: 812.01  10/18/2021 - Present        Closed fracture of multiple pubic rami [facility]) ICD-10-CM: P80.460S  ICD-9-CM: 808.2  10/18/2021 - Present        HTN (hypertension), benign ICD-10-CM: I10  ICD-9-CM: 401.1  10/18/2021 - Present        DM (diabetes mellitus), type 2 ([address] ICD-10-CM: E11.9  ICD-9-CM: 250.00  10/18/2021 - Present        Leukocytosis ICD-10-CM: Y05.945  ICD-9-CM: 288.60  10/18/2021 - Present        CKD (chronic kidney disease) stage 3, GFR 30-59 ml/min ([facility]) ICD-10-CM: N18.30  ICD-9-CM: 585.3  10/18/2021 - Present        Hypokalemia due to loss of potassium ICD-10-CM: E87.6  ICD-9-CM: 276.8  10/18/2021 - Present              HPI:   Ms. Valeria Mcmullen is an 87y.o.  female with PMH of DM, CKD admitted s/p fall for eval. Noted to have femoral neck fracture and pubic rami fracture. Pt's only complaint is RUE pain that is currently controlled with Tylenol. Lives with her daughter. Typically walks with a walker. (Dr Opal Byrd)     10/22: Didn't sleep well last night. Xray of knee is ok except for an effusion. US neg for DVT. Cr 2.35. Pain is controlled. Surgery scheduled for Monday. 10/23:  Doing well. Pain moderate level. Plan is for surgery Monday. 10/24: No complaints this am. BS are better with SSI. Holding home meds. AM labs pending. 10/25: Surgery planned for later today. AM labs stable, creatinine continues to slowly improve. She denies pain at this time, is looking forward to getting her shoulder repaired. She understands the pelvic fractures cannot be surgically repaired and will take weeks/months to heal.    10/26: Tolerated surgery well, did not sleep well last night. Pain is controlled right now. Will have to wait and see how she does with therapy over the next few days to decide about disposition. 10/27: Worked with PT/OT yesterday, needs further rehab but she is encouraged. She has had some shoulder pain but it is manageable with just Tylenol right now.         Review of Systems:   A comprehensive review of systems was negative except for that written in the HPI. Objective:   Physical Exam:     Visit Vitals  /69 (BP 1 Location: Left upper arm, BP Patient Position: At rest)   Pulse 83   Temp 97.9 °F (36.6 °C)   Resp 17   Ht 5' 2.01\" (1.575 m)   Wt 63.5 kg (140 lb)   SpO2 (!) 87%   BMI 25.60 kg/m²    O2 Flow Rate (L/min): 2 l/min O2 Device: None (Room air)    Temp (24hrs), Av.9 °F (36.6 °C), Min:97.6 °F (36.4 °C), Max:98.1 °F (36.7 °C)    10/26 1901 - 10/27 0700  In: -   Out: 900 [Urine:900]   10/25 0701 - 10/26 1900  In: 330 [I.V.:20]  Out: 3520 [IIVMO:8909]    General:  Alert, cooperative, no distress, appears stated age. Head:  Normocephalic, without obvious abnormality, atraumatic. Eyes:  Conjunctivae/corneas clear. PERRL, EOMs intact. Nose: Nares normal. Septum midline. Mucosa normal. No drainage or sinus tenderness. Throat: Lips, mucosa, and tongue normal.   Neck: Supple, symmetrical, trachea midline, no adenopathy, thyroid: no enlargement/tenderness/nodules, no carotid bruit and no JVD. Back:   Symmetric, no curvature. ROM normal. No CVA tenderness. Lungs:   Clear to auscultation bilaterally. Chest wall:  No tenderness or deformity. Heart:  Regular rate and rhythm, S1, S2 normal, no murmur, click, rub or gallop. Abdomen:   Soft, non-tender. Bowel sounds normal. No masses,  No organomegaly. Extremities: Right UE in sling,  no cyanosis or edema. No calf tenderness or cords. Pulses: 2+ and symmetric all extremities. Skin: Skin color, texture, turgor normal. No rashes or lesions   Neurologic: CNII-XII intact. Alert and oriented X 3. Fine motor of hands and fingers normal.   equal.  No cogwheeling or rigidity. Gait not tested at this time. Sensation grossly normal to touch. Gross motor of extremities normal.       Data Review:   X-ray Right knee 10/21/21    IMPRESSION  1. No definite evidence of acute traumatic injury involving the knee.   2. Evidence of degenerative change as described above. 3. Presence of a moderate-sized knee joint effusion. Duplex Lower Ext 10/21/21  Interpretation Summary    Right lower extremity venous duplex negative for deep venous thrombosis or thrombophlebitis. Left common femoral vein is thrombus free. Lower Extremity Venous Findings    Right Lower Venous    No evidence of deep vein thrombosis in the common femoral, profunda femoral, femoral, popliteal, posterior tibial, and peroneal veins. The veins were imaged in the transverse and longitudinal planes. The vessels showed normal color filling and compressibility. Doppler interrogation showed phasic and spontaneous flow. Left Lower Venous    For comparison purposes, the left common femoral vein was briefly interrogated. These vein demonstrates normal color filing and compressibility. Doppler flow was phasic and spontaneous. Recent Days:  Recent Labs     10/27/21  0439 10/26/21  0123 10/25/21  1636   WBC 7.5 7.4 8.9   HGB 8.6* 9.5* 10.4*   HCT 26.7* 29.3* 31.9*    225 233     Recent Labs     10/27/21  0439 10/26/21  0123 10/25/21  0356 10/24/21  1118 10/24/21  1118    141 142   < > 140   K 4.2 4.9 4.0   < > 4.2   * 113* 114*   < > 113*   CO2 20* 19* 21   < > 23   * 194* 80   < > 94   BUN 36* 39* 40*   < > 41*   CREA 1.67* 1.77* 1.93*   < > 2.11*   CA 8.4* 8.5 8.4*   < > 8.2*   ALB  --  2.0* 1.9*  --  2.1*   TBILI  --  0.7 0.6  --  0.6   ALT  --  22 19  --  20    < > = values in this interval not displayed.        24 Hour Results:  Recent Results (from the past 24 hour(s))   GLUCOSE, POC    Collection Time: 10/26/21  6:51 AM   Result Value Ref Range    Glucose (POC) 171 (H) 65 - 117 mg/dL    Performed by Yanely Caldwell    GLUCOSE, POC    Collection Time: 10/26/21 11:13 AM   Result Value Ref Range    Glucose (POC) 157 (H) 65 - 117 mg/dL    Performed by [address] [place] [address], POC    Collection Time: 10/26/21  4:03 PM   Result Value Ref Range    Glucose (POC) 133 (H) 65 - 117 mg/dL    Performed by Edilma Traylor    GLUCOSE, POC    Collection Time: 10/26/21  8:51 PM   Result Value Ref Range    Glucose (POC) 164 (H) 65 - 117 mg/dL    Performed by Preston Pal    CBC W/O DIFF    Collection Time: 10/27/21  4:39 AM   Result Value Ref Range    WBC 7.5 3.6 - 11.0 K/uL    RBC 2.86 (L) 3.80 - 5.20 M/uL    HGB 8.6 (L) 11.5 - 16.0 g/dL    HCT 26.7 (L) 35.0 - 47.0 %    MCV 93.4 80.0 - 99.0 FL    MCH 30.1 26.0 - 34.0 PG    MCHC 32.2 30.0 - 36.5 g/dL    RDW 14.1 11.5 - 14.5 %    PLATELET 518 037 - 977 K/uL    MPV 10.4 8.9 - 12.9 FL    NRBC 0.0 0  WBC    ABSOLUTE NRBC 0.00 0.00 - 9.97 K/uL   METABOLIC PANEL, BASIC    Collection Time: 10/27/21  4:39 AM   Result Value Ref Range    Sodium 141 136 - 145 mmol/L    Potassium 4.2 3.5 - 5.1 mmol/L    Chloride 114 (H) 97 - 108 mmol/L    CO2 20 (L) 21 - 32 mmol/L    Anion gap 7 5 - 15 mmol/L    Glucose 108 (H) 65 - 100 mg/dL    BUN 36 (H) 6 - 20 MG/DL    Creatinine 1.67 (H) 0.55 - 1.02 MG/DL    BUN/Creatinine ratio 22 (H) 12 - 20      GFR est AA 35 (L) >60 ml/min/1.73m2    GFR est non-AA 29 (L) >60 ml/min/1.73m2    Calcium 8.4 (L) 8.5 - 10.1 MG/DL       Medications reviewed  Current Facility-Administered Medications   Medication Dose Route Frequency    naloxone (NARCAN) injection 0.4 mg  0.4 mg IntraVENous PRN    diphenhydrAMINE (BENADRYL) 12.5 mg/5 mL oral elixir 12.5 mg  12.5 mg Oral Q6H PRN    senna-docusate (PERICOLACE) 8.6-50 mg per tablet 1 Tablet  1 Tablet Oral BID    polyethylene glycol (MIRALAX) packet 17 g  17 g Oral DAILY    famotidine (PEPCID) tablet 20 mg  20 mg Oral DAILY    heparin (porcine) injection 5,000 Units  5,000 Units SubCUTAneous Q8H    insulin lispro (HUMALOG) injection   SubCUTAneous AC&HS    glucose chewable tablet 16 g  4 Tablet Oral PRN    glucagon (GLUCAGEN) injection 1 mg  1 mg IntraMUSCular PRN    0.9% sodium chloride infusion  50 mL/hr IntraVENous CONTINUOUS    amLODIPine (NORVASC) tablet 10 mg 10 mg Oral DAILY    acetaminophen (TYLENOL) tablet 650 mg  650 mg Oral Q6H    sodium chloride (NS) flush 5-40 mL  5-40 mL IntraVENous Q8H    sodium chloride (NS) flush 5-40 mL  5-40 mL IntraVENous PRN    morphine injection 2 mg  2 mg IntraVENous Q4H PRN    ondansetron (ZOFRAN) injection 4 mg  4 mg IntraVENous Q4H PRN    oxyCODONE IR (ROXICODONE) tablet 5 mg  5 mg Oral Q4H PRN    dextrose (D50W) injection syrg 12.5-25 g  12.5-25 g IntraVENous PRN    hydrALAZINE (APRESOLINE) 20 mg/mL injection 10 mg  10 mg IntraVENous Q6H PRN       Care Plan discussed with: Patient/Family    Total time spent with patient and review of records: 30 minutes.     Cathryn Camargo MD

## 2021-10-27 NOTE — PROGRESS NOTES
10/27/2021   CARE MANAGEMENT NOTE:  CM reviewed EMR.   Pt was admitted with humeral neck fx, closed fx of multiple pubic rami, HTN, DM, and CKD3. Pt's  is . Reportedly, pt resides with her dtr Claudette Garibay ([phone]).   Serene will be out of town in [place] until this weekend. Amari Hernández ([phone]) is pt's granddtr and a secondary family contact.      RUR 18%; LOS 9 days     Transition Plan of Care:  1.  Ortho is following for medical management - pt is s/p right reverse total shoulder arthroplasty on 10/25. 2.  [facility] referral was sent today at dtr's request.  Dtr wants pt to be more \"self sufficient\" before returning home   3.  PCR Covid 19 test for placement was ordered on 10/27  4.  Outpt f/u  5.  Mode of transportation upon discharge to be determined.     CM will continue to follow pt for [facility] vs home  CARLENE Hernandez

## 2021-10-27 NOTE — PROGRESS NOTES
Ortho Progress Note     Patient: Jose Pelaez MRN: [id number]  SSN: [id number]    YOB: 1934  Age: 80 y.o. Sex: female      POD:    2 Days Post-Op  S/P:    Procedure(s):  RIGHT REVERSE TOTAL SHOULDER ARTHROPLASTY (GENERAL WITH REGIONAL BLOCK)     Subjective:     Jose Pelaez is resting in bed with the appropriate level of discomfort. The patient denies complaints of dyspnea or chest pain. Objective  Objective:   Patient Vitals for the past 12 hrs:   BP Temp Pulse Resp SpO2   10/27/21 0331 128/69 97.9 °F (36.6 °C) 83 17 (!) 87 %   10/26/21 2319 (!) 151/58 97.9 °F (36.6 °C) 82 17 92 %     Recent Labs     10/27/21  0439   HGB 8.6*   HCT 26.7*      K 4.2   *   CO2 20*   BUN 36*   CREA 1.67*   *       Patient is alert and oriented x 3 in NAD. The operative shoulder dressing is clean, dry, and intact. The interscalene block catheter is intact without drainage. The patient is neurovascularly intact. The sling and ice are properly positioned on the operative arm. Assessment:     Assessment:   Status post shoulder arthroplasty       Plan:   1. We will continue the current pain management regimen. 2. Physical therapy and occupational therapy will continue treatment according to protocol. 3. The patient will be discharged home if medically stable, the pain level is well controlled and they have been cleared by physical therapy. 5. The patient is to follow-up in the office in 10-14 days. Sling needs to be switched for a smaller size  Remove bulky dressing, leave tegaderm in place    OK to DC from my POV.       FU in 2 weeks in office

## 2021-10-27 NOTE — PROGRESS NOTES
Bedside and Verbal shift change report given to Donald Leung RN (oncoming nurse) by Ruy Campbell. Isidro Moser RN (offgoing nurse). Report included the following information SBAR, Kardex, Intake/Output, MAR and Recent Results.

## 2021-10-27 NOTE — PROGRESS NOTES
Called down to 4th floor about getting a new sling for patient. Ortho  said the one she has is to big. Spoke with unit secretary who said she spoke to Ortho NP and that they were going to come up.

## 2021-10-27 NOTE — PROGRESS NOTES
Problem: Mobility Impaired (Adult and Pediatric)  Goal: *Acute Goals and Plan of Care (Insert Text)  Description: FUNCTIONAL STATUS PRIOR TO ADMISSION: Patient was modified independent using a rolling walker for functional mobility. HOME SUPPORT PRIOR TO ADMISSION: The patient lived with daughter but did not require assist.    Physical Therapy Goals  Initiated 10/19/2021; Reviewed 10/26/2021 and remain appropriate with minor adjustments as below  1. Patient will move from supine to sit and sit to supine  in bed with minimal assistance/contact guard assist within 7 day(s). 2.  Patient will transfer from bed to chair and chair to bed with minimal assistance/contact guard assist using the least restrictive device within 7 day(s). 3.  Patient will perform sit to stand with minimal assistance/contact guard assist within 7 day(s). 4.  Patient will ambulate with minimal assistance/contact guard assist for 50 feet with the least restrictive device within 7 day(s). 5.  Patient will ascend/descend 1 stairs with 1 handrail(s) with minimal assistance/contact guard assist within 7 day(s). Outcome: Progressing Towards Goal  Note:   PHYSICAL THERAPY TREATMENT  Patient: Eh Adams (2 y.o. female)  Date: 10/27/2021  Diagnosis: Fx humeral neck [S42.213A] Humeral surgical neck fracture  Procedure(s) (LRB):  RIGHT REVERSE TOTAL SHOULDER ARTHROPLASTY (GENERAL WITH REGIONAL BLOCK) (Right) 2 Days Post-Op  Precautions: NWB (RUE)  Chart, physical therapy assessment, plan of care and goals were reviewed. ASSESSMENT  Patient continues with skilled PT services and progressing towards goals. Medium sling with abduction pillow placed on RUE by OT. Pt requires mod/ max A x2 for bed mobility and transfer to [facility]. (-) BM. Tolerated sitting x 5 min. Requested to return to bed due to pain and fatigue. Bed placed in chair position. Daughter present throughout session.     Current Level of Function Impacting Discharge (mobility/balance): max A x 2    Other factors to consider for discharge:          PLAN :  Patient continues to benefit from skilled intervention to address the above impairments. Continue treatment per established plan of care. to address goals. Recommendation for discharge: (in order for the patient to meet his/her long term goals)  Therapy up to 5 days/week in rehab setting    This discharge recommendation:  Has been made in collaboration with the attending provider and/or case management    IF patient discharges home will need the following DME: to be determined (TBD)       SUBJECTIVE:   Patient stated lets try.     OBJECTIVE DATA SUMMARY:   Critical Behavior:  Neurologic State: Alert  Orientation Level: Oriented X4  Cognition: Follows commands  Safety/Judgement: Awareness of environment  Functional Mobility Training:  Bed Mobility:     Supine to Sit: Maximum assistance;Assist x2  Sit to Supine: Maximum assistance;Assist x2           Transfers:  Sit to Stand: Moderate assistance;Assist x2  Stand to Sit: Minimum assistance;Assist x2; Additional time                             Balance:  Sitting: High guard; Intact  Standing: Impaired; With support  Standing - Static: Constant support; Fair  Standing - Dynamic : Constant support; Fair  Ambulation/Gait Training:  Distance (ft): 3 Feet (ft)  Assistive Device: Walker alondra;Gait belt;Brace/Splint (abd sling)  Ambulation - Level of Assistance: Moderate assistance;Assist x2; Additional time        Gait Abnormalities: Decreased step clearance; Antalgic; Step to gait; Shuffling gait        Base of Support: Narrowed     Speed/Tonia: Slow                       Stairs:               Therapeutic Exercises:   Heel slides, ankle pumps, glute sets  Pain Ratin/10    Activity Tolerance:   Good    After treatment patient left in no apparent distress:   Supine in bed, Call bell within reach, Bed / chair alarm activated, and Caregiver / family present    COMMUNICATION/COLLABORATION:   The patients plan of care was discussed with: Occupational therapist and Registered nurse.      Afua Ulrich   Time Calculation: 45 mins

## 2021-10-28 LAB
GLUCOSE BLD STRIP.AUTO-MCNC: 115 MG/DL (ref 65–117)
GLUCOSE BLD STRIP.AUTO-MCNC: 136 MG/DL (ref 65–117)
GLUCOSE BLD STRIP.AUTO-MCNC: 144 MG/DL (ref 65–117)
GLUCOSE BLD STRIP.AUTO-MCNC: 94 MG/DL (ref 65–117)
SARS-COV-2, XPLCVT: NOT DETECTED
SERVICE CMNT-IMP: ABNORMAL
SERVICE CMNT-IMP: ABNORMAL
SERVICE CMNT-IMP: NORMAL
SERVICE CMNT-IMP: NORMAL
SOURCE, COVRS: NORMAL

## 2021-10-28 PROCEDURE — 51798 US URINE CAPACITY MEASURE: CPT

## 2021-10-28 PROCEDURE — 97530 THERAPEUTIC ACTIVITIES: CPT

## 2021-10-28 PROCEDURE — 65270000029 HC RM PRIVATE

## 2021-10-28 PROCEDURE — 82962 GLUCOSE BLOOD TEST: CPT

## 2021-10-28 PROCEDURE — 74011250637 HC RX REV CODE- 250/637: Performed by: INTERNAL MEDICINE

## 2021-10-28 PROCEDURE — 74011250636 HC RX REV CODE- 250/636: Performed by: PHYSICIAN ASSISTANT

## 2021-10-28 PROCEDURE — 97116 GAIT TRAINING THERAPY: CPT

## 2021-10-28 PROCEDURE — 77030038269 HC DRN EXT URIN PURWCK BARD -A

## 2021-10-28 PROCEDURE — 77030019905 HC CATH URETH INTMIT MDII -A

## 2021-10-28 PROCEDURE — 97535 SELF CARE MNGMENT TRAINING: CPT

## 2021-10-28 PROCEDURE — 74011250637 HC RX REV CODE- 250/637: Performed by: ORTHOPAEDIC SURGERY

## 2021-10-28 PROCEDURE — 74011250636 HC RX REV CODE- 250/636: Performed by: ORTHOPAEDIC SURGERY

## 2021-10-28 RX ORDER — POLYETHYLENE GLYCOL 3350 17 G/17G
17 POWDER, FOR SOLUTION ORAL 2 TIMES DAILY
Status: DISCONTINUED | OUTPATIENT
Start: 2021-10-28 | End: 2021-10-29 | Stop reason: HOSPADM

## 2021-10-28 RX ORDER — FACIAL-BODY WIPES
10 EACH TOPICAL
Status: COMPLETED | OUTPATIENT
Start: 2021-10-28 | End: 2021-10-28

## 2021-10-28 RX ADMIN — Medication 10 ML: at 22:02

## 2021-10-28 RX ADMIN — HEPARIN SODIUM 5000 UNITS: 5000 INJECTION INTRAVENOUS; SUBCUTANEOUS at 05:16

## 2021-10-28 RX ADMIN — HEPARIN SODIUM 5000 UNITS: 5000 INJECTION INTRAVENOUS; SUBCUTANEOUS at 13:15

## 2021-10-28 RX ADMIN — ONDANSETRON 4 MG: 2 INJECTION INTRAMUSCULAR; INTRAVENOUS at 16:14

## 2021-10-28 RX ADMIN — ONDANSETRON 4 MG: 2 INJECTION INTRAMUSCULAR; INTRAVENOUS at 10:12

## 2021-10-28 RX ADMIN — AMLODIPINE BESYLATE 10 MG: 5 TABLET ORAL at 09:10

## 2021-10-28 RX ADMIN — MORPHINE SULFATE 2 MG: 2 INJECTION, SOLUTION INTRAMUSCULAR; INTRAVENOUS at 16:14

## 2021-10-28 RX ADMIN — HEPARIN SODIUM 5000 UNITS: 5000 INJECTION INTRAVENOUS; SUBCUTANEOUS at 22:02

## 2021-10-28 RX ADMIN — FAMOTIDINE 20 MG: 20 TABLET ORAL at 09:10

## 2021-10-28 RX ADMIN — OXYCODONE 5 MG: 5 TABLET ORAL at 09:16

## 2021-10-28 RX ADMIN — ACETAMINOPHEN 650 MG: 325 TABLET ORAL at 13:15

## 2021-10-28 RX ADMIN — POLYETHYLENE GLYCOL 3350 17 G: 17 POWDER, FOR SOLUTION ORAL at 09:10

## 2021-10-28 RX ADMIN — BISACODYL 10 MG: 10 SUPPOSITORY RECTAL at 13:15

## 2021-10-28 RX ADMIN — DOCUSATE SODIUM 50MG AND SENNOSIDES 8.6MG 1 TABLET: 8.6; 5 TABLET, FILM COATED ORAL at 09:10

## 2021-10-28 RX ADMIN — ACETAMINOPHEN 650 MG: 325 TABLET ORAL at 05:15

## 2021-10-28 RX ADMIN — MORPHINE SULFATE 2 MG: 2 INJECTION, SOLUTION INTRAMUSCULAR; INTRAVENOUS at 10:12

## 2021-10-28 RX ADMIN — ACETAMINOPHEN 650 MG: 325 TABLET ORAL at 17:16

## 2021-10-28 RX ADMIN — ACETAMINOPHEN 650 MG: 325 TABLET ORAL at 22:03

## 2021-10-28 RX ADMIN — POLYETHYLENE GLYCOL 3350 17 G: 17 POWDER, FOR SOLUTION ORAL at 17:16

## 2021-10-28 RX ADMIN — HYDRALAZINE HYDROCHLORIDE 10 MG: 20 INJECTION INTRAMUSCULAR; INTRAVENOUS at 05:16

## 2021-10-28 RX ADMIN — Medication 10 ML: at 13:15

## 2021-10-28 RX ADMIN — DOCUSATE SODIUM 50MG AND SENNOSIDES 8.6MG 1 TABLET: 8.6; 5 TABLET, FILM COATED ORAL at 17:16

## 2021-10-28 RX ADMIN — Medication 10 ML: at 05:15

## 2021-10-28 NOTE — PROGRESS NOTES
Problem: Self Care Deficits Care Plan (Adult)  Goal: *Acute Goals and Plan of Care (Insert Text)  Description:   FUNCTIONAL STATUS PRIOR TO ADMISSION: Patient was modified independent using a rollator for functional mobility. HOME SUPPORT: The patient lived with daughter but did not require assist.    Occupational Therapy Goals  Re evaluation 10/26/2021- following R reverse TSA  Initiated 10/19/2021  1. Patient will perform lower body dressing with minimal assistance/contact guard assist within 7 day(s). 2.  Patient will perform grooming with supervision/set-up within 7 day(s). 3.  Patient will perform anterior body bathing with minimal assistance/contact guard assist within 7 day(s). 4.  Patient will perform toilet transfers with minimal assistance/contact guard assist within 7 day(s). 5.  Patient will perform all aspects of toileting with minimal assistance/contact guard assist within 7 day(s). 6  Patient will don/doff arm sling at minimal assistance/contact guard assist level within 7 days. 7.  Patient will perform shoulder exercises per physician order with minimal assistance/contact guard assist within 7 days. 8.  Patient will verbalize/demonstrate shoulder precautions during ADLs with 100% accuracy within 7 days. Outcome: Progressing Towards Goal   OCCUPATIONAL THERAPY TREATMENT  Patient: Starlin Sandifer (34 y.o. female)  Date: 10/28/2021  Diagnosis: Fx humeral neck [S42.213A] Humeral surgical neck fracture  Procedure(s) (LRB):  RIGHT REVERSE TOTAL SHOULDER ARTHROPLASTY (GENERAL WITH REGIONAL BLOCK) (Right) 3 Days Post-Op  Precautions: NWB (RUE)  Chart, occupational therapy assessment, plan of care, and goals were reviewed. ASSESSMENT  Patient continues with skilled OT services and is progressing towards goals. Pt wanting to try to use BSC, max assist supine to sit and than moderate assist x 2 transfer. Pt unable to void, recently had craig catheter removed.  Pt washed her face seated and than took a few steps forward before back to bed. She actively moved digits, sling intact. 2 family members present and provided education as to benefit of having larger shirts for pt to wear if going to rehab. Pt rating pain at 4/10. Pt left with bed in chair like position for lunch. Pt motivated to improve status and would benefit from inpatient rehab. Current Level of Function Impacting Discharge (ADLs): moderate assist x 2 BSC    Other factors to consider for discharge:          PLAN :  Patient continues to benefit from skilled intervention to address the above impairments. Continue treatment per established plan of care to address goals. Recommend with staff: BSC transfers    Recommend next OT session: cont towards goals    Recommendation for discharge: (in order for the patient to meet his/her long term goals)  Therapy 3 hours per day 5-7 days per week    This discharge recommendation:  Has been made in collaboration with the attending provider and/or case management    IF patient discharges home will need the following DME:        SUBJECTIVE:   Patient stated I want to try.     OBJECTIVE DATA SUMMARY:   Cognitive/Behavioral Status:  Neurologic State: Alert  Orientation Level: Oriented X4  Cognition: Follows commands             Functional Mobility and Transfers for ADLs:  Bed Mobility:   Max assist supine to sit    Transfers:     Functional Transfers  Toilet Transfer : Moderate assistance;Assist x2  Cues: Physical assistance  Adaptive Equipment: Bedside commode       Balance:   Impaired standing balance    ADL Intervention:     Max assist LB bathe, dress, set-up face washing          Therapeutic Exercises:   Pt actively moving digits in sling.     Activity Tolerance:   Fair    After treatment patient left in no apparent distress:   Supine in bed and Call bell within reach    COMMUNICATION/COLLABORATION:   The patients plan of care was discussed with: Physical therapy assistant, Occupational therapist, Registered nurse, and Case management.      MONICA Lou/L  Time Calculation: 29 mins

## 2021-10-28 NOTE — PROGRESS NOTES
Spiritual Care Assessment/Progress Note  [facility]      NAME: Rufino Yung      MRN: [id number]  AGE: 80 y.o. SEX: female  Anabaptism Affiliation: Unknown   Language: English     10/28/2021     Total Time (in minutes): 14     Spiritual Assessment begun in [facility]  MED SURG 2 through conversation with:         []Patient        [] Family    [] Friend(s)        Reason for Consult: Initial/Spiritual assessment, patient floor     Spiritual beliefs: (Please include comment if needed)     [] Identifies with a kareem tradition:         [] Supported by a kareem community:            [] Claims no spiritual orientation:           [] Seeking spiritual identity:                [] Adheres to an individual form of spirituality:           [x] Not able to assess:                           Identified resources for coping:      [] Prayer                               [] Music                  [] Guided Imagery     [] Family/friends                 [] Pet visits     [] Devotional reading                         [x] Unknown     [] Other:                                               Interventions offered during this visit: (See comments for more details)    Patient Interventions: Other (comment) (Unable to assess)           Plan of Care:     [] Support spiritual and/or cultural needs    [] Support AMD and/or advance care planning process      [] Support grieving process   [] Coordinate Rites and/or Rituals    [] Coordination with community clergy   [] No spiritual needs identified at this time   [] Detailed Plan of Care below (See Comments)  [] Make referral to Music Therapy  [] Make referral to Pet Therapy     [] Make referral to Addiction services  [] Make referral to [facility]  [] Make referral to Spiritual Care Partner  [] No future visits requested        [x] Follow up upon further referrals     Comments:  attempted to visit Mrs. Sammi Max for an initial spiritual assessment on the Med. Surgical Unit.  Mrs. Siva Mcmahan was being assessed by her nursing team at the time of the 's visit. 's are available for further support upon referral  Zohaib Zuñiga. Alexa Torrez.      Paging Service: Trino-PRASHARON ([phone])

## 2021-10-28 NOTE — PROGRESS NOTES
Bedside and Verbal shift change report given to Cynthia Phan RN (oncoming nurse) by Danilo Adan. Steph Goddard RN (offgoing nurse).  Report included the following information SBAR, Kardex, Intake/Output, MAR and Recent Results

## 2021-10-28 NOTE — PROGRESS NOTES
ORTHO PROGRESS NOTE      SUBJECTIVE:  Starlin Sandifer denies pain at rest but has pelvis pain with activity. Right knee more sore than yesterday. She reports LUE and BLE edema. Home Lasix held per medicine d/t CKD. OBJECTIVE:  Patient Vitals for the past 24 hrs:   Temp Pulse BP   10/28/21 1054 97.7 °F (36.5 °C) 90 (!) 139/49   10/28/21 0737 97.8 °F (36.6 °C) 79 (!) 151/60   10/28/21 0549 97.4 °F (36.3 °C) 82 (!) 157/66   10/28/21 0420 97.9 °F (36.6 °C) 77 (!) 173/71   10/27/21 2256 98.2 °F (36.8 °C) 89 (!) 143/90   10/27/21 1954 98.4 °F (36.9 °C) 90 (!) 154/61     Sleeping when I enter but easily awakened. Alert, no distress. Lying in bed. Family present. Respirations unlabored. Dressing/immobilizer RUE CDI. Ecchymosis upper arm. RUE mild edema. Moves digits/wrist OK with SILT and brisk CR. LUE edema. BLE edema. Right knee mild TTP. PROM causes groin pain. Left knee scar remains healed with no effusion and NTTP. Recent Labs     10/27/21  0439   HGB 8.6*   HCT 26.7*      BUN 36*   CREA 1.67*   GFRAA 35*   GFRNA 29*       PT/OT:   Gait:  Gait  Base of Support: Narrowed  Speed/Tonia: Slow, Shuffled  Step Length: Right shortened, Left shortened  Gait Abnormalities: Decreased step clearance, Antalgic, Step to gait  Ambulation - Level of Assistance: Moderate assistance  Distance (ft):  (2' and 5 ')  Assistive Device: Gait belt, Brace/Splint, Walker alondra                 ASSESSMENT:  Procedure: Procedure(s):  RIGHT REVERSE TOTAL SHOULDER ARTHROPLASTY (GENERAL WITH REGIONAL BLOCK)  Post Op day: 3 Days Post-Op  Right sided pelvic rami fractures  Right knee DJD    PLAN:  PT/OT: WBAT RLE with device using LUE. NWB RUE.    RUE: A/PROM elbow, wrist, hand motion; shoulder pendulums, passive forward flexion to 90 deg, no ER past neutral.  Analgesics: Tylenol, oxycodone. Try to wean IV analgesics. DVT proph: Heparin for now.   Given immobilization from pelvis and shoulder fractures, would recommend 30 days proph. Disp planning. F/U: Dr. Osvaldo Barajas 10-14 days. Please call again if questions.     Herbert Dear, PA  Orthopedic Trauma Service  [facility]

## 2021-10-28 NOTE — PROGRESS NOTES
Problem: Self Care Deficits Care Plan (Adult)  Goal: *Acute Goals and Plan of Care (Insert Text)  Description:   FUNCTIONAL STATUS PRIOR TO ADMISSION: Patient was modified independent using a rollator for functional mobility. HOME SUPPORT: The patient lived with daughter but did not require assist.    Occupational Therapy Goals  Re evaluation 10/26/2021- following R reverse TSA  Initiated 10/19/2021  1. Patient will perform lower body dressing with minimal assistance/contact guard assist within 7 day(s). 2.  Patient will perform grooming with supervision/set-up within 7 day(s). 3.  Patient will perform anterior body bathing with minimal assistance/contact guard assist within 7 day(s). 4.  Patient will perform toilet transfers with minimal assistance/contact guard assist within 7 day(s). 5.  Patient will perform all aspects of toileting with minimal assistance/contact guard assist within 7 day(s). 6  Patient will don/doff arm sling at minimal assistance/contact guard assist level within 7 days. 7.  Patient will perform shoulder exercises per physician order with minimal assistance/contact guard assist within 7 days. 8.  Patient will verbalize/demonstrate shoulder precautions during ADLs with 100% accuracy within 7 days. Outcome: Progressing Towards Goal   OCCUPATIONAL THERAPY TREATMENT- LATE ENTRY  Patient: Kamlesh Souza (89 y.o. female)  Date: 10/28/2021  Diagnosis: Fx humeral neck [S42.213A] Humeral surgical neck fracture  Procedure(s) (LRB):  RIGHT REVERSE TOTAL SHOULDER ARTHROPLASTY (GENERAL WITH REGIONAL BLOCK) (Right) 3 Days Post-Op  Precautions: NWB (RUE)  Chart, occupational therapy assessment, plan of care, and goals were reviewed. ASSESSMENT  Patient continues with skilled OT services and is progressing towards goals. Patient tolerated UE exercises for RUE. Patient with discomfort with forward flexion, but improves with additional reps. She reports feels stiff.   Encouraged patient/family to perform throughout day. Patient fitted for smaller sling and noted improved fit. Patient reports comfortable. Patient to EOB with max x2. Goal for transfer to [facility] and patient able to perform with mod assist x 2. No current need for toileting and patient returned to supine at end of session. Patient with good participation throughout session. Current Level of Function Impacting Discharge (ADLs): mod x 2 tranfers, max x2 bed mobility, and up to total assist for ADLs    Other factors to consider for discharge: lives with daugther         PLAN :  Patient continues to benefit from skilled intervention to address the above impairments. Continue treatment per established plan of care to address goals. Recommend with staff: ADLs as able for upper body    Recommend next OT session: progression of goals    Recommendation for discharge: (in order for the patient to meet his/her long term goals)  Therapy up to 5 days/week in rehab setting    This discharge recommendation:  Has been made in collaboration with the attending provider and/or case management    IF patient discharges home will need the following DME: TBD       SUBJECTIVE:   Patient stated That was alot.     OBJECTIVE DATA SUMMARY:   Cognitive/Behavioral Status:                      Functional Mobility and Transfers for ADLs:  Bed Mobility:       Transfers:             Balance:       ADL Intervention:                                          Therapeutic Exercises:     (Days 1 to 3) Exercises:      EXERCISE   Sets   Reps   Active Active Assist   Passive   Comments   Elbow Flexion/ extesnsion 1 10 []           [x]           []            2x day   Wrist flexion/ extension 1 10 []           [x]           []            2x day  Assist for full range    Hand flexion/ extension 1 10 [x]           []           []            2x day    Supine passive forward elevation  1 10 []           []           [x]            2x day; plane of scapula (PFE) to  Tolerance; Supine PFE by family member or using opposite arm            Pain:      Activity Tolerance:   Good and requires rest breaks    After treatment patient left in no apparent distress:   Supine in bed, Call bell within reach, Bed / chair alarm activated, Caregiver / family present, and Side rails x 3    COMMUNICATION/COLLABORATION:   The patients plan of care was discussed with: Physical therapy assistant and Registered nurse.      Sangeeta Sosa, OTR/L  Time Calculation: 45 mins

## 2021-10-28 NOTE — PROGRESS NOTES
36 - Patient's daughter reported last BM hasn't been since 10/17. Patient thinks she feels the urge but nothing is coming, attempted bedpan due to limited mobility, no BM.     1039 - Called Dr. Fuentes Settles about no BM since 10/17. Also notified of craig catheter placed on 10/25 for urinary retention, okay to remove and attempt voiding trial.     1130 - Removed craig. 1539 - Patient had BM, medium/formed. Has not voided yet. 1815 - Patient still has not voided, bladder scan showed 440 mL.     1830 - Notified MD, received order to straight cath one time. 1854 - Straight cath removed 400 mL.

## 2021-10-28 NOTE — PROGRESS NOTES
10/28/2021   CARE MANAGEMENT NOTE:  CM reviewed EMR.   Pt was admitted with humeral neck fx, closed fx of multiple pubic rami, HTN, DM, and CKD3. Pt's  is . Reportedly, pt resides with her dtr Efrain Colon ([phone]).   Serene will be out of town in [place] until this weekend. Alix Credit ([phone]) is pt's granddtr and a secondary family contact.      RUR 18%; LOS 10 days     Transition Plan of Care:  1.  Ortho is following for medical management - pt is s/p right reverse total shoulder arthroplasty on 10/25. 2.  [facility] - has accepted pt with possible bed available Friday/Sat. 3.  PCR Covid 19 test for placement was ordered on 10/27 and results are pending  4.  Outpt f/u  5.  Mode of transportation upon discharge to be determined.     CM will continue to follow pt for [facility] vs home  CARLENE Gunderson

## 2021-10-28 NOTE — PROGRESS NOTES
This RN discussed plan for patient's TL IJ with Danika Serna MD. Per MD, TL IJ may be removed. Order placed. Pre cath assessment Pre cath assessment/Event Note

## 2021-10-28 NOTE — ROUTINE PROCESS
Bedside shift change report given to Eladia (oncoming nurse) by Jared Trvais (offgoing nurse). Report included the following information SBAR, Kardex, ED Summary, Intake/Output, MAR, Recent Results and Med Rec Status.

## 2021-10-28 NOTE — PROGRESS NOTES
Daily Progress Note: 10/28/2021  Kirk Bishop NP    Assessment/Plan:   Humeral surgical neck fracture (10/18/2021) POA: CT upper extremity showed a comminuted fracture involving the humeral neck and the greater tuberosity.   -Seen by orthopedic surgery who deem her stable. -Iinitially non operative management recommended but now s/p repair yesterday  -Continue an arm sling and NWB RUE, pain control. PT, OT.   -CM for ultimate discharge planning.   -[facility] to only go [facility]     Closed fracture of multiple pubic rami ([facility] [address] (10/18/2021) POA: CT pelvis showed an acute nondisplaced right superior and inferior pubic rami fractures. Proximal right femoral intramedullary hardware is intact. -Reviewed by ortho. -Continue WBAT with walker.   -PT, OT following.      Right knee pain POA: has associated swelling.   -Xray right knee (moderate joint effusion but no fx) as well as a venous doppler ultrasound (negative for DVT)  -Pain control     HTN (hypertension), benign (10/18/2021) POA: BP stable. -Continue Amlodipine     DM (diabetes mellitus), type 2 ([facility] [address] (10/18/2021) POA: A1c 6.1.   -Restarted SSI  -DM diet as tolerated. -Home Amaryl on hold.      Leukocytosis (10/18/2021) POA: CXR clear. UA not impressive. WBCs better. Afebrile.   -resolved     CKD (chronic kidney disease) stage 4 (HCC) (10/18/2021) / Hypokalemia due to loss of potassium (10/18/2021) POA:   -K+ better. -SCr slightly high and likely has stage 4 disease now  -Continue gentle hydration.    -ARF POA, improving with hydration, baseline creatinine unclear       Problem List:  Problem List as of 10/28/2021 Date Reviewed: 10/18/2021        Codes Class Noted - Resolved    Right knee pain ICD-10-CM: M25.561  ICD-9-CM: 719.46  10/21/2021 - Present        * (Principal) Humeral surgical neck fracture ICD-10-CM: S42.213A  ICD-9-CM: 812.01  10/18/2021 - Present        Closed fracture of multiple pubic rami ([facility] [address] ICD-10-CM: Q46.056E  ICD-9-CM: 808.2  10/18/2021 - Present        HTN (hypertension), benign ICD-10-CM: I10  ICD-9-CM: 401.1  10/18/2021 - Present        DM (diabetes mellitus), type 2 ([facility] ICD-10-CM: E11.9  ICD-9-CM: 250.00  10/18/2021 - Present        Leukocytosis ICD-10-CM: I54.087  ICD-9-CM: 288.60  10/18/2021 - Present        CKD (chronic kidney disease) stage 3, GFR 30-59 ml/min ([facility]) ICD-10-CM: N18.30  ICD-9-CM: 585.3  10/18/2021 - Present        Hypokalemia due to loss of potassium ICD-10-CM: E87.6  ICD-9-CM: 276.8  10/18/2021 - Present              HPI:   Ms. Funmi Carrillo is an 87y.o.  female with PMH of DM, CKD admitted s/p fall for eval. Noted to have femoral neck fracture and pubic rami fracture. Pt's only complaint is RUE pain that is currently controlled with Tylenol. Lives with her daughter. Typically walks with a walker. (Dr Freda Hammans)     10/22: Didn't sleep well last night. Xray of knee is ok except for an effusion. US neg for DVT. Cr 2.35. Pain is controlled. Surgery scheduled for Monday. 10/23:  Doing well. Pain moderate level. Plan is for surgery Monday. 10/24: No complaints this am. BS are better with SSI. Holding home meds. AM labs pending. 10/25: Surgery planned for later today. AM labs stable, creatinine continues to slowly improve. She denies pain at this time, is looking forward to getting her shoulder repaired. She understands the pelvic fractures cannot be surgically repaired and will take weeks/months to heal.    10/26: Tolerated surgery well, did not sleep well last night. Pain is controlled right now. Will have to wait and see how she does with therapy over the next few days to decide about disposition. 10/27: Worked with PT/OT yesterday, needs further rehab but she is encouraged. She has had some shoulder pain but it is manageable with just Tylenol right now. 10/28: Worked with PT again yesterday, needs a lot of assistance. CM working on transition to rehab. Pain is well controlled. Review of Systems:   A comprehensive review of systems was negative except for that written in the HPI. Objective:   Physical Exam:     Visit Vitals  BP (!) 157/66 (BP 1 Location: Left lower arm, BP Patient Position: At rest)   Pulse 82   Temp 97.4 °F (36.3 °C)   Resp 18   Ht 5' 2.01\" (1.575 m)   Wt 63.5 kg (140 lb)   SpO2 95%   BMI 25.60 kg/m²    O2 Flow Rate (L/min): 2 l/min O2 Device: None (Room air)    Temp (24hrs), Av.9 °F (36.6 °C), Min:97.4 °F (36.3 °C), Max:98.4 °F (36.9 °C)    10/27 1901 - 10/28 0700  In: 240 [P.O.:240]  Out: 750 [Urine:750]   10/26 07 - 10/27 1900  In: -   Out: 9230 [Urine:2825]    General:  Alert, cooperative, no distress, appears stated age. Head:  Normocephalic, without obvious abnormality, atraumatic. Eyes:  Conjunctivae/corneas clear. PERRL, EOMs intact. Nose: Nares normal. Septum midline. Mucosa normal. No drainage or sinus tenderness. Throat: Lips, mucosa, and tongue normal.   Neck: Supple, symmetrical, trachea midline, no adenopathy, thyroid: no enlargement/tenderness/nodules, no carotid bruit and no JVD. Back:   Symmetric, no curvature. ROM normal. No CVA tenderness. Lungs:   Clear to auscultation bilaterally. Chest wall:  No tenderness or deformity. Heart:  Regular rate and rhythm, S1, S2 normal, no murmur, click, rub or gallop. Abdomen:   Soft, non-tender. Bowel sounds normal. No masses,  No organomegaly. Extremities: Right UE in sling,  no cyanosis or edema. No calf tenderness or cords. Pulses: 2+ and symmetric all extremities. Skin: Skin color, texture, turgor normal. No rashes or lesions   Neurologic: CNII-XII intact. Alert and oriented X 3. Fine motor of hands and fingers normal.   equal.  No cogwheeling or rigidity. Gait not tested at this time. Sensation grossly normal to touch. Gross motor of extremities normal.       Data Review:   X-ray Right knee 10/21/21    IMPRESSION  1.  No definite evidence of acute traumatic injury involving the knee. 2. Evidence of degenerative change as described above. 3. Presence of a moderate-sized knee joint effusion. Duplex Lower Ext 10/21/21  Interpretation Summary    Right lower extremity venous duplex negative for deep venous thrombosis or thrombophlebitis. Left common femoral vein is thrombus free. Lower Extremity Venous Findings    Right Lower Venous    No evidence of deep vein thrombosis in the common femoral, profunda femoral, femoral, popliteal, posterior tibial, and peroneal veins. The veins were imaged in the transverse and longitudinal planes. The vessels showed normal color filling and compressibility. Doppler interrogation showed phasic and spontaneous flow. Left Lower Venous    For comparison purposes, the left common femoral vein was briefly interrogated. These vein demonstrates normal color filing and compressibility. Doppler flow was phasic and spontaneous.        Recent Days:  Recent Labs     10/27/21  0439 10/26/21  0123 10/25/21  1636   WBC 7.5 7.4 8.9   HGB 8.6* 9.5* 10.4*   HCT 26.7* 29.3* 31.9*    225 233     Recent Labs     10/27/21  0439 10/26/21  0123    141   K 4.2 4.9   * 113*   CO2 20* 19*   * 194*   BUN 36* 39*   CREA 1.67* 1.77*   CA 8.4* 8.5   ALB  --  2.0*   TBILI  --  0.7   ALT  --  22       24 Hour Results:  Recent Results (from the past 24 hour(s))   GLUCOSE, POC    Collection Time: 10/27/21  6:56 AM   Result Value Ref Range    Glucose (POC) 102 65 - 117 mg/dL    Performed by Ernestine Goyal    GLUCOSE, POC    Collection Time: 10/27/21 11:31 AM   Result Value Ref Range    Glucose (POC) 115 65 - 117 mg/dL    Performed by Navneet Hines    GLUCOSE, POC    Collection Time: 10/27/21  3:54 PM   Result Value Ref Range    Glucose (POC) 136 (H) 65 - 117 mg/dL    Performed by Navneet Hines    SARS-COV-2    Collection Time: 10/27/21  3:59 PM   Result Value Ref Range    SARS-CoV-2 Please find results under separate order     GLUCOSE, POC    Collection Time: 10/27/21  9:06 PM   Result Value Ref Range    Glucose (POC) 129 (H) 65 - 117 mg/dL    Performed by Chandrakant Lockhart (PCT)        Medications reviewed  Current Facility-Administered Medications   Medication Dose Route Frequency    naloxone (NARCAN) injection 0.4 mg  0.4 mg IntraVENous PRN    senna-docusate (PERICOLACE) 8.6-50 mg per tablet 1 Tablet  1 Tablet Oral BID    polyethylene glycol (MIRALAX) packet 17 g  17 g Oral DAILY    famotidine (PEPCID) tablet 20 mg  20 mg Oral DAILY    heparin (porcine) injection 5,000 Units  5,000 Units SubCUTAneous Q8H    insulin lispro (HUMALOG) injection   SubCUTAneous AC&HS    glucose chewable tablet 16 g  4 Tablet Oral PRN    glucagon (GLUCAGEN) injection 1 mg  1 mg IntraMUSCular PRN    0.9% sodium chloride infusion  50 mL/hr IntraVENous CONTINUOUS    amLODIPine (NORVASC) tablet 10 mg  10 mg Oral DAILY    acetaminophen (TYLENOL) tablet 650 mg  650 mg Oral Q6H    sodium chloride (NS) flush 5-40 mL  5-40 mL IntraVENous Q8H    sodium chloride (NS) flush 5-40 mL  5-40 mL IntraVENous PRN    morphine injection 2 mg  2 mg IntraVENous Q4H PRN    ondansetron (ZOFRAN) injection 4 mg  4 mg IntraVENous Q4H PRN    oxyCODONE IR (ROXICODONE) tablet 5 mg  5 mg Oral Q4H PRN    dextrose (D50W) injection syrg 12.5-25 g  12.5-25 g IntraVENous PRN    hydrALAZINE (APRESOLINE) 20 mg/mL injection 10 mg  10 mg IntraVENous Q6H PRN       Care Plan discussed with: Patient/Family    Total time spent with patient and review of records: 30 minutes.     Tiffanie Menjivar MD

## 2021-10-28 NOTE — DISCHARGE INSTRUCTIONS
Shoulder Surgery Discharge Instructions  Dr. Mahesh Kurtz    Please take the time to review the following instructions before you leave the hospital and use them as guidelines during your recovery from surgery. If you have any questions, you may contact my office at [phone]. Wound Care / Dressing Change    Do NOT remove your dressing. Keep the clear, plastic dressing intact until your follow up in the office. Showering / Bathing    If the clear plastic dressing is intact and there is no drainage, you may shower. If the dressing is loose or water gets under it please notify our office. If there is drainage, please call the office immediately. Sling    Keep your arm in the immobilizer/sling at all times except when showering, changing your clothes, and doing the exercises shown to you by Dr. Mendel Caulk or your physical/occupational therapist prior to your discharge from the hospital.  Keep your arm at your side when changing your clothes and showering. Do not move it away from your body. Ice and Elevation    Ice therapy should be used consistently for 48 hours after surgery. Subsequently, you should ice 3 times per day for 20 minutes at a time. After the first week, you may use ice as needed for pain and swelling. Please ensure there is protective barrier between your skin and the ice. Diet    You may advance your regular diet as tolerated. Increase your clear liquid intake for the next 2-3 days. Medications      1. Pain: You were prescribed a narcotic medication for pain control. Please  use the medication as prescribed. Pain medications may cause constipation - Colace or Milk of Magnesia may be used as needed. Other possible side effects of pain medications are dizziness, headache, nausea, vomiting, and urinary retention.   Discontinue the pain medication if you develop itching, rash, shortness of breath, or difficulties swallowing. If these symptoms become severe or arent relieved by discontinuing the medication, you should seek immediate medical attention. You cannot drive or operate machinery while taking narcotic medication. Some pain medications already contain Tylenol/Acetaminophen. Please read your prescription pain medicine bottle prior to taking additional Tylenol. Do not exceed 3000mg of Tylenol/Acetaminophen in a 24 hour period. Refills of pain medication are authorized during office hours only   ( Monday to Friday 8am-5pm)        2. Blood Thinner:  You have been given a prescription for blood thinner to take for 30 days. Do not take anti-inflammatory medication (Advil, Aleve, Motrin). 3. Stool Softeners:  Pain medications can cause constipation. Stool softeners, warm prune juice and increasing your water and fiber intake can help prevent constipation. Do not take laxatives. 4. You should resume other medications per primary care doctor's directions. Physical Therapy:  You must begin outpatient physical therapy a week after your surgery. Your were given a prescription when you scheduled your surgery. If you do not have an appointment scheduled or a therapy prescription please call Dr. Mendel Caulk' office immediately. RUE: NWB; A/PROM elbow, wrist, hand motion; shoulder pendulums, passive forward flexion to 90 deg, no ER past neutral.  RLE: WBAT with assistive device LUE. APPOINTMENT:     Follow-Up Appointment:  Please follow up at your scheduled appointment 10-14 days from the day of your surgery. If you do not already have an appointment, please call our office at [phone] for your follow up appointment. Your appointment will likely be with MARION Sosa assists Dr. Mendel Caulk in surgery and will be able to review your operation and answer your questions.     APPOINTMENT: call office      Important Signs and Symptoms:  If any of the following signs and symptoms occurs, you should contact Dr. Emma Crockett office. Please be advised if a problem arises which you feel requires immediate medical attention or you are unable to contact Dr. Emma Crockett office, you should seek immediate medical attention. Signs and symptoms to watch for include:    1. A sudden increase in swelling and/or redness or warmth at the area of your surgery which isnt relieved by rest, ice, and elevation. 2. Oral temperature greater than 101degrees for 12 hours or more which isnt relieved by an increase in fluid intake and taking two Tylenol every 4-6 hours. 3. Excessive drainage from your incisions, or drainage which hasnt stopped by 72 hours after your surgery. 4. Calf pain, ever, chills, shortness of breath, chest pain, nausea, vomiting or other signs and symptoms which are of concern to you. Unless you are having a true medical emergency,   you MUST call Dr. Amber Alvarez' office   BEFORE proceeding to the Emergency Department. A provider is on call 24 hours/day. Exercises after Shoulder Surgery  1. Passive Forward Flexion:                             Instructions:  - Lay on your back  - Take your non surgical arm and grab the wrist of your surgical arm   - Use your non surgical arm to lift your surgical arm over your head with the elbow straight   - Slowly return your arm to your side using your non surgical arm  - Repeat 20 times in the morning and 20 times in the evening  Remember:  - Passive motion: Your arm is lifted with the help of your other hand. o The repair is protected when you do passive motion  - Active motion: You lift your arm by using your shoulder muscles. o DO NOT DO ANY ACTIVE MOTION FOR NOW    2.  Codman Pendulum Exercises                                         Instructions:  - Bend forward about 90° at the waist and support yourself on a sturdy object with your non surgical arm  (bend slightly at the knees to protect your back)  - Gently allow your surgical arm to hang towards the ground  - Move your hips forward and backward allowing your arm to swing slightly  - Arm can move forward, backward, and in small circles (clockwise and counterclockwise)  o Remember: let your body move your arm, do not use your arm muscles  - Begin performing the exercise for about 30 seconds. Progress to 2 minutes.   - Repeat 2-3 times per day

## 2021-10-29 VITALS
TEMPERATURE: 98.5 F | DIASTOLIC BLOOD PRESSURE: 68 MMHG | OXYGEN SATURATION: 93 % | WEIGHT: 140 LBS | RESPIRATION RATE: 18 BRPM | SYSTOLIC BLOOD PRESSURE: 157 MMHG | BODY MASS INDEX: 25.76 KG/M2 | HEIGHT: 62 IN | HEART RATE: 85 BPM

## 2021-10-29 LAB
GLUCOSE BLD STRIP.AUTO-MCNC: 111 MG/DL (ref 65–117)
GLUCOSE BLD STRIP.AUTO-MCNC: 118 MG/DL (ref 65–117)
GLUCOSE BLD STRIP.AUTO-MCNC: 124 MG/DL (ref 65–117)
SERVICE CMNT-IMP: ABNORMAL
SERVICE CMNT-IMP: ABNORMAL
SERVICE CMNT-IMP: NORMAL

## 2021-10-29 PROCEDURE — 74011250637 HC RX REV CODE- 250/637: Performed by: ORTHOPAEDIC SURGERY

## 2021-10-29 PROCEDURE — 74011250636 HC RX REV CODE- 250/636: Performed by: PHYSICIAN ASSISTANT

## 2021-10-29 PROCEDURE — 82962 GLUCOSE BLOOD TEST: CPT

## 2021-10-29 PROCEDURE — 74011250637 HC RX REV CODE- 250/637: Performed by: INTERNAL MEDICINE

## 2021-10-29 RX ORDER — OXYCODONE HYDROCHLORIDE 5 MG/1
5 TABLET ORAL
Refills: 0 | Status: SHIPPED | COMMUNITY
Start: 2021-10-29 | End: 2021-12-27

## 2021-10-29 RX ORDER — AMOXICILLIN 250 MG
1 CAPSULE ORAL 2 TIMES DAILY
Status: ON HOLD | COMMUNITY
Start: 2021-10-29 | End: 2022-02-03

## 2021-10-29 RX ORDER — FUROSEMIDE 40 MG/1
40 TABLET ORAL 2 TIMES DAILY
Status: DISCONTINUED | OUTPATIENT
Start: 2021-10-29 | End: 2021-10-29 | Stop reason: HOSPADM

## 2021-10-29 RX ORDER — POLYETHYLENE GLYCOL 3350 17 G/17G
17 POWDER, FOR SOLUTION ORAL 2 TIMES DAILY
Status: ON HOLD | COMMUNITY
Start: 2021-10-29 | End: 2022-02-03

## 2021-10-29 RX ADMIN — HEPARIN SODIUM 5000 UNITS: 5000 INJECTION INTRAVENOUS; SUBCUTANEOUS at 05:00

## 2021-10-29 RX ADMIN — OXYCODONE 5 MG: 5 TABLET ORAL at 05:00

## 2021-10-29 RX ADMIN — FUROSEMIDE 40 MG: 40 TABLET ORAL at 17:19

## 2021-10-29 RX ADMIN — Medication 10 ML: at 05:01

## 2021-10-29 RX ADMIN — FUROSEMIDE 40 MG: 40 TABLET ORAL at 10:06

## 2021-10-29 RX ADMIN — ACETAMINOPHEN 650 MG: 325 TABLET ORAL at 05:00

## 2021-10-29 RX ADMIN — FAMOTIDINE 20 MG: 20 TABLET ORAL at 10:06

## 2021-10-29 RX ADMIN — HEPARIN SODIUM 5000 UNITS: 5000 INJECTION INTRAVENOUS; SUBCUTANEOUS at 15:15

## 2021-10-29 RX ADMIN — AMLODIPINE BESYLATE 10 MG: 5 TABLET ORAL at 10:06

## 2021-10-29 RX ADMIN — Medication 10 ML: at 15:15

## 2021-10-29 RX ADMIN — ACETAMINOPHEN 650 MG: 325 TABLET ORAL at 17:19

## 2021-10-29 RX ADMIN — OXYCODONE 5 MG: 5 TABLET ORAL at 17:25

## 2021-10-29 RX ADMIN — ACETAMINOPHEN 650 MG: 325 TABLET ORAL at 12:49

## 2021-10-29 NOTE — DISCHARGE SUMMARY
Physician Discharge Summary     Patient ID:    Tomasa Casas  [id number]  35 y.o.  1934  Aston Doyle NP    Admit date: 10/17/2021  Discharge date and time: 10/29/2021  Admission Diagnoses: Fx humeral neck [S42.213A]  Discharge Medications:   Current Discharge Medication List      START taking these medications    Details   oxyCODONE IR (ROXICODONE) 5 mg immediate release tablet Take 1 Tablet by mouth every four (4) hours as needed. Max Daily Amount: 30 mg. Refills: 0    Associated Diagnoses: Closed fracture of multiple pubic rami, right, initial encounter ([facility] [address]      senna-docusate (PERICOLACE) 8.6-50 mg per tablet Take 1 Tablet by mouth two (2) times a day. polyethylene glycol (MIRALAX) 17 gram packet Take 1 Packet by mouth two (2) times a day. CONTINUE these medications which have NOT CHANGED    Details   albuterol (PROVENTIL VENTOLIN) 2.5 mg /3 mL (0.083 %) nebu 2.5 mg by Nebulization route every four (4) hours as needed for Wheezing or Shortness of Breath. amLODIPine (NORVASC) 2.5 mg tablet Take 2.5 mg by mouth daily. furosemide (Lasix) 40 mg tablet Take 40 mg by mouth two (2) times a day. glimepiride (AMARYL) 4 mg tablet Take 4 mg by mouth every morning. albuterol (PROVENTIL HFA, VENTOLIN HFA, PROAIR HFA) 90 mcg/actuation inhaler Take 2 Puffs by inhalation every four (4) hours as needed for Wheezing. Qty: 1 Inhaler, Refills: 0              Follow up Care:    1. Aston Doyle NP with in 1 weeks  2. specialists as directed. Diet:  Diabetic Diet  Disposition:  SNF.   Advanced Directive:  Discharge Exam:  [See today's progress note.]  CONSULTATIONS: Orthopedic Surgery    Significant Diagnostic Studies:   Recent Labs     10/27/21  0439   WBC 7.5   HGB 8.6*   HCT 26.7*        Recent Labs     10/27/21  0439      K 4.2   *   CO2 20*   BUN 36*   CREA 1.67*   *   CA 8.4*     No results for input(s): ALT, AP, TBIL, TBILI, TP, ALB, GLOB, GGT, AML, LPSE in the last 72 hours. No lab exists for component: SGOT, GPT, AMYP, HLPSE  No results for input(s): INR, PTP, APTT, INREXT in the last 72 hours. No results for input(s): FE, TIBC, PSAT, FERR in the last 72 hours. No results for input(s): PH, PCO2, PO2 in the last 72 hours. No results for input(s): CPK, CKMB in the last 72 hours. No lab exists for component: TROPONINI  Lab Results   Component Value Date/Time    Glucose (POC) 124 (H) 10/29/2021 06:50 AM    Glucose (POC) 144 (H) 10/28/2021 09:31 PM    Glucose (POC) 115 10/28/2021 03:49 PM    Glucose (POC) 136 (H) 10/28/2021 10:56 AM    Glucose (POC) 94 10/28/2021 06:49 AM     Lab Results   Component Value Date/Time    TSH 0.63 11/21/2010 06:00 AM         HOSPITAL COURSE & TREATMENT RENDERED:          Assessment/Plan:   Humeral surgical neck fracture (10/18/2021) POA: CT upper extremity showed a comminuted fracture involving the humeral neck and the greater tuberosity.   -Seen by orthopedic surgery who deem her stable.   -Iinitially non operative management recommended but now s/p repair yesterday  -Continue an arm sling and NWB RUE, pain control. PT, OT.   -CM for ultimate discharge planning.   -Accepted to [facility] pending bed availability, later today or tomorrow     Closed fracture of multiple pubic rami ([facility] [address] (10/18/2021) POA: CT pelvis showed an acute nondisplaced right superior and inferior pubic rami fractures. Proximal right femoral intramedullary hardware is intact. -Reviewed by ortho. -Continue WBAT with walker.   -PT, OT following.      Right knee pain POA: has associated swelling.   -Xray right knee (moderate joint effusion but no fx) as well as a venous doppler ultrasound (negative for DVT)  -Pain control     HTN (hypertension), benign (10/18/2021) POA: BP stable. -Continue Amlodipine     DM (diabetes mellitus), type 2 ([address] (10/18/2021) POA: A1c 6.1.   -Restarted SSI  -DM diet as tolerated.    -Home Am[facility] on hold.      Leukocytosis (10/18/2021) POA: CXR clear. UA not impressive. WBCs better. Afebrile.   -resolved     CKD (chronic kidney disease) stage 4 (formerly [facility]) (10/18/2021) / Hypokalemia due to loss of potassium (10/18/2021) POA:   -K+ better. -SCr slightly high and likely has stage 4 disease now  -Continue gentle hydration.   -ARF POA, improving with hydration, baseline creatinine unclear         Problem List:            Problem List as of 10/29/2021 Date Reviewed: 10/18/2021         Codes Class Noted - Resolved     Right knee pain ICD-10-CM: M25.561  ICD-9-CM: 719.46   10/21/2021 - Present           * (Principal) Humeral surgical neck fracture ICD-10-CM: S42.213A  ICD-9-CM: 812.01   10/18/2021 - Present           Closed fracture of multiple pubic rami ([address] ICD-10-CM: S32.599A  ICD-9-CM: 940. 2   10/18/2021 - Present           HTN (hypertension), benign ICD-10-CM: I10  ICD-9-CM: 401. 1   10/18/2021 - Present           DM (diabetes mellitus), type 2 ([facility] ICD-10-CM: E11.9  ICD-9-CM: 250.00   10/18/2021 - Present           Leukocytosis ICD-10-CM: X32.366  ICD-9-CM: 288.60   10/18/2021 - Present           CKD (chronic kidney disease) stage 3, GFR 30-59 ml/min (formerly [facility]) ICD-10-CM: N18.30  ICD-9-CM: 082. 3   10/18/2021 - Present           Hypokalemia due to loss of potassium ICD-10-CM: E87.6  ICD-9-CM: 276.8   10/18/2021 - Present                   HPI:   Ms. Celeste is an 87y. o.   female with PMH of DM, CKD admitted s/p fall for eval. Noted to have femoral neck fracture and pubic rami fracture. Pt's only complaint is RUE pain that is currently controlled with Tylenol. Lives with her daughter. Typically walks with a walker. (Dr Shawn Valentin)     10/22: Didn't sleep well last night. Xray of knee is ok except for an effusion. US neg for DVT. Cr 2.35. Pain is controlled. Surgery scheduled for Monday.     10/23:  Doing well. Pain moderate level. Plan is for surgery Monday.      10/24: No complaints this am. BS are better with SSI. Holding home meds.  AM labs pending.       10/25: Surgery planned for later today. AM labs stable, creatinine continues to slowly improve. She denies pain at this time, is looking forward to getting her shoulder repaired. She understands the pelvic fractures cannot be surgically repaired and will take weeks/months to heal.     10/26: Tolerated surgery well, did not sleep well last night. Pain is controlled right now. Will have to wait and see how she does with therapy over the next few days to decide about disposition.       10/27: Worked with PT/OT yesterday, needs further rehab but she is encouraged. She has had some shoulder pain but it is manageable with just Tylenol right now.       10/28: Worked with PT again yesterday, needs a lot of assistance. CM working on transition to rehab. Pain is well controlled.       10/29: Accepted to [facility], awaiting bed availability. Doing well, working with therapy, pain controlled. Discussed with patient's daughter, requested we resume her Lasix due to swelling.         Signed:  Aditya Hickman MD  10/29/2021  11:05 AM

## 2021-10-29 NOTE — PROGRESS NOTES
Medicare pt has received, reviewed, and signed 2nd IM letter informing them of their right to appeal the discharge. Signed copied has been placed on pt bedside chart.     Benito Swanson RN

## 2021-10-29 NOTE — PROGRESS NOTES
10/29/2021   CARE MANAGEMENT NOTE:  CM reviewed EMR.   Pt was admitted with humeral neck fx, closed fx of multiple pubic rami, HTN, DM, and CKD3. Pt's  is . Reportedly, pt resides with her dtr César Weaver ([phone]).   Serene will be out of town in [place] until this weekend. Ernestina Howard ([phone]) is pt's granddtr and a secondary family contact.      RUR 16%; FPS 73 KTET     Transition Plan of Care:  1.  [facility] - accepted and bed is available today. RN, please call report to [phone]. Pt will go to room . Pt's dtr is aware and agreeable. 2.  PCR Covid 19 test for placement was negative on 10/27  3.  Outpt f/u  4.  [facility] was arranged with 6:30 p.m pickup at their earliest availability    No further post discharge needs indicated.   Joana

## 2021-10-29 NOTE — PROGRESS NOTES
Spiritual Care Assessment/Progress Note  [address]      NAME: Rubi Jaime      MRN: [id number]  AGE: 80 y.o. SEX: female  B[employer] Affiliation: Unknown   Language: English     10/29/2021     Total Time (in minutes): 13     Spiritual Assessment begun in [facility]  MED SURG 2 through conversation with:         []Patient        [] Family    [] Friend(s)        Reason for Consult: Initial/Spiritual assessment, patient floor     Spiritual beliefs: (Please include comment if needed)     [] Identifies with a kareme tradition:         [] Supported by a kareem community:            [] Claims no spiritual orientation:           [] Seeking spiritual identity:                [] Adheres to an individual form of spirituality:           [x] Not able to assess:                           Identified resources for coping:      [] Prayer                               [] Music                  [] Guided Imagery     [] Family/friends                 [] Pet visits     [] Devotional reading                         [x] Unknown     [] Other:                                              Interventions offered during this visit: (See comments for more details)    Patient Interventions: Other (comment) (Unable to assess)           Plan of Care:     [] Support spiritual and/or cultural needs    [] Support AMD and/or advance care planning process      [] Support grieving process   [] Coordinate Rites and/or Rituals    [] Coordination with community clergy   [] No spiritual needs identified at this time   [] Detailed Plan of Care below (See Comments)  [] Make referral to Music Therapy  [] Make referral to Pet Therapy     [] Make referral to Addiction services  [] Make referral to [facility]  [] Make referral to Spiritual Care Partner  [] No future visits requested        [x] Follow up upon further referrals     Comments:  attempted to visit Mrs. Cristina Sutherland for an initial spiritual care assessment on the [place] [address] unit. Mrs. John Paul Patel was lying in bed and appeared to be resting comfortably when the  came into the room.  did not disturb her at this time. 's are available for further support upon referral  Zohaib Figueroa.      Paging Service: [phone])

## 2021-10-29 NOTE — PROGRESS NOTES
Bedside and Verbal shift change report given to Lissette Rainey RN (oncoming nurse) by Geronimo Alejandro. Cece Washington RN (offgoing nurse).  Report included the following information SBAR, Kardex, Intake/Output, MAR and Recent Results

## 2021-11-04 NOTE — OP NOTES
OPERATIVE NOTE    Date of Procedure: 10/25/2021   Preoperative Diagnosis:  RIGHT PROXIMAL HUMERUS FRACTURE  Postoperative Diagnosis: RIGHT PROXIMAL HUMERUS FRACTURE    Procedure: Procedure(s):  RIGHT REVERSE TOTAL SHOULDER ARTHROPLASTY (GENERAL WITH REGIONAL BLOCK)    Surgeon: Mariah Hoff MD  Assistant(s): Merline Sham, PA-C, ATC  Anesthesia: General   Estimated Blood Loss: minimal  Specimens: * No specimens in log *   Findings: See full operative note. Complications: None  Implants:   Implant Name Type Inv.  Item Serial No.  Lot No. LRB No. Used Action   HMRL BEARING 36 MM STD KENRICK - SN/A  HMRL BEARING 36 MM STD KENRICK N/A [employer]_ [id number] Right 1 Implanted   IMPLANT HMRL TY +3 STD - SN/A  IMPLANT HMRL TY +3 STD N/A [employer]_ [id number] Right 1 Implanted   SCR LCK 3.5 HEX 4.75X30 STRL -- COMPREHENSIVE - SN/A  SCR LCK 3.5 HEX 4.75X30 STRL -- COMPREHENSIVE N/A "[employer]ET ORTHOPEDICS_ [id number] Right 1 Implanted   SCR LCK 3.5 HEX 4.75X25 STRL -- COMPREHENSIVE - SN/A  SCR LCK 3.5 HEX 4.75X25 STRL -- COMPREHENSIVE N/A "[employer]ET ORTHOPEDICS_ [id number] Right 1 Implanted   BASEPLT W/ADAPTER ANNA 25MM -- COMPREHENSIVE - SN/A  BASEPLT W/ADAPTER ANNA 25MM -- COMPREHENSIVE N/A "[employer]ET ORTHOPEDICS_ [id number] Right 1 Implanted   GLENOSPHERE RVS STD 0D 36MM -- COMPREHENSIVE - SN/A  GLENOSPHERE RVS STD 0D 36MM -- COMPREHENSIVE N/A "[employer]ET ORTHOPEDICS_ [id number] Right 1 Implanted   SCR LCK 3.5 HEX 4.75X15 STRL -- COMPREHENSIVE - SN/A  SCR LCK 3.5 HEX 4.75X15 STRL -- COMPREHENSIVE N/A [employer] ORTH[employer]_ [id number] Right 1 Implanted   SCR CTRL RVS 6.5X30MM STRL/RST -- COMPREHENSIVE - SN/A  SCR CTRL RVS 6.5X30MM STRL/RST -- COMPREHENSIVE N/A [employer]_ [id number] Right 1 Implanted   SCR LCK 3.5 HEX 4.75X15 STRL -- COMPREHENSIVE - SN/A  SCR LCK 3.5 HEX 4.75X15 STRL -- COMPREHENSIVE N/A [employer] ORTHOPEDICS_WD [id number] Right 1 Implanted   SHOULDR S3 TOT ADV REVRS IMPL CAPPED S3 - [id number]  SHOULDR S3 TOT ADV REVRS IMPL CAPPED S3  [employer] ORTHOPEDICS_WD  Right 1 Implanted         INDICATIONS FOR OPERATION:  Jasper Brush is an 79 yo female who has been complaining of shoulder pain and loss of function due to a recent fracture. She  has failed conservative treatment including anti-inflammatory medication, injections, and therapy and presents for definitive operative treatment. DESCRIPTION OF PROCEDURE:  After being informed of the risks and benefits, which  include but are not limited to bleeding, infection, neurovascular damage, wound  complications, pain and stiffness in the shoulder, the patient consented for the  procedure. After adequate general anesthesia, the patient was placed in the beach chair  position and all bony prominences were padded well. The shoulder was then  prepped and draped in sterile fashion. Standard deltopectoral incision was performed, preserving the cephalic vein. The subdeltoid and subcoracoid spaces  were retracted. The bicipital sheath was incised and the biceps tendon was  frayed and entrapped within the fracture. A biceps tenodesis was then performed  with #2 FiberWire to the pectoralis major tendon. The remainder of the biceps  was removed. At that point, the fracture fragments were mobilized, identified  and secured with stay sutures. We then evaluated the humeral head and felt that  there was no attachment to the capsular structures medially. The medial  capsular attachments had torn. The humeral head was easily removed unfortunately, necessitating placement of a reverse shoulder. At that time, the glenoid retractors were placed and the guide pin was inserted into the glenoid. The glenoid was reamed and the baseplate was impacted into position. The  central screw and peripheral screws were placed using standard technique, and a 36 mm standard glenosphere  was impacted into position.       We serially reamed the humerus and the appropriate broach  was placed. We then trialed the shoulder. We noted the appropriate height of  the broach. At that time, the drill holes were placed in the humerus just  distal to the greater tuberosity fracture. In addition, #5 FiberWire sutures  were tied to the stem. We used 3 of them. The stem was impacted into position,  and then the first suture around the stem was used to secure the lesser  tuberosity, and the second was used to secure the greater tuberosity. The third  was a cerclage suture that went around both tuberosities. The holes that were  placed in the shaft had a #5 FiberWire that was placed through them. This was  used for a tension band technique over top of the greater tuberosity. A second  suture was placed through the humeral baseplate and the sutures were passed out  through the rotator cuff just above the greater tuberosity. This suture was  tied to the shaft securing the greater tuberosity. Once this tension band was  performed, we had an outstanding purchase of the bone and no motion at the  fracture site. The shoulder had excellent motion and no instability of the implant. The  wound was irrigated copiously with antibiotic irrigation, and the deltopectoral  interval was then closed. The skin was closed in layers and sterile dressings  were applied. The patient was awakened and taken to recovery in stable  Condition.

## 2021-12-24 ENCOUNTER — APPOINTMENT (OUTPATIENT)
Dept: GENERAL RADIOLOGY | Age: 86
DRG: 247 | End: 2021-12-24
Attending: EMERGENCY MEDICINE
Payer: MEDICARE

## 2021-12-24 ENCOUNTER — HOSPITAL ENCOUNTER (INPATIENT)
Age: 86
LOS: 1 days | Discharge: HOME | DRG: 247 | End: 2021-12-25
Attending: EMERGENCY MEDICINE | Admitting: INTERNAL MEDICINE
Payer: MEDICARE

## 2021-12-24 DIAGNOSIS — I21.09 ST ELEVATION MYOCARDIAL INFARCTION (STEMI) OF ANTERIOR WALL (HCC): Primary | ICD-10-CM

## 2021-12-24 DIAGNOSIS — I21.3 ST ELEVATION MYOCARDIAL INFARCTION (STEMI), UNSPECIFIED ARTERY (HCC): ICD-10-CM

## 2021-12-24 PROBLEM — R07.9 CHEST PAIN: Status: ACTIVE | Noted: 2021-12-24

## 2021-12-24 PROBLEM — R13.10 DYSPHAGIA: Status: ACTIVE | Noted: 2021-12-24

## 2021-12-24 LAB
ALBUMIN SERPL-MCNC: 3.2 G/DL (ref 3.5–5)
ALBUMIN/GLOB SERPL: 0.8 {RATIO} (ref 1.1–2.2)
ALP SERPL-CCNC: 88 U/L (ref 45–117)
ALT SERPL-CCNC: 22 U/L (ref 12–78)
ANION GAP SERPL CALC-SCNC: 8 MMOL/L (ref 5–15)
AST SERPL-CCNC: 24 U/L (ref 15–37)
BASOPHILS # BLD: 0 K/UL (ref 0–0.1)
BASOPHILS NFR BLD: 1 % (ref 0–1)
BILIRUB SERPL-MCNC: 0.5 MG/DL (ref 0.2–1)
BUN SERPL-MCNC: 22 MG/DL (ref 6–20)
BUN/CREAT SERPL: 10 (ref 12–20)
CALCIUM SERPL-MCNC: 9.2 MG/DL (ref 8.5–10.1)
CHLORIDE SERPL-SCNC: 104 MMOL/L (ref 97–108)
CO2 SERPL-SCNC: 28 MMOL/L (ref 21–32)
CREAT SERPL-MCNC: 2.1 MG/DL (ref 0.55–1.02)
DIFFERENTIAL METHOD BLD: ABNORMAL
EOSINOPHIL # BLD: 0.2 K/UL (ref 0–0.4)
EOSINOPHIL NFR BLD: 3 % (ref 0–7)
ERYTHROCYTE [DISTWIDTH] IN BLOOD BY AUTOMATED COUNT: 13.1 % (ref 11.5–14.5)
GLOBULIN SER CALC-MCNC: 4.2 G/DL (ref 2–4)
GLUCOSE BLD STRIP.AUTO-MCNC: 110 MG/DL (ref 65–117)
GLUCOSE BLD STRIP.AUTO-MCNC: 82 MG/DL (ref 65–117)
GLUCOSE SERPL-MCNC: 221 MG/DL (ref 65–100)
HCT VFR BLD AUTO: 35.4 % (ref 35–47)
HGB BLD-MCNC: 11.2 G/DL (ref 11.5–16)
IMM GRANULOCYTES # BLD AUTO: 0 K/UL (ref 0–0.04)
IMM GRANULOCYTES NFR BLD AUTO: 0 % (ref 0–0.5)
LYMPHOCYTES # BLD: 1.3 K/UL (ref 0.8–3.5)
LYMPHOCYTES NFR BLD: 20 % (ref 12–49)
MCH RBC QN AUTO: 28.9 PG (ref 26–34)
MCHC RBC AUTO-ENTMCNC: 31.6 G/DL (ref 30–36.5)
MCV RBC AUTO: 91.2 FL (ref 80–99)
MONOCYTES # BLD: 0.4 K/UL (ref 0–1)
MONOCYTES NFR BLD: 6 % (ref 5–13)
NEUTS SEG # BLD: 4.8 K/UL (ref 1.8–8)
NEUTS SEG NFR BLD: 70 % (ref 32–75)
NRBC # BLD: 0 K/UL (ref 0–0.01)
NRBC BLD-RTO: 0 PER 100 WBC
PLATELET # BLD AUTO: 215 K/UL (ref 150–400)
PMV BLD AUTO: 10.7 FL (ref 8.9–12.9)
POTASSIUM SERPL-SCNC: 3.7 MMOL/L (ref 3.5–5.1)
PROT SERPL-MCNC: 7.4 G/DL (ref 6.4–8.2)
RBC # BLD AUTO: 3.88 M/UL (ref 3.8–5.2)
SERVICE CMNT-IMP: NORMAL
SERVICE CMNT-IMP: NORMAL
SODIUM SERPL-SCNC: 140 MMOL/L (ref 136–145)
TROPONIN-HIGH SENSITIVITY: 1082 NG/L (ref 0–51)
TROPONIN-HIGH SENSITIVITY: ABNORMAL NG/L (ref 0–51)
WBC # BLD AUTO: 6.8 K/UL (ref 3.6–11)

## 2021-12-24 PROCEDURE — 74011250637 HC RX REV CODE- 250/637: Performed by: EMERGENCY MEDICINE

## 2021-12-24 PROCEDURE — 92941 PRQ TRLML REVSC TOT OCCL AMI: CPT | Performed by: SPECIALIST

## 2021-12-24 PROCEDURE — C1725 CATH, TRANSLUMIN NON-LASER: HCPCS | Performed by: SPECIALIST

## 2021-12-24 PROCEDURE — 85025 COMPLETE CBC W/AUTO DIFF WBC: CPT

## 2021-12-24 PROCEDURE — 027034Z DILATION OF CORONARY ARTERY, ONE ARTERY WITH DRUG-ELUTING INTRALUMINAL DEVICE, PERCUTANEOUS APPROACH: ICD-10-PCS | Performed by: SPECIALIST

## 2021-12-24 PROCEDURE — 77030013715 HC INFL SYS MRTM -B: Performed by: SPECIALIST

## 2021-12-24 PROCEDURE — 77030003390 HC NDL ANGI MRTM -A: Performed by: SPECIALIST

## 2021-12-24 PROCEDURE — 84484 ASSAY OF TROPONIN QUANT: CPT

## 2021-12-24 PROCEDURE — B2151ZZ FLUOROSCOPY OF LEFT HEART USING LOW OSMOLAR CONTRAST: ICD-10-PCS | Performed by: SPECIALIST

## 2021-12-24 PROCEDURE — C1769 GUIDE WIRE: HCPCS | Performed by: SPECIALIST

## 2021-12-24 PROCEDURE — 74011250636 HC RX REV CODE- 250/636: Performed by: SPECIALIST

## 2021-12-24 PROCEDURE — 77030008543 HC TBNG MON PRSS MRTM -A: Performed by: SPECIALIST

## 2021-12-24 PROCEDURE — 80053 COMPREHEN METABOLIC PANEL: CPT

## 2021-12-24 PROCEDURE — 99153 MOD SED SAME PHYS/QHP EA: CPT | Performed by: SPECIALIST

## 2021-12-24 PROCEDURE — 74011000258 HC RX REV CODE- 258: Performed by: SPECIALIST

## 2021-12-24 PROCEDURE — C1874 STENT, COATED/COV W/DEL SYS: HCPCS | Performed by: SPECIALIST

## 2021-12-24 PROCEDURE — 36245 INS CATH ABD/L-EXT ART 1ST: CPT | Performed by: SPECIALIST

## 2021-12-24 PROCEDURE — 74011250637 HC RX REV CODE- 250/637: Performed by: SPECIALIST

## 2021-12-24 PROCEDURE — 36415 COLL VENOUS BLD VENIPUNCTURE: CPT

## 2021-12-24 PROCEDURE — C1887 CATHETER, GUIDING: HCPCS | Performed by: SPECIALIST

## 2021-12-24 PROCEDURE — 74011000250 HC RX REV CODE- 250: Performed by: SPECIALIST

## 2021-12-24 PROCEDURE — 92610 EVALUATE SWALLOWING FUNCTION: CPT

## 2021-12-24 PROCEDURE — 82962 GLUCOSE BLOOD TEST: CPT

## 2021-12-24 PROCEDURE — 77030038269 HC DRN EXT URIN PURWCK BARD -A

## 2021-12-24 PROCEDURE — C1894 INTRO/SHEATH, NON-LASER: HCPCS | Performed by: SPECIALIST

## 2021-12-24 PROCEDURE — 77030029065 HC DRSG HEMO QCLOT ZMED -B

## 2021-12-24 PROCEDURE — 77030013797 HC KT TRNSDUC PRSSR EDWD -A: Performed by: SPECIALIST

## 2021-12-24 PROCEDURE — 74011000636 HC RX REV CODE- 636: Performed by: SPECIALIST

## 2021-12-24 PROCEDURE — 99285 EMERGENCY DEPT VISIT HI MDM: CPT

## 2021-12-24 PROCEDURE — B2111ZZ FLUOROSCOPY OF MULTIPLE CORONARY ARTERIES USING LOW OSMOLAR CONTRAST: ICD-10-PCS | Performed by: SPECIALIST

## 2021-12-24 PROCEDURE — 93454 CORONARY ARTERY ANGIO S&I: CPT | Performed by: SPECIALIST

## 2021-12-24 PROCEDURE — 93005 ELECTROCARDIOGRAM TRACING: CPT

## 2021-12-24 PROCEDURE — 77030004532 HC CATH ANGI DX IMP BSC -A: Performed by: SPECIALIST

## 2021-12-24 PROCEDURE — 4A023N7 MEASUREMENT OF CARDIAC SAMPLING AND PRESSURE, LEFT HEART, PERCUTANEOUS APPROACH: ICD-10-PCS | Performed by: SPECIALIST

## 2021-12-24 PROCEDURE — 74011250636 HC RX REV CODE- 250/636: Performed by: INTERNAL MEDICINE

## 2021-12-24 PROCEDURE — 99152 MOD SED SAME PHYS/QHP 5/>YRS: CPT | Performed by: SPECIALIST

## 2021-12-24 PROCEDURE — 65660000000 HC RM CCU STEPDOWN

## 2021-12-24 PROCEDURE — 2709999900 HC NON-CHARGEABLE SUPPLY: Performed by: SPECIALIST

## 2021-12-24 DEVICE — XIENCE SIERRA™ EVEROLIMUS ELUTING CORONARY STENT SYSTEM 2.50 MM X 18 MM / RAPID-EXCHANGE
Type: IMPLANTABLE DEVICE | Status: FUNCTIONAL
Brand: XIENCE SIERRA™

## 2021-12-24 DEVICE — XIENCE SIERRA™ EVEROLIMUS ELUTING CORONARY STENT SYSTEM 2.75 MM X 28 MM / RAPID-EXCHANGE
Type: IMPLANTABLE DEVICE | Status: FUNCTIONAL
Brand: XIENCE SIERRA™

## 2021-12-24 DEVICE — XIENCE SIERRA™ EVEROLIMUS ELUTING CORONARY STENT SYSTEM 2.50 MM X 28 MM / RAPID-EXCHANGE
Type: IMPLANTABLE DEVICE | Status: FUNCTIONAL
Brand: XIENCE SIERRA™

## 2021-12-24 RX ORDER — OXYCODONE AND ACETAMINOPHEN 7.5; 325 MG/1; MG/1
1 TABLET ORAL
Status: DISCONTINUED | OUTPATIENT
Start: 2021-12-24 | End: 2021-12-25 | Stop reason: HOSPADM

## 2021-12-24 RX ORDER — LIDOCAINE HYDROCHLORIDE 10 MG/ML
INJECTION INFILTRATION; PERINEURAL AS NEEDED
Status: DISCONTINUED | OUTPATIENT
Start: 2021-12-24 | End: 2021-12-24 | Stop reason: HOSPADM

## 2021-12-24 RX ORDER — MORPHINE SULFATE 2 MG/ML
2 INJECTION, SOLUTION INTRAMUSCULAR; INTRAVENOUS
Status: DISCONTINUED | OUTPATIENT
Start: 2021-12-24 | End: 2021-12-25 | Stop reason: HOSPADM

## 2021-12-24 RX ORDER — SODIUM CHLORIDE 0.9 % (FLUSH) 0.9 %
5-40 SYRINGE (ML) INJECTION EVERY 8 HOURS
Status: DISCONTINUED | OUTPATIENT
Start: 2021-12-24 | End: 2021-12-25 | Stop reason: HOSPADM

## 2021-12-24 RX ORDER — HEPARIN SODIUM 5000 [USP'U]/ML
5000 INJECTION, SOLUTION INTRAVENOUS; SUBCUTANEOUS EVERY 8 HOURS
Status: DISCONTINUED | OUTPATIENT
Start: 2021-12-24 | End: 2021-12-25 | Stop reason: HOSPADM

## 2021-12-24 RX ORDER — ZOLPIDEM TARTRATE 5 MG/1
2.5 TABLET ORAL
Status: DISCONTINUED | OUTPATIENT
Start: 2021-12-24 | End: 2021-12-25 | Stop reason: HOSPADM

## 2021-12-24 RX ORDER — HYDROCORTISONE SODIUM SUCCINATE 100 MG/2ML
100 INJECTION, POWDER, FOR SOLUTION INTRAMUSCULAR; INTRAVENOUS
Status: DISCONTINUED | OUTPATIENT
Start: 2021-12-24 | End: 2021-12-25 | Stop reason: HOSPADM

## 2021-12-24 RX ORDER — MIDAZOLAM HYDROCHLORIDE 1 MG/ML
INJECTION, SOLUTION INTRAMUSCULAR; INTRAVENOUS AS NEEDED
Status: DISCONTINUED | OUTPATIENT
Start: 2021-12-24 | End: 2021-12-24 | Stop reason: HOSPADM

## 2021-12-24 RX ORDER — ONDANSETRON 4 MG/1
4 TABLET, ORALLY DISINTEGRATING ORAL
Status: DISCONTINUED | OUTPATIENT
Start: 2021-12-24 | End: 2021-12-25 | Stop reason: HOSPADM

## 2021-12-24 RX ORDER — ASPIRIN 325 MG
325 TABLET ORAL DAILY
Status: DISCONTINUED | OUTPATIENT
Start: 2021-12-25 | End: 2021-12-25 | Stop reason: HOSPADM

## 2021-12-24 RX ORDER — SODIUM CHLORIDE 9 MG/ML
75 INJECTION, SOLUTION INTRAVENOUS CONTINUOUS
Status: DISPENSED | OUTPATIENT
Start: 2021-12-24 | End: 2021-12-24

## 2021-12-24 RX ORDER — INSULIN LISPRO 100 [IU]/ML
INJECTION, SOLUTION INTRAVENOUS; SUBCUTANEOUS
Status: DISCONTINUED | OUTPATIENT
Start: 2021-12-24 | End: 2021-12-25 | Stop reason: HOSPADM

## 2021-12-24 RX ORDER — ACETAMINOPHEN 325 MG/1
650 TABLET ORAL
Status: DISCONTINUED | OUTPATIENT
Start: 2021-12-24 | End: 2021-12-25 | Stop reason: HOSPADM

## 2021-12-24 RX ORDER — METOPROLOL TARTRATE 25 MG/1
25 TABLET, FILM COATED ORAL EVERY 12 HOURS
Status: DISCONTINUED | OUTPATIENT
Start: 2021-12-24 | End: 2021-12-25

## 2021-12-24 RX ORDER — SODIUM CHLORIDE 0.9 % (FLUSH) 0.9 %
5-40 SYRINGE (ML) INJECTION AS NEEDED
Status: DISCONTINUED | OUTPATIENT
Start: 2021-12-24 | End: 2021-12-25 | Stop reason: HOSPADM

## 2021-12-24 RX ORDER — HEPARIN SODIUM 200 [USP'U]/100ML
INJECTION, SOLUTION INTRAVENOUS
Status: COMPLETED | OUTPATIENT
Start: 2021-12-24 | End: 2021-12-24

## 2021-12-24 RX ORDER — ACETAMINOPHEN 650 MG/1
650 SUPPOSITORY RECTAL
Status: DISCONTINUED | OUTPATIENT
Start: 2021-12-24 | End: 2021-12-25 | Stop reason: HOSPADM

## 2021-12-24 RX ORDER — SODIUM CHLORIDE 9 MG/ML
75 INJECTION, SOLUTION INTRAVENOUS CONTINUOUS
Status: DISCONTINUED | OUTPATIENT
Start: 2021-12-24 | End: 2021-12-25 | Stop reason: HOSPADM

## 2021-12-24 RX ORDER — MAGNESIUM SULFATE 100 %
4 CRYSTALS MISCELLANEOUS AS NEEDED
Status: DISCONTINUED | OUTPATIENT
Start: 2021-12-24 | End: 2021-12-25 | Stop reason: HOSPADM

## 2021-12-24 RX ORDER — CLOPIDOGREL BISULFATE 75 MG/1
75 TABLET ORAL DAILY
Status: DISCONTINUED | OUTPATIENT
Start: 2021-12-25 | End: 2021-12-25 | Stop reason: HOSPADM

## 2021-12-24 RX ORDER — GUAIFENESIN 100 MG/5ML
324 LIQUID (ML) ORAL
Status: COMPLETED | OUTPATIENT
Start: 2021-12-24 | End: 2021-12-24

## 2021-12-24 RX ORDER — NITROGLYCERIN 20 MG/100ML
INJECTION INTRAVENOUS
Status: COMPLETED | OUTPATIENT
Start: 2021-12-24 | End: 2021-12-24

## 2021-12-24 RX ORDER — DEXTROSE 50 % IN WATER (D50W) INTRAVENOUS SYRINGE
12.5-25 AS NEEDED
Status: DISCONTINUED | OUTPATIENT
Start: 2021-12-24 | End: 2021-12-25 | Stop reason: HOSPADM

## 2021-12-24 RX ORDER — ONDANSETRON 2 MG/ML
4 INJECTION INTRAMUSCULAR; INTRAVENOUS
Status: DISCONTINUED | OUTPATIENT
Start: 2021-12-24 | End: 2021-12-25 | Stop reason: HOSPADM

## 2021-12-24 RX ORDER — CLOPIDOGREL 300 MG/1
TABLET, FILM COATED ORAL AS NEEDED
Status: DISCONTINUED | OUTPATIENT
Start: 2021-12-24 | End: 2021-12-24 | Stop reason: HOSPADM

## 2021-12-24 RX ORDER — AMLODIPINE BESYLATE 5 MG/1
2.5 TABLET ORAL DAILY
Status: DISCONTINUED | OUTPATIENT
Start: 2021-12-25 | End: 2021-12-25

## 2021-12-24 RX ORDER — ATORVASTATIN CALCIUM 20 MG/1
20 TABLET, FILM COATED ORAL DAILY
Status: DISCONTINUED | OUTPATIENT
Start: 2021-12-25 | End: 2021-12-25 | Stop reason: HOSPADM

## 2021-12-24 RX ORDER — POLYETHYLENE GLYCOL 3350 17 G/17G
17 POWDER, FOR SOLUTION ORAL DAILY PRN
Status: DISCONTINUED | OUTPATIENT
Start: 2021-12-24 | End: 2021-12-25 | Stop reason: HOSPADM

## 2021-12-24 RX ORDER — NITROGLYCERIN 0.4 MG/1
0.4 TABLET SUBLINGUAL
Status: DISCONTINUED | OUTPATIENT
Start: 2021-12-24 | End: 2021-12-25 | Stop reason: HOSPADM

## 2021-12-24 RX ORDER — FENTANYL CITRATE 50 UG/ML
INJECTION, SOLUTION INTRAMUSCULAR; INTRAVENOUS AS NEEDED
Status: DISCONTINUED | OUTPATIENT
Start: 2021-12-24 | End: 2021-12-24 | Stop reason: HOSPADM

## 2021-12-24 RX ADMIN — HEPARIN SODIUM 5000 UNITS: 5000 INJECTION INTRAVENOUS; SUBCUTANEOUS at 22:58

## 2021-12-24 RX ADMIN — Medication 10 ML: at 15:12

## 2021-12-24 RX ADMIN — METOPROLOL TARTRATE 25 MG: 25 TABLET, FILM COATED ORAL at 15:11

## 2021-12-24 RX ADMIN — METOPROLOL TARTRATE 25 MG: 25 TABLET, FILM COATED ORAL at 20:09

## 2021-12-24 RX ADMIN — ASPIRIN 324 MG: 81 TABLET, CHEWABLE ORAL at 12:28

## 2021-12-24 RX ADMIN — SODIUM CHLORIDE 75 ML/HR: 9 INJECTION, SOLUTION INTRAVENOUS at 19:00

## 2021-12-24 RX ADMIN — MORPHINE SULFATE 2 MG: 2 INJECTION, SOLUTION INTRAMUSCULAR; INTRAVENOUS at 21:05

## 2021-12-24 RX ADMIN — Medication 10 ML: at 15:11

## 2021-12-24 RX ADMIN — Medication 10 ML: at 21:07

## 2021-12-24 RX ADMIN — OXYCODONE AND ACETAMINOPHEN 1 TABLET: 7.5; 325 TABLET ORAL at 19:49

## 2021-12-24 RX ADMIN — Medication 10 ML: at 21:08

## 2021-12-24 RX ADMIN — HEPARIN SODIUM 5000 UNITS: 5000 INJECTION INTRAVENOUS; SUBCUTANEOUS at 15:11

## 2021-12-24 RX ADMIN — SODIUM CHLORIDE 75 ML/HR: 9 INJECTION, SOLUTION INTRAVENOUS at 15:43

## 2021-12-24 RX ADMIN — ONDANSETRON 4 MG: 2 INJECTION INTRAMUSCULAR; INTRAVENOUS at 21:02

## 2021-12-24 NOTE — Clinical Note
UNABLE TO ADVANCE CATHETER AND WIRE VIA RIGHT FEMORAL ARTERY.  WILL ATTEMPT ACCESS AT LEFT FORMAL ARTERY

## 2021-12-24 NOTE — Clinical Note
Sheath #2: Sheath: left in place. Site secured by Tegaderm. Sheath connected to heparin pressure bag.

## 2021-12-24 NOTE — ED NOTES
Dr. Osiris Garsia at bedside to discuss cardiac catheterization procedure with pt and family and verbally consent. Pt taken ARCHANA to cath lab.

## 2021-12-24 NOTE — PROGRESS NOTES
Spiritual Care Assessment/Progress Note  [address]      NAME: Eh Adams      MRN: [id number]  AGE: 80 y.o. SEX: female  Restoration Affiliation: Unknown   Language: English     12/24/2021     Total Time (in minutes): 19     Spiritual Assessment begun in [facility] EMERGENCY DEPT through conversation with:         []Patient        [] Family    [] Friend(s)        Reason for Consult: Crisis, Stemi     Spiritual beliefs: (Please include comment if needed)     [] Identifies with a kareem tradition:         [] Supported by a kareem community:            [] Claims no spiritual orientation:           [] Seeking spiritual identity:                [] Adheres to an individual form of spirituality:           [x] Not able to assess:                           Identified resources for coping:      [] Prayer                               [] Music                  [] Guided Imagery     [] Family/friends                 [] Pet visits     [] Devotional reading                         [x] Unknown     [] Other:                                              Interventions offered during this visit: (See comments for more details)    Patient Interventions: Initial visit           Plan of Care:     [] Support spiritual and/or cultural needs    [] Support AMD and/or advance care planning process      [] Support grieving process   [] Coordinate Rites and/or Rituals    [] Coordination with community clergy   [] No spiritual needs identified at this time   [] Detailed Plan of Care below (See Comments)  [] Make referral to Music Therapy  [] Make referral to Pet Therapy     [] Make referral to Addiction services  [] Make referral to [facility]  [] Make referral to Spiritual Care Partner  [] No future visits requested        [x] Follow up visits as needed     Responded to a Stemi in ER. Pt was being attended to by medical staff and no family present.   Chaplain Shena, MDiv, MS, [facility]

## 2021-12-24 NOTE — PROGRESS NOTES
SPEECH PATHOLOGY BEDSIDE SWALLOW EVALUATION/DISCHARGE  Patient: Irina Robles (56 y.o. female)  Date: 12/24/2021  Primary Diagnosis: Chest pain [R07.9]  Procedure(s) (LRB):  LEFT HEART CATH / CORONARY ANGIOGRAPHY (N/A)  INSERT STENT GERRY CORONARY (N/A)  ANGIOGRAPHY ILIAC BILAT (N/A) Day of Surgery   Precautions: aspiration       ASSESSMENT :  Based on the objective data described below, the patient presents with functional oral-pharyngeal swallow. Slightly reduced chew with Upper dentures. SHe may have some decreased esophageal peristalsis at age 80. Will need additional workup with barium esophagram IF she can not tolerate solid foods this weekend. Symptoms may have been STEMI related though. Admitted 12-21-21 with c/o food stuck in esophagus,  Chest pressure. She was dx with STEMI and had emergent cardiac cath. PMH: DM, HTN< CKD>  Skilled acute therapy provided by a speech-language pathologist is not indicated at this time. PLAN :  Recommendations:  Ok for Easy to [employer]. thin liquids. Prop head up for eating. Consider esophagram next week or as OP if symptoms of esophageal residue s/p PO persist.   Discharge Recommendations: None     SUBJECTIVE:   Patient stated [employer] is some gristle in here.  .    OBJECTIVE:     Past Medical History:   Diagnosis Date    Chronic kidney disease     Diabetes ([facility] [address]     Hypertension      Past Surgical History:   Procedure Laterality Date    HX APPENDECTOMY      HX HYSTERECTOMY      HX KNEE REPLACEMENT Left     HX OTHER SURGICAL      head sx x 2 nerve related    HX OTHER SURGICAL      Back surgery x 2    HX TONSILLECTOMY       Prior Level of Function/Home Situation:   Home Situation  Home Environment: Private residence  # Steps to Enter: 1  One/Two Story Residence: Two story, live on 1st floor  Living Alone: No  Support Systems: Child(watson)  Patient Expects to be Discharged to[de-identified] House  Current DME Used/Available at Home: Walker, rolling  Diet prior to admission: regular, thins  Current Diet:  Regular, thins. But had to be mostly reclined to eat. Cognitive and Communication Status:     Orientation Level: Oriented to person,Oriented to place,Oriented to time  Cognition: Follows commands  Perception: Appears intact  Perseveration: No perseveration noted  Safety/Judgement: Insight into deficits  Oral Assessment:  Oral Assessment  Labial:  (very mild R asymettry)  Dentition: Upper dentures  Oral Hygiene: WFL  Lingual: No impairment  Velum: No impairment  P.O. Trials:  Patient Position: reclined in bed s/p cardiac cath with head propped up at 90 for swallowing. Vocal quality prior to P.O.: No impairment  Consistency Presented: Solid; Thin liquid;Puree  How Presented: Self-fed/presented;Spoon;Straw;Successive swallows   ORAL PHASE:   Bolus Acceptance: No impairment  Bolus Formation/Control: Impaired (extended chew of solids. had to spit out the mushroom piece. She tried pudding, water and beef)  Type of Impairment: Mastication  Propulsion: No impairment  Oral Residue: None   PHARYNGEAL PHASE:   Initiation of Swallow: No impairment  Laryngeal Elevation: Functional  Aspiration Signs/Symptoms: None      daughter reports that patient has dysphagia for large pills and they cut in half. Provided compensatory strateagies. Patient initially admitted with c/o esophageal transit issues, but ended up with dx : MI needing cardiac cath                NOMS:   The NOMS functional outcome measure was used to quantify this patient's level of swallowing impairment. Based on the NOMS, the patient was determined to be at level 6 for swallow function     NOMS Swallowing Levels:  Level 1 (CN): NPO  Level 2 (CM): NPO but takes consistency in therapy  Level 3 (CL): Takes less than 50% of nutrition p.o. and continues with nonoral feedings; and/or safe with mod cues; and/or max diet restriction  Level 4 (CK):  Safe swallow but needs mod cues; and/or mod diet restriction; and/or still requires some nonoral feeding/supplements  Level 5 (CJ): Safe swallow with min diet restriction; and/or needs min cues  Level 6 (CI): Independent with p.o.; rare cues; usually self cues; may need to avoid some foods or needs extra time  Level 7 ([address] [place] [address]): Independent for all p.o.  GRAY. (2003). National Outcomes Measurement System (NOMS): Adult Speech-Language Pathology User's Guide. Pain:  Pain Scale 1: Numeric (0 - 10)  Pain Intensity 1: 0  Pain Location 1: Chest  After treatment:   Patient left in no apparent distress in bed, Nursing notified and Caregiver / family present    COMMUNICATION/EDUCATION:   Patient was educated regarding her deficit(s) of possible esophageal dysphagia  as this relates to her diagnosis of STEMI. She demonstrated Good understanding as evidenced by discussion. .    The patient's plan of care including recommendations, planned interventions, and recommended diet changes were discussed with: Registered nurse.      Thank you for this referral.  GÓMEZ Chance  Time Calculation: 15 mins

## 2021-12-24 NOTE — Clinical Note
Sheath #1: Sheath: left in place. Site secured by Tegaderm. Sheath connected to heparin pressure bag.

## 2021-12-24 NOTE — PROGRESS NOTES
Advance Care Planning (ACP) Provider Note - Comprehensive      Date of ACP Conversation:  12/24/21    Persons included in Conversation:  patient   Length of ACP Conversation in minutes:  16 minutes     Authorized Decision Maker (if patient is incapable of making informed decisions): This person is: Ms. Elena Camargo for ALL Patients with Decision Making Capacity:  Importance of advance care planning, including choosing a healthcare agent to communicate patient's healthcare decisions if patient lost the ability to make decisions. Review of Existing Advance Directive:  Patient and family do not have an advance directive readily available for review. Active Diagnoses:   Myocardial infarction    These active diagnoses are of sufficient risk that focused discussion on advance care planning is indicated in order to allow the patient to thoughtfully consider personal goals of care; and, if situations arise that prevent the ability to personally give input, to ensure appropriate representation of their personal desires for different levels and aggressiveness of care. For Serious or Chronic Illness:  Understanding of medical condition     Reviewed pt's clinical status. Sula Rinne has multiple medical problems including DM, HTN and is being admitted for ST elevation MI. We reviewed our treatment plan and anticipated discharge plans. Further, we discussed pt's wishes on how she would like to proceed (aggressive/heroic resuscitation vs not intervening and allowing nature takes its course) if she were to suffer cardiopulmonary arrest.    Understanding of CPR, goals and expected outcomes, benefits and burdens discussed. Information on CPR success provided (many factors lower a patients chance of survival, including advanced age, performance status, malignancy, and presence of multiple comorbidities); Individual asked to communicate understanding of information in his/her own words.     CPR works best to restart the heart when there is a sudden event, like a heart attack, in someone who is otherwise healthy. Unfortunately, CPR does not typically restart the heart for people who have serious health conditions or who are very sick. \"     \"In the event your heart stopped as a result of an underlying serious health condition, would you want attempts to be made to restart your heart (answer \"yes\" for attempt to resuscitate) or would you prefer a natural death (answer \"no\" for do not attempt to resuscitate)? \" NO    Patient made it very clear to me that she would not want heroic measures for resuscitation in the event of cardiopulmonary arrest, including chest compressions, defibrillation, intubation/mechanical ventilation.     Interventions Provided:  Referral made for ACP follow-up assistance to: Palliative care

## 2021-12-24 NOTE — ED PROVIDER NOTES
HPI       80y F with DM and HTN here with trouble swallowing. Started a few days ago. Feels like food isn't getting all the way down her esophagus. This is causing a pressure sensation in the middle of her chest. No shortness of breath. No fever. No nausea or vomiting. Is able to swallow her secretions and still take PO. No diarrhea. No hx of similar. Nothing makes sx's better or worse. Past Medical History:   Diagnosis Date    Chronic kidney disease     [facility] ([facility] [address]     Hypertension        Past Surgical History:   Procedure Laterality Date    HX APPENDECTOMY      HX HYSTERECTOMY      HX KNEE REPLACEMENT Left     HX OTHER SURGICAL      head sx x 2 nerve related    HX OTHER SURGICAL      Back surgery x 2    HX TONSILLECTOMY           No family history on file. Social History     Socioeconomic History    Marital status:      Spouse name: Not on file    Number of children: Not on file    Years of education: Not on file    Highest education level: Not on file   Occupational History    Not on file   Tobacco Use    Smoking status: Not on file    Smokeless tobacco: Not on file   Substance and Sexual Activity    Alcohol use: Not on file    Drug use: Not on file    Sexual activity: Not on file   Other Topics Concern    Not on file   Social History Narrative    Not on file     Social Determinants of Health     Financial Resource Strain:     Difficulty of Paying Living Expenses: Not on file   Food Insecurity:     Worried About Running Out of Food in the Last Year: Not on file    Alondra of Food in the Last Year: Not on file   Transportation Needs:     Lack of Transportation (Medical): Not on file    Lack of Transportation (Non-Medical):  Not on file   Physical Activity:     Days of Exercise per Week: Not on file    Minutes of Exercise per Session: Not on file   Stress:     Feeling of Stress : Not on file   Social Connections:     Frequency of Communication with Friends and Family: Not on file    Frequency of Social Gatherings with Friends and Family: Not on file    Attends Zoroastrian Services: Not on file    Active Member of Clubs or Organizations: Not on file    Attends Club or Organization Meetings: Not on file    Marital Status: Not on file   Intimate Partner Violence:     Fear of Current or Ex-Partner: Not on file    Emotionally Abused: Not on file    Physically Abused: Not on file    Sexually Abused: Not on file   Housing Stability:     Unable to Pay for Housing in the Last Year: Not on file    Number of [place] in the Last Year: Not on file    Unstable Housing in the Last Year: Not on file         ALLERGIES: Codeine, Compazine [prochlorperazine], Dilaudid [hydromorphone], Morphine, and Sulfa (sulfonamide antibiotics)    Review of Systems   Review of Systems   Constitutional: (-) weight loss. HEENT: (-) stiff neck   Eyes: (-) discharge. Respiratory: (-) cough. Cardiovascular: (-) syncope. Gastrointestinal: (-) blood in stool. Genitourinary: (-) hematuria. Musculoskeletal: (-) myalgias. Neurological: (-) seizure. Skin: (-) petechiae  Lymph/Immunologic: (-) enlarged lymph nodes  All other systems reviewed and are negative. Vitals:    12/24/21 1100   BP: (!) 167/71   Pulse: 89   Resp: 16   Temp: 98.3 °F (36.8 °C)   SpO2: 96%   Weight: 63.5 kg (140 lb)   Height: 5' 2\" (1.575 m)            Physical Exam Nursing note and vitals reviewed. Constitutional: oriented to person, place, and time. appears well-developed and well-nourished. No distress. Head: Normocephalic and atraumatic. Sclera anicteric  Nose: No rhinorrhea  Mouth/Throat: Oropharynx is clear and moist. Pharynx normal  Eyes: Conjunctivae are normal. Pupils are equal, round, and reactive to light. Right eye exhibits no discharge. Left eye exhibits no discharge. Neck: Painless normal range of motion. Neck supple. No LAD.   Cardiovascular: Normal rate, regular rhythm, normal heart sounds and intact distal pulses. Exam reveals no gallop and no friction rub. No murmur heard. Pulmonary/Chest:  No respiratory distress. No wheezes. No rales. No rhonchi. No increased work of breathing. No accessory muscle use. Good air exchange throughout. Abdominal: soft, non-tender, no rebound or guarding. No hepatosplenomegaly. Normal bowel sounds throughout. Back: no tenderness to palpation, no deformities, no CVA tenderness  Extremities/Musculoskeletal: Normal range of motion. no tenderness. No edema. Distal extremities are neurovasc intact. Lymphadenopathy:   No adenopathy. Neurological:  Alert and oriented to person, place, and time. Coordination normal. CN 2-12 intact. Motor and sensory function intact. Skin: Skin is warm and dry. No rash noted. No pallor. MDM 87y F here with what sounds like trouble swallowing which is causing chest pressure. By history doesn't sound cardiac. Will check upper GI as well as labs and ECG. Procedures    12:04 PM  Radiologist will not come in to do upper GI. Will d/w GI for follow-up.    12:20 PM  ECG shows STEMI. Cards coming to evaluate. Getting 324 ASA. ED EKG interpretation:  Rhythm: normal sinus rhythm; and regular . Rate (approx.): 85; Axis: normal; P wave: normal; QRS interval: normal ; ST/T wave: elevated inferior leads; This EKG was interpreted by Celine Elias MD,ED Provider. Perfect Serve Consult for Admission  12:23 PM    ED Room Number: ER15/15  Patient Name and age:  Rubi Jaime 80 y.o.  female  Working Diagnosis: No diagnosis found. COVID-19 Suspicion:  no  Sepsis present:  no  Reassessment needed: N/A  Code Status:  Full Code  Readmission: yes  Isolation Requirements:  no  Recommended Level of Care:  telemetry  Department:[facility] ED - [phone]  Other:  Harvindere Settler here with midline chest pressure. She felt it was more GI related that she wasn't able to swallow her food well and that it was getting stuck.  Having some sx's for the past few days but worse this AM and with nausea. Admitted 2 months ago for R humerus and pubic rami fx's. Overall had been recovering well and back home.  ECG done today shows STEMI and cards coming in now but asked to admit to hospitalist.    Total critical care time (excluding procedures): 35 min

## 2021-12-24 NOTE — PROCEDURES
Cath:  Obstructive 2VD:     LAD p30, m30; D1 70     LCx p30 (co-dom)     RCA m100  Successful GERRY (\"full metal jacket\") of mid RCA into PDA     Mid 2.75x28 Xience     Mid-dist 2.5x28 Xience     Dist-PDA 2.5x18 Xience     All post dil with 2.75x27 NC Trek     Focal mid-dist area post-dil with 3.0x8 NC Euphora  RFA/LFA manual  RFA with sig disease so LFA accessed    ASA/plavix, statin  NTG drip  Echo  DM mgmt per Dr. Sesar Rudd

## 2021-12-24 NOTE — CONSULTS
Sherle Meigs, MD, [address] [place] [address]  [address]  [phone]    Date of  Admission: 12/24/2021 11:03 AM         IMPRESSION and RECOMMENDATIONS     1.  CP:  No troponin back. Symptoms and EKG worrisome for acute IMI. Rec emergent cath/poss. .The risks (including but not limited to death, myocardial infarction, cerebrovascular accident, dysrhythmia, renal failure, vascular complication, allergy, and/or need for emergency surgery), benefits, and alternatives have been explained. Verbal informed consent has been obtained. Further mgmt based on cath. \  I have discussed this plan with the patient. She appears to understand this plan and wishes to proceed ahead. Problem List  Date Reviewed: 10/18/2021          Codes Class Noted    Chest pain ICD-10-CM: R07.9  ICD-9-CM: 786.50  12/24/2021        Dysphagia ICD-10-CM: R13.10  ICD-9-CM: 787.20  12/24/2021        * (Principal) STEMI (ST elevation myocardial infarction) ([facility] ICD-10-CM: I21.3  ICD-9-CM: 410.90  12/24/2021        Right knee pain ICD-10-CM: M25.561  ICD-9-CM: 719.46  10/21/2021        Humeral surgical neck fracture ICD-10-CM: S42.213A  ICD-9-CM: 812.01  10/18/2021        Closed fracture of multiple pubic rami ([facility] ICD-10-CM: F96.393S  ICD-9-CM: 808.2  10/18/2021        HTN (hypertension), benign ICD-10-CM: I10  ICD-9-CM: 401.1  10/18/2021        DM (diabetes mellitus), type 2 ([facility] ICD-10-CM: E11.9  ICD-9-CM: 250.00  10/18/2021        Leukocytosis ICD-10-CM: C69.317  ICD-9-CM: 288.60  10/18/2021        CKD (chronic kidney disease) stage 3, GFR 30-59 ml/min ([facility]) ICD-10-CM: N18.30  ICD-9-CM: 585.3  10/18/2021        Hypokalemia due to loss of potassium ICD-10-CM: E87.6  ICD-9-CM: 276.8  10/18/2021              History of Present Illness:     Carol Christy is a 80 y.o. female with the above problem list who was admitted for Chest pain [R07.9].     Presents with epigastric, lower ant CP x several days. Today, was very bad so came to ER. Here EKG worrisome for IMI. 80y F with DM and HTN here with trouble swallowing. Started a few days ago. Feels like food isn't getting all the way down her esophagus. This is causing a pressure sensation in the middle of her chest. No shortness of breath. No fever. No nausea or vomiting. Is able to swallow her secretions and still take PO. No diarrhea. No hx of similar. Nothing makes sx's better or worse. She denies other prior chest pain/discomfort, shortness of breath, dyspnea on exertion, orthopnea, paroxysmal noctural dyspnea, lower extremity edema, palpitations, syncope, or near-syncope. No current facility-administered medications for this encounter. Current Outpatient Medications   Medication Sig    oxyCODONE IR (ROXICODONE) 5 mg immediate release tablet Take 1 Tablet by mouth every four (4) hours as needed. Max Daily Amount: 30 mg.    senna-docusate (PERICOLACE) 8.6-50 mg per tablet Take 1 Tablet by mouth two (2) times a day.  polyethylene glycol (MIRALAX) 17 gram packet Take 1 Packet by mouth two (2) times a day.  albuterol (PROVENTIL VENTOLIN) 2.5 mg /3 mL (0.083 %) nebu 2.5 mg by Nebulization route every four (4) hours as needed for Wheezing or Shortness of Breath.  amLODIPine (NORVASC) 2.5 mg tablet Take 2.5 mg by mouth daily.  furosemide (Lasix) 40 mg tablet Take 40 mg by mouth two (2) times a day.  glimepiride (AMARYL) 4 mg tablet Take 4 mg by mouth every morning.  albuterol (PROVENTIL HFA, VENTOLIN HFA, PROAIR HFA) 90 mcg/actuation inhaler Take 2 Puffs by inhalation every four (4) hours as needed for Wheezing.       Allergies   Allergen Reactions    Codeine Nausea and Vomiting    Compazine [Prochlorperazine] Other (comments)     Facial droop    Dilaudid [Hydromorphone] Nausea and Vomiting    Morphine Nausea and Vomiting    Sulfa (Sulfonamide Antibiotics) Nausea and Vomiting      No family history on file. Social History     Socioeconomic History    Marital status:      Spouse name: Not on file    Number of children: Not on file    Years of education: Not on file    Highest education level: Not on file   Occupational History    Not on file   Tobacco Use    Smoking status: Not on file    Smokeless tobacco: Not on file   Substance and Sexual Activity    Alcohol use: Not on file    Drug use: Not on file    Sexual activity: Not on file   Other Topics Concern    Not on file   Social History Narrative    Not on file     Social Determinants of Health     Financial Resource Strain:     Difficulty of Paying Living Expenses: Not on file   Food Insecurity:     Worried About Running Out of Food in the Last Year: Not on file    Alondra of Food in the Last Year: Not on file   Transportation Needs:     Lack of Transportation (Medical): Not on file    Lack of Transportation (Non-Medical):  Not on file   Physical Activity:     Days of Exercise per Week: Not on file    Minutes of Exercise per Session: Not on file   Stress:     Feeling of Stress : Not on file   Social Connections:     Frequency of Communication with Friends and Family: Not on file    Frequency of Social Gatherings with Friends and Family: Not on file    Attends Latter day Services: Not on file    Active Member of [address] [place] [address] [employer] or Organizations: Not on file    Attends Club or Organization Meetings: Not on file    Marital Status: Not on file   Intimate Partner Violence:     Fear of Current or Ex-Partner: Not on file    Emotionally Abused: Not on file    Physically Abused: Not on file    Sexually Abused: Not on file   Housing Stability:     Unable to Pay for Housing in the Last Year: Not on file    Number of [place] in the Last Year: Not on file    Unstable Housing in the Last Year: Not on file       Physical Exam:     Patient Vitals for the past 16 hrs:   BP Temp Pulse Resp SpO2 Height Weight   12/24/21 1236 (!) 178/75  99 16 98 %     12/24/21 1221     96 %     12/24/21 1100 (!) 167/71 98.3 °F (36.8 °C) 89 16 96 % 5' 2\" (1.575 m) 63.5 kg (140 lb)       HEENT Exam:     Normocephalic, atraumatic. EOMI. Oropharynx negative. Neck supple. No lymphadenopathy. Lung Exam:     The patient is not dyspneic. There is no cough. Breath sounds are heard equally in all lung fields. There are no wheezes, rales, rhonchi, or rubs heard on auscultation. Heart Exam:     The rhythm is regular. The PMI is in the 5th intercostal space of the MCL. Apical impulse is normal. S1 is regular. S2 is physiologic. There is no S3, S4 gallop, murmur, click, or rub. Abdomen Exam:     Bowel sounds are normoactive. Abdomen soft in all quadrants. No tenderness. No palpable masses. No organomegaly. No hernias noted. No bruits or pulsatile mass. Extremities Exam:     The extremities are atraumatic appearing. There is no clubbing, cyanosis, edema, ulcers, varicose veins, rash, erythemia noted in the extremities. The neurovascular status is grossly intact with normal distal sensation and pulses. Vascular Exam:     The radial, brachial, dorsalis pedis, posterior tibial, are equal and strong bilaterally The carotids are equal bilaterally without bruits. Labs:     Lab Results   Component Value Date/Time    Glucose 221 (H) 12/24/2021 11:53 AM    Sodium 140 12/24/2021 11:53 AM    Potassium 3.7 12/24/2021 11:53 AM    Chloride 104 12/24/2021 11:53 AM    CO2 28 12/24/2021 11:53 AM    BUN 22 (H) 12/24/2021 11:53 AM    Creatinine 2.10 (H) 12/24/2021 11:53 AM    Calcium 9.2 12/24/2021 11:53 AM     Recent Labs     12/24/21  1153   WBC 6.8   HGB 11.2*   HCT 35.4        Recent Labs     12/24/21  1153   ALT 22   AP 88   TBILI 0.5   TP 7.4   ALB 3.2*   GLOB 4.2*     No results for input(s): INR, PTP, APTT, INREXT in the last 72 hours. No results for input(s): CPK, CKMB, TNIPOC in the last 72 hours.     No lab exists for component: TROPONINI, ITNL  No results for input(s): TROIQ in the last 72 hours.   No results found for: CHOL, CHOLX, CHLST, CHOLV, HDL, HDLP, LDL, LDLC, DLDLP, TGLX, TRIGL, TRIGP, CHHD, CHHDX    EKG:  NSR, PVCs, IMI

## 2021-12-24 NOTE — Clinical Note
TRANSFER - OUT REPORT:     Verbal report given to: Chitra Khalil. Report consisted of patient's Situation, Background, Assessment and   Recommendations(SBAR). Opportunity for questions and clarification was provided. Patient transported with a Registered Nurse, [address] [place] [address] / Patient Care Tech and Monitor. Patient transported to: ICU, 1.

## 2021-12-24 NOTE — H&P
[facility]  [address] [facility], [facility]  [phone]    Admission History and Physical      NAME:  Emily Sadler   :   1934   MRN:  [id number]     PCP:  Sonia Sage NP     Date of service:  2021         Subjective:     CHIEF COMPLAINT: Chest pressure    HISTORY OF PRESENT ILLNESS:     Ms. Elizabeth Bedolla is a 80 y.o.  female who is admitted with STEMI. Ms. Elizabeth Bedolla with past medical history of CKD, DM, hypertension presented to ER complaining of chest pressure and difficulty swallowing. Patient has been having pressure in her midsternal area for the last couple of days, but today the pressure got worse and she told that she had difficulty swallowing, that the food stuck in her esophagus. Never had similar symptoms in the past.  Her EKG on admission shows ST elevation MI      Past Medical History:   Diagnosis Date    Chronic kidney disease     Diabetes ([address]     Hypertension         Past Surgical History:   Procedure Laterality Date    HX APPENDECTOMY      HX HYSTERECTOMY      HX KNEE REPLACEMENT Left     HX OTHER SURGICAL      head sx x 2 nerve related    HX OTHER SURGICAL      Back surgery x 2    HX TONSILLECTOMY         Social History     Tobacco Use    Smoking status: Not on file    Smokeless tobacco: Not on file   Substance Use Topics    Alcohol use: Not on file        No family history on file. Allergies   Allergen Reactions    Codeine Nausea and Vomiting    Compazine [Prochlorperazine] Other (comments)     Facial droop    Dilaudid [Hydromorphone] Nausea and Vomiting    Morphine Nausea and Vomiting    Sulfa (Sulfonamide Antibiotics) Nausea and Vomiting        Prior to Admission medications    Medication Sig Start Date End Date Taking? Authorizing Provider   oxyCODONE IR (ROXICODONE) 5 mg immediate release tablet Take 1 Tablet by mouth every four (4) hours as needed.  Max Daily Amount: 30 mg. 10/29/21   Corinne Fells, MD senna-docusate (PERICOLACE) 8.6-50 mg per tablet Take 1 Tablet by mouth two (2) times a day. 10/29/21   Dru Solis MD   polyethylene glycol [facility]) 17 gram packet Take 1 Packet by mouth two (2) times a day. 10/29/21   Dru Solis MD   albuterol (PROVENTIL VENTOLIN) 2.5 mg /3 mL (0.083 %) nebu 2.5 mg by Nebulization route every four (4) hours as needed for Wheezing or Shortness of Breath. Provider, Historical   amLODIPine (NORVASC) 2.5 mg tablet Take 2.5 mg by mouth daily. Provider, Historical   furosemide (Lasix) 40 mg tablet Take 40 mg by mouth two (2) times a day. Provider, Historical   glimepiride (AMARYL) 4 mg tablet Take 4 mg by mouth every morning.     Provider, Historical   albuterol (PROVENTIL HFA, VENTOLIN HFA, PROAIR HFA) 90 mcg/actuation inhaler Take 2 Puffs by inhalation every four (4) hours as needed for Wheezing. 7/25/21   Chanell Beckman MD         Review of Systems:  (bold if positive, if negative)    Gen:  Eyes:  ENT:  CVS:  chest pain, dPulm:  GI:  :  MS:  Skin:  Psych:  Endo:  Hem:  Renal:  Neuro:            Objective:      VITALS:    Vital signs reviewed; most recent are:    Visit Vitals  BP (!) 178/75   Pulse 99   Temp 98.3 °F (36.8 °C)   Resp 16   Ht 5' 2\" (1.575 m)   Wt 63.5 kg (140 lb)   SpO2 98%   BMI 25.61 kg/m²     SpO2 Readings from Last 6 Encounters:   12/24/21 98%   10/29/21 93%   07/25/21 96%        No intake or output data in the 24 hours ending 12/24/21 1305         Exam:     Physical Exam:    Gen:  Well-developed, well-nourished, in no acute distress  HEENT:  Pink conjunctivae, PERRL, hearing intact to voice, moist mucous membranes  Neck:  Supple, without masses, thyroid non-tender  Resp:  No accessory muscle use, clear breath sounds without wheezes rales or rhonchi  Card:  No murmurs, normal S1, S2 without thrills, bruits or peripheral edema  Abd:  Soft, non-tender, non-distended, normoactive bowel sounds are present, no palpable organomegaly and no detectable hernias  Lymph:  No cervical or inguinal adenopathy  Musc:  No cyanosis or clubbing  Skin:  No rashes or ulcers, skin turgor is good  Neuro:  Cranial nerves are grossly intact, no focal motor weakness, follows commands appropriately  Psych:  Good insight, oriented to person, place and time, alert       Labs:    Recent Labs     12/24/21  1153   WBC 6.8   HGB 11.2*   HCT 35.4        Recent Labs     12/24/21  1153      K 3.7      CO2 28   *   BUN 22*   CREA 2.10*   CA 9.2   ALB 3.2*   TBILI 0.5   ALT 22     Lab Results   Component Value Date/Time    Glucose (POC) 111 10/29/2021 05:17 PM    Glucose (POC) 118 (H) 10/29/2021 11:30 AM     No results for input(s): PH, PCO2, PO2, HCO3, FIO2 in the last 72 hours. No results for input(s): INR, INREXT in the last 72 hours. Telemetry reviewed:   ST elevation       Assessment/Plan:    1. STEMI (ST elevation myocardial infarction) ([facility] (12/24/2021)/ Chest pain (12/24/2021)/elevated troponin. Evaluated by cardiology and patient was taken to cardiac cath lab emergently. Management per cardiology. 2.  Dysphagia (12/24/2021). We will assess her swallow letter after cardiac catheterization    3. HTN (hypertension), benign (10/18/2021). Continue home amlodipine    4. DM (diabetes mellitus), type 2 ([facility] (10/18/2021). Hold Amaryl and cover with sliding scale for now    5. CKD (chronic kidney disease) stage 3, GFR 30-59 ml/min ([facility] (10/18/2021). Monitor renal function.   We will give gentle IV fluids due to IV contrast for cardiac catheterization    CODE STATUS: DNR(decision-maker: Daughter)    Previous medical records reviewed     Risk of deterioration: high      Total time spent with patient: [address] discussed with: Patient, Nursing Staff and >50% of time spent in counseling and coordination of care    Discussed:  Care Plan    Prophylaxis:  Hep SQ    Probable Disposition:  Home w/Family           ___________________________________________________    Attending Physician: Paulo Romano MD

## 2021-12-25 ENCOUNTER — APPOINTMENT (OUTPATIENT)
Dept: NON INVASIVE DIAGNOSTICS | Age: 86
DRG: 247 | End: 2021-12-25
Attending: SPECIALIST
Payer: MEDICARE

## 2021-12-25 VITALS
RESPIRATION RATE: 16 BRPM | DIASTOLIC BLOOD PRESSURE: 62 MMHG | OXYGEN SATURATION: 97 % | HEIGHT: 62 IN | WEIGHT: 138.45 LBS | TEMPERATURE: 97.6 F | BODY MASS INDEX: 25.48 KG/M2 | SYSTOLIC BLOOD PRESSURE: 134 MMHG | HEART RATE: 57 BPM

## 2021-12-25 LAB
ANION GAP SERPL CALC-SCNC: 4 MMOL/L (ref 5–15)
BUN SERPL-MCNC: 17 MG/DL (ref 6–20)
BUN/CREAT SERPL: 12 (ref 12–20)
CALCIUM SERPL-MCNC: 7.8 MG/DL (ref 8.5–10.1)
CHLORIDE SERPL-SCNC: 109 MMOL/L (ref 97–108)
CHOLEST SERPL-MCNC: 169 MG/DL
CO2 SERPL-SCNC: 29 MMOL/L (ref 21–32)
CREAT SERPL-MCNC: 1.47 MG/DL (ref 0.55–1.02)
ECHO AO ASC DIAM: 3.3 CM
ECHO AO ASCENDING AORTA INDEX: 2.02 CM/M2
ECHO AV AREA PEAK VELOCITY: 1.7 CM2
ECHO AV AREA PEAK VELOCITY: 1.7 CM2
ECHO AV PEAK GRADIENT: 8 MMHG
ECHO AV PEAK VELOCITY: 1.4 M/S
ECHO AV VELOCITY RATIO: 0.57
ECHO LA DIAMETER INDEX: 2.33 CM/M2
ECHO LA DIAMETER: 3.8 CM
ECHO LA VOL 2C: 75 ML (ref 22–52)
ECHO LA VOL 4C: 37 ML (ref 22–52)
ECHO LA VOL BP: 52 ML (ref 22–52)
ECHO LA VOL BP: 52 ML (ref 22–52)
ECHO LA VOLUME AREA LENGTH: 56 ML
ECHO LA VOLUME INDEX A2C: 46 ML/M2 (ref 16–34)
ECHO LA VOLUME INDEX A4C: 23 ML/M2 (ref 16–34)
ECHO LA VOLUME INDEX AREA LENGTH: 34 ML/M2 (ref 16–34)
ECHO LV E' LATERAL VELOCITY: 5 CM/S
ECHO LV E' SEPTAL VELOCITY: 5 CM/S
ECHO LV EDV A2C: 87 ML
ECHO LV EDV A4C: 109 ML
ECHO LV EDV BP: 107 ML (ref 56–104)
ECHO LV EDV BP: 107 ML (ref 56–104)
ECHO LV EDV INDEX A4C: 67 ML/M2
ECHO LV EDV NDEX A2C: 53 ML/M2
ECHO LV EJECTION FRACTION A2C: 43 %
ECHO LV EJECTION FRACTION A4C: 44 %
ECHO LV EJECTION FRACTION BIPLANE: 45 % (ref 55–100)
ECHO LV EJECTION FRACTION BIPLANE: 45 % (ref 55–100)
ECHO LV ESV A2C: 49 ML
ECHO LV ESV A4C: 61 ML
ECHO LV ESV BP: 59 ML (ref 19–49)
ECHO LV ESV INDEX A2C: 30 ML/M2
ECHO LV ESV INDEX A4C: 37 ML/M2
ECHO LV ESV INDEX BP: 36 ML/M2
ECHO LV FRACTIONAL SHORTENING: 19 % (ref 28–44)
ECHO LV INTERNAL DIMENSION DIASTOLE INDEX: 3.8 CM/M2
ECHO LV INTERNAL DIMENSION DIASTOLIC: 6.2 CM (ref 3.9–5.3)
ECHO LV INTERNAL DIMENSION SYSTOLIC INDEX: 3.07 CM/M2
ECHO LV INTERNAL DIMENSION SYSTOLIC: 5 CM
ECHO LV IVSD: 0.8 CM (ref 0.6–0.9)
ECHO LV MASS 2D: 197.1 G (ref 67–162)
ECHO LV MASS INDEX 2D: 120.9 G/M2 (ref 43–95)
ECHO LV POSTERIOR WALL DIASTOLIC: 0.8 CM (ref 0.6–0.9)
ECHO LV RELATIVE WALL THICKNESS RATIO: 0.26
ECHO LVOT AREA: 3.1 CM2
ECHO LVOT DIAM: 2 CM
ECHO LVOT PEAK GRADIENT: 2 MMHG
ECHO LVOT PEAK VELOCITY: 0.8 M/S
ECHO MV A VELOCITY: 1.09 M/S
ECHO MV E DECELERATION TIME (DT): 242.4 MS
ECHO MV E VELOCITY: 0.85 M/S
ECHO MV E/A RATIO: 0.78
ECHO MV E/E' LATERAL: 17
ECHO MV E/E' RATIO (AVERAGED): 17
ECHO MV E/E' SEPTAL: 17
ECHO MV REGURGITANT ALIASING (NYQUIST) VELOCITY: 32 CM/S
ECHO MV REGURGITANT PEAK GRADIENT: 144 MMHG
ECHO MV REGURGITANT PEAK VELOCITY: 6 M/S
ECHO MV REGURGITANT VELOCITY PISA: 5.5 M/S
ECHO MV REGURGITANT VTIA: 240.8 CM
ECHO PULMONARY ARTERY END DIASTOLIC PRESSURE: 3 MMHG
ECHO PV MAX VELOCITY: 0.8 M/S
ECHO PV PEAK GRADIENT: 3 MMHG
ECHO PV REGURGITANT MAX VELOCITY: 0.9 M/S
ECHO RV FREE WALL PEAK S': 10 CM/S
ECHO RV INTERNAL DIMENSION: 3.4 CM
ECHO RV TAPSE: 2.2 CM (ref 1.5–2)
ECHO TV REGURGITANT MAX VELOCITY: 2.37 M/S
ECHO TV REGURGITANT PEAK GRADIENT: 23 MMHG
ERYTHROCYTE [DISTWIDTH] IN BLOOD BY AUTOMATED COUNT: 13 % (ref 11.5–14.5)
GLUCOSE BLD STRIP.AUTO-MCNC: 140 MG/DL (ref 65–117)
GLUCOSE BLD STRIP.AUTO-MCNC: 147 MG/DL (ref 65–117)
GLUCOSE SERPL-MCNC: 140 MG/DL (ref 65–100)
HCT VFR BLD AUTO: 33.3 % (ref 35–47)
HDLC SERPL-MCNC: 49 MG/DL
HDLC SERPL: 3.4 {RATIO} (ref 0–5)
HGB BLD-MCNC: 10.5 G/DL (ref 11.5–16)
LDLC SERPL CALC-MCNC: 105.8 MG/DL (ref 0–100)
MCH RBC QN AUTO: 28.4 PG (ref 26–34)
MCHC RBC AUTO-ENTMCNC: 31.5 G/DL (ref 30–36.5)
MCV RBC AUTO: 90 FL (ref 80–99)
NRBC # BLD: 0 K/UL (ref 0–0.01)
NRBC BLD-RTO: 0 PER 100 WBC
PLATELET # BLD AUTO: 197 K/UL (ref 150–400)
PMV BLD AUTO: 10.7 FL (ref 8.9–12.9)
POTASSIUM SERPL-SCNC: 3.4 MMOL/L (ref 3.5–5.1)
RBC # BLD AUTO: 3.7 M/UL (ref 3.8–5.2)
SERVICE CMNT-IMP: ABNORMAL
SERVICE CMNT-IMP: ABNORMAL
SODIUM SERPL-SCNC: 142 MMOL/L (ref 136–145)
TRIGL SERPL-MCNC: 71 MG/DL (ref ?–150)
TROPONIN-HIGH SENSITIVITY: ABNORMAL NG/L (ref 0–51)
VLDLC SERPL CALC-MCNC: 14.2 MG/DL
WBC # BLD AUTO: 6.1 K/UL (ref 3.6–11)

## 2021-12-25 PROCEDURE — 84484 ASSAY OF TROPONIN QUANT: CPT

## 2021-12-25 PROCEDURE — 74011250636 HC RX REV CODE- 250/636: Performed by: INTERNAL MEDICINE

## 2021-12-25 PROCEDURE — 80048 BASIC METABOLIC PNL TOTAL CA: CPT

## 2021-12-25 PROCEDURE — 93306 TTE W/DOPPLER COMPLETE: CPT

## 2021-12-25 PROCEDURE — 85027 COMPLETE CBC AUTOMATED: CPT

## 2021-12-25 PROCEDURE — 93005 ELECTROCARDIOGRAM TRACING: CPT

## 2021-12-25 PROCEDURE — 74011250637 HC RX REV CODE- 250/637: Performed by: INTERNAL MEDICINE

## 2021-12-25 PROCEDURE — 80061 LIPID PANEL: CPT

## 2021-12-25 PROCEDURE — 74011250637 HC RX REV CODE- 250/637: Performed by: SPECIALIST

## 2021-12-25 PROCEDURE — 82962 GLUCOSE BLOOD TEST: CPT

## 2021-12-25 PROCEDURE — 36415 COLL VENOUS BLD VENIPUNCTURE: CPT

## 2021-12-25 RX ORDER — METOPROLOL TARTRATE 50 MG/1
50 TABLET ORAL EVERY 12 HOURS
Qty: 60 TABLET | Refills: 1 | Status: SHIPPED | OUTPATIENT
Start: 2021-12-25

## 2021-12-25 RX ORDER — AMLODIPINE BESYLATE 5 MG/1
5 TABLET ORAL DAILY
Status: DISCONTINUED | OUTPATIENT
Start: 2021-12-25 | End: 2021-12-25 | Stop reason: HOSPADM

## 2021-12-25 RX ORDER — AMLODIPINE BESYLATE 2.5 MG/1
5 TABLET ORAL DAILY
Qty: 30 TABLET | Refills: 0
Start: 2021-12-25 | End: 2021-12-27

## 2021-12-25 RX ORDER — METOPROLOL TARTRATE 50 MG/1
50 TABLET ORAL EVERY 12 HOURS
Status: DISCONTINUED | OUTPATIENT
Start: 2021-12-25 | End: 2021-12-25 | Stop reason: HOSPADM

## 2021-12-25 RX ORDER — ASPIRIN 325 MG
325 TABLET ORAL DAILY
Qty: 30 TABLET | Refills: 0 | Status: SHIPPED
Start: 2021-12-26 | End: 2022-02-05

## 2021-12-25 RX ORDER — CLOPIDOGREL BISULFATE 75 MG/1
75 TABLET ORAL DAILY
Qty: 30 TABLET | Refills: 1 | Status: ON HOLD | OUTPATIENT
Start: 2021-12-26 | End: 2022-02-05 | Stop reason: SDUPTHER

## 2021-12-25 RX ORDER — ATORVASTATIN CALCIUM 20 MG/1
20 TABLET, FILM COATED ORAL DAILY
Qty: 30 TABLET | Refills: 1 | Status: SHIPPED | OUTPATIENT
Start: 2021-12-26

## 2021-12-25 RX ADMIN — HEPARIN SODIUM 5000 UNITS: 5000 INJECTION INTRAVENOUS; SUBCUTANEOUS at 06:22

## 2021-12-25 RX ADMIN — Medication 10 ML: at 05:59

## 2021-12-25 RX ADMIN — METOPROLOL TARTRATE 50 MG: 50 TABLET, FILM COATED ORAL at 09:14

## 2021-12-25 RX ADMIN — ONDANSETRON 4 MG: 2 INJECTION INTRAMUSCULAR; INTRAVENOUS at 06:58

## 2021-12-25 RX ADMIN — ONDANSETRON 4 MG: 4 TABLET, ORALLY DISINTEGRATING ORAL at 12:50

## 2021-12-25 RX ADMIN — ATORVASTATIN CALCIUM 20 MG: 20 TABLET, FILM COATED ORAL at 09:14

## 2021-12-25 RX ADMIN — ASPIRIN 325 MG: 325 TABLET, FILM COATED ORAL at 09:14

## 2021-12-25 RX ADMIN — AMLODIPINE BESYLATE 5 MG: 5 TABLET ORAL at 09:14

## 2021-12-25 RX ADMIN — SODIUM CHLORIDE 75 ML/HR: 9 INJECTION, SOLUTION INTRAVENOUS at 06:31

## 2021-12-25 RX ADMIN — POTASSIUM BICARBONATE 40 MEQ: 391 TABLET, EFFERVESCENT ORAL at 09:14

## 2021-12-25 RX ADMIN — CLOPIDOGREL BISULFATE 75 MG: 75 TABLET ORAL at 09:14

## 2021-12-25 NOTE — PROGRESS NOTES
1920 Received report. 1949 Percocet 7.5-325 mg 1 po given for chest soreness. 2035 EKG done, informed Dr Mg Vyas.  2053 Troponin drawn,   2102 Zofran 4 mg iv given for nausea. 2105 morphine 2mg iv given for R shoulder discomfort. 2200 Patient resting,no c/o discomforts. 3554 Labs drawn. 2286 TRANSFER - OUT REPORT:    Verbal report given to VICTOR M Taylor on Rufino Yung  being transferred to [facility] for routine progression of care       Report consisted of patients Situation, Background, Assessment and   Recommendations(SBAR). Information from the following report(s) SBAR, Kardex, ED Summary, Procedure Summary, Intake/Output, MAR, Recent Results, Med Rec Status and Cardiac Rhythm SR was reviewed with the receiving nurse. Lines:   Peripheral IV 12/24/21 Left Antecubital (Active)   Site Assessment Clean, dry, & intact 12/25/21 0555   Phlebitis Assessment 0 12/25/21 0555   Infiltration Assessment 0 12/25/21 0555   Dressing Status Clean, dry, & intact 12/25/21 0555   Dressing Type Transparent 12/25/21 0555   Hub Color/Line Status Pink; Infusing 12/25/21 0555   Action Taken Open ports on tubing capped 12/25/21 0555   Alcohol Cap Used Yes 12/25/21 0555       Peripheral IV 12/24/21 Right Antecubital (Active)   Site Assessment Clean, dry, & intact 12/25/21 0555   Phlebitis Assessment 0 12/25/21 0555   Infiltration Assessment 0 12/25/21 0555   Dressing Status Clean, dry, & intact 12/25/21 0555   Dressing Type Transparent 12/25/21 0555   Hub Color/Line Status Pink;Capped 12/25/21 0555   Action Taken Open ports on tubing capped 12/25/21 0555   Alcohol Cap Used Yes 12/25/21 0555        Opportunity for questions and clarification was provided.       Patient transported with:   Monitor  Registered Nurse

## 2021-12-25 NOTE — PROGRESS NOTES
12/25/2021  Case Management Note    11:46 AM  Noted order for resumption of care for home health, spoke with patient and she is open to [address].  I have sent them a resumption referral.     DOROTHEA Andres

## 2021-12-25 NOTE — PROGRESS NOTES
Kamala Dueñas MD, [address] [facility] [address], [facility] 33 [phone]      IMPRESSION and RECOMMENDATIONS     1.  CAD:  Inf STEMI. Doing well. Would discharge on current med regimen: metoprolol 50mg bid, norvasc 5mg (increased dose), ASA/plavix, lipitor. Echo reviewed (EF 45%). She should f/u with me in 1-2 weeks. Discussed with her and daughter. Subjective:       Pt without complaints.         Objective:   Patient Vitals for the past 16 hrs:   BP Temp Pulse Resp SpO2 Height Weight   12/25/21 0841 (!) 161/75     5' 2\" (1.575 m) 62.8 kg (138 lb 7.2 oz)   12/25/21 0810 (!) 161/75 97.3 °F (36.3 °C) 65 16 97 %     12/25/21 0700   64       12/25/21 0600       62.8 kg (138 lb 8 oz)   12/25/21 0555 (!) 167/71 97.4 °F (36.3 °C) 67 14 96 %     12/25/21 0547   81 30      12/25/21 0500 (!) 146/52         12/25/21 0400 (!) 145/47 97.8 °F (36.6 °C) 64 13 93 %     12/25/21 0300 (!) 155/55  66 11 93 %     12/25/21 0200 (!) 145/55  62 18 94 %     12/25/21 0100 (!) 146/54  65 17 92 %     12/25/21 0000 (!) 144/50  62 14 93 %     12/24/21 2345   64 13 92 %     12/24/21 2330 (!) 158/85  65 9 (!) 89 %     12/24/21 2315 (!) 154/97 97.7 °F (36.5 °C) 65 20 97 %     12/24/21 2300 (!) 150/54  66 15 94 %     12/24/21 2245 (!) 146/57  (!) 59  95 %     12/24/21 2230 (!) 141/57  62  95 %     12/24/21 2215 (!) 137/52  64 14 96 %     12/24/21 2200 (!) 141/52  (!) 59 14 96 %     12/24/21 2150   76       12/24/21 2145 (!) 144/61  60 16 93 %     12/24/21 2130 (!) 145/63  61 8 97 %     12/24/21 2115 (!) 171/130  65 14 100 %     12/24/21 2100 (!) 166/62  65 14 100 %     12/24/21 2045 (!) 90/52  66 11 100 %     12/24/21 2030 (!) 159/65  67 21 100 %     12/24/21 2015 (!) 166/57  66 16 100 %     12/24/21 2000   67 15 100 %     12/24/21 1954 (!) 177/64  69 20 100 %     12/24/21 1945 (!) 179/59  68 17 100 %     12/24/21 1930 (!) 167/68 97.7 °F (36.5 °C) 71 21 98 %     12/24/21 1909 (!) 164/65  67 18 97 %     12/24/21 1900 (!) 153/59  64 15 97 %     12/24/21 1845 (!) 149/56  66 13 96 %     12/24/21 1830 (!) 137/53  62 16 96 %         HEENT Exam:     WNL         Lung Exam:     The patient is not dyspneic. Breath sounds are heard equally in all lung fields. There are no wheezes, rales, rhonchi, or rubs heard on auscultation. Heart Exam:     The rhythm is regular. The PMI is in the 5th intercostal space of the MCL. Apical impulse is normal. S1 is regular. S2 is physiologic. There is no S3, S4 gallop, murmur, click, or rub. Abdomen Exam:     Benign. Extremities Exam:     No cyanosis, clubbing, edema. Distal pulses intact. Bilateral groin without bruit/hematoma. Lab Results   Component Value Date/Time    Glucose 140 (H) 12/25/2021 04:15 AM    Sodium 142 12/25/2021 04:15 AM    Potassium 3.4 (L) 12/25/2021 04:15 AM    Chloride 109 (H) 12/25/2021 04:15 AM    CO2 29 12/25/2021 04:15 AM    BUN 17 12/25/2021 04:15 AM    Creatinine 1.47 (H) 12/25/2021 04:15 AM    Calcium 7.8 (L) 12/25/2021 04:15 AM     Recent Labs     12/25/21  0415 12/24/21  1153   WBC 6.1 6.8   HGB 10.5* 11.2*   HCT 33.3* 35.4    215     Recent Labs     12/24/21  1153   ALT 22   AP 88   TBILI 0.5   TP 7.4   ALB 3.2*   GLOB 4.2*     No results for input(s): INR, PTP, APTT, INREXT in the last 72 hours. No results for input(s): CPK, CKMB, TNIPOC in the last 72 hours. No lab exists for component: TROPONINI, ITNL  No results for input(s): TROIQ in the last 72 hours.   Lab Results   Component Value Date/Time    Cholesterol, total 169 12/25/2021 04:15 AM    HDL Cholesterol PENDING 12/25/2021 04:15 AM    LDL, calculated PENDING 12/25/2021 04:15 AM    Triglyceride 71 12/25/2021 04:15 AM    CHOL/HDL Ratio PENDING 12/25/2021 04:15 AM

## 2021-12-25 NOTE — DISCHARGE SUMMARY
Hospitalist Discharge Summary     Patient ID:    Samy Díaz  [id number]  30 y.o.  1934    Admit date of service: 12/24/2021    Discharge date of service: 12/25/2021    Admission Diagnoses: Chest pain [R07.9]    Chronic Diagnoses:    Problem List as of 12/25/2021 Date Reviewed: 10/18/2021          Codes Class Noted - Resolved    Chest pain ICD-10-CM: R07.9  ICD-9-CM: 786.50  12/24/2021 - Present        Dysphagia ICD-10-CM: R13.10  ICD-9-CM: 787.20  12/24/2021 - Present        * (Principal) STEMI (ST elevation myocardial infarction) ([facility] ICD-10-CM: I21.3  ICD-9-CM: 410.90  12/24/2021 - Present        Right knee pain ICD-10-CM: M25.561  ICD-9-CM: 719.46  10/21/2021 - Present        Humeral surgical neck fracture ICD-10-CM: S42.213A  ICD-9-CM: 812.01  10/18/2021 - Present        Closed fracture of multiple pubic rami ([facility] ICD-10-CM: M77.350I  ICD-9-CM: 808.2  10/18/2021 - Present        HTN (hypertension), benign ICD-10-CM: I10  ICD-9-CM: 401.1  10/18/2021 - Present        DM (diabetes mellitus), type 2 ([facility] ICD-10-CM: E11.9  ICD-9-CM: 250.00  10/18/2021 - Present        Leukocytosis ICD-10-CM: F84.731  ICD-9-CM: 288.60  10/18/2021 - Present        CKD (chronic kidney disease) stage 3, GFR 30-59 ml/min ([facility]) ICD-10-CM: N18.30  ICD-9-CM: 585.3  10/18/2021 - Present        Hypokalemia due to loss of potassium ICD-10-CM: E87.6  ICD-9-CM: 276.8  10/18/2021 - Present              Discharge Medications:   Current Discharge Medication List      START taking these medications    Details   aspirin (ASPIRIN) 325 mg tablet Take 1 Tablet by mouth daily. Qty: 30 Tablet, Refills: 0      atorvastatin (LIPITOR) 20 mg tablet Take 1 Tablet by mouth daily. Qty: 30 Tablet, Refills: 1      clopidogreL (PLAVIX) 75 mg tab Take 1 Tablet by mouth daily. Qty: 30 Tablet, Refills: 1      metoprolol tartrate (LOPRESSOR) 50 mg tablet Take 1 Tablet by mouth every twelve (12) hours.   Qty: 60 Tablet, Refills: 1         CONTINUE these medications which have CHANGED    Details   amLODIPine (NORVASC) 2.5 mg tablet Take 2 Tablets by mouth daily. Qty: 30 Tablet, Refills: 0         CONTINUE these medications which have NOT CHANGED    Details   oxyCODONE IR (ROXICODONE) 5 mg immediate release tablet Take 1 Tablet by mouth every four (4) hours as needed. Max Daily Amount: 30 mg. Refills: 0    Associated Diagnoses: Closed fracture of multiple pubic rami, right, initial encounter ([facility]      senna-docusate (PERICOLACE) 8.6-50 mg per tablet Take 1 Tablet by mouth two (2) times a day. polyethylene glycol (MIRALAX) 17 gram packet Take 1 Packet by mouth two (2) times a day. albuterol (PROVENTIL VENTOLIN) 2.5 mg /3 mL (0.083 %) nebu 2.5 mg by Nebulization route every four (4) hours as needed for Wheezing or Shortness of Breath. furosemide (Lasix) 40 mg tablet Take 40 mg by mouth two (2) times a day. glimepiride (AMARYL) 4 mg tablet Take 4 mg by mouth every morning. albuterol (PROVENTIL HFA, VENTOLIN HFA, PROAIR HFA) 90 mcg/actuation inhaler Take 2 Puffs by inhalation every four (4) hours as needed for Wheezing. Qty: 1 Inhaler, Refills: 0             Follow up Care:    1. Puma Johnson NP in 1-2 weeks  2. Dr Brenda Hopper     Diet:  Cardiac Diet    Disposition:  Home. Advanced Directive:    Discharge Exam:  See today's note. CONSULTATIONS: Cardiology    Significant Diagnostic Studies:   Recent Labs     12/25/21  0415 12/24/21  1153   WBC 6.1 6.8   HGB 10.5* 11.2*   HCT 33.3* 35.4    215     Recent Labs     12/25/21  0415 12/24/21  1153    140   K 3.4* 3.7   * 104   CO2 29 28   BUN 17 22*   CREA 1.47* 2.10*   * 221*   CA 7.8* 9.2     Recent Labs     12/24/21  1153   ALT 22   AP 88   TBILI 0.5   TP 7.4   ALB 3.2*   GLOB 4.2*     No results for input(s): INR, PTP, APTT, INREXT in the last 72 hours. No results for input(s): FE, TIBC, PSAT, FERR in the last 72 hours.    No results for input(s): PH, PCO2, PO2 in the last 72 hours. No results for input(s): CPK, CKMB in the last 72 hours. No lab exists for component: TROPONINI  Lab Results   Component Value Date/Time    Glucose (POC) 140 (H) 12/25/2021 08:14 AM    Glucose (POC) 110 12/24/2021 09:06 PM    Glucose (POC) 82 12/24/2021 06:56 PM    Glucose (POC) 111 10/29/2021 05:17 PM    Glucose (POC) 118 (H) 10/29/2021 11:30 AM             HOSPITAL COURSE & TREATMENT RENDERED:   1.  STEMI (ST elevation myocardial infarction) ([facility] (12/24/2021)/ Chest pain (12/24/2021)/elevated troponin(POA).  Evaluated by cardiology and patient had emergent cardiac cath 12/24 with stent placement. Continue ASA, plavix and metoprolol. FU with cardiology.     2.  Dysphagia (12/24/2021). most likely the sensation she had was due to her cardiac issue. Resolved      3.  HTN (hypertension), benign (10/18/2021).   Continue home amlodipine and metoprolol      4.  DM (diabetes mellitus), type 2 ([facility] (10/18/2021).   continue Amaryl       5.  CKD (chronic kidney disease) stage 3, GFR 30-59 ml/min ([facility]) (10/18/2021).   Monitor renal function.  improving.      CODE STATUS: DNR(decision-maker: Daughter)        Discharged in improved condition.     Spent 35 minutes    Signed:  Anamika Wells MD  12/25/2021  11:14 AM

## 2021-12-25 NOTE — PROGRESS NOTES
Problem: Falls - Risk of  Goal: *Absence of Falls  Description: Document Earma Janice Fall Risk and appropriate interventions in the flowsheet.   Outcome: Progressing Towards Goal  Note: Fall Risk Interventions:       Mentation Interventions: Adequate sleep, hydration, pain control,Evaluate medications/consider consulting pharmacy,More frequent rounding,Reorient patient,Room close to nurse's station,Toileting rounds,Update white board    Medication Interventions: Bed/chair exit alarm    Elimination Interventions: Call light in reach,Bed/chair exit alarm    History of Falls Interventions: Bed/chair exit alarm,Investigate reason for fall

## 2021-12-25 NOTE — PROGRESS NOTES
Problem: Falls - Risk of  Goal: *Absence of Falls  Description: Document Boni Welch Fall Risk and appropriate interventions in the flowsheet.   Outcome: Progressing Towards Goal  Note: Fall Risk Interventions:  Mobility Interventions: Bed/chair exit alarm,Patient to call before getting OOB    Mentation Interventions: Bed/chair exit alarm    Medication Interventions: Patient to call before getting OOB,Teach patient to arise slowly,Bed/chair exit alarm    Elimination Interventions: Bed/chair exit alarm,Call light in reach,Patient to call for help with toileting needs    History of Falls Interventions: Bed/chair exit alarm,Door open when patient unattended         Problem: Patient Education: Go to Patient Education Activity  Goal: Patient/Family Education  Outcome: Progressing Towards Goal

## 2021-12-25 NOTE — ROUTINE PROCESS
0 - TRANSFER - IN REPORT:    Verbal report received from VICTOR M Seo (name) on Starlin Sandifer  being received from [phone] (unit) for routine progression of care      Report consisted of patients Situation, Background, Assessment and   Recommendations(SBAR). Information from the following report(s) SBAR, Kardex, Intake/Output, MAR, Accordion, Recent Results and Cardiac Rhythm NSR was reviewed with the receiving nurse. Opportunity for questions and clarification was provided. Assessment completed upon patients arrival to unit and care assumed. 0700 - Bedside shift change report given to Haley Mcclain RN (oncoming nurse) by Catherine Prado RN (offgoing nurse). Report included the following information SBAR, Kardex, Intake/Output, MAR, Accordion and Recent Results.

## 2021-12-25 NOTE — PROGRESS NOTES
TRANSFER - IN REPORT:    Verbal report received from Sonia Underwood RN (name) on David Wild  being received from [facility] (unit) for routine progression of care      Report consisted of patients Situation, Background, Assessment and   Recommendations(SBAR). Information from the following report(s) SBAR, Kardex, Procedure Summary, MAR and Cardiac Rhythm Sinus rhythm was reviewed with the receiving nurse. Opportunity for questions and clarification was provided. Assessment completed upon patients arrival to unit and care assumed. 1430: Patient brought up from Cath Lab. No complaints of chest pressure or pain. 1440: Right and left groin site C/D/I. No complaints of pain. 1730: Sheaths removed from left and right groin. Quick clot applied to right and left sites. Both sites C/D/I. No signs of hematomas. 1800: Patient complained of right shoulder pain. Bedside and Verbal shift change report given to Rosangela Buitrago RN (oncoming nurse) by Yola Santos RN (offgoing nurse). Report included the following information SBAR, Kardex, MAR and Cardiac Rhythm Sinus rhythm.

## 2021-12-25 NOTE — DISCHARGE INSTRUCTIONS
ACUTE DIAGNOSES:  Chest pain [R07.9]    CHRONIC MEDICAL DIAGNOSES:  Problem List as of 12/25/2021 Date Reviewed: 10/18/2021          Codes Class Noted - Resolved    Chest pain ICD-10-CM: R07.9  ICD-9-CM: 786.50  12/24/2021 - Present        Dysphagia ICD-10-CM: R13.10  ICD-9-CM: 787.20  12/24/2021 - Present        * (Principal) STEMI (ST elevation myocardial infarction) ([facility] ICD-10-CM: I21.3  ICD-9-CM: 410.90  12/24/2021 - Present        Right knee pain ICD-10-CM: M25.561  ICD-9-CM: 719.46  10/21/2021 - Present        Humeral surgical neck fracture ICD-10-CM: S42.213A  ICD-9-CM: 812.01  10/18/2021 - Present        Closed fracture of multiple pubic rami ([facility] ICD-10-CM: A10.554S  ICD-9-CM: 808.2  10/18/2021 - Present        HTN (hypertension), benign ICD-10-CM: I10  ICD-9-CM: 401.1  10/18/2021 - Present        DM (diabetes mellitus), type 2 ([facility] ICD-10-CM: E11.9  ICD-9-CM: 250.00  10/18/2021 - Present        Leukocytosis ICD-10-CM: Y99.522  ICD-9-CM: 288.60  10/18/2021 - Present        CKD (chronic kidney disease) stage 3, GFR 30-59 ml/min ([facility]) ICD-10-CM: N18.30  ICD-9-CM: 585.3  10/18/2021 - Present        Hypokalemia due to loss of potassium ICD-10-CM: E87.6  ICD-9-CM: 276.8  10/18/2021 - Present              DISCHARGE MEDICATIONS:          · It is important that you take the medication exactly as they are prescribed. · Keep your medication in the bottles provided by the pharmacist and keep a list of the medication names, dosages, and times to be taken in your wallet. · Do not take other medications without consulting your doctor. DIET:  Cardiac Diet    ACTIVITY: Activity as tolerated    ADDITIONAL INFORMATION: If you experience any of the following symptoms then please call your primary care physician or return to the emergency room if you cannot get hold of your doctor: Fever, chills, nausea, vomiting, diarrhea, change in mentation, falling, bleeding, shortness of breath.     FOLLOW UP CARE:   Michael Khan NP  you are to call and set up an appointment to see them in 5 days. Follow-up with Jl Zimmerman      Information obtained by :  I understand that if any problems occur once I am at home I am to contact my physician. I understand and acknowledge receipt of the instructions indicated above. Physician's or R.N.'s Signature                                                                  Date/Time                                                                                                                                              Patient or Representative Signature                                                          Date/Time    Discharge Instructions:     Medications: Activity:     As tolerated except do not lift over 10 pounds for 5 days. Diet:     American Heart Association. Follow-up:     Follow up with Viral Dyson MD   [address] [facility]  [phone]      If you smoke, STOP!

## 2021-12-25 NOTE — PROGRESS NOTES
[facility]  [address] [facility], [address] [place] [address]  [phone]      Medical Progress Note      NAME: Samy Díaz   :  1934  MRM:  [id number]    Date of service: 2021  7:01 AM       Assessment and Plan:   1. STEMI (ST elevation myocardial infarction) ([facility] (2021)/ Chest pain (2021)/elevated troponin(POA). Evaluated by cardiology and patient had emergent cardiac cath  with stent placement. Continue ASA, plavix and metoprolol(incresaed dose due to not well controlled BP)  Management per cardiology.     2. Dysphagia (2021). most likely the sensation she had was due to her cardiac issue. SLP consult      3. HTN (hypertension), benign (10/18/2021). Continue home amlodipine and metoprolol(increased doses)     4.  DM (diabetes mellitus), type 2 ([facility] (10/18/2021). Hold Amaryl and cover with sliding scale for now     5.  CKD (chronic kidney disease) stage 3, GFR 30-59 ml/min ([facility]) (10/18/2021). Monitor renal function. getting gentle IV fluids due to IV contrast she received for cardiac catheterization     CODE STATUS: DNR(decision-maker: Daughter)          Subjective:     Chief Complaint[de-identified] Patient was seen and examined as a follow up for STEMI. Chart was reviewed. c/o nausea     ROS:  (bold if positive, if negative)    Tolerating PT  Tolerating Diet        Objective:     Last 24hrs VS reviewed since prior progress note.  Most recent are:    Visit Vitals  BP (!) 167/71 (BP 1 Location: Left upper arm)   Pulse 67   Temp 97.4 °F (36.3 °C)   Resp 14   Ht 5' 2\" (1.575 m)   Wt 62.8 kg (138 lb 8 oz)   SpO2 96%   Breastfeeding No   BMI 25.33 kg/m²     SpO2 Readings from Last 6 Encounters:   21 96%   10/29/21 93%   21 96%    O2 Flow Rate (L/min): 2 l/min       Intake/Output Summary (Last 24 hours) at 2021 0701  Last data filed at 2021 1116  Gross per 24 hour   Intake 350 ml   Output 2450 ml   Net -2100 ml        Physical Exam:    Gen:  Well-developed, well-nourished, in no acute distress  HEENT:  Pink conjunctivae, PERRL, hearing intact to voice, moist mucous membranes  Neck:  Supple, without masses, thyroid non-tender  Resp:  No accessory muscle use, clear breath sounds without wheezes rales or rhonchi  Card:  No murmurs, normal S1, S2 without thrills, bruits or peripheral edema  Abd:  Soft, non-tender, non-distended, normoactive bowel sounds are present, no palpable organomegaly and no detectable hernias  Lymph:  No cervical or inguinal adenopathy  Musc:  No cyanosis or clubbing  Skin:  No rashes or ulcers, skin turgor is good  Neuro:  Cranial nerves are grossly intact, no focal motor weakness, follows commands appropriately  Psych:  Good insight, oriented to person, place and time, alert  __________________________________________________________________  Medications Reviewed: (see below)  Medications:     Current Facility-Administered Medications   Medication Dose Route Frequency    metoprolol tartrate (LOPRESSOR) tablet 50 mg  50 mg Oral Q12H    amLODIPine (NORVASC) tablet 5 mg  5 mg Oral DAILY    sodium chloride (NS) flush 5-40 mL  5-40 mL IntraVENous Q8H    sodium chloride (NS) flush 5-40 mL  5-40 mL IntraVENous PRN    acetaminophen (TYLENOL) tablet 650 mg  650 mg Oral Q6H PRN    Or    acetaminophen (TYLENOL) suppository 650 mg  650 mg Rectal Q6H PRN    polyethylene glycol (MIRALAX) packet 17 g  17 g Oral DAILY PRN    ondansetron (ZOFRAN ODT) tablet 4 mg  4 mg Oral Q8H PRN    Or    ondansetron (ZOFRAN) injection 4 mg  4 mg IntraVENous Q6H PRN    0.9% sodium chloride infusion  75 mL/hr IntraVENous CONTINUOUS    heparin (porcine) injection 5,000 Units  5,000 Units SubCUTAneous Q8H    sodium chloride (NS) flush 5-40 mL  5-40 mL IntraVENous Q8H    sodium chloride (NS) flush 5-40 mL  5-40 mL IntraVENous PRN    hydrocortisone Sod Succ (PF) (SOLU-CORTEF) injection 100 mg  100 mg IntraVENous ONCE PRN    nitroglycerin (NITROSTAT) tablet 0.4 mg  0.4 mg SubLINGual Q5MIN PRN    aspirin tablet 325 mg  325 mg Oral DAILY    acetaminophen (TYLENOL) tablet 650 mg  650 mg Oral Q4H PRN    oxyCODONE-acetaminophen (PERCOCET 7.5) 7.5-325 mg per tablet 1 Tablet  1 Tablet Oral Q4H PRN    atorvastatin (LIPITOR) tablet 20 mg  20 mg Oral DAILY    clopidogreL (PLAVIX) tablet 75 mg  75 mg Oral DAILY    zolpidem (AMBIEN) tablet 2.5 mg  2.5 mg Oral QHS PRN    insulin lispro (HUMALOG) injection   SubCUTAneous AC&HS    glucose chewable tablet 16 g  4 Tablet Oral PRN    dextrose (D50W) injection syrg 12.5-25 g  12.5-25 g IntraVENous PRN    glucagon (GLUCAGEN) injection 1 mg  1 mg IntraMUSCular PRN    morphine injection 2 mg  2 mg IntraVENous Q4H PRN        Lab Data Reviewed: (see below)  Lab Review:     Recent Labs     12/25/21  0415 12/24/21  1153   WBC 6.1 6.8   HGB 10.5* 11.2*   HCT 33.3* 35.4    215     Recent Labs     12/25/21  0415 12/24/21  1153    140   K 3.4* 3.7   * 104   CO2 29 28   * 221*   BUN 17 22*   CREA 1.47* 2.10*   CA 7.8* 9.2   ALB  --  3.2*   TBILI  --  0.5   ALT  --  22     Lab Results   Component Value Date/Time    Glucose (POC) 110 12/24/2021 09:06 PM    Glucose (POC) 82 12/24/2021 06:56 PM    Glucose (POC) 111 10/29/2021 05:17 PM    Glucose (POC) 118 (H) 10/29/2021 11:30 AM    Glucose (POC) 124 (H) 10/29/2021 06:50 AM     No results for input(s): PH, PCO2, PO2, HCO3, FIO2 in the last 72 hours. No results for input(s): INR, INREXT in the last 72 hours. All Micro Results     None          I have reviewed notes of prior 24hr. Other pertinent lab: Total time spent with patient: 28 I personally reviewed chart, notes, data and current medications in the medical record. I have personally examined and treated the patient at bedside during this period.                  Care Plan discussed with: Patient, Nursing Staff and >50% of time spent in counseling and coordination of care    Discussed: Care Plan    Prophylaxis:  Hep SQ    Disposition:  Home w/Family           ___________________________________________________    Attending Physician: Patrizia Ley MD

## 2021-12-26 ENCOUNTER — HOSPITAL ENCOUNTER (OUTPATIENT)
Age: 86
Setting detail: OBSERVATION
Discharge: HOME HEALTH | End: 2021-12-27
Attending: STUDENT IN AN ORGANIZED HEALTH CARE EDUCATION/TRAINING PROGRAM | Admitting: INTERNAL MEDICINE
Payer: MEDICARE

## 2021-12-26 ENCOUNTER — APPOINTMENT (OUTPATIENT)
Dept: CT IMAGING | Age: 86
End: 2021-12-26
Attending: STUDENT IN AN ORGANIZED HEALTH CARE EDUCATION/TRAINING PROGRAM
Payer: MEDICARE

## 2021-12-26 DIAGNOSIS — E16.2 HYPOGLYCEMIA: Primary | ICD-10-CM

## 2021-12-26 DIAGNOSIS — N17.9 AKI (ACUTE KIDNEY INJURY) (HCC): ICD-10-CM

## 2021-12-26 PROBLEM — I21.3 STEMI (ST ELEVATION MYOCARDIAL INFARCTION) (HCC): Status: RESOLVED | Noted: 2021-12-24 | Resolved: 2021-12-26

## 2021-12-26 PROBLEM — S42.213A HUMERAL SURGICAL NECK FRACTURE: Status: RESOLVED | Noted: 2021-10-18 | Resolved: 2021-12-26

## 2021-12-26 PROBLEM — D72.829 LEUKOCYTOSIS: Status: RESOLVED | Noted: 2021-10-18 | Resolved: 2021-12-26

## 2021-12-26 PROBLEM — S32.599A CLOSED FRACTURE OF MULTIPLE PUBIC RAMI (HCC): Status: RESOLVED | Noted: 2021-10-18 | Resolved: 2021-12-26

## 2021-12-26 PROBLEM — G93.41 ACUTE METABOLIC ENCEPHALOPATHY: Status: ACTIVE | Noted: 2021-12-26

## 2021-12-26 PROBLEM — M25.561 RIGHT KNEE PAIN: Status: RESOLVED | Noted: 2021-10-21 | Resolved: 2021-12-26

## 2021-12-26 PROBLEM — R07.9 CHEST PAIN: Status: RESOLVED | Noted: 2021-12-24 | Resolved: 2021-12-26

## 2021-12-26 PROBLEM — E87.6 HYPOKALEMIA DUE TO LOSS OF POTASSIUM: Status: RESOLVED | Noted: 2021-10-18 | Resolved: 2021-12-26

## 2021-12-26 LAB
ALBUMIN SERPL-MCNC: 3.4 G/DL (ref 3.5–5)
ALBUMIN/GLOB SERPL: 0.8 {RATIO} (ref 1.1–2.2)
ALP SERPL-CCNC: 89 U/L (ref 45–117)
ALT SERPL-CCNC: 22 U/L (ref 12–78)
ANION GAP SERPL CALC-SCNC: 11 MMOL/L (ref 5–15)
APPEARANCE UR: ABNORMAL
AST SERPL-CCNC: 38 U/L (ref 15–37)
BACTERIA URNS QL MICRO: ABNORMAL /HPF
BASOPHILS # BLD: 0.1 K/UL (ref 0–0.1)
BASOPHILS NFR BLD: 1 % (ref 0–1)
BILIRUB SERPL-MCNC: 0.5 MG/DL (ref 0.2–1)
BILIRUB UR QL: NEGATIVE
BUN SERPL-MCNC: 32 MG/DL (ref 6–20)
BUN/CREAT SERPL: 12 (ref 12–20)
CALCIUM SERPL-MCNC: 8.9 MG/DL (ref 8.5–10.1)
CHLORIDE SERPL-SCNC: 101 MMOL/L (ref 97–108)
CO2 SERPL-SCNC: 25 MMOL/L (ref 21–32)
COLOR UR: ABNORMAL
COMMENT, HOLDF: NORMAL
CREAT SERPL-MCNC: 2.77 MG/DL (ref 0.55–1.02)
DIFFERENTIAL METHOD BLD: ABNORMAL
EOSINOPHIL # BLD: 0.4 K/UL (ref 0–0.4)
EOSINOPHIL NFR BLD: 4 % (ref 0–7)
EPITH CASTS URNS QL MICRO: ABNORMAL /LPF
ERYTHROCYTE [DISTWIDTH] IN BLOOD BY AUTOMATED COUNT: 13.1 % (ref 11.5–14.5)
GLOBULIN SER CALC-MCNC: 4.1 G/DL (ref 2–4)
GLUCOSE BLD STRIP.AUTO-MCNC: 63 MG/DL (ref 65–117)
GLUCOSE BLD STRIP.AUTO-MCNC: 73 MG/DL (ref 65–117)
GLUCOSE SERPL-MCNC: 45 MG/DL (ref 65–100)
GLUCOSE UR STRIP.AUTO-MCNC: NEGATIVE MG/DL
HCT VFR BLD AUTO: 34.4 % (ref 35–47)
HGB BLD-MCNC: 11.1 G/DL (ref 11.5–16)
HGB UR QL STRIP: ABNORMAL
HYALINE CASTS URNS QL MICRO: ABNORMAL /LPF (ref 0–5)
IMM GRANULOCYTES # BLD AUTO: 0 K/UL (ref 0–0.04)
IMM GRANULOCYTES NFR BLD AUTO: 0 % (ref 0–0.5)
KETONES UR QL STRIP.AUTO: NEGATIVE MG/DL
LEUKOCYTE ESTERASE UR QL STRIP.AUTO: ABNORMAL
LYMPHOCYTES # BLD: 2 K/UL (ref 0.8–3.5)
LYMPHOCYTES NFR BLD: 23 % (ref 12–49)
MCH RBC QN AUTO: 29.4 PG (ref 26–34)
MCHC RBC AUTO-ENTMCNC: 32.3 G/DL (ref 30–36.5)
MCV RBC AUTO: 91.2 FL (ref 80–99)
MONOCYTES # BLD: 0.6 K/UL (ref 0–1)
MONOCYTES NFR BLD: 7 % (ref 5–13)
NEUTS SEG # BLD: 5.8 K/UL (ref 1.8–8)
NEUTS SEG NFR BLD: 65 % (ref 32–75)
NITRITE UR QL STRIP.AUTO: NEGATIVE
NRBC # BLD: 0 K/UL (ref 0–0.01)
NRBC BLD-RTO: 0 PER 100 WBC
PH UR STRIP: 6 [PH] (ref 5–8)
PLATELET # BLD AUTO: 287 K/UL (ref 150–400)
PMV BLD AUTO: 10.7 FL (ref 8.9–12.9)
POTASSIUM SERPL-SCNC: 3.3 MMOL/L (ref 3.5–5.1)
PROT SERPL-MCNC: 7.5 G/DL (ref 6.4–8.2)
PROT UR STRIP-MCNC: NEGATIVE MG/DL
RBC # BLD AUTO: 3.77 M/UL (ref 3.8–5.2)
RBC #/AREA URNS HPF: ABNORMAL /HPF (ref 0–5)
SAMPLES BEING HELD,HOLD: NORMAL
SERVICE CMNT-IMP: ABNORMAL
SERVICE CMNT-IMP: NORMAL
SODIUM SERPL-SCNC: 137 MMOL/L (ref 136–145)
SP GR UR REFRACTOMETRY: 1.01 (ref 1–1.03)
UROBILINOGEN UR QL STRIP.AUTO: 0.2 EU/DL (ref 0.2–1)
WBC # BLD AUTO: 8.8 K/UL (ref 3.6–11)
WBC URNS QL MICRO: ABNORMAL /HPF (ref 0–4)

## 2021-12-26 PROCEDURE — 74011250636 HC RX REV CODE- 250/636: Performed by: STUDENT IN AN ORGANIZED HEALTH CARE EDUCATION/TRAINING PROGRAM

## 2021-12-26 PROCEDURE — 70450 CT HEAD/BRAIN W/O DYE: CPT

## 2021-12-26 PROCEDURE — G0378 HOSPITAL OBSERVATION PER HR: HCPCS

## 2021-12-26 PROCEDURE — 80053 COMPREHEN METABOLIC PANEL: CPT

## 2021-12-26 PROCEDURE — 81001 URINALYSIS AUTO W/SCOPE: CPT

## 2021-12-26 PROCEDURE — 99285 EMERGENCY DEPT VISIT HI MDM: CPT

## 2021-12-26 PROCEDURE — 87086 URINE CULTURE/COLONY COUNT: CPT

## 2021-12-26 PROCEDURE — 87186 SC STD MICRODIL/AGAR DIL: CPT

## 2021-12-26 PROCEDURE — 85025 COMPLETE CBC W/AUTO DIFF WBC: CPT

## 2021-12-26 PROCEDURE — 96372 THER/PROPH/DIAG INJ SC/IM: CPT

## 2021-12-26 PROCEDURE — 87077 CULTURE AEROBIC IDENTIFY: CPT

## 2021-12-26 PROCEDURE — 74011250636 HC RX REV CODE- 250/636: Performed by: INTERNAL MEDICINE

## 2021-12-26 PROCEDURE — 82962 GLUCOSE BLOOD TEST: CPT

## 2021-12-26 PROCEDURE — 36415 COLL VENOUS BLD VENIPUNCTURE: CPT

## 2021-12-26 PROCEDURE — 77030038269 HC DRN EXT URIN PURWCK BARD -A

## 2021-12-26 RX ORDER — HEPARIN SODIUM 5000 [USP'U]/ML
5000 INJECTION, SOLUTION INTRAVENOUS; SUBCUTANEOUS EVERY 8 HOURS
Status: DISCONTINUED | OUTPATIENT
Start: 2021-12-26 | End: 2021-12-27 | Stop reason: HOSPADM

## 2021-12-26 RX ORDER — ACETAMINOPHEN 650 MG/1
650 SUPPOSITORY RECTAL
Status: DISCONTINUED | OUTPATIENT
Start: 2021-12-26 | End: 2021-12-27 | Stop reason: HOSPADM

## 2021-12-26 RX ORDER — POLYETHYLENE GLYCOL 3350 17 G/17G
17 POWDER, FOR SOLUTION ORAL DAILY PRN
Status: DISCONTINUED | OUTPATIENT
Start: 2021-12-26 | End: 2021-12-27 | Stop reason: HOSPADM

## 2021-12-26 RX ORDER — MAGNESIUM SULFATE 100 %
4 CRYSTALS MISCELLANEOUS AS NEEDED
Status: DISCONTINUED | OUTPATIENT
Start: 2021-12-26 | End: 2021-12-27 | Stop reason: HOSPADM

## 2021-12-26 RX ORDER — ASPIRIN 325 MG
325 TABLET ORAL DAILY
Status: DISCONTINUED | OUTPATIENT
Start: 2021-12-27 | End: 2021-12-26

## 2021-12-26 RX ORDER — DEXTROSE MONOHYDRATE 50 MG/ML
150 INJECTION, SOLUTION INTRAVENOUS CONTINUOUS
Status: DISCONTINUED | OUTPATIENT
Start: 2021-12-26 | End: 2021-12-26

## 2021-12-26 RX ORDER — CLOPIDOGREL BISULFATE 75 MG/1
75 TABLET ORAL DAILY
Status: DISCONTINUED | OUTPATIENT
Start: 2021-12-27 | End: 2021-12-27 | Stop reason: HOSPADM

## 2021-12-26 RX ORDER — ACETAMINOPHEN 325 MG/1
650 TABLET ORAL
Status: DISCONTINUED | OUTPATIENT
Start: 2021-12-26 | End: 2021-12-27 | Stop reason: HOSPADM

## 2021-12-26 RX ORDER — ATORVASTATIN CALCIUM 20 MG/1
20 TABLET, FILM COATED ORAL DAILY
Status: DISCONTINUED | OUTPATIENT
Start: 2021-12-27 | End: 2021-12-27 | Stop reason: HOSPADM

## 2021-12-26 RX ORDER — ONDANSETRON 4 MG/1
4 TABLET, ORALLY DISINTEGRATING ORAL
Status: DISCONTINUED | OUTPATIENT
Start: 2021-12-26 | End: 2021-12-27 | Stop reason: HOSPADM

## 2021-12-26 RX ORDER — METOPROLOL TARTRATE 50 MG/1
50 TABLET ORAL EVERY 12 HOURS
Status: DISCONTINUED | OUTPATIENT
Start: 2021-12-26 | End: 2021-12-26

## 2021-12-26 RX ORDER — AMOXICILLIN 250 MG
1 CAPSULE ORAL 2 TIMES DAILY
Status: DISCONTINUED | OUTPATIENT
Start: 2021-12-26 | End: 2021-12-27 | Stop reason: HOSPADM

## 2021-12-26 RX ORDER — ONDANSETRON 2 MG/ML
4 INJECTION INTRAMUSCULAR; INTRAVENOUS
Status: DISCONTINUED | OUTPATIENT
Start: 2021-12-26 | End: 2021-12-27 | Stop reason: HOSPADM

## 2021-12-26 RX ORDER — POTASSIUM CHLORIDE, DEXTROSE MONOHYDRATE AND SODIUM CHLORIDE 300; 5; 900 MG/100ML; G/100ML; MG/100ML
INJECTION, SOLUTION INTRAVENOUS CONTINUOUS
Status: DISCONTINUED | OUTPATIENT
Start: 2021-12-26 | End: 2021-12-26

## 2021-12-26 RX ORDER — GUAIFENESIN 100 MG/5ML
81 LIQUID (ML) ORAL DAILY
Status: DISCONTINUED | OUTPATIENT
Start: 2021-12-27 | End: 2021-12-27 | Stop reason: HOSPADM

## 2021-12-26 RX ORDER — DEXTROSE, SODIUM CHLORIDE, AND POTASSIUM CHLORIDE 5; .9; .15 G/100ML; G/100ML; G/100ML
125 INJECTION INTRAVENOUS CONTINUOUS
Status: DISCONTINUED | OUTPATIENT
Start: 2021-12-26 | End: 2021-12-27 | Stop reason: HOSPADM

## 2021-12-26 RX ORDER — DEXTROSE 50 % IN WATER (D50W) INTRAVENOUS SYRINGE
25-50 AS NEEDED
Status: DISCONTINUED | OUTPATIENT
Start: 2021-12-26 | End: 2021-12-27 | Stop reason: HOSPADM

## 2021-12-26 RX ORDER — METOPROLOL TARTRATE 50 MG/1
50 TABLET ORAL EVERY 12 HOURS
Status: DISCONTINUED | OUTPATIENT
Start: 2021-12-27 | End: 2021-12-27 | Stop reason: HOSPADM

## 2021-12-26 RX ADMIN — DEXTROSE MONOHYDRATE 150 ML/HR: 50 INJECTION, SOLUTION INTRAVENOUS at 18:14

## 2021-12-26 RX ADMIN — HEPARIN SODIUM 5000 UNITS: 5000 INJECTION INTRAVENOUS; SUBCUTANEOUS at 21:31

## 2021-12-26 RX ADMIN — POTASSIUM CHLORIDE, DEXTROSE MONOHYDRATE AND SODIUM CHLORIDE 125 ML/HR: 150; 5; 900 INJECTION, SOLUTION INTRAVENOUS at 20:34

## 2021-12-26 NOTE — ED NOTES
NIH score of 0 on exam with teleneurologist. Attributes pt's sx to hypoglycemic event. Recommends treating hypoglycemia and continuing to closely monitor pt's mental status. Recommends MRI of the brain as needed for further evaluation.

## 2021-12-26 NOTE — ED PROVIDER NOTES
Patient is a 59-year-old female present emergency department for altered mental status, slurred speech. Patient was recently admitted on 12/24 for STEMI was found to have 100 recent occlusion of the RCA had a stent placed was discharged yesterday daughter was with the patient in approximately 5 PM noticed that patient was somnolent, altered and having slurred speech. EMS was called and on arrival patient's blood sugar was 60 EMS rechecked it and it was 42. They gave patient oral glucose and patient's altered mental status started to resolve in route to the ED. They rechecked her blood sugar was 71 Code S level 1 was called. Past Medical History:   Diagnosis Date    Chronic kidney disease     Diabetes ([facility] [address]     Hypertension        Past Surgical History:   Procedure Laterality Date    HX APPENDECTOMY      HX HYSTERECTOMY      HX KNEE REPLACEMENT Left     HX OTHER SURGICAL      head sx x 2 nerve related    HX OTHER SURGICAL      Back surgery x 2    HX TONSILLECTOMY           No family history on file. Social History     Socioeconomic History    Marital status:      Spouse name: Not on file    Number of children: Not on file    Years of education: Not on file    Highest education level: Not on file   Occupational History    Not on file   Tobacco Use    Smoking status: Not on file    Smokeless tobacco: Not on file   Substance and Sexual Activity    Alcohol use: Not on file    Drug use: Not on file    Sexual activity: Not on file   Other Topics Concern    Not on file   Social History Narrative    Not on file     Social Determinants of Health     Financial Resource Strain:     Difficulty of Paying Living Expenses: Not on file   Food Insecurity:     Worried About Running Out of Food in the Last Year: Not on file    Alondra of Food in the Last Year: Not on file   Transportation Needs:     Lack of Transportation (Medical):  Not on file    Lack of Transportation (Non-Medical): Not on file   Physical Activity:     Days of Exercise per Week: Not on file    Minutes of Exercise per Session: Not on file   Stress:     Feeling of Stress : Not on file   Social Connections:     Frequency of Communication with Friends and Family: Not on file    Frequency of Social Gatherings with Friends and Family: Not on file    Attends Christian Services: Not on file    Active Member of [address] [place] [address] or Organizations: Not on file    Attends Club or Organization Meetings: Not on file    Marital Status: Not on file   Intimate Partner Violence:     Fear of Current or Ex-Partner: Not on file    Emotionally Abused: Not on file    Physically Abused: Not on file    Sexually Abused: Not on file   Housing Stability:     Unable to Pay for Housing in the Last Year: Not on file    Number of [place] in the Last Year: Not on file    Unstable Housing in the Last Year: Not on file         ALLERGIES: Codeine, Compazine [prochlorperazine], Dilaudid [hydromorphone], Morphine, and Sulfa (sulfonamide antibiotics)    Review of Systems   Neurological: Positive for speech difficulty. Psychiatric/Behavioral: Positive for confusion. All other systems reviewed and are negative. Vitals:    12/26/21 1746   BP: (!) 149/58   Pulse: (!) 59   Resp: 17   Temp: (!) 96 °F (35.6 °C)   SpO2: 98%   Weight: 65.4 kg (144 lb 1.6 oz)   Height: 5' 2\" (1.575 m)            Physical Exam  Vitals and nursing note reviewed. HENT:      Head: Normocephalic and atraumatic. Eyes:      General: No visual field deficit. Extraocular Movements: Extraocular movements intact. Pupils: Pupils are equal, round, and reactive to light. Cardiovascular:      Rate and Rhythm: Normal rate and regular rhythm. Pulmonary:      Effort: Pulmonary effort is normal.      Breath sounds: Normal breath sounds. Abdominal:      General: Bowel sounds are normal.      Palpations: Abdomen is soft.    Musculoskeletal:         General: Normal range of motion. Cervical back: Normal range of motion and neck supple. Skin:     General: Skin is warm and dry. Neurological:      Mental Status: She is alert and oriented to person, place, and time. She is confused. GCS: GCS eye subscore is 4. GCS verbal subscore is 5. GCS motor subscore is 6. Cranial Nerves: No cranial nerve deficit, dysarthria or facial asymmetry. Sensory: No sensory deficit. Motor: No weakness. Coordination: Coordination normal.   Psychiatric:         Mood and Affect: Mood normal.         Behavior: Behavior normal.          MDM  Number of Diagnoses or Management Options  Diagnosis management comments: A/P: Hypoglycemia, CVA, metabolic encephalopathy. 42-year-old female presenting after likely having hypoglycemic event secondary to patient being on sulfonylurea with underlying CKD. Patient recently had cardiac catheterization Code S level 1 called CT head without contrast obtained teleneurology will see and evaluate. We will send basic labs start patient on D5 infusion. Procedures      ED EKG interpretation:  Rhythm: 1st degree block; and regular . Rate (approx.): 57; Axis: normal; P wave: normal; QRS interval: normal ; ST/T wave: T wave inverted; in  Lead: III , aVF; Other findings: borderline ekg. EKG documented by Julianne Weaver MD       Perfect Serve Consult for Admission  7:04 PM    ED Room Number: ER03/03  Patient Name and age:  Fortunato Moreno 80 y.o.  female  Working Diagnosis:   1. Hypoglycemia    2. JOANNA (acute kidney injury) ([facility] [address]        COVID-19 Suspicion:  no  Sepsis present:  no  Reassessment needed: yes  Code Status:  Full Code  Readmission: yes  Isolation Requirements:  no  Recommended Level of Care:  telemetry  Department:[facility] ED - [phone]  Other: Total critical care time spent exclusive of procedures:  44 min.

## 2021-12-26 NOTE — ED TRIAGE NOTES
Signs and symptoms: AMS and slurred speech  VAN score: Negative  Last Known Well: 1700  Blood Glucose (EMS acceptable): 63  Blood Pressure (EMS acceptable): 149/58  Anticoagulants: plavix    Code Stroke Level 1 initiated at this time. EMS reports pt was last known well about 45 mins ago. Reports pt's daughter noticed pt was confusion and had slurred speech. Pt recently had a STEMI 2 days ago with stent placement. On plavix. VAN negative.

## 2021-12-27 ENCOUNTER — TELEPHONE (OUTPATIENT)
Dept: CARDIAC REHAB | Age: 86
End: 2021-12-27

## 2021-12-27 VITALS
SYSTOLIC BLOOD PRESSURE: 143 MMHG | BODY MASS INDEX: 26.52 KG/M2 | TEMPERATURE: 97.8 F | RESPIRATION RATE: 18 BRPM | HEIGHT: 62 IN | DIASTOLIC BLOOD PRESSURE: 59 MMHG | WEIGHT: 144.1 LBS | HEART RATE: 68 BPM | OXYGEN SATURATION: 99 %

## 2021-12-27 LAB
ALBUMIN SERPL-MCNC: 2.5 G/DL (ref 3.5–5)
ALBUMIN/GLOB SERPL: 0.7 {RATIO} (ref 1.1–2.2)
ALP SERPL-CCNC: 67 U/L (ref 45–117)
ALT SERPL-CCNC: 18 U/L (ref 12–78)
ANION GAP SERPL CALC-SCNC: 7 MMOL/L (ref 5–15)
AST SERPL-CCNC: 29 U/L (ref 15–37)
ATRIAL RATE: 57 BPM
ATRIAL RATE: 62 BPM
ATRIAL RATE: 85 BPM
BILIRUB SERPL-MCNC: 0.3 MG/DL (ref 0.2–1)
BUN SERPL-MCNC: 33 MG/DL (ref 6–20)
BUN/CREAT SERPL: 13 (ref 12–20)
CALCIUM SERPL-MCNC: 7.9 MG/DL (ref 8.5–10.1)
CALCULATED P AXIS, ECG09: 55 DEGREES
CALCULATED P AXIS, ECG09: 56 DEGREES
CALCULATED P AXIS, ECG09: 59 DEGREES
CALCULATED R AXIS, ECG10: -4 DEGREES
CALCULATED R AXIS, ECG10: 3 DEGREES
CALCULATED T AXIS, ECG11: -17 DEGREES
CALCULATED T AXIS, ECG11: -29 DEGREES
CALCULATED T AXIS, ECG11: 58 DEGREES
CHLORIDE SERPL-SCNC: 106 MMOL/L (ref 97–108)
CO2 SERPL-SCNC: 28 MMOL/L (ref 21–32)
CREAT SERPL-MCNC: 2.54 MG/DL (ref 0.55–1.02)
DIAGNOSIS, 93000: NORMAL
ERYTHROCYTE [DISTWIDTH] IN BLOOD BY AUTOMATED COUNT: 13.1 % (ref 11.5–14.5)
FERRITIN SERPL-MCNC: 45 NG/ML (ref 26–388)
FOLATE SERPL-MCNC: 8.1 NG/ML (ref 5–21)
GLOBULIN SER CALC-MCNC: 3.4 G/DL (ref 2–4)
GLUCOSE BLD STRIP.AUTO-MCNC: 106 MG/DL (ref 65–117)
GLUCOSE SERPL-MCNC: 260 MG/DL (ref 65–100)
HAPTOGLOB SERPL-MCNC: 153 MG/DL (ref 30–200)
HCT VFR BLD AUTO: 30.5 % (ref 35–47)
HGB BLD-MCNC: 9.7 G/DL (ref 11.5–16)
IRON SATN MFR SERPL: 14 % (ref 20–50)
IRON SERPL-MCNC: 35 UG/DL (ref 35–150)
LDH SERPL L TO P-CCNC: 237 U/L (ref 81–246)
MAGNESIUM SERPL-MCNC: 2.6 MG/DL (ref 1.6–2.4)
MCH RBC QN AUTO: 29.5 PG (ref 26–34)
MCHC RBC AUTO-ENTMCNC: 31.8 G/DL (ref 30–36.5)
MCV RBC AUTO: 92.7 FL (ref 80–99)
NRBC # BLD: 0 K/UL (ref 0–0.01)
NRBC BLD-RTO: 0 PER 100 WBC
P-R INTERVAL, ECG05: 166 MS
P-R INTERVAL, ECG05: 200 MS
P-R INTERVAL, ECG05: 220 MS
PLATELET # BLD AUTO: 192 K/UL (ref 150–400)
PMV BLD AUTO: 11.3 FL (ref 8.9–12.9)
POTASSIUM SERPL-SCNC: 4.2 MMOL/L (ref 3.5–5.1)
PROCALCITONIN SERPL-MCNC: 0.11 NG/ML
PROT SERPL-MCNC: 5.9 G/DL (ref 6.4–8.2)
Q-T INTERVAL, ECG07: 398 MS
Q-T INTERVAL, ECG07: 458 MS
Q-T INTERVAL, ECG07: 552 MS
QRS DURATION, ECG06: 100 MS
QRS DURATION, ECG06: 106 MS
QRS DURATION, ECG06: 124 MS
QTC CALCULATION (BEZET), ECG08: 464 MS
QTC CALCULATION (BEZET), ECG08: 473 MS
QTC CALCULATION (BEZET), ECG08: 537 MS
RBC # BLD AUTO: 3.29 M/UL (ref 3.8–5.2)
RETICS # AUTO: 0.04 M/UL (ref 0.02–0.08)
RETICS/RBC NFR AUTO: 1.3 % (ref 0.7–2.1)
SERVICE CMNT-IMP: NORMAL
SODIUM SERPL-SCNC: 141 MMOL/L (ref 136–145)
TIBC SERPL-MCNC: 244 UG/DL (ref 250–450)
TSH SERPL DL<=0.05 MIU/L-ACNC: 0.98 UIU/ML (ref 0.36–3.74)
VENTRICULAR RATE, ECG03: 57 BPM
VENTRICULAR RATE, ECG03: 62 BPM
VENTRICULAR RATE, ECG03: 85 BPM
VIT B12 SERPL-MCNC: 205 PG/ML (ref 193–986)
WBC # BLD AUTO: 5.6 K/UL (ref 3.6–11)

## 2021-12-27 PROCEDURE — 83735 ASSAY OF MAGNESIUM: CPT

## 2021-12-27 PROCEDURE — 74011250637 HC RX REV CODE- 250/637: Performed by: INTERNAL MEDICINE

## 2021-12-27 PROCEDURE — 83010 ASSAY OF HAPTOGLOBIN QUANT: CPT

## 2021-12-27 PROCEDURE — 82607 VITAMIN B-12: CPT

## 2021-12-27 PROCEDURE — 83615 LACTATE (LD) (LDH) ENZYME: CPT

## 2021-12-27 PROCEDURE — 96365 THER/PROPH/DIAG IV INF INIT: CPT

## 2021-12-27 PROCEDURE — 96372 THER/PROPH/DIAG INJ SC/IM: CPT

## 2021-12-27 PROCEDURE — 83540 ASSAY OF IRON: CPT

## 2021-12-27 PROCEDURE — 36415 COLL VENOUS BLD VENIPUNCTURE: CPT

## 2021-12-27 PROCEDURE — 97161 PT EVAL LOW COMPLEX 20 MIN: CPT

## 2021-12-27 PROCEDURE — 82746 ASSAY OF FOLIC ACID SERUM: CPT

## 2021-12-27 PROCEDURE — 74011250636 HC RX REV CODE- 250/636: Performed by: INTERNAL MEDICINE

## 2021-12-27 PROCEDURE — 97116 GAIT TRAINING THERAPY: CPT

## 2021-12-27 PROCEDURE — 85045 AUTOMATED RETICULOCYTE COUNT: CPT

## 2021-12-27 PROCEDURE — 82728 ASSAY OF FERRITIN: CPT

## 2021-12-27 PROCEDURE — 84145 PROCALCITONIN (PCT): CPT

## 2021-12-27 PROCEDURE — 82962 GLUCOSE BLOOD TEST: CPT

## 2021-12-27 PROCEDURE — 84443 ASSAY THYROID STIM HORMONE: CPT

## 2021-12-27 PROCEDURE — 96366 THER/PROPH/DIAG IV INF ADDON: CPT

## 2021-12-27 PROCEDURE — 85027 COMPLETE CBC AUTOMATED: CPT

## 2021-12-27 PROCEDURE — G0378 HOSPITAL OBSERVATION PER HR: HCPCS

## 2021-12-27 PROCEDURE — 80053 COMPREHEN METABOLIC PANEL: CPT

## 2021-12-27 RX ADMIN — ASPIRIN 81 MG: 81 TABLET, CHEWABLE ORAL at 09:30

## 2021-12-27 RX ADMIN — POTASSIUM CHLORIDE, DEXTROSE MONOHYDRATE AND SODIUM CHLORIDE 125 ML/HR: 150; 5; 900 INJECTION, SOLUTION INTRAVENOUS at 05:09

## 2021-12-27 RX ADMIN — CLOPIDOGREL BISULFATE 75 MG: 75 TABLET, FILM COATED ORAL at 09:31

## 2021-12-27 RX ADMIN — METOPROLOL TARTRATE 50 MG: 50 TABLET, FILM COATED ORAL at 09:31

## 2021-12-27 RX ADMIN — ATORVASTATIN CALCIUM 20 MG: 20 TABLET, FILM COATED ORAL at 09:30

## 2021-12-27 RX ADMIN — HEPARIN SODIUM 5000 UNITS: 5000 INJECTION INTRAVENOUS; SUBCUTANEOUS at 09:30

## 2021-12-27 NOTE — CARDIO/PULMONARY
[facility] Cardiopulmonary Rehab: 79 yo female admitted with Chest Pain. Per Dr Joseph Meier, dx includes: STEMI. S /P emergent PTCA with GERRY to RCA into PDA (12/24). LVEF 45%. Patient is  & resides with her daughter Maximo San) in [place]. PMH: Rt proximal humerus fracture, s/p Rt Reverse Total Shoulder Arthroplasty (10/29/21), Lt TKR, Lumbar fusion. CAD risk factors: DM, smoking hx, HTN, HLD, post-menopausal, and family hx. UPDATE 12/27/21: Attempted to contact pt & daughter to discuss eligibility for outpatient cardia rehab program. Left messages and then realized pt was in the ED c/o confusion. Dr Joseph Meier had seen the pt and was going to discharge her. Met with patient & her daughter in ED prior to discharge (see cardiopulmonary rehab progress note in pt's chart for 12/27/21).

## 2021-12-27 NOTE — PROGRESS NOTES
PHYSICAL THERAPY EVALUATION/DISCHARGE  Patient: Jose Clemente (61 y.o. female)  Date: 12/27/2021  Primary Diagnosis: Hypoglycemia [E16.2]       Precautions:   PWB (RUE)      ASSESSMENT  Based on the objective data described below, the patient presents with decreased activity tolerance, impaired endurance, balance and mildly reduced functional mobility in the setting of hospital admission for STEMI with cath. PMH notable for reverse TSA in October 2021 (patient recently changed to [address] up from [facility]), pelvic fracture. PTA patient was receiving therapy 4d/week via HHPT and OT. Patient today demonstrates transfers and ambulation at [facility] level with RW and [address] maintained throughout [address]. Patient's daughter present and active participant in physical therapy evaluation. Patient without complaints of palpitations, lightheadedness/dizziness, chest pain or other symptoms. Vitals stable as below. Recommend gentle resumption of HHPT HEP with care taken to patient symptoms of palpitations, SOB and taking ample rest breaks. Recommend resumption of HHPT services once indicated. Patient with plan to discharge today; orders placed. Functional Outcome Measure: The patient scored 17/28 on the Tenetti outcome measure which is indicative of high fall risk. Other factors to consider for discharge: none additional     Further skilled acute physical therapy is not indicated at this time. PLAN :  Recommendation for discharge: (in order for the patient to meet his/her long term goals)  Physical therapy at least 2 days/week in the home     This discharge recommendation:  Has been made in collaboration with the attending provider and/or case management    IF patient discharges home will need the following DME: patient owns DME required for discharge       SUBJECTIVE:   Patient stated I feel fine.     OBJECTIVE DATA SUMMARY:     Vitals:    12/27/21 1031 12/27/21 1100 12/27/21 1216 12/27/21 1246   BP:  (!) 133/57 (!) 134/49 (!) 143/59   BP 1 Location:  Left upper arm Left upper arm    BP Patient Position:   Sitting  Comment: post ambulation    Pulse: 60 (!) 59 63 68   Temp:    97.8 °F (36.6 °C)   Resp: 18   18   Height:       Weight:       SpO2: 97% 98% 100% 99%         HISTORY:    Past Medical History:   Diagnosis Date    CAD (coronary artery disease)     CHF (congestive heart failure), NYHA class I, chronic, systolic (HCC)     CKD (chronic kidney disease), stage III (HCC)     DM type 2 causing renal disease ([facility] [address]     DM type 2 causing vascular disease ([facility] [address]     Hypertension     Ischemic cardiomyopathy      Past Surgical History:   Procedure Laterality Date    HX APPENDECTOMY      HX HYSTERECTOMY      HX KNEE REPLACEMENT Left     HX OTHER SURGICAL      head sx x 2 nerve related    HX OTHER SURGICAL      Back surgery x 2    HX TONSILLECTOMY         Prior level of function: supervision with RW; assistance with bathing/dressing/cooking  Personal factors and/or comorbidities impacting plan of care: as above    Home Situation  Home Environment: Private residence  # Steps to Enter: 1  Wheelchair Ramp:  (portable ramp)  One/Two Story Residence: Two story, live on 1st floor  Living Alone: No  Support Systems: Child(watson) (dtr )  Patient Expects to be Discharged to[de-identified] House  Current DME Used/Available at Home: Grab bars,Raised toilet seat,Shower chair,Walker, rolling (alondra walker; transport chair; donut cushion for sofa/recline)  Tub or Shower Type: Shower    EXAMINATION/PRESENTATION/DECISION MAKING:   Critical Behavior:  Neurologic State: Alert  Orientation Level: Oriented X4  Cognition: Appropriate decision making,Follows commands     Hearing:   Auditory  Auditory Impairment: None  Skin:  Patient with multiple bruises to yaneth UEs   Edema: defer to RN note  Range Of Motion:  AROM: Generally decreased, functional                       Strength:    Strength: Generally decreased, functional                    Tone & Sensation:   Tone: Normal                              Coordination:  Coordination: Within functional limits  Vision:      Functional Mobility:  Bed Mobility:           Scooting: Contact guard assistance  Transfers:  Sit to Stand: Contact guard assistance;Assist x1  Stand to Sit: Contact guard assistance;Assist x1                       Balance:   Sitting: Intact; With support  Standing: Intact; With support  Ambulation/Gait Training:  Distance (ft): 25 Feet (ft)  Assistive Device: Gait belt;Walker, rolling  Ambulation - Level of Assistance: Contact guard assistance;Assist x1        Gait Abnormalities: Step to gait              Speed/Tonia: Pace decreased (<100 feet/min)  Step Length: Left shortened                  Functional Measure:  Tinetti test:    Sitting Balance: 1  Arises: 1  Attempts to Rise: 2  Immediate Standing Balance: 1  Standing Balance: 1  Nudged: 1  Eyes Closed: 0  Turn 360 Degrees - Continuous/Discontinuous: 1  Turn 360 Degrees - Steady/Unsteady: 1  Sitting Down: 1  Balance Score: 10 Balance total score  Indication of Gait: 1  R Step Length/Height: 0  L Step Length/Height: 1  R Foot Clearance: 1  L Foot Clearance: 1  Step Symmetry: 0  Step Continuity: 0  Path: 1  Trunk: 1  Walking Time: 1  Gait Score: 7 Gait total score  Total Score: 17/28 Overall total score         Tinetti Tool Score Risk of Falls  <19 = High Fall Risk  19-24 = Moderate Fall Risk  25-28 = Low Fall Risk  Tinetti ME. Performance-Oriented Assessment of Mobility Problems in Elderly Patients. Nunez 66; [id number].  (Scoring Description: PT Bulletin Feb. 10, 1993)    Older adults: Aye Pitt et al, 2009; n = 1000 [facility] elderly evaluated with ABC, PHUC, ADL, and IADL)  · Mean PHUC score for males aged 69-68 years = 26.21(3.40)  · Mean PHUC score for females age 69-68 years = 25.16(4.30)  · Mean PHUC score for males over 80 years = 23.29(6.02)  · Mean PHUC score for females over 80 years = 17.20(8.32)            Physical Therapy Evaluation Charge Determination History Examination Presentation Decision-Making   MEDIUM  Complexity : 1-2 comorbidities / personal factors will impact the outcome/ POC  MEDIUM Complexity : 3 Standardized tests and measures addressing body structure, function, activity limitation and / or participation in recreation  MEDIUM Complexity : Evolving with changing characteristics  MEDIUM Complexity : FOTO score of 26-74      Based on the above components, the patient evaluation is determined to be of the following complexity level: MEDIUM    Pain Rating:  Patient without reports of pain during therapy    Activity Tolerance:   Good and tolerates ADLs without rest breaks      After treatment patient left in no apparent distress:   Supine in bed, Call bell within reach and Caregiver / family present    COMMUNICATION/EDUCATION:   The patients plan of care was discussed with: Occupational therapist, Registered nurse and Case management. Fall prevention education was provided and the patient/caregiver indicated understanding. and Patient/family have participated as able in goal setting and plan of care.     Thank you for this referral.  Sherri Hill PT, DPT   Time Calculation: 30 mins

## 2021-12-27 NOTE — TELEPHONE ENCOUNTER
[facility] Cardiopulmonary Rehab: Attempted to contact pt on both home & cell #s. Left message about eligibility for cardiac rehab program, s/p STEMI & emergent PTCA with GERRY to RCA into PDA (12/24). Encouraged pt to call back for additional information. Patient is  & resides with her daughter Nba Mckeon) in [place]. She is 80years old. PMH: PTCA/stent to RCA (); Rt proximal humerus fracture, s/p Rt Reverse Total Shoulder Arthroplasty (10/29/21), Lt TKR, Lumbar fusion. CAD risk factors: DM, smokinghx, HTN, HLD, post-menopausal, and family hx.

## 2021-12-27 NOTE — ED NOTES
0145 Vitals obtained, AM labs collected, patient denies pain at this time, no needs, call bell within reach, advised to call for asst

## 2021-12-27 NOTE — PROGRESS NOTES
5- Per Charge RNRigoberto no handoff can be given to another RN at this time. No available RN to take pt. Vital signs stable, pt. resting in bed.

## 2021-12-27 NOTE — H&P
[facility]    Hospitalist Admission Note      NAME:  Lesli Fierro   :   1934   MRN:  [id number]     PCP:  Phoebe Sanchez NP     Date/Time of service:  2021 7:18 PM          Subjective:     CHIEF COMPLAINT: confusion    HISTORY OF PRESENT ILLNESS:     Ms. Shira Pan is a 80 y.o.  female who presented to the Emergency Department complaining of confusion. Noted today by daughter. Just discharged yesterday after admit for NSTMEI, with cath, PCI and new meds. Yesterday at home she was nauseous and vomiting all the time. ER finds hypoglycemia and ARF. We will admit her for observation. Past Medical History:   Diagnosis Date    CAD (coronary artery disease)     CHF (congestive heart failure), NYHA class I, chronic, systolic (HCC)     CKD (chronic kidney disease), stage III ([address]     DM type 2 causing renal disease ([address]     DM type 2 causing vascular disease ([address]     Hypertension     Ischemic cardiomyopathy         Past Surgical History:   Procedure Laterality Date    HX APPENDECTOMY      HX HYSTERECTOMY      HX KNEE REPLACEMENT Left     HX OTHER SURGICAL      head sx x 2 nerve related    HX OTHER SURGICAL      Back surgery x 2    HX TONSILLECTOMY         Social History     Tobacco Use    Smoking status: Not on file    Smokeless tobacco: Not on file   Substance Use Topics    Alcohol use: Not on file        No family history on file. Family hx cannot be fully assessed, since the patient cannot provide information    Allergies   Allergen Reactions    Codeine Nausea and Vomiting    Compazine [Prochlorperazine] Other (comments)     Facial droop    Dilaudid [Hydromorphone] Nausea and Vomiting    Morphine Nausea and Vomiting    Sulfa (Sulfonamide Antibiotics) Nausea and Vomiting        Prior to Admission medications    Medication Sig Start Date End Date Taking? Authorizing Provider   amLODIPine (NORVASC) 2.5 mg tablet Take 2 Tablets by mouth daily.  21 Zeny Salgado MD   aspirin (ASPIRIN) 325 mg tablet Take 1 Tablet by mouth daily. 12/26/21   Zeny Salgado MD   atorvastatin (LIPITOR) 20 mg tablet Take 1 Tablet by mouth daily. 12/26/21   Zeny Salgado MD   clopidogreL (PLAVIX) 75 mg tab Take 1 Tablet by mouth daily. 12/26/21   Zeny Salgado MD   metoprolol tartrate (LOPRESSOR) 50 mg tablet Take 1 Tablet by mouth every twelve (12) hours. 12/25/21   Zeny Salgado MD   oxyCODONE IR (ROXICODONE) 5 mg immediate release tablet Take 1 Tablet by mouth every four (4) hours as needed. Max Daily Amount: 30 mg. 10/29/21   Fanny Lo MD   senna-docusate (PERICOLACE) 8.6-50 mg per tablet Take 1 Tablet by mouth two (2) times a day. 10/29/21   Fanny Lo MD   polyethylene glycol [facility]) 17 gram packet Take 1 Packet by mouth two (2) times a day. 10/29/21   Fanny Lo MD   albuterol (PROVENTIL VENTOLIN) 2.5 mg /3 mL (0.083 %) nebu 2.5 mg by Nebulization route every four (4) hours as needed for Wheezing or Shortness of Breath. Provider, Historical   furosemide (Lasix) 40 mg tablet Take 40 mg by mouth two (2) times a day. Provider, Historical   glimepiride (AMARYL) 4 mg tablet Take 4 mg by mouth every morning.     Provider, Historical   albuterol (PROVENTIL HFA, VENTOLIN HFA, PROAIR HFA) 90 mcg/actuation inhaler Take 2 Puffs by inhalation every four (4) hours as needed for Wheezing. 7/25/21   Renato Rubalcava MD       Review of Systems:  (bold if positive, if negative)    Gen:  Eyes:  ENT:  CVS:  Pulm:  GI:  :  MS:  Skin:  Psych:  Endo:  Hem:  Renal:  Neuro:        Objective:      VITALS:    Vital signs reviewed; most recent are:    Visit Vitals  BP (!) 140/54 (BP 1 Location: Left arm, BP Patient Position: At rest)   Pulse (!) 59   Temp 97.4 °F (36.3 °C)   Resp 12   Ht 5' 2\" (1.575 m)   Wt 65.4 kg (144 lb 1.6 oz)   SpO2 98%   BMI 26.36 kg/m²     SpO2 Readings from Last 6 Encounters:   12/26/21 98%   12/25/21 97%   10/29/21 93%   07/25/21 96% No intake or output data in the 24 hours ending 12/26/21 4176     Exam:     Physical Exam:    Gen:  Well-developed, well-nourished, in no acute distress  HEENT:  Pink conjunctivae, PERRL, hearing intact to voice, moist mucous membranes  Neck:  Supple, without masses, thyroid non-tender  Resp:  No accessory muscle use, clear breath sounds without wheezes rales or rhonchi  Card:  No murmurs, normal S1, S2 without thrills, bruits or peripheral edema  Abd:  Soft, non-tender, non-distended, normoactive bowel sounds are present, no mass  Lymph:  No cervical or inguinal adenopathy  Musc:  No cyanosis or clubbing  Skin:  No rashes or ulcers, skin turgor is good  Neuro:  Cranial nerves are grossly intact, no focal motor weakness, follows commands appropriately  Psych:  Good insight, oriented to person, place and time, alert     Labs:    Recent Labs     12/26/21  1807   WBC 8.8   HGB 11.1*   HCT 34.4*        Recent Labs     12/26/21  1807      K 3.3*      CO2 25   GLU 45*   BUN 32*   CREA 2.77*   CA 8.9   ALB 3.4*   TBILI 0.5   ALT 22     Lab Results   Component Value Date/Time    Glucose (POC) 73 12/26/2021 06:38 PM    Glucose (POC) 63 (L) 12/26/2021 05:42 PM     No results for input(s): PH, PCO2, PO2, HCO3, FIO2 in the last 72 hours. No results for input(s): INR, INREXT, INREXT in the last 72 hours. All Micro Results     Procedure Component Value Units Date/Time    CULTURE, URINE [[id number]]     Order Status: Sent Specimen: Cath Urine     URINE CULTURE HOLD SAMPLE [[id number]]     Order Status: Sent Specimen: Urine           I have reviewed previous records       Assessment and Plan:      Hypoglycemia / DM type 2 causing renal  And vascular disease - POA. In setting of age and A1c of 6.1, I would stop all testing and treatment of DM at discharge. Non DM diet. Stop amaryl.  Hydrate with D5.    ARF (acute renal failure) on CKD (chronic kidney disease) stage 3 / hypokalemia - POA, likely a combination of IV dye used in cath, plus the continued use of lasix in setting of poor PO intake. Hydrate with NS 40mEg K and monitor. Consult cardiology to consider diuretic adjustment. Hydrate overnight and only consult renal if not improving as expected. Acute metabolic encephalopathy - POA due to hypoglycemia and ARF. Give D5, Hydrate. No narcotics. Monitor. I doubt CVA. Consult neurology only if not improving as expected. CAD (coronary artery disease) / Ischemic cardiomyopathy / CHF systolic / HTN (hypertension) - Consult cardiology due to NSTEMi and cath 2 days ago. For now continue ASA, Plaivx, atorvastatin, metoprolol, but hold Norvasc and lasix. Anemia - POA, mild and may worsen with hydration. Check serologies. Dysphagia - Diet as tolerated    Telemetry reviewed:   normal sinus rhythm    Risk of deterioration: high      Total time spent with patient: 48 Minutes I personally reviewed chart, notes, data and current medications in the medical record. I have personally examined and treated the patient at bedside during this period.                  Care Plan discussed with: Patient, Nursing Staff and >50% of time spent in counseling and coordination of care    Discussed:  Care Plan       ___________________________________________________    Attending Physician: Dipesh Villafuerte MD

## 2021-12-27 NOTE — DISCHARGE INSTRUCTIONS
Cardiology Follow up with Hernandez Schmitz MD on January 4th, 2022 at [address] [facility] [address]  [phone]

## 2021-12-27 NOTE — ED NOTES
Bedside and Verbal shift change report given to Josue  (oncoming nurse) by Sharmin Delgado  (offgoing nurse). Report included the following information SBAR, Kardex and ED Summary.

## 2021-12-27 NOTE — CARDIO/PULMONARY
[facility] Cardiopulmonary Rehab: 79 yo female in ED with confusion following recent STEMI & PCI (12/24). Was discharged yesterday. Met with patient in ED prior to discharge. Daughter Irena Both) was also present. Pt is  & resides with her daughter in [place]. Dgt reported pt had a fall in October with Rt humerus fracture & pelvic fracture. She is currently receiving home PT services 4 X per week and has been non-weight bearing until recently. Pt & daughter reported they did not know that the unusual sensation in her throat (felt like something was stuck) was possibly related to her heart. Pt had no previous cardiac hx. Aware pt had a heart attack & needed a stent. Explained eligibility for outpatient cardiac rehab program is for up to 12 months, and that pt must complete home physical therapy prior to staring cardiac rehab. Dgt verbalized understanding that pt is not a candidate for cardiac rehab at this time. Dgt reported they did not receive any info following pt's heart attack. Provided MI education folder. Reviewed CAD risk factors. Per daughter, pt smoked at least 1/2 ppd from ages 24 until 36 or 48. According to H&P on 11/23/10, pt as still smoking \"a few cigarettes\" per day. She has since quit smoking. Pt has appt with Dr Nick Pelaez on 1/4/22. All questions were answered.

## 2021-12-27 NOTE — ED NOTES
Bedside and Verbal shift change report given to Venice Romo RN (oncoming nurse) by Armaan Still RN (offgoing nurse). Report included the following information SBAR, ED Summary, MAR and Recent Results.

## 2021-12-27 NOTE — PROGRESS NOTES
12/27/2021  1:44 PM  Case management note    Patient discharged with daughter  Sent new referral to [facility] as she was with them prior to admission.   To begin after follow up with Dr. Braxton Joyce  On Jaime 3, 2022  Emilee Lopez

## 2021-12-27 NOTE — DISCHARGE SUMMARY
Jan Mondragon [facility] 79  [address] [facility], [address] [place] [address]  [phone]    Physician Discharge Summary     Patient ID:  Marci Joseph  [id number]  80 y.o.  1934    Admit date: 12/26/2021    Discharge date and time: 12/27/2021 11:21 AM    Admission Diagnoses: Hypoglycemia [E16.2]    Discharge Diagnoses:    1. Acute metabolic encephalopathy  2. Hypoglycemia  3. Hypokalemia  4. JOANNA on CKD3 - suspect sec to dehydration from poor po intake, lasix and contrast nephropathy; improving with IVF  5. Ischemic cardiomyopathy  6. Chronic systolic heart failure, HFrEF  7. HTN  8. DM2  9. CAD  10. Overweight - BMI 26.36    Hospital Course: 79 yo F adm to observation due to confusion, hypoglycemia. Found to be in JOANNA in setting of known CKD3. She was just discharged after [facility] for NSTEMI on lasix and continuing her home amaryl. Both of these medications have been stopped with improvement in creatinine and mentation to baseline. Cardiology consulted, agreeable to holding these medications and followup scheduled for 1/4 with Dr Jenelle Barlow. PCP: Jose Gannon NP     Consults: Cardiology    Significant Diagnostic Studies: CT CODE NEURO HEAD WO CONTRAST    Result Date: 12/26/2021  EXAM: CT CODE NEURO HEAD WO CONTRAST INDICATION: AMS, slurred speech, R Rampa Ivon 115 5 pm COMPARISON: 6/9/2013. CONTRAST: None. TECHNIQUE: Unenhanced CT of the head was performed using 5 mm images. Brain and bone windows were generated. Coronal and sagittal reformats. CT dose reduction was achieved through use of a standardized protocol tailored for this examination and automatic exposure control for dose modulation. FINDINGS: The ventricles and sulci are normal in size, shape and configuration. . Small vessel ischemic changes are stable. 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 . There is no intracranial hemorrhage, extra-axial collection, or mass effect. The basilar cisterns are open. No CT evidence of acute infarct.  The bone windows demonstrate no abnormalities. The visualized portions of the paranasal sinuses and mastoid air cells are clear. No acute process or change compared to the prior exam.     ECHO ADULT COMPLETE    Result Date: 12/25/2021    Mild left ventricular systolic dysfunction (EF 86%) with inferior and basal-mid septal akinesis.   Diastolic dysfunction   No hemodynamically significant valvular abnormlities noted.      INVASIVE VASCULAR PROCEDURE    Result Date: 12/24/2021  Cath: Obstructive 2VD:    LAD p30, m30; D1 70    LCx p30 (co-dom)    RCA m100 Successful GERRY (\"full metal jacket\") of mid RCA into PDA    Mid 2.75x28 Xience    Mid-dist 2.5x28 Xience    Dist-PDA 2.5x18 Xience    All post dil with 2.75x27 NC Trek    Focal mid-dist area post-dil with 3.0x8 NC Euphora RFA/LFA manual RFA with sig disease so LFA accessed ASA/plavix, statin NTG drip Echo DM mgmt per Dr. Hafsa Tomlin    Result Date: 12/24/2021  Cath: Obstructive 2VD:    LAD p30, m30; D1 70    LCx p30 (co-dom)    RCA m100 Successful GERRY (\"full metal jacket\") of mid RCA into PDA    Mid 2.75x28 Xience    Mid-dist 2.5x28 Xience    Dist-PDA 2.5x18 Xience    All post dil with 2.75x27 NC Trek    Focal mid-dist area post-dil with 3.0x8 NC Euphora RFA/LFA manual RFA with sig disease so LFA accessed ASA/plavix, statin NTG drip Echo DM mgmt per Dr. Steele Mono     Discharge Exam:  Physical Exam:    Gen: Well-developed, well-nourished, in no acute distress  HEENT:  Pink conjunctivae, PERRL, hearing intact to voice, moist mucous membranes  Neck: Supple, without masses, thyroid non-tender  Resp: No accessory muscle use, clear breath sounds without wheezes rales or rhonchi  Card: No murmurs, normal S1, S2 without thrills, bruits or peripheral edema  Abd:  Soft, non-tender, non-distended, normoactive bowel sounds are present, no palpable organomegaly and no detectable hernias  Lymph:  No cervical or inguinal adenopathy  Musc: No cyanosis or clubbing  Skin: No rashes or ulcers, skin turgor is good  Neuro:  Cranial nerves are grossly intact, no focal motor weakness, follows commands appropriately  Psych:  Good insight, oriented to person, place and time, alert    Disposition: home with home health  Discharge Condition: Stable    Patient Instructions:   Current Discharge Medication List      CONTINUE these medications which have NOT CHANGED    Details   aspirin (ASPIRIN) 325 mg tablet Take 1 Tablet by mouth daily. Qty: 30 Tablet, Refills: 0      atorvastatin (LIPITOR) 20 mg tablet Take 1 Tablet by mouth daily. Qty: 30 Tablet, Refills: 1      clopidogreL (PLAVIX) 75 mg tab Take 1 Tablet by mouth daily. Qty: 30 Tablet, Refills: 1      metoprolol tartrate (LOPRESSOR) 50 mg tablet Take 1 Tablet by mouth every twelve (12) hours. Qty: 60 Tablet, Refills: 1      senna-docusate (PERICOLACE) 8.6-50 mg per tablet Take 1 Tablet by mouth two (2) times a day. polyethylene glycol (MIRALAX) 17 gram packet Take 1 Packet by mouth two (2) times a day. albuterol (PROVENTIL VENTOLIN) 2.5 mg /3 mL (0.083 %) nebu 2.5 mg by Nebulization route every four (4) hours as needed for Wheezing or Shortness of Breath. albuterol (PROVENTIL HFA, VENTOLIN HFA, PROAIR HFA) 90 mcg/actuation inhaler Take 2 Puffs by inhalation every four (4) hours as needed for Wheezing.   Qty: 1 Inhaler, Refills: 0         STOP taking these medications       amLODIPine (NORVASC) 2.5 mg tablet Comments:   Reason for Stopping:         oxyCODONE IR (ROXICODONE) 5 mg immediate release tablet Comments:   Reason for Stopping:         furosemide (Lasix) 40 mg tablet Comments:   Reason for Stopping:         glimepiride (AMARYL) 4 mg tablet Comments:   Reason for Stopping:             Activity: PT/OT per Home Health  Diet: Cardiac Diet  Wound Care: None needed    Follow-up with  Follow-up Information     Follow up With Specialties Details Why Contact Info    Jacqui Durand MD Cardiology, Interventional Cardiology On 1/4/2022 9am [address] [place] [address]  [phone]      Miroslava Lopez NP Nurse Practitioner   [address]  [phone]            Signed:  Robb Darnell DO  12/27/2021  11:21 AM    Time spent 25 minutes.

## 2021-12-27 NOTE — CONSULTS
Valery Angelo MD, [address] [place] [address]  [address]  [phone]    Date of  Admission: 12/26/2021  5:27 PM         IMPRESSION and RECOMMENDATIONS     1.  CAD:  Cont ASA/plavix. I'm +/- on metoprolol and lipitor, but continue for now. Agree with Dr. Ambrosio Lopez of minimalization of meds as diuretics and amaryl likely caused her decompensation. I'm ok with her going home today as she seems back to baseline. I have arranged f/u with me 1/4/22 @ 9am.    Discussed with Pt and daughter. Problem List  Date Reviewed: 12/26/2021          Codes Class Noted    Hypoglycemia ICD-10-CM: E16.2  ICD-9-CM: 251.2  12/26/2021        ARF (acute renal failure) (HCC) ICD-10-CM: N17.9  ICD-9-CM: 584.9  12/26/2021        Acute metabolic encephalopathy QOV-55-PI: G93.41  ICD-9-CM: 348.31  12/26/2021        CAD (coronary artery disease) ICD-10-CM: I25.10  ICD-9-CM: 414.00  Unknown        DM type 2 causing renal disease (HCC) ICD-10-CM: E11.29  ICD-9-CM: 250.40  Unknown        DM type 2 causing vascular disease (HCC) ICD-10-CM: E11.59  ICD-9-CM: 250.70, 443.81  Unknown        Hypertension ICD-10-CM: I10  ICD-9-CM: 401.9  Unknown        CHF (congestive heart failure), NYHA class I, chronic, systolic (HCC) CUR-01-: I50.22  ICD-9-CM: 428.22, 428.0  Unknown        Ischemic cardiomyopathy ICD-10-CM: I25.5  ICD-9-CM: 414.8  Unknown        Dysphagia ICD-10-CM: R13.10  ICD-9-CM: 787.20  12/24/2021        HTN (hypertension), benign ICD-10-CM: I10  ICD-9-CM: 401.1  10/18/2021        DM (diabetes mellitus), type 2 ([facility] ICD-10-CM: E11.9  ICD-9-CM: 250.00  10/18/2021        CKD (chronic kidney disease) stage 3, GFR 30-59 ml/min ([facility]) ICD-10-CM: N18.30  ICD-9-CM: 585.3  10/18/2021              History of Present Illness:     Paul Roberts is a 80 y.o. female with the above problem list who was admitted for Hypoglycemia [E16.2]. Ms. Sandrita Keenan is a 80 y.o.  female who presented to the Emergency Department complaining of confusion. Noted today by daughter. Just discharged yesterday after admit for STEMI, with cath, PCI and new meds. Yesterday at home she was nauseous and vomiting all the time. ER finds hypoglycemia and ARF. She denies chest pain/discomfort, shortness of breath, dyspnea on exertion, orthopnea, paroxysmal noctural dyspnea, lower extremity edema, palpitations, syncope, or near-syncope. Current Facility-Administered Medications   Medication Dose Route Frequency    atorvastatin (LIPITOR) tablet 20 mg  20 mg Oral DAILY    clopidogreL (PLAVIX) tablet 75 mg  75 mg Oral DAILY    senna-docusate (PERICOLACE) 8.6-50 mg per tablet 1 Tablet  1 Tablet Oral BID    glucose chewable tablet 16 g  4 Tablet Oral PRN    dextrose (D50W) injection syrg 12.5-25 g  25-50 mL IntraVENous PRN    glucagon (GLUCAGEN) injection 1 mg  1 mg IntraMUSCular PRN    acetaminophen (TYLENOL) tablet 650 mg  650 mg Oral Q6H PRN    Or    acetaminophen (TYLENOL) suppository 650 mg  650 mg Rectal Q6H PRN    polyethylene glycol (MIRALAX) packet 17 g  17 g Oral DAILY PRN    ondansetron (ZOFRAN ODT) tablet 4 mg  4 mg Oral Q8H PRN    Or    ondansetron (ZOFRAN) injection 4 mg  4 mg IntraVENous Q6H PRN    heparin (porcine) injection 5,000 Units  5,000 Units SubCUTAneous Q8H    aspirin chewable tablet 81 mg  81 mg Oral DAILY    metoprolol tartrate (LOPRESSOR) tablet 50 mg  50 mg Oral Q12H    dextrose 5% - 0.9% NaCl with KCl 20 mEq/L infusion  125 mL/hr IntraVENous CONTINUOUS     Current Outpatient Medications   Medication Sig    amLODIPine (NORVASC) 2.5 mg tablet Take 2 Tablets by mouth daily.  aspirin (ASPIRIN) 325 mg tablet Take 1 Tablet by mouth daily.  atorvastatin (LIPITOR) 20 mg tablet Take 1 Tablet by mouth daily.  clopidogreL (PLAVIX) 75 mg tab Take 1 Tablet by mouth daily.     metoprolol tartrate (LOPRESSOR) 50 mg tablet Take 1 Tablet by mouth every twelve (12) hours.  oxyCODONE IR (ROXICODONE) 5 mg immediate release tablet Take 1 Tablet by mouth every four (4) hours as needed. Max Daily Amount: 30 mg.    senna-docusate (PERICOLACE) 8.6-50 mg per tablet Take 1 Tablet by mouth two (2) times a day.  polyethylene glycol (MIRALAX) 17 gram packet Take 1 Packet by mouth two (2) times a day.  albuterol (PROVENTIL VENTOLIN) 2.5 mg /3 mL (0.083 %) nebu 2.5 mg by Nebulization route every four (4) hours as needed for Wheezing or Shortness of Breath.  furosemide (Lasix) 40 mg tablet Take 40 mg by mouth two (2) times a day.  glimepiride (AMARYL) 4 mg tablet Take 4 mg by mouth every morning.  albuterol (PROVENTIL HFA, VENTOLIN HFA, PROAIR HFA) 90 mcg/actuation inhaler Take 2 Puffs by inhalation every four (4) hours as needed for Wheezing. Allergies   Allergen Reactions    Codeine Nausea and Vomiting    Compazine [Prochlorperazine] Other (comments)     Facial droop    Dilaudid [Hydromorphone] Nausea and Vomiting    Morphine Nausea and Vomiting    Sulfa (Sulfonamide Antibiotics) Nausea and Vomiting      No family history on file.    Social History     Socioeconomic History    Marital status:      Spouse name: Not on file    Number of children: Not on file    Years of education: Not on file    Highest education level: Not on file   Occupational History    Not on file   Tobacco Use    Smoking status: Not on file    Smokeless tobacco: Not on file   Substance and Sexual Activity    Alcohol use: Not on file    Drug use: Not on file    Sexual activity: Not on file   Other Topics Concern    Not on file   Social History Narrative    Not on file     Social Determinants of Health     Financial Resource Strain:     Difficulty of Paying Living Expenses: Not on file   Food Insecurity:     Worried About Running Out of Food in the Last Year: Not on file    Alondra of Food in the Last Year: Not on file   Transportation Needs:     Lack of Transportation (Medical): Not on file    Lack of Transportation (Non-Medical): Not on file   Physical Activity:     Days of Exercise per Week: Not on file    Minutes of Exercise per Session: Not on file   Stress:     Feeling of Stress : Not on file   Social Connections:     Frequency of Communication with Friends and Family: Not on file    Frequency of Social Gatherings with Friends and Family: Not on file    Attends Gnosticism Services: Not on file    Active Member of Clubs or Organizations: Not on file    Attends Club or Organization Meetings: Not on file    Marital Status: Not on file   Intimate Partner Violence:     Fear of Current or Ex-Partner: Not on file    Emotionally Abused: Not on file    Physically Abused: Not on file    Sexually Abused: Not on file   Housing Stability:     Unable to Pay for Housing in the Last Year: Not on file    Number of Places Lived in the Last Year: Not on file    Unstable Housing in the Last Year: Not on file       Physical Exam:     Patient Vitals for the past 16 hrs:   BP Temp Pulse Resp SpO2   12/27/21 0855 (!) 147/68  63 16 98 %   12/27/21 0825 (!) 155/99  62 19 97 %   12/27/21 0755 (!) 150/52  66 22 97 %   12/27/21 0727 (!) 132/98  64 21 99 %   12/27/21 0657 (!) 138/50  (!) 59 17 97 %   12/27/21 0627 (!) 127/53  60 17 97 %   12/27/21 0505 (!) 111/38  (!) 53 15 98 %   12/27/21 0435 (!) 103/38  (!) 55 16 95 %   12/27/21 0405 (!) 101/29  (!) 53 18 97 %   12/27/21 0335 (!) 123/49  (!) 56 15 97 %   12/27/21 0305 (!) 101/45  (!) 57 17 96 %   12/27/21 0205 (!) 116/46  (!) 54 14 97 %   12/27/21 0148 (!) 123/52 97.4 °F (36.3 °C) 63 16 97 %   12/27/21 0001 (!) 117/42 97.5 °F (36.4 °C) (!) 50 14 99 %   12/26/21 2017 (!) 140/54 97.4 °F (36.3 °C) (!) 59 12 98 %   12/26/21 1907   (!) 58 23 96 %   12/26/21 1900     98 %   12/26/21 1837 (!) 119/43  (!) 58 19 98 %   12/26/21 1807   (!) 56 16 96 %       HEENT Exam:     Normocephalic, atraumatic. EOMI. Oropharynx negative. Neck supple. No lymphadenopathy. Lung Exam:     The patient is not dyspneic. There is no cough. Breath sounds are heard equally in all lung fields. There are no wheezes, rales, rhonchi, or rubs heard on auscultation. Heart Exam:     The rhythm is regular. The PMI is in the 5th intercostal space of the MCL. Apical impulse is normal. S1 is regular. S2 is physiologic. There is no S3, S4 gallop, murmur, click, or rub. Abdomen Exam:     Bowel sounds are normoactive. Abdomen soft in all quadrants. No tenderness. No palpable masses. No organomegaly. No hernias noted. No bruits or pulsatile mass. Extremities Exam:     The extremities are atraumatic appearing. There is no clubbing, cyanosis, edema, ulcers, varicose veins, rash, erythemia noted in the extremities. The neurovascular status is grossly intact with normal distal sensation and pulses. Vascular Exam:     The radial, brachial, dorsalis pedis, posterior tibial, are equal and strong bilaterally The carotids are equal bilaterally without bruits. Labs:     Lab Results   Component Value Date/Time    Glucose 260 (H) 12/27/2021 01:43 AM    Sodium 141 12/27/2021 01:43 AM    Potassium 4.2 12/27/2021 01:43 AM    Chloride 106 12/27/2021 01:43 AM    CO2 28 12/27/2021 01:43 AM    BUN 33 (H) 12/27/2021 01:43 AM    Creatinine 2.54 (H) 12/27/2021 01:43 AM    Calcium 7.9 (L) 12/27/2021 01:43 AM     Recent Labs     12/27/21  0143 12/26/21  1807   WBC 5.6 8.8   HGB 9.7* 11.1*   HCT 30.5* 34.4*    287     Recent Labs     12/27/21  0143 12/26/21  1807   ALT 18 22   AP 67 89   TBILI 0.3 0.5   TP 5.9* 7.5   ALB 2.5* 3.4*   GLOB 3.4 4.1*     No results for input(s): INR, PTP, APTT, INREXT in the last 72 hours. No results for input(s): CPK, CKMB, TNIPOC in the last 72 hours. No lab exists for component: TROPONINI, ITNL  No results for input(s): TROIQ in the last 72 hours.   Lab Results   Component Value Date/Time    Cholesterol, total 169 12/25/2021 04:15 AM    HDL Cholesterol 49 12/25/2021 04:15 AM    LDL, calculated 105.8 (H) 12/25/2021 04:15 AM    Triglyceride 71 12/25/2021 04:15 AM    CHOL/HDL Ratio 3.4 12/25/2021 04:15 AM       EKG:  SB @ 57, 1st AVB, IVCD.

## 2021-12-28 ENCOUNTER — PATIENT OUTREACH (OUTPATIENT)
Dept: CASE MANAGEMENT | Age: 86
End: 2021-12-28

## 2021-12-28 NOTE — PROGRESS NOTES
Care Transitions Initial Call    Call within 2 business days of discharge: Yes     Patient: Kemar Landon Patient : 1934 MRN: [id number]    [facility]       Complaint Diagnosis Description Type Department Provider    21 Altered mental status; Dysarthria Hypoglycemia . .. ED (ADMIT) DORA Beck, DO; Micah Cabot... Was this an external facility discharge? No     Challenges to be reviewed by the provider   Additional needs identified to be addressed with provider: yes  Daughter, Calvin Marc, reports patient has swollen left leg with red area concern for DVT/cellulitis. Will be seeing cardio 21. Will follow up tomorrow    Lasix stopped at discharge from ED     Method of communication with provider : chart routing    Discussed COVID-19 related testing which was not done at this time. Advance Care Planning:   Does patient have an Advance Directive:  not on file education deferred to future outreach    Inpatient Readmission Risk score: Unplanned Readmit Risk Score: 14 ( )    Was this a readmission? no   Patient stated reason for the admission: heart attack    Patients top risk factors for readmission: level of motivation, medical condition-cellulitis and medication management   Interventions to address risk factors: Scheduled appointment with Specialist-21 and Obtained and reviewed discharge summary and/or continuity of care documents    Care Transition Nurse (CTN) contacted the family by telephone to perform post hospital discharge assessment. Verified name and  with family as identifiers. Provided introduction to self, and explanation of the CTN role. CTN reviewed discharge instructions, medical action plan and red flags with family who verbalized understanding. Were discharge instructions available to patient? yes. Reviewed appropriate site of care based on symptoms and resources available to patient including: PCP, Specialist and  [facility].  Family given an opportunity to ask questions and does not have any further questions or concerns at this time. The family agrees to contact the PCP office for questions related to their healthcare. Medication reconciliation was performed with family, who verbalizes understanding of administration of home medications. Advised obtaining a 90-day supply of all daily and as-needed medications. Referral to Pharm D needed: no     Home Health/Outpatient orders at discharge: home health care, PT, OT and [place] [address]: [facility]  Date of initial visit: 12/29/21    Durable Medical Equipment ordered at discharge: None    Was patient discharged with a pulse oximeter? no. Discussed and confirmed pulse oximeter discharge instructions and when to notify provider or seek emergency care. Discussed follow-up appointments. If no appointment was previously scheduled, appointment scheduling offered: na. Is follow up appointment scheduled within 7 days of discharge? yes. [facility] follow up appointment(s): No future appointments. [facility] follow up appointment(s): cardio 12/28/21    Plan for follow-up call in 1-2 days based on severity of symptoms and risk factors. Plan for next call: symptom management-swelling, leg redness and follow up appointment-cardio  CTN provided contact information for future needs.

## 2021-12-30 LAB
BACTERIA SPEC CULT: ABNORMAL
BACTERIA SPEC CULT: ABNORMAL
CC UR VC: ABNORMAL
SERVICE CMNT-IMP: ABNORMAL

## 2022-01-08 ENCOUNTER — APPOINTMENT (OUTPATIENT)
Dept: CT IMAGING | Age: 87
End: 2022-01-08
Attending: STUDENT IN AN ORGANIZED HEALTH CARE EDUCATION/TRAINING PROGRAM
Payer: MEDICARE

## 2022-01-08 ENCOUNTER — HOSPITAL ENCOUNTER (EMERGENCY)
Age: 87
Discharge: HOME | End: 2022-01-08
Attending: STUDENT IN AN ORGANIZED HEALTH CARE EDUCATION/TRAINING PROGRAM
Payer: MEDICARE

## 2022-01-08 VITALS
HEIGHT: 62 IN | DIASTOLIC BLOOD PRESSURE: 56 MMHG | BODY MASS INDEX: 25.4 KG/M2 | RESPIRATION RATE: 18 BRPM | SYSTOLIC BLOOD PRESSURE: 153 MMHG | OXYGEN SATURATION: 96 % | HEART RATE: 72 BPM | WEIGHT: 138 LBS | TEMPERATURE: 97.7 F

## 2022-01-08 DIAGNOSIS — S70.01XA CONTUSION OF RIGHT HIP, INITIAL ENCOUNTER: ICD-10-CM
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
## 2022-01-08 LAB
ALBUMIN SERPL-MCNC: 2.9 G/DL (ref 3.5–5)
ALBUMIN/GLOB SERPL: 0.7 {RATIO} (ref 1.1–2.2)
ALP SERPL-CCNC: 72 U/L (ref 45–117)
ALT SERPL-CCNC: 15 U/L (ref 12–78)
ANION GAP SERPL CALC-SCNC: 7 MMOL/L (ref 5–15)
AST SERPL-CCNC: 25 U/L (ref 15–37)
BASOPHILS # BLD: 0 K/UL (ref 0–0.1)
BASOPHILS NFR BLD: 1 % (ref 0–1)
BILIRUB SERPL-MCNC: 0.5 MG/DL (ref 0.2–1)
BUN SERPL-MCNC: 21 MG/DL (ref 6–20)
BUN/CREAT SERPL: 12 (ref 12–20)
CALCIUM SERPL-MCNC: 8.9 MG/DL (ref 8.5–10.1)
CHLORIDE SERPL-SCNC: 111 MMOL/L (ref 97–108)
CO2 SERPL-SCNC: 22 MMOL/L (ref 21–32)
CREAT SERPL-MCNC: 1.69 MG/DL (ref 0.55–1.02)
DIFFERENTIAL METHOD BLD: ABNORMAL
EOSINOPHIL # BLD: 0.2 K/UL (ref 0–0.4)
EOSINOPHIL NFR BLD: 2 % (ref 0–7)
ERYTHROCYTE [DISTWIDTH] IN BLOOD BY AUTOMATED COUNT: 14.1 % (ref 11.5–14.5)
GLOBULIN SER CALC-MCNC: 4 G/DL (ref 2–4)
GLUCOSE SERPL-MCNC: 115 MG/DL (ref 65–100)
HCT VFR BLD AUTO: 27.6 % (ref 35–47)
HGB BLD-MCNC: 8.7 G/DL (ref 11.5–16)
IMM GRANULOCYTES # BLD AUTO: 0 K/UL (ref 0–0.04)
IMM GRANULOCYTES NFR BLD AUTO: 1 % (ref 0–0.5)
LYMPHOCYTES # BLD: 0.9 K/UL (ref 0.8–3.5)
LYMPHOCYTES NFR BLD: 10 % (ref 12–49)
MCH RBC QN AUTO: 29.2 PG (ref 26–34)
MCHC RBC AUTO-ENTMCNC: 31.5 G/DL (ref 30–36.5)
MCV RBC AUTO: 92.6 FL (ref 80–99)
MONOCYTES # BLD: 0.6 K/UL (ref 0–1)
MONOCYTES NFR BLD: 7 % (ref 5–13)
NEUTS SEG # BLD: 6.8 K/UL (ref 1.8–8)
NEUTS SEG NFR BLD: 79 % (ref 32–75)
NRBC # BLD: 0 K/UL (ref 0–0.01)
NRBC BLD-RTO: 0 PER 100 WBC
PLATELET # BLD AUTO: 203 K/UL (ref 150–400)
PMV BLD AUTO: 11.3 FL (ref 8.9–12.9)
POTASSIUM SERPL-SCNC: 3.8 MMOL/L (ref 3.5–5.1)
PROT SERPL-MCNC: 6.9 G/DL (ref 6.4–8.2)
RBC # BLD AUTO: 2.98 M/UL (ref 3.8–5.2)
SODIUM SERPL-SCNC: 140 MMOL/L (ref 136–145)
TROPONIN-HIGH SENSITIVITY: 61 NG/L (ref 0–51)
TROPONIN-HIGH SENSITIVITY: 63 NG/L (ref 0–51)
WBC # BLD AUTO: 8.5 K/UL (ref 3.6–11)

## 2022-01-08 PROCEDURE — 36415 COLL VENOUS BLD VENIPUNCTURE: CPT

## 2022-01-08 PROCEDURE — 85025 COMPLETE CBC W/AUTO DIFF WBC: CPT

## 2022-01-08 PROCEDURE — 80053 COMPREHEN METABOLIC PANEL: CPT

## 2022-01-08 PROCEDURE — 99285 EMERGENCY DEPT VISIT HI MDM: CPT

## 2022-01-08 PROCEDURE — 93005 ELECTROCARDIOGRAM TRACING: CPT

## 2022-01-08 PROCEDURE — 84484 ASSAY OF TROPONIN QUANT: CPT

## 2022-01-08 PROCEDURE — 72192 CT PELVIS W/O DYE: CPT

## 2022-01-08 NOTE — ED TRIAGE NOTES
Pt to ED via EMS from home for GLF while walking to the bathroom. Reports walking with walker and falling on bottom. Denies LOC, hitting head or dizziness prior to fall. Pt currently on blood thinners after MI 12/24. Denies cp, SOB, fever, cough, HA or dizziness. C/o R buttock pain and skin tear to L forearm, bleeding controlled and wound dressed by EMS    Reports at home covid test positive 5 days ago after noted fever, daughter at home also positive for covid. Denies any complaints r/t covid.

## 2022-01-10 LAB
ATRIAL RATE: 68 BPM
CALCULATED P AXIS, ECG09: 70 DEGREES
CALCULATED R AXIS, ECG10: 4 DEGREES
CALCULATED T AXIS, ECG11: -45 DEGREES
DIAGNOSIS, 93000: NORMAL
P-R INTERVAL, ECG05: 196 MS
Q-T INTERVAL, ECG07: 462 MS
QRS DURATION, ECG06: 100 MS
QTC CALCULATION (BEZET), ECG08: 491 MS
VENTRICULAR RATE, ECG03: 68 BPM

## 2022-01-10 NOTE — PROGRESS NOTES
Detail Level: Simple Problem: Falls - Risk of  Goal: *Absence of Falls  Description: Document Earma Janice Fall Risk and appropriate interventions in the flowsheet.   Outcome: Progressing Towards Goal  Note: Fall Risk Interventions:                                Problem: Patient Education: Go to Patient Education Activity  Goal: Patient/Family Education  Outcome: Progressing Towards Goal     Problem: Patient Education: Go to Patient Education Activity  Goal: Patient/Family Education  Outcome: Progressing Towards Goal     Problem: AMI: Day 1  Goal: Off Pathway (Use only if patient is Off Pathway)  Outcome: Progressing Towards Goal  Goal: Activity/Safety  Outcome: Progressing Towards Goal  Goal: Consults, if ordered  Outcome: Progressing Towards Goal  Goal: Diagnostic Test/Procedures  Outcome: Progressing Towards Goal  Goal: Nutrition/Diet  Outcome: Progressing Towards Goal  Goal: Discharge Planning  Outcome: Progressing Towards Goal  Goal: Medications  Outcome: Progressing Towards Goal  Goal: Respiratory  Outcome: Progressing Towards Goal  Goal: Treatments/Interventions/Procedures  Outcome: Progressing Towards Goal  Goal: Psychosocial  Outcome: Progressing Towards Goal  Goal: *Eligible patient receives reperfusion therapy thrombolytics/PCI  Outcome: Progressing Towards Goal  Goal: *Optimal pain control at patient's stated goal  Outcome: Progressing Towards Goal  Goal: *Hemodynamically stable  Outcome: Progressing Towards Goal  Goal: *Received aspirin and beta-blocker within 24 hours of arrival unless contraindicated  Outcome: Progressing Towards Goal     Problem: AMI: Day 2  Goal: Off Pathway (Use only if patient is Off Pathway)  Outcome: Progressing Towards Goal  Goal: Activity/Safety  Outcome: Progressing Towards Goal  Goal: Consults, if ordered  Outcome: Progressing Towards Goal  Goal: Diagnostic Test/Procedures  Outcome: Progressing Towards Goal  Goal: Nutrition/Diet  Outcome: Progressing Towards Goal  Goal: Discharge Planning  Outcome: Add 19958 Cpt? (Important Note: In 2017 The Use Of 07527 Is Being Tracked By Cms To Determine Future Global Period Reimbursement For Global Periods): yes Progressing Towards Goal  Goal: Medications  Outcome: Progressing Towards Goal  Goal: Respiratory  Outcome: Progressing Towards Goal  Goal: Treatments/Interventions/Procedures  Outcome: Progressing Towards Goal  Goal: Psychosocial  Outcome: Progressing Towards Goal  Goal: *Optimal pain control at patient's stated goal  Outcome: Progressing Towards Goal  Goal: *Hemodynamically stable  Outcome: Progressing Towards Goal  Goal: *Demonstrates progressive activity  Outcome: Progressing Towards Goal  Goal: *Tolerating diet  Outcome: Progressing Towards Goal     Problem: AMI: Day 3  Goal: Off Pathway (Use only if patient is Off Pathway)  Outcome: Progressing Towards Goal  Goal: Activity/Safety  Outcome: Progressing Towards Goal  Goal: Consults, if ordered  Outcome: Progressing Towards Goal  Goal: Diagnostic Test/Procedures  Outcome: Progressing Towards Goal  Goal: Nutrition/Diet  Outcome: Progressing Towards Goal  Goal: Discharge Planning  Outcome: Progressing Towards Goal  Goal: Medications  Outcome: Progressing Towards Goal  Goal: Respiratory  Outcome: Progressing Towards Goal  Goal: Treatments/Interventions/Procedures  Outcome: Progressing Towards Goal  Goal: Psychosocial  Outcome: Progressing Towards Goal  Goal: *Optimal pain control at patient's stated goal  Outcome: Progressing Towards Goal  Goal: *Hemodynamically stable  Outcome: Progressing Towards Goal  Goal: *Demonstrates progressive activity  Outcome: Progressing Towards Goal  Goal: *Tolerating diet  Outcome: Progressing Towards Goal     Problem: AMI: Day 4  Goal: Off Pathway (Use only if patient is Off Pathway)  Outcome: Progressing Towards Goal  Goal: Activity/Safety  Outcome: Progressing Towards Goal  Goal: Diagnostic Test/Procedures  Outcome: Progressing Towards Goal  Goal: Nutrition/Diet  Outcome: Progressing Towards Goal  Goal: Discharge Planning  Outcome: Progressing Towards Goal  Goal: Medications  Outcome: Progressing Towards Goal  Goal: Respiratory  Outcome: Progressing Towards Goal  Goal: Treatments/Interventions/Procedures  Outcome: Progressing Towards Goal  Goal: Psychosocial  Outcome: Progressing Towards Goal  Goal: *Optimal pain control at patient's stated goal  Outcome: Progressing Towards Goal  Goal: *Hemodynamically stable  Outcome: Progressing Towards Goal  Goal: *Demonstrates progressive activity  Outcome: Progressing Towards Goal  Goal: *Tolerating diet  Outcome: Progressing Towards Goal     Problem: AMI: Day 5  Goal: Off Pathway (Use only if patient is Off Pathway)  Outcome: Progressing Towards Goal  Goal: Activity/Safety  Outcome: Progressing Towards Goal  Goal: Diagnostic Test/Procedures  Outcome: Progressing Towards Goal  Goal: Nutrition/Diet  Outcome: Progressing Towards Goal  Goal: Discharge Planning  Outcome: Progressing Towards Goal  Goal: Medications  Outcome: Progressing Towards Goal  Goal: Treatments/Interventions/Procedures  Outcome: Progressing Towards Goal  Goal: Psychosocial  Outcome: Progressing Towards Goal     Problem: AMI: Discharge Outcomes  Goal: *Demonstrates ability to perform prescribed activity without shortness of breath or discomfort  Outcome: Progressing Towards Goal  Goal: *Verbalizes understanding and describes prescribed diet  Outcome: Progressing Towards Goal  Goal: *Describes follow-up/return visits to physicians  Outcome: Progressing Towards Goal  Goal: *Describes home care/support arrangements established based on need  Outcome: Progressing Towards Goal  Goal: *Anxiety reduced or absent  Outcome: Progressing Towards Goal  Goal: *Understands and describes signs and symptoms to report to providers(Stroke Metric)  Outcome: Progressing Towards Goal  Goal: *Verbalizes name, dosage, time, side effects, and number of days to continue medications  Outcome: Progressing Towards Goal

## 2022-01-11 NOTE — ED PROVIDER NOTES
Patient is an 80-year-old female present emergency department after sustaining a ground-level fall when she was walking to the bathroom. Patient states that she was walking with her walker fell backwards onto her buttocks. Patient denies LOC denies hitting her head denies this being a presyncopal episode states that this was purely mechanical.  Patient is on blood thinners after having an MI on 12/24 she currently denies any chest pain, shortness of breath. Patient is complaining of right hip pain. Past Medical History:   Diagnosis Date    CAD (coronary artery disease)     CHF (congestive heart failure), NYHA class I, chronic, systolic (HCC)     CKD (chronic kidney disease), stage III ([address]     DM [facility]     DM type 2 causing vascular disease [facility]     Hypertension     Ischemic cardiomyopathy        Past Surgical History:   Procedure Laterality Date    HX APPENDECTOMY      HX HYSTERECTOMY      HX KNEE REPLACEMENT Left     HX OTHER SURGICAL      head sx x 2 nerve related    HX OTHER SURGICAL      Back surgery x 2    HX TONSILLECTOMY           No family history on file.     Social History     Socioeconomic History    Marital status:      Spouse name: Not on file    Number of children: Not on file    Years of education: Not on file    Highest education level: Not on file   Occupational History    Not on file   Tobacco Use    Smoking status: Not on file    Smokeless tobacco: Not on file   Substance and Sexual Activity    Alcohol use: Not on file    Drug use: Not on file    Sexual activity: Not on file   Other Topics Concern    Not on file   Social History Narrative    Not on file     Social Determinants of Health     Financial Resource Strain:     Difficulty of Paying Living Expenses: Not on file   Food Insecurity:     Worried About Running Out of Food in the Last Year: Not on file    Alondra of Food in the Last Year: Not on file   Transportation Needs:     Lack of Transportation (Medical): Not on file    Lack of Transportation (Non-Medical): Not on file   Physical Activity:     Days of Exercise per Week: Not on file    Minutes of Exercise per Session: Not on file   Stress:     Feeling of Stress : Not on file   Social Connections:     Frequency of Communication with Friends and Family: Not on file    Frequency of Social Gatherings with Friends and Family: Not on file    Attends Scientologist Services: Not on file    Active Member of [address] [place] [address] or Organizations: Not on file    Attends Club or Organization Meetings: Not on file    Marital Status: Not on file   Intimate Partner Violence:     Fear of Current or Ex-Partner: Not on file    Emotionally Abused: Not on file    Physically Abused: Not on file    Sexually Abused: Not on file   Housing Stability:     Unable to Pay for Housing in the Last Year: Not on file    Number of [place] in the Last Year: Not on file    Unstable Housing in the Last Year: Not on file         ALLERGIES: Codeine, Compazine [prochlorperazine], Dilaudid [hydromorphone], Morphine, and Sulfa (sulfonamide antibiotics)    Review of Systems   Musculoskeletal: Positive for arthralgias and gait problem. All other systems reviewed and are negative. Vitals:    01/08/22 1126 01/08/22 1156 01/08/22 1300 01/08/22 1307   BP: (!) 190/52 (!) 165/59 (!) 175/50 (!) 153/56   Pulse: 66 85 67 72   Resp: 22 20 22 18   Temp:       SpO2: 100%  96% 96%   Weight:       Height:                Physical Exam  Vitals and nursing note reviewed. Constitutional:       Appearance: Normal appearance. HENT:      Head: Normocephalic. Eyes:      Extraocular Movements: Extraocular movements intact. Pupils: Pupils are equal, round, and reactive to light. Cardiovascular:      Rate and Rhythm: Normal rate and regular rhythm. Pulses: Normal pulses. Heart sounds: Normal heart sounds.    Pulmonary:      Effort: Pulmonary effort is normal. Breath sounds: Normal breath sounds. Abdominal:      General: Abdomen is flat. Palpations: Abdomen is soft. Musculoskeletal:      Cervical back: Normal range of motion and neck supple. Right hip: Tenderness and bony tenderness present. Legs:       Comments: Tender to touch, hematoma. Skin:     General: Skin is warm and dry. Neurological:      General: No focal deficit present. Mental Status: She is alert and oriented to person, place, and time. Psychiatric:         Mood and Affect: Mood normal.         Behavior: Behavior normal.          MDM  Number of Diagnoses or Management Options  Contusion of right hip, initial encounter  Fall, initial encounter  Diagnosis management comments: A/P: Right hip contusion, hematoma. 49-year-old female present emergency department after ground-level fall falling onto her buttocks patient with obvious hematoma to the right hip. Will obtain imaging.          Procedures

## 2022-01-12 ENCOUNTER — APPOINTMENT (OUTPATIENT)
Dept: GENERAL RADIOLOGY | Age: 87
End: 2022-01-12
Attending: EMERGENCY MEDICINE
Payer: MEDICARE

## 2022-01-12 ENCOUNTER — HOSPITAL ENCOUNTER (OUTPATIENT)
Age: 87
Setting detail: OBSERVATION
Discharge: HOME | End: 2022-01-13
Attending: EMERGENCY MEDICINE | Admitting: INTERNAL MEDICINE
Payer: MEDICARE

## 2022-01-12 DIAGNOSIS — R07.9 CHEST PAIN, UNSPECIFIED TYPE: Primary | ICD-10-CM

## 2022-01-12 LAB
ALBUMIN SERPL-MCNC: 3 G/DL (ref 3.5–5)
ALBUMIN/GLOB SERPL: 0.8 {RATIO} (ref 1.1–2.2)
ALP SERPL-CCNC: 88 U/L (ref 45–117)
ALT SERPL-CCNC: 28 U/L (ref 12–78)
ANION GAP SERPL CALC-SCNC: 7 MMOL/L (ref 5–15)
AST SERPL-CCNC: 23 U/L (ref 15–37)
BASOPHILS # BLD: 0.1 K/UL (ref 0–0.1)
BASOPHILS NFR BLD: 1 % (ref 0–1)
BILIRUB SERPL-MCNC: 0.6 MG/DL (ref 0.2–1)
BUN SERPL-MCNC: 20 MG/DL (ref 6–20)
BUN/CREAT SERPL: 12 (ref 12–20)
CALCIUM SERPL-MCNC: 8.8 MG/DL (ref 8.5–10.1)
CHLORIDE SERPL-SCNC: 110 MMOL/L (ref 97–108)
CO2 SERPL-SCNC: 23 MMOL/L (ref 21–32)
COMMENT, HOLDF: NORMAL
CREAT SERPL-MCNC: 1.7 MG/DL (ref 0.55–1.02)
DIFFERENTIAL METHOD BLD: ABNORMAL
EOSINOPHIL # BLD: 0.6 K/UL (ref 0–0.4)
EOSINOPHIL NFR BLD: 7 % (ref 0–7)
ERYTHROCYTE [DISTWIDTH] IN BLOOD BY AUTOMATED COUNT: 14 % (ref 11.5–14.5)
GLOBULIN SER CALC-MCNC: 3.8 G/DL (ref 2–4)
GLUCOSE SERPL-MCNC: 68 MG/DL (ref 65–100)
HCT VFR BLD AUTO: 27.4 % (ref 35–47)
HGB BLD-MCNC: 8.8 G/DL (ref 11.5–16)
IMM GRANULOCYTES # BLD AUTO: 0 K/UL (ref 0–0.04)
IMM GRANULOCYTES NFR BLD AUTO: 0 % (ref 0–0.5)
LYMPHOCYTES # BLD: 1.4 K/UL (ref 0.8–3.5)
LYMPHOCYTES NFR BLD: 16 % (ref 12–49)
MCH RBC QN AUTO: 28.8 PG (ref 26–34)
MCHC RBC AUTO-ENTMCNC: 32.1 G/DL (ref 30–36.5)
MCV RBC AUTO: 89.5 FL (ref 80–99)
MONOCYTES # BLD: 0.7 K/UL (ref 0–1)
MONOCYTES NFR BLD: 8 % (ref 5–13)
NEUTS SEG # BLD: 6.1 K/UL (ref 1.8–8)
NEUTS SEG NFR BLD: 68 % (ref 32–75)
NRBC # BLD: 0 K/UL (ref 0–0.01)
NRBC BLD-RTO: 0 PER 100 WBC
PLATELET # BLD AUTO: 254 K/UL (ref 150–400)
PMV BLD AUTO: 11 FL (ref 8.9–12.9)
POTASSIUM SERPL-SCNC: 3.5 MMOL/L (ref 3.5–5.1)
PROT SERPL-MCNC: 6.8 G/DL (ref 6.4–8.2)
RBC # BLD AUTO: 3.06 M/UL (ref 3.8–5.2)
SAMPLES BEING HELD,HOLD: NORMAL
SODIUM SERPL-SCNC: 140 MMOL/L (ref 136–145)
TROPONIN-HIGH SENSITIVITY: 38 NG/L (ref 0–51)
TROPONIN-HIGH SENSITIVITY: 38 NG/L (ref 0–51)
WBC # BLD AUTO: 8.9 K/UL (ref 3.6–11)

## 2022-01-12 PROCEDURE — 36415 COLL VENOUS BLD VENIPUNCTURE: CPT

## 2022-01-12 PROCEDURE — 80053 COMPREHEN METABOLIC PANEL: CPT

## 2022-01-12 PROCEDURE — 84484 ASSAY OF TROPONIN QUANT: CPT

## 2022-01-12 PROCEDURE — 99285 EMERGENCY DEPT VISIT HI MDM: CPT

## 2022-01-12 PROCEDURE — 74011250637 HC RX REV CODE- 250/637: Performed by: EMERGENCY MEDICINE

## 2022-01-12 PROCEDURE — G0378 HOSPITAL OBSERVATION PER HR: HCPCS

## 2022-01-12 PROCEDURE — 93005 ELECTROCARDIOGRAM TRACING: CPT

## 2022-01-12 PROCEDURE — 71045 X-RAY EXAM CHEST 1 VIEW: CPT

## 2022-01-12 PROCEDURE — 85025 COMPLETE CBC W/AUTO DIFF WBC: CPT

## 2022-01-12 RX ORDER — CLOPIDOGREL BISULFATE 75 MG/1
75 TABLET ORAL DAILY
Status: DISCONTINUED | OUTPATIENT
Start: 2022-01-13 | End: 2022-01-13 | Stop reason: HOSPADM

## 2022-01-12 RX ORDER — ASPIRIN 325 MG
325 TABLET ORAL DAILY
Status: DISCONTINUED | OUTPATIENT
Start: 2022-01-13 | End: 2022-01-13 | Stop reason: HOSPADM

## 2022-01-12 RX ORDER — SODIUM CHLORIDE 0.9 % (FLUSH) 0.9 %
5-40 SYRINGE (ML) INJECTION AS NEEDED
Status: DISCONTINUED | OUTPATIENT
Start: 2022-01-12 | End: 2022-01-13 | Stop reason: HOSPADM

## 2022-01-12 RX ORDER — AMOXICILLIN 250 MG
1 CAPSULE ORAL 2 TIMES DAILY
Status: DISCONTINUED | OUTPATIENT
Start: 2022-01-13 | End: 2022-01-13 | Stop reason: HOSPADM

## 2022-01-12 RX ORDER — ATORVASTATIN CALCIUM 20 MG/1
20 TABLET, FILM COATED ORAL DAILY
Status: DISCONTINUED | OUTPATIENT
Start: 2022-01-13 | End: 2022-01-13 | Stop reason: HOSPADM

## 2022-01-12 RX ORDER — POLYETHYLENE GLYCOL 3350 17 G/17G
17 POWDER, FOR SOLUTION ORAL 2 TIMES DAILY
Status: DISCONTINUED | OUTPATIENT
Start: 2022-01-13 | End: 2022-01-13 | Stop reason: HOSPADM

## 2022-01-12 RX ORDER — HEPARIN SODIUM 5000 [USP'U]/ML
5000 INJECTION, SOLUTION INTRAVENOUS; SUBCUTANEOUS EVERY 8 HOURS
Status: DISCONTINUED | OUTPATIENT
Start: 2022-01-12 | End: 2022-01-13 | Stop reason: HOSPADM

## 2022-01-12 RX ORDER — SODIUM CHLORIDE 0.9 % (FLUSH) 0.9 %
5-40 SYRINGE (ML) INJECTION EVERY 8 HOURS
Status: DISCONTINUED | OUTPATIENT
Start: 2022-01-12 | End: 2022-01-13 | Stop reason: HOSPADM

## 2022-01-12 RX ORDER — NITROGLYCERIN 0.4 MG/1
0.4 TABLET SUBLINGUAL
Status: COMPLETED | OUTPATIENT
Start: 2022-01-12 | End: 2022-01-12

## 2022-01-12 RX ADMIN — NITROGLYCERIN 0.4 MG: 0.4 TABLET SUBLINGUAL at 20:25

## 2022-01-12 NOTE — ED PROVIDER NOTES
71-year-old female history of coronary disease, CHF, CKD, diabetes, hypertension who had a heart attack in December with 3 stents placed presents to the emergency department via EMS with chief complaint of chest tightness for the past hour and a half. Similar relieved with 2 doses of nitroglycerin in the ambulance. No fevers or chills or cough. The history is provided by the patient, medical records and the EMS personnel. Chest Pain (Angina)   This is a new problem. The current episode started 1 to 2 hours ago. The problem has been gradually improving. The problem occurs constantly. The pain is present in the substernal region. The pain is moderate. The quality of the pain is described as pressure-like. The pain does not radiate. Pertinent negatives include no abdominal pain, no back pain, no cough, no diaphoresis, no fever, no headaches, no malaise/fatigue, no nausea, no shortness of breath, no vomiting and no weakness. Her past medical history is significant for DM and HTN. Procedural history includes cardiac catheterization and cardiac stents. Past Medical History:   Diagnosis Date    CAD (coronary artery disease)     CHF (congestive heart failure), NYHA class I, chronic, systolic (HCC)     CKD (chronic kidney disease), stage III ([address]     DM type 2 causing renal disease ([address]     DM type 2 causing vascular disease ([address]     Hypertension     Ischemic cardiomyopathy        Past Surgical History:   Procedure Laterality Date    HX APPENDECTOMY      HX HYSTERECTOMY      HX KNEE REPLACEMENT Left     HX OTHER SURGICAL      head sx x 2 nerve related    HX OTHER SURGICAL      Back surgery x 2    HX TONSILLECTOMY           No family history on file.     Social History     Socioeconomic History    Marital status:      Spouse name: Not on file    Number of children: Not on file    Years of education: Not on file    Highest education level: Not on file   Occupational History    Not on file Tobacco Use    Smoking status: Not on file    Smokeless tobacco: Not on file   Substance and Sexual Activity    Alcohol use: Not on file    Drug use: Not on file    Sexual activity: Not on file   Other Topics Concern    Not on file   Social History Narrative    Not on file     Social Determinants of Health     Financial Resource Strain:     Difficulty of Paying Living Expenses: Not on file   Food Insecurity:     Worried About Running Out of Food in the Last Year: Not on file    Alondra of Food in the Last Year: Not on file   Transportation Needs:     Lack of Transportation (Medical): Not on file    Lack of Transportation (Non-Medical): Not on file   Physical Activity:     Days of Exercise per Week: Not on file    Minutes of Exercise per Session: Not on file   Stress:     Feeling of Stress : Not on file   Social Connections:     Frequency of Communication with Friends and Family: Not on file    Frequency of Social Gatherings with Friends and Family: Not on file    Attends Jain Services: Not on file    Active Member of [address] [place] [address] or Organizations: Not on file    Attends Club or Organization Meetings: Not on file    Marital Status: Not on file   Intimate Partner Violence:     Fear of Current or Ex-Partner: Not on file    Emotionally Abused: Not on file    Physically Abused: Not on file    Sexually Abused: Not on file   Housing Stability:     Unable to Pay for Housing in the Last Year: Not on file    Number of [place] in the Last Year: Not on file    Unstable Housing in the Last Year: Not on file         ALLERGIES: Codeine, Compazine [prochlorperazine], Dilaudid [hydromorphone], Morphine, and Sulfa (sulfonamide antibiotics)    Review of Systems   Constitutional: Negative for diaphoresis, fatigue, fever and malaise/fatigue. HENT: Negative for sneezing and sore throat. Respiratory: Negative for cough and shortness of breath. Cardiovascular: Positive for chest pain.  Negative for leg swelling. Gastrointestinal: Negative for abdominal pain, diarrhea, nausea and vomiting. Genitourinary: Negative for difficulty urinating and dysuria. Musculoskeletal: Negative for arthralgias, back pain and myalgias. Skin: Negative for color change and rash. Neurological: Negative for weakness and headaches. Psychiatric/Behavioral: Negative for agitation and behavioral problems. Vitals:    01/12/22 1737   Pulse: 67   Resp: 20   Temp: 98.2 °F (36.8 °C)   SpO2: 96%   Weight: 64 kg (141 lb 1.6 oz)   Height: 5' 2\" (1.575 m)            Physical Exam  Vitals and nursing note reviewed. Constitutional:       General: She is not in acute distress. Appearance: Normal appearance. She is well-developed. She is not ill-appearing, toxic-appearing or diaphoretic. HENT:      Head: Normocephalic and atraumatic. Nose: Nose normal.      Mouth/Throat:      Mouth: Mucous membranes are moist.      Pharynx: Oropharynx is clear. Eyes:      Extraocular Movements: Extraocular movements intact. Conjunctiva/sclera: Conjunctivae normal.      Pupils: Pupils are equal, round, and reactive to light. Cardiovascular:      Rate and Rhythm: Normal rate and regular rhythm. Pulses: Normal pulses. Heart sounds: Normal heart sounds. Pulmonary:      Effort: Pulmonary effort is normal. No respiratory distress. Breath sounds: Normal breath sounds. No wheezing. Chest:      Chest wall: No tenderness. Abdominal:      General: Abdomen is flat. There is no distension. Palpations: Abdomen is soft. Tenderness: There is no abdominal tenderness. There is no guarding or rebound. Musculoskeletal:         General: No swelling, tenderness, deformity or signs of injury. Normal range of motion. Cervical back: Normal range of motion and neck supple. No rigidity. No muscular tenderness. Right lower leg: No edema. Left lower leg: No edema. Skin:     General: Skin is warm and dry. Capillary Refill: Capillary refill takes less than 2 seconds. Neurological:      General: No focal deficit present. Mental Status: She is alert and oriented to person, place, and time. Psychiatric:         Mood and Affect: Mood normal.         Behavior: Behavior normal.          MDM  Number of Diagnoses or Management Options  Diagnosis management comments: 80-year-old female presents as above with chest pain. Ongoing chest pain in the ED which improved with nitroglycerin. She has a stable troponin, but high risk given her recent procedure. Plan to admit for full ACS rule out. Amount and/or Complexity of Data Reviewed  Clinical lab tests: reviewed  Tests in the radiology section of CPT®: reviewed  Tests in the medicine section of CPT®: reviewed  Decide to obtain previous medical records or to obtain history from someone other than the patient: yes      ED Course as of 01/12/22 1743   Wed Jan 12, 2022   1739 ED EKG interpretation:Rhythm: normal sinus rhythm. Rate (approx.): 70. Axis: normal.  ST segment:  No concerning ST elevations or depressions. This EKG was interpreted by Silva Moore MD,ED Provider. [JM]      ED Course User Index  [JM] Whit Bhatt MD       Procedures      Perfect Serve Consult for Admission  8:20 PM    ED Room Number: ER08/08  Patient Name and age:  Farnk Wall 80 y.o.  female  Working Diagnosis:   1. Chest pain, unspecified type        COVID-19 Suspicion:  no  Sepsis present:  no  Reassessment needed: N/A  Code Status:  Full Code  Readmission: yes  Isolation Requirements:  no  Recommended Level of Care:  telemetry  Department:[facility] ED - [phone]  Other: Patient with recent 3 stent placement by Dr. Bobby Lebron. Ongoing pain in the emergency department which improved with nitroglycerin. Reassuring troponin.

## 2022-01-12 NOTE — ED TRIAGE NOTES
Patient arrives via EMS from home with complaint of mid-sternal chest tightness since 1600 today. Denies pain radiating to other areas. Was admitted to [facility] last week following MI and 2 stent placement. Per EMS, patient received 324 mg of Aspirin prior to EMS arrival, and 3 sublingual nitro in route. Per EMS:   B  BP: 186/75  HR: 67  RR: 18  SpO2: 97% on RA.

## 2022-01-13 ENCOUNTER — PATIENT OUTREACH (OUTPATIENT)
Dept: CASE MANAGEMENT | Age: 87
End: 2022-01-13

## 2022-01-13 VITALS
OXYGEN SATURATION: 97 % | HEART RATE: 64 BPM | RESPIRATION RATE: 18 BRPM | BODY MASS INDEX: 25.96 KG/M2 | SYSTOLIC BLOOD PRESSURE: 166 MMHG | DIASTOLIC BLOOD PRESSURE: 65 MMHG | HEIGHT: 62 IN | WEIGHT: 141.1 LBS | TEMPERATURE: 97.8 F

## 2022-01-13 PROBLEM — R07.9 CHEST PAIN: Status: RESOLVED | Noted: 2022-01-12 | Resolved: 2022-01-13

## 2022-01-13 LAB — TROPONIN-HIGH SENSITIVITY: 41 NG/L (ref 0–51)

## 2022-01-13 PROCEDURE — 77030038269 HC DRN EXT URIN PURWCK BARD -A

## 2022-01-13 PROCEDURE — G0378 HOSPITAL OBSERVATION PER HR: HCPCS

## 2022-01-13 PROCEDURE — 84484 ASSAY OF TROPONIN QUANT: CPT

## 2022-01-13 PROCEDURE — 74011000250 HC RX REV CODE- 250: Performed by: INTERNAL MEDICINE

## 2022-01-13 PROCEDURE — 74011250636 HC RX REV CODE- 250/636: Performed by: INTERNAL MEDICINE

## 2022-01-13 PROCEDURE — 74011250637 HC RX REV CODE- 250/637: Performed by: SPECIALIST

## 2022-01-13 PROCEDURE — 36415 COLL VENOUS BLD VENIPUNCTURE: CPT

## 2022-01-13 PROCEDURE — 96372 THER/PROPH/DIAG INJ SC/IM: CPT

## 2022-01-13 PROCEDURE — 74011250637 HC RX REV CODE- 250/637: Performed by: INTERNAL MEDICINE

## 2022-01-13 RX ORDER — AMLODIPINE BESYLATE 5 MG/1
10 TABLET ORAL DAILY
Status: DISCONTINUED | OUTPATIENT
Start: 2022-01-13 | End: 2022-01-13

## 2022-01-13 RX ORDER — AMLODIPINE BESYLATE 5 MG/1
5 TABLET ORAL DAILY
Status: DISCONTINUED | OUTPATIENT
Start: 2022-01-14 | End: 2022-01-13 | Stop reason: HOSPADM

## 2022-01-13 RX ORDER — AMLODIPINE BESYLATE 5 MG/1
5 TABLET ORAL DAILY
Qty: 30 TABLET | Refills: 0 | Status: SHIPPED | OUTPATIENT
Start: 2022-01-14 | End: 2022-02-05

## 2022-01-13 RX ORDER — ONDANSETRON 2 MG/ML
4 INJECTION INTRAMUSCULAR; INTRAVENOUS
Status: DISCONTINUED | OUTPATIENT
Start: 2022-01-13 | End: 2022-01-13 | Stop reason: HOSPADM

## 2022-01-13 RX ORDER — DOCUSATE SODIUM 100 MG/1
100 CAPSULE, LIQUID FILLED ORAL
Status: DISCONTINUED | OUTPATIENT
Start: 2022-01-13 | End: 2022-01-13 | Stop reason: HOSPADM

## 2022-01-13 RX ORDER — ACETAMINOPHEN 325 MG/1
650 TABLET ORAL
Status: DISCONTINUED | OUTPATIENT
Start: 2022-01-13 | End: 2022-01-13 | Stop reason: HOSPADM

## 2022-01-13 RX ORDER — PANTOPRAZOLE SODIUM 40 MG/1
40 GRANULE, DELAYED RELEASE ORAL
Qty: 15 EACH | Refills: 0 | Status: ON HOLD | OUTPATIENT
Start: 2022-01-13 | End: 2022-02-05 | Stop reason: SDUPTHER

## 2022-01-13 RX ORDER — METOPROLOL TARTRATE 50 MG/1
50 TABLET ORAL 2 TIMES DAILY
Status: DISCONTINUED | OUTPATIENT
Start: 2022-01-13 | End: 2022-01-13 | Stop reason: HOSPADM

## 2022-01-13 RX ADMIN — HEPARIN SODIUM 5000 UNITS: 5000 INJECTION INTRAVENOUS; SUBCUTANEOUS at 05:41

## 2022-01-13 RX ADMIN — METOPROLOL TARTRATE 50 MG: 50 TABLET, FILM COATED ORAL at 12:12

## 2022-01-13 RX ADMIN — CLOPIDOGREL BISULFATE 75 MG: 75 TABLET ORAL at 09:26

## 2022-01-13 RX ADMIN — DOCUSATE SODIUM 50MG AND SENNOSIDES 8.6MG 1 TABLET: 8.6; 5 TABLET, FILM COATED ORAL at 09:26

## 2022-01-13 RX ADMIN — SODIUM CHLORIDE, PRESERVATIVE FREE 10 ML: 5 INJECTION INTRAVENOUS at 05:41

## 2022-01-13 RX ADMIN — AMLODIPINE BESYLATE 10 MG: 5 TABLET ORAL at 09:26

## 2022-01-13 RX ADMIN — ATORVASTATIN CALCIUM 20 MG: 20 TABLET, FILM COATED ORAL at 09:26

## 2022-01-13 RX ADMIN — ASPIRIN 325 MG: 325 TABLET, FILM COATED ORAL at 09:26

## 2022-01-13 NOTE — ED NOTES
Verbal shift change report given to Todd Garcia (oncoming nurse) by Clare Pugh (offgoing nurse). Report included the following information SBAR, Kardex, ED Summary, [facility] and Recent Results.

## 2022-01-13 NOTE — PROGRESS NOTES
Bedside and Verbal shift change report given to VICTOR M Salinas (oncoming nurse) by Renard Austin (offgoing nurse). Report included the following information SBAR, Kardex, Intake/Output, MAR, Recent Results and Med Rec Status.

## 2022-01-13 NOTE — ED NOTES
TRANSFER - OUT REPORT:    Verbal report given to PERLITA [facility], RN (name) on [place] [facility]  being transferred to [phone]  (unit) for routine progression of care       Report consisted of patients Situation, Background, Assessment and   Recommendations(SBAR). Information from the following report(s) SBAR was reviewed with the receiving nurse. Lines:   Peripheral IV 01/12/22 Right Antecubital (Active)   Site Assessment Clean, dry, & intact 01/12/22 1743   Phlebitis Assessment 0 01/12/22 1743   Infiltration Assessment 0 01/12/22 1743   Dressing Status Clean, dry, & intact 01/12/22 1743        Opportunity for questions and clarification was provided.       Patient transported with:   Monitor

## 2022-01-13 NOTE — PROGRESS NOTES
1/13/2022  11:49 AM  CM completed assessment w/ Dtr Betty Soler via phone  Charted demographics verified, pt lives w/ Dtr Melodie in a 3 story home, there is 1 entry step and she has a GF bed/bath, pt has had several falls most recently 1/8/22 where she was seen at [facility] ED and discharged to home. Pt is ambulatory w/ a alondra-walker Pt's Dtr assists w/ bathing, dressing and administers medications, also provides transport   Pt had admission 12/26-12/27/21 for CP was discharged to home w/ outpatient f/u and [facility]    Reason for Admission:  OBS for Chest Pain                     RUR Score:     N/A pt is OBS Moderate Risk of readmission/Yellow              PCP: First and Last name:   Ladonna Og NP     Name of Practice:    Are you a current patient: Yes/No: yes   Approximate date of last visit: 12/29/21   Can you participate in a virtual visit if needed: yes    Do you (patient/family) have any concerns for transition/discharge? Pt lives w/ Dtr [address] who is supportive and assists in her care               Plan for utilizing home health:   Pt is open to [facility] for SN, PT and OT, a resumption order was sent in [employer]  DME,alondra-walker, RW, cane, transport chair, raised toilet seat, shower chair   Rx: [employer], pt uses [facility], no difficulty affording her medications  COVID Vax Hx: Lima Peter 2 doses, Moderna booster November 2021  Current Advanced Directive/Advance Care Plan:  Full Code  HCDM daughter Betty Soler [phone]    Healthcare Decision Maker:   Click here to complete [address] including selection of the Healthcare Decision Maker Relationship (ie \"Primary\")              Transition of Care Plan:        RUR N/A pt is OBs  LOS 1 Day  1. Hospital admission OBS for medical management   2. Cardiology consult  3. CM to follow through for treatment/response  4.  Resumption orders, clinicals sent ot [facility] in [employer], AVS updated, sent in [employer], notified of DC today  5. Outpatient f/u PCP, cardiology  6. [facility] resources  7. Daughter will transport at [facility] Management Interventions  PCP Verified by CM:  Yes Juan Martinez NP, last visit 12/29/21)  Last Visit to PCP: 12/29/21  Palliative Care Criteria Met (RRAT>21 & CHF Dx)?: No  Mode of Transport at Discharge: Self (Family)  Transition of Care Consult (CM Consult): [address]: No  Reason Outside [place]: Patient already serviced by other home care/hospice agency  Physical Therapy Consult: No  Occupational Therapy Consult: No  Speech Therapy Consult: No  Support Systems: Parent(s) (pt livrs w/ Dtr in pvt residence, Dtr assists w/ ADLs and transport)  Confirm Follow Up Transport: Family  The Plan for Transition of Care is Related to the Following Treatment Goals : [facility]  The Patient and/or Patient Representative was Provided with a Choice of Provider and Agrees with the Discharge Plan?: Yes  Name of the Patient Representative Who was Provided with a Choice of Provider and Agrees with the Discharge Plan: esther Jarrett of Choice List was Provided with Basic Dialogue that Supports the Patient's Individualized Plan of Care/Goals, Treatment Preferences and Shares the Quality Data Associated with the Providers?: (S) Yes  Discharge Location  Discharge Placement: Home with home health ([facility])  TONIE Butler

## 2022-01-13 NOTE — H&P
History and Physical  NAME: Cari Daniels  MRN: [id number]  YOB: 1934  AGE: 80 y.o.  female  SOCIAL SECURITY NUMBER: [id number]  Primary Care Provider: Zena Garcia NP  CODE STATUS: Full Code      ASSESSMENT/PLAN:  1. Chest tightness. Will request cardiology consultation. Of note, patient had percutaneous intervention less than 3 weeks ago on Christmas eve by Dr. Renan Pichardo. Troponins, EKGs are thus far negative. ER doctor, patient had response to nitroglycerin in the emergency department. Continue dual antiplatelet therapy with aspirin, Plavix, statin with Lipitor  2. Chronic kidney disease stage III/IV. Baseline creatinine is about 1.45-1.6 GFR in the low 30s to high 20s. Patient had creatinine up to 2.77 following cardiac catheterization on 12/24/2021. Now, it is closer to baseline. 3.  Hypertension. Will hold metoprolol at this time in case cardiology consultant would like to do a stress test in the morning. Patient will remain n.p.o. after midnight. Blood pressure is uncontrolled. Will start Norvasc 10 mg p.o. daily; ACE inhibitor, angiotensin receptor blocker may be contraindicated due to chronic kidney disease--will defer to cardiologist regarding these agents. 4.  Chronic systolic and diastolic heart failure. Echocardiogram of 12/25/2021 showed LVEF 45%. Patient does not appear to be volume overloaded on chest x-ray or on exam at this time. 5. Coronary artery disease. S/p stents x 3 to RCA on 12/24/2021. Continue dual antiplatelet therapy with aspirin, Plavix, statin with Lipitor. History of Presenting Illness:   Cari Daniels is a 80 y.o. female presents with chest pain. She has known history of coronary artery disease, and had 3 stents placed to the RCA this past Christmas Eve. Patient states that she had chest tightness most of the day, in the central chest on 1/12/22.  She states that she did not have any associated nausea, vomiting, lightheadedness, palpitations, diaphoresis, shortness of breath. Patient is not quite sure whether medication in the emergency department help relieve the chest tightness, but states that she did have the chest tightness on arrival, but now it is gone. Patient states that on a scale of 1 to 10, the chest tightness would be rated 6/10 in intensity. Patient is a resident of [facility]. Patient ambulates around sheltering arms with a walker. She states that she does not find herself particularly fatigued or short of breath in this kind of activity. Patient said she did have a fall a couple of weeks ago. She denies any dizziness prior to the fall. Patient states that she fell in the bathroom while turning around. She fell on her buttocks. She denies any head injury or loss of consciousness. Emergency department records, ongoing chest pain in the emergency department was relieved with nitroglycerin. REVIEW OF OLD RECORDS:  Patient had cardiac catheterization on 12/24/2021 by Dr. India Schlatter,  approximately 3 weeks ago with intervention:  Patient was found to have two-vessel obstructive disease with proximal 30% LAD stenosis, mid 30% LAD stenosis, 70% diagonal 1 stenosis. She had proximal 30% stenosis in the codominant left circumflex artery and 100% stenosis in mid RCA. Patient had successful drug-eluting stent  (\"full metal jacket\") of mid RCA into PDA,   mid 2.75x28 Xience stent, mid to distal 2.5x28 Xience stent, distal RCA to PDA 2.5x18 Xience stent, all postdilated with noncutting balloon. Her right femoral artery had significant disease, left femoral artery was used for access. Patient developed postcardiac catheterization contrast nephropathy, with creatinine going up to 2.77. Her baseline creatinine is about 1.45-1.64         Review of Systems:  A comprehensive review of systems was negative except for that written in the History of Present Illness.      Past Medical History:   Diagnosis Date    CAD (coronary artery disease)     CHF (congestive heart failure), NYHA class I, chronic, systolic (HCC)     Colic and diastolic CHF per echocardiogram of 12/25/2021. Patient was found to have LVEF 45%    CKD (chronic kidney disease), stage III ([facility]     Diabetes mellitus type 2 in nonobese ([facility]     Hypertension     Ischemic cardiomyopathy     Echocardiogram of 12/25/2021: mild left ventricular systolic dysfunction (EF 06%) with inferior and basal-mid septal akinesis. Diastolic dysfunction.  Peripheral vascular disease ([facility]         PAST SURGICAL HISTORY:   Past Surgical History:   Procedure Laterality Date    HX APPENDECTOMY      HX HYSTERECTOMY      HX KNEE REPLACEMENT Left     HX OTHER SURGICAL      head sx x 2 nerve related    HX OTHER SURGICAL      Back surgery x 2    HX TONSILLECTOMY         Prior to Admission medications    Medication Sig Start Date End Date Taking? Authorizing Provider   aspirin (ASPIRIN) 325 mg tablet Take 1 Tablet by mouth daily. 12/26/21   Mateusz Tony MD   atorvastatin (LIPITOR) 20 mg tablet Take 1 Tablet by mouth daily. 12/26/21   Mateusz Tony MD   clopidogreL (PLAVIX) 75 mg tab Take 1 Tablet by mouth daily. 12/26/21   Mateusz Tony MD   metoprolol tartrate (LOPRESSOR) 50 mg tablet Take 1 Tablet by mouth every twelve (12) hours. 12/25/21   Mateusz Tony MD   senna-docusate (PERICOLACE) 8.6-50 mg per tablet Take 1 Tablet by mouth two (2) times a day. 10/29/21   Rabia Jung MD   polyethylene glycol [facility]) 17 gram packet Take 1 Packet by mouth two (2) times a day. 10/29/21   Rabia Jung MD   albuterol (PROVENTIL VENTOLIN) 2.5 mg /3 mL (0.083 %) nebu 2.5 mg by Nebulization route every four (4) hours as needed for Wheezing or Shortness of Breath.     Provider, Historical   albuterol (PROVENTIL HFA, VENTOLIN HFA, PROAIR HFA) 90 mcg/actuation inhaler Take 2 Puffs by inhalation every four (4) hours as needed for Wheezing. 7/25/21   Pedro Stevens MD       Allergies   Allergen Reactions    Codeine Nausea and Vomiting    Compazine [Prochlorperazine] Other (comments)     Facial droop    Dilaudid [Hydromorphone] Nausea and Vomiting    Morphine Nausea and Vomiting    Sulfa (Sulfonamide Antibiotics) Nausea and Vomiting        Family History   Problem Relation Age of Onset    Heart Disease Mother         Social History     Tobacco Use    Smoking status: Former Smoker     Years: 15.00    Smokeless tobacco: Never Used   Substance Use Topics    Alcohol use: Yes     Comment: very rarely     Drug use: Never       Physical exam  Patient Vitals for the past 24 hrs:   BP Temp Pulse Resp SpO2   Oxygen therapy   01/12/22 2300 (!) 172/53  70 22 96 % Room air   01/12/22 2230 (!) 174/51  69 21 98 % Room air   01/12/22 2200 (!) 185/60  71 25 97 % Room air   01/12/22 2130 (!) 174/55  71 16 97 % Room air   01/12/22 2100 (!) 150/50  69 21 99 % Room air   01/12/22 2030 (!) 132/44  73 17 100 % Room air   01/12/22 2025 (!) 154/49  72      01/12/22 1857 (!) 169/47  73 26 97 % Room air   01/12/22 1827 (!) 161/46  66 21 98 % Room air   01/12/22 1812 (!) 154/49    98 % Room air   01/12/22 1742 (!) 159/66  71  97 % Room air   01/12/22 1740     97 % Room air   01/12/22 1737  98.2 °F (36.8 °C) 67 20 96 % Room air     Estimated body mass index is 25.81 kg/m² as calculated from the following:    Height as of this encounter: 5' 2\" (1.575 m). Weight as of this encounter: 64 kg (141 lb 1.6 oz). General: In no acute distress. Well developed, well nourished. Head: Normocephalic, atraumatic. Eyes: Anicteric sclera. PERRL. Extraocular muscles intact. ENT: External ears and nose appear normal.  Oral mucosa moist.  Neck: Supple. No jugular venous distention. Heart: Regular rate and rhythm. No murmurs appreciated. Chest: Symmetrical excursion. Clear to auscultation bilaterally. Abdomen: Soft, nontender.   No abnormal distention. Bowel sounds are present throughout. Extremities: No gross deformities. No edema, no cyanosis. Feet are warm to touch. Neurological: No lateralizing deficits. Alert, oriented X3. Skin: No jaundice. No rashes. Recent Results (from the past 24 hour(s))   EKG, as personally reviewed by me, shows normal sinus rhythm with few premature ventricular contractions, pulse rate 70. EKG, 12 LEAD, INITIAL    Collection Time: 01/12/22  5:38 PM   Result Value Ref Range    Ventricular Rate 70 BPM    Atrial Rate 70 BPM    P-R Interval 178 ms    QRS Duration 100 ms    Q-T Interval 482 ms    QTC Calculation (Bezet) 520 ms    Calculated P Axis 47 degrees    Calculated R Axis -10 degrees    Calculated T Axis -31 degrees    Diagnosis       Sinus rhythm with occasional premature ventricular complexes  Cannot rule out Anterior infarct , age undetermined  Prolonged QT  Abnormal ECG  When compared with ECG of 08-JAN-2022 09:15,  premature ventricular complexes are now present     SAMPLES BEING HELD    Collection Time: 01/12/22  5:41 PM   Result Value Ref Range    SAMPLES BEING HELD [email or web address]     COMMENT        Add-on orders for these samples will be processed based on acceptable specimen integrity and analyte stability, which may vary by analyte. CBC WITH AUTOMATED DIFF    Collection Time: 01/12/22  5:41 PM   Result Value Ref Range    WBC 8.9 3.6 - 11.0 K/uL    RBC 3.06 (L) 3.80 - 5.20 M/uL    HGB 8.8 (L) 11.5 - 16.0 g/dL    HCT 27.4 (L) 35.0 - 47.0 %    MCV 89.5 80.0 - 99.0 FL    MCH 28.8 26.0 - 34.0 PG    MCHC 32.1 30.0 - 36.5 g/dL    RDW 14.0 11.5 - 14.5 %    PLATELET 878 497 - 408 K/uL    MPV 11.0 8.9 - 12.9 FL    NRBC 0.0 0  WBC    ABSOLUTE NRBC 0.00 0.00 - 0.01 K/uL    NEUTROPHILS 68 32 - 75 %    LYMPHOCYTES 16 12 - 49 %    MONOCYTES 8 5 - 13 %    EOSINOPHILS 7 0 - 7 %    BASOPHILS 1 0 - 1 %    IMMATURE GRANULOCYTES 0 0.0 - 0.5 %    ABS. NEUTROPHILS 6.1 1.8 - 8.0 K/UL    ABS.  LYMPHOCYTES 1.4 0.8 - 3.5 K/UL    ABS. MONOCYTES 0.7 0.0 - 1.0 K/UL    ABS. EOSINOPHILS 0.6 (H) 0.0 - 0.4 K/UL    ABS. BASOPHILS 0.1 0.0 - 0.1 K/UL    ABS. IMM. GRANS. 0.0 0.00 - 0.04 K/UL    DF AUTOMATED     METABOLIC PANEL, COMPREHENSIVE    Collection Time: 01/12/22  5:41 PM   Result Value Ref Range    Sodium 140 136 - 145 mmol/L    Potassium 3.5 3.5 - 5.1 mmol/L    Chloride 110 (H) 97 - 108 mmol/L    CO2 23 21 - 32 mmol/L    Anion gap 7 5 - 15 mmol/L    Glucose 68 65 - 100 mg/dL    BUN 20 6 - 20 MG/DL    Creatinine 1.70 (H) 0.55 - 1.02 MG/DL    BUN/Creatinine ratio 12 12 - 20      GFR est AA 34 (L) >60 ml/min/1.73m2    GFR est non-AA 28 (L) >60 ml/min/1.73m2    Calcium 8.8 8.5 - 10.1 MG/DL    Bilirubin, total 0.6 0.2 - 1.0 MG/DL    ALT (SGPT) 28 12 - 78 U/L    AST (SGOT) 23 15 - 37 U/L    Alk. phosphatase 88 45 - 117 U/L    Protein, total 6.8 6.4 - 8.2 g/dL    Albumin 3.0 (L) 3.5 - 5.0 g/dL    Globulin 3.8 2.0 - 4.0 g/dL    A-G Ratio 0.8 (L) 1.1 - 2.2     TROPONIN-HIGH SENSITIVITY    Collection Time: 01/12/22  5:41 PM   Result Value Ref Range    Troponin-High Sensitivity 38 0 - 51 ng/L   TROPONIN-HIGH SENSITIVITY    Collection Time: 01/12/22  7:26 PM   Result Value Ref Range    Troponin-High Sensitivity 38 0 - 51 ng/L   TROPONIN-HIGH SENSITIVITY    Collection Time: 01/13/22  7:23 AM   Result Value Ref Range    Troponin-High Sensitivity 41 0 - 51 ng/L             XR CHEST PORT  Narrative: EXAM: XR CHEST PORT    HISTORY: Chest pain. COMPARISON: 10/25/2021    FINDINGS: Single view(s) of the chest. The lungs are well inflated. No focal  consolidation, pleural effusion, or pneumothorax. The heart is mildly enlarged. The bones are osteopenic. The patient is status post reverse right total  shoulder arthroplasty. Impression: Unchanged mild cardiomegaly.       12/24/21    ECHO ADULT COMPLETE 12/25/2021 12/25/2021    Interpretation Summary    Mild left ventricular systolic dysfunction (EF 52%) with inferior and basal-mid septal akinesis.   Diastolic dysfunction    No hemodynamically significant valvular abnormlities noted.     Signed by: Jorge Luis Chambers MD on 12/25/2021 10:11 AM

## 2022-01-13 NOTE — DISCHARGE SUMMARY
Hospitalist Discharge Summary     Patient ID:  Boni Diaz  [id number]  51 y.o.  1934 1/12/2022    PCP on record: Troy Vigil NP    Admit date: 1/12/2022  Discharge date and time: 1/13/2022    DISCHARGE DIAGNOSIS:  Chest pain unspecified  CAD recent stent  CKD stage III   hypertension      CONSULTATIONS:  IP CONSULT TO CARDIOLOGY    Excerpted HPI from H&P of Ja Mcallister DO:  Boni Diaz is a 80 y.o. female presents with chest pain. She has known history of coronary artery disease, and had 3 stents placed to the RCA this past [facility].        Patient states that she had chest tightness most of the day, in the central chest on 1/12/22. She states that she did not have any associated nausea, vomiting, lightheadedness, palpitations, diaphoresis, shortness of breath. Patient is not quite sure whether medication in the emergency department help relieve the chest tightness, but states that she did have the chest tightness on arrival, but now it is gone. Patient states that on a scale of 1 to 10, the chest tightness would be rated 6/10 in intensity.     Patient is a resident of [facility]. Patient ambulates around [employer] with a walker. She states that she does not find herself particularly fatigued or short of breath in this kind of activity. Patient said she did have a fall a couple of weeks ago. She denies any dizziness prior to the fall. Patient states that she fell in the bathroom while turning around. She fell on her buttocks. She denies any head injury or loss of consciousness. ______________________________________________________________________  DISCHARGE SUMMARY/HOSPITAL COURSE:  for full details see H&P, daily progress notes, labs, consult notes. Patient admitted last night for chest pain, cardiac enzymes and EKG with unremarkable.   Patient was seen by cardiology today who cleared patient for discharge and recommend further work-up for GI to evaluate for pain. Patient started on Norvasc and Protonix at the time of discharge. Patient to follow-up with PCP and cardiology along with GI for further care. No other acute issues medically stable for discharge. Please see H&P for full details. _______________________________________________________________________  Patient seen and examined by me on discharge day. Patient maximized inpatient benefit stay and cleared for discharge by cardiology. Spoke with daughter at bedside who agreed and verbalized understanding of discharge plan and instructions at this time. __________________________________________________________  DISCHARGE MEDICATIONS:   Current Discharge Medication List      START taking these medications    Details   amLODIPine (NORVASC) 5 mg tablet Take 1 Tablet by mouth daily. Qty: 30 Tablet, Refills: 0  Start date: 1/14/2022      pantoprazole (PROTONIX) 40 mg granules for oral suspension Take 40 mg by mouth Daily (before breakfast). Qty: 15 Each, Refills: 0  Start date: 1/13/2022         CONTINUE these medications which have NOT CHANGED    Details   aspirin (ASPIRIN) 325 mg tablet Take 1 Tablet by mouth daily. Qty: 30 Tablet, Refills: 0      atorvastatin (LIPITOR) 20 mg tablet Take 1 Tablet by mouth daily. Qty: 30 Tablet, Refills: 1      clopidogreL (PLAVIX) 75 mg tab Take 1 Tablet by mouth daily. Qty: 30 Tablet, Refills: 1      metoprolol tartrate (LOPRESSOR) 50 mg tablet Take 1 Tablet by mouth every twelve (12) hours. Qty: 60 Tablet, Refills: 1      senna-docusate (PERICOLACE) 8.6-50 mg per tablet Take 1 Tablet by mouth two (2) times a day. polyethylene glycol (MIRALAX) 17 gram packet Take 1 Packet by mouth two (2) times a day. albuterol (PROVENTIL VENTOLIN) 2.5 mg /3 mL (0.083 %) nebu 2.5 mg by Nebulization route every four (4) hours as needed for Wheezing or Shortness of Breath.       albuterol (PROVENTIL HFA, VENTOLIN HFA, PROAIR HFA) 90 mcg/actuation inhaler Take 2 Puffs by inhalation every four (4) hours as needed for Wheezing.   Qty: 1 Inhaler, Refills: 0               Patient Follow Up Instructions:    Diet cardiac  Activity as before  Follow-up with GI for further evaluation for possible acid reflux  Continue Protonix for 15 days to see if helps with symptoms  Return to ER or call PCP immediately if symptoms recur or get worse    Follow-up Information     Follow up With Specialties Details Why [address] [place] [address], [address] [place] [address] Road  [phone]    Ajit Castle NP Nurse Practitioner In 1 week  [address]  [address] [place] [address]      Meghan Downing MD Cardiology, Interventional Cardiology In 4 weeks  [facility]  [phone]      Shelly Martinez MD Gastroenterology In 2 weeks for evalaution of acid reflux [id number] [address]  [phone]          ________________________________________________________________    Risk of deterioration: Low    Condition at Discharge:  Stable  __________________________________________________________________    Disposition  Home with family, no needs    ____________________________________________________________________    Code Status: Full Code  ___________________________________________________________________      Total time in minutes spent coordinating this discharge 31  minutes    Signed:  Fernando Strickland MD .

## 2022-01-13 NOTE — ROUTINE PROCESS
1200 -   Discharge instructions reviewed with Patient and Family member verbalized understanding. Discharge medications reviewed with Patient and Family member and prescriptions given to patient. Patient and Family member made aware of follow up MD visits. An After Visit Summary was printed and given to the patient, hardcopy signed and placed in chart. Peripheral IV was removed. Patient transported home.

## 2022-01-13 NOTE — PROGRESS NOTES
1/13/2022  12:01 PM  Medicare Outpatient Observation Notice (MOON) provided to patient/representative with verbal explanation of the notice. Time allotted for questions regarding the notice. Patient /representative provided a completed copy of the MOON notice. Copy placed on bedside chart.   TONIE Snyder

## 2022-01-13 NOTE — PROGRESS NOTES
Attempted to schedule hospital follow up [facility] BREANA Perez.  Recording stated call center at lunch will call back

## 2022-01-13 NOTE — CONSULTS
Modesta Yan MD, [address] [place] [address] [facility]  [phone]    Date of  Admission: 1/12/2022  5:31 PM         IMPRESSION and RECOMMENDATIONS     1.  CAD:  Unremarkable troponins despite ongoing CP. Suspect non-cardiac etiology: ? GI. No further cardiac testing at this time. Cont ASA/plavix, statin. Resume metoprolol. Will increase home norvasc from 2.5 to 5mg every day. Stable for discharge from a cardiac standpoint. Discussed with Pt and daughter.          Problem List  Date Reviewed: 12/26/2021          Codes Class Noted    Chest pain ICD-10-CM: R07.9  ICD-9-CM: 786.50  1/12/2022        Hypoglycemia ICD-10-CM: E16.2  ICD-9-CM: 251.2  12/26/2021        ARF (acute renal failure) ([facility]) ICD-10-CM: N17.9  ICD-9-CM: 584.9  12/26/2021        Acute metabolic encephalopathy E-37-: G93.41  ICD-9-CM: 348.31  12/26/2021        CAD (coronary artery disease) ICD-10-CM: I25.10  ICD-9-CM: 414.00  Unknown        DM type 2 causing renal disease (HCC) ICD-10-CM: E11.29  ICD-9-CM: 250.40  Unknown        DM type 2 causing vascular disease (HCC) ICD-10-CM: E11.59  ICD-9-CM: 250.70, 443.81  Unknown        Hypertension ICD-10-CM: I10  ICD-9-CM: 401.9  Unknown        CHF (congestive heart failure), NYHA class I, chronic, systolic (HCC) [id number]: I50.22  ICD-9-CM: 428.22, 428.0  Unknown        Ischemic cardiomyopathy ICD-10-CM: I25.5  ICD-9-CM: 414.8  Unknown        Dysphagia ICD-10-CM: R13.10  ICD-9-CM: 787.20  12/24/2021        HTN (hypertension), benign ICD-10-CM: I10  ICD-9-CM: 401.1  10/18/2021        DM (diabetes mellitus), type 2 ([facility] ICD-10-CM: E11.9  ICD-9-CM: 250.00  10/18/2021        CKD (chronic kidney disease) stage 3, GFR 30-59 ml/min ([facility]) ICD-10-CM: N18.30  ICD-9-CM: 585.3  10/18/2021              History of Present Illness:     Nura Patel is a 80 y.o. female with the above problem list who was admitted for Chest pain [R07. 9]. Angeles Chinchilla is a 80 y.o. female presents with chest pain. She has known history of coronary artery disease, and had 3 stents placed to the RCA this past Christmas Eve.        Patient states that she had chest tightness most of the day, in the central chest on 1/12/22. She states that she did not have any associated nausea, vomiting, lightheadedness, palpitations, diaphoresis, shortness of breath. Patient is not quite sure whether medication in the emergency department help relieve the chest tightness, but states that she did have the chest tightness on arrival, but now it is gone. Patient states that on a scale of 1 to 10, the chest tightness would be rated 6/10 in intensity.     Patient is a resident of [facility]. Patient ambulates around [facility] arms with a walker. She states that she does not find herself particularly fatigued or short of breath in this kind of activity. Patient said she did have a fall a couple of weeks ago. She denies any dizziness prior to the fall. Patient states that she fell in the bathroom while turning around. She fell on her buttocks. She denies any head injury or loss of consciousness. She denies anginal-sounding chest pain/discomfort, shortness of breath, dyspnea on exertion, orthopnea, paroxysmal noctural dyspnea, lower extremity edema, palpitations, syncope, or near-syncope.     Current Facility-Administered Medications   Medication Dose Route Frequency    amLODIPine (NORVASC) tablet 10 mg  10 mg Oral DAILY    acetaminophen (TYLENOL) tablet 650 mg  650 mg Oral Q6H PRN    aluminum-magnesium hydroxide (MAALOX) oral suspension 15 mL  15 mL Oral QID PRN    ondansetron (ZOFRAN) injection 4 mg  4 mg IntraVENous Q4H PRN    docusate sodium (COLACE) capsule 100 mg  100 mg Oral DAILY PRN    sodium chloride (NS) flush 5-40 mL  5-40 mL IntraVENous Q8H    sodium chloride (NS) flush 5-40 mL  5-40 mL IntraVENous PRN    heparin (porcine) injection 5,000 Units 5,000 Units SubCUTAneous Q8H    aspirin tablet 325 mg  325 mg Oral DAILY    atorvastatin (LIPITOR) tablet 20 mg  20 mg Oral DAILY    clopidogreL (PLAVIX) tablet 75 mg  75 mg Oral DAILY    senna-docusate (PERICOLACE) 8.6-50 mg per tablet 1 Tablet  1 Tablet Oral BID    polyethylene glycol (MIRALAX) packet 17 g  17 g Oral BID      Allergies   Allergen Reactions    Codeine Nausea and Vomiting    Compazine [Prochlorperazine] Other (comments)     Facial droop    Dilaudid [Hydromorphone] Nausea and Vomiting    Morphine Nausea and Vomiting    Sulfa (Sulfonamide Antibiotics) Nausea and Vomiting      Family History   Problem Relation Age of Onset    Heart Disease Mother       Social History     Socioeconomic History    Marital status:      Spouse name: Not on file    Number of children: Not on file    Years of education: Not on file    Highest education level: Not on file   Occupational History    Not on file   Tobacco Use    Smoking status: Former Smoker     Years: 15.00    Smokeless tobacco: Never Used   Substance and Sexual Activity    Alcohol use: Yes     Comment: very rarely     Drug use: Never    Sexual activity: Not on file   Other Topics Concern     Service Not Asked    Blood Transfusions Not Asked    Caffeine Concern Not Asked    Occupational Exposure Not Asked    Hobby Hazards Not Asked    Sleep Concern Not Asked    Stress Concern Not Asked    Weight Concern Not Asked    Special Diet Not Asked    Back Care Not Asked    Exercise Not Asked    Bike Helmet Not Asked    Seat Belt Not Asked    Self-Exams Not Asked   Social History Narrative    Not on file     Social Determinants of Health     Financial Resource Strain:     Difficulty of Paying Living Expenses: Not on file   Food Insecurity:     Worried About Running Out of Food in the Last Year: Not on file    Alondra of Food in the Last Year: Not on file   Transportation Needs:     Lack of Transportation (Medical): Not on file    Lack of Transportation (Non-Medical): Not on file   Physical Activity:     Days of Exercise per Week: Not on file    Minutes of Exercise per Session: Not on file   Stress:     Feeling of Stress : Not on file   Social Connections:     Frequency of Communication with Friends and Family: Not on file    Frequency of Social Gatherings with Friends and Family: Not on file    Attends Spiritism Services: Not on file    Active Member of Clubs or Organizations: Not on file    Attends Club or Organization Meetings: Not on file    Marital Status: Not on file   Intimate Partner Violence:     Fear of Current or Ex-Partner: Not on file    Emotionally Abused: Not on file    Physically Abused: Not on file    Sexually Abused: Not on file   Housing Stability:     Unable to Pay for Housing in the Last Year: Not on file    Number of Places Lived in the Last Year: Not on file    Unstable Housing in the Last Year: Not on file       Physical Exam:     Patient Vitals for the past 16 hrs:   BP Temp Pulse Resp SpO2   01/13/22 0716 (!) 166/65 97.8 °F (36.6 °C) 64 18 97 %   01/13/22 0700   62     01/13/22 0322 (!) 150/69 98.3 °F (36.8 °C) 72 18 97 %   01/13/22 0129   70     01/13/22 0026 (!) 174/56  71 21 97 %   01/13/22 0000 (!) 173/53  70 20 96 %   01/12/22 2330 (!) 177/61  70 22 97 %   01/12/22 2300 (!) 172/53  70 22 96 %   01/12/22 2230 (!) 174/51  69 21 98 %   01/12/22 2200 (!) 185/60  71 25 97 %   01/12/22 2130 (!) 174/55  71 16 97 %   01/12/22 2100 (!) 150/50  69 21 99 %   01/12/22 2030 (!) 132/44  73 17 100 %   01/12/22 2025 (!) 154/49  72         HEENT Exam:     Normocephalic, atraumatic. EOMI. Oropharynx negative. Neck supple. No lymphadenopathy. Lung Exam:     The patient is not dyspneic. There is no cough. Breath sounds are heard equally in all lung fields. There are no wheezes, rales, rhonchi, or rubs heard on auscultation. Heart Exam:     The rhythm is regular. The PMI is in the 5th intercostal space of the MCL. Apical impulse is normal. S1 is regular. S2 is physiologic. There is no S3, S4 gallop, murmur, click, or rub. Abdomen Exam:     Bowel sounds are normoactive. Abdomen soft in all quadrants. No tenderness. No palpable masses. No organomegaly. No hernias noted. No bruits or pulsatile mass. Extremities Exam:     The extremities are atraumatic appearing. There is no clubbing, cyanosis, edema, ulcers, varicose veins, rash, erythemia noted in the extremities. The neurovascular status is grossly intact with normal distal sensation and pulses. Vascular Exam:     The radial, brachial, dorsalis pedis, posterior tibial, are equal and strong bilaterally The carotids are equal bilaterally without bruits. Labs:     Lab Results   Component Value Date/Time    Glucose 68 01/12/2022 05:41 PM    Sodium 140 01/12/2022 05:41 PM    Potassium 3.5 01/12/2022 05:41 PM    Chloride 110 (H) 01/12/2022 05:41 PM    CO2 23 01/12/2022 05:41 PM    BUN 20 01/12/2022 05:41 PM    Creatinine 1.70 (H) 01/12/2022 05:41 PM    Calcium 8.8 01/12/2022 05:41 PM     Recent Labs     01/12/22  1741   WBC 8.9   HGB 8.8*   HCT 27.4*        Recent Labs     01/12/22  1741   ALT 28   AP 88   TBILI 0.6   TP 6.8   ALB 3.0*   GLOB 3.8     No results for input(s): INR, PTP, APTT, INREXT in the last 72 hours. No results for input(s): CPK, CKMB, TNIPOC in the last 72 hours. No lab exists for component: TROPONINI, ITNL  No results for input(s): TROIQ in the last 72 hours. Lab Results   Component Value Date/Time    Cholesterol, total 169 12/25/2021 04:15 AM    HDL Cholesterol 49 12/25/2021 04:15 AM    LDL, calculated 105.8 (H) 12/25/2021 04:15 AM    Triglyceride 71 12/25/2021 04:15 AM    CHOL/HDL Ratio 3.4 12/25/2021 04:15 AM       EKG:  NSR @ 70, PVC, PAF, inf TWI.

## 2022-01-13 NOTE — DISCHARGE INSTRUCTIONS
Diet cardiac  Activity as before  Follow-up with GI for further evaluation for possible acid reflux  Continue Protonix for 15 days to see if helps with symptoms  Return to ER or call PCP immediately if symptoms recur or get worse

## 2022-01-14 LAB
ATRIAL RATE: 70 BPM
CALCULATED P AXIS, ECG09: 47 DEGREES
CALCULATED R AXIS, ECG10: -10 DEGREES
CALCULATED T AXIS, ECG11: -31 DEGREES
DIAGNOSIS, 93000: NORMAL
P-R INTERVAL, ECG05: 178 MS
Q-T INTERVAL, ECG07: 482 MS
QRS DURATION, ECG06: 100 MS
QTC CALCULATION (BEZET), ECG08: 520 MS
VENTRICULAR RATE, ECG03: 70 BPM

## 2022-01-14 NOTE — PROGRESS NOTES
Patient called on home number on file. Message left on voicemail with contact information to return call to this writer.

## 2022-01-28 ENCOUNTER — PATIENT OUTREACH (OUTPATIENT)
Dept: CASE MANAGEMENT | Age: 87
End: 2022-01-28

## 2022-01-28 NOTE — PROGRESS NOTES
Follow Up Call    Encounter was not routed to provider for escalation. Method of communication with provider: none. Contacted the family by telephone to follow up after hospital visit. Status: [facility] follow up appointment(s): No future appointments. Follow up appointment completed? yes. Provided contact information for future needs. Plan for follow-up call in 10-14 days based on severity of symptoms and risk factors.   Plan for next call: symptom management-chest pain     Kierra Ruiz RN

## 2022-02-02 ENCOUNTER — APPOINTMENT (OUTPATIENT)
Dept: VASCULAR SURGERY | Age: 87
DRG: 377 | End: 2022-02-02
Attending: INTERNAL MEDICINE
Payer: MEDICARE

## 2022-02-02 ENCOUNTER — HOSPITAL ENCOUNTER (INPATIENT)
Age: 87
LOS: 3 days | Discharge: HOME HEALTH | DRG: 377 | End: 2022-02-05
Attending: STUDENT IN AN ORGANIZED HEALTH CARE EDUCATION/TRAINING PROGRAM | Admitting: INTERNAL MEDICINE
Payer: MEDICARE

## 2022-02-02 DIAGNOSIS — D64.9 ACUTE ON CHRONIC ANEMIA: ICD-10-CM

## 2022-02-02 DIAGNOSIS — K92.2 GASTROINTESTINAL HEMORRHAGE, UNSPECIFIED GASTROINTESTINAL HEMORRHAGE TYPE: Primary | ICD-10-CM

## 2022-02-02 LAB
ALBUMIN SERPL-MCNC: 3.2 G/DL (ref 3.5–5)
ALBUMIN/GLOB SERPL: 0.8 {RATIO} (ref 1.1–2.2)
ALP SERPL-CCNC: 88 U/L (ref 45–117)
ALT SERPL-CCNC: 17 U/L (ref 12–78)
ANION GAP SERPL CALC-SCNC: 5 MMOL/L (ref 5–15)
AST SERPL-CCNC: 13 U/L (ref 15–37)
BASOPHILS # BLD: 0.1 K/UL (ref 0–0.1)
BASOPHILS NFR BLD: 1 % (ref 0–1)
BILIRUB SERPL-MCNC: 0.3 MG/DL (ref 0.2–1)
BUN SERPL-MCNC: 14 MG/DL (ref 6–20)
BUN/CREAT SERPL: 9 (ref 12–20)
CALCIUM SERPL-MCNC: 8.7 MG/DL (ref 8.5–10.1)
CHLORIDE SERPL-SCNC: 111 MMOL/L (ref 97–108)
CO2 SERPL-SCNC: 25 MMOL/L (ref 21–32)
COMMENT, HOLDF: NORMAL
CREAT SERPL-MCNC: 1.64 MG/DL (ref 0.55–1.02)
DIFFERENTIAL METHOD BLD: ABNORMAL
EOSINOPHIL # BLD: 0.2 K/UL (ref 0–0.4)
EOSINOPHIL NFR BLD: 4 % (ref 0–7)
ERYTHROCYTE [DISTWIDTH] IN BLOOD BY AUTOMATED COUNT: 13.9 % (ref 11.5–14.5)
FERRITIN SERPL-MCNC: 18 NG/ML (ref 8–252)
FOLATE SERPL-MCNC: 7.4 NG/ML (ref 5–21)
GLOBULIN SER CALC-MCNC: 3.8 G/DL (ref 2–4)
GLUCOSE BLD STRIP.AUTO-MCNC: 84 MG/DL (ref 65–117)
GLUCOSE SERPL-MCNC: 103 MG/DL (ref 65–100)
HCT VFR BLD AUTO: 23.7 % (ref 35–47)
HEMOCCULT STL QL: POSITIVE
HGB BLD-MCNC: 7.3 G/DL (ref 11.5–16)
IMM GRANULOCYTES # BLD AUTO: 0 K/UL (ref 0–0.04)
IMM GRANULOCYTES NFR BLD AUTO: 0 % (ref 0–0.5)
IRON SATN MFR SERPL: 7 % (ref 20–50)
IRON SERPL-MCNC: 24 UG/DL (ref 35–150)
LYMPHOCYTES # BLD: 1.6 K/UL (ref 0.8–3.5)
LYMPHOCYTES NFR BLD: 27 % (ref 12–49)
MCH RBC QN AUTO: 27.3 PG (ref 26–34)
MCHC RBC AUTO-ENTMCNC: 30.8 G/DL (ref 30–36.5)
MCV RBC AUTO: 88.8 FL (ref 80–99)
MONOCYTES # BLD: 0.4 K/UL (ref 0–1)
MONOCYTES NFR BLD: 7 % (ref 5–13)
NEUTS SEG # BLD: 3.6 K/UL (ref 1.8–8)
NEUTS SEG NFR BLD: 61 % (ref 32–75)
NRBC # BLD: 0 K/UL (ref 0–0.01)
NRBC BLD-RTO: 0 PER 100 WBC
PLATELET # BLD AUTO: 235 K/UL (ref 150–400)
PMV BLD AUTO: 11 FL (ref 8.9–12.9)
POTASSIUM SERPL-SCNC: 4 MMOL/L (ref 3.5–5.1)
PROT SERPL-MCNC: 7 G/DL (ref 6.4–8.2)
RBC # BLD AUTO: 2.67 M/UL (ref 3.8–5.2)
RETICS # AUTO: 0.05 M/UL (ref 0.02–0.08)
RETICS/RBC NFR AUTO: 1.8 % (ref 0.7–2.1)
SAMPLES BEING HELD,HOLD: NORMAL
SERVICE CMNT-IMP: NORMAL
SODIUM SERPL-SCNC: 141 MMOL/L (ref 136–145)
TIBC SERPL-MCNC: 329 UG/DL (ref 250–450)
VIT B12 SERPL-MCNC: 222 PG/ML (ref 193–986)
WBC # BLD AUTO: 6 K/UL (ref 3.6–11)

## 2022-02-02 PROCEDURE — 86923 COMPATIBILITY TEST ELECTRIC: CPT

## 2022-02-02 PROCEDURE — 82746 ASSAY OF FOLIC ACID SERUM: CPT

## 2022-02-02 PROCEDURE — 86900 BLOOD TYPING SEROLOGIC ABO: CPT

## 2022-02-02 PROCEDURE — 65660000000 HC RM CCU STEPDOWN

## 2022-02-02 PROCEDURE — 82728 ASSAY OF FERRITIN: CPT

## 2022-02-02 PROCEDURE — 74011250636 HC RX REV CODE- 250/636: Performed by: INTERNAL MEDICINE

## 2022-02-02 PROCEDURE — 85025 COMPLETE CBC W/AUTO DIFF WBC: CPT

## 2022-02-02 PROCEDURE — 82607 VITAMIN B-12: CPT

## 2022-02-02 PROCEDURE — 83540 ASSAY OF IRON: CPT

## 2022-02-02 PROCEDURE — 99284 EMERGENCY DEPT VISIT MOD MDM: CPT

## 2022-02-02 PROCEDURE — 82272 OCCULT BLD FECES 1-3 TESTS: CPT

## 2022-02-02 PROCEDURE — 80053 COMPREHEN METABOLIC PANEL: CPT

## 2022-02-02 PROCEDURE — 36415 COLL VENOUS BLD VENIPUNCTURE: CPT

## 2022-02-02 PROCEDURE — 85045 AUTOMATED RETICULOCYTE COUNT: CPT

## 2022-02-02 PROCEDURE — 82962 GLUCOSE BLOOD TEST: CPT

## 2022-02-02 PROCEDURE — 74011000250 HC RX REV CODE- 250: Performed by: INTERNAL MEDICINE

## 2022-02-02 RX ORDER — SODIUM CHLORIDE 0.9 % (FLUSH) 0.9 %
5-40 SYRINGE (ML) INJECTION AS NEEDED
Status: DISCONTINUED | OUTPATIENT
Start: 2022-02-02 | End: 2022-02-05 | Stop reason: HOSPADM

## 2022-02-02 RX ORDER — ONDANSETRON 4 MG/1
4 TABLET, FILM COATED ORAL
COMMUNITY

## 2022-02-02 RX ORDER — ONDANSETRON 2 MG/ML
4 INJECTION INTRAMUSCULAR; INTRAVENOUS
Status: DISCONTINUED | OUTPATIENT
Start: 2022-02-02 | End: 2022-02-05 | Stop reason: HOSPADM

## 2022-02-02 RX ORDER — CHOLECALCIFEROL (VITAMIN D3) 125 MCG
CAPSULE ORAL
COMMUNITY

## 2022-02-02 RX ORDER — ACETAMINOPHEN 650 MG/1
650 SUPPOSITORY RECTAL
Status: DISCONTINUED | OUTPATIENT
Start: 2022-02-02 | End: 2022-02-03

## 2022-02-02 RX ORDER — METOPROLOL TARTRATE 50 MG/1
50 TABLET ORAL EVERY 12 HOURS
Status: DISCONTINUED | OUTPATIENT
Start: 2022-02-03 | End: 2022-02-05 | Stop reason: HOSPADM

## 2022-02-02 RX ORDER — LINAGLIPTIN 5 MG/1
5 TABLET, FILM COATED ORAL DAILY
COMMUNITY
End: 2022-02-05

## 2022-02-02 RX ORDER — POLYETHYLENE GLYCOL 3350 17 G/17G
17 POWDER, FOR SOLUTION ORAL DAILY PRN
Status: DISCONTINUED | OUTPATIENT
Start: 2022-02-02 | End: 2022-02-05 | Stop reason: HOSPADM

## 2022-02-02 RX ORDER — MAGNESIUM SULFATE 100 %
4 CRYSTALS MISCELLANEOUS AS NEEDED
Status: DISCONTINUED | OUTPATIENT
Start: 2022-02-02 | End: 2022-02-05 | Stop reason: HOSPADM

## 2022-02-02 RX ORDER — ONDANSETRON 4 MG/1
4 TABLET, ORALLY DISINTEGRATING ORAL
Status: DISCONTINUED | OUTPATIENT
Start: 2022-02-02 | End: 2022-02-03

## 2022-02-02 RX ORDER — SODIUM CHLORIDE 9 MG/ML
50 INJECTION, SOLUTION INTRAVENOUS CONTINUOUS
Status: DISCONTINUED | OUTPATIENT
Start: 2022-02-02 | End: 2022-02-04

## 2022-02-02 RX ORDER — ACETAMINOPHEN 325 MG/1
650 TABLET ORAL
Status: DISCONTINUED | OUTPATIENT
Start: 2022-02-02 | End: 2022-02-05 | Stop reason: HOSPADM

## 2022-02-02 RX ORDER — DEXTROSE 50 % IN WATER (D50W) INTRAVENOUS SYRINGE
12.5-25 AS NEEDED
Status: DISCONTINUED | OUTPATIENT
Start: 2022-02-02 | End: 2022-02-05 | Stop reason: HOSPADM

## 2022-02-02 RX ORDER — SODIUM CHLORIDE 0.9 % (FLUSH) 0.9 %
5-40 SYRINGE (ML) INJECTION EVERY 8 HOURS
Status: DISCONTINUED | OUTPATIENT
Start: 2022-02-02 | End: 2022-02-05 | Stop reason: HOSPADM

## 2022-02-02 RX ORDER — INSULIN LISPRO 100 [IU]/ML
INJECTION, SOLUTION INTRAVENOUS; SUBCUTANEOUS
Status: DISCONTINUED | OUTPATIENT
Start: 2022-02-02 | End: 2022-02-03

## 2022-02-02 RX ORDER — AMLODIPINE BESYLATE 5 MG/1
5 TABLET ORAL DAILY
Status: DISCONTINUED | OUTPATIENT
Start: 2022-02-03 | End: 2022-02-03

## 2022-02-02 RX ADMIN — SODIUM CHLORIDE 50 ML/HR: 9 INJECTION, SOLUTION INTRAVENOUS at 21:00

## 2022-02-02 RX ADMIN — Medication 10 ML: at 22:51

## 2022-02-02 NOTE — ED PROVIDER NOTES
Patient is an 70-year-old female present emergency department for low hemoglobin. Patient was scheduled to have a follow-up appointment with nephrology tomorrow had labs performed on Monday family received a phone call stating that her hemoglobin was 7.3 today was informed to come to the emergency department to have her labs rechecked. Patient states that she actually feels well has no symptoms including chest pain, shortness of breath, dizziness or lightheadedness she continues to have residual swelling of the lower extremities. Patient is on aspirin and Plavix is not on any anticoagulation. Patient denies any vaginal bleeding, changes in stool color or rectal bleeding. Past Medical History:   Diagnosis Date    CAD (coronary artery disease)     CHF (congestive heart failure), NYHA class I, chronic, systolic (HCC)     Systolic and diastolic CHF per echocardiogram of 12/25/2021. Patient was found to have LVEF 45%    Chronic kidney disease     stage III/IV:Creatinine 1.45-1.7 with GFR in the low 30s-high 20s    Diabetes mellitus type 2 in nonobese ([address]     Hypertension     Ischemic cardiomyopathy     Echocardiogram of 12/25/2021: mild left ventricular systolic dysfunction (EF 53%) with inferior and basal-mid septal akinesis. Diastolic dysfunction.  Peripheral vascular disease ([address]        Past Surgical History:   Procedure Laterality Date    HX APPENDECTOMY      HX CORONARY STENT PLACEMENT      stents x 3 to mid-distal right coronary artery going into PDA for 100% occlusion of RCA; and also had nonobstructive disease in 30% LAD stenosis, 70% diagonal 1 stenosis.   She had proximal 30% stenosis in the codominant left circumflex artery     HX HYSTERECTOMY      HX KNEE REPLACEMENT Left     HX OTHER SURGICAL      head sx x 2 nerve related    HX OTHER SURGICAL      Back surgery x 2    HX TONSILLECTOMY           Family History:   Problem Relation Age of Onset    Heart Disease Mother Social History     Socioeconomic History    Marital status:      Spouse name: Not on file    Number of children: Not on file    Years of education: Not on file    Highest education level: Not on file   Occupational History    Not on file   Tobacco Use    Smoking status: Former Smoker     Years: 15.00    Smokeless tobacco: Never Used   Substance and Sexual Activity    Alcohol use: Yes     Comment: very rarely     Drug use: Never    Sexual activity: Not on file   Other Topics Concern     Service Not Asked    Blood Transfusions Not Asked    Caffeine Concern Not Asked    Occupational Exposure Not Asked    Hobby Hazards Not Asked    Sleep Concern Not Asked    Stress Concern Not Asked    Weight Concern Not Asked    Special Diet Not Asked    Back Care Not Asked    Exercise Not Asked    Bike Helmet Not Asked    Seat Belt Not Asked    Self-Exams Not Asked   Social History Narrative    Not on file     Social Determinants of Health     Financial Resource Strain:     Difficulty of Paying Living Expenses: Not on file   Food Insecurity:     Worried About Running Out of Food in the Last Year: Not on file    Alondra of Food in the Last Year: Not on file   Transportation Needs:     Lack of Transportation (Medical): Not on file    Lack of Transportation (Non-Medical):  Not on file   Physical Activity:     Days of Exercise per Week: Not on file    Minutes of Exercise per Session: Not on file   Stress:     Feeling of Stress : Not on file   Social Connections:     Frequency of Communication with Friends and Family: Not on file    Frequency of Social Gatherings with Friends and Family: Not on file    Attends Adventist Services: Not on file    Active Member of Clubs or Organizations: Not on file    Attends Club or Organization Meetings: Not on file    Marital Status: Not on file   Intimate Partner Violence:     Fear of Current or Ex-Partner: Not on file    Emotionally Abused: Not on file    Physically Abused: Not on file    Sexually Abused: Not on file   Housing Stability:     Unable to Pay for Housing in the Last Year: Not on file    Number of Places Lived in the Last Year: Not on file    Unstable Housing in the Last Year: Not on file         ALLERGIES: Codeine, Compazine [prochlorperazine], Dilaudid [hydromorphone], Morphine, and Sulfa (sulfonamide antibiotics)    Review of Systems   Constitutional: Negative for activity change, appetite change and fatigue. Respiratory: Negative for chest tightness and shortness of breath. Cardiovascular: Positive for leg swelling. Negative for chest pain and palpitations. Gastrointestinal: Negative for abdominal distention, abdominal pain, anal bleeding, blood in stool, constipation, diarrhea, nausea and vomiting. Neurological: Negative for dizziness and light-headedness. All other systems reviewed and are negative. There were no vitals filed for this visit. Physical Exam  Vitals and nursing note reviewed. Constitutional:       Appearance: Normal appearance. HENT:      Head: Normocephalic and atraumatic. Eyes:      Extraocular Movements: Extraocular movements intact. Pupils: Pupils are equal, round, and reactive to light. Cardiovascular:      Rate and Rhythm: Normal rate and regular rhythm. Pulses: Normal pulses. Heart sounds: Normal heart sounds. Pulmonary:      Effort: Pulmonary effort is normal.      Breath sounds: Normal breath sounds. Abdominal:      General: Abdomen is flat. Palpations: Abdomen is soft. Musculoskeletal:         General: Normal range of motion. Cervical back: Normal range of motion and neck supple. Skin:     General: Skin is warm and dry. Coloration: Skin is pale. Neurological:      General: No focal deficit present. Mental Status: She is alert and oriented to person, place, and time.    Psychiatric:         Mood and Affect: Mood normal. Behavior: Behavior normal.          MDM  Number of Diagnoses or Management Options  Diagnosis management comments: A/P: Chronic anemia, GI bleed, renal dysfunction. 77-year-old female present emergency department with recent labs showing hemoglobin 7.3 patient has no complaints at this time. Will obtain CBC, CMP, retake count, type and screen. Procedures      Perfect Serve Consult for Admission  6:44 PM    ED Room Number: CJ31/31  Patient Name and age:  Leblanc Shaker 80 y.o.  female  Working Diagnosis:   1. Gastrointestinal hemorrhage, unspecified gastrointestinal hemorrhage type    2.  Acute on chronic anemia        COVID-19 Suspicion:  no  Sepsis present:  no  Reassessment needed: no  Code Status:  Full Code  Readmission: yes  Isolation Requirements:  no  Recommended Level of Care:  med/surg  Department:[facility] ED - [phone]  Other:

## 2022-02-02 NOTE — ED TRIAGE NOTES
Patient arrives with c/o low Hbg. States has Stage 4 Kidney Failure, and has nephrologist appointment tomorrow. Reports labs on Monday showed Hb.3     Denies SOB, chest pain, weakness/fatigue/light-headedness, N/V/D. Denies dark stools, signs of bleeding. Patient reports MI 2021, and fractured right arm in .

## 2022-02-03 ENCOUNTER — APPOINTMENT (OUTPATIENT)
Dept: VASCULAR SURGERY | Age: 87
DRG: 377 | End: 2022-02-03
Attending: INTERNAL MEDICINE
Payer: MEDICARE

## 2022-02-03 PROBLEM — I10 HTN (HYPERTENSION), BENIGN: Status: RESOLVED | Noted: 2021-10-18 | Resolved: 2022-02-03

## 2022-02-03 PROBLEM — E16.2 HYPOGLYCEMIA: Status: RESOLVED | Noted: 2021-12-26 | Resolved: 2022-02-03

## 2022-02-03 PROBLEM — N17.9 ARF (ACUTE RENAL FAILURE) (HCC): Status: RESOLVED | Noted: 2021-12-26 | Resolved: 2022-02-03

## 2022-02-03 PROBLEM — D50.9 IRON DEFICIENCY ANEMIA: Status: ACTIVE | Noted: 2022-02-03

## 2022-02-03 PROBLEM — K92.2 CHRONIC GI BLEEDING: Status: ACTIVE | Noted: 2022-02-03

## 2022-02-03 PROBLEM — E11.9 DM (DIABETES MELLITUS), TYPE 2 (HCC): Status: RESOLVED | Noted: 2021-10-18 | Resolved: 2022-02-03

## 2022-02-03 PROBLEM — G93.41 ACUTE METABOLIC ENCEPHALOPATHY: Status: RESOLVED | Noted: 2021-12-26 | Resolved: 2022-02-03

## 2022-02-03 PROBLEM — R13.10 DYSPHAGIA: Status: RESOLVED | Noted: 2021-12-24 | Resolved: 2022-02-03

## 2022-02-03 LAB
ANION GAP SERPL CALC-SCNC: 4 MMOL/L (ref 5–15)
BASOPHILS # BLD: 0 K/UL (ref 0–0.1)
BASOPHILS NFR BLD: 0 % (ref 0–1)
BUN SERPL-MCNC: 12 MG/DL (ref 6–20)
BUN/CREAT SERPL: 8 (ref 12–20)
CALCIUM SERPL-MCNC: 8.7 MG/DL (ref 8.5–10.1)
CHLORIDE SERPL-SCNC: 114 MMOL/L (ref 97–108)
CO2 SERPL-SCNC: 24 MMOL/L (ref 21–32)
COMMENT, HOLDF: NORMAL
COVID-19 RAPID TEST, COVR: DETECTED
CREAT SERPL-MCNC: 1.52 MG/DL (ref 0.55–1.02)
DIFFERENTIAL METHOD BLD: ABNORMAL
EOSINOPHIL # BLD: 0 K/UL (ref 0–0.4)
EOSINOPHIL NFR BLD: 0 % (ref 0–7)
ERYTHROCYTE [DISTWIDTH] IN BLOOD BY AUTOMATED COUNT: 13.8 % (ref 11.5–14.5)
GLUCOSE BLD STRIP.AUTO-MCNC: 87 MG/DL (ref 65–117)
GLUCOSE SERPL-MCNC: 73 MG/DL (ref 65–100)
HCT VFR BLD AUTO: 21.9 % (ref 35–47)
HCT VFR BLD AUTO: 26.1 % (ref 35–47)
HCT VFR BLD AUTO: 27.3 % (ref 35–47)
HGB BLD-MCNC: 6.8 G/DL (ref 11.5–16)
HGB BLD-MCNC: 8.3 G/DL (ref 11.5–16)
HGB BLD-MCNC: 8.7 G/DL (ref 11.5–16)
HISTORY CHECKED?,CKHIST: NORMAL
IMM GRANULOCYTES # BLD AUTO: 0 K/UL
IMM GRANULOCYTES NFR BLD AUTO: 0 %
INR PPP: 1.1 (ref 0.9–1.1)
LYMPHOCYTES # BLD: 1.6 K/UL (ref 0.8–3.5)
LYMPHOCYTES NFR BLD: 27 % (ref 12–49)
MAGNESIUM SERPL-MCNC: 2.5 MG/DL (ref 1.6–2.4)
MCH RBC QN AUTO: 27.1 PG (ref 26–34)
MCHC RBC AUTO-ENTMCNC: 31.1 G/DL (ref 30–36.5)
MCV RBC AUTO: 87.3 FL (ref 80–99)
MONOCYTES # BLD: 0.4 K/UL (ref 0–1)
MONOCYTES NFR BLD: 6 % (ref 5–13)
NEUTS SEG # BLD: 4 K/UL (ref 1.8–8)
NEUTS SEG NFR BLD: 67 % (ref 32–75)
NRBC # BLD: 0 K/UL (ref 0–0.01)
NRBC BLD-RTO: 0 PER 100 WBC
PLATELET # BLD AUTO: 218 K/UL (ref 150–400)
PMV BLD AUTO: 10.8 FL (ref 8.9–12.9)
POTASSIUM SERPL-SCNC: 4 MMOL/L (ref 3.5–5.1)
PROTHROMBIN TIME: 11 SEC (ref 9–11.1)
RBC # BLD AUTO: 2.51 M/UL (ref 3.8–5.2)
RBC MORPH BLD: ABNORMAL
SAMPLES BEING HELD,HOLD: NORMAL
SERVICE CMNT-IMP: NORMAL
SODIUM SERPL-SCNC: 142 MMOL/L (ref 136–145)
SOURCE, COVRS: ABNORMAL
WBC # BLD AUTO: 6 K/UL (ref 3.6–11)

## 2022-02-03 PROCEDURE — 87635 SARS-COV-2 COVID-19 AMP PRB: CPT

## 2022-02-03 PROCEDURE — 74011000250 HC RX REV CODE- 250: Performed by: INTERNAL MEDICINE

## 2022-02-03 PROCEDURE — 30233N1 TRANSFUSION OF NONAUTOLOGOUS RED BLOOD CELLS INTO PERIPHERAL VEIN, PERCUTANEOUS APPROACH: ICD-10-PCS | Performed by: INTERNAL MEDICINE

## 2022-02-03 PROCEDURE — 74011250636 HC RX REV CODE- 250/636: Performed by: INTERNAL MEDICINE

## 2022-02-03 PROCEDURE — 93970 EXTREMITY STUDY: CPT

## 2022-02-03 PROCEDURE — 36430 TRANSFUSION BLD/BLD COMPNT: CPT

## 2022-02-03 PROCEDURE — 36415 COLL VENOUS BLD VENIPUNCTURE: CPT

## 2022-02-03 PROCEDURE — C9113 INJ PANTOPRAZOLE SODIUM, VIA: HCPCS | Performed by: INTERNAL MEDICINE

## 2022-02-03 PROCEDURE — P9016 RBC LEUKOCYTES REDUCED: HCPCS

## 2022-02-03 PROCEDURE — 85610 PROTHROMBIN TIME: CPT

## 2022-02-03 PROCEDURE — 74011250637 HC RX REV CODE- 250/637: Performed by: INTERNAL MEDICINE

## 2022-02-03 PROCEDURE — 85018 HEMOGLOBIN: CPT

## 2022-02-03 PROCEDURE — 83735 ASSAY OF MAGNESIUM: CPT

## 2022-02-03 PROCEDURE — 74011250637 HC RX REV CODE- 250/637: Performed by: NURSE PRACTITIONER

## 2022-02-03 PROCEDURE — 82962 GLUCOSE BLOOD TEST: CPT

## 2022-02-03 PROCEDURE — 65660000000 HC RM CCU STEPDOWN

## 2022-02-03 PROCEDURE — 85025 COMPLETE CBC W/AUTO DIFF WBC: CPT

## 2022-02-03 PROCEDURE — 80048 BASIC METABOLIC PNL TOTAL CA: CPT

## 2022-02-03 RX ORDER — AMLODIPINE BESYLATE 5 MG/1
5 TABLET ORAL DAILY
Status: DISCONTINUED | OUTPATIENT
Start: 2022-02-03 | End: 2022-02-03

## 2022-02-03 RX ORDER — CLOPIDOGREL BISULFATE 75 MG/1
75 TABLET ORAL DAILY
Status: DISCONTINUED | OUTPATIENT
Start: 2022-02-04 | End: 2022-02-03

## 2022-02-03 RX ORDER — AMLODIPINE BESYLATE 5 MG/1
10 TABLET ORAL DAILY
Status: DISCONTINUED | OUTPATIENT
Start: 2022-02-04 | End: 2022-02-05 | Stop reason: HOSPADM

## 2022-02-03 RX ORDER — POLYETHYLENE GLYCOL 3350 17 G/17G
17 POWDER, FOR SOLUTION ORAL
Status: COMPLETED | OUTPATIENT
Start: 2022-02-03 | End: 2022-02-03

## 2022-02-03 RX ORDER — SODIUM CHLORIDE 9 MG/ML
250 INJECTION, SOLUTION INTRAVENOUS AS NEEDED
Status: DISCONTINUED | OUTPATIENT
Start: 2022-02-03 | End: 2022-02-05 | Stop reason: HOSPADM

## 2022-02-03 RX ORDER — POLYETHYLENE GLYCOL 3350 17 G/17G
17 POWDER, FOR SOLUTION ORAL
Status: COMPLETED | OUTPATIENT
Start: 2022-02-04 | End: 2022-02-04

## 2022-02-03 RX ADMIN — AMLODIPINE BESYLATE 5 MG: 5 TABLET ORAL at 03:20

## 2022-02-03 RX ADMIN — POLYETHYLENE GLYCOL 3350 17 G: 17 POWDER, FOR SOLUTION ORAL at 17:08

## 2022-02-03 RX ADMIN — POLYETHYLENE GLYCOL 3350 17 G: 17 POWDER, FOR SOLUTION ORAL at 17:11

## 2022-02-03 RX ADMIN — POLYETHYLENE GLYCOL 3350 17 G: 17 POWDER, FOR SOLUTION ORAL at 18:01

## 2022-02-03 RX ADMIN — Medication 10 ML: at 14:49

## 2022-02-03 RX ADMIN — POLYETHYLENE GLYCOL 3350 17 G: 17 POWDER, FOR SOLUTION ORAL at 17:55

## 2022-02-03 RX ADMIN — POLYETHYLENE GLYCOL 3350 17 G: 17 POWDER, FOR SOLUTION ORAL at 17:12

## 2022-02-03 RX ADMIN — POLYETHYLENE GLYCOL 3350 17 G: 17 POWDER, FOR SOLUTION ORAL at 17:52

## 2022-02-03 RX ADMIN — IRON SUCROSE 100 MG: 20 INJECTION, SOLUTION INTRAVENOUS at 10:52

## 2022-02-03 RX ADMIN — METOPROLOL TARTRATE 50 MG: 50 TABLET, FILM COATED ORAL at 20:51

## 2022-02-03 RX ADMIN — SODIUM CHLORIDE, PRESERVATIVE FREE 40 MG: 5 INJECTION INTRAVENOUS at 19:37

## 2022-02-03 RX ADMIN — METOPROLOL TARTRATE 50 MG: 50 TABLET, FILM COATED ORAL at 00:31

## 2022-02-03 RX ADMIN — POLYETHYLENE GLYCOL 3350 17 G: 17 POWDER, FOR SOLUTION ORAL at 17:10

## 2022-02-03 RX ADMIN — METOPROLOL TARTRATE 50 MG: 50 TABLET, FILM COATED ORAL at 08:11

## 2022-02-03 RX ADMIN — Medication 10 ML: at 20:51

## 2022-02-03 RX ADMIN — POLYETHYLENE GLYCOL 3350 17 G: 17 POWDER, FOR SOLUTION ORAL at 17:53

## 2022-02-03 NOTE — CONSULTS
Heather Lund MD, [address] [place] [address]  [address]  [phone]    Date of  Admission: 2/2/2022  5:19 PM         IMPRESSION and RECOMMENDATIONS     1.  CAD:  S/P \"Full metal jacket\" of RCA with Xience stents 12/24/21. Cont metoprolol. Norvasc increased for HTN. Was on lipitor at home, can resume is desired. 2.  GIB:  ASA/plavix held. This is reasonable as the new 2nd generation GERRY have a low and acceptable risk of acute stent thrombosis after 28d. Given this, will stop plavix. I would like to resume plavix +/- ASA 81mg every day if source of bleeding is located and treated and/or if acceptable with GI. Discussed with Pt and family. Problem List  Date Reviewed: 2/3/2022          Codes Class Noted    Iron deficiency anemia ICD-10-CM: D50.9  ICD-9-CM: 280.9  2/3/2022        Chronic GI bleeding ICD-10-CM: K92.2  ICD-9-CM: 578.9  2/3/2022        Acute GI bleeding ICD-10-CM: K92.2  ICD-9-CM: 578.9  2/2/2022        CAD (coronary artery disease) ICD-10-CM: I25.10  ICD-9-CM: 414.00  Unknown        DM type 2 causing renal disease (HCC) ICD-10-CM: E11.29  ICD-9-CM: 250.40  Unknown        DM type 2 causing vascular disease (HCC) ICD-10-CM: E11.59  ICD-9-CM: 250.70, 443.81  Unknown        Hypertension ICD-10-CM: I10  ICD-9-CM: 401.9  Unknown        CHF (congestive heart failure), NYHA class I, chronic, systolic ([facility]) AGV-02-JY: I50.22  ICD-9-CM: 428.22, 428.0  Unknown        Ischemic cardiomyopathy ICD-10-CM: I25.5  ICD-9-CM: 414.8  Unknown        CKD (chronic kidney disease) stage 3, GFR 30-59 ml/min ([facility]) ICD-10-CM: N18.30  ICD-9-CM: 585.3  10/18/2021              History of Present Illness:     Dean Patton is a 80 y.o. female with the above problem list who was admitted for Acute GI bleeding [K92.2].     Ms. Vinicio Cat is a 80 y.o.   female with a past medical history of CKD stage IIIb, coronary artery disease status post 3 stents to RCA 12/21, hypertension and diabetes who is admitted with acute GI bleed. Ms. Amanda Moser states she received a phone call today stating that her hemoglobin on labs obtained this past Monday were normal, and she was advised to come to the emergency room to have her labs re-checked. She denies any overt bleeding. She denies any abdominal pain or nausea vomiting. She denies any chest pain, dyspnea, lightheadedness or syncope. She endorses some lower extremity swelling and tenderness. Patient states she has no history of heavy alcohol use. She she denies any heavy NSAID use. Denies any prior hemorrhoids or GI bleeds. Of note, patient is on aspirin and Plavix due to recent stent placement in December 2021 as noted above. She denies chest pain/discomfort, shortness of breath, dyspnea on exertion, orthopnea, paroxysmal noctural dyspnea, lower extremity edema, palpitations, syncope, or near-syncope.     Current Facility-Administered Medications   Medication Dose Route Frequency    0.9% sodium chloride infusion 250 mL  250 mL IntraVENous PRN    [START ON 2/4/2022] amLODIPine (NORVASC) tablet 10 mg  10 mg Oral DAILY    [START ON 2/4/2022] clopidogreL (PLAVIX) tablet 75 mg  75 mg Oral DAILY    sodium chloride (NS) flush 5-40 mL  5-40 mL IntraVENous Q8H    sodium chloride (NS) flush 5-40 mL  5-40 mL IntraVENous PRN    acetaminophen (TYLENOL) tablet 650 mg  650 mg Oral Q6H PRN    polyethylene glycol (MIRALAX) packet 17 g  17 g Oral DAILY PRN    ondansetron (ZOFRAN) injection 4 mg  4 mg IntraVENous Q6H PRN    pantoprazole (PROTONIX) 40 mg in 0.9% sodium chloride 10 mL injection  40 mg IntraVENous Q12H    glucose chewable tablet 16 g  4 Tablet Oral PRN    dextrose (D50W) injection syrg 12.5-25 g  12.5-25 g IntraVENous PRN    glucagon (GLUCAGEN) injection 1 mg  1 mg IntraMUSCular PRN    [Held by provider] 0.9% sodium chloride infusion  50 mL/hr IntraVENous CONTINUOUS    metoprolol tartrate (LOPRESSOR) tablet 50 mg  50 mg Oral Q12H      Allergies   Allergen Reactions    Codeine Nausea and Vomiting    Compazine [Prochlorperazine] Other (comments)     Facial droop    Dilaudid [Hydromorphone] Nausea and Vomiting    Morphine Nausea and Vomiting    Sulfa (Sulfonamide Antibiotics) Nausea and Vomiting      Family History   Problem Relation Age of Onset    Heart Disease Mother       Social History     Socioeconomic History    Marital status:      Spouse name: Not on file    Number of children: Not on file    Years of education: Not on file    Highest education level: Not on file   Occupational History    Not on file   Tobacco Use    Smoking status: Former Smoker     Years: 15.00    Smokeless tobacco: Never Used   Substance and Sexual Activity    Alcohol use: Yes     Comment: very rarely     Drug use: Never    Sexual activity: Not on file   Other Topics Concern     Service Not Asked    Blood Transfusions Not Asked    Caffeine Concern Not Asked    Occupational Exposure Not Asked    Hobby Hazards Not Asked    Sleep Concern Not Asked    Stress Concern Not Asked    Weight Concern Not Asked    Special Diet Not Asked    Back Care Not Asked    Exercise Not Asked    Bike Helmet Not Asked    Seat Belt Not Asked    Self-Exams Not Asked   Social History Narrative    Not on file     Social Determinants of Health     Financial Resource Strain:     Difficulty of Paying Living Expenses: Not on file   Food Insecurity:     Worried About Running Out of Food in the Last Year: Not on file    Alondra of Food in the Last Year: Not on file   Transportation Needs:     Lack of Transportation (Medical): Not on file    Lack of Transportation (Non-Medical):  Not on file   Physical Activity:     Days of Exercise per Week: Not on file    Minutes of Exercise per Session: Not on file   Stress:     Feeling of Stress : Not on file   Social Connections:     Frequency of Communication with Friends and Family: Not on file    Frequency of Social Gatherings with Friends and Family: Not on file    Attends Holiness Services: Not on file    Active Member of Clubs or Organizations: Not on file    Attends Club or Organization Meetings: Not on file    Marital Status: Not on file   Intimate Partner Violence:     Fear of Current or Ex-Partner: Not on file    Emotionally Abused: Not on file    Physically Abused: Not on file    Sexually Abused: Not on file   Housing Stability:     Unable to Pay for Housing in the Last Year: Not on file    Number of Places Lived in the Last Year: Not on file    Unstable Housing in the Last Year: Not on file       Physical Exam:     Patient Vitals for the past 16 hrs:   BP Temp Pulse Resp SpO2   02/03/22 1121 (!) 165/73 98.1 °F (36.7 °C) 66 18 94 %   02/03/22 0716 (!) 160/69 97.9 °F (36.6 °C) 61 20 97 %   02/03/22 0705 -- -- 60 -- --   02/03/22 0615 (!) 170/70 98 °F (36.7 °C) 60 20 97 %   02/03/22 0528 (!) 174/69 97.7 °F (36.5 °C) 65 20 96 %   02/03/22 0420 (!) 177/75 97.8 °F (36.6 °C) 70 20 97 %   02/03/22 0351 (!) 169/74 97.9 °F (36.6 °C) 65 20 97 %   02/03/22 0335 (!) 174/75 97.8 °F (36.6 °C) 66 20 95 %   02/03/22 0320 (!) 180/80 98 °F (36.7 °C) 68 20 97 %   02/02/22 2301 -- -- 69 -- --   02/02/22 2300 (!) 182/69 98.2 °F (36.8 °C) 68 20 98 %       HEENT Exam:     Normocephalic, atraumatic. EOMI. Oropharynx negative. Neck supple. No lymphadenopathy. Lung Exam:     The patient is not dyspneic. There is no cough. Breath sounds are heard equally in all lung fields. There are no wheezes, rales, rhonchi, or rubs heard on auscultation. Heart Exam:     The rhythm is regular. The PMI is in the 5th intercostal space of the MCL. Apical impulse is normal. S1 is regular. S2 is physiologic. There is no S3, S4 gallop, murmur, click, or rub. Abdomen Exam:     Bowel sounds are normoactive. Abdomen soft in all quadrants. No tenderness. No palpable masses.  No organomegaly. No hernias noted. No bruits or pulsatile mass. Extremities Exam:     The extremities are atraumatic appearing. There is no clubbing, cyanosis, edema, ulcers, varicose veins, rash, erythemia noted in the extremities. The neurovascular status is grossly intact with normal distal sensation and pulses. Vascular Exam:     The radial, brachial, dorsalis pedis, posterior tibial, are equal and strong bilaterally The carotids are equal bilaterally without bruits. Labs:     Lab Results   Component Value Date/Time    Glucose 73 02/03/2022 12:34 AM    Sodium 142 02/03/2022 12:34 AM    Potassium 4.0 02/03/2022 12:34 AM    Chloride 114 (H) 02/03/2022 12:34 AM    CO2 24 02/03/2022 12:34 AM    BUN 12 02/03/2022 12:34 AM    Creatinine 1.52 (H) 02/03/2022 12:34 AM    Calcium 8.7 02/03/2022 12:34 AM     Recent Labs     02/03/22  0801 02/03/22  0034 02/02/22  1742 02/02/22  1742   WBC  --  6.0  --  6.0   HGB 8.3* 6.8*   < > 7.3*   HCT 26.1* 21.9*   < > 23.7*   PLT  --  218  --  235    < > = values in this interval not displayed. Recent Labs     02/02/22  1742   ALT 17   AP 88   TBILI 0.3   TP 7.0   ALB 3.2*   GLOB 3.8     Recent Labs     02/03/22  0034   INR 1.1   PTP 11.0      No results for input(s): CPK, CKMB, TNIPOC in the last 72 hours. No lab exists for component: TROPONINI, ITNL  No results for input(s): TROIQ in the last 72 hours.   Lab Results   Component Value Date/Time    Cholesterol, total 169 12/25/2021 04:15 AM    HDL Cholesterol 49 12/25/2021 04:15 AM    LDL, calculated 105.8 (H) 12/25/2021 04:15 AM    Triglyceride 71 12/25/2021 04:15 AM    CHOL/HDL Ratio 3.4 12/25/2021 04:15 AM       EKG:  none

## 2022-02-03 NOTE — PROGRESS NOTES
Spiritual Care Assessment/Progress Note  [address]      NAME: Benji Mcgee      MRN: [id number]  AGE: 80 y.o. SEX: female  Voodoo Affiliation: Unknown   Language: English     2/3/2022     Total Time (in minutes): 33     Spiritual Assessment begun in [facility] 5M1 MED SURG 1 through conversation with:         [x]Patient        [x] Family    [] Friend(s)        Reason for Consult: Initial/Spiritual assessment, patient floor     Spiritual beliefs: (Please include comment if needed)     [x] Identifies with a kareem tradition: Cheondoism        [] Supported by a kareem community:            [] Claims no spiritual orientation:           [] Seeking spiritual identity:                [] Adheres to an individual form of spirituality:           [] Not able to assess:                           Identified resources for coping:      [] Prayer                               [] Music                  [] Guided Imagery     [x] Family/friends                 [] Pet visits     [] Devotional reading                         [] Unknown     [] Other:                                             Interventions offered during this visit: (See comments for more details)    Patient Interventions: Affirmation of emotions/emotional suffering,Affirmation of kareem,Catharsis/review of pertinent events in supportive environment,Iconic (affirming the presence of God/Higher Power),Normalization of emotional/spiritual concerns,Prayer (assurance of)     Family/Friend(s):  Affirmation of emotions/emotional suffering,Affirmation of kareem,Catharsis/review of pertinent events in supportive environment,Iconic (affirming the presence of God/Higher Power),Normalization of emotional/spiritual concerns     Plan of Care:     [] Support spiritual and/or cultural needs    [] Support AMD and/or advance care planning process      [] Support grieving process   [] Coordinate Rites and/or Rituals    [] Coordination with community clergy   [] No spiritual needs identified at this time   [] Detailed Plan of Care below (See Comments)  [] Make referral to Music Therapy  [] Make referral to Pet Therapy     [] Make referral to Addiction services  [] Make referral to [facility]  [] Make referral to Spiritual Care Partner  [] No future visits requested        [x] Follow up visits as needed     Visited pt for initial spiritual assessment. She is engaging and enjoys visitors and conversation. Pt is a  and had five children, one is . Pt's daughter Wanda Collier and granddaughter (Melodie's daughter) Liz Hutton were at bedside. The pt lives with Wanda Collier and is well supported by her family. Listened as the family spoke of her recent issues resulting in different hospital stays.    Chaplain Real MDiv, MS, [facility]

## 2022-02-03 NOTE — PROGRESS NOTES
Recent stent (12/2021) with full metal jacket of RCA. Full recs to follow, but would try to find bleeding source and treat, and at least resume plavix is able. My last office note:    Kelli Hurt 1934   Office/Outpatient Visit  Visit Date: Dianne Diaz 9:15 am  Provider: Shira Montes MD (Assistant: Miah Roberts RN )  Location: [facility])[address] [place] [address] [phone]    Electronically signed by Modesta Yan MD on  01/04/2022 09:41:09 AM                           Subjective:    CC: Mrs. Bryan Quintanilla is a 80year old White female. Her primary care physician is Yg Tate NP. This is a 1 week follow-up visit. verbally reviewed She has a history of coronary artery disease of the native coronary artery and essential hypertension. HPI:           Coronary Artery Disease:   She is here today for routine follow-up. Mrs. Bryan Quintanilla has a prior history of a myocardial infarction and is currently on a beta blocker. The course of the disease has been stable. Currently, her treatment regimen consists of daily 81 mg aspirin,  Lopressor,  Lipitor, and Plavix. She denies angina, chest pain, exercise limitation, orthopnea, paroxysmal nocturnal dyspnea and shortness of breath. Mrs. Bryan Quintanilla is compliant with alcohol avoidance, tobacco avoidance, taking medications as recommended and prescribed, and the treatment plan. Regarding hypertension:  Type Primary Hypertension Currently, her treatment regimen consists of Lopressor. ROS:   Discussed relevant lab and imaging studies along with the plan of treatment with the nurse. EYES:  Negative for blurred vision and eye pain. E/N/T:  Negative for epistaxis and hoarseness. CARDIOVASCULAR:  Negative for chest pain, orthopnea, palpitations and pedal edema. RESPIRATORY:  Negative for cough and hemoptysis. GASTROINTESTINAL:  Negative for abdominal pain and dysphagia.     MUSCULOSKELETAL: Negative for arthralgias and back pain. NEUROLOGICAL:  Negative for headaches, paresthesias, and weakness. HEMATOLOGIC/LYMPHATIC:  Negative for easy bruising, bleeding, and lymphadenopathy. PSYCHIATRIC:  No symptoms reported. Past Medical History / Family History / Social History:     Last Reviewed on 2022 09:20 AM by Lalo Villela  Past Medical History:     Coronary artery disease  Hypertension  Myocardial Infarction: in 2021;   Type 1 Diabetes   INFLUENZA VACCINE: was last done    COVID-19 Vaccine: Moderna x2 Pfizer Booster     Past Cardiac Procedures:  TESTING(Dr Landon Salvador)     1.  CORONARY ARTERY DISEASE  a. Cath (21): LAD p30, m30; D1 70. LCx p30 (co-dom). RCA m100  b.  GERRY (\"full metal jacket\") of mid RCA into PDA (21): Mid 2.75x28 Xience, Mid-dist 2.5x28 Xience, Dist-PDA 2.5x18 Xience. All post dil with 2.75x27 NC Trek. Focal mid-dist area post-dil with 3.0x8 NC Euphora  c.  Echo (21):  EF 45% with inf & basal-mid sept AK, DD.    2.  DYSLIPIDEMIA  a. FLP (21): Tot 169, TG 71, HDL 49,  (no Rx). Surgical History:   Surgical/Procedural History:   Appendectomy  : X 1  Hysterectomy  Joint Replacement:  Joint Replacement  ; Joint Replacement  Tonsillectomy  Head surgery for Headaches  Vein Ligation     Cardiac Procedural/Surgical History:  Cardiac Catheterization:  2021-     Family History:   Father: Healthy;    Mother: ;  congestive heart failure     Social History:   Social History:   Occupation: Retired   Marital Status:    Children: 4 children     Tobacco/Alcohol/Supplements:   Last Reviewed on 2022 09:20 AM by Lalo Villela  TOBACCO/ALCOHOL/SUPPLEMENTS   Tobacco: She has a past history of cigarette smoking; quit . Alcohol: Non-drinker   Caffeine:  She admits to consuming caffeine via coffee ( 1 serving per day ) and tea ( 1 serving per day ).       Substance Abuse History:   Last Reviewed on 1/04/2022 09:20 AM by Mophie    Mental Health History:   Last Reviewed on 1/04/2022 09:20 AM by [facility] [employer] [facility]    Communicable Diseases (eg STDs): Last Reviewed on 1/04/2022 09:20 AM by [facility] [employer]    Current Problems:   Last Reviewed on 1/04/2022 09:20 AM by [facility] [employer]  Essential (primary) hypertension  Atherosclerotic heart disease of native coronary artery  Atherosclerotic heart disease of native coronary artery without angina pectoris  Localized edema    Allergies:   Last Reviewed on 1/04/2022 09:20 AM by [facility] [employer]  codeine: Nausea   Compazine: Nausea   Dilaudid: Nausea   Sulfa (Sulfonamide Antibiotics): Nausea     Current Medications:   Last Reviewed on 1/04/2022 09:20 AM by [employer]  aspirin 81 mg oral tablet, delayed release (enteric coated) [take 1 tablet (81 mg) by oral route once daily]  atorvastatin 20 mg oral tablet [take 1 tablet (20 mg) by oral route once daily]  metoprolol tartrate 50 mg oral tablet [take 1 tablet (50 mg) by oral route 2 times per day with meals]  clopidogreL 75 mg oral tablet [take 1 tablet (75 mg) by oral route once daily]  ciprofloxacin HCl 500 mg oral tablet [take 1 tablet (500 mg) by oral route 2 times per day]    Objective:    Vitals:     Historical:   12/28/2021  BP:   165/61 mm Hg ( (left arm, , sitting, );) 12/28/2021  Wt:   140lbs ( (Estimated, );)   Exams:   Disregard exam: virtual visit. GENERAL:  Alert, oriented to person, place and time x3. EYES:  Normal lids without xanthelasma; conjunctiva unremarkable; no scleral icterus. HEENT:  Face symmetric, voice clear, extraocular muscles intact. NECK:  Supple. No bruits, No JVD. LUNGS:  Clear to auscultation. No rales, no wheezing. CARDIAC: Regular rate and rhythm. S1 and S2 normal. No murmur , gallops or rubs. ABDOMEN:  Soft. Positive bowel sounds. Non-tender. MUSCULOSKELETAL:  Normal range of motion, strength and tone. EXTREMITIES:  No edema.    Good muscle tone and strength. SKIN: No significant rashes or lesions; no suspicious moles. NEUROLOGICAL:  No focal deficits, cranial nerves II-XII are grossly intact. PSYCHIATRIC:  Appropriate affect and demeanor; normal speech pattern; grossly normal memory. Assessment:   Doing well from a cardiac standpoint. BP high today. Stopped norvasc in hospital due to normal BP. May need to resume. Check Bps and call next week. She has norvasc at home. Discussed diet/exercise. F/U 6 months. I25.10   Atherosclerotic heart disease of native coronary artery without angina pectoris     I10   Essential (primary) hypertension     I25.1   Atherosclerotic heart disease of native coronary artery     I10   Essential (primary) hypertension       ORDERS:     Other Orders:       ACE or ARB NOT Prescribed, reason not given  (In-House)            LDL-C >= 100 mg/dL  (Send-Out)          0556F  Plan of care to achieve lipid control documented (CAD)  (In-House)          4013F  Statin therapy rxd/currently taken(CAD)  (In-House)          4086F  Aspirin or clopidogrel prescribed (CAD)  (Send-Out)          [id number]  Queried Patient for Tobacco Use  (Send-Out)              Plan:     Atherosclerotic heart disease of native coronary artery without angina pectoris  CAD: with ACE/ARB Rx with Diabetes, NOT Prescribed, Reason NOS Lipid Control LDL >= 100 mg/dL Plan of care to achieve lipid control documented Statin therapy prescribed or currently being taken with Antiplatelet Therapy Aspirin or Clopidogrel Prescribed Beta Blocker: Prior MI: Yes, currently taking a beta blocker. Medication list has been reviewed. Continue current medications. Smoking Status:  Nonsmoker Instructed to call immediately if she develops new or worsening symptoms, such as shortness of breath and chest pain. Schedule a follow-up appointment in 6 months.         Orders:       ACE or ARB NOT Prescribed, reason not given  (In-House)   LDL-C >= 100 mg/dL  (Send-Out)          0556F  Plan of care to achieve lipid control documented (CAD)  (In-House)          4013F  Statin therapy rxd/currently taken(CAD)  (In-House)          1249N  Aspirin or clopidogrel prescribed (CAD)  (Send-Out)          [id number]  Queried Patient for Tobacco Use  (Send-Out)            Patient Education Handouts:     COV Coronary Artery Disease      COV Heart Healthy Diet      COV Hypertension        Patient Recommendations: For  Atherosclerotic heart disease of native coronary artery without angina pectoris:  Your medication list has been reviewed. Continue current medications. Please call immediately if you experience new or worsening symptoms such as shortness of breath; chest pain;    Schedule a follow-up visit in 6 months.

## 2022-02-03 NOTE — PROGRESS NOTES
[facility]: MED RECONCILIATION    Comments/Recommendations:         Information obtained from: Family Member - daughter    **RxQuery data reflects medications filled and processed through the patient's insurance, however   this data does NOT capture whether the medication was picked, is currently being taken by the patient or if the patient is adherent to the medication in question. Allergies: Codeine, Compazine [prochlorperazine], Dilaudid [hydromorphone], Morphine, and Sulfa (sulfonamide antibiotics)    Prior to Admission Medications:     Prior to Admission Medications   Prescriptions Last Dose Informant Patient Reported? Taking? albuterol (PROVENTIL HFA, VENTOLIN HFA, PROAIR HFA) 90 mcg/actuation inhaler Unknown at Unknown time Self No No   Sig: Take 2 Puffs by inhalation every four (4) hours as needed for Wheezing. albuterol (PROVENTIL VENTOLIN) 2.5 mg /3 mL (0.083 %) nebu Unknown at Unknown time Self Yes No   Si.5 mg by Nebulization route every four (4) hours as needed for Wheezing or Shortness of Breath. amLODIPine (NORVASC) 5 mg tablet 2022 at 0900 Child No Yes   Sig: Take 1 Tablet by mouth daily. aspirin (ASPIRIN) 325 mg tablet 2022 at 0900 Child No Yes   Sig: Take 1 Tablet by mouth daily. atorvastatin (LIPITOR) 20 mg tablet 2022 at 0900 Child No Yes   Sig: Take 1 Tablet by mouth daily. cholecalciferol, vitamin D3, (Vitamin D3) 50 mcg (2,000 unit) tab 2022 at 0900 Child Yes Yes   Sig: Take  by mouth. clopidogreL (PLAVIX) 75 mg tab 2022 at 0900 Child No Yes   Sig: Take 1 Tablet by mouth daily. linaGLIPtin (Tradjenta) 5 mg tablet 2022 at 0900 Child Yes Yes   Sig: Take 5 mg by mouth daily. Indications: type 2 diabetes mellitus   metoprolol tartrate (LOPRESSOR) 50 mg tablet 2022 at 0900 Child No Yes   Sig: Take 1 Tablet by mouth every twelve (12) hours.    ondansetron hcl (Zofran) 4 mg tablet Unknown at Unknown time Child Yes No   Sig: Take 4 mg by mouth daily as needed for Nausea or Vomiting. pantoprazole (PROTONIX) 40 mg granules for oral suspension 2/2/2022 at 0900 Child No Yes   Sig: Take 40 mg by mouth Daily (before breakfast). Facility-Administered Medications: None         [employer]    Clinical Staff Pharmacist  [address] [place] [address]  [phone]

## 2022-02-03 NOTE — CONSULTS
[address] [place] [address]. [address] [place] [address] NP  [phone]                    GASTROENTEROLOGY CONSULTATION NOTE              NAME:  Harpal Hector   :   1934   MRN:   [id number]       Referring Physician:   Dr. Teresa Levine Date:   2/3/2022 4:30 PM    Chief Complaint:  GI bleed       History of Present Illness:    Patient is a 80 y.o. history of RCC, Colon cancer status partial colon resection who we have been asked to see in consultation for the above complaint. The patient presented to the ER for findings of low hemoglobin. This was found incidentally with lab tests from her nephrologist.  She denies black or bloody stools. She has been anticoagulated with aspirin and plavix for CAD status post stenting in 2021. Her hemoglobin on admission was 6.8 and her stools were heme positive. PMH:  Past Medical History:   Diagnosis Date    CAD (coronary artery disease)     CHF (congestive heart failure), NYHA class I, chronic, systolic (HCC)     Systolic and diastolic CHF per echocardiogram of 2021. Patient was found to have LVEF 45%    Chronic kidney disease     stage III/IV:Creatinine 1.45-1.7 with GFR in the low 30s-high 20s    Diabetes mellitus type 2 in nonobese ([address]     Hypertension     Ischemic cardiomyopathy     Echocardiogram of 2021: mild left ventricular systolic dysfunction (EF 29%) with inferior and basal-mid septal akinesis. Diastolic dysfunction.  Peripheral vascular disease (HCC)        PSH:  Past Surgical History:   Procedure Laterality Date    HX APPENDECTOMY      HX CORONARY STENT PLACEMENT      stents x 3 to mid-distal right coronary artery going into PDA for 100% occlusion of RCA; and also had nonobstructive disease in 30% LAD stenosis, 70% diagonal 1 stenosis.   She had proximal 30% stenosis in the codominant left circumflex artery     HX HYSTERECTOMY      HX KNEE REPLACEMENT Left     HX OTHER SURGICAL      head sx x 2 nerve related    HX OTHER SURGICAL      Back surgery x 2    HX TONSILLECTOMY         Allergies: Allergies   Allergen Reactions    Codeine Nausea and Vomiting    Compazine [Prochlorperazine] Other (comments)     Facial droop    Dilaudid [Hydromorphone] Nausea and Vomiting    Morphine Nausea and Vomiting    Sulfa (Sulfonamide Antibiotics) Nausea and Vomiting       Home Medications:  Prior to Admission Medications   Prescriptions Last Dose Informant Patient Reported? Taking? albuterol (PROVENTIL HFA, VENTOLIN HFA, PROAIR HFA) 90 mcg/actuation inhaler Unknown at Unknown time Self No No   Sig: Take 2 Puffs by inhalation every four (4) hours as needed for Wheezing. albuterol (PROVENTIL VENTOLIN) 2.5 mg /3 mL (0.083 %) nebu Unknown at Unknown time Self Yes No   Si.5 mg by Nebulization route every four (4) hours as needed for Wheezing or Shortness of Breath. amLODIPine (NORVASC) 5 mg tablet 2022 at 0900 Child No Yes   Sig: Take 1 Tablet by mouth daily. aspirin (ASPIRIN) 325 mg tablet 2022 at 0900 Child No Yes   Sig: Take 1 Tablet by mouth daily. atorvastatin (LIPITOR) 20 mg tablet 2022 at 0900 Child No Yes   Sig: Take 1 Tablet by mouth daily. cholecalciferol, vitamin D3, (Vitamin D3) 50 mcg (2,000 unit) tab 2022 at 0900 Child Yes Yes   Sig: Take  by mouth. clopidogreL (PLAVIX) 75 mg tab 2022 at 0900 Child No Yes   Sig: Take 1 Tablet by mouth daily. linaGLIPtin (Tradjenta) 5 mg tablet 2022 at 0900 Child Yes Yes   Sig: Take 5 mg by mouth daily. Indications: type 2 diabetes mellitus   metoprolol tartrate (LOPRESSOR) 50 mg tablet 2022 at 0900 Child No Yes   Sig: Take 1 Tablet by mouth every twelve (12) hours. ondansetron hcl (Zofran) 4 mg tablet Unknown at Unknown time Child Yes No   Sig: Take 4 mg by mouth daily as needed for Nausea or Vomiting. pantoprazole (PROTONIX) 40 mg granules for oral suspension 2022 at 0900 Child No Yes   Sig: Take 40 mg by mouth Daily (before breakfast). Facility-Administered Medications: None       Hospital Medications:  Current Facility-Administered Medications   Medication Dose Route Frequency    0.9% sodium chloride infusion 250 mL  250 mL IntraVENous PRN    [START ON 2/4/2022] amLODIPine (NORVASC) tablet 10 mg  10 mg Oral DAILY    sodium chloride (NS) flush 5-40 mL  5-40 mL IntraVENous Q8H    sodium chloride (NS) flush 5-40 mL  5-40 mL IntraVENous PRN    acetaminophen (TYLENOL) tablet 650 mg  650 mg Oral Q6H PRN    polyethylene glycol (MIRALAX) packet 17 g  17 g Oral DAILY PRN    ondansetron (ZOFRAN) injection 4 mg  4 mg IntraVENous Q6H PRN    pantoprazole (PROTONIX) 40 mg in 0.9% sodium chloride 10 mL injection  40 mg IntraVENous Q12H    glucose chewable tablet 16 g  4 Tablet Oral PRN    dextrose (D50W) injection syrg 12.5-25 g  12.5-25 g IntraVENous PRN    glucagon (GLUCAGEN) injection 1 mg  1 mg IntraMUSCular PRN    [Held by provider] 0.9% sodium chloride infusion  50 mL/hr IntraVENous CONTINUOUS    metoprolol tartrate (LOPRESSOR) tablet 50 mg  50 mg Oral Q12H       Social History:  Social History     Tobacco Use    Smoking status: Former Smoker     Years: 15.00    Smokeless tobacco: Never Used   Substance Use Topics    Alcohol use: Yes     Comment: very rarely        Family History:  Family History   Problem Relation Age of Onset    Heart Disease Mother        Review of Systems:  Constitutional: negative fever, negative chills, negative weight loss  Eyes:   negative visual changes  ENT:   negative sore throat, tongue or lip swelling  Respiratory:  negative cough, negative dyspnea  Cards:  negative for chest pain, palpitations, lower extremity edema  GI:   See HPI  :  negative for frequency, dysuria  Integument:  negative for rash and pruritus  Heme:  negative for easy bruising and gum/nose bleeding  Musculoskel: negative for myalgias,  back pain and muscle weakness  Neuro: negative for headaches, dizziness, vertigo  Psych:  negative for feelings of anxiety, depression     Objective:     Patient Vitals for the past 8 hrs:   BP Temp Pulse Resp SpO2   02/03/22 1445 (!) 167/69 98.4 °F (36.9 °C) 69 18 95 %   02/03/22 1121 (!) 165/73 98.1 °F (36.7 °C) 66 18 94 %     No intake/output data recorded. 02/01 1901 - 02/03 0700  In: 410 [I.V.:100]  Out: 100 [Urine:100]    EXAM:     NEURO-alert, oriented x3, affect appropriate   HEENT-Head: Normocephalic, no lesions, without obvious abnormality. LUNGS-clear to auscultation bilaterally    COR-regular rate and rhythym     ABD- soft, non-tender. Bowel sounds normal. No masses,  no organomegaly     EXT-no edema    Skin - No rash     Data Review     Recent Labs     02/03/22  1548 02/03/22  0801 02/03/22  0034 02/03/22  0034 02/02/22  1742 02/02/22 1742   WBC  --   --   --  6.0  --  6.0   HGB 8.7* 8.3*   < > 6.8*   < > 7.3*   HCT 27.3* 26.1*   < > 21.9*   < > 23.7*   PLT  --   --   --  218  --  235    < > = values in this interval not displayed. Recent Labs     02/03/22 0034 02/02/22 1742    141   K 4.0 4.0   * 111*   CO2 24 25   BUN 12 14   CREA 1.52* 1.64*   GLU 73 103*   CA 8.7 8.7     Recent Labs     02/02/22 1742   AP 88   TP 7.0   ALB 3.2*   GLOB 3.8     Recent Labs     02/03/22 0034   INR 1.1   PTP 11.0       Patient Active Problem List   Diagnosis Code    CKD (chronic kidney disease) stage 3, GFR 30-59 ml/min ([facility]) N18.30    CAD (coronary artery disease) I25.10    DM type 2 causing renal disease ([facility] E11.29    DM type 2 causing vascular disease ([facility] E11.59    Hypertension I10    CHF (congestive heart failure), NYHA class I, chronic, systolic (HCC) N42.19    Ischemic cardiomyopathy I25.5    Acute GI bleeding K92.2    Iron deficiency anemia D50.9    Chronic GI bleeding K92.2       Assessment and Plan:  Anemia with heme positive stools:  Hemoglobin is stable today at 8.7 after one unit or PRBCs. She denies visible blood in the stools.   Potentially due to AVMs or malignancy. - Continue to monitor hemoglobin  - Continue to hold Plavix  - Clear liquid diet today  - EGD and Colonoscopy tomorrow. Following. Thanks for allowing me to participate in the care of this patient.   Signed By: Deborah Toscano NP     2/3/2022  4:30 PM

## 2022-02-03 NOTE — PROGRESS NOTES
Reason for Admission:  GI bleed                     RUR Score:  19%                   Plan for utilizing home health:   Uses [facility] HH has OT 2 days, PT 2 days and nursing 2 days at home. PCP: First and Last name:  Lianet Poe, JP     Name of Practice:    Are you a current patient: Yes/No: yes   Approximate date of last visit:    Can you participate in a virtual visit with your PCP: yes                    Current Advanced Directive/Advance Care Plan: DNR      Healthcare Decision Maker:   Daughter Devon Nunez  [phone]    Met with daughter who is known to this department and this hospital, she recognized cm and said she is glad to be working again with us. Identified that patient still lives with her, and is followed by [facility] for PT OT and Nsg. Asked if her plan was to resume, she said yes, and acknowledged that she has had [facility] in the past and usually feels that is what is best for her Mother (chart reviewed of 12/2021 and clarified as a result Serene'es preference this time. Will refer to [facility]. Transition of Care Plan:      1-  CM to follow to refer to [facility]  2- Coordinate post acute care with daughter  3- coordinate transportation with daughter closer to discharge. Care Management Interventions  PCP Verified by CM:  Yes  Mode of Transport at Discharge: Self  Transition of Care Consult (CM Consult): [address]: No  Reason Outside [place]: Patient already serviced by other home [id number]: Child(watson)  Confirm Follow Up Transport: Family  Discharge Location  Patient Expects to be Discharged to[de-identified] Home with home health

## 2022-02-03 NOTE — PROGRESS NOTES
1730 - Lab called that patient's rapid COVID swab is positive. Notified Dr. Lara Larsen, said patient does not need to be on precautions since she is past the two week dakotah. Family stated earlier that she had tested positive for COVID back in early January (Jan. 8th 2022). Per charge nurse and director and per policy, patient must be placed on COVID precautions. Informed family, Clearance Ok and granddaughter at bedside. Attempted to contact infectious disease RN, to ask about protocol but unfortunately had left for the day. Will pass along to night RN to try and contact ID RN tomorrow morning.

## 2022-02-03 NOTE — PROGRESS NOTES
Physical therapy note    Chart reviewed in preparation for physical therapy evaluation. Patient received 1 Unit RBCs this morning and hgb improved. Cleared by RN to work with patient. Initiated PT evaluation this morning, however noted patient L LE red, warm to touch and tender to palpation. Doppler results pending. Will hold and re-attempt evaluation this afternoon once doppler results in.     Thank you,  Humphrey Blanco, PT, DPT  8 minutes

## 2022-02-03 NOTE — PROGRESS NOTES
Bedside and Verbal shift change report given to Mariela Weinstein (oncoming nurse) by Lluvia Suarez RN (offgoing nurse). Report included the following information SBAR, Kardex, Intake/Output, MAR, Accordion, Recent Results and Med Rec Status.

## 2022-02-03 NOTE — H&P
Jan Mondragon zofia [place] [address]  [address]  [phone]    Admission History and Physical      NAME:  Benji Mcgee   :   1934   MRN:  [id number]     PCP:  Hammad Purcell NP     Date/Time of service:  2022  7:48 PM        Subjective:     CHIEF COMPLAINT: Abnormal hemoglobin    HISTORY OF PRESENT ILLNESS:     Ms. Ivette Neal is a 80 y.o.   female with a past medical history of CKD stage IIIb, coronary artery disease status post 3 stents to RCA , hypertension and diabetes who is admitted with acute GI bleed. Ms. Ivette Neal states she received a phone call today stating that her hemoglobin on labs obtained this past Monday were normal, and she was advised to come to the emergency room to have her labs re-checked. She denies any overt bleeding. She denies any abdominal pain or nausea vomiting. She denies any chest pain, dyspnea, lightheadedness or syncope. She endorses some lower extremity swelling and tenderness. Patient states she has no history of heavy alcohol use. She she denies any heavy NSAID use. Denies any prior hemorrhoids or GI bleeds. Of note, patient is on aspirin and Plavix due to recent stent placement in 2021 as noted above. Allergies   Allergen Reactions    Codeine Nausea and Vomiting    Compazine [Prochlorperazine] Other (comments)     Facial droop    Dilaudid [Hydromorphone] Nausea and Vomiting    Morphine Nausea and Vomiting    Sulfa (Sulfonamide Antibiotics) Nausea and Vomiting       Prior to Admission medications    Medication Sig Start Date End Date Taking? Authorizing Provider   amLODIPine (NORVASC) 5 mg tablet Take 1 Tablet by mouth daily. 22   Fernando Palm MD   pantoprazole (PROTONIX) 40 mg granules for oral suspension Take 40 mg by mouth Daily (before breakfast). 22   Fernando Palm MD   aspirin (ASPIRIN) 325 mg tablet Take 1 Tablet by mouth daily.  21   Heladio Stern MD   atorvastatin (LIPITOR) 20 mg tablet Take 1 Tablet by mouth daily. 12/26/21   Daysi Moser MD   clopidogreL (PLAVIX) 75 mg tab Take 1 Tablet by mouth daily. 12/26/21   Daysi Moser MD   metoprolol tartrate (LOPRESSOR) 50 mg tablet Take 1 Tablet by mouth every twelve (12) hours. 12/25/21   Daysi Moser MD   senna-docusate (PERICOLACE) 8.6-50 mg per tablet Take 1 Tablet by mouth two (2) times a day. 10/29/21   Curtis Squires MD   polyethylene glycol [facility]) 17 gram packet Take 1 Packet by mouth two (2) times a day. 10/29/21   Curtis Squires MD   albuterol (PROVENTIL VENTOLIN) 2.5 mg /3 mL (0.083 %) nebu 2.5 mg by Nebulization route every four (4) hours as needed for Wheezing or Shortness of Breath. Provider, Historical   albuterol (PROVENTIL HFA, VENTOLIN HFA, PROAIR HFA) 90 mcg/actuation inhaler Take 2 Puffs by inhalation every four (4) hours as needed for Wheezing. 7/25/21   Kandice Shelley MD       Past Medical History:   Diagnosis Date    CAD (coronary artery disease)     CHF (congestive heart failure), NYHA class I, chronic, systolic (HCC)     Systolic and diastolic CHF per echocardiogram of 12/25/2021. Patient was found to have LVEF 45%    Chronic kidney disease     stage III/IV:Creatinine 1.45-1.7 with GFR in the low 30s-high 20s    Diabetes mellitus type 2 in nonobese ([address]     Hypertension     Ischemic cardiomyopathy     Echocardiogram of 12/25/2021: mild left ventricular systolic dysfunction (EF 60%) with inferior and basal-mid septal akinesis. Diastolic dysfunction.  Peripheral vascular disease ([address]         Past Surgical History:   Procedure Laterality Date    HX APPENDECTOMY      HX CORONARY STENT PLACEMENT      stents x 3 to mid-distal right coronary artery going into PDA for 100% occlusion of RCA; and also had nonobstructive disease in 30% LAD stenosis, 70% diagonal 1 stenosis.   She had proximal 30% stenosis in the codominant left circumflex artery     HX HYSTERECTOMY      HX KNEE REPLACEMENT Left     HX OTHER SURGICAL      head sx x 2 nerve related    HX OTHER SURGICAL      Back surgery x 2    HX TONSILLECTOMY         Social History     Tobacco Use    Smoking status: Former Smoker     Years: 15.00    Smokeless tobacco: Never Used   Substance Use Topics    Alcohol use: Yes     Comment: very rarely         Family History   Problem Relation Age of Onset    Heart Disease Mother         Review of Systems:  Per HPI    Gen:  Weight gain, weight loss, fever, chills, fatigue  Eyes:  Visual changes, pain,   ENT:  rhinorrhea, sore throat   CVS:  Palpitations, chest pain, dizziness, syncope, edema, orthopnea  Pulm:  Cough, dyspnea, sputum, hemoptysis, wheezing  GI:  Abdominal pain, nausea, emesis, diarrhea, constipation, GERD, melena  :  Hematuria, incontinence, dysuria  MS:  Pain, weakness, swelling, arthritis  Skin:  Rash, erythema, wound  Psych:   Insomnia, depression, anxiety, suicidal ideation  Endo:  Heat intolerance, cold intolerance, polyuria  Hem:  Enlarged nodes, bruising, bleeding, night sweats  Renal:   change in urine, flank pain  Neuro:  Numbness, tingling, tremors          Objective:      VITALS:    Vital signs reviewed; most recent are:    Visit Vitals  BP (!) 176/65   Pulse 87   Temp 98 °F (36.7 °C)   Resp 24   Ht 5' 2\" (1.575 m)   Wt 64 kg (141 lb)   SpO2 100%   BMI 25.79 kg/m²     SpO2 Readings from Last 6 Encounters:   02/02/22 100%   01/13/22 97%   01/08/22 96%   12/27/21 99%   12/25/21 97%   10/29/21 93%        No intake or output data in the 24 hours ending 02/02/22 1948     Exam:     Physical Exam:    Gen: Elderly, in no acute distress  HEENT:  Pink conjunctivae, PERRL, hearing intact to voice  Resp:  No accessory muscle use, clear breath sounds without wheezes rales or rhonchi  Card:  RRR, No murmurs, normal S1, S2, bilateral lower extremity swelling  Abd:  Soft, non-tender, non-distended, normoactive bowel sounds are present  Musc:  No cyanosis or clubbing  Skin:  No rashes or ulcers, skin turgor is good  Neuro:  Cranial nerves 3-12 are grossly intact, follows commands appropriately  Psych:  Oriented to person, place, and time, fair insight      Labs:    Recent Labs     02/02/22 1742   WBC 6.0   HGB 7.3*   HCT 23.7*        Recent Labs     02/02/22 1742      K 4.0   *   CO2 25   *   BUN 14   CREA 1.64*   CA 8.7   ALB 3.2*   TBILI 0.3   ALT 17     Lab Results   Component Value Date/Time    Glucose (POC) 106 12/27/2021 07:38 AM    Glucose (POC) 73 12/26/2021 06:38 PM     No results for input(s): PH, PCO2, PO2, HCO3, FIO2 in the last 72 hours. No results for input(s): INR, INREXT in the last 72 hours. Radiology and EKG reviewed: Cardiac monitor with PVCs. Old Records reviewed in [address] [facility]       Assessment/Plan:         Acute GI bleeding (2/2/2022): Concern for gastritis or ulcer. IV PPI. Gentle IVF.  NPO. Consult GI. Acute blood loss anemia: No overt bleeding but Hemoccult positive. Trend hemoglobin transfuse as needed for hemoglobin 7. Check serologies. CKD stage IIIb: Appears around baseline. Monitor. Hypertension: Continue home amlodipine and beta-blocker as BP permits. CAD (coronary artery disease) s/p 3 stents to RCA / Ischemic cardiomyopathy / CHF systolic / HTN (hypertension): Hold ASA and Plavix due to above. Metoprolol and amlodipine as BB tolerates. Contiue statin.      Diabetes: Hold oral agents. Insulin sliding scale. LE swelling: check dopplers. Would need IVC if positive.         Risk of deterioration: high      Total time spent with patient: 36 Minutes **I personally saw and examined the patient during this time period**                 Care Plan discussed with: Patient and Nursing Staff    Discussed:  Code Status and Care Plan    Prophylaxis:  SCD's    Probable Disposition:  Home w/Family           ___________________________________________________    Attending Physician: Marya Gilford, DO

## 2022-02-03 NOTE — PROGRESS NOTES
Occupational therapy note:  Orders received,chart reviewed. Patient has received 1 unit of PRBCs and Hgb now improved. Patient now awaiting doppler results for LE DVT rule out. Will follow up with patient at later time. Sangeeta Sosa MS OTR/L

## 2022-02-03 NOTE — PROGRESS NOTES
[facility]    Medical Progress Note      NAME: Benji Mcgee   :  1934  MRM:  [id number]    Date/Time of service 2/3/2022  11:04 AM          Assessment and Plan:     Acute GI bleeding / Chronic GI bleeding - POA, unclear if upper or lower. GI consult. Clear diet. PPI. Acute blood loss anemia / Iron deficiency anemia - POA, worse than weeks ago. Low iron on serologies. Exacerbated by ASA/Plavix recently started after recent PCI stent. Transfuse 1 unit now to keep Hb>7. Give IV iron once here, and PO at discharge    CAD (coronary artery disease) / Ischemic cardiomyopathy / CHF systolic / HTN (hypertension) - Consulted cardiology to discuss ASA/Plavix after stent 2 months ago. For now hold ASA, atorvastatin, resume ASAP. Continue plavix, metoprolol and Norvasc.     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 Hx of hypoglycemia. BG never above 180 a home. Monitor. Non DM diet. Stop amaryl. Stop all treatment, the risks outweigh any benefit. A1c useless in setting of bleeding.     CKD (chronic kidney disease) stage 3 - POA, monitor.          Subjective:     Chief Complaint:  Feels okay    ROS:  (bold if positive, if negative)    Tolerating PT   Clears        Objective:     Last 24hrs VS reviewed since prior progress note.  Most recent are:    Visit Vitals  BP (!) 160/69 (BP 1 Location: Right upper arm, BP Patient Position: At rest)   Pulse 61   Temp 97.9 °F (36.6 °C)   Resp 20   Ht 5' 2\" (1.575 m)   Wt 64 kg (141 lb)   SpO2 97%   BMI 25.79 kg/m²     SpO2 Readings from Last 6 Encounters:   22 97%   22 97%   22 96%   21 99%   21 97%   10/29/21 93%            Intake/Output Summary (Last 24 hours) at 2/3/2022 0820  Last data filed at 2/3/2022 0615  Gross per 24 hour   Intake 410 ml   Output 100 ml   Net 310 ml        Physical Exam:    Gen:  Well-developed, well-nourished, in no acute distress  HEENT:  Pale conjunctivae, PERRL, hearing intact to voice, moist mucous membranes  Neck:  Supple, without masses, thyroid non-tender  Resp:  No accessory muscle use, clear breath sounds without wheezes rales or rhonchi  Card:  No murmurs, normal S1, S2 without thrills, bruits or peripheral edema  Abd:  Soft, non-tender, non-distended, normoactive bowel sounds are present, no mass  Lymph:  No cervical or inguinal adenopathy  Musc:  No cyanosis or clubbing  Skin:  No rashes or ulcers, skin turgor is good  Neuro:  Cranial nerves are grossly intact, no focal motor weakness, follows commands appropriately  Psych:  Good insight, oriented to person, place and time, alert    Telemetry reviewed:   normal sinus rhythm  __________________________________________________________________  Medications Reviewed: (see below)  Medications:     Current Facility-Administered Medications   Medication Dose Route Frequency    0.9% sodium chloride infusion 250 mL  250 mL IntraVENous PRN    iron sucrose (VENOFER) injection 100 mg  100 mg IntraVENous ONCE    [START ON 2/4/2022] amLODIPine (NORVASC) tablet 10 mg  10 mg Oral DAILY    sodium chloride (NS) flush 5-40 mL  5-40 mL IntraVENous Q8H    sodium chloride (NS) flush 5-40 mL  5-40 mL IntraVENous PRN    acetaminophen (TYLENOL) tablet 650 mg  650 mg Oral Q6H PRN    Or    acetaminophen (TYLENOL) suppository 650 mg  650 mg Rectal Q6H PRN    polyethylene glycol (MIRALAX) packet 17 g  17 g Oral DAILY PRN    ondansetron (ZOFRAN ODT) tablet 4 mg  4 mg Oral Q8H PRN    Or    ondansetron (ZOFRAN) injection 4 mg  4 mg IntraVENous Q6H PRN    pantoprazole (PROTONIX) 40 mg in 0.9% sodium chloride 10 mL injection  40 mg IntraVENous Q12H    insulin lispro (HUMALOG) injection   SubCUTAneous AC&HS    glucose chewable tablet 16 g  4 Tablet Oral PRN    dextrose (D50W) injection syrg 12.5-25 g  12.5-25 g IntraVENous PRN    glucagon (GLUCAGEN) injection 1 mg  1 mg IntraMUSCular PRN    [Held by provider] 0.9% sodium chloride infusion  50 mL/hr IntraVENous CONTINUOUS    metoprolol tartrate (LOPRESSOR) tablet 50 mg  50 mg Oral Q12H        Lab Data Reviewed: (see below)  Lab Review:     Recent Labs     02/03/22 0034 02/02/22 1742   WBC 6.0 6.0   HGB 6.8* 7.3*   HCT 21.9* 23.7*    235     Recent Labs     02/03/22  0034 02/02/22  1742    141   K 4.0 4.0   * 111*   CO2 24 25   GLU 73 103*   BUN 12 14   CREA 1.52* 1.64*   CA 8.7 8.7   MG 2.5*  --    ALB  --  3.2*   TBILI  --  0.3   ALT  --  17   INR 1.1  --      Lab Results   Component Value Date/Time    Glucose (POC) 87 02/03/2022 06:48 AM    Glucose (POC) 84 02/02/2022 11:20 PM    Glucose (POC) 106 12/27/2021 07:38 AM    Glucose (POC) 73 12/26/2021 06:38 PM    Glucose (POC) 63 (L) 12/26/2021 05:42 PM     No results for input(s): PH, PCO2, PO2, HCO3, FIO2 in the last 72 hours. Recent Labs     02/03/22 0034   INR 1.1     All Micro Results     None          Other pertinent lab: none    Total time spent with patient: 30 Minutes I personally reviewed chart, notes, data and current medications in the medical record. I have personally examined and treated the patient at bedside during this period.                  Care Plan discussed with: Patient, Care Manager, Nursing Staff, Consultant/Specialist and >50% of time spent in counseling and coordination of care    Discussed:  Care Plan and D/C Planning    Prophylaxis:  SCD's and H2B/PPI    Disposition:  Home w/Family           ___________________________________________________    Attending Physician: Kiara Turner MD

## 2022-02-03 NOTE — ED NOTES
9:13 PM  TRANSFER - OUT REPORT:    Verbal report given to RN(name) on Flor Purcell  being transferred to (unit) for routine progression of care       Report consisted of patients Situation, Background, Assessment and   Recommendations(SBAR). Information from the following report(s) SBAR, Kardex, MAR, and Recent Results was reviewed with the receiving nurse. Lines:   Peripheral IV 02/02/22 Left Antecubital (Active)   Site Assessment Clean, dry, & intact 02/02/22 1740   Phlebitis Assessment 0 02/02/22 1740   Infiltration Assessment 0 02/02/22 1740   Dressing Status Clean, dry, & intact 02/02/22 1740   Dressing Type Transparent;Tape 02/02/22 1740   Hub Color/Line Status Pink;Patent; Flushed 02/02/22 1740   Action Taken Blood drawn 02/02/22 1740        Opportunity for questions and clarification was provided. Patient transported with:   Registered Nurse              Attempted to give report, unable to at this time.

## 2022-02-04 ENCOUNTER — ANESTHESIA (OUTPATIENT)
Dept: ENDOSCOPY | Age: 87
DRG: 377 | End: 2022-02-04
Payer: MEDICARE

## 2022-02-04 ENCOUNTER — ANESTHESIA EVENT (OUTPATIENT)
Dept: ENDOSCOPY | Age: 87
DRG: 377 | End: 2022-02-04
Payer: MEDICARE

## 2022-02-04 LAB
ABO + RH BLD: NORMAL
ANION GAP SERPL CALC-SCNC: 5 MMOL/L (ref 5–15)
BLD PROD TYP BPU: NORMAL
BLOOD GROUP ANTIBODIES SERPL: NORMAL
BPU ID: NORMAL
BUN SERPL-MCNC: 12 MG/DL (ref 6–20)
BUN/CREAT SERPL: 8 (ref 12–20)
CALCIUM SERPL-MCNC: 8.8 MG/DL (ref 8.5–10.1)
CHLORIDE SERPL-SCNC: 113 MMOL/L (ref 97–108)
CO2 SERPL-SCNC: 23 MMOL/L (ref 21–32)
CREAT SERPL-MCNC: 1.42 MG/DL (ref 0.55–1.02)
CROSSMATCH RESULT,%XM: NORMAL
GLUCOSE BLD STRIP.AUTO-MCNC: 105 MG/DL (ref 65–117)
GLUCOSE SERPL-MCNC: 65 MG/DL (ref 65–100)
HCT VFR BLD AUTO: 26.8 % (ref 35–47)
HCT VFR BLD AUTO: 29.6 % (ref 35–47)
HGB BLD-MCNC: 8.5 G/DL (ref 11.5–16)
HGB BLD-MCNC: 9.2 G/DL (ref 11.5–16)
MAGNESIUM SERPL-MCNC: 2.5 MG/DL (ref 1.6–2.4)
POTASSIUM SERPL-SCNC: 4.2 MMOL/L (ref 3.5–5.1)
SERVICE CMNT-IMP: NORMAL
SODIUM SERPL-SCNC: 141 MMOL/L (ref 136–145)
SPECIMEN EXP DATE BLD: NORMAL
STATUS OF UNIT,%ST: NORMAL
UNIT DIVISION, %UDIV: 0

## 2022-02-04 PROCEDURE — 0DBL8ZZ EXCISION OF TRANSVERSE COLON, VIA NATURAL OR ARTIFICIAL OPENING ENDOSCOPIC: ICD-10-PCS | Performed by: INTERNAL MEDICINE

## 2022-02-04 PROCEDURE — 0DJ08ZZ INSPECTION OF UPPER INTESTINAL TRACT, VIA NATURAL OR ARTIFICIAL OPENING ENDOSCOPIC: ICD-10-PCS | Performed by: INTERNAL MEDICINE

## 2022-02-04 PROCEDURE — 77030010936 HC CLP LIG BSC -C: Performed by: INTERNAL MEDICINE

## 2022-02-04 PROCEDURE — 85018 HEMOGLOBIN: CPT

## 2022-02-04 PROCEDURE — 76060000031 HC ANESTHESIA FIRST 0.5 HR: Performed by: INTERNAL MEDICINE

## 2022-02-04 PROCEDURE — 65270000029 HC RM PRIVATE

## 2022-02-04 PROCEDURE — 97161 PT EVAL LOW COMPLEX 20 MIN: CPT

## 2022-02-04 PROCEDURE — 74011250636 HC RX REV CODE- 250/636: Performed by: INTERNAL MEDICINE

## 2022-02-04 PROCEDURE — 74011250637 HC RX REV CODE- 250/637: Performed by: INTERNAL MEDICINE

## 2022-02-04 PROCEDURE — 80048 BASIC METABOLIC PNL TOTAL CA: CPT

## 2022-02-04 PROCEDURE — 2709999900 HC NON-CHARGEABLE SUPPLY: Performed by: INTERNAL MEDICINE

## 2022-02-04 PROCEDURE — 88305 TISSUE EXAM BY PATHOLOGIST: CPT

## 2022-02-04 PROCEDURE — 74011250636 HC RX REV CODE- 250/636: Performed by: NURSE ANESTHETIST, CERTIFIED REGISTERED

## 2022-02-04 PROCEDURE — 97165 OT EVAL LOW COMPLEX 30 MIN: CPT

## 2022-02-04 PROCEDURE — C9113 INJ PANTOPRAZOLE SODIUM, VIA: HCPCS | Performed by: INTERNAL MEDICINE

## 2022-02-04 PROCEDURE — 74011000250 HC RX REV CODE- 250: Performed by: INTERNAL MEDICINE

## 2022-02-04 PROCEDURE — 76040000019: Performed by: INTERNAL MEDICINE

## 2022-02-04 PROCEDURE — 83735 ASSAY OF MAGNESIUM: CPT

## 2022-02-04 PROCEDURE — 77030013992 HC SNR POLYP ENDOSC BSC -B: Performed by: INTERNAL MEDICINE

## 2022-02-04 PROCEDURE — 97535 SELF CARE MNGMENT TRAINING: CPT

## 2022-02-04 PROCEDURE — 74011250637 HC RX REV CODE- 250/637: Performed by: NURSE PRACTITIONER

## 2022-02-04 PROCEDURE — 74011000250 HC RX REV CODE- 250: Performed by: NURSE ANESTHETIST, CERTIFIED REGISTERED

## 2022-02-04 PROCEDURE — 36415 COLL VENOUS BLD VENIPUNCTURE: CPT

## 2022-02-04 PROCEDURE — 82962 GLUCOSE BLOOD TEST: CPT

## 2022-02-04 DEVICE — CLIP INT L235CM WRK CHAN DIA2.8MM OPN 11MM LCK MECHANISM MR: Type: IMPLANTABLE DEVICE | Site: TRANSVERSE COLON | Status: FUNCTIONAL

## 2022-02-04 RX ORDER — PROPOFOL 10 MG/ML
INJECTION, EMULSION INTRAVENOUS
Status: DISCONTINUED | OUTPATIENT
Start: 2022-02-04 | End: 2022-02-04 | Stop reason: HOSPADM

## 2022-02-04 RX ORDER — ATROPINE SULFATE 0.1 MG/ML
0.4 INJECTION INTRAVENOUS
Status: DISCONTINUED | OUTPATIENT
Start: 2022-02-04 | End: 2022-02-04 | Stop reason: HOSPADM

## 2022-02-04 RX ORDER — EPINEPHRINE 0.1 MG/ML
1 INJECTION INTRACARDIAC; INTRAVENOUS
Status: DISCONTINUED | OUTPATIENT
Start: 2022-02-04 | End: 2022-02-04 | Stop reason: HOSPADM

## 2022-02-04 RX ORDER — SODIUM CHLORIDE 9 MG/ML
INJECTION, SOLUTION INTRAVENOUS
Status: DISCONTINUED | OUTPATIENT
Start: 2022-02-04 | End: 2022-02-04 | Stop reason: HOSPADM

## 2022-02-04 RX ORDER — SODIUM CHLORIDE 9 MG/ML
50 INJECTION, SOLUTION INTRAVENOUS CONTINUOUS
Status: DISCONTINUED | OUTPATIENT
Start: 2022-02-04 | End: 2022-02-04

## 2022-02-04 RX ORDER — LIDOCAINE HYDROCHLORIDE 20 MG/ML
INJECTION, SOLUTION EPIDURAL; INFILTRATION; INTRACAUDAL; PERINEURAL AS NEEDED
Status: DISCONTINUED | OUTPATIENT
Start: 2022-02-04 | End: 2022-02-04 | Stop reason: HOSPADM

## 2022-02-04 RX ORDER — MIDAZOLAM HYDROCHLORIDE 1 MG/ML
.25-5 INJECTION, SOLUTION INTRAMUSCULAR; INTRAVENOUS
Status: DISCONTINUED | OUTPATIENT
Start: 2022-02-04 | End: 2022-02-04 | Stop reason: HOSPADM

## 2022-02-04 RX ORDER — FLUMAZENIL 0.1 MG/ML
0.2 INJECTION INTRAVENOUS
Status: DISCONTINUED | OUTPATIENT
Start: 2022-02-04 | End: 2022-02-04 | Stop reason: HOSPADM

## 2022-02-04 RX ORDER — GUAIFENESIN 100 MG/5ML
81 LIQUID (ML) ORAL DAILY
Status: DISCONTINUED | OUTPATIENT
Start: 2022-02-05 | End: 2022-02-05 | Stop reason: HOSPADM

## 2022-02-04 RX ORDER — PHENYLEPHRINE HCL IN 0.9% NACL 0.4MG/10ML
SYRINGE (ML) INTRAVENOUS AS NEEDED
Status: DISCONTINUED | OUTPATIENT
Start: 2022-02-04 | End: 2022-02-04 | Stop reason: HOSPADM

## 2022-02-04 RX ORDER — EPHEDRINE SULFATE/0.9% NACL/PF 50 MG/5 ML
SYRINGE (ML) INTRAVENOUS AS NEEDED
Status: DISCONTINUED | OUTPATIENT
Start: 2022-02-04 | End: 2022-02-04 | Stop reason: HOSPADM

## 2022-02-04 RX ORDER — NALOXONE HYDROCHLORIDE 0.4 MG/ML
0.4 INJECTION, SOLUTION INTRAMUSCULAR; INTRAVENOUS; SUBCUTANEOUS
Status: DISCONTINUED | OUTPATIENT
Start: 2022-02-04 | End: 2022-02-04 | Stop reason: HOSPADM

## 2022-02-04 RX ORDER — DEXTROMETHORPHAN/PSEUDOEPHED 2.5-7.5/.8
1.2 DROPS ORAL
Status: DISCONTINUED | OUTPATIENT
Start: 2022-02-04 | End: 2022-02-04 | Stop reason: HOSPADM

## 2022-02-04 RX ORDER — PROPOFOL 10 MG/ML
INJECTION, EMULSION INTRAVENOUS AS NEEDED
Status: DISCONTINUED | OUTPATIENT
Start: 2022-02-04 | End: 2022-02-04 | Stop reason: HOSPADM

## 2022-02-04 RX ADMIN — POLYETHYLENE GLYCOL 3350 17 G: 17 POWDER, FOR SOLUTION ORAL at 06:00

## 2022-02-04 RX ADMIN — Medication 80 MCG: at 11:17

## 2022-02-04 RX ADMIN — Medication 10 ML: at 13:52

## 2022-02-04 RX ADMIN — POLYETHYLENE GLYCOL 3350 17 G: 17 POWDER, FOR SOLUTION ORAL at 05:30

## 2022-02-04 RX ADMIN — AMLODIPINE BESYLATE 10 MG: 5 TABLET ORAL at 13:51

## 2022-02-04 RX ADMIN — LIDOCAINE HYDROCHLORIDE 60 MG: 20 INJECTION, SOLUTION INTRAVENOUS at 11:07

## 2022-02-04 RX ADMIN — POLYETHYLENE GLYCOL 3350 17 G: 17 POWDER, FOR SOLUTION ORAL at 04:17

## 2022-02-04 RX ADMIN — METOPROLOL TARTRATE 50 MG: 50 TABLET, FILM COATED ORAL at 23:55

## 2022-02-04 RX ADMIN — Medication 20 MG: at 11:27

## 2022-02-04 RX ADMIN — Medication 10 ML: at 06:00

## 2022-02-04 RX ADMIN — SODIUM CHLORIDE: 9 INJECTION, SOLUTION INTRAVENOUS at 11:00

## 2022-02-04 RX ADMIN — PROPOFOL INJECTABLE EMULSION 50 MG: 10 INJECTION, EMULSION INTRAVENOUS at 11:07

## 2022-02-04 RX ADMIN — SODIUM CHLORIDE, PRESERVATIVE FREE 40 MG: 5 INJECTION INTRAVENOUS at 23:55

## 2022-02-04 RX ADMIN — Medication 10 MG: at 11:21

## 2022-02-04 RX ADMIN — Medication 10 ML: at 23:56

## 2022-02-04 RX ADMIN — POLYETHYLENE GLYCOL 3350 17 G: 17 POWDER, FOR SOLUTION ORAL at 04:18

## 2022-02-04 RX ADMIN — PROPOFOL 120 MCG/KG/MIN: 10 INJECTION, EMULSION INTRAVENOUS at 11:07

## 2022-02-04 RX ADMIN — POLYETHYLENE GLYCOL 3350 17 G: 17 POWDER, FOR SOLUTION ORAL at 05:45

## 2022-02-04 RX ADMIN — METOPROLOL TARTRATE 50 MG: 50 TABLET, FILM COATED ORAL at 13:52

## 2022-02-04 RX ADMIN — Medication 80 MCG: at 11:21

## 2022-02-04 RX ADMIN — SODIUM CHLORIDE, PRESERVATIVE FREE 40 MG: 5 INJECTION INTRAVENOUS at 09:07

## 2022-02-04 NOTE — PROGRESS NOTES
Physical Therapy    Consult received, chart reviewed. Patient currently ARCHANA undergoing endoscopy. Will f/u later today as able and appropriate.     Tulio Bautista MS, PT

## 2022-02-04 NOTE — PROGRESS NOTES
Bedside and Verbal shift change report given to Jim Will (oncoming nurse) by Yadi Brumfield (offgoing nurse). Report included the following information SBAR, Kardex, Intake/Output, MAR, Recent Results and Med Rec Status.

## 2022-02-04 NOTE — PROGRESS NOTES
02/04/22    Medicare Outpatient Observation Notice (MOON)/ [place] Outpatient Observation Notice (Eric Dior) provided to patient/representative with verbal explanation of the notice. Time allotted for questions regarding the notice. Patient /representative provided a completed copy of the MOON/VOON notice. Copy placed on bedside chart. State Observation Letter was verbally explained to patient and provided in writing to patient. The patient signed the document.     Patient unable to sign in a Contact Precaution room left copy outside of door and copy in chart

## 2022-02-04 NOTE — PROGRESS NOTES
Sugar Caller  1934  [id number]    Situation:  Verbal report received from:  Alm Burkitt, RN  Procedure: Procedure(s):  ESOPHAGOGASTRODUODENOSCOPY (EGD)  COLONOSCOPY  ENDOSCOPIC POLYPECTOMY  RESOLUTION CLIP x2    Background:    Preoperative diagnosis: anemia  Postoperative diagnosis: 1- normal upper endosscopy. 2- transverse colon polyp x2.   3- hemorrhoids. :  Dr. Aubrie Acharya  Assistant(s): Endoscopy Technician-1: Aneudy Hall  Endoscopy RN-1: Chao Decker    Specimens:   ID Type Source Tests Collected by Time Destination   1 : Transverse colon polyp x2. Preservative Colon, Transverse  Bozena Juares MD 2/4/2022 1120 Pathology     H. Pylori  no    Assessment:  Intra-procedure medications       Anesthesia gave intra-procedure sedation and medications, see anesthesia flow sheet yes    Intravenous fluids: NS@ KVO     Vital signs stable   yes    Abdominal assessment: round and soft   yes    Recommendation:  Discharge patient per MD order  inpatient.   Return to floor  yes  Family or Friend   family  Permission to share finding with family or friend yes

## 2022-02-04 NOTE — ANESTHESIA PREPROCEDURE EVALUATION
Relevant Problems   CARDIOVASCULAR   (+) CAD (coronary artery disease)   (+) Hypertension      RENAL FAILURE   (+) CKD (chronic kidney disease) stage 3, GFR 30-59 ml/min (HCC)      ENDOCRINE   (+) DM type 2 causing renal disease (HCC)   (+) DM type 2 causing vascular disease (HCC)      HEMATOLOGY   (+) Iron deficiency anemia       Anesthetic History   No history of anesthetic complications            Review of Systems / Medical History  Patient summary reviewed, nursing notes reviewed and pertinent labs reviewed    Pulmonary      Recent URI        Pertinent negatives: No shortness of breath     Neuro/Psych   Within defined limits           Cardiovascular    Hypertension          Past MI and CAD    Exercise tolerance: <4 METS  Comments: MI in 2021, 3 stents placed    TTE in 12/2021 demonstrated mild systolic dysfunction, EF of 45%   GI/Hepatic/Renal         Renal disease: CRI       Endo/Other    Diabetes    Anemia     Other Findings              Physical Exam    Airway  Mallampati: II  TM Distance: 4 - 6 cm  Neck ROM: normal range of motion   Mouth opening: Normal     Cardiovascular    Rhythm: regular  Rate: normal         Dental    Dentition: Edentulous     Pulmonary  Breath sounds clear to auscultation              Comments: Lungs clear anteriorly, difficult to examine due to current positioning, breathing appears slightly labored, but does not report any dyspnea. CXR ordered given she has been in hospital for week, various IV fluids and renal insufficiency.  Abdominal         Other Findings            Anesthetic Plan    ASA: 3  Anesthesia type: MAC          Induction: Intravenous  Anesthetic plan and risks discussed with: Patient

## 2022-02-04 NOTE — PERIOP NOTES
Austen Gonzales  1934  [id number]    Situation:    Scheduled Procedure: Procedure(s):  ESOPHAGOGASTRODUODENOSCOPY (EGD)  COLONOSCOPY  Verbal report received from: Kylie Mancuso RN  Preoperative diagnosis: anemia    Background:    Procedure: Procedure(s):  ESOPHAGOGASTRODUODENOSCOPY (EGD)  COLONOSCOPY  Physician performing procedure; Dr. Winston Aguirre MD   SBAR QUESTIONS FLOOR TO ENDO RN    NPO Status/Last PO Intake: yes except for miralax at 0600    Pregnancy Test:Not applicable If yes, result: none    Is the patient taking Blood Thinners: YES If yes, list: plavix and asprin and last taken 2/2/22  Is the patient diabetic:yes       If yes, what was the last BS:  87  Time taken? 4262 2/3/22  Anything given? no           Does the patient have a Pacemaker/Defibrillator in place?: no   Does the patient need antibiotics before/during/after procedure: no   If the patient is having a colon, How much prep was drank? 100   What were the Colon prep results? liquid   Does the patient have SCD in place:no   Is patient on CONTACT precautions:yes        If yes, what kind of CONTACT precautions: droplet plus (covid)    Assessment:  Are the vital signs stable prior to patient coming to ENDO?  yes  Is the patient alert/oriented and able to sign consent for the procedures:yes  How does the patient's abdomen feel prior to coming to ENDO? round and soft yes   Does the patient have a patient IV in place?  yes     Recommendation:  Family or Friend present no     Permission to share finding with Family or Friend n/a

## 2022-02-04 NOTE — PROGRESS NOTES
[facility]    Medical Progress Note      NAME: Dean Patton   :  1934  MRM:  [id number]    Date/Time of service 2022  9:17 AM          Assessment and Plan:     Acute GI bleeding / Chronic GI bleeding - POA, unclear if upper or lower. GI consulted. Clear diet. PPI. GI plans endoscopy. Acute blood loss anemia / Iron deficiency anemia - POA, worse than weeks ago. Stable overnight. Low iron on serologies. Exacerbated by ASA/Plavix recently started after recent PCI stent. Transfused 1 unit on 2/3 to keep Hb>7. Give IV iron once here, and PO at discharge    CAD (coronary artery disease) / Ischemic cardiomyopathy / CHF systolic / HTN (hypertension) - Consulted cardiology to discuss ASA/Plavix after stent 2 months ago. For now hold Plavix for 3 more days, resume atorvastatin and ASA. Continue metoprolol and Norvasc.     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 Hx of hypoglycemia. BG never above 180 a home. Monitor. Non DM diet. Stop amaryl. Stop all treatment, the risks outweigh any benefit. A1c useless in setting of bleeding. COVID infection - Tested positive be rapid test. No symptoms. No hypoxia. No treatment warranted.     CKD (chronic kidney disease) stage 3 - POA, monitor.          Subjective:     Chief Complaint:  Feels okay    ROS:  (bold if positive, if negative)    Tolerating PT   Clears        Objective:     Last 24hrs VS reviewed since prior progress note.  Most recent are:    Visit Vitals  BP (!) 170/66 (BP 1 Location: Left upper arm, BP Patient Position: At rest)   Pulse 66   Temp 97.9 °F (36.6 °C)   Resp 18   Ht 5' 2\" (1.575 m)   Wt 64 kg (141 lb)   SpO2 98%   BMI 25.79 kg/m²     SpO2 Readings from Last 6 Encounters:   22 98%   22 97%   22 96%   21 99%   21 97%   10/29/21 93%            Intake/Output Summary (Last 24 hours) at 2022 0917  Last data filed at 2022 0254  Gross per 24 hour   Intake 1440 ml   Output 0 ml   Net 1440 ml        Physical Exam:    Gen:  Well-developed, well-nourished, in no acute distress  HEENT:  Pale conjunctivae, PERRL, hearing intact to voice, moist mucous membranes  Neck:  Supple, without masses, thyroid non-tender  Resp:  No accessory muscle use, clear breath sounds without wheezes rales or rhonchi  Card:  No murmurs, normal S1, S2 without thrills, bruits or peripheral edema  Abd:  Soft, non-tender, non-distended, normoactive bowel sounds are present, no mass  Lymph:  No cervical or inguinal adenopathy  Musc:  No cyanosis or clubbing  Skin:  No rashes or ulcers, skin turgor is good  Neuro:  Cranial nerves are grossly intact, no focal motor weakness, follows commands appropriately  Psych:  Good insight, oriented to person, place and time, alert    Telemetry reviewed:   normal sinus rhythm  __________________________________________________________________  Medications Reviewed: (see below)  Medications:     Current Facility-Administered Medications   Medication Dose Route Frequency    0.9% sodium chloride infusion 250 mL  250 mL IntraVENous PRN    amLODIPine (NORVASC) tablet 10 mg  10 mg Oral DAILY    sodium chloride (NS) flush 5-40 mL  5-40 mL IntraVENous Q8H    sodium chloride (NS) flush 5-40 mL  5-40 mL IntraVENous PRN    acetaminophen (TYLENOL) tablet 650 mg  650 mg Oral Q6H PRN    polyethylene glycol (MIRALAX) packet 17 g  17 g Oral DAILY PRN    ondansetron (ZOFRAN) injection 4 mg  4 mg IntraVENous Q6H PRN    pantoprazole (PROTONIX) 40 mg in 0.9% sodium chloride 10 mL injection  40 mg IntraVENous Q12H    glucose chewable tablet 16 g  4 Tablet Oral PRN    dextrose (D50W) injection syrg 12.5-25 g  12.5-25 g IntraVENous PRN    glucagon (GLUCAGEN) injection 1 mg  1 mg IntraMUSCular PRN    [Held by provider] 0.9% sodium chloride infusion  50 mL/hr IntraVENous CONTINUOUS    metoprolol tartrate (LOPRESSOR) tablet 50 mg  50 mg Oral Q12H        Lab Data Reviewed: (see below)  Lab Review: Recent Labs     02/04/22 0414 02/03/22  1548 02/03/22  0801 02/03/22  0034 02/03/22  0034 02/02/22  1742 02/02/22  1742   WBC  --   --   --   --  6.0  --  6.0   HGB 8.5* 8.7* 8.3*   < > 6.8*   < > 7.3*   HCT 26.8* 27.3* 26.1*   < > 21.9*   < > 23.7*   PLT  --   --   --   --  218  --  235    < > = values in this interval not displayed. Recent Labs     02/04/22 0414 02/03/22  0034 02/02/22  1742    142 141   K 4.2 4.0 4.0   * 114* 111*   CO2 23 24 25   GLU 65 73 103*   BUN 12 12 14   CREA 1.42* 1.52* 1.64*   CA 8.8 8.7 8.7   MG 2.5* 2.5*  --    ALB  --   --  3.2*   TBILI  --   --  0.3   ALT  --   --  17   INR  --  1.1  --      Lab Results   Component Value Date/Time    Glucose (POC) 87 02/03/2022 06:48 AM    Glucose (POC) 84 02/02/2022 11:20 PM    Glucose (POC) 106 12/27/2021 07:38 AM    Glucose (POC) 73 12/26/2021 06:38 PM    Glucose (POC) 63 (L) 12/26/2021 05:42 PM     No results for input(s): PH, PCO2, PO2, HCO3, FIO2 in the last 72 hours. Recent Labs     02/03/22 0034   INR 1.1     All Micro Results     Procedure Component Value Units Date/Time    COVID-19 RAPID TEST [[id number]]  (Abnormal) Collected: 02/03/22 1651    Order Status: Completed Specimen: Nasopharyngeal Updated: 02/03/22 1728     Specimen source Nasopharyngeal        COVID-19 rapid test Detected        Comment: Rapid Abbott ID Now       The specimen is POSITIVE for SARS-CoV-2, the novel coronavirus associated with COVID-19. This test has been authorized by the FDA under an Emergency Use Authorization (EUA) for use by authorized laboratories.         Fact sheet for Healthcare Providers: ConventionUpdate.co.nz  Fact sheet for Patients: ConventionUpdate.co.nz       Methodology: Isothermal Nucleic Acid Amplification  CALLED TO AND READ BACK BY  TAMI DOW @1728/YWG               Other pertinent lab: none    Total time spent with patient: 30 Minutes I personally reviewed chart, notes, data and current medications in the medical record. I have personally examined and treated the patient at bedside during this period.                  Care Plan discussed with: Patient, Care Manager, Nursing Staff, Consultant/Specialist and >50% of time spent in counseling and coordination of care    Discussed:  Care Plan and D/C Planning    Prophylaxis:  SCD's and H2B/PPI    Disposition:  Home w/Family           ___________________________________________________    Attending Physician: Gerda Milton MD

## 2022-02-04 NOTE — PERIOP NOTES
Received report from Nati Doe CRNA. See anesthesia record. Patient taken to post-recovery. Post-recovery report given to YA JOHNSTON RN. Patient's ABD remains soft and non-tender post procedure. Pt has no complaints at this time and tolerated the procedure well. Endoscope was pre-cleaned at bedside immediately following procedure by Eli Gan.

## 2022-02-04 NOTE — PROGRESS NOTES
Endoscopy discharge instructions have been reviewed and given to patient. The patient verbalized understanding and acceptance of instructions. Dr. Hector Lundy discussed with patient procedure findings and next steps.

## 2022-02-04 NOTE — ROUTINE PROCESS
Bedside and Verbal shift change report given to Luther Clement RN (oncoming nurse) by Davy Dewitt rn (offgoing nurse). Report included the following information SBAR.

## 2022-02-04 NOTE — PROCEDURES
Pipo Norman M.D.  [phone]           2022                EGD Operative Report  Mabel Dow  :  1934  Jaydon Domingo Medical Record Number:  [id number]      Indication:  Occult blood in stool with possible UGI origin, acute blood loss anemia     : Yadira Baeza MD    Referring Provider:  Madi Moran NP      Anesthesia/Sedation:  MAC anesthesia    Airway assessment: No airway problems anticipated    Pre-Procedural Exam:      Airway: clear, no airway problems anticipated  Heart: RRR, without gallops or rubs  Lungs: clear bilaterally without wheezes, crackles, or rhonchi  Abdomen: soft, nontender, nondistended, bowel sounds present  Mental Status: awake, alert and oriented to person, place and time       Procedure Details     After infomed consent was obtained for the procedure, with all risks and benefits of procedure explained the patient was taken to the endoscopy suite and placed in the left lateral decubitus position. Following sequential administration of sedation as per above, the endoscope was inserted into the mouth and advanced under direct vision to second portion of the duodenum. A careful inspection was made as the gastroscope was withdrawn, including a retroflexed view of the proximal stomach; findings and interventions are described below. Findings:   Esophagus:normal  Stomach: normal   Duodenum/jejunum: normal    Therapies:  none    Specimens: none           Complications:   None; patient tolerated the procedure well. EBL:  None.            Impression:    Upper endoscopy within normal    Recommendations:    - Will proceed with colonoscopy    Yadira Baeza MD

## 2022-02-04 NOTE — WOUND CARE
Wound Consult:  New consult Visit. Chart reviewed. Consulted for skin tear. Spoke with patients nurse,  Nita Contreras. Patient is resting on a hillrom  bed with versacare mattress. Heels off loaded with pillows. Patient is awake, alert, cooperative; requires 1 assist to move side to side in bed. .  Assessment:all wounds POA   Right lower arm  0.4x0.4cm brown dry scab  Left lower medial arm- 3x3cn pink resurfaced wound, blanches  Heels, buttocks, sacrum- no redness, skin intact  Left heel- dry skin- \"cracked\" painful foam dressing applied for protection and comfort  Skin Care / PI Prevention Recommendations:  1. Minimize friction/shear: minimize layers of linen/pads under patient. 2. Off load pressure/reposition:  turn and reposition approximately every 2 hours; float heels with pillows or use off loading heel boots; waffle cushion for sitting; position wedge. 3. Manage Moisture - keep skin folds dry; incontinence skin care with incontinence wipes. 4. Continue to monitor nutrition, pain, and skin risk scale, and skin assessment. Plan:  Spoke with Dr. Tom Uribe regarding findings and proposed orders for treatment. Please re-consult should concerns arise despite continued skin/PI prevention measures.     [facility], Wound / [address] [facility] Healing Office [phone]

## 2022-02-04 NOTE — PROGRESS NOTES
PHYSICAL THERAPY EVALUATION/DISCHARGE  Patient: Flor Purcell (89 y.o. female)  Date: 2/4/2022  Primary Diagnosis: Acute GI bleeding [K92.2]  Procedure(s) (LRB):  ESOPHAGOGASTRODUODENOSCOPY (EGD) (N/A)  COLONOSCOPY (N/A)  ENDOSCOPIC POLYPECTOMY (N/A)  RESOLUTION CLIP x2 (N/A) Day of Surgery   Precautions:          ASSESSMENT  Based on the objective data described below, the patient presents with baseline strength level, AROM, and activity tolerance with steady dynamic balance and gait s/p admission for GI bleeding. Patient received in NAD with daughter present in room. Completed bed mob, transfers, toileting, and gait in room with RW with SUP only with no difficulty or LOB, safety concerns. Patient lives with her daughter in a ground floor apartment in the home, is mod indep to SUP with mobility and ADLs. Patient appears to be functioning at her baseline and endorses this to be the case. Has been having HHPT and recommend this resume upon d/c. Will complete orders. Functional Outcome Measure: The patient scored 75 on the Barthel outcome measure which is indicative of near independence with mobility and self care tasks. Other factors to consider for discharge: supportive daughter     Further skilled acute physical therapy is not indicated at this time. PLAN :  Recommendation for discharge: (in order for the patient to meet his/her long term goals)  Physical therapy at least 2 days/week in the home     This discharge recommendation:  Has been made in collaboration with the attending provider and/or case management    IF patient discharges home will need the following DME: patient owns DME required for discharge       SUBJECTIVE:   Patient stated I'm getting around just like I always do and I'm going home tomorrow.     OBJECTIVE DATA SUMMARY:   HISTORY:    Past Medical History:   Diagnosis Date    CAD (coronary artery disease)     CHF (congestive heart failure), NYHA class I, chronic, systolic ([facility] [address]     Systolic and diastolic CHF per echocardiogram of 12/25/2021. Patient was found to have LVEF 45%    Chronic kidney disease     stage III/IV:Creatinine 1.45-1.7 with GFR in the low 30s-high 20s    Diabetes mellitus type 2 in nonobese ([address]     Hypertension     Ischemic cardiomyopathy     Echocardiogram of 12/25/2021: mild left ventricular systolic dysfunction (EF 38%) with inferior and basal-mid septal akinesis. Diastolic dysfunction.  Peripheral vascular disease ([facility] [address]      Past Surgical History:   Procedure Laterality Date    COLONOSCOPY N/A 2/4/2022    COLONOSCOPY performed by Esteban Abdi MD at [facility]    HX APPENDECTOMY      HX CORONARY STENT PLACEMENT      stents x 3 to mid-distal right coronary artery going into PDA for 100% occlusion of RCA; and also had nonobstructive disease in 30% LAD stenosis, 70% diagonal 1 stenosis. She had proximal 30% stenosis in the codominant left circumflex artery     HX HYSTERECTOMY      HX KNEE REPLACEMENT Left     HX OTHER SURGICAL      head sx x 2 nerve related    HX OTHER SURGICAL      Back surgery x 2    HX TONSILLECTOMY      [place] CARDIAC SURG PROCEDURE UNLIST      heart stents. x 3 in january 2022.         Prior level of function: mod indep to sup with RW   Personal factors and/or comorbidities impacting plan of care: recent R shoulder surgery    Home Situation  Home Environment: Private residence  # Steps to Enter: 0  One/Two Story Residence: Two story, live on 1st floor  Living Alone: No  Support Systems: Child(watson)  Patient Expects to be Discharged to[de-identified] Home  Current DME Used/Available at Home: [employer], rolling,Shower chair,Raised toilet seat,Adaptive dressing aides,Cane, straight (alondra walker)  Tub or Shower Type: Shower    EXAMINATION/PRESENTATION/DECISION MAKING:   Critical Behavior:  Neurologic State: Alert  Orientation Level: Oriented X4  Cognition: Follows commands     Hearing:       Range Of Motion:  AROM: Generally decreased, functional                       Strength:    Strength: Generally decreased, functional                    Tone & Sensation:   Tone: Normal              Sensation: Intact               Coordination:  Coordination: Within functional limits  Vision:      Functional Mobility:  Bed Mobility:  Rolling: Supervision  Supine to Sit: Supervision  Sit to Supine: Supervision  Scooting: Supervision  Transfers:  Sit to Stand: Supervision  Stand to Sit: Supervision        Bed to Chair: Supervision              Balance:   Sitting: Intact  Standing: Intact; With support  Ambulation/Gait Training:  Distance (ft): 40 Feet (ft)  Assistive Device: Gait belt;Walker, rolling  Ambulation - Level of Assistance: Supervision        Gait Abnormalities: Decreased step clearance  Right Side Weight Bearing:  (partial WB RUE 2/2 shoulder surgery)                                  Functional Measure:  Barthel Index:    Bathin  Bladder: 10  Bowels: 10  Groomin  Dressing: 10  Feeding: 10  Mobility: 10  Stairs: 0  Toilet Use: 10  Transfer (Bed to Chair and Back): 10  Total: 75/100       The Barthel ADL Index: Guidelines  1. The index should be used as a record of what a patient does, not as a record of what a patient could do. 2. The main aim is to establish degree of independence from any help, physical or verbal, however minor and for whatever reason. 3. The need for supervision renders the patient not independent. 4. A patient's performance should be established using the best available evidence. Asking the patient, friends/relatives and nurses are the usual sources, but direct observation and common sense are also important. However direct testing is not needed. 5. Usually the patient's performance over the preceding 24-48 hours is important, but occasionally longer periods will be relevant. 6. Middle categories imply that the patient supplies over 50 per cent of the effort. 7. Use of aids to be independent is allowed.     Score Interpretation (from [address] [facility] 83)    Independent   60-79 Minimally independent   40-59 Partially dependent   20-39 Very dependent   <20 Totally dependent     -Hua Aden., Barthel, D.W. (1965). Functional evaluation: the Barthel Index. [address] ([address] (1997). The Barthel activities of daily living index: self-reporting versus actual performance in the old (> or = 75 years). Journal [address] 45(7), 14 [facility], J.FUADF, Joss Nunez., Bertrand Legato. (1999). Measuring the change in disability after inpatient rehabilitation; comparison of the responsiveness of the Barthel Index and Functional [place] Measure. Journal of Neurology, Neurosurgery, and Psychiatry, 66(4), 378-154. MARNIE Kline, MARLYS Dotson, & Karthik Lee M.A. (2004) Assessment of post-stroke quality of life in cost-effectiveness studies: The usefulness of the Barthel Index and the EuroQoL-5D. Quality of Life Research, 15, 895-24            Physical Therapy Evaluation Charge Determination   History Examination Presentation Decision-Making   HIGH Complexity :3+ comorbidities / personal factors will impact the outcome/ POC  LOW Complexity : 1-2 Standardized tests and measures addressing body structure, function, activity limitation and / or participation in recreation  MEDIUM Complexity : Evolving with changing characteristics  Other outcome measures Barthel 75  LOW       Based on the above components, the patient evaluation is determined to be of the following complexity level: LOW     Pain Rating:  None reported    Activity Tolerance:   Good and Fair      After treatment patient left in no apparent distress:   Supine in bed, Call bell within reach and Caregiver / family present    COMMUNICATION/EDUCATION:   The patients plan of care was discussed with: Occupational therapist and Registered nurse.      Fall prevention education was provided and the patient/caregiver indicated understanding.     Thank you for this referral.  Rony Dorsey, PT   Time Calculation: 20 mins

## 2022-02-04 NOTE — PROGRESS NOTES
OCCUPATIONAL THERAPY EVALUATION/DISCHARGE  Patient: Harpal Hector (31 y.o. female)  Date: 2/4/2022  Primary Diagnosis: Acute GI bleeding [K92.2]  Procedure(s) (LRB):  ESOPHAGOGASTRODUODENOSCOPY (EGD) (N/A)  COLONOSCOPY (N/A)  ENDOSCOPIC POLYPECTOMY (N/A)  RESOLUTION CLIP x2 (N/A) Day of Surgery   Precautions:  PWB (to R UE)    ASSESSMENT  Based on the objective data described below, the patient presents with near baseline ADL performance and mobility following admission for anemia. Pt is s/p colonoscopy this AM, now cleared to participate with therapy. She is pleasant and oriented and is able to demonstrate serial ADL and mobility tasks with overall supervision to MOD I. No LOB noted for OOB tasks and she [facility] to R UE (recent shoulder repair in October 2021) during all tasks. Pt has all needed DME and AE at home, including shower chair, raised toilet seat, alondra-walker, and LH AE. She reports feeling near baseline and is agreeable to resume her [facility] therapy services at discharge. Current Level of Function (ADLs/self-care): overall supervision to MOD I for ADLs and mobility    Functional Outcome Measure: The patient scored 75/100 on the Barthel outcome measure which is indicative of minimal functional impairment.       Other factors to consider for discharge: supportive dtr; has all needed DME and AE     PLAN :  Recommend with staff: OOB to chair for meals; mobility to bathroom for toileting; ADLs as needed    Recommendation for discharge: (in order for the patient to meet his/her long term goals)  Occupational therapy at least 2 days/week in the home     This discharge recommendation:  Has been made in collaboration with the attending provider and/or case management    IF patient discharges home will need the following DME: patient owns DME required for discharge       SUBJECTIVE:   Patient stated I have my equipment that I use at home to get my socks and shoes on.    OBJECTIVE DATA SUMMARY: HISTORY:   Past Medical History:   Diagnosis Date    CAD (coronary artery disease)     CHF (congestive heart failure), NYHA class I, chronic, systolic (HCC)     Systolic and diastolic CHF per echocardiogram of 12/25/2021. Patient was found to have LVEF 45%    Chronic kidney disease     stage III/IV:Creatinine 1.45-1.7 with GFR in the low 30s-high 20s    Diabetes mellitus type 2 in nonobese ([address]     Hypertension     Ischemic cardiomyopathy     Echocardiogram of 12/25/2021: mild left ventricular systolic dysfunction (EF 90%) with inferior and basal-mid septal akinesis. Diastolic dysfunction.  Peripheral vascular disease ([address]      Past Surgical History:   Procedure Laterality Date    COLONOSCOPY N/A 2/4/2022    COL[facility] by Mali Godinez MD at [facility]    HX APPENDECTOMY      HX CORONARY STENT PLACEMENT      stents x 3 to mid-distal right coronary artery going into PDA for 100% occlusion of RCA; and also had nonobstructive disease in 30% LAD stenosis, 70% diagonal 1 stenosis. She had proximal 30% stenosis in the codominant left circumflex artery     HX HYSTERECTOMY      HX KNEE REPLACEMENT Left     HX OTHER SURGICAL      head sx x 2 nerve related    HX OTHER SURGICAL      Back surgery x 2    HX TONSILLECTOMY      [place] CARDIAC SURG PROCEDURE UNLIST      heart stents. x 3 in january 2022.         Prior Level of Function/Environment/Context: lives with dtr; mod I with RW and alondra-walker; mod I with ADLs; dtr provides supervision and assistance as needed for bathing  Expanded or extensive additional review of patient history:   Home Situation  Home Environment: Private residence  # Steps to Enter: 0  One/Two Story Residence: Two story, live on 1st floor  Living Alone: No  Support Systems: Child(watson)  Patient Expects to be Discharged to[de-identified] Home  Current DME Used/Available at Home: [employer], rolling,Shower chair,Raised toilet seat,Adaptive dressing aides,Cane, straight (alondra walker)  Tub or Shower Type: Shower    Hand dominance: Right    EXAMINATION OF PERFORMANCE DEFICITS:  Cognitive/Behavioral Status:  Neurologic State: Alert  Orientation Level: Oriented X4  Cognition: Appropriate decision making; Appropriate for age attention/concentration; Appropriate safety awareness; Follows commands  Perception: Appears intact  Perseveration: No perseveration noted  Safety/Judgement: Awareness of environment; Fall prevention;Good awareness of safety precautions; Home safety; Insight into deficits    Range of Motion:  AROM: Generally decreased, functional     Strength:  Strength: Generally decreased, functional    Coordination:  Coordination: Within functional limits  Fine Motor Skills-Upper: Left Intact; Right Intact    Gross Motor Skills-Upper: Left Intact; Right Intact    Tone & Sensation:  Tone: Normal  Sensation: Intact    Balance:  Sitting: Intact  Standing: Intact; With support    Functional Mobility and Transfers for ADLs:  Bed Mobility:  Rolling: Supervision  Supine to Sit: Supervision  Sit to Supine: Supervision  Scooting: Supervision    Transfers:  Sit to Stand: Supervision  Stand to Sit: Supervision  Bed to Chair: Supervision  Bathroom Mobility: Supervision/set up  Toilet Transfer : Modified independent    ADL Assessment:  Feeding: Independent    Oral Facial Hygiene/Grooming: Supervision    Bathing: Supervision    Upper Body Dressing: Setup    Lower Body Dressing: Supervision;Modified independent    Toileting: Supervision;Modified independent    ADL Intervention and task modifications:  Grooming  Grooming Assistance: Supervision  Position Performed: Standing (at sink with RW)  Washing Hands: Supervision    Lower Body Dressing Assistance  Socks: Supervision (to pull up/adjust)  Leg Crossed Method Used: No  Position Performed: Bending forward method;Seated edge of bed  Cues: Osei Arana  Bladder Hygiene: Modified independent  Clothing Management: Supervision  Cues: Verbal cues provided  Adaptive Equipment: Grab bars; Walker    Cognitive Retraining  Safety/Judgement: Awareness of environment; Fall prevention;Good awareness of safety precautions; Home safety; Insight into deficits     Functional Measure:    Barthel Index:  Bathin  Bladder: 10  Bowels: 10  Groomin  Dressing: 10  Feeding: 10  Mobility: 10  Stairs: 0  Toilet Use: 10  Transfer (Bed to Chair and Back): 10  Total: 75/100      The Barthel ADL Index: Guidelines  1. The index should be used as a record of what a patient does, not as a record of what a patient could do. 2. The main aim is to establish degree of independence from any help, physical or verbal, however minor and for whatever reason. 3. The need for supervision renders the patient not independent. 4. A patient's performance should be established using the best available evidence. Asking the patient, friends/relatives and nurses are the usual sources, but direct observation and common sense are also important. However direct testing is not needed. 5. Usually the patient's performance over the preceding 24-48 hours is important, but occasionally longer periods will be relevant. 6. Middle categories imply that the patient supplies over 50 per cent of the effort. 7. Use of aids to be independent is allowed. Score Interpretation (from [address] [facility] 83)    Independent   60-79 Minimally independent   40-59 Partially dependent   20-39 Very dependent   <20 Totally dependent     -Hua Aden., Barthel, D.W. (1965). Functional evaluation: the Barthel Index. [facility] ([address] [facility] [address] (1997). The Barthel activities of daily living index: self-reporting versus actual performance in the old (> or = 75 years). Journal of [address] [place] [address] 45(7), 14 [facility], J.J..F, Balbina Givens., Leah Chandler. (1999).  Measuring the change in disability after inpatient rehabilitation; comparison of the responsiveness of the Barthel Index and Functional [place] Measure. Journal of Neurology, Neurosurgery, and Psychiatry, 66(4), 956-449. MARNIE Moctezuma, MARLYS Dotson, & Gustavo Hurtado M.A. (2004) Assessment of post-stroke quality of life in cost-effectiveness studies: The usefulness of the Barthel Index and the EuroQoL-5D. Quality of Life Research, 15, 309-15     Occupational Therapy Evaluation Charge Determination   History Examination Decision-Making   LOW Complexity : Brief history review  LOW Complexity : 1-3 performance deficits relating to physical, cognitive , or psychosocial skils that result in activity limitations and / or participation restrictions  MEDIUM Complexity : Patient may present with comorbidities that affect occupational performnce. Miniml to moderate modification of tasks or assistance (eg, physical or verbal ) with assesment(s) is necessary to enable patient to complete evaluation       Based on the above components, the patient evaluation is determined to be of the following complexity level: LOW   Pain Rating:  Pt reporting minimal pain    Activity Tolerance:   Fair, tolerates ADLs without rest breaks and SpO2 stable on RA    After treatment patient left in no apparent distress:    Supine in bed, Call bell within reach, Caregiver / family present and Side rails x 3    COMMUNICATION/EDUCATION:   The patients plan of care was discussed with: Physical therapist and Registered nurse.      Thank you for this referral.  Yusef Patiño, OT  Time Calculation: 19 mins

## 2022-02-04 NOTE — PROGRESS NOTES
[facility], MD, [address] [place] [address]  [address]  [phone]      IMPRESSION and RECOMMENDATIONS     1.  CAD:  S/P \"Full metal jacket\" of RCA with Xience stents 12/24/21. Cont metoprolol. Norvasc increased for HTN. Was on lipitor at home, can resume is desired.     2. GIB:  ASA/plavix held. This is reasonable as the new 2nd generation GERRY have a low and acceptable risk of acute stent thrombosis after 28d. I would like to resume plavix +/- ASA 81mg every day if source of bleeding is located and treated and/or if acceptable with GI. Will see prn over the weekend. Dr. Christianne Santiago is available should issues arise.     Discussed with Pt and family. Subjective:       No complaints. Objective:   Patient Vitals for the past 16 hrs:   BP Temp Pulse Resp SpO2   02/04/22 0842 (!) 170/66 97.9 °F (36.6 °C) 66 18 98 %   02/04/22 0051 (!) 156/65 98.2 °F (36.8 °C) (!) 58 18 96 %   02/03/22 2239 -- -- 60 -- --   02/03/22 2037 (!) 155/52 97.8 °F (36.6 °C) 67 18 97 %       HEENT Exam:     WNL         Lung Exam:     The patient is not dyspneic. Breath sounds are heard equally in all lung fields. There are no wheezes, rales, rhonchi, or rubs heard on auscultation. Heart Exam:     The rhythm is regular. The PMI is in the 5th intercostal space of the MCL. Apical impulse is normal. S1 is regular. S2 is physiologic. There is no S3, S4 gallop, murmur, click, or rub. Abdomen Exam:     Benign. Extremities Exam:     No cyanosis, clubbing, edema. Distal pulses intact.            Lab Results   Component Value Date/Time    Glucose 65 02/04/2022 04:14 AM    Sodium 141 02/04/2022 04:14 AM    Potassium 4.2 02/04/2022 04:14 AM    Chloride 113 (H) 02/04/2022 04:14 AM    CO2 23 02/04/2022 04:14 AM    BUN 12 02/04/2022 04:14 AM    Creatinine 1.42 (H) 02/04/2022 04:14 AM    Calcium 8.8 02/04/2022 04:14 AM     Recent Labs     02/04/22  0414 02/03/22  1548 02/03/22  0801 02/03/22  0034 02/02/22 1742 02/02/22 1742   WBC  --   --   --  6.0  --  6.0   HGB 8.5* 8.7*   < > 6.8*   < > 7.3*   HCT 26.8* 27.3*   < > 21.9*   < > 23.7*   PLT  --   --   --  218  --  235    < > = values in this interval not displayed. Recent Labs     02/02/22 1742   ALT 17   AP 88   TBILI 0.3   TP 7.0   ALB 3.2*   GLOB 3.8     Recent Labs     02/03/22 0034   INR 1.1   PTP 11.0      No results for input(s): CPK, CKMB, TNIPOC in the last 72 hours. No lab exists for component: TROPONINI, ITNL  No results for input(s): TROIQ in the last 72 hours.   Lab Results   Component Value Date/Time    Cholesterol, total 169 12/25/2021 04:15 AM    HDL Cholesterol 49 12/25/2021 04:15 AM    LDL, calculated 105.8 (H) 12/25/2021 04:15 AM    Triglyceride 71 12/25/2021 04:15 AM    CHOL/HDL Ratio 3.4 12/25/2021 04:15 AM

## 2022-02-04 NOTE — ANESTHESIA POSTPROCEDURE EVALUATION
Procedure(s):  ESOPHAGOGASTRODUODENOSCOPY (EGD)  COLONOSCOPY  ENDOSCOPIC POLYPECTOMY  RESOLUTION CLIP x2.     MAC    Anesthesia Post Evaluation      Multimodal analgesia: multimodal analgesia used between 6 hours prior to anesthesia start to PACU discharge  Patient location during evaluation: PACU  Patient participation: complete - patient participated  Level of consciousness: awake and alert  Pain management: adequate  Airway patency: patent  Anesthetic complications: no  Cardiovascular status: acceptable  Respiratory status: acceptable  Hydration status: acceptable  Post anesthesia nausea and vomiting:  none  Final Post Anesthesia Temperature Assessment:  Normothermia (36.0-37.5 degrees C)      INITIAL Post-op Vital signs:   Vitals Value Taken Time   /60 02/04/22 1155   Temp 36.6 °C (97.9 °F) 02/04/22 1130   Pulse 75 02/04/22 1155   Resp 22 02/04/22 1155   SpO2 100 % 02/04/22 1155

## 2022-02-04 NOTE — PROGRESS NOTES
Occupational Therapy:  02/04/22    Chart reviewed in prep for OT eval. Pt currently ARCHANA at endoscopy and unavailable to participate. Will follow up later today as able.      Thank you,  Gilda Killian, OTR/L

## 2022-02-04 NOTE — PERIOP NOTES
TRANSFER - OUT REPORT:    Verbal report given to Guanako Hughes RN (name) on Betty Huynh  being transferred to 5th floor, bed 504 (unit) for routine progression of care       Report consisted of patients Situation, Background, Assessment and   Recommendations(SBAR). Information from the following report(s) Procedure Summary, MAR and Recent Results was reviewed with the receiving nurse. Lines:   Peripheral IV 02/02/22 Left Antecubital (Active)   Site Assessment Clean, dry, & intact 02/04/22 0941   Phlebitis Assessment 0 02/04/22 0941   Infiltration Assessment 0 02/04/22 0941   Dressing Status Clean, dry, & intact 02/04/22 0941   Dressing Type Transparent;Tape 02/04/22 0941   Hub Color/Line Status Patent;Pink 02/04/22 0941   Action Taken Open ports on tubing capped 02/04/22 0941   Alcohol Cap Used Yes 02/04/22 0941        Opportunity for questions and clarification was provided.       Patient transported with:   [email or web address]

## 2022-02-04 NOTE — PROCEDURES
Rigoberto Cid M.D.  [phone]            2022          Colonoscopy Operative Report  [place] File  :  1934  [place] Medical Record Number:  [id number]      Indications:    Occult blood in stool, acute blood loss anemia, history of colon cancer    :  Henry Beck MD    Referring Provider: Kristyn Swartz NP    Sedation:  MAC anesthesia    Pre-Procedural Exam:      Airway: clear,  No airway problems anticipated  Heart: RRR, without gallops or rubs  Lungs: clear bilaterally without wheezes, crackles, or rhonchi  Abdomen: soft, nontender, nondistended, bowel sounds present  Mental Status: awake, alert and oriented to person, place and time     Procedure Details:  After informed consent was obtained with all risks and benefits of procedure explained and preoperative exam completed, the patient was taken to the endoscopy suite and placed in the left lateral decubitus position. Upon sequential sedation as per above, a digital rectal exam was performed. The Olympus videocolonoscope  was inserted in the rectum and carefully advanced to the surgical anastomosis . The quality of preparation was good. The colonoscope was slowly withdrawn with careful inspection and evaluation between folds. Retroflexion in the rectum was performed. Findings:   Terminal Ileum: normal  Cecum: surgically absent  Ascending Colon: mucosa within normal with adequate ileo-colonic anastomosis  Transverse Colon: 2  Sessile polyp(s), the largest 7 mm in size; Descending Colon: normal  Sigmoid: normal  Rectum: no mucosal lesion appreciated  Grade 1 internal hemorrhoid(s); Interventions:  2 complete polypectomy were performed using hot snare  and the polyps were  retrieved. One clip was placed on each site. Specimen Removed:  specimen #1, 5 and 7 mm in size, located in the transverse colon removed by snare cautery and retrieved for pathology    Complications: None.      EBL: None.    Impression:  Previous ileo-colonic anastomosis                         Two small polyps removed and clips placed at the sites. Small internal hemorrhoids    Recommendations:  - Resume diet  - Continue to monitor hemoglobin  - Resume iron supplements  - Can resume aspirin today, resume Plavix in 3 days. - If anemia is persistent and hemoglobin dropping, consider capsule endoscopy as outpatient.     Kannan Mishra MD  2/4/2022  11:28 AM

## 2022-02-05 VITALS
DIASTOLIC BLOOD PRESSURE: 56 MMHG | RESPIRATION RATE: 17 BRPM | BODY MASS INDEX: 25.96 KG/M2 | TEMPERATURE: 97.8 F | WEIGHT: 141.09 LBS | HEIGHT: 62 IN | SYSTOLIC BLOOD PRESSURE: 124 MMHG | HEART RATE: 66 BPM | OXYGEN SATURATION: 97 %

## 2022-02-05 LAB
ANION GAP SERPL CALC-SCNC: 6 MMOL/L (ref 5–15)
BASOPHILS # BLD: 0.1 K/UL (ref 0–0.1)
BASOPHILS NFR BLD: 1 % (ref 0–1)
BUN SERPL-MCNC: 14 MG/DL (ref 6–20)
BUN/CREAT SERPL: 8 (ref 12–20)
CALCIUM SERPL-MCNC: 8.9 MG/DL (ref 8.5–10.1)
CHLORIDE SERPL-SCNC: 111 MMOL/L (ref 97–108)
CO2 SERPL-SCNC: 23 MMOL/L (ref 21–32)
COMMENT, HOLDF: NORMAL
CREAT SERPL-MCNC: 1.7 MG/DL (ref 0.55–1.02)
DIFFERENTIAL METHOD BLD: ABNORMAL
EOSINOPHIL # BLD: 0.4 K/UL (ref 0–0.4)
EOSINOPHIL NFR BLD: 6 % (ref 0–7)
ERYTHROCYTE [DISTWIDTH] IN BLOOD BY AUTOMATED COUNT: 14.1 % (ref 11.5–14.5)
GLUCOSE SERPL-MCNC: 124 MG/DL (ref 65–100)
HCT VFR BLD AUTO: 29.1 % (ref 35–47)
HGB BLD-MCNC: 9.1 G/DL (ref 11.5–16)
IMM GRANULOCYTES # BLD AUTO: 0 K/UL (ref 0–0.04)
IMM GRANULOCYTES NFR BLD AUTO: 0 % (ref 0–0.5)
LYMPHOCYTES # BLD: 1.3 K/UL (ref 0.8–3.5)
LYMPHOCYTES NFR BLD: 20 % (ref 12–49)
MCH RBC QN AUTO: 28.3 PG (ref 26–34)
MCHC RBC AUTO-ENTMCNC: 31.3 G/DL (ref 30–36.5)
MCV RBC AUTO: 90.4 FL (ref 80–99)
MONOCYTES # BLD: 0.5 K/UL (ref 0–1)
MONOCYTES NFR BLD: 8 % (ref 5–13)
NEUTS SEG # BLD: 4.2 K/UL (ref 1.8–8)
NEUTS SEG NFR BLD: 65 % (ref 32–75)
NRBC # BLD: 0 K/UL (ref 0–0.01)
NRBC BLD-RTO: 0 PER 100 WBC
PLATELET # BLD AUTO: 209 K/UL (ref 150–400)
PMV BLD AUTO: 10.9 FL (ref 8.9–12.9)
POTASSIUM SERPL-SCNC: 4.1 MMOL/L (ref 3.5–5.1)
RBC # BLD AUTO: 3.22 M/UL (ref 3.8–5.2)
SAMPLES BEING HELD,HOLD: NORMAL
SODIUM SERPL-SCNC: 140 MMOL/L (ref 136–145)
WBC # BLD AUTO: 6.5 K/UL (ref 3.6–11)

## 2022-02-05 PROCEDURE — 74011250636 HC RX REV CODE- 250/636: Performed by: INTERNAL MEDICINE

## 2022-02-05 PROCEDURE — 36415 COLL VENOUS BLD VENIPUNCTURE: CPT

## 2022-02-05 PROCEDURE — C9113 INJ PANTOPRAZOLE SODIUM, VIA: HCPCS | Performed by: INTERNAL MEDICINE

## 2022-02-05 PROCEDURE — 85025 COMPLETE CBC W/AUTO DIFF WBC: CPT

## 2022-02-05 PROCEDURE — 74011250637 HC RX REV CODE- 250/637: Performed by: INTERNAL MEDICINE

## 2022-02-05 PROCEDURE — 74011000250 HC RX REV CODE- 250: Performed by: INTERNAL MEDICINE

## 2022-02-05 PROCEDURE — 80048 BASIC METABOLIC PNL TOTAL CA: CPT

## 2022-02-05 RX ORDER — AMLODIPINE BESYLATE 10 MG/1
10 TABLET ORAL DAILY
Qty: 90 TABLET | Refills: 0 | Status: SHIPPED | OUTPATIENT
Start: 2022-02-06 | End: 2022-10-26

## 2022-02-05 RX ORDER — LANOLIN ALCOHOL/MO/W.PET/CERES
325 CREAM (GRAM) TOPICAL
Qty: 90 TABLET | Refills: 0 | Status: SHIPPED | OUTPATIENT
Start: 2022-02-05

## 2022-02-05 RX ORDER — GUAIFENESIN 100 MG/5ML
81 LIQUID (ML) ORAL DAILY
Qty: 90 TABLET | Refills: 0 | Status: SHIPPED | OUTPATIENT
Start: 2022-02-06

## 2022-02-05 RX ORDER — CLOPIDOGREL BISULFATE 75 MG/1
75 TABLET ORAL DAILY
Qty: 30 TABLET | Refills: 0 | Status: SHIPPED
Start: 2022-02-07 | End: 2022-06-09

## 2022-02-05 RX ORDER — SENNOSIDES 8.6 MG/1
1 TABLET ORAL
Qty: 90 TABLET | Refills: 0 | Status: SHIPPED | OUTPATIENT
Start: 2022-02-05 | End: 2022-10-28

## 2022-02-05 RX ORDER — PANTOPRAZOLE SODIUM 40 MG/1
40 GRANULE, DELAYED RELEASE ORAL
Qty: 90 EACH | Refills: 0 | Status: SHIPPED | OUTPATIENT
Start: 2022-02-05

## 2022-02-05 RX ADMIN — Medication 10 ML: at 06:12

## 2022-02-05 RX ADMIN — METOPROLOL TARTRATE 50 MG: 50 TABLET, FILM COATED ORAL at 10:52

## 2022-02-05 RX ADMIN — AMLODIPINE BESYLATE 10 MG: 5 TABLET ORAL at 10:52

## 2022-02-05 RX ADMIN — SODIUM CHLORIDE, PRESERVATIVE FREE 40 MG: 5 INJECTION INTRAVENOUS at 10:52

## 2022-02-05 RX ADMIN — ASPIRIN 81 MG: 81 TABLET, CHEWABLE ORAL at 10:52

## 2022-02-05 NOTE — DISCHARGE SUMMARY
Jan Mondragon [facility]  [address] [facility], [address] [place] [address]  [phone]    Physician Discharge Summary     Patient ID:  Javier Sweeney  [id number]  80 y.o.  1934    Admit date: 2/2/2022    Discharge date and time: 2/5/2022 11:43 AM    Admission Diagnoses: Acute GI bleeding [K92.2]    Discharge Diagnoses:  Principal Diagnosis <principal problem not specified>                                            Active Problems:    CKD (chronic kidney disease) stage 3, GFR 30-59 ml/min ([facility] [address] (10/18/2021)      CAD (coronary artery disease) ()      DM type 2 causing renal disease (HCC) ()      DM type 2 causing vascular disease (HCC) ()      Hypertension ()      CHF (congestive heart failure), NYHA class I, chronic, systolic (HCC) ()      Ischemic cardiomyopathy ()      Acute GI bleeding (2/2/2022)      Iron deficiency anemia (2/3/2022)      Chronic GI bleeding (2/3/2022)           Hospital Course:     Acute GI bleeding / Chronic GI bleeding - POA, unclear source. EGD on 2/04 unremarkable, colonoscopy on 2/04 w/ two polyps removed. F/u PCP and Gi OP.      Acute blood loss anemia / Iron deficiency anemia - POA, worse than previous. Low iron on serologies; start Po iron on DC with senna PEN. Exacerbated by ASA/Plavix recently started after recent PCI stent. S/p 1 unit PRBC on 2/3 to keep Hb>7; Hgb has remained stable. F/u PCP OP.      CAD (coronary artery disease) / Ischemic cardiomyopathy / CHF systolic / HTN (hypertension) - Consulted cardiology to discuss ASA/Plavix after stent 2 months ago.  For now hold Plavix for 3 days following polpectomy, resume atorvastatin and low dose ASA. Continue metoprolol and Norvasc. F/u cardiology OP.      DM type 2 causing renal and vascular disease - POA. Hx of hypoglycemia. BG never above 180 a home. Have been low normal to normal here. Stop home trajenda. Stop all treatment, the risks outweigh any benefit.   F/u PCP OP.      COVID infection - Tested positive be rapid test. No symptoms. No hypoxia. No treatment warranted. F/u PCP OP.      CKD (chronic kidney disease) stage 3 - POA, monitor. Cr slightly increased today but close to prior baseline. Cbc and BMP early next wee; f/u with PCP, discussed with daughter. PCP: Marielle Seals NP     Consults: Cardiology and GI    Significant Diagnostic Studies:     Colonoscopy   Terminal Ileum: normal  Cecum: surgically absent  Ascending Colon: mucosa within normal with adequate ileo-colonic anastomosis  Transverse Colon: 2  Sessile polyp(s), the largest 7 mm in size; Descending Colon: normal  Sigmoid: normal  Rectum: no mucosal lesion appreciated  Grade 1 internal hemorrhoid(s);    EGD   Findings:   Esophagus:normal  Stomach: normal   Duodenum/jejunum: normal    Discharge Exam:  Physical Exam:    Gen: elderly, in no acute distress  HEENT:  Pink conjunctivae, PERRL, hearing intact to voice, moist mucous membranes  Resp: No accessory muscle use, clear breath sounds without wheezes rales or rhonchi  Card: No murmurs, normal S1, S2 without thrills, bruits or peripheral edema  Abd:  Soft, non-tender, non-distended, normoactive bowel sounds are present  Musc: No cyanosis or clubbing  Skin: No rashes or ulcers, skin turgor is good  Neuro:  Cranial nerves are grossly intact, follows commands appropriately  Psych:  oriented to person, place and time, alert    Disposition: home  Discharge Condition: Stable    MEDICATIONS:  Current Discharge Medication List      START taking these medications    Details   aspirin 81 mg chewable tablet Take 1 Tablet by mouth daily. Qty: 90 Tablet, Refills: 0      ferrous sulfate (Iron) 325 mg (65 mg iron) tablet Take 1 Tablet by mouth Daily (before breakfast). Qty: 90 Tablet, Refills: 0      senna (Senna) 8.6 mg tablet Take 1 Tablet by mouth daily as needed for Constipation.   Qty: 90 Tablet, Refills: 0         CONTINUE these medications which have CHANGED    Details   clopidogreL (PLAVIX) 75 mg tab Take 1 Tablet by mouth daily. Please hold for 3 days following procedure on 2/04/21. Qty: 30 Tablet, Refills: 0      pantoprazole (PROTONIX) 40 mg granules for oral suspension Take 40 mg by mouth Daily (before breakfast). Qty: 90 Each, Refills: 0      amLODIPine (NORVASC) 10 mg tablet Take 1 Tablet by mouth daily. Qty: 90 Tablet, Refills: 0         CONTINUE these medications which have NOT CHANGED    Details   cholecalciferol, vitamin D3, (Vitamin D3) 50 mcg (2,000 unit) tab Take  by mouth. atorvastatin (LIPITOR) 20 mg tablet Take 1 Tablet by mouth daily. Qty: 30 Tablet, Refills: 1      metoprolol tartrate (LOPRESSOR) 50 mg tablet Take 1 Tablet by mouth every twelve (12) hours. Qty: 60 Tablet, Refills: 1      ondansetron hcl (Zofran) 4 mg tablet Take 4 mg by mouth daily as needed for Nausea or Vomiting. albuterol (PROVENTIL VENTOLIN) 2.5 mg /3 mL (0.083 %) nebu 2.5 mg by Nebulization route every four (4) hours as needed for Wheezing or Shortness of Breath. albuterol (PROVENTIL HFA, VENTOLIN HFA, PROAIR HFA) 90 mcg/actuation inhaler Take 2 Puffs by inhalation every four (4) hours as needed for Wheezing.   Qty: 1 Inhaler, Refills: 0         STOP taking these medications       linaGLIPtin (Tradjenta) 5 mg tablet Comments:   Reason for Stopping:         aspirin (ASPIRIN) 325 mg tablet Comments:   Reason for Stopping:               Activity: Activity as tolerated  Diet: Low fat, Low cholesterol  Wound Care: None needed    Follow-up with  Follow-up Information     Follow up With Specialties Details Why Contact Info    Gomez Romero NP Nurse Practitioner Schedule an appointment as soon as possible for a visit in 1 week Hospital Follow Up  [address] [place] [address]      Opal Barahona MD Gastroenterology Schedule an appointment as soon as possible for a visit in 2 weeks Follow Up  [address] [facility] [address] [facility]  [phone]      Hortencia Renee Papito Galeana MD Cardiology, Interventional Cardiology Schedule an appointment as soon as possible for a visit in 1 week Hospital Follow Up [facility]   [phone]        Schedule an appointment as soon as possible for a visit Please follow up with nephrology as soon as possible as planned prior to your hospital admission. Follow-up tests/labs as above.      Signed:  Jaime Tristan DO  2/5/2022  11:43 AM  **I personally spent 35 min on discharge**

## 2022-02-05 NOTE — PROGRESS NOTES
I have reviewed discharge instructions with the patient & daughter. The patient & daughter verbalized understanding. IV removed.  Signed copy of AVS placed in patients chart full weight-bearing

## 2022-02-05 NOTE — PROGRESS NOTES
Verbal shift change report given to Yasemin Jaeger RN (oncoming nurse) by Daxa Madison RN (offgoing nurse). Report included the following information SBAR, Kardex, Procedure Summary, Intake/Output, MAR, Accordion, Recent Results and Med Rec Status.

## 2022-02-05 NOTE — DISCHARGE INSTRUCTIONS
HOSPITALIST DISCHARGE INSTRUCTIONS  NAME: Aldair Cole   :  1934   MRN:  [id number]     Date/Time:  2022 11:35 AM    ADMIT DATE: 2022     DISCHARGE DATE: 2022     ADMITTING DIAGNOSIS:  GI bleeding   Anemia    DISCHARGE DIAGNOSIS:  GI bleeding   Anemia     Patient Education        Gastrointestinal Bleeding: Care Instructions  Your Care Instructions     The digestive or gastrointestinal tract goes from the mouth to the anus. It is often called the GI tract. Bleeding can happen anywhere in the GI tract. It may be caused by an ulcer, an infection, or cancer. It may also be caused by medicines such as aspirin or ibuprofen. Light bleeding may not cause any symptoms at first. But if you continue to bleed for a while, you may feel very weak or tired. Sudden, heavy bleeding means you need to see a doctor right away. This kind of bleeding can be very dangerous. But it can usually be cured or controlled. The doctor may do some tests to find the cause of your bleeding. Follow-up care is a key part of your treatment and safety. Be sure to make and go to all appointments, and call your doctor if you are having problems. It's also a good idea to know your test results and keep a list of the medicines you take. How can you care for yourself at home? · Be safe with medicines. Take your medicines exactly as prescribed. Call your doctor if you think you are having a problem with your medicine. You will get more details on the specific medicines your doctor prescribes. · Do not take aspirin or other anti-inflammatory medicines, such as naproxen (Aleve) or ibuprofen (Advil, Motrin), without talking to your doctor first. Ask your doctor if it is okay to use acetaminophen (Tylenol). · Do not drink alcohol. · The bleeding may make you lose iron. So it's important to eat foods that have a lot of iron. These include red meat, shellfish, poultry, and eggs.  They also include beans, raisins, whole-grain breads, and leafy green vegetables. If you want help planning meals, you can make an appointment with a dietitian. When should you call for help? Call 911 anytime you think you may need emergency care. For example, call if:    · You have sudden, severe belly pain.     · You vomit blood or what looks like coffee grounds.     · You passed out (lost consciousness).     · Your stools are maroon or very bloody. Call your doctor now or seek immediate medical care if:    · You are dizzy or lightheaded, or you feel like you may faint.     · Your stools are black and look like tar, or they have streaks of blood.     · You have belly pain.     · You vomit or have nausea.     · You have trouble swallowing, or it hurts when you swallow. Watch closely for changes in your health, and be sure to contact your doctor if:    · You do not get better as expected. Where can you learn more? Go to http://www.gray.com/  Enter F981 in the search box to learn more about \"Gastrointestinal Bleeding: Care Instructions. \"  Current as of: July 1, 2021               Content Version: 13.0  © 6140-0842 [employer]. Care instructions adapted under license by [employer] (which disclaims liability or warranty for this information). If you have questions about a medical condition or this instruction, always ask your healthcare professional. [place] [address] any warranty or liability for your use of this information. Patient Education        Anemia: Care Instructions  Your Care Instructions     Anemia is a low level of red blood cells, which carry oxygen throughout your body. Many things can cause anemia. Lack of iron is one of the most common causes. Your body needs iron to make hemoglobin, a substance in red blood cells that carries oxygen from the lungs to your body's cells. Without enough iron, the body produces fewer and smaller red blood cells.  As a result, your body's cells do not get enough oxygen, and you feel tired and weak. And you may have trouble concentrating. Bleeding is the most common cause of a lack of iron. You may have heavy menstrual bleeding or bleeding caused by conditions such as ulcers, hemorrhoids, or cancer. Regular use of aspirin or other anti-inflammatory medicines (such as ibuprofen) also can cause bleeding in some people. A lack of iron in your diet also can cause anemia, especially at times when the body needs more iron, such as during pregnancy, infancy, and the teen years. Your doctor may have prescribed iron pills. It may take several months of treatment for your iron levels to return to normal. Your doctor also may suggest that you eat foods that are rich in iron, such as meat and beans. There are many other causes of anemia. It is not always due to a lack of iron. Finding the specific cause of your anemia will help your doctor find the right treatment for you. Follow-up care is a key part of your treatment and safety. Be sure to make and go to all appointments, and call your doctor if you are having problems. It's also a good idea to know your test results and keep a list of the medicines you take. How can you care for yourself at home? · Take your medicines exactly as prescribed. Call your doctor if you think you are having a problem with your medicine. · If your doctor recommends iron pills, take them as directed:  ? Try to take the pills on an empty stomach about 1 hour before or 2 hours after meals. But you may need to take iron with food to avoid an upset stomach. ? Do not take antacids or drink milk or caffeine drinks (such as coffee, tea, or cola) at the same time or within 2 hours of the time that you take your iron. They can make it hard for your body to absorb the iron. ? Vitamin C (from food or supplements) helps your body absorb iron.  Try taking iron pills with a glass of orange juice or some other food that is high in vitamin C, such as citrus fruits. ? Iron pills may cause stomach problems, such as heartburn, nausea, diarrhea, constipation, and cramps. Be sure to drink plenty of fluids, and include fruits, vegetables, and fiber in your diet each day. Iron pills often make your bowel movements dark or green. ? If you forget to take an iron pill, do not take a double dose of iron the next time you take a pill. ? Keep iron pills out of the reach of small children. An overdose of iron can be very dangerous. · Follow your doctor's advice about eating iron-rich foods. These include red meat, shellfish, poultry, eggs, beans, raisins, whole-grain bread, and leafy green vegetables. · Steam vegetables to help them keep their iron content. When should you call for help? Call 911 anytime you think you may need emergency care. For example, call if:    · You have symptoms of a heart attack. These may include:  ? Chest pain or pressure, or a strange feeling in the chest.  ? Sweating. ? Shortness of breath. ? Nausea or vomiting. ? Pain, pressure, or a strange feeling in the back, neck, jaw, or upper belly or in one or both shoulders or arms. ? Lightheadedness or sudden weakness. ? A fast or irregular heartbeat. After you call 911, the  may tell you to chew 1 adult-strength or 2 to 4 low-dose aspirin. Wait for an ambulance. Do not try to drive yourself.     · You passed out (lost consciousness). Call your doctor now or seek immediate medical care if:    · You have new or increased shortness of breath.     · You are dizzy or lightheaded, or you feel like you may faint.     · Your fatigue and weakness continue or get worse.     · You have any abnormal bleeding, such as:  ? Nosebleeds. ? Vaginal bleeding that is different (heavier, more frequent, at a different time of the month) than what you are used to.  ? Bloody or black stools, or rectal bleeding. ? Bloody or pink urine.    Watch closely for changes in your health, and be sure to contact your doctor if:    · You do not get better as expected. Where can you learn more? Go to http://www.Biogazelle.com/  Enter R301 in the search box to learn more about \"Anemia: Care Instructions. \"  Current as of: April 29, 2021               Content Version: 13.0  © 1551-5323 [employer]. Care instructions adapted under license by [employer] (which disclaims liability or warranty for this information). If you have questions about a medical condition or this instruction, always ask your healthcare professional. [place] [address] any warranty or liability for your use of this information. MEDICATIONS:     · It is important that you take the medication exactly as they are prescribed. · Keep your medication in the bottles provided by the pharmacist and keep a list of the medication names, dosages, and times to be taken in your wallet. · Do not take other medications without consulting your doctor     Pain Management: per above medications    What to do at Home    Recommended diet:  Low fat diet/ High Fiber     Recommended activity: Activity as tolerated    1) Return to the hospital if you feel worse    2) If you experience any of the following symptoms then please call your primary care physician or return to the emergency room if you cannot get hold of your doctor:  Fever, chills, nausea, vomiting, diarrhea, change in mentation, falling, bleeding, shortness of breath, chest pain, severe headache, severe abdominal pain. Follow Up:   Follow-up Information     Follow up With Specialties Details Why Contact Info    Stan Rivera NP Nurse Practitioner Schedule an appointment as soon as possible for a visit in 1 week Hospital Follow Up  [facility]      Sven Cuevas MD Gastroenterology Schedule an appointment as soon as possible for a visit in 2 weeks Follow Up  [address] [address]      Thea Hein MD Cardiology, Interventional Cardiology Schedule an appointment as soon as possible for a visit in 1 week Hospital Follow Up [facility] [address]  [phone]        Schedule an appointment as soon as possible for a visit Please follow up with nephrology as soon as possible as planned prior to your hospital admission. Information obtained by :  I understand that if any problems occur once I am at home I am to contact my physician. I understand and acknowledge receipt of the instructions indicated above.                                                                                                                                            Physician's or R.N.'s Signature                                                                  Date/Time                                                                                                                                              Patient or Representative Signature                                                          Date/Time

## 2022-02-05 NOTE — PROGRESS NOTES
2/5/22  12:37 PM    CM noted dc order. CM spoke to patients daughter to confirm home health agency, [facility]. Sent resumption order. No further dc needs.  [place] De La Paz

## 2022-02-07 ENCOUNTER — PATIENT OUTREACH (OUTPATIENT)
Dept: CASE MANAGEMENT | Age: 87
End: 2022-02-07

## 2022-02-07 NOTE — PROGRESS NOTES
Care Transitions Initial Call    Call within 2 business days of discharge: Yes     Patient: Dean Patton Patient : 1934 MRN: [id number]    [facility]       Complaint Diagnosis Description Type Department Provider    22 Abnormal Lab Results Gastrointestinal hemorrhage, unspecified gastrointestinal hemorrhage type . .. ED to Hosp-Admission (Discharged) (ADMIT) [id number] Viola Memos, DO; Venkat Lone. .. Was this an external facility discharge? No     Challenges to be reviewed by the provider   Additional needs identified to be addressed with provider: yes  [place] stopped at discharge  GI bleed ASA decreased from 325 mg  to 81 mg, continue to monitor H&H         Method of communication with provider : chart routing    Discussed COVID-19 related testing which was available at this time. Test results were positive. Patient informed of results, if available? yes     Advance Care Planning:   Does patient have an Advance Directive:  not on file deferred to future outreach    Inpatient Readmission Risk score: Unplanned Readmit Risk Score: 18.7 ( )    Was this a readmission? no   Patient stated reason for the admission: sent by provider for  abnormal labs    Patients top risk factors for readmission: lack of knowledge about disease, medical condition-GI bleed, anemia and medication management   Interventions to address risk factors: Scheduled appointment with PCP-22, Scheduled appointment with Specialist-22, Obtained and reviewed discharge summary and/or continuity of care documents and have lab work drawn to follow up on H&H    Care Transition Nurse (CTN) contacted the family by telephone to perform post hospital discharge assessment. Verified name and  with family as identifiers. Provided introduction to self, and explanation of the CTN role. CTN reviewed discharge instructions, medical action plan and red flags with family who verbalized understanding. Were discharge instructions available to patient? yes. Reviewed appropriate site of care based on symptoms and resources available to patient including: PCP, Specialist, Urgent [address] and When to call 911. Family given an opportunity to ask questions and does not have any further questions or concerns at this time. The family agrees to contact the PCP office for questions related to their healthcare. Medication reconciliation was performed with family, who verbalizes understanding of administration of home medications. Advised obtaining a 90-day supply of all daily and as-needed medications. Referral to Pharm D needed: no     Home Health/Outpatient orders at discharge: home health care, PT, OT and [address]: [facility]  Date of initial visit: [facility] 2/7/22    Durable Medical Equipment ordered at discharge: None    Covid Risk Education    Educated patient about risk for severe COVID-19 due to risk factors according to CDC guidelines. CTN reviewed discharge instructions, medical action plan and red flag symptoms with the family who verbalized understanding. Discussed COVID vaccination status: no. Education provided on COVID-19 vaccination as appropriate. Discussed exposure protocols and quarantine with CDC Guidelines. Family was given an opportunity to verbalize any questions and concerns and agrees to contact CTN or health care provider for questions related to their healthcare. Was patient discharged with a pulse oximeter? no. Discussed and confirmed pulse oximeter discharge instructions and when to notify provider or seek emergency care. Discussed follow-up appointments. If no appointment was previously scheduled, appointment scheduling offered: na. Is follow up appointment scheduled within 7 days of discharge? yes. [facility] follow up appointment(s): No future appointments.   Non-[facility] follow up appointment(s): cardio 2/9/22, GI 2/14/22, PCP 2/16/22    Plan for follow-up call in 5-7 days based on severity of symptoms and risk factors. Plan for next call: symptom management-GI bleeding, SOB, , follow up appointment-PCP, GI and advanced care planning education  CTN provided contact information for future needs. Goals Addressed                 This Visit's Progress     Prevent complications post hospitalization. 2/7/22 Daughter reports discharge H&H stable. Patient to have repeat labs today via [facility]. Appts cardio 2/9/22, GI 2/14/22, and PCP  2/16/22. Patient will monitor patient for bleeding, report attendance of appt and current H&H level at next week outreach.  GILBERTO

## 2022-02-13 NOTE — PROGRESS NOTES
Physician Progress Note      PATIENT:               Joann Cruz  CSN #:                  [id number]  :                       1934  ADMIT DATE:       2022 5:19 PM  [address] DATE:        2022 1:08 PM  RESPONDING  PROVIDER #:        Sara WOODSON DO          QUERY TEXT:    Dear Attending,    Patient admitted with GI bleeding, noted to have colon polyps and internal hemorrhoids on  colonoscopy. If possible, please document in progress notes and discharge summary the cause of the GI bleeding: The medical record reflects the following:  Risk Factors: GIB, HTN, DM  Clinical Indicators: to ED for eval of low Hgb  - admitted for eval of GIB  -  Colonoscopy with findings of two small colon polyps and small internal hemorrhoids  Treatment: GI consult, EGD/Colonoscopy, monitoring      Thank you,    Fartun Maxwell RN  [facility]  [phone]  Options provided:  -- GI bleeding due to colon polyps  -- GI bleeding due to internal hemorrhoids  -- Other - I will add my own diagnosis  -- Disagree - Not applicable / Not valid  -- Disagree - Clinically unable to determine / Unknown  -- Refer to Clinical Documentation Reviewer    PROVIDER RESPONSE TEXT:    Provider is clinically unable to determine a response to this query.     Query created by: Landrum Romberg on 2022 9:09 AM      Electronically signed by:  Aislinn Rogers DO 2022 12:51 PM

## 2022-02-18 ENCOUNTER — PATIENT OUTREACH (OUTPATIENT)
Dept: CASE MANAGEMENT | Age: 87
End: 2022-02-18

## 2022-02-18 NOTE — PROGRESS NOTES
Patient daughtercalled on mobile number on file. Message left on voicemail with contact information to return call to this writer.

## 2022-03-02 ENCOUNTER — PATIENT OUTREACH (OUTPATIENT)
Dept: CASE MANAGEMENT | Age: 87
End: 2022-03-02

## 2022-03-02 NOTE — PROGRESS NOTES
Patient daughter called on mobile number on file. Message left on voicemail with contact information to return call to this writer.

## 2022-03-09 ENCOUNTER — PATIENT OUTREACH (OUTPATIENT)
Dept: CASE MANAGEMENT | Age: 87
End: 2022-03-09

## 2022-03-09 NOTE — PROGRESS NOTES
Patient has graduated from the Transitions of Care Coordination  program on 3/9/22. Patient/family has the ability to self-manage at this time Care management goals have been completed. Patient was not referred to the [facility] team for further management. Patient has Care Transition Nurse's contact information for any further questions, concerns, or needs. Patients upcoming visits:  No future appointments.

## 2022-03-18 PROBLEM — D50.9 IRON DEFICIENCY ANEMIA: Status: ACTIVE | Noted: 2022-02-03

## 2022-03-19 PROBLEM — K92.2 ACUTE GI BLEEDING: Status: ACTIVE | Noted: 2022-02-02

## 2022-03-19 PROBLEM — K92.2 CHRONIC GI BLEEDING: Status: ACTIVE | Noted: 2022-02-03

## 2022-03-19 PROBLEM — N18.30 CKD (CHRONIC KIDNEY DISEASE) STAGE 3, GFR 30-59 ML/MIN (HCC): Status: ACTIVE | Noted: 2021-10-18

## 2022-06-05 ENCOUNTER — DOCUMENTATION ONLY (OUTPATIENT)
Dept: CARDIOLOGY CLINIC | Age: 87
End: 2022-06-05

## 2022-06-05 NOTE — PROGRESS NOTES
Patient of Dr Ofelia Montalvo  Daughter called that she has been short of breaths going to bathroom  bp is high so she goes back on bp med and lasix 40 mg every day  Creatinine 1.7 in Feb  She can only increase lasix to bid today and I will pass this on to Dr Ofelia Montalvo to follow up  I think she would need to get BMP if she improves on lasix bid but if not she needs to go to ER and admitted

## 2022-06-06 ENCOUNTER — HOSPITAL ENCOUNTER (INPATIENT)
Age: 87
LOS: 3 days | Discharge: HOME | DRG: 377 | End: 2022-06-09
Attending: EMERGENCY MEDICINE | Admitting: INTERNAL MEDICINE
Payer: MEDICARE

## 2022-06-06 ENCOUNTER — APPOINTMENT (OUTPATIENT)
Dept: GENERAL RADIOLOGY | Age: 87
DRG: 377 | End: 2022-06-06
Attending: EMERGENCY MEDICINE
Payer: MEDICARE

## 2022-06-06 DIAGNOSIS — J81.0 ACUTE PULMONARY EDEMA (HCC): ICD-10-CM

## 2022-06-06 DIAGNOSIS — D64.9 ANEMIA, UNSPECIFIED TYPE: Primary | ICD-10-CM

## 2022-06-06 DIAGNOSIS — R06.09 DYSPNEA ON EXERTION: ICD-10-CM

## 2022-06-06 PROBLEM — E86.0 DEHYDRATION: Status: ACTIVE | Noted: 2022-06-06

## 2022-06-06 PROBLEM — K92.2 GI BLEED: Status: ACTIVE | Noted: 2022-06-06

## 2022-06-06 PROBLEM — N17.9 AKI (ACUTE KIDNEY INJURY) (HCC): Status: ACTIVE | Noted: 2022-06-06

## 2022-06-06 LAB
ALBUMIN SERPL-MCNC: 3.2 G/DL (ref 3.5–5)
ALBUMIN/GLOB SERPL: 1 {RATIO} (ref 1.1–2.2)
ALP SERPL-CCNC: 69 U/L (ref 45–117)
ALT SERPL-CCNC: 15 U/L (ref 12–78)
ANION GAP SERPL CALC-SCNC: 9 MMOL/L (ref 5–15)
APPEARANCE UR: CLEAR
AST SERPL-CCNC: 11 U/L (ref 15–37)
BACTERIA URNS QL MICRO: NEGATIVE /HPF
BASOPHILS # BLD: 0.1 K/UL (ref 0–0.1)
BASOPHILS NFR BLD: 1 % (ref 0–1)
BILIRUB SERPL-MCNC: 0.7 MG/DL (ref 0.2–1)
BILIRUB UR QL: NEGATIVE
BNP SERPL-MCNC: 1678 PG/ML
BUN SERPL-MCNC: 25 MG/DL (ref 6–20)
BUN/CREAT SERPL: 12 (ref 12–20)
CALCIUM SERPL-MCNC: 8.4 MG/DL (ref 8.5–10.1)
CHLORIDE SERPL-SCNC: 105 MMOL/L (ref 97–108)
CO2 SERPL-SCNC: 27 MMOL/L (ref 21–32)
COLOR UR: NORMAL
COMMENT, HOLDF: NORMAL
COVID-19 RAPID TEST, COVR: NOT DETECTED
CREAT SERPL-MCNC: 2.09 MG/DL (ref 0.55–1.02)
DIFFERENTIAL METHOD BLD: ABNORMAL
EOSINOPHIL # BLD: 0.2 K/UL (ref 0–0.4)
EOSINOPHIL NFR BLD: 5 % (ref 0–7)
EPITH CASTS URNS QL MICRO: NORMAL /LPF
ERYTHROCYTE [DISTWIDTH] IN BLOOD BY AUTOMATED COUNT: 19.9 % (ref 11.5–14.5)
FERRITIN SERPL-MCNC: 13 NG/ML (ref 8–252)
GLOBULIN SER CALC-MCNC: 3.2 G/DL (ref 2–4)
GLUCOSE SERPL-MCNC: 157 MG/DL (ref 65–100)
GLUCOSE UR STRIP.AUTO-MCNC: NEGATIVE MG/DL
HAPTOGLOB SERPL-MCNC: 123 MG/DL (ref 30–200)
HCT VFR BLD AUTO: 20.7 % (ref 35–47)
HEMOCCULT STL QL: POSITIVE
HGB BLD-MCNC: 6.1 G/DL (ref 11.5–16)
HGB BLD-MCNC: 6.8 G/DL (ref 11.5–16)
HGB UR QL STRIP: NEGATIVE
HISTORY CHECKED?,CKHIST: NORMAL
HISTORY CHECKED?,CKHIST: NORMAL
HYALINE CASTS URNS QL MICRO: NORMAL /LPF (ref 0–2)
IMM GRANULOCYTES # BLD AUTO: 0 K/UL (ref 0–0.04)
IMM GRANULOCYTES NFR BLD AUTO: 0 % (ref 0–0.5)
IRON SATN MFR SERPL: 6 % (ref 20–50)
IRON SERPL-MCNC: 17 UG/DL (ref 35–150)
KETONES UR QL STRIP.AUTO: NEGATIVE MG/DL
LDH SERPL L TO P-CCNC: 178 U/L (ref 81–246)
LEUKOCYTE ESTERASE UR QL STRIP.AUTO: NEGATIVE
LYMPHOCYTES # BLD: 1.2 K/UL (ref 0.8–3.5)
LYMPHOCYTES NFR BLD: 24 % (ref 12–49)
MCH RBC QN AUTO: 27 PG (ref 26–34)
MCHC RBC AUTO-ENTMCNC: 29.5 G/DL (ref 30–36.5)
MCV RBC AUTO: 91.6 FL (ref 80–99)
MONOCYTES # BLD: 0.4 K/UL (ref 0–1)
MONOCYTES NFR BLD: 8 % (ref 5–13)
NEUTS SEG # BLD: 3.1 K/UL (ref 1.8–8)
NEUTS SEG NFR BLD: 62 % (ref 32–75)
NITRITE UR QL STRIP.AUTO: NEGATIVE
NRBC # BLD: 0 K/UL (ref 0–0.01)
NRBC BLD-RTO: 0 PER 100 WBC
PH UR STRIP: 7 [PH] (ref 5–8)
PLATELET # BLD AUTO: 206 K/UL (ref 150–400)
PMV BLD AUTO: 11.5 FL (ref 8.9–12.9)
POTASSIUM SERPL-SCNC: 3.8 MMOL/L (ref 3.5–5.1)
PROCALCITONIN SERPL-MCNC: <0.05 NG/ML
PROT SERPL-MCNC: 6.4 G/DL (ref 6.4–8.2)
PROT UR STRIP-MCNC: NEGATIVE MG/DL
RBC # BLD AUTO: 2.26 M/UL (ref 3.8–5.2)
RBC #/AREA URNS HPF: NORMAL /HPF (ref 0–5)
RETICS # AUTO: 0.04 M/UL (ref 0.02–0.08)
RETICS/RBC NFR AUTO: 1.8 % (ref 0.7–2.1)
SAMPLES BEING HELD,HOLD: NORMAL
SODIUM SERPL-SCNC: 141 MMOL/L (ref 136–145)
SOURCE, COVRS: NORMAL
SP GR UR REFRACTOMETRY: 1.01 (ref 1–1.03)
TIBC SERPL-MCNC: 302 UG/DL (ref 250–450)
TROPONIN-HIGH SENSITIVITY: 11 NG/L (ref 0–51)
UROBILINOGEN UR QL STRIP.AUTO: 0.2 EU/DL (ref 0.2–1)
VIT B12 SERPL-MCNC: 221 PG/ML (ref 193–986)
WBC # BLD AUTO: 5 K/UL (ref 3.6–11)
WBC URNS QL MICRO: NORMAL /HPF (ref 0–4)

## 2022-06-06 PROCEDURE — 65270000029 HC RM PRIVATE

## 2022-06-06 PROCEDURE — 83880 ASSAY OF NATRIURETIC PEPTIDE: CPT

## 2022-06-06 PROCEDURE — 30233N1 TRANSFUSION OF NONAUTOLOGOUS RED BLOOD CELLS INTO PERIPHERAL VEIN, PERCUTANEOUS APPROACH: ICD-10-PCS | Performed by: INTERNAL MEDICINE

## 2022-06-06 PROCEDURE — 85018 HEMOGLOBIN: CPT

## 2022-06-06 PROCEDURE — 85045 AUTOMATED RETICULOCYTE COUNT: CPT

## 2022-06-06 PROCEDURE — 84484 ASSAY OF TROPONIN QUANT: CPT

## 2022-06-06 PROCEDURE — 93005 ELECTROCARDIOGRAM TRACING: CPT

## 2022-06-06 PROCEDURE — 87086 URINE CULTURE/COLONY COUNT: CPT

## 2022-06-06 PROCEDURE — P9016 RBC LEUKOCYTES REDUCED: HCPCS

## 2022-06-06 PROCEDURE — 86900 BLOOD TYPING SEROLOGIC ABO: CPT

## 2022-06-06 PROCEDURE — 82746 ASSAY OF FOLIC ACID SERUM: CPT

## 2022-06-06 PROCEDURE — 84145 PROCALCITONIN (PCT): CPT

## 2022-06-06 PROCEDURE — 86923 COMPATIBILITY TEST ELECTRIC: CPT

## 2022-06-06 PROCEDURE — 85025 COMPLETE CBC W/AUTO DIFF WBC: CPT

## 2022-06-06 PROCEDURE — 36430 TRANSFUSION BLD/BLD COMPNT: CPT

## 2022-06-06 PROCEDURE — 80053 COMPREHEN METABOLIC PANEL: CPT

## 2022-06-06 PROCEDURE — 87635 SARS-COV-2 COVID-19 AMP PRB: CPT

## 2022-06-06 PROCEDURE — 82607 VITAMIN B-12: CPT

## 2022-06-06 PROCEDURE — 82728 ASSAY OF FERRITIN: CPT

## 2022-06-06 PROCEDURE — 36415 COLL VENOUS BLD VENIPUNCTURE: CPT

## 2022-06-06 PROCEDURE — 87147 CULTURE TYPE IMMUNOLOGIC: CPT

## 2022-06-06 PROCEDURE — 82272 OCCULT BLD FECES 1-3 TESTS: CPT

## 2022-06-06 PROCEDURE — 99285 EMERGENCY DEPT VISIT HI MDM: CPT

## 2022-06-06 PROCEDURE — 83010 ASSAY OF HAPTOGLOBIN QUANT: CPT

## 2022-06-06 PROCEDURE — 81001 URINALYSIS AUTO W/SCOPE: CPT

## 2022-06-06 PROCEDURE — 74011250636 HC RX REV CODE- 250/636: Performed by: INTERNAL MEDICINE

## 2022-06-06 PROCEDURE — 83615 LACTATE (LD) (LDH) ENZYME: CPT

## 2022-06-06 PROCEDURE — 71046 X-RAY EXAM CHEST 2 VIEWS: CPT

## 2022-06-06 PROCEDURE — 83550 IRON BINDING TEST: CPT

## 2022-06-06 RX ORDER — FUROSEMIDE 40 MG/1
40 TABLET ORAL DAILY
COMMUNITY

## 2022-06-06 RX ORDER — ACETAMINOPHEN 650 MG/1
650 SUPPOSITORY RECTAL
Status: DISCONTINUED | OUTPATIENT
Start: 2022-06-06 | End: 2022-06-09 | Stop reason: HOSPADM

## 2022-06-06 RX ORDER — PANTOPRAZOLE SODIUM 40 MG/1
40 TABLET, DELAYED RELEASE ORAL
Status: DISCONTINUED | OUTPATIENT
Start: 2022-06-07 | End: 2022-06-09 | Stop reason: HOSPADM

## 2022-06-06 RX ORDER — DEXTROSE, SODIUM CHLORIDE, AND POTASSIUM CHLORIDE 5; .45; .3 G/100ML; G/100ML; G/100ML
INJECTION INTRAVENOUS CONTINUOUS
Status: DISCONTINUED | OUTPATIENT
Start: 2022-06-06 | End: 2022-06-07

## 2022-06-06 RX ORDER — SODIUM CHLORIDE 9 MG/ML
250 INJECTION, SOLUTION INTRAVENOUS AS NEEDED
Status: DISCONTINUED | OUTPATIENT
Start: 2022-06-06 | End: 2022-06-09 | Stop reason: HOSPADM

## 2022-06-06 RX ORDER — POLYETHYLENE GLYCOL 3350 17 G/17G
17 POWDER, FOR SOLUTION ORAL DAILY PRN
Status: DISCONTINUED | OUTPATIENT
Start: 2022-06-06 | End: 2022-06-09 | Stop reason: HOSPADM

## 2022-06-06 RX ORDER — ACETAMINOPHEN 325 MG/1
650 TABLET ORAL
Status: DISCONTINUED | OUTPATIENT
Start: 2022-06-06 | End: 2022-06-09 | Stop reason: HOSPADM

## 2022-06-06 RX ORDER — ONDANSETRON 2 MG/ML
4 INJECTION INTRAMUSCULAR; INTRAVENOUS
Status: DISCONTINUED | OUTPATIENT
Start: 2022-06-06 | End: 2022-06-09 | Stop reason: HOSPADM

## 2022-06-06 RX ORDER — ONDANSETRON 4 MG/1
4 TABLET, ORALLY DISINTEGRATING ORAL
Status: DISCONTINUED | OUTPATIENT
Start: 2022-06-06 | End: 2022-06-09 | Stop reason: HOSPADM

## 2022-06-06 RX ADMIN — POTASSIUM CHLORIDE, DEXTROSE MONOHYDRATE AND SODIUM CHLORIDE: 300; 5; 450 INJECTION, SOLUTION INTRAVENOUS at 14:49

## 2022-06-06 RX ADMIN — SODIUM CHLORIDE 1000 ML: 9 INJECTION, SOLUTION INTRAVENOUS at 13:58

## 2022-06-06 RX ADMIN — IRON SUCROSE 100 MG: 20 INJECTION, SOLUTION INTRAVENOUS at 14:49

## 2022-06-06 RX ADMIN — POTASSIUM CHLORIDE, DEXTROSE MONOHYDRATE AND SODIUM CHLORIDE: 300; 5; 450 INJECTION, SOLUTION INTRAVENOUS at 19:15

## 2022-06-06 NOTE — CONSULTS
Blanca Worrell MD, [address] [place] [address]  [address]  [phone]    Date of  Admission: 6/6/2022 11:43 AM         IMPRESSION and RECOMMENDATIONS     1. Dyspnea:  Likely mild pulm edema due to mild LV dysfunction and reduced intravasc oncotic pressure as well as sig anemia. Agree with PRBCs. May need a dose of lasix after. Limited echo for LVF. 2.  CAD:  S/P GERRY RCA 12/2021. No evidence of ischemia. Can hold ASA/plavix if needed. Cont statin, etc.  This is reasonable as the new 2nd generation GERRY have a low and acceptable risk of acute stent thrombosis after 28d. I have discussed this plan with the patient. She appears to understand this plan and wishes to proceed ahead.          Problem List  Date Reviewed: 6/6/2022          Codes Class Noted    Dehydration ICD-10-CM: E86.0  ICD-9-CM: 276.51  6/6/2022        GI bleed ICD-10-CM: K92.2  ICD-9-CM: 578.9  6/6/2022        JOANNA (acute kidney injury) ([facility] [address] ICD-10-CM: N17.9  ICD-9-CM: 584.9  6/6/2022        Iron deficiency anemia ICD-10-CM: D50.9  ICD-9-CM: 280.9  2/3/2022        Chronic GI bleeding ICD-10-CM: K92.2  ICD-9-CM: 578.9  2/3/2022        Acute GI bleeding ICD-10-CM: K92.2  ICD-9-CM: 578.9  2/2/2022        CAD (coronary artery disease) ICD-10-CM: I25.10  ICD-9-CM: 414.00  Unknown        DM type 2 causing renal disease (HCC) ICD-10-CM: E11.29  ICD-9-CM: 250.40  Unknown        DM type 2 causing vascular disease (HCC) ICD-10-CM: E11.59  ICD-9-CM: 250.70, 443.81  Unknown        Hypertension ICD-10-CM: I10  ICD-9-CM: 401.9  Unknown        CHF (congestive heart failure), NYHA class I, chronic, systolic (HCC) DHV-25-DT: I50.22  ICD-9-CM: 428.22, 428.0  Unknown        Ischemic cardiomyopathy ICD-10-CM: I25.5  ICD-9-CM: 414.8  Unknown        CKD (chronic kidney disease) stage 3, GFR 30-59 ml/min ([facility]) ICD-10-CM: N18.30  ICD-9-CM: 585.3  10/18/2021              History of Present Illness:     Bekah Goldberg is a 80 y.o. female with the above problem list who was admitted for GI bleed [K92.2]. 80-year-old female presents with dyspnea on exertion, bradycardia, low blood pressure that started on Saturday. She denies any chest pain. Denies any fevers or chills. She is not sure if her swelling in her lower extremities is worsened. She has a history of CHF and is followed by cardiology Dr. Zoë Pan. She has had a prior admission for GI bleed and anemia. She denies chest pain/discomfort, orthopnea, paroxysmal noctural dyspnea, lower extremity edema, palpitations, syncope, or near-syncope. Current Facility-Administered Medications   Medication Dose Route Frequency    0.9% sodium chloride infusion 250 mL  250 mL IntraVENous PRN    0.9% sodium chloride infusion 250 mL  250 mL IntraVENous PRN    iron sucrose (VENOFER) injection 100 mg  100 mg IntraVENous ONCE    sodium chloride 0.9 % bolus infusion 1,000 mL  1,000 mL IntraVENous ONCE    dextrose 5% - 0.45% NaCl with KCl 40 mEq/L infusion   IntraVENous CONTINUOUS    acetaminophen (TYLENOL) tablet 650 mg  650 mg Oral Q6H PRN    Or    acetaminophen (TYLENOL) suppository 650 mg  650 mg Rectal Q6H PRN    polyethylene glycol (MIRALAX) packet 17 g  17 g Oral DAILY PRN    ondansetron (ZOFRAN ODT) tablet 4 mg  4 mg Oral Q8H PRN    Or    ondansetron (ZOFRAN) injection 4 mg  4 mg IntraVENous Q6H PRN     Current Outpatient Medications   Medication Sig    clopidogreL (PLAVIX) 75 mg tab Take 1 Tablet by mouth daily. Please hold for 3 days following procedure on 2/04/21.  pantoprazole (PROTONIX) 40 mg granules for oral suspension Take 40 mg by mouth Daily (before breakfast).  amLODIPine (NORVASC) 10 mg tablet Take 1 Tablet by mouth daily.  aspirin 81 mg chewable tablet Take 1 Tablet by mouth daily.  ferrous sulfate (Iron) 325 mg (65 mg iron) tablet Take 1 Tablet by mouth Daily (before breakfast).     senna (Senna) 8.6 mg tablet Take 1 Tablet by mouth daily as needed for Constipation.  ondansetron hcl (Zofran) 4 mg tablet Take 4 mg by mouth daily as needed for Nausea or Vomiting.  cholecalciferol, vitamin D3, (Vitamin D3) 50 mcg (2,000 unit) tab Take  by mouth.  atorvastatin (LIPITOR) 20 mg tablet Take 1 Tablet by mouth daily.  metoprolol tartrate (LOPRESSOR) 50 mg tablet Take 1 Tablet by mouth every twelve (12) hours.  albuterol (PROVENTIL VENTOLIN) 2.5 mg /3 mL (0.083 %) nebu 2.5 mg by Nebulization route every four (4) hours as needed for Wheezing or Shortness of Breath.  albuterol (PROVENTIL HFA, VENTOLIN HFA, PROAIR HFA) 90 mcg/actuation inhaler Take 2 Puffs by inhalation every four (4) hours as needed for Wheezing.       Allergies   Allergen Reactions    Codeine Nausea and Vomiting    Compazine [Prochlorperazine] Other (comments)     Facial droop    Dilaudid [Hydromorphone] Nausea and Vomiting    Morphine Nausea and Vomiting    Sulfa (Sulfonamide Antibiotics) Nausea and Vomiting      Family History   Problem Relation Age of Onset    Heart Disease Mother       Social History     Socioeconomic History    Marital status:      Spouse name: Not on file    Number of children: Not on file    Years of education: Not on file    Highest education level: Not on file   Occupational History    Not on file   Tobacco Use    Smoking status: Former Smoker     Years: 15.00    Smokeless tobacco: Never Used   Substance and Sexual Activity    Alcohol use: Yes     Comment: very rarely     Drug use: Never    Sexual activity: Not on file   Other Topics Concern     Service Not Asked    Blood Transfusions Not Asked    Caffeine Concern Not Asked    Occupational Exposure Not Asked    Hobby Hazards Not Asked    Sleep Concern Not Asked    Stress Concern Not Asked    Weight Concern Not Asked    Special Diet Not Asked    Back Care Not Asked    Exercise Not Asked    Bike Helmet Not Asked   [address] Not Asked    Self-Exams Not Asked   Social History Narrative    Not on file     Social Determinants of Health     Financial Resource Strain:     Difficulty of Paying Living Expenses: Not on file   Food Insecurity:     Worried About Running Out of Food in the Last Year: Not on file    Alondra of Food in the Last Year: Not on file   Transportation Needs:     Lack of Transportation (Medical): Not on file    Lack of Transportation (Non-Medical): Not on file   Physical Activity:     Days of Exercise per Week: Not on file    Minutes of Exercise per Session: Not on file   Stress:     Feeling of Stress : Not on file   Social Connections:     Frequency of Communication with Friends and Family: Not on file    Frequency of Social Gatherings with Friends and Family: Not on file    Attends Congregational Services: Not on file    Active Member of [address] [place] [address] [employer] or Organizations: Not on file    Attends Club or Organization Meetings: Not on file    Marital Status: Not on file   Intimate Partner Violence:     Fear of Current or Ex-Partner: Not on file    Emotionally Abused: Not on file    Physically Abused: Not on file    Sexually Abused: Not on file   Housing Stability:     Unable to Pay for Housing in the Last Year: Not on file    Number of [place] in the Last Year: Not on file    Unstable Housing in the Last Year: Not on file       Physical Exam:     Patient Vitals for the past 16 hrs:   BP Temp Pulse Resp SpO2 Height Weight   06/06/22 1245 (!) 132/41 -- (!) 58 19 98 % -- --   06/06/22 1223 (!) 135/40 -- -- -- -- -- --   06/06/22 1200 (!) 120/52 -- (!) 59 18 100 % -- --   06/06/22 1153 -- -- 64 -- -- -- --   06/06/22 1138 (!) 132/40 97.8 °F (36.6 °C) 62 16 100 % 5' 2\" (1.575 m) 61.2 kg (135 lb)       HEENT Exam:     Normocephalic, atraumatic. EOMI. Oropharynx negative. Neck supple. No lymphadenopathy. Lung Exam:     The patient is not dyspneic. There is no cough.  Breath sounds are heard equally in all lung fields. There are no wheezes, rhonchi, or rubs heard on auscultation. Bibasilar rales. Heart Exam:     The rhythm is regular. The PMI is in the 5th intercostal space of the MCL. Apical impulse is normal. S1 is regular. S2 is physiologic. There is no S3, S4 gallop, murmur, click, or rub. Abdomen Exam:     Bowel sounds are normoactive. Abdomen soft in all quadrants. No tenderness. No palpable masses. No organomegaly. No hernias noted. No bruits or pulsatile mass. Extremities Exam:     The extremities are atraumatic appearing. There is no clubbing, cyanosis, ulcers, varicose veins, rash, erythemia noted in the extremities. The neurovascular status is grossly intact with normal distal sensation and pulses. Minimal edema. Vascular Exam:     The radial, brachial, dorsalis pedis, posterior tibial, are equal and strong bilaterally The carotids are equal bilaterally without bruits. Labs:     Lab Results   Component Value Date/Time    Glucose 157 (H) 06/06/2022 11:58 AM    Sodium 141 06/06/2022 11:58 AM    Potassium 3.8 06/06/2022 11:58 AM    Chloride 105 06/06/2022 11:58 AM    CO2 27 06/06/2022 11:58 AM    BUN 25 (H) 06/06/2022 11:58 AM    Creatinine 2.09 (H) 06/06/2022 11:58 AM    Calcium 8.4 (L) 06/06/2022 11:58 AM     Recent Labs     06/06/22  1158   WBC 5.0   HGB 6.1*   HCT 20.7*        Recent Labs     06/06/22  1158   ALT 15   AP 69   TBILI 0.7   TP 6.4   ALB 3.2*   GLOB 3.2     No results for input(s): INR, PTP, APTT, INREXT in the last 72 hours. No results for input(s): CPK, CKMB, TNIPOC in the last 72 hours. No lab exists for component: TROPONINI, ITNL  No results for input(s): TROIQ in the last 72 hours.   Lab Results   Component Value Date/Time    Cholesterol, total 169 12/25/2021 04:15 AM    HDL Cholesterol 49 12/25/2021 04:15 AM    LDL, calculated 105.8 (H) 12/25/2021 04:15 AM    Triglyceride 71 12/25/2021 04:15 AM    CHOL/HDL Ratio 3.4 12/25/2021 04:15 AM EKG:  NSR

## 2022-06-06 NOTE — PROGRESS NOTES
7:00 PM  Bedside and Verbal shift change report given to Kate Jurado  (oncoming nurse) by Cayden Herman (offgoing nurse). Report included the following information SBAR, Intake/Output, MAR and Recent Results.

## 2022-06-06 NOTE — PROGRESS NOTES
Progress Note    Chart reviewed in preparation for physical therapy evaluation. Noted hgb of 6 with plan to transfuse 1u PRBC. Will hold and continue to follow to initiate PT evaluation tomorrow.     Thank you,  Anatoliy Lund, PT, DPT

## 2022-06-06 NOTE — ED TRIAGE NOTES
Pt reports shortness of breath, decreased heart rate and low blood pressure starting since Saturday. Reports dyspnea with exertion. Denies chest pain or chest tightness.

## 2022-06-06 NOTE — CONSULTS
[address] [place] [address]. [address] [place] [address] NP  [phone]                    GASTROENTEROLOGY CONSULTATION NOTE              NAME:  Edu Orozco   :   1934   MRN:   [id number]       Referring Physician:   Dr Rajinder Quinones Date:   2022 4:42 PM    Chief Complaint:    Anemia, Heme positive stools. History of Present Illness:    Patient is a 80 y.o. who we have been asked to see in consultation for the above complaint. The patient presented to the ER for complaints of shortness of breath and fatigue. This started about two days ago. Her daughter discussed these symptoms with pts cardiologist who advised increasing lasix dose. She reportely was hypotensive at home. Her hemoglobin on admission was 6.1. There has been no visible blood in the stools, no vomiting. Her last EGD and Colonoscopy were in 2022. She is anticoagulated with plavix. PMH:  Past Medical History:   Diagnosis Date    CAD (coronary artery disease)     CHF (congestive heart failure), NYHA class I, chronic, systolic (HCC)     Systolic and diastolic CHF per echocardiogram of 2021. Patient was found to have LVEF 45%    Chronic kidney disease     stage III/IV:Creatinine 1.45-1.7 with GFR in the low 30s-high 20s    Diabetes mellitus type 2 in nonobese ([address]     Hypertension     Ischemic cardiomyopathy     Echocardiogram of 2021: mild left ventricular systolic dysfunction (EF 86%) with inferior and basal-mid septal akinesis. Diastolic dysfunction.  Peripheral vascular disease (HCC)        PSH:  Past Surgical History:   Procedure Laterality Date    COLONOSCOPY N/A 2022    [facility]    HX APPENDECTOMY      HX CORONARY STENT PLACEMENT      stents x 3 to mid-distal right coronary artery going into PDA for 100% occlusion of RCA; and also had nonobstructive disease in 30% LAD stenosis, 70% diagonal 1 stenosis.   She had proximal 30% stenosis in the codominant left circumflex artery     HX HYSTERECTOMY      HX KNEE REPLACEMENT Left     HX OTHER SURGICAL      head sx x 2 nerve related    HX OTHER SURGICAL      Back surgery x 2    HX TONSILLECTOMY      SC CARDIAC SURG PROCEDURE UNLIST      heart stents. x 3 in 2022. Allergies: Allergies   Allergen Reactions    Codeine Nausea and Vomiting    Compazine [Prochlorperazine] Other (comments)     Facial droop    Dilaudid [Hydromorphone] Nausea and Vomiting    Morphine Nausea and Vomiting    Sulfa (Sulfonamide Antibiotics) Nausea and Vomiting       Home Medications:  Prior to Admission Medications   Prescriptions Last Dose Informant Patient Reported? Taking? albuterol (PROVENTIL HFA, VENTOLIN HFA, PROAIR HFA) 90 mcg/actuation inhaler Not Taking at Unknown time Self No No   Sig: Take 2 Puffs by inhalation every four (4) hours as needed for Wheezing. Patient not taking: Reported on 2022   albuterol (PROVENTIL VENTOLIN) 2.5 mg /3 mL (0.083 %) nebu Not Taking at Unknown time Self Yes No   Si.5 mg by Nebulization route every four (4) hours as needed for Wheezing or Shortness of Breath. Patient not taking: Reported on 2022   amLODIPine (NORVASC) 10 mg tablet 2022 at Unknown time  No Yes   Sig: Take 1 Tablet by mouth daily. Patient taking differently: Take 5 mg by mouth daily. aspirin 81 mg chewable tablet 2022 at Unknown time  No Yes   Sig: Take 1 Tablet by mouth daily. atorvastatin (LIPITOR) 20 mg tablet 2022 at Unknown time Child No Yes   Sig: Take 1 Tablet by mouth daily. cholecalciferol, vitamin D3, (Vitamin D3) 50 mcg (2,000 unit) tab 2022 at Unknown time Child Yes Yes   Sig: Take  by mouth. clopidogreL (PLAVIX) 75 mg tab 2022 at Unknown time  No Yes   Sig: Take 1 Tablet by mouth daily. Please hold for 3 days following procedure on 21. Patient taking differently: Take 75 mg by mouth two (2) times a day.  Please hold for 3 days following procedure on 2/04/21. ferrous sulfate (Iron) 325 mg (65 mg iron) tablet 6/6/2022 at Unknown time  No Yes   Sig: Take 1 Tablet by mouth Daily (before breakfast). furosemide (Lasix) 40 mg tablet 6/6/2022 at Unknown time  Yes Yes   Sig: Take 40 mg by mouth daily. Indications: accumulation of fluid resulting from chronic heart failure   metoprolol tartrate (LOPRESSOR) 50 mg tablet 6/6/2022 at Unknown time Child No Yes   Sig: Take 1 Tablet by mouth every twelve (12) hours. ondansetron hcl (Zofran) 4 mg tablet Unknown at Unknown time Child Yes No   Sig: Take 4 mg by mouth daily as needed for Nausea or Vomiting. pantoprazole (PROTONIX) 40 mg granules for oral suspension 6/6/2022 at Unknown time  No Yes   Sig: Take 40 mg by mouth Daily (before breakfast). senna (Senna) 8.6 mg tablet Not Taking at Unknown time  No No   Sig: Take 1 Tablet by mouth daily as needed for Constipation. Patient not taking: Reported on 6/6/2022      Facility-Administered Medications: None       Hospital Medications:  Current Facility-Administered Medications   Medication Dose Route Frequency    0.9% sodium chloride infusion 250 mL  250 mL IntraVENous PRN    0.9% sodium chloride infusion 250 mL  250 mL IntraVENous PRN    dextrose 5% - 0.45% NaCl with KCl 40 mEq/L infusion   IntraVENous CONTINUOUS    acetaminophen (TYLENOL) tablet 650 mg  650 mg Oral Q6H PRN    Or    acetaminophen (TYLENOL) suppository 650 mg  650 mg Rectal Q6H PRN    polyethylene glycol (MIRALAX) packet 17 g  17 g Oral DAILY PRN    ondansetron (ZOFRAN ODT) tablet 4 mg  4 mg Oral Q8H PRN    Or    ondansetron (ZOFRAN) injection 4 mg  4 mg IntraVENous Q6H PRN     Current Outpatient Medications   Medication Sig    furosemide (Lasix) 40 mg tablet Take 40 mg by mouth daily. Indications: accumulation of fluid resulting from chronic heart failure    clopidogreL (PLAVIX) 75 mg tab Take 1 Tablet by mouth daily.  Please hold for 3 days following procedure on 2/04/21. (Patient taking differently: Take 75 mg by mouth two (2) times a day. Please hold for 3 days following procedure on 2/04/21.)    pantoprazole (PROTONIX) 40 mg granules for oral suspension Take 40 mg by mouth Daily (before breakfast).  amLODIPine (NORVASC) 10 mg tablet Take 1 Tablet by mouth daily. (Patient taking differently: Take 5 mg by mouth daily.)    aspirin 81 mg chewable tablet Take 1 Tablet by mouth daily.  ferrous sulfate (Iron) 325 mg (65 mg iron) tablet Take 1 Tablet by mouth Daily (before breakfast).  cholecalciferol, vitamin D3, (Vitamin D3) 50 mcg (2,000 unit) tab Take  by mouth.  atorvastatin (LIPITOR) 20 mg tablet Take 1 Tablet by mouth daily.  metoprolol tartrate (LOPRESSOR) 50 mg tablet Take 1 Tablet by mouth every twelve (12) hours.  senna (Senna) 8.6 mg tablet Take 1 Tablet by mouth daily as needed for Constipation. (Patient not taking: Reported on 6/6/2022)    ondansetron hcl (Zofran) 4 mg tablet Take 4 mg by mouth daily as needed for Nausea or Vomiting.  albuterol (PROVENTIL VENTOLIN) 2.5 mg /3 mL (0.083 %) nebu 2.5 mg by Nebulization route every four (4) hours as needed for Wheezing or Shortness of Breath. (Patient not taking: Reported on 6/6/2022)    albuterol (PROVENTIL HFA, VENTOLIN HFA, PROAIR HFA) 90 mcg/actuation inhaler Take 2 Puffs by inhalation every four (4) hours as needed for Wheezing.  (Patient not taking: Reported on 6/6/2022)       Social History:  Social History     Tobacco Use    Smoking status: Former Smoker     Years: 15.00    Smokeless tobacco: Never Used   Substance Use Topics    Alcohol use: Yes     Comment: very rarely        Family History:  Family History   Problem Relation Age of Onset    Heart Disease Mother        Review of Systems:  Constitutional: negative fever, negative chills, negative weight loss  Eyes:   negative visual changes  ENT:   negative sore throat, tongue or lip swelling  Respiratory:  negative cough, negative dyspnea  Cards:  negative for chest pain, palpitations, lower extremity edema  GI:   See HPI  :  negative for frequency, dysuria  Integument:  negative for rash and pruritus  Heme:  negative for easy bruising and gum/nose bleeding  Musculoskel: negative for myalgias,  back pain and muscle weakness  Neuro: negative for headaches, dizziness, vertigo  Psych:  negative for feelings of anxiety, depression     Objective:     Patient Vitals for the past 8 hrs:   BP Temp Pulse Resp SpO2 Height Weight   06/06/22 1633 (!) 142/43 97.8 °F (36.6 °C) 65 27 100 % -- --   06/06/22 1605 (!) 136/50 97.7 °F (36.5 °C) 69 20 99 % -- --   06/06/22 1600 (!) 136/39 -- 67 21 97 % -- --   06/06/22 1515 (!) 151/62 -- 69 27 99 % -- --   06/06/22 1508 -- -- -- -- 97 % -- --   06/06/22 1508 -- -- -- 28 (!) 88 % -- --   06/06/22 1501 -- -- 72 -- -- -- --   06/06/22 1500 (!) 156/49 -- 72 19 95 % -- --   06/06/22 1445 (!) 159/55 -- 72 21 92 % -- --   06/06/22 1345 (!) 135/42 -- 64 24 98 % -- --   06/06/22 1245 (!) 132/41 -- (!) 58 19 98 % -- --   06/06/22 1223 (!) 135/40 -- -- -- -- -- --   06/06/22 1200 (!) 120/52 -- (!) 59 18 100 % -- --   06/06/22 1153 -- -- 64 -- -- -- --   06/06/22 1138 (!) 132/40 97.8 °F (36.6 °C) 62 16 100 % 5' 2\" (1.575 m) 61.2 kg (135 lb)     06/06 0701 - 06/06 1900  In: -   Out: 1800 [Urine:1800]  No intake/output data recorded. EXAM:     NEURO-alert, oriented x3, affect appropriate   HEENT-Head: Normocephalic, no lesions, without obvious abnormality. LUNGS-clear to auscultation bilaterally    COR-regular rate and rhythym     ABD- soft, non-tender.  Bowel sounds normal. No masses,  no organomegaly     EXT-no edema    Skin - No rash     Data Review     Recent Labs     06/06/22  1158   WBC 5.0   HGB 6.1*   HCT 20.7*        Recent Labs     06/06/22  1158      K 3.8      CO2 27   BUN 25*   CREA 2.09*   *   CA 8.4*     Recent Labs     06/06/22  1158   AP 69   TP 6.4   ALB 3.2*   GLOB 3.2     No results for input(s): INR, PTP, APTT, INREXT in the last 72 hours. Patient Active Problem List   Diagnosis Code    CKD (chronic kidney disease) stage 3, GFR 30-59 ml/min ([facility]) N18.30    CAD (coronary artery disease) I25.10    DM type 2 causing renal disease [facility] E11.29    DM type 2 causing vascular disease ([facility] E11.59    Hypertension I10    CHF (congestive heart failure), NYHA class I, chronic, systolic ([facility]) X32.78    Ischemic cardiomyopathy I25.5    Acute GI bleeding K92.2    Iron deficiency anemia D50.9    Chronic GI bleeding K92.2    Dehydration E86.0    GI bleed K92.2    JOANNA (acute kidney injury) ([facility] N17.9       Assessment and Plan:  Anemia with Heme Positive Stools:     - Continue to monitor hemoglobin  - Will arrange for capsule study Wednesday  - Acid suppression with Pantoprazole  - Continue supportive care    Following. Thanks for allowing me to participate in the care of this patient.   Signed By: Reagan Pate NP     6/6/2022  4:42 PM

## 2022-06-06 NOTE — H&P
[facility]  [address], [place] [address]  [phone]    Hospitalist Admission Note      NAME:  Briscoe Olszewski   :   1934   MRN:  [id number]     PCP:  Michel Lopez NP     Date/Time of service:  2022 1:50 PM          Subjective:     CHIEF COMPLAINT: PEREZ and weak    HISTORY OF PRESENT ILLNESS:     Ms. Felisa Brito is a 80 y.o.  female who presented to the Emergency Department complaining of Dyspnea, associated with fatigue, bradycardia and low BP. Admit and full negative workup in February for GI bleed. Hb in ER is 6,  Not hypoxic, but xray showing pulmonary edema and labs showing dehydration. We will admit her for management. Past Medical History:   Diagnosis Date    CAD (coronary artery disease)     CHF (congestive heart failure), NYHA class I, chronic, systolic (HCC)     Systolic and diastolic CHF per echocardiogram of 2021. Patient was found to have LVEF 45%    Chronic kidney disease     stage III/IV:Creatinine 1.45-1.7 with GFR in the low 30s-high 20s    Diabetes mellitus type 2 in nonobese ([address]     Hypertension     Ischemic cardiomyopathy     Echocardiogram of 2021: mild left ventricular systolic dysfunction (EF 40%) with inferior and basal-mid septal akinesis. Diastolic dysfunction.  Peripheral vascular disease ([address]         Past Surgical History:   Procedure Laterality Date    COLONOSCOPY N/A 2022    COL[facility]enio [facility]berg [facility]    HX APPENDECTOMY      HX CORONARY STENT PLACEMENT      stents x 3 to mid-distal right coronary artery going into PDA for 100% occlusion of RCA; and also had nonobstructive disease in 30% LAD stenosis, 70% diagonal 1 stenosis.   She had proximal 30% stenosis in the codominant left circumflex artery     HX HYSTERECTOMY      HX KNEE REPLACEMENT Left     HX OTHER SURGICAL      head sx x 2 nerve related    HX OTHER SURGICAL      Back surgery x 2    HX TONSILLECTOMY      [place] CARDIAC SURG PROCEDURE UNLIST      heart stents. x 3 in january 2022. Social History     Tobacco Use    Smoking status: Former Smoker     Years: 15.00    Smokeless tobacco: Never Used   Substance Use Topics    Alcohol use: Yes     Comment: very rarely         Family History   Problem Relation Age of Onset    Heart Disease Mother       Family hx cannot be fully assessed, since the patient cannot provide information    Allergies   Allergen Reactions    Codeine Nausea and Vomiting    Compazine [Prochlorperazine] Other (comments)     Facial droop    Dilaudid [Hydromorphone] Nausea and Vomiting    Morphine Nausea and Vomiting    Sulfa (Sulfonamide Antibiotics) Nausea and Vomiting        Prior to Admission medications    Medication Sig Start Date End Date Taking? Authorizing Provider   clopidogreL (PLAVIX) 75 mg tab Take 1 Tablet by mouth daily. Please hold for 3 days following procedure on 2/04/21. 2/7/22   Adri Fairly, DO   pantoprazole (PROTONIX) 40 mg granules for oral suspension Take 40 mg by mouth Daily (before breakfast). 2/5/22   Adri Fairly, DO   amLODIPine (NORVASC) 10 mg tablet Take 1 Tablet by mouth daily. 2/6/22   Adri Fairly, DO   aspirin 81 mg chewable tablet Take 1 Tablet by mouth daily. 2/6/22   Adri Fairly, DO   ferrous sulfate (Iron) 325 mg (65 mg iron) tablet Take 1 Tablet by mouth Daily (before breakfast). 2/5/22   Adri Fairly, DO   senna (Senna) 8.6 mg tablet Take 1 Tablet by mouth daily as needed for Constipation. 2/5/22   Adri Fairly, DO   ondansetron hcl (Zofran) 4 mg tablet Take 4 mg by mouth daily as needed for Nausea or Vomiting. Provider, Historical   cholecalciferol, vitamin D3, (Vitamin D3) 50 mcg (2,000 unit) tab Take  by mouth. Provider, Historical   atorvastatin (LIPITOR) 20 mg tablet Take 1 Tablet by mouth daily. 12/26/21   Yvonne Chan MD   metoprolol tartrate (LOPRESSOR) 50 mg tablet Take 1 Tablet by mouth every twelve (12) hours. 12/25/21   Abel Robledo MD   albuterol (PROVENTIL VENTOLIN) 2.5 mg /3 mL (0.083 %) nebu 2.5 mg by Nebulization route every four (4) hours as needed for Wheezing or Shortness of Breath.     Provider, Historical   albuterol (PROVENTIL HFA, VENTOLIN HFA, PROAIR HFA) 90 mcg/actuation inhaler Take 2 Puffs by inhalation every four (4) hours as needed for Wheezing. 7/25/21   Ritchie Rizzo MD       Review of Systems:  (bold if positive, if negative)    Gen:  fatigueEyes:  ENT:  CVS:  Pulm:  dyspneaGI:  :  MS:  Skin:  Psych:  Endo:  Hem:  Renal:  Neuro:  weakness      Objective:      VITALS:    Vital signs reviewed; most recent are:    Visit Vitals  BP (!) 132/41   Pulse (!) 58   Temp 97.8 °F (36.6 °C)   Resp 19   Ht 5' 2\" (1.575 m)   Wt 61.2 kg (135 lb)   SpO2 98%   BMI 24.69 kg/m²     SpO2 Readings from Last 6 Encounters:   06/06/22 98%   02/05/22 97%   01/13/22 97%   01/08/22 96%   12/27/21 99%   12/25/21 97%        No intake or output data in the 24 hours ending 06/06/22 1350     Exam:     Physical Exam:    Gen:  Frail, in no acute distress  HEENT:  Pale conjunctivae, PERRL, hearing intact to voice, moist mucous membranes  Neck:  Supple, without masses, thyroid non-tender  Resp:  No accessory muscle use, clear breath sounds without wheezes rales or rhonchi  Card:  No murmurs, bradycardic S1, S2 without thrills, bruits or peripheral edema  Abd:  Soft, non-tender, non-distended, normoactive bowel sounds are present, no mass  Lymph:  No cervical or inguinal adenopathy  Musc:  No cyanosis or clubbing  Skin:  No rashes or ulcers, skin turgor is good  Neuro:  Cranial nerves are grossly intact, mild motor weakness, follows commands appropriately  Psych:  Good insight, oriented to person, place and time, alert     Labs:    Recent Labs     06/06/22  1158   WBC 5.0   HGB 6.1*   HCT 20.7*        Recent Labs     06/06/22  1158      K 3.8      CO2 27   *   BUN 25*   CREA 2.09*   CA 8.4*   ALB 3.2* TBILI 0.7   ALT 15     Lab Results   Component Value Date/Time    Glucose (POC) 105 02/04/2022 11:48 AM    Glucose (POC) 87 02/03/2022 06:48 AM     No results for input(s): PH, PCO2, PO2, HCO3, FIO2 in the last 72 hours. No results for input(s): INR, INREXT in the last 72 hours. All Micro Results     Procedure Component Value Units Date/Time    CULTURE, URINE [[id number]]     Order Status: Sent Specimen: Urine from Clean catch     COVID-19 RAPID TEST [[id number]]     Order Status: Sent           I have reviewed previous records       Assessment and Plan:      Acute and Chronic GI bleeding - POA, recurrent. On admit 3 months ago negative EGD and colonoscopy by DR Angi Vance. Capsule was suggested. PPI. Clear for now. Acute blood loss anemia / Iron deficiency anemia - POA. Worse than baseline. Take iron at home. Give 1U pRBC now. Give IV iron once now. Monitor. I have discussed with the patient the rationale for blood component transfusion; its benefits in treating or preventing fatigue, organ damage, or death; and its risk which includes mild transfusion reactions, rare risk of blood borne infection, or more serious but rare reactions. I have discussed the alternatives to transfusion, including the risk and consequences of not receiving transfusion. The patient had an opportunity to ask questions and had agreed to proceed with transfusion of blood components. JOANNA (acute kidney injury) / Dehydration / CKD (chronic kidney disease) stage 3 - POA due to poor PO intake. Hydrate gently. CHF (congestive heart failure), NYHA class I, chronic, systolic / Ischemic cardiomyopathy / Pulmonary edema - Edema on xray, but no hypoxia at rest.  Is suspect anemia has further strained her CHF. Not on diuretic. Appears dehydrated. Will cautiously give fluid. Check ECHO for any change. Consult cardiology. Hold BB    DM type 2 causing renal and vascular disease - Diabetic diet when eating. SSI per protocol.   Not on home meds. A1c useless during bleeding. CAD (coronary artery disease) / Hypertension - Non acute. Troponin low. Consult cardiology as above. Hold plavix and ASA today due to bleeding. Hold metoprolol due to bradycardia. Hold Norvasc due to low BP. Resume atorvastatin when eating. Telemetry reviewed:   normal sinus rhythm    Risk of deterioration: high      Total time spent with patient: 48 Minutes I personally reviewed chart, notes, data and current medications in the medical record. I have personally examined and treated the patient at bedside during this period. To assist coordination of care and communication with nursing and staff, this note may be preliminary early in the day, but finalized by end of the day.                  Care Plan discussed with: Patient, Nursing Staff and >50% of time spent in counseling and coordination of care    Discussed:  Care Plan and D/C Planning       ___________________________________________________    Attending Physician: Herbert Lacy MD

## 2022-06-06 NOTE — ED PROVIDER NOTES
51-year-old female presents with dyspnea on exertion, bradycardia, low blood pressure that started on Saturday. She denies any chest pain. Denies any fevers or chills. She is not sure if her swelling in her lower extremities is worsened. She has a history of CHF and is followed by cardiology Dr. Tenzin Cota. She has had a prior admission for GI bleed and anemia. Past Medical History:   Diagnosis Date    CAD (coronary artery disease)     CHF (congestive heart failure), NYHA class I, chronic, systolic (HCC)     Systolic and diastolic CHF per echocardiogram of 12/25/2021. Patient was found to have LVEF 45%    Chronic kidney disease     stage III/IV:Creatinine 1.45-1.7 with GFR in the low 30s-high 20s    Diabetes mellitus type 2 in nonobese ([address]     Hypertension     Ischemic cardiomyopathy     Echocardiogram of 12/25/2021: mild left ventricular systolic dysfunction (EF 14%) with inferior and basal-mid septal akinesis. Diastolic dysfunction.  Peripheral vascular disease ([address]        Past Surgical History:   Procedure Laterality Date    COLONOSCOPY N/A 2/4/2022    COLONOSCOPY performed by Vicenta Zurita MD at [facility]    HX APPENDECTOMY      HX CORONARY STENT PLACEMENT      stents x 3 to mid-distal right coronary artery going into PDA for 100% occlusion of RCA; and also had nonobstructive disease in 30% LAD stenosis, 70% diagonal 1 stenosis. She had proximal 30% stenosis in the codominant left circumflex artery     HX HYSTERECTOMY      HX KNEE REPLACEMENT Left     HX OTHER SURGICAL      head sx x 2 nerve related    HX OTHER SURGICAL      Back surgery x 2    HX TONSILLECTOMY      [place] CARDIAC SURG PROCEDURE UNLIST      heart stents. x 3 in january 2022.           Family History:   Problem Relation Age of Onset    Heart Disease Mother        Social History     Socioeconomic History    Marital status:      Spouse name: Not on file    Number of children: Not on file    Years of education: Not on file    Highest education level: Not on file   Occupational History    Not on file   Tobacco Use    Smoking status: Former Smoker     Years: 15.00    Smokeless tobacco: Never Used   Substance and Sexual Activity    Alcohol use: Yes     Comment: very rarely     Drug use: Never    Sexual activity: Not on file   Other Topics Concern     Service Not Asked    Blood Transfusions Not Asked    Caffeine Concern Not Asked    Occupational Exposure Not Asked    Hobby Hazards Not Asked    Sleep Concern Not Asked    Stress Concern Not Asked    Weight Concern Not Asked    Special Diet Not Asked    Back Care Not Asked    Exercise Not Asked    Bike Helmet Not Asked   [address] Not Asked    Self-Exams Not Asked   Social History Narrative    Not on file     Social Determinants of Health     Financial Resource Strain:     Difficulty of Paying Living Expenses: Not on file   Food Insecurity:     Worried About Running Out of Food in the Last Year: Not on file    Alondra of Food in the Last Year: Not on file   Transportation Needs:     Lack of Transportation (Medical): Not on file    Lack of Transportation (Non-Medical):  Not on file   Physical Activity:     Days of Exercise per Week: Not on file    Minutes of Exercise per Session: Not on file   Stress:     Feeling of Stress : Not on file   Social Connections:     Frequency of Communication with Friends and Family: Not on file    Frequency of Social Gatherings with Friends and Family: Not on file    Attends [employer]ism Services: Not on file    Active Member of Clubs or Organizations: Not on file    Attends Club or Organization Meetings: Not on file    Marital Status: Not on file   Intimate Partner Violence:     Fear of Current or Ex-Partner: Not on file    Emotionally Abused: Not on file    Physically Abused: Not on file    Sexually Abused: Not on file   Housing Stability:     Unable to Pay for Housing in the Last Year: Not on file    Number of Places Lived in the Last Year: Not on file    Unstable Housing in the Last Year: Not on file         ALLERGIES: Codeine, Compazine [prochlorperazine], Dilaudid [hydromorphone], Morphine, and Sulfa (sulfonamide antibiotics)    Review of Systems   All other systems reviewed and are negative. Vitals:    06/06/22 1153 06/06/22 1200 06/06/22 1223 06/06/22 1245   BP:  (!) 120/52 (!) 135/40 (!) 132/41   Pulse: 64 (!) 59  (!) 58   Resp:  18  19   Temp:       SpO2:  100%  98%   Weight:       Height:                Physical Exam  Vitals and nursing note reviewed. Constitutional:       General: She is not in acute distress. Comments: Elderly female no acute distress   HENT:      Head: Normocephalic and atraumatic. Mouth/Throat:      Mouth: Mucous membranes are moist.   Eyes:      General: No scleral icterus. Conjunctiva/sclera: Conjunctivae normal.      Pupils: Pupils are equal, round, and reactive to light. Neck:      Trachea: No tracheal deviation. Cardiovascular:      Rate and Rhythm: Normal rate and regular rhythm. Heart sounds: No murmur heard. No friction rub. No gallop. Comments: No JVD  Pulmonary:      Effort: Pulmonary effort is normal. No respiratory distress. Breath sounds: No wheezing or rales. Abdominal:      General: There is no distension. Palpations: Abdomen is soft. Tenderness: There is no abdominal tenderness. Genitourinary:     Comments: Brown stool  Musculoskeletal:         General: No deformity. Cervical back: Neck supple. Right lower leg: Edema present. Left lower leg: Edema present. Skin:     General: Skin is warm and dry. Neurological:      General: No focal deficit present. Mental Status: She is alert.    Psychiatric:         Mood and Affect: Mood normal.          MDM  Number of Diagnoses or Management Options  Acute pulmonary edema (HCC)  Anemia, unspecified type  Dyspnea on exertion  Diagnosis management comments: 49-year-old female with a history of CHF, anemia presents with dyspnea on exertion. Hemoglobin returned 6. Transfusion ordered. Chest x-ray shows pulmonary edema but patient is in no acute distress and has 100% oxygen saturation. Her blood pressure is borderline low at MAP of 60. We will hold off on diuretics. Likely will benefit from transfusion and her volume status can be optimized at that time. Rectal exam performed showing brown stool, Hemoccult pending at time of admission. Amount and/or Complexity of Data Reviewed  Decide to obtain previous medical records or to obtain history from someone other than the patient: yes    Risk of Complications, Morbidity, and/or Mortality  General comments: Total critical care time spent exclusive of procedures:  33 minutes            ED Course as of 06/06/22 1340   Mon Jun 06, 2022   1145 EKG shows normal sinus rhythm at rate of 61, normal axis, normal intervals, PVC present, no acute ischemia or infarction [TT]      ED Course User Index  [TT] Mahendra Win MD       Procedures        Perfect Serve Consult for Admission  1:41 PM    ED Room Number: EV44/86  Patient Name and age:  Cathy Constantino 80 y.o.  female  Working Diagnosis:   1. Anemia, unspecified type    2. Acute pulmonary edema (HCC)    3. Dyspnea on exertion        COVID-19 Suspicion:  no  Sepsis present:  no  Reassessment needed: N/A  Code Status:  Do Not Resuscitate  Readmission: no  Isolation Requirements:  no  Recommended Level of Care:  telemetry  Department:[facility] ED - [phone]  Other: Hemoglobin returned 6. Hemoccult pending. Brown stool. Has been mild pulmonary edema and effusions on chest x-ray. Blood pressure soft so did not start diuresis. Peng Payment.  Maria Luisa Womack MD

## 2022-06-07 ENCOUNTER — APPOINTMENT (OUTPATIENT)
Dept: GENERAL RADIOLOGY | Age: 87
DRG: 377 | End: 2022-06-07
Attending: STUDENT IN AN ORGANIZED HEALTH CARE EDUCATION/TRAINING PROGRAM
Payer: MEDICARE

## 2022-06-07 ENCOUNTER — APPOINTMENT (OUTPATIENT)
Dept: NON INVASIVE DIAGNOSTICS | Age: 87
DRG: 377 | End: 2022-06-07
Attending: INTERNAL MEDICINE
Payer: MEDICARE

## 2022-06-07 LAB
ABO + RH BLD: NORMAL
ALBUMIN SERPL-MCNC: 2.8 G/DL (ref 3.5–5)
ALBUMIN/GLOB SERPL: 0.9 {RATIO} (ref 1.1–2.2)
ALP SERPL-CCNC: 62 U/L (ref 45–117)
ALT SERPL-CCNC: 14 U/L (ref 12–78)
ANION GAP SERPL CALC-SCNC: 7 MMOL/L (ref 5–15)
AST SERPL-CCNC: 14 U/L (ref 15–37)
BILIRUB SERPL-MCNC: 1.6 MG/DL (ref 0.2–1)
BLD PROD TYP BPU: NORMAL
BLD PROD TYP BPU: NORMAL
BLOOD GROUP ANTIBODIES SERPL: NORMAL
BPU ID: NORMAL
BPU ID: NORMAL
BUN SERPL-MCNC: 22 MG/DL (ref 6–20)
BUN/CREAT SERPL: 12 (ref 12–20)
CALCIUM SERPL-MCNC: 8.2 MG/DL (ref 8.5–10.1)
CHLORIDE SERPL-SCNC: 109 MMOL/L (ref 97–108)
CO2 SERPL-SCNC: 28 MMOL/L (ref 21–32)
CREAT SERPL-MCNC: 1.88 MG/DL (ref 0.55–1.02)
CROSSMATCH RESULT,%XM: NORMAL
CROSSMATCH RESULT,%XM: NORMAL
ECHO AV AREA PEAK VELOCITY: 1.4 CM2
ECHO AV AREA/BSA PEAK VELOCITY: 0.9 CM2/M2
ECHO AV PEAK GRADIENT: 14 MMHG
ECHO AV PEAK VELOCITY: 1.9 M/S
ECHO AV VELOCITY RATIO: 0.42
ECHO LA DIAMETER INDEX: 2.47 CM/M2
ECHO LA DIAMETER: 4 CM
ECHO LA VOL 2C: 94 ML (ref 22–52)
ECHO LA VOL 4C: 76 ML (ref 22–52)
ECHO LA VOL BP: 85 ML (ref 22–52)
ECHO LA VOL/BSA BIPLANE: 52 ML/M2 (ref 16–34)
ECHO LA VOLUME AREA LENGTH: 89 ML
ECHO LA VOLUME INDEX A2C: 58 ML/M2 (ref 16–34)
ECHO LA VOLUME INDEX A4C: 47 ML/M2 (ref 16–34)
ECHO LA VOLUME INDEX AREA LENGTH: 55 ML/M2 (ref 16–34)
ECHO LV E' LATERAL VELOCITY: 4 CM/S
ECHO LV FRACTIONAL SHORTENING: 34 % (ref 28–44)
ECHO LV INTERNAL DIMENSION DIASTOLE INDEX: 2.9 CM/M2
ECHO LV INTERNAL DIMENSION DIASTOLIC: 4.7 CM (ref 3.9–5.3)
ECHO LV INTERNAL DIMENSION SYSTOLIC INDEX: 1.91 CM/M2
ECHO LV INTERNAL DIMENSION SYSTOLIC: 3.1 CM
ECHO LV IVSD: 1.1 CM (ref 0.6–0.9)
ECHO LV MASS 2D: 175.8 G (ref 67–162)
ECHO LV MASS INDEX 2D: 108.5 G/M2 (ref 43–95)
ECHO LV POSTERIOR WALL DIASTOLIC: 1 CM (ref 0.6–0.9)
ECHO LV RELATIVE WALL THICKNESS RATIO: 0.43
ECHO LVOT AREA: 3.1 CM2
ECHO LVOT DIAM: 2 CM
ECHO LVOT PEAK GRADIENT: 3 MMHG
ECHO LVOT PEAK VELOCITY: 0.8 M/S
ECHO MV A VELOCITY: 1.09 M/S
ECHO MV E DECELERATION TIME (DT): 159.2 MS
ECHO MV E VELOCITY: 1.31 M/S
ECHO MV E/A RATIO: 1.2
ECHO MV E/E' LATERAL: 32.75
ECHO PV MAX VELOCITY: 0.9 M/S
ECHO PV PEAK GRADIENT: 3 MMHG
ERYTHROCYTE [DISTWIDTH] IN BLOOD BY AUTOMATED COUNT: 17.5 % (ref 11.5–14.5)
EST. AVERAGE GLUCOSE BLD GHB EST-MCNC: 117 MG/DL
GLOBULIN SER CALC-MCNC: 3 G/DL (ref 2–4)
GLUCOSE SERPL-MCNC: 121 MG/DL (ref 65–100)
HBA1C MFR BLD: 5.7 % (ref 4–5.6)
HCT VFR BLD AUTO: 25.5 % (ref 35–47)
HGB BLD-MCNC: 8.1 G/DL (ref 11.5–16)
MAGNESIUM SERPL-MCNC: 2.6 MG/DL (ref 1.6–2.4)
MCH RBC QN AUTO: 27.8 PG (ref 26–34)
MCHC RBC AUTO-ENTMCNC: 31.8 G/DL (ref 30–36.5)
MCV RBC AUTO: 87.6 FL (ref 80–99)
NRBC # BLD: 0 K/UL (ref 0–0.01)
NRBC BLD-RTO: 0 PER 100 WBC
PLATELET # BLD AUTO: 195 K/UL (ref 150–400)
PMV BLD AUTO: 11.2 FL (ref 8.9–12.9)
POTASSIUM SERPL-SCNC: 3.6 MMOL/L (ref 3.5–5.1)
PROT SERPL-MCNC: 5.8 G/DL (ref 6.4–8.2)
RBC # BLD AUTO: 2.91 M/UL (ref 3.8–5.2)
SODIUM SERPL-SCNC: 144 MMOL/L (ref 136–145)
SPECIMEN EXP DATE BLD: NORMAL
STATUS OF UNIT,%ST: NORMAL
STATUS OF UNIT,%ST: NORMAL
UNIT DIVISION, %UDIV: 0
UNIT DIVISION, %UDIV: 0
WBC # BLD AUTO: 5.3 K/UL (ref 3.6–11)

## 2022-06-07 PROCEDURE — 97116 GAIT TRAINING THERAPY: CPT

## 2022-06-07 PROCEDURE — 65270000046 HC RM TELEMETRY

## 2022-06-07 PROCEDURE — 97165 OT EVAL LOW COMPLEX 30 MIN: CPT

## 2022-06-07 PROCEDURE — 77030038269 HC DRN EXT URIN PURWCK BARD -A

## 2022-06-07 PROCEDURE — 97530 THERAPEUTIC ACTIVITIES: CPT

## 2022-06-07 PROCEDURE — 94761 N-INVAS EAR/PLS OXIMETRY MLT: CPT

## 2022-06-07 PROCEDURE — 74011000250 HC RX REV CODE- 250: Performed by: INTERNAL MEDICINE

## 2022-06-07 PROCEDURE — 83036 HEMOGLOBIN GLYCOSYLATED A1C: CPT

## 2022-06-07 PROCEDURE — 74011250637 HC RX REV CODE- 250/637: Performed by: NURSE PRACTITIONER

## 2022-06-07 PROCEDURE — 77010033678 HC OXYGEN DAILY

## 2022-06-07 PROCEDURE — 83735 ASSAY OF MAGNESIUM: CPT

## 2022-06-07 PROCEDURE — 36415 COLL VENOUS BLD VENIPUNCTURE: CPT

## 2022-06-07 PROCEDURE — 97161 PT EVAL LOW COMPLEX 20 MIN: CPT

## 2022-06-07 PROCEDURE — 74011250637 HC RX REV CODE- 250/637: Performed by: INTERNAL MEDICINE

## 2022-06-07 PROCEDURE — 71045 X-RAY EXAM CHEST 1 VIEW: CPT

## 2022-06-07 PROCEDURE — 80053 COMPREHEN METABOLIC PANEL: CPT

## 2022-06-07 PROCEDURE — 85027 COMPLETE CBC AUTOMATED: CPT

## 2022-06-07 PROCEDURE — 97535 SELF CARE MNGMENT TRAINING: CPT

## 2022-06-07 PROCEDURE — 93306 TTE W/DOPPLER COMPLETE: CPT | Performed by: INTERNAL MEDICINE

## 2022-06-07 PROCEDURE — 93306 TTE W/DOPPLER COMPLETE: CPT

## 2022-06-07 PROCEDURE — 74011250636 HC RX REV CODE- 250/636: Performed by: INTERNAL MEDICINE

## 2022-06-07 RX ORDER — POLYETHYLENE GLYCOL 3350 17 G/17G
17 POWDER, FOR SOLUTION ORAL
Status: DISPENSED | OUTPATIENT
Start: 2022-06-07 | End: 2022-06-07

## 2022-06-07 RX ORDER — BUMETANIDE 0.25 MG/ML
1 INJECTION INTRAMUSCULAR; INTRAVENOUS ONCE
Status: COMPLETED | OUTPATIENT
Start: 2022-06-07 | End: 2022-06-07

## 2022-06-07 RX ADMIN — POLYETHYLENE GLYCOL 3350 17 G: 17 POWDER, FOR SOLUTION ORAL at 20:48

## 2022-06-07 RX ADMIN — PANTOPRAZOLE SODIUM 40 MG: 40 TABLET, DELAYED RELEASE ORAL at 08:20

## 2022-06-07 RX ADMIN — BUMETANIDE 1 MG: 0.25 INJECTION, SOLUTION INTRAMUSCULAR; INTRAVENOUS at 08:20

## 2022-06-07 RX ADMIN — IRON SUCROSE 100 MG: 20 INJECTION, SOLUTION INTRAVENOUS at 12:43

## 2022-06-07 RX ADMIN — POLYETHYLENE GLYCOL 3350 17 G: 17 POWDER, FOR SOLUTION ORAL at 22:27

## 2022-06-07 NOTE — PROGRESS NOTES
[facility]  [address]  [phone]      Medical Progress Note      NAME: Samia Wilson   :  1934  MRM:  [id number]    Date/Time of service: 2022  8:32 AM       Subjective:     Chief Complaint:  Patient was personally seen and examined by me during this time period. Chart reviewed. Had some dyspnea after blood transfusion. Less fatigue        Objective:       Vitals:       Last 24hrs VS reviewed since prior progress note.  Most recent are:    Visit Vitals  BP (!) 143/82   Pulse 69   Temp 98.3 °F (36.8 °C)   Resp 29   Ht 5' 2\" (1.575 m)   Wt 61.2 kg (135 lb)   SpO2 97%   BMI 24.69 kg/m²     SpO2 Readings from Last 6 Encounters:   22 97%   22 97%   22 97%   22 96%   21 99%   21 97%    O2 Flow Rate (L/min): 2 l/min       Intake/Output Summary (Last 24 hours) at 2022 6140  Last data filed at 2022 0143  Gross per 24 hour   Intake 683.3 ml   Output 2650 ml   Net -1966.7 ml        Exam:     Physical Exam:    Gen:  Elderly, frail, ill-appearing, NAD  HEENT:  Pink conjunctivae, PERRL, hearing intact to voice, moist mucous membranes  Neck:  Supple, without masses, thyroid non-tender  Resp:  No accessory muscle use, crackles at bases   Card:  No murmurs, normal S1, S2 without thrills, bruits or peripheral edema  Abd:  Soft, non-tender, non-distended, normoactive bowel sounds are present  Musc:  No cyanosis or clubbing  Skin:  No rashes   Neuro:  Cranial nerves 3-12 are grossly intact, follows commands appropriately  Psych:  Fair insight, oriented to person, place and time, alert    Medications Reviewed: (see below)    Lab Data Reviewed: (see below)    ______________________________________________________________________    Medications:     Current Facility-Administered Medications   Medication Dose Route Frequency    iron sucrose (VENOFER) injection 100 mg  100 mg IntraVENous DAILY    0.9% sodium chloride infusion 250 mL  250 mL IntraVENous PRN    0.9% sodium chloride infusion 250 mL  250 mL IntraVENous PRN    acetaminophen (TYLENOL) tablet 650 mg  650 mg Oral Q6H PRN    Or    acetaminophen (TYLENOL) suppository 650 mg  650 mg Rectal Q6H PRN    polyethylene glycol (MIRALAX) packet 17 g  17 g Oral DAILY PRN    ondansetron (ZOFRAN ODT) tablet 4 mg  4 mg Oral Q8H PRN    Or    ondansetron (ZOFRAN) injection 4 mg  4 mg IntraVENous Q6H PRN    pantoprazole (PROTONIX) tablet 40 mg  40 mg Oral ACB     Current Outpatient Medications   Medication Sig    furosemide (Lasix) 40 mg tablet Take 40 mg by mouth daily. Indications: accumulation of fluid resulting from chronic heart failure    clopidogreL (PLAVIX) 75 mg tab Take 1 Tablet by mouth daily. Please hold for 3 days following procedure on 2/04/21. (Patient taking differently: Take 75 mg by mouth two (2) times a day. Please hold for 3 days following procedure on 2/04/21.)    pantoprazole (PROTONIX) 40 mg granules for oral suspension Take 40 mg by mouth Daily (before breakfast).  amLODIPine (NORVASC) 10 mg tablet Take 1 Tablet by mouth daily. (Patient taking differently: Take 5 mg by mouth daily.)    aspirin 81 mg chewable tablet Take 1 Tablet by mouth daily.  ferrous sulfate (Iron) 325 mg (65 mg iron) tablet Take 1 Tablet by mouth Daily (before breakfast).  cholecalciferol, vitamin D3, (Vitamin D3) 50 mcg (2,000 unit) tab Take  by mouth.  atorvastatin (LIPITOR) 20 mg tablet Take 1 Tablet by mouth daily.  metoprolol tartrate (LOPRESSOR) 50 mg tablet Take 1 Tablet by mouth every twelve (12) hours.  senna (Senna) 8.6 mg tablet Take 1 Tablet by mouth daily as needed for Constipation. (Patient not taking: Reported on 6/6/2022)    ondansetron hcl (Zofran) 4 mg tablet Take 4 mg by mouth daily as needed for Nausea or Vomiting.     albuterol (PROVENTIL VENTOLIN) 2.5 mg /3 mL (0.083 %) nebu 2.5 mg by Nebulization route every four (4) hours as needed for Wheezing or Shortness of Breath. (Patient not taking: Reported on 6/6/2022)    albuterol (PROVENTIL HFA, VENTOLIN HFA, PROAIR HFA) 90 mcg/actuation inhaler Take 2 Puffs by inhalation every four (4) hours as needed for Wheezing. (Patient not taking: Reported on 6/6/2022)          Lab Review:     Recent Labs     06/07/22  0358 06/06/22 2046 06/06/22  1158   WBC 5.3  --  5.0   HGB 8.1* 6.8* 6.1*   HCT 25.5*  --  20.7*     --  206     Recent Labs     06/07/22  0358 06/06/22  1158    141   K 3.6 3.8   * 105   CO2 28 27   * 157*   BUN 22* 25*   CREA 1.88* 2.09*   CA 8.2* 8.4*   MG 2.6*  --    ALB 2.8* 3.2*   TBILI 1.6* 0.7   ALT 14 15     Lab Results   Component Value Date/Time    Glucose (POC) 105 02/04/2022 11:48 AM    Glucose (POC) 87 02/03/2022 06:48 AM    Glucose (POC) 84 02/02/2022 11:20 PM    Glucose (POC) 106 12/27/2021 07:38 AM    Glucose (POC) 73 12/26/2021 06:38 PM          Assessment / Plan:     81 yo hx of HTN, DM, CAD s/p GERRY, sCHF EF 45%, GI bleed, presented w/ weakness, recurrent GI bleed, blood loss anemia    1) Recurrent GI bleed: recent EGD/colon 3 months ago unremarkable. GI following, plan for capsule study    2) Acute blood loss anemia/Fe def anemia: due to above. S/p 2u RBCs. Will start IV iron today. Monitor Hgb closely, transfuse prn    3) Acute on chronic sCHF: last EF 45%. Worsening pulm edema after transfusion. Will give 1 dose of IV bumex today    4) JOANNA/CKD 3: due to blood loss. Slowly improving. Will monitor     5) CAD: s/p GERRY. Cards following. Ok with holding ASA/plavix for now due to GI bleed    6) HTN: holding norvasc, BB due to relative hypotension    7) DM type 2: check A1C.   Cont SSI    Total time spent with patient care: 40 min  **I personally saw and examined the patient during this time period**                 Care Plan discussed with: Patient, nursing     Discussed:  Care Plan    Prophylaxis:  SCD's    Disposition:  Home w/Family           ___________________________________________________    Attending Physician: Pau Mosher MD

## 2022-06-07 NOTE — PROGRESS NOTES
TRANSFER - OUT REPORT:    Verbal report given to Serafin Osborne RN(name) on [employer]  being transferred to [employer] (unit) for routine progression of care       Report consisted of patients Situation, Background, Assessment and   Recommendations(SBAR). Information from the following report(s) SBAR, Kardex, ED Summary, Intake/Output, MAR, Recent Results and Cardiac Rhythm NSR was reviewed with the receiving nurse. Lines:   Peripheral IV 06/06/22 Left Antecubital (Active)   Site Assessment Clean, dry, & intact 06/07/22 0030   Phlebitis Assessment 0 06/07/22 0030   Infiltration Assessment 0 06/07/22 0030   Dressing Status Clean, dry, & intact 06/07/22 0030   Dressing Type Transparent 06/07/22 0030   Hub Color/Line Status Infusing 06/07/22 0030   Action Taken Open ports on tubing capped 06/07/22 0030   Alcohol Cap Used Yes 06/07/22 0030        Opportunity for questions and clarification was provided.       Patient transported with:   [employer]egg[employer]

## 2022-06-07 NOTE — PROGRESS NOTES
Bedside and Verbal shift change report given to Mendel Kayser, RN (oncoming nurse) by Vernel Libman, RN (offgoing nurse). Report included the following information SBAR, Kardex and ED Summary.

## 2022-06-07 NOTE — PROGRESS NOTES
Problem: Self Care Deficits Care Plan (Adult)  Goal: *Acute Goals and Plan of Care (Insert Text)  Description: FUNCTIONAL STATUS PRIOR TO ADMISSION: Patient was modified independent using a rolling walker for functional mobility. HOME SUPPORT: The patient lived with daughter but did not require assist.    Occupational Therapy Goals  Initiated 6/7/2022  1. Patient will perform lower body dressing with supervision/set-up within 7 day(s). 2.  Patient will perform grooming, standing at sink, with supervision/set-up within 7 day(s). 3.  Patient will perform toilet transfers with supervision/set-up within 7 day(s). 4.  Patient will perform all aspects of toileting with supervision/set-up within 7 day(s). 5.  Patient will participate in upper extremity therapeutic exercise/activities with supervision/set-up for 10 minutes within 7 day(s). Outcome: Progressing Towards Goal   OCCUPATIONAL THERAPY EVALUATION  Patient: Tomasa Casas (27 y.o. female)  Date: 6/7/2022  Primary Diagnosis: GI bleed [K92.2]  Procedure(s) (LRB):  CAPSULE (N/A)     Precautions:   Fall    ASSESSMENT  Based on the objective data described below, the patient presents with hospital admission secondary to GI bleed. Patient also with dehydration, low BP and pulmonary edema. Patient transfused 2 units of blood over night and now stable for activity. Patient received supine in bed, agreeable to activity. Patient reports pain to LLE and noted redness and warm to touch. Patient RN notified. Patient to EOB with min assist today. She is able to manage transfers, toileting, and standing at sink for hand hygiene with CGA. Patient utilizes RW with transfers. Patient left in NAD in chair at end of session. Current Level of Function Impacting Discharge (ADLs/self-care): min-CGA    Functional Outcome Measure: The patient scored 50/100 on the Barthel Index  outcome measure.       Other factors to consider for discharge: lives with daughter Patient will benefit from skilled therapy intervention to address the above noted impairments. PLAN :  Recommendations and Planned Interventions: self care training, functional mobility training, therapeutic exercise, balance training, therapeutic activities, endurance activities, patient education, home safety training, and family training/education    Frequency/Duration: Patient will be followed by occupational therapy 5 times a week to address goals. Recommendation for discharge: (in order for the patient to meet his/her long term goals)  Occupational therapy at least 2 days/week in the home     This discharge recommendation:  Has been made in collaboration with the attending provider and/or case management    IF patient discharges home will need the following DME: none       SUBJECTIVE:   Patient stated TBD.     OBJECTIVE DATA SUMMARY:   HISTORY:   Past Medical History:   Diagnosis Date    CAD (coronary artery disease)     CHF (congestive heart failure), NYHA class I, chronic, systolic (HCC)     Systolic and diastolic CHF per echocardiogram of 12/25/2021. Patient was found to have LVEF 45%    Chronic kidney disease     stage III/IV:Creatinine 1.45-1.7 with GFR in the low 30s-high 20s    Diabetes mellitus type 2 in nonobese [facility])     Hypertension     Ischemic cardiomyopathy     Echocardiogram of 12/25/2021: mild left ventricular systolic dysfunction (EF 41%) with inferior and basal-mid septal akinesis. Diastolic dysfunction. Peripheral vascular disease ([address]      Past Surgical History:   Procedure Laterality Date    COLONOSCOPY N/A 2/4/2022    COLONOSCOPY performed by [facility] Montoya MD at [facility] [place] ENDOSCOPY    HX APPENDECTOMY      HX CORONARY STENT PLACEMENT      stents x 3 to mid-distal right coronary artery going into PDA for 100% occlusion of RCA; and also had nonobstructive disease in 30% LAD stenosis, 70% diagonal 1 stenosis.   She had proximal 30% stenosis in the codominant left circumflex artery HX HYSTERECTOMY      HX KNEE REPLACEMENT Left     HX OTHER SURGICAL      head sx x 2 nerve related    HX OTHER SURGICAL      Back surgery x 2    HX TONSILLECTOMY      [place] CARDIAC SURG PROCEDURE UNLIST      heart stents. x 3 in january 2022. Expanded or extensive additional review of patient history:     Home Situation  Home Environment: Private residence  # Steps to Enter: 1  One/Two Story Residence: Two story, live on 1st floor (three story)  Living Alone: No  Support Systems: Child(watson)  Patient Expects to be Discharged to[de-identified] Home  Current DME Used/Available at Home: Walker, rolling,Cane, straight,Grab bars,Raised toilet seat,Shower chair    Hand dominance: Right    EXAMINATION OF PERFORMANCE DEFICITS:  Cognitive/Behavioral Status:  Neurologic State: Alert  Orientation Level: Oriented X4  Cognition: Appropriate decision making; Follows commands             Skin: intact as seen, redness at LLE     Edema: LEs     Hearing:   Auditory  Auditory Impairment: None    Vision/Perceptual:                                     Range of Motion:  AROM: Within functional limits                         Strength:  Strength: Generally decreased, functional                Coordination:  Coordination: Within functional limits            Tone & Sensation:  Tone: Normal                         Balance:       Functional Mobility and Transfers for ADLs:  Bed Mobility:  Rolling: Contact guard assistance  Supine to Sit: Minimum assistance;Assist x1;Additional time  Scooting: Contact guard assistance;Assist x1    Transfers:  Sit to Stand: Contact guard assistance  Stand to Sit: Contact guard assistance  Bed to Chair: Contact guard assistance  Bathroom Mobility: Contact guard assistance  Toilet Transfer : Contact guard assistance  Assistive Device : Walker, rolling    ADL Assessment:  Feeding: Independent    Oral Facial Hygiene/Grooming: Contact guard assistance    Bathing: Contact guard assistance    Upper Body Dressing: Contact guard assistance    Lower Body Dressing: Minimum assistance    Toileting: Contact guard assistance                ADL Intervention and task modifications:                                          Therapeutic Exercise:     Functional Measure:    Barthel Index:  Bathin  Bladder: 5  Bowels: 10  Groomin  Dressin  Feeding: 10  Mobility: 0  Stairs: 0  Toilet Use: 5  Transfer (Bed to Chair and Back): 10  Total: 50/100      The Barthel ADL Index: Guidelines  1. The index should be used as a record of what a patient does, not as a record of what a patient could do. 2. The main aim is to establish degree of independence from any help, physical or verbal, however minor and for whatever reason. 3. The need for supervision renders the patient not independent. 4. A patient's performance should be established using the best available evidence. Asking the patient, friends/relatives and nurses are the usual sources, but direct observation and common sense are also important. However direct testing is not needed. 5. Usually the patient's performance over the preceding 24-48 hours is important, but occasionally longer periods will be relevant. 6. Middle categories imply that the patient supplies over 50 per cent of the effort. 7. Use of aids to be independent is allowed. Score Interpretation (from [address] [address])    Independent   60-79 Minimally independent   40-59 Partially dependent   20-39 Very dependent   <20 Totally dependent     -Hua Aden., Barthel, D.W. (1965). Functional evaluation: the Barthel Index. [facility] ([address] (1997). The Barthel activities of daily living index: self-reporting versus actual performance in the old (> or = 75 years). Journal of [address] [place] [address] 45(7), [facility], [facility], [facility]., Jack Tierney. (1999).  Measuring the change in disability after inpatient rehabilitation; comparison of the responsiveness of the Barthel Index and Functional [place] Measure. Journal of Neurology, Neurosurgery, and Psychiatry, 66(4), 307-711. MARNIE Casillas, MARLYS Dotson, & Ladonna Brunson M.A. (2004) Assessment of post-stroke quality of life in cost-effectiveness studies: The usefulness of the Barthel Index and the EuroQoL-5D. Quality of Life Research, 15, 260-40         Occupational Therapy Evaluation Charge Determination   History Examination Decision-Making   LOW Complexity : Brief history review  LOW Complexity : 1-3 performance deficits relating to physical, cognitive , or psychosocial skils that result in activity limitations and / or participation restrictions  LOW Complexity : No comorbidities that affect functional and no verbal or physical assistance needed to complete eval tasks       Based on the above components, the patient evaluation is determined to be of the following complexity level: LOW   Pain Rating:  LLE, redness noted     Activity Tolerance:   Good    After treatment patient left in no apparent distress:    Sitting in chair, Call bell within reach, Bed / chair alarm activated, and Caregiver / family present    COMMUNICATION/EDUCATION:   The patients plan of care was discussed with: Physical therapist and Registered nurse. Home safety education was provided and the patient/caregiver indicated understanding., Patient/family have participated as able in goal setting and plan of care. , and Patient/family agree to work toward stated goals and plan of care. This patients plan of care is appropriate for delegation to [facility].     Thank you for this referral.  Philip Fortune OTR/L  Time Calculation: 24 mins

## 2022-06-07 NOTE — ROUTINE PROCESS
Bedside shift change report given to Adriana DOW (oncoming nurse) by Jaelyn Gunderson (offgoing nurse). Report included the following information SBAR, Kardex, Intake/Output, MAR, Accordion and Recent Results.

## 2022-06-07 NOTE — PROGRESS NOTES
Problem: Falls - Risk of  Goal: *Absence of Falls  Description: Document Alvarez Monique Fall Risk and appropriate interventions in the flowsheet.   Outcome: Progressing Towards Goal  Note: Fall Risk Interventions:  Mobility Interventions: Bed/chair exit alarm,Patient to call before getting OOB         Medication Interventions: Bed/chair exit alarm,Patient to call before getting OOB,Teach patient to arise slowly    Elimination Interventions: Bed/chair exit alarm,Call light in reach,Patient to call for help with toileting needs    History of Falls Interventions: Bed/chair exit alarm,Door open when patient unattended,Room close to nurse's station

## 2022-06-07 NOTE — PROGRESS NOTES
Problem: Mobility Impaired (Adult and Pediatric)  Goal: *Acute Goals and Plan of Care (Insert Text)  Description: FUNCTIONAL STATUS PRIOR TO ADMISSION: Patient was modified independent using a rolling walker for functional mobility. HOME SUPPORT PRIOR TO ADMISSION: The patient lived with her daughter but did not require assist.    Physical Therapy Goals  Initiated 6/7/2022  1. Patient will move from supine to sit and sit to supine , scoot up and down, and roll side to side in bed with independence within 7 day(s). 2.  Patient will transfer from bed to chair and chair to bed with modified independence using the least restrictive device within 7 day(s). 3.  Patient will perform sit to stand with modified independence within 7 day(s). 4.  Patient will ambulate with modified independence for 200 feet with the least restrictive device within 7 day(s). 5.  Patient will ascend/descend 3 stairs with yaneth handrail(s) with minimal assistance/contact guard assist within 7 day(s). Outcome: Not Met   PHYSICAL THERAPY EVALUATION  Patient: Samy Díaz (77 y.o. female)  Date: 6/7/2022  Primary Diagnosis: GI bleed [K92.2]        Precautions:   Fall      ASSESSMENT  Based on the objective data described below, the patient presents with functional mobility that is mildly below her baseline in the setting of hospital admission for GI bleed. Patient s/p 2 unit PRBC transfusion with hgb now >7. Patient today tolerates 30ft total ambulation in room with CGA and RW. There is no instability or loss of balance. Patient recently completed course of HHPT and OT at home post last admission in February 2022. She denies lightheadedness/dizziness/palpitations and shortness of breath throughout session. No post acute physical therapy needs identified, however will continue to follow patient while inpatient for maintenance of strength and progression of functional mobility while inpatient.      Of note, noticed patient L ankle proximal to malleoli red, warm to touch and tender to touch/palpation with patient wincing. Communicated concerns to patient's RN to assess. Current Level of Function Impacting Discharge (mobility/balance): CGA  with RW    Functional Outcome Measure: The patient scored Total Score: 19/28 on the Tinetti outcome measure which is indicative of high fall risk. Other factors to consider for discharge:      Patient will benefit from skilled therapy intervention to address the above noted impairments. PLAN :  Recommendations and Planned Interventions: bed mobility training, transfer training, gait training, therapeutic exercises, edema management/control, patient and family training/education, and therapeutic activities      Frequency/Duration: Patient will be followed by physical therapy:  5 times a week to address goals. Recommendation for discharge: (in order for the patient to meet his/her long term goals)  No skilled physical therapy/ follow up rehabilitation needs identified at this time. This discharge recommendation:  Has not yet been discussed the attending provider and/or case management    IF patient discharges home will need the following DME: patient owns DME required for discharge         SUBJECTIVE:   Patient stated I need to go.  re: to the bathroom    OBJECTIVE DATA SUMMARY:   Patient received supine in bed and was agreeable to participate in PT session. Patient was cleared by nursing to participate in PT session. Patient's daughter present for evaluation       HISTORY:    Past Medical History:   Diagnosis Date    CAD (coronary artery disease)     CHF (congestive heart failure), NYHA class I, chronic, systolic (HCC)     Systolic and diastolic CHF per echocardiogram of 12/25/2021.   Patient was found to have LVEF 45%    Chronic kidney disease     stage III/IV:Creatinine 1.45-1.7 with GFR in the low 30s-high 20s    Diabetes mellitus type 2 in nonobese ([facility]     Hypertension  Ischemic cardiomyopathy     Echocardiogram of 12/25/2021: mild left ventricular systolic dysfunction (EF 50%) with inferior and basal-mid septal akinesis. Diastolic dysfunction.  Peripheral vascular disease ([address]      Past Surgical History:   Procedure Laterality Date    COLONOSCOPY N/A 2/4/2022    COLON[facility]COPY performed [facility] Jamshid Dela [facility]    HX APPENDECTOMY      HX CORONARY STENT PLACEMENT      stents x 3 to mid-distal right coronary artery going into PDA for 100% occlusion of RCA; and also had nonobstructive disease in 30% LAD stenosis, 70% diagonal 1 stenosis. She had proximal 30% stenosis in the codominant left circumflex artery     HX HYSTERECTOMY      HX KNEE REPLACEMENT Left     HX OTHER SURGICAL      head sx x 2 nerve related    HX OTHER SURGICAL      Back surgery x 2    HX TONSILLECTOMY      [place] CARDIAC SURG PROCEDURE UNLIST      heart stents. x 3 in january 2022. Personal factors and/or comorbidities impacting plan of care: none additional    Home Situation  Home Environment: Private residence  # Steps to Enter: 1  One/Two Story Residence: Two story, live on 1st floor (three story)  Living Alone: No  Support Systems: Child(watson)  Patient Expects to be Discharged to[de-identified] Home  Current DME Used/Available at Home: Preeti Gambler, rolling,Cane, straight,Grab bars,Raised toilet seat,Shower chair    EXAMINATION/PRESENTATION/DECISION MAKING:   Critical Behavior:  Neurologic State: Alert  Orientation Level: Oriented X4  Cognition: Appropriate decision making,Follows commands     Hearing:   Auditory  Auditory Impairment: None  Skin:  All observed intact; yaneth LE ankles red; LLE warm to touch  Edema: defer to RN notes  Range Of Motion:  AROM: Within functional limits                       Strength:    Strength: Generally decreased, functional                    Tone & Sensation:   Tone: Normal                              Coordination:  Coordination: Within functional limits  Vision: Functional Mobility:  Bed Mobility:  Rolling: Contact guard assistance  Supine to Sit: Minimum assistance;Assist x1;Additional time     Scooting: Contact guard assistance;Assist x1  Transfers:  Sit to Stand: Contact guard assistance  Stand to Sit: Contact guard assistance        Bed to Chair: Contact guard assistance              Balance:      Ambulation/Gait Training:  Distance (ft): 30 Feet (ft)  Assistive Device: Walker, rolling;Gait belt  Ambulation - Level of Assistance: Contact guard assistance        Gait Abnormalities: Decreased step clearance              Speed/Tonia: Pace decreased (<100 feet/min)                     Functional Measure:  Tinetti test:    Sitting Balance: 1  Arises: 1  Attempts to Rise: 2  Immediate Standing Balance: 1  Standing Balance: 1  Nudged: 1  Eyes Closed: 1  Turn 360 Degrees - Continuous/Discontinuous: 1  Turn 360 Degrees - Steady/Unsteady: 1  Sitting Down: 1  Balance Score: 11 Balance total score  Indication of Gait: 1  R Step Length/Height: 1  L Step Length/Height: 1  R Foot Clearance: 1  L Foot Clearance: 1  Step Symmetry: 1  Step Continuity: 1  Path: 1  Trunk: 0  Walking Time: 0  Gait Score: 8 Gait total score  Total Score: 19/28 Overall total score         Tinetti Tool Score Risk of Falls  <19 = High Fall Risk  19-24 = Moderate Fall Risk  25-28 = Low Fall Risk  Tinetti ME. Performance-Oriented Assessment of Mobility Problems in Elderly Patients. [address]; [id number].  (Scoring Description: PT Bulletin Feb. 10, 1993)    Older adults: Maricruz Nelson et al, 2009; n = 1000 [facility] elderly evaluated with ABC, PHUC, ADL, and IADL)  · Mean PHUC score for males aged 69-68 years = 26.21(3.40)  · Mean PHUC score for females age 69-68 years = 25.16(4.30)  · Mean PHUC score for males over 80 years = 23.29(6.02)  · Mean PHUC score for females over 80 years = 17.20(8.32)        Physical Therapy Evaluation Charge Determination   History Examination Presentation Decision-Making   MEDIUM  Complexity : 1-2 comorbidities / personal factors will impact the outcome/ POC  MEDIUM Complexity : 3 Standardized tests and measures addressing body structure, function, activity limitation and / or participation in recreation  MEDIUM Complexity : Evolving with changing characteristics  MEDIUM Complexity : FOTO score of 26-74      Based on the above components, the patient evaluation is determined to be of the following complexity level: MEDIUM    Pain Rating:  Patient without reports of pain during therapy      Activity Tolerance:   Good and tolerates ADLs without rest breaks      After treatment patient left in no apparent distress:   Sitting in chair, Call bell within reach, Bed / chair alarm activated, Caregiver / family present and Side rails x 3    COMMUNICATION/EDUCATION:   The patients plan of care was discussed with: Occupational therapist and Registered nurse. Fall prevention education was provided and the patient/caregiver indicated understanding. and Patient/family have participated as able in goal setting and plan of care.     Thank you for this referral.  Cinda Newell, PT   Time Calculation: 34 mins

## 2022-06-07 NOTE — PROGRESS NOTES
Bedside and Verbal shift change report given to [address] [place] [address] Avenue (oncoming nurse) by Stevie Moore (offgoing nurse). Report included the following information SBAR, Procedure Summary, Intake/Output, MAR, Recent Results and Cardiac Rhythm Sinus Rhythm.

## 2022-06-07 NOTE — PROGRESS NOTES
Messaged MD to see verify continuous fluid order as patient has developed bilateral lower lobe crackles    Stopped fluids and will get chest xr

## 2022-06-07 NOTE — PROGRESS NOTES
[address] NP  [phone]           GI PROGRESS NOTE        NAME: Edu Orozco   :  1934   MRN:  [id number]       Subjective:   Daughter reports pt was short of breath after blood transfusion. Objective:   Nad      VITALS:   Last 24hrs VS reviewed since prior progress note. Most recent are:  Visit Vitals  BP (!) 143/82   Pulse 64   Temp 98.4 °F (36.9 °C)   Resp 29   Ht 5' 2\" (1.575 m)   Wt 61.2 kg (135 lb)   SpO2 98%   BMI 24.69 kg/m²       Intake/Output Summary (Last 24 hours) at 2022 1457  Last data filed at 2022 0143  Gross per 24 hour   Intake 683.3 ml   Output 2650 ml   Net -1966.7 ml       PHYSICAL EXAM:  General: Alert, in no acute distress    HEENT: Anicteric sclerae. Lungs:            CTA Bilaterally. Heart:  Regular  rhythm,    Abdomen: Soft, Non distended, Non tender.  (+)Bowel sounds, no HSM  Extremities: No c/c/e  Neurologic:  CN 2-12 gi, Alert and oriented X 3. No acute neurological distress   Psych:   Not anxious nor agitated. Lab Data Reviewed:   Recent Labs     22  0358 226 22  1158 22  1158   WBC 5.3  --   --  5.0   HGB 8.1* 6.8*   < > 6.1*   HCT 25.5*  --   --  20.7*     --   --  206    < > = values in this interval not displayed.      Recent Labs     22  0358 22  1158    141   K 3.6 3.8   * 105   CO2 28 27   BUN 22* 25*   CREA 1.88* 2.09*   * 157*   CA 8.2* 8.4*     Recent Labs     22  0358 22  1158   AP 62 69   TP 5.8* 6.4   ALB 2.8* 3.2*   GLOB 3.0 3.2       ________________________________________________________________________  Patient Active Problem List   Diagnosis Code    CKD (chronic kidney disease) stage 3, GFR 30-59 ml/min ([facility]) N18.30    CAD (coronary artery disease) I25.10    DM type 2 causing renal disease ([facility]) E11.29    DM type 2 causing vascular disease ([facility]) E11.59    Hypertension I10    CHF (congestive heart failure), NYHA class I, chronic, systolic (HCC) I03.33    Ischemic cardiomyopathy I25.5    Acute GI bleeding K92.2    Iron deficiency anemia D50.9    Chronic GI bleeding K92.2    Dehydration E86.0    GI bleed K92.2    JOANNA (acute kidney injury) ([address] N17.9         Assessment and Plan:  Anemia with Heme Positive Stools:  Hgb is 8.6 after 2 units PRBCs     - Continue to monitor hemoglobin  - Will arrange for capsule study tomorrow  - NPO at midnight  - Acid suppression with Pantoprazole  - Continue supportive care     Following.         Signed By: Kareem Cedillo NP     6/7/2022  2:57 PM

## 2022-06-08 LAB
ANION GAP SERPL CALC-SCNC: 6 MMOL/L (ref 5–15)
ATRIAL RATE: 61 BPM
BACTERIA SPEC CULT: ABNORMAL
BACTERIA SPEC CULT: ABNORMAL
BUN SERPL-MCNC: 18 MG/DL (ref 6–20)
BUN/CREAT SERPL: 11 (ref 12–20)
CALCIUM SERPL-MCNC: 9 MG/DL (ref 8.5–10.1)
CALCULATED P AXIS, ECG09: 46 DEGREES
CALCULATED R AXIS, ECG10: 10 DEGREES
CALCULATED T AXIS, ECG11: 9 DEGREES
CC UR VC: ABNORMAL
CHLORIDE SERPL-SCNC: 108 MMOL/L (ref 97–108)
CO2 SERPL-SCNC: 28 MMOL/L (ref 21–32)
CREAT SERPL-MCNC: 1.68 MG/DL (ref 0.55–1.02)
DIAGNOSIS, 93000: NORMAL
ERYTHROCYTE [DISTWIDTH] IN BLOOD BY AUTOMATED COUNT: 17.8 % (ref 11.5–14.5)
FOLATE SERPL-MCNC: 8.5 NG/ML
GLUCOSE SERPL-MCNC: 115 MG/DL (ref 65–100)
HCT VFR BLD AUTO: 29.6 % (ref 35–47)
HGB BLD-MCNC: 9.3 G/DL (ref 11.5–16)
MAGNESIUM SERPL-MCNC: 2.5 MG/DL (ref 1.6–2.4)
MCH RBC QN AUTO: 27.4 PG (ref 26–34)
MCHC RBC AUTO-ENTMCNC: 31.4 G/DL (ref 30–36.5)
MCV RBC AUTO: 87.3 FL (ref 80–99)
NRBC # BLD: 0 K/UL (ref 0–0.01)
NRBC BLD-RTO: 0 PER 100 WBC
P-R INTERVAL, ECG05: 198 MS
PHOSPHATE SERPL-MCNC: 2.9 MG/DL (ref 2.6–4.7)
PLATELET # BLD AUTO: 224 K/UL (ref 150–400)
PMV BLD AUTO: 10.7 FL (ref 8.9–12.9)
POTASSIUM SERPL-SCNC: 3.7 MMOL/L (ref 3.5–5.1)
Q-T INTERVAL, ECG07: 480 MS
QRS DURATION, ECG06: 102 MS
QTC CALCULATION (BEZET), ECG08: 483 MS
RBC # BLD AUTO: 3.39 M/UL (ref 3.8–5.2)
SERVICE CMNT-IMP: ABNORMAL
SODIUM SERPL-SCNC: 142 MMOL/L (ref 136–145)
VENTRICULAR RATE, ECG03: 61 BPM
WBC # BLD AUTO: 6.7 K/UL (ref 3.6–11)

## 2022-06-08 PROCEDURE — 83735 ASSAY OF MAGNESIUM: CPT

## 2022-06-08 PROCEDURE — 84100 ASSAY OF PHOSPHORUS: CPT

## 2022-06-08 PROCEDURE — 77030012058: Performed by: INTERNAL MEDICINE

## 2022-06-08 PROCEDURE — 77030018766 HC KT ENDOSC IMAG GVIM -F: Performed by: INTERNAL MEDICINE

## 2022-06-08 PROCEDURE — 36415 COLL VENOUS BLD VENIPUNCTURE: CPT

## 2022-06-08 PROCEDURE — 74011250636 HC RX REV CODE- 250/636: Performed by: INTERNAL MEDICINE

## 2022-06-08 PROCEDURE — 0DJ07ZZ INSPECTION OF UPPER INTESTINAL TRACT, VIA NATURAL OR ARTIFICIAL OPENING: ICD-10-PCS | Performed by: INTERNAL MEDICINE

## 2022-06-08 PROCEDURE — 80048 BASIC METABOLIC PNL TOTAL CA: CPT

## 2022-06-08 PROCEDURE — 76040000019: Performed by: INTERNAL MEDICINE

## 2022-06-08 PROCEDURE — 65270000046 HC RM TELEMETRY

## 2022-06-08 PROCEDURE — 77030010306 HC SYS DEL CAP STRC -C: Performed by: INTERNAL MEDICINE

## 2022-06-08 PROCEDURE — 74011250637 HC RX REV CODE- 250/637: Performed by: INTERNAL MEDICINE

## 2022-06-08 PROCEDURE — 85027 COMPLETE CBC AUTOMATED: CPT

## 2022-06-08 RX ORDER — AMLODIPINE BESYLATE 5 MG/1
5 TABLET ORAL DAILY
Status: DISCONTINUED | OUTPATIENT
Start: 2022-06-08 | End: 2022-06-09 | Stop reason: HOSPADM

## 2022-06-08 RX ORDER — HYDRALAZINE HYDROCHLORIDE 20 MG/ML
20 INJECTION INTRAMUSCULAR; INTRAVENOUS
Status: DISCONTINUED | OUTPATIENT
Start: 2022-06-08 | End: 2022-06-09 | Stop reason: HOSPADM

## 2022-06-08 RX ORDER — METOPROLOL TARTRATE 50 MG/1
50 TABLET ORAL EVERY 12 HOURS
Status: DISCONTINUED | OUTPATIENT
Start: 2022-06-08 | End: 2022-06-09 | Stop reason: HOSPADM

## 2022-06-08 RX ADMIN — IRON SUCROSE 100 MG: 20 INJECTION, SOLUTION INTRAVENOUS at 08:48

## 2022-06-08 RX ADMIN — AMLODIPINE BESYLATE 5 MG: 5 TABLET ORAL at 12:24

## 2022-06-08 RX ADMIN — METOPROLOL TARTRATE 50 MG: 50 TABLET ORAL at 19:53

## 2022-06-08 RX ADMIN — METOPROLOL TARTRATE 50 MG: 50 TABLET ORAL at 12:24

## 2022-06-08 NOTE — PROGRESS NOTES
7:40 AM  Capsule swallowed at 0730. Patient instructions given to to the patient, the RN and patient's daughter (by phone). Patient instructed to wear the belt for 8 hours. Telemetry has been discontinued for 8 hours by the RN. Patient swallowed the capsule without any problems.

## 2022-06-08 NOTE — PROGRESS NOTES
[address] NP  [phone]           GI PROGRESS NOTE        NAME: Marquita Matson   :  1934   MRN:  [id number]       Subjective:   No complaints this afternoon. Objective:   NAD      VITALS:   Last 24hrs VS reviewed since prior progress note. Most recent are:  Visit Vitals  BP (!) 165/66 (BP 1 Location: Right upper arm, BP Patient Position: At rest;Semi fowlers)   Pulse 64   Temp 99.3 °F (37.4 °C)   Resp 18   Ht 5' 2\" (1.575 m)   Wt 61.2 kg (135 lb)   SpO2 94%   BMI 24.69 kg/m²       Intake/Output Summary (Last 24 hours) at 2022 1642  Last data filed at 2022 0559  Gross per 24 hour   Intake --   Output 1300 ml   Net -1300 ml       PHYSICAL EXAM:  General: Alert, in no acute distress    HEENT: Anicteric sclerae. Lungs:            CTA Bilaterally. Heart:  Regular  rhythm,    Abdomen: Soft, Non distended, Non tender.  (+)Bowel sounds, no HSM  Extremities: No c/c/e  Neurologic:  CN 2-12 gi, Alert and oriented X 3. No acute neurological distress   Psych:   Good insight. Not anxious nor agitated.     Lab Data Reviewed:   Recent Labs     22  0206 22  0358   WBC 6.7 5.3   HGB 9.3* 8.1*   HCT 29.6* 25.5*    195     Recent Labs     22  0206 22  0358    144   K 3.7 3.6    109*   CO2 28 28   BUN 18 22*   CREA 1.68* 1.88*   * 121*   PHOS 2.9  --    CA 9.0 8.2*     Recent Labs     22  0358 22  1158   AP 62 69   TP 5.8* 6.4   ALB 2.8* 3.2*   GLOB 3.0 3.2       ________________________________________________________________________  Patient Active Problem List   Diagnosis Code    CKD (chronic kidney disease) stage 3, GFR 30-59 ml/min ([facility]) N18.30    CAD (coronary artery disease) I25.10    DM type 2 causing renal disease ([facility]) E11.29    DM type 2 causing vascular disease ([facility]) E11.59    Hypertension I10    CHF (congestive heart failure), NYHA class I, chronic, systolic ([facility]) X94.36    Ischemic cardiomyopathy I25.5    Acute GI bleeding K92.2    Iron deficiency anemia D50.9    Chronic GI bleeding K92.2    Dehydration E86.0    GI bleed K92.2    [facility]) [facility] N17.9         Assessment and Plan:  Anemia with Heme Positive Stools:  Hgb is 8.6 after 2 units PRBCs     - Continue to monitor hemoglobin  - Capsule read pending.   - Acid suppression with Pantoprazole  - Continue supportive care     Following.        Signed By: Brayan Fletcher NP     6/8/2022  4:42 PM

## 2022-06-08 NOTE — PROGRESS NOTES
Spiritual Care Assessment/Progress Note  [address]      NAME: Carol Christy      MRN: [id number]  AGE: 80 y.o. SEX: female  Jew Affiliation: Unknown   Language: English     6/8/2022     Total Time (in minutes): 10     Spiritual Assessment begun in [facility]  MED SURG 2 through conversation with:         []Patient        [x] Family    [] Friend(s)        Reason for Consult: Initial/Spiritual assessment, patient floor     Spiritual beliefs: (Please include comment if needed)     [x] Identifies with a kareem tradition: [employer]       [] Supported by a kareem community:            [] Claims no spiritual orientation:           [] Seeking spiritual identity:                [] Adheres to an individual form of spirituality:           [] Not able to assess:                           Identified resources for coping:      [] Prayer                               [] Music                  [] Guided Imagery     [x] Family/friends                 [] Pet visits     [] Devotional reading                         [] Unknown     [] Other:                                              Interventions offered during this visit: (See comments for more details)          Family/Friend(s): Affirmation of emotions/emotional suffering,Affirmation of kareem     Plan of Care:     [] Support spiritual and/or cultural needs    [] Support AMD and/or advance care planning process      [] Support grieving process   [] Coordinate Rites and/or Rituals    [] Coordination with community clergy   [] No spiritual needs identified at this time   [] Detailed Plan of Care below (See Comments)  [] Make referral to Music Therapy  [] Make referral to Pet Therapy     [] Make referral to Addiction services  [] Make referral to [facility]  [] Make referral to Spiritual Care Partner  [] No future visits requested        [x] Follow up visits as needed     Visited patient leonele initial spiritual assessment.  She was sleeping for the duration of the visit. Her grand daughter Isael Hernandez was at bedside. Brief visit as she was working remotely and the patient did not awake.   Chaplain White MDiv, MS, [facility]

## 2022-06-08 NOTE — PROGRESS NOTES
Jarad Salazar MD, [address] [place] [address]  [address]  [phone]      IMPRESSION and RECOMMENDATIONS     1. Dyspnea:  Likely mild pulm edema due to mild LV dysfunction and reduced intravasc oncotic pressure as well as sig anemia. Better after PRBCs. LVF normalized on echo.     2. CAD:  S/P GERRY RCA 12/2021. No evidence of ischemia. Holding DAPT. Cont statin, etc.  This is reasonable as the new 2nd generation GERRY have a low and acceptable risk of acute stent thrombosis after 28d.  Will resume norvasc given HTN. 3.  GIB:  Undergoing pill endoscopy. I have discussed this plan with the patient. She appears to understand this plan and wishes to proceed ahead. Subjective:       No complaints. Objective:   Patient Vitals for the past 16 hrs:   BP Temp Pulse Resp SpO2   06/08/22 1044 (!) 176/62 98.6 °F (37 °C) 74 18 91 %   06/08/22 0811 (!) 178/63 98.3 °F (36.8 °C) 73 18 95 %   06/08/22 0700 -- -- 67 -- --   06/08/22 0444 (!) 157/64 98.6 °F (37 °C) 72 18 97 %   06/07/22 2305 -- -- 94 -- --   06/07/22 2250 (!) 181/61 98.8 °F (37.1 °C) 74 19 95 %   06/07/22 2000 (!) 179/75 98.7 °F (37.1 °C) 77 20 94 %       HEENT Exam:     WNL         Lung Exam:     The patient is not dyspneic. Breath sounds are heard equally in all lung fields. There are no wheezes, rales, rhonchi, or rubs heard on auscultation. Heart Exam:     The rhythm is regular. The PMI is in the 5th intercostal space of the MCL. Apical impulse is normal. S1 is regular. S2 is physiologic. There is no S3, S4 gallop, murmur, click, or rub. Abdomen Exam:     Benign. Extremities Exam:     No cyanosis, clubbing, edema. Distal pulses intact.            Lab Results   Component Value Date/Time    Glucose 115 (H) 06/08/2022 02:06 AM    Sodium 142 06/08/2022 02:06 AM    Potassium 3.7 06/08/2022 02:06 AM    Chloride 108 06/08/2022 02:06 AM    CO2 28 06/08/2022 02:06 AM    BUN 18 06/08/2022 02:06 AM    Creatinine 1.68 (H) 06/08/2022 02:06 AM    Calcium 9.0 06/08/2022 02:06 AM     Recent Labs     06/08/22  0206 06/07/22  0358   WBC 6.7 5.3   HGB 9.3* 8.1*   HCT 29.6* 25.5*    195     Recent Labs     06/07/22  0358 06/06/22  1158   ALT 14 15   AP 62 69   TBILI 1.6* 0.7   TP 5.8* 6.4   ALB 2.8* 3.2*   GLOB 3.0 3.2     No results for input(s): INR, PTP, APTT, INREXT in the last 72 hours. No results for input(s): CPK, CKMB, TNIPOC in the last 72 hours. No lab exists for component: TROPONINI, ITNL  No results for input(s): TROIQ in the last 72 hours.   Lab Results   Component Value Date/Time    Cholesterol, total 169 12/25/2021 04:15 AM    HDL Cholesterol 49 12/25/2021 04:15 AM    LDL, calculated 105.8 (H) 12/25/2021 04:15 AM    Triglyceride 71 12/25/2021 04:15 AM    CHOL/HDL Ratio 3.4 12/25/2021 04:15 AM

## 2022-06-08 NOTE — PROGRESS NOTES
Problem: Falls - Risk of  Goal: *Absence of Falls  Description: Document Cardenas Reason Fall Risk and appropriate interventions in the flowsheet.   Outcome: Progressing Towards Goal  Note: Fall Risk Interventions:  Mobility Interventions: Bed/chair exit alarm,Communicate number of staff needed for ambulation/transfer,Patient to call before getting OOB,PT Consult for mobility concerns,Utilize walker, cane, or other assistive device,Utilize gait belt for transfers/ambulation         Medication Interventions: Bed/chair exit alarm,Evaluate medications/consider consulting pharmacy,Patient to call before getting OOB,Teach patient to arise slowly,Utilize gait belt for transfers/ambulation    Elimination Interventions: Bed/chair exit alarm,Call light in reach,Patient to call for help with toileting needs,Stay With Me (per policy),Toilet paper/wipes in reach,Toileting schedule/hourly rounds    History of Falls Interventions: Bed/chair exit alarm         Problem: Patient Education: Go to Patient Education Activity  Goal: Patient/Family Education  Outcome: Progressing Towards Goal     Problem: Patient Education: Go to Patient Education Activity  Goal: Patient/Family Education  Outcome: Progressing Towards Goal     Problem: Patient Education: Go to Patient Education Activity  Goal: Patient/Family Education  Outcome: Progressing Towards Goal

## 2022-06-08 NOTE — PROGRESS NOTES
Bedside and Verbal shift change report given to Alix Chung RN (oncoming nurse) by lAma Kaufman (offgoing nurse). Report included the following information SBAR, Procedure Summary, Intake/Output, MAR and Recent Results.

## 2022-06-08 NOTE — PROGRESS NOTES
Jan Mondragon [facility]  [address] [facility], [address] [place] [address]  [phone]      Medical Progress Note      NAME: Eh Adams   :  1934  MRM:  [id number]    Date/Time of service: 2022  11:49 AM       Subjective:     Chief Complaint:  Patient was personally seen and examined by me during this time period. Chart reviewed. Receiving capsule study. No chest pain, SOB. BP high. Family at bedside        Objective:       Vitals:       Last 24hrs VS reviewed since prior progress note.  Most recent are:    Visit Vitals  BP (!) 176/62 (BP 1 Location: Right upper arm, BP Patient Position: At rest)   Pulse 74   Temp 98.6 °F (37 °C)   Resp 18   Ht 5' 2\" (1.575 m)   Wt 61.2 kg (135 lb)   SpO2 91%   BMI 24.69 kg/m²     SpO2 Readings from Last 6 Encounters:   22 91%   22 97%   22 97%   22 96%   21 99%   21 97%    O2 Flow Rate (L/min): 2 l/min       Intake/Output Summary (Last 24 hours) at 2022 1149  Last data filed at 2022 0559  Gross per 24 hour   Intake --   Output 1300 ml   Net -1300 ml        Exam:     Physical Exam:    Gen:  Elderly, frail, ill-appearing, NAD  HEENT:  Pink conjunctivae, PERRL, hearing intact to voice, moist mucous membranes  Neck:  Supple, without masses, thyroid non-tender  Resp:  No accessory muscle use, crackles at bases   Card:  No murmurs, normal S1, S2 without thrills, bruits or peripheral edema  Abd:  Soft, non-tender, non-distended, normoactive bowel sounds are present  Musc:  No cyanosis or clubbing  Skin:  No rashes   Neuro:  Cranial nerves 3-12 are grossly intact, follows commands appropriately  Psych:  Fair insight, oriented to person, place and time, alert    Medications Reviewed: (see below)    Lab Data Reviewed: (see below)    ______________________________________________________________________    Medications:     Current Facility-Administered Medications   Medication Dose Route Frequency    amLODIPine (NORVASC) tablet 5 mg  5 mg Oral DAILY    metoprolol tartrate (LOPRESSOR) tablet 50 mg  50 mg Oral Q12H    hydrALAZINE (APRESOLINE) 20 mg/mL injection 20 mg  20 mg IntraVENous Q6H PRN    iron sucrose (VENOFER) injection 100 mg  100 mg IntraVENous DAILY    0.9% sodium chloride infusion 250 mL  250 mL IntraVENous PRN    0.9% sodium chloride infusion 250 mL  250 mL IntraVENous PRN    acetaminophen (TYLENOL) tablet 650 mg  650 mg Oral Q6H PRN    Or    acetaminophen (TYLENOL) suppository 650 mg  650 mg Rectal Q6H PRN    polyethylene glycol (MIRALAX) packet 17 g  17 g Oral DAILY PRN    ondansetron (ZOFRAN ODT) tablet 4 mg  4 mg Oral Q8H PRN    Or    ondansetron (ZOFRAN) injection 4 mg  4 mg IntraVENous Q6H PRN    pantoprazole (PROTONIX) tablet 40 mg  40 mg Oral ACB          Lab Review:     Recent Labs     06/08/22  0206 06/07/22  0358 06/06/22 2046 06/06/22  1158 06/06/22  1158   WBC 6.7 5.3  --   --  5.0   HGB 9.3* 8.1* 6.8*   < > 6.1*   HCT 29.6* 25.5*  --   --  20.7*    195  --   --  206    < > = values in this interval not displayed. Recent Labs     06/08/22  0206 06/07/22 0358 06/06/22  1158    144 141   K 3.7 3.6 3.8    109* 105   CO2 28 28 27   * 121* 157*   BUN 18 22* 25*   CREA 1.68* 1.88* 2.09*   CA 9.0 8.2* 8.4*   MG 2.5* 2.6*  --    PHOS 2.9  --   --    ALB  --  2.8* 3.2*   TBILI  --  1.6* 0.7   ALT  --  14 15     Lab Results   Component Value Date/Time    Glucose (POC) 105 02/04/2022 11:48 AM    Glucose (POC) 87 02/03/2022 06:48 AM    Glucose (POC) 84 02/02/2022 11:20 PM    Glucose (POC) 106 12/27/2021 07:38 AM    Glucose (POC) 73 12/26/2021 06:38 PM          Assessment / Plan:     79 yo hx of HTN, DM, CAD s/p GERRY, sCHF EF 45%, GI bleed, presented w/ weakness, recurrent GI bleed, blood loss anemia    1) Recurrent GI bleed: recent EGD/colon 3 months ago unremarkable. GI following, plan for capsule on 06/08    2) Acute blood loss anemia/Fe def anemia: Hgb now stable. Anemia due to above. S/p 2u RBCs. Will cont IV iron today. Monitor Hgb closely, transfuse prn    3) Acute on chronic sCHF: last EF 45%. Had worsening pulm edema after transfusion. Will cont IV bumex prn    4) JOANNA/CKD 3: due to blood loss. Slowly improving. Will monitor     5) CAD: s/p GERRY. Cards following. Ok with holding ASA/plavix for now due to GI bleed for now    6) HTN: resume norvasc, metoprolol. Use IV hydralazine prn    7) DM type 2: A1C 5.7%.   Cont SSI    Total time spent with patient care: 30 min  **I personally saw and examined the patient during this time period**                 Care Plan discussed with: Patient, nursing, family     Discussed:  Care Plan    Prophylaxis:  SCD's    Disposition:  Home w/Family           ___________________________________________________    Attending Physician: Laurel Roque MD

## 2022-06-08 NOTE — PROGRESS NOTES
PHYSICAL THERAPY:PT attempted for second time today. Pt was sleeping soundly earlier and now again this afternoon. Physicins just finishing in room  with pt. Pt also with noted increased BP today. PT will allow pt to rest.Will follow. next treatment day.

## 2022-06-08 NOTE — PROGRESS NOTES
Reason for Admission: GI Bleed                     RUR Score: 17% MODERATE                  PCP: First and Last name:   Joy Arnold NP   Name of Practice:    Are you a current patient: Yes/No: Yes    Approximate date of last visit:  Within the last 3 months    Can you participate in a virtual visit if needed: No     Do you (patient/family) have any concerns for transition/discharge? None identified at this time                 Plan for utilizing home health: Per recommendation pending pt's progression during hospitalization stay     Current Advanced Directive/Advance Care Plan:  DNR- AMD on file and accurate     Healthcare Decision Maker:   Click here to complete [address] including selection of the Healthcare Decision Maker Relationship (ie \"Primary\")            Primary Decision Maker: Rosa Carey - Daughter - [phone]    Secondary Decision Maker: Jay Jay Trimble - [phone]    Transition of Care Plan:         Pt is a 80year old,  female, admitted with GI bleed. Pt was in a deep sleep when CM attempted to meet with her. CM spoke with pt's dtr and granddaughter at bedside (dtr via phone), confirmed address, emergency contact and PCP. Pt lives with her family in a one level home, has a walker, shower chair and toliet raise. Pt does not drive and has had HH in the past with [facility] . Pt was also at [facility] (last Sept). Confirmed insurance coverage of [employer] and [employer] (secondary), states they use the local [facility] and has no problems affording or accessing medications. Pt's family aware of 2:00 PM preferred  time. Granddaughter states someone is always with her while here in the hospital and they will drive her home when needed. Pt remains on Med. Surg- plans for capsul procedure today- pending PT/OT consults- family wishes to have [place] services through [facility] if needed. CM will continue to follow and assist as needed.      Care Management Interventions  PCP Verified by CM:  Yes  Mode of Transport at Discharge: Self  Transition of Care Consult (CM Consult): Discharge Planning  MyChart Signup: No  Discharge Durable Medical Equipment: No  Health Maintenance Reviewed: Yes  Physical Therapy Consult: Yes  Occupational Therapy Consult: Yes  Speech Therapy Consult: No  Support Systems: Child(watson),Other Family Member(s)  Confirm Follow Up Transport: Family  The Patient and/or Patient Representative was Provided with a Choice of Provider and Agrees with the Discharge Plan?: Yes  Name of the Patient Representative Who was Provided with a Choice of Provider and Agrees with the Discharge Plan: Spoke with pt's dtr and granddaughter  Freedom of Choice List was Provided with Basic Dialogue that Supports the Patient's Individualized Plan of Care/Goals, Treatment Preferences and Shares the Quality Data Associated with the Providers?: Yes  Discharge Location  Patient Expects to be Discharged to[de-identified] Home with home health     DOROTHEA Larkin, [address]

## 2022-06-08 NOTE — PERIOP NOTES
Capsule data recording device removed. Discharge instructs reviewed with patient. Patient denies any complaints at this time in regards to capsule procedure.

## 2022-06-08 NOTE — PERIOP NOTES
Zohaib Mittal  1934  [id number]          Situation:  Verbal report received from: Hnas Salguero RN  Preoperative diagnosis: anemia  Patient stated .     Background:  Procedure: Procedure(s):  CAPSULE  Physician performing procedure; Dr. Any Braxton    Patient diabetic yes   Patient taking blood thinners yes Aspirin  Patient has a defibrillator: no   Patient needs antibiotics: no     Assessment:  Vital signs stable yes  Alert and oriented yes  Abdominal assessment: round and soft yes      Recommendation:  Endoscopic Procedure  Family or Friend yes  Permission to share finding with Family or Friend yes

## 2022-06-09 VITALS
HEART RATE: 64 BPM | WEIGHT: 135 LBS | BODY MASS INDEX: 24.84 KG/M2 | OXYGEN SATURATION: 93 % | DIASTOLIC BLOOD PRESSURE: 59 MMHG | SYSTOLIC BLOOD PRESSURE: 159 MMHG | TEMPERATURE: 98.2 F | RESPIRATION RATE: 16 BRPM | HEIGHT: 62 IN

## 2022-06-09 LAB
ANION GAP SERPL CALC-SCNC: 4 MMOL/L (ref 5–15)
BUN SERPL-MCNC: 19 MG/DL (ref 6–20)
BUN/CREAT SERPL: 11 (ref 12–20)
CALCIUM SERPL-MCNC: 9 MG/DL (ref 8.5–10.1)
CHLORIDE SERPL-SCNC: 110 MMOL/L (ref 97–108)
CO2 SERPL-SCNC: 29 MMOL/L (ref 21–32)
CREAT SERPL-MCNC: 1.68 MG/DL (ref 0.55–1.02)
ERYTHROCYTE [DISTWIDTH] IN BLOOD BY AUTOMATED COUNT: 18 % (ref 11.5–14.5)
GLUCOSE SERPL-MCNC: 103 MG/DL (ref 65–100)
HCT VFR BLD AUTO: 30.4 % (ref 35–47)
HGB BLD-MCNC: 9.5 G/DL (ref 11.5–16)
MAGNESIUM SERPL-MCNC: 2.8 MG/DL (ref 1.6–2.4)
MCH RBC QN AUTO: 27.5 PG (ref 26–34)
MCHC RBC AUTO-ENTMCNC: 31.3 G/DL (ref 30–36.5)
MCV RBC AUTO: 87.9 FL (ref 80–99)
NRBC # BLD: 0 K/UL (ref 0–0.01)
NRBC BLD-RTO: 0 PER 100 WBC
PHOSPHATE SERPL-MCNC: 3 MG/DL (ref 2.6–4.7)
PLATELET # BLD AUTO: 216 K/UL (ref 150–400)
PMV BLD AUTO: 10.7 FL (ref 8.9–12.9)
POTASSIUM SERPL-SCNC: 4 MMOL/L (ref 3.5–5.1)
RBC # BLD AUTO: 3.46 M/UL (ref 3.8–5.2)
SODIUM SERPL-SCNC: 143 MMOL/L (ref 136–145)
WBC # BLD AUTO: 6.2 K/UL (ref 3.6–11)

## 2022-06-09 PROCEDURE — 77030038269 HC DRN EXT URIN PURWCK BARD -A

## 2022-06-09 PROCEDURE — 80048 BASIC METABOLIC PNL TOTAL CA: CPT

## 2022-06-09 PROCEDURE — 94761 N-INVAS EAR/PLS OXIMETRY MLT: CPT

## 2022-06-09 PROCEDURE — 84100 ASSAY OF PHOSPHORUS: CPT

## 2022-06-09 PROCEDURE — 74011250637 HC RX REV CODE- 250/637: Performed by: NURSE PRACTITIONER

## 2022-06-09 PROCEDURE — 77010033678 HC OXYGEN DAILY

## 2022-06-09 PROCEDURE — 85027 COMPLETE CBC AUTOMATED: CPT

## 2022-06-09 PROCEDURE — 74011250637 HC RX REV CODE- 250/637: Performed by: INTERNAL MEDICINE

## 2022-06-09 PROCEDURE — 36415 COLL VENOUS BLD VENIPUNCTURE: CPT

## 2022-06-09 PROCEDURE — 74011250636 HC RX REV CODE- 250/636: Performed by: INTERNAL MEDICINE

## 2022-06-09 PROCEDURE — 83735 ASSAY OF MAGNESIUM: CPT

## 2022-06-09 PROCEDURE — 97116 GAIT TRAINING THERAPY: CPT

## 2022-06-09 RX ADMIN — PANTOPRAZOLE SODIUM 40 MG: 40 TABLET, DELAYED RELEASE ORAL at 05:47

## 2022-06-09 RX ADMIN — IRON SUCROSE 100 MG: 20 INJECTION, SOLUTION INTRAVENOUS at 08:10

## 2022-06-09 RX ADMIN — METOPROLOL TARTRATE 50 MG: 50 TABLET ORAL at 08:10

## 2022-06-09 RX ADMIN — AMLODIPINE BESYLATE 5 MG: 5 TABLET ORAL at 08:10

## 2022-06-09 NOTE — PROGRESS NOTES
Bedside and Verbal shift change report given to Aleah Billy (oncoming nurse) by Lavonne Walden (offgoing nurse). Report included the following information SBAR, Kardex, Intake/Output, MAR, Recent Results and Med Rec Status.

## 2022-06-09 NOTE — PROGRESS NOTES
Marie Jean MD, [address] [place] [address]  [address]  [phone]      IMPRESSION and RECOMMENDATIONS     1. Dyspnea:  Likely mild pulm edema due to mild LV dysfunction and reduced intravasc oncotic pressure as well as sig anemia. Better after PRBCs. LVF normalized on echo.     2. CAD:  S/P GERRY RCA 12/2021. No evidence of ischemia. Holding DAPT. Cont statin, etc.      3.  GIB:  Undergoing pill endoscopy. 4.  HTN:  Norvasc resumed @ 5mg and metoprolol 50mg bid. May need to increase norvasc back to 10. I have discussed this plan with the patient. She appears to understand this plan and wishes to proceed ahead. Subjective:       No complaints. Objective:     Patient Vitals for the past 16 hrs:   BP Temp Pulse Resp SpO2   06/09/22 0833 (!) 158/61 98.4 °F (36.9 °C) 61 16 95 %   06/09/22 0700 -- -- 68 -- --   06/09/22 0250 (!) 143/65 98.5 °F (36.9 °C) 64 18 94 %   06/08/22 2350 (!) 178/72 98.8 °F (37.1 °C) 64 18 94 %   06/08/22 2303 -- -- 64 -- --       HEENT Exam:     WNL         Lung Exam:     The patient is not dyspneic. Breath sounds are heard equally in all lung fields. There are no wheezes, rales, rhonchi, or rubs heard on auscultation. Heart Exam:     The rhythm is regular. The PMI is in the 5th intercostal space of the MCL. Apical impulse is normal. S1 is regular. S2 is physiologic. There is no S3, S4 gallop, murmur, click, or rub. Abdomen Exam:     Benign. Extremities Exam:     No cyanosis, clubbing, edema. Distal pulses intact.            Lab Results   Component Value Date/Time    Glucose 103 (H) 06/09/2022 01:33 AM    Sodium 143 06/09/2022 01:33 AM    Potassium 4.0 06/09/2022 01:33 AM    Chloride 110 (H) 06/09/2022 01:33 AM    CO2 29 06/09/2022 01:33 AM    BUN 19 06/09/2022 01:33 AM    Creatinine 1.68 (H) 06/09/2022 01:33 AM    Calcium 9.0 06/09/2022 01:33 AM     Recent Labs     06/09/22  0133 06/08/22  0206 WBC 6.2 6.7   HGB 9.5* 9.3*   HCT 30.4* 29.6*    224     Recent Labs     06/07/22  0358 06/06/22  1158   ALT 14 15   AP 62 69   TBILI 1.6* 0.7   TP 5.8* 6.4   ALB 2.8* 3.2*   GLOB 3.0 3.2     No results for input(s): INR, PTP, APTT, INREXT, INREXT in the last 72 hours. No results for input(s): CPK, CKMB, TNIPOC in the last 72 hours. No lab exists for component: TROPONINI, ITNL  No results for input(s): TROIQ in the last 72 hours.   Lab Results   Component Value Date/Time    Cholesterol, total 169 12/25/2021 04:15 AM    HDL Cholesterol 49 12/25/2021 04:15 AM    LDL, calculated 105.8 (H) 12/25/2021 04:15 AM    Triglyceride 71 12/25/2021 04:15 AM    CHOL/HDL Ratio 3.4 12/25/2021 04:15 AM

## 2022-06-09 NOTE — DISCHARGE INSTRUCTIONS
HOSPITALIST DISCHARGE INSTRUCTIONS  NAME: Samia Wilson   :  1934   MRN:  [id number]     Date/Time:  2022 11:38 AM    ADMIT DATE: 2022     DISCHARGE DATE: 2022     ADMITTING DIAGNOSIS:  GI bleed, blood loss anemia, iron deficiency anemia    DISCHARGE DIAGNOSIS:  same    MEDICATIONS:  See after visit summary       · It is important that you take the medication exactly as they are prescribed. · Keep your medication in the bottles provided by the pharmacist and keep a list of the medication names, dosages, and times to be taken in your wallet. · Do not take other medications without consulting your doctor     Pain Management: per above medications    What to do at Home    Recommended diet:  Diabetic Diet    Recommended activity: Activity as tolerated    1) Return to the hospital if you feel worse    2) If you experience any of the following symptoms then please call your primary care physician or return to the emergency room if you cannot get hold of your doctor:  Fever, chills, nausea, vomiting, diarrhea, change in mentation, falling, bleeding, shortness of breath, chest pain, severe headache, severe abdominal pain,     3) Stop Plavix until you see the GI doctor and Cardiologist    4) Follow up with your doctors     Follow Up: Follow-up Information     Follow up With Specialties Details Why Contact Info    Declan Gonzalez NP Nurse Practitioner Schedule an appointment as soon as possible for a visit in 5 days  [address] [facility]      John Mcpherson MD Gastroenterology Schedule an appointment as soon as possible for a visit in 1 week  [address] Pascale Bañuelos MD Cardiovascular Disease Physician, Interventional Cardiology Physician Schedule an appointment as soon as possible for a visit in 1 week  [address]  [phone]              Information obtained by :   I understand that if any problems occur once I am at home I am to contact my physician. I understand and acknowledge receipt of the instructions indicated above. Physician's or R.N.'s Signature                                                                  Date/Time                                                                                                                                              Patient or Representative Signature                                                          Date/Time      Patient Education        Anemia: Care Instructions  Your Care Instructions     Anemia is a low level of red blood cells, which carry oxygen throughout your body. Many things can cause anemia. Lack of iron is one of the most common causes. Your body needs iron to make hemoglobin, a substance in red blood cells that carries oxygen from the lungs to your body's cells. Without enough iron, the body produces fewer and smaller red blood cells. As a result, your body's cells do not get enough oxygen, and you feel tired and weak. And you may have trouble concentrating. Bleeding is the most common cause of a lack of iron. You may have heavy menstrual bleeding or bleeding caused by conditions such as ulcers, hemorrhoids, or cancer. Regular use of aspirin or other anti-inflammatory medicines (such as ibuprofen) also can cause bleeding in some people. A lack of iron in your diet also can cause anemia, especially at times when the body needs more iron, such as during pregnancy, infancy, and the teen years. Your doctor may have prescribed iron pills. It may take several months of treatment for your iron levels to return to normal. Your doctor also may suggest that you eat foods that are rich in iron, such as meat and beans. There are many other causes of anemia.  It is not always due to a lack of iron. Finding the specific cause of your anemia will help your doctor find the right treatment for you. Follow-up care is a key part of your treatment and safety. Be sure to make and go to all appointments, and call your doctor if you are having problems. It's also a good idea to know your test results and keep a list of the medicines you take. How can you care for yourself at home? · Take your medicines exactly as prescribed. Call your doctor if you think you are having a problem with your medicine. · If your doctor recommends iron pills, take them as directed:  ? Try to take the pills on an empty stomach about 1 hour before or 2 hours after meals. But you may need to take iron with food to avoid an upset stomach. ? Do not take antacids or drink milk or caffeine drinks (such as coffee, tea, or cola) at the same time or within 2 hours of the time that you take your iron. They can make it hard for your body to absorb the iron. ? Vitamin C (from food or supplements) helps your body absorb iron. Try taking iron pills with a glass of orange juice or some other food that is high in vitamin C, such as citrus fruits. ? Iron pills may cause stomach problems, such as heartburn, nausea, diarrhea, constipation, and cramps. Be sure to drink plenty of fluids, and include fruits, vegetables, and fiber in your diet each day. Iron pills often make your bowel movements dark or green. ? If you forget to take an iron pill, do not take a double dose of iron the next time you take a pill. ? Keep iron pills out of the reach of small children. An overdose of iron can be very dangerous. · Follow your doctor's advice about eating iron-rich foods. These include red meat, shellfish, poultry, eggs, beans, raisins, whole-grain bread, and leafy green vegetables. · Steam vegetables to help them keep their iron content. When should you call for help? Call 911 anytime you think you may need emergency care.  For example, call if:    · You have symptoms of a heart attack. These may include:  ? Chest pain or pressure, or a strange feeling in the chest.  ? Sweating. ? Shortness of breath. ? Nausea or vomiting. ? Pain, pressure, or a strange feeling in the back, neck, jaw, or upper belly or in one or both shoulders or arms. ? Lightheadedness or sudden weakness. ? A fast or irregular heartbeat. After you call 911, the  may tell you to chew 1 adult-strength or 2 to 4 low-dose aspirin. Wait for an ambulance. Do not try to drive yourself.     · You passed out (lost consciousness). Call your doctor now or seek immediate medical care if:    · You have new or increased shortness of breath.     · You are dizzy or lightheaded, or you feel like you may faint.     · Your fatigue and weakness continue or get worse.     · You have any abnormal bleeding, such as:  ? Nosebleeds. ? Vaginal bleeding that is different (heavier, more frequent, at a different time of the month) than what you are used to.  ? Bloody or black stools, or rectal bleeding. ? Bloody or pink urine. Watch closely for changes in your health, and be sure to contact your doctor if:    · You do not get better as expected. Where can you learn more? Go to http://www.Go Dish.com/  Enter R301 in the search box to learn more about \"Anemia: Care Instructions. \"  Current as of: November 29, 2021               Content Version: 13.2  © 2006-2022 [employer]. Care instructions adapted under license by [employer] (which disclaims liability or warranty for this information). If you have questions about a medical condition or this instruction, always ask your healthcare professional. [place] [address] any warranty or liability for your use of this information.        Patient Education        Gastrointestinal Bleeding: Care Instructions  Your Care Instructions     The digestive or gastrointestinal tract goes from the mouth to the anus. It is often called the GI tract. Bleeding can happen anywhere in the GI tract. It may be caused by an ulcer, an infection, or cancer. It may also be caused by medicines such as aspirin or ibuprofen. Light bleeding may not cause any symptoms at first. But if you continue to bleed for a while, you may feel very weak or tired. Sudden, heavy bleeding means you need to see a doctor right away. This kind of bleeding can be very dangerous. But it can usually be cured or controlled. The doctor may do some tests to find the cause of your bleeding. Follow-up care is a key part of your treatment and safety. Be sure to make and go to all appointments, and call your doctor if you are having problems. It's also a good idea to know your test results and keep a list of the medicines you take. How can you care for yourself at home? · Be safe with medicines. Take your medicines exactly as prescribed. Call your doctor if you think you are having a problem with your medicine. You will get more details on the specific medicines your doctor prescribes. · Do not take aspirin or other anti-inflammatory medicines, such as naproxen (Aleve) or ibuprofen (Advil, Motrin), without talking to your doctor first. Ask your doctor if it is okay to use acetaminophen (Tylenol). · Do not drink alcohol. · The bleeding may make you lose iron. So it's important to eat foods that have a lot of iron. These include red meat, shellfish, poultry, and eggs. They also include beans, raisins, whole-grain breads, and leafy green vegetables. If you want help planning meals, you can make an appointment with a dietitian. When should you call for help? Call 911 anytime you think you may need emergency care. For example, call if:    · You have sudden, severe belly pain.     · You vomit blood or what looks like coffee grounds.     · You passed out (lost consciousness).     · Your stools are maroon or very bloody.    Call your doctor now or seek immediate medical care if:    · You are dizzy or lightheaded, or you feel like you may faint.     · Your stools are black and look like tar, or they have streaks of blood.     · You have belly pain.     · You vomit or have nausea.     · You have trouble swallowing, or it hurts when you swallow. Watch closely for changes in your health, and be sure to contact your doctor if:    · You do not get better as expected. Where can you learn more? Go to http://www.gray.com/  Enter F981 in the search box to learn more about \"Gastrointestinal Bleeding: Care Instructions. \"  Current as of: July 1, 2021               Content Version: 13.2  © 2006-2022 [employer]. Care instructions adapted under license by [employer] (which disclaims liability or warranty for this information). If you have questions about a medical condition or this instruction, always ask your healthcare professional. [place] [address] any warranty or liability for your use of this information.

## 2022-06-09 NOTE — PROGRESS NOTES
Problem: Mobility Impaired (Adult and Pediatric)  Goal: *Acute Goals and Plan of Care (Insert Text)  Description: FUNCTIONAL STATUS PRIOR TO ADMISSION: Patient was modified independent using a rolling walker for functional mobility. HOME SUPPORT PRIOR TO ADMISSION: The patient lived with her daughter but did not require assist.    Physical Therapy Goals  Initiated 6/7/2022  1. Patient will move from supine to sit and sit to supine , scoot up and down, and roll side to side in bed with independence within 7 day(s). 2.  Patient will transfer from bed to chair and chair to bed with modified independence using the least restrictive device within 7 day(s). 3.  Patient will perform sit to stand with modified independence within 7 day(s). 4.  Patient will ambulate with modified independence for 200 feet with the least restrictive device within 7 day(s). 5.  Patient will ascend/descend 3 stairs with yaneth handrail(s) with minimal assistance/contact guard assist within 7 day(s). Outcome: Progressing Towards Goal   PHYSICAL THERAPY TREATMENT  Patient: Starlin Sandifer (59 y.o. female)  Date: 6/9/2022  Diagnosis: GI bleed [K92.2] Acute GI bleeding  Procedure(s) (LRB):  CAPSULE- STUDY ENDS AT 1530 (N/A) 1 Day Post-Op  Precautions: Fall  Chart, physical therapy assessment, plan of care and goals were reviewed. ASSESSMENT  Patient continues with skilled PT services and is progressing towards goals. Patient preparing for d/c, agreeable for PT to assist with changing, toileting. Patient demo's good safety awareness with mobility and transfers and well as self care task, only needing SBA today with no LOB or signs of fatigue. To d/c home with daughter's assistance, no further services needed.      Current Level of Function Impacting Discharge (mobility/balance): SBA, gait x 40'    Other factors to consider for discharge:          PLAN :  Patient continues to benefit from skilled intervention to address the above impairments. Continue treatment per established plan of care. to address goals. Recommendation for discharge: (in order for the patient to meet his/her long term goals)  No skilled physical therapy/ follow up rehabilitation needs identified at this time. This discharge recommendation:  Has been made in collaboration with the attending provider and/or case management    IF patient discharges home will need the following DME: patient owns DME required for discharge       SUBJECTIVE:   Patient stated I'm ready to get out of here.     OBJECTIVE DATA SUMMARY:   Critical Behavior:  Neurologic State: Alert  Orientation Level: Oriented X4  Cognition: Follows commands     Functional Mobility Training:  Bed Mobility:     Supine to Sit: Supervision     Scooting: Supervision        Transfers:  Sit to Stand: Stand-by assistance  Stand to Sit: Stand-by assistance        Bed to Chair: Stand-by assistance                    Balance:  Sitting: Intact  Standing: Intact; With support  Ambulation/Gait Training:  Distance (ft): 40 Feet (ft)  Assistive Device: Gait belt;Walker, rolling  Ambulation - Level of Assistance: Stand-by assistance        Gait Abnormalities: Decreased step clearance        Base of Support: Widened                         Pain Rating:  None reported    Activity Tolerance:   Good, Fair, and requires rest breaks    After treatment patient left in no apparent distress:   Sitting in chair, Call bell within reach, and Caregiver / family present    COMMUNICATION/COLLABORATION:   The patients plan of care was discussed with: Registered nurse.      Keila Hunter, PT   Time Calculation: 16 mins

## 2022-06-09 NOTE — PROGRESS NOTES
6/9/2022  CARE MANAGEMENT NOTE:  CM reviewed EMR and handoff received from previous care manager Dave Rosenbaum). Pt was admitted with GIB. Reportedly, pt resides with family. Dtr Kennedy Kawasaki ([phone])    RUR 17%    Transition Plan of Care:  1. Cardiology, GI following for medical management - pt is s/p capsule study on 6/8  2. Plan is for pt to return home with family  3. PT/OT evals complete; pt ambulated 30 feet on 6/7 and no further rehab services indicated  4. Outpt f/u  5. Family will transport pt home    No further post discharge needs indicated at this time.   Joana

## 2022-06-09 NOTE — DISCHARGE SUMMARY
Jan Mondragon [facility]  [address] [facility], [address] [place] [address]  [phone]    Physician Discharge Summary     Patient ID:  Briscoe Olszewski  [id number]  80 y.o.  1934    Admit date: 6/6/2022    Discharge date and time: 6/9/2022 11:40 AM    Admission Diagnoses: GI bleed [K92.2]    Discharge Diagnoses:  Principal Diagnosis Acute GI bleeding                                            Principal Problem:    Acute GI bleeding (2/2/2022)    Active Problems:    CKD (chronic kidney disease) stage 3, GFR 30-59 ml/min ([facility] [address] (10/18/2021)      CAD (coronary artery disease) ()      DM type 2 causing renal disease (HCC) ()      DM type 2 causing vascular disease (HCC) ()      Hypertension ()      CHF (congestive heart failure), NYHA class I, chronic, systolic (HCC) ()      Ischemic cardiomyopathy ()      Iron deficiency anemia (2/3/2022)      Chronic GI bleeding (2/3/2022)      Dehydration (6/6/2022)      GI bleed (6/6/2022)      JOANNA (acute kidney injury) ([facility] [address] (6/6/2022)           Hospital Course:     81 yo hx of HTN, DM, CAD s/p GERRY, sCHF EF 45%, GI bleed, presented w/ weakness, recurrent GI bleed, blood loss anemia     1) Recurrent GI bleed: Now resolved. Recent EGD/colon 3 months ago unremarkable. GI following, s/p capsule on 06/08. Will hold plavix until cleared by GI and Cardiology     2) Acute blood loss anemia/Fe def anemia: Hgb now stable. Anemia due to above. S/p 2u RBCs. s/p IV iron     3) Acute on chronic sCHF: last EF 45%. Required IV bumex after transfusion. Cont home lasix      4) JOANNA/CKD 3: due to blood loss. Improved with transfusion      5) CAD: s/p GERRY. Cards following. Ok with holding ASA/plavix for now due to GI bleed for now. F/u with Cards     6) HTN: resume norvasc, metoprolol     7) DM type 2: A1C 5.7%.   Cont SSI    PCP: Michel Lopez NP     Consults: GI, Cards    Significant Diagnostic Studies: Capsule studies    Discharge Exam:  Physical Exam:    Gen:  Elderly, frail, ill-appearing, NAD  HEENT:  Pink conjunctivae, PERRL, hearing intact to voice, moist mucous membranes  Neck:  Supple, without masses, thyroid non-tender  Resp:  No accessory muscle use, crackles at bases   Card:  No murmurs, normal S1, S2 without thrills, bruits or peripheral edema  Abd:  Soft, non-tender, non-distended, normoactive bowel sounds are present  Musc:  No cyanosis or clubbing  Skin:  No rashes   Neuro:  Cranial nerves 3-12 are grossly intact, follows commands appropriately  Psych:  Fair insight, oriented to person, place and time, alert    Disposition: home  Discharge Condition: Stable    Patient Instructions:   Current Discharge Medication List      CONTINUE these medications which have NOT CHANGED    Details   furosemide (Lasix) 40 mg tablet Take 40 mg by mouth daily. Indications: accumulation of fluid resulting from chronic heart failure      pantoprazole (PROTONIX) 40 mg granules for oral suspension Take 40 mg by mouth Daily (before breakfast). Qty: 90 Each, Refills: 0      amLODIPine (NORVASC) 10 mg tablet Take 1 Tablet by mouth daily. Qty: 90 Tablet, Refills: 0      aspirin 81 mg chewable tablet Take 1 Tablet by mouth daily. Qty: 90 Tablet, Refills: 0      ferrous sulfate (Iron) 325 mg (65 mg iron) tablet Take 1 Tablet by mouth Daily (before breakfast). Qty: 90 Tablet, Refills: 0      cholecalciferol, vitamin D3, (Vitamin D3) 50 mcg (2,000 unit) tab Take  by mouth. atorvastatin (LIPITOR) 20 mg tablet Take 1 Tablet by mouth daily. Qty: 30 Tablet, Refills: 1      metoprolol tartrate (LOPRESSOR) 50 mg tablet Take 1 Tablet by mouth every twelve (12) hours. Qty: 60 Tablet, Refills: 1      senna (Senna) 8.6 mg tablet Take 1 Tablet by mouth daily as needed for Constipation. Qty: 90 Tablet, Refills: 0      ondansetron hcl (Zofran) 4 mg tablet Take 4 mg by mouth daily as needed for Nausea or Vomiting.          STOP taking these medications       clopidogreL (PLAVIX) 75 mg tab Comments: Reason for Stopping:         albuterol (PROVENTIL VENTOLIN) 2.5 mg /3 mL (0.083 %) nebu Comments:   Reason for Stopping:         albuterol (PROVENTIL HFA, VENTOLIN HFA, PROAIR HFA) 90 mcg/actuation inhaler Comments:   Reason for Stopping:             Activity: Activity as tolerated  Diet: Diabetic Diet  Wound Care: None needed    Follow-up with  Follow-up Information     Follow up With Specialties Details Why Contact Info    Andreia Coker NP Nurse Practitioner Schedule an appointment as soon as possible for a visit in 5 days  [address]  [address]      Veronica Barillas MD Gastroenterology Schedule an appointment as soon as possible for a visit in 1 week  [address] Pascale Fulton MD Cardiovascular Disease Physician, Interventional Cardiology Physician Schedule an appointment as soon as possible for a visit in 1 week  Rishabh Carrillo  [phone]            Follow-up tests/labs: capsule    Signed:  Ijeoma Winkler MD  6/9/2022  11:40 AM  **I personally spent 35 min on discharge**

## 2022-06-09 NOTE — ROUTINE PROCESS
Pt discharged per order. Written and verbal discharge orders given to pt and pt's daughter. Pt and daughter educated on medications, importance of follow up, and when to return to ED. IV catheter removed intact. No prescriptions prescribed. Pt to vehicle via wheelchair and accompanied by daughter. No distress noted and all personal belongings at side.

## 2022-07-06 PROBLEM — E86.0 DEHYDRATION: Status: RESOLVED | Noted: 2022-06-06 | Resolved: 2022-07-06

## 2022-07-21 NOTE — PROCEDURES
Full capsule endoscopy report saved in medical records. The capsule passed to the cecum. There was evidence of few small angiodysplasias seen in the proximal and mid-jejunum. No active bleeding seen. Otherwise no lesions or blood seen throughout the visualized portions of the small bowel. Impression:    Few non bleeding angiodysplasias seen in the proximal and mid-jejunum. C  Continue to monitor hemoglobin on a regular basis with PCP.

## 2022-10-26 ENCOUNTER — APPOINTMENT (OUTPATIENT)
Dept: GENERAL RADIOLOGY | Age: 87
DRG: 202 | End: 2022-10-26
Attending: EMERGENCY MEDICINE
Payer: MEDICARE

## 2022-10-26 ENCOUNTER — HOSPITAL ENCOUNTER (INPATIENT)
Age: 87
LOS: 2 days | Discharge: HOME | DRG: 202 | End: 2022-10-28
Attending: EMERGENCY MEDICINE | Admitting: INTERNAL MEDICINE
Payer: MEDICARE

## 2022-10-26 ENCOUNTER — APPOINTMENT (OUTPATIENT)
Dept: NON INVASIVE DIAGNOSTICS | Age: 87
DRG: 202 | End: 2022-10-26
Attending: SPECIALIST
Payer: MEDICARE

## 2022-10-26 DIAGNOSIS — N17.9 AKI (ACUTE KIDNEY INJURY) (HCC): ICD-10-CM

## 2022-10-26 DIAGNOSIS — R06.09 DYSPNEA ON EXERTION: ICD-10-CM

## 2022-10-26 DIAGNOSIS — I50.9 ACUTE ON CHRONIC CONGESTIVE HEART FAILURE, UNSPECIFIED HEART FAILURE TYPE (HCC): ICD-10-CM

## 2022-10-26 DIAGNOSIS — R05.1 ACUTE COUGH: Primary | ICD-10-CM

## 2022-10-26 PROBLEM — J45.901 ASTHMA EXACERBATION: Status: ACTIVE | Noted: 2022-10-26

## 2022-10-26 PROBLEM — J20.9 ACUTE BRONCHITIS: Status: ACTIVE | Noted: 2022-10-26

## 2022-10-26 PROBLEM — I50.33 ACUTE ON CHRONIC DIASTOLIC (CONGESTIVE) HEART FAILURE (HCC): Status: ACTIVE | Noted: 2022-10-26

## 2022-10-26 LAB
ALBUMIN SERPL-MCNC: 3.4 G/DL (ref 3.5–5)
ALBUMIN/GLOB SERPL: 0.9 {RATIO} (ref 1.1–2.2)
ALP SERPL-CCNC: 79 U/L (ref 45–117)
ALT SERPL-CCNC: 20 U/L (ref 12–78)
ANION GAP SERPL CALC-SCNC: 9 MMOL/L (ref 5–15)
AST SERPL-CCNC: 18 U/L (ref 15–37)
ATRIAL RATE: 65 BPM
BASOPHILS # BLD: 0.1 K/UL (ref 0–0.1)
BASOPHILS NFR BLD: 1 % (ref 0–1)
BILIRUB SERPL-MCNC: 0.7 MG/DL (ref 0.2–1)
BNP SERPL-MCNC: 1219 PG/ML
BUN SERPL-MCNC: 40 MG/DL (ref 6–20)
BUN/CREAT SERPL: 14 (ref 12–20)
CALCIUM SERPL-MCNC: 9 MG/DL (ref 8.5–10.1)
CALCULATED P AXIS, ECG09: 64 DEGREES
CALCULATED R AXIS, ECG10: 21 DEGREES
CALCULATED T AXIS, ECG11: 45 DEGREES
CHLORIDE SERPL-SCNC: 107 MMOL/L (ref 97–108)
CO2 SERPL-SCNC: 24 MMOL/L (ref 21–32)
COMMENT, HOLDF: NORMAL
COVID-19 RAPID TEST, COVR: NOT DETECTED
CREAT SERPL-MCNC: 2.88 MG/DL (ref 0.55–1.02)
DIAGNOSIS, 93000: NORMAL
DIFFERENTIAL METHOD BLD: ABNORMAL
ECHO EST RA PRESSURE: 3 MMHG
ECHO LV EDV A2C: 57 ML
ECHO LV EDV A4C: 71 ML
ECHO LV EDV BP: 66 ML (ref 56–104)
ECHO LV EDV INDEX A4C: 44 ML/M2
ECHO LV EDV INDEX BP: 41 ML/M2
ECHO LV EDV NDEX A2C: 35 ML/M2
ECHO LV EJECTION FRACTION A2C: 51 %
ECHO LV EJECTION FRACTION A4C: 57 %
ECHO LV EJECTION FRACTION BIPLANE: 54 % (ref 55–100)
ECHO LV ESV A2C: 28 ML
ECHO LV ESV A4C: 31 ML
ECHO LV ESV BP: 30 ML (ref 19–49)
ECHO LV ESV INDEX A2C: 17 ML/M2
ECHO LV ESV INDEX A4C: 19 ML/M2
ECHO LV ESV INDEX BP: 19 ML/M2
ECHO LV FRACTIONAL SHORTENING: 32 % (ref 28–44)
ECHO LV INTERNAL DIMENSION DIASTOLE INDEX: 3.09 CM/M2
ECHO LV INTERNAL DIMENSION DIASTOLIC: 5 CM (ref 3.9–5.3)
ECHO LV INTERNAL DIMENSION SYSTOLIC INDEX: 2.1 CM/M2
ECHO LV INTERNAL DIMENSION SYSTOLIC: 3.4 CM
ECHO LV IVSD: 1.2 CM (ref 0.6–0.9)
ECHO LV MASS 2D: 233.7 G (ref 67–162)
ECHO LV MASS INDEX 2D: 144.3 G/M2 (ref 43–95)
ECHO LV POSTERIOR WALL DIASTOLIC: 1.2 CM (ref 0.6–0.9)
ECHO LV RELATIVE WALL THICKNESS RATIO: 0.48
ECHO RIGHT VENTRICULAR SYSTOLIC PRESSURE (RVSP): 25 MMHG
ECHO TV REGURGITANT MAX VELOCITY: 2.32 M/S
ECHO TV REGURGITANT PEAK GRADIENT: 21 MMHG
EOSINOPHIL # BLD: 0.5 K/UL (ref 0–0.4)
EOSINOPHIL NFR BLD: 7 % (ref 0–7)
ERYTHROCYTE [DISTWIDTH] IN BLOOD BY AUTOMATED COUNT: 13.2 % (ref 11.5–14.5)
FLUAV AG NPH QL IA: NEGATIVE
FLUBV AG NOSE QL IA: NEGATIVE
GLOBULIN SER CALC-MCNC: 3.9 G/DL (ref 2–4)
GLUCOSE BLD STRIP.AUTO-MCNC: 343 MG/DL (ref 65–117)
GLUCOSE SERPL-MCNC: 198 MG/DL (ref 65–100)
HCT VFR BLD AUTO: 33.2 % (ref 35–47)
HGB BLD-MCNC: 10.7 G/DL (ref 11.5–16)
IMM GRANULOCYTES # BLD AUTO: 0 K/UL (ref 0–0.04)
IMM GRANULOCYTES NFR BLD AUTO: 0 % (ref 0–0.5)
LYMPHOCYTES # BLD: 1.6 K/UL (ref 0.8–3.5)
LYMPHOCYTES NFR BLD: 22 % (ref 12–49)
MAGNESIUM SERPL-MCNC: 2.7 MG/DL (ref 1.6–2.4)
MCH RBC QN AUTO: 29.5 PG (ref 26–34)
MCHC RBC AUTO-ENTMCNC: 32.2 G/DL (ref 30–36.5)
MCV RBC AUTO: 91.5 FL (ref 80–99)
MONOCYTES # BLD: 0.5 K/UL (ref 0–1)
MONOCYTES NFR BLD: 7 % (ref 5–13)
NEUTS SEG # BLD: 4.6 K/UL (ref 1.8–8)
NEUTS SEG NFR BLD: 63 % (ref 32–75)
NRBC # BLD: 0 K/UL (ref 0–0.01)
NRBC BLD-RTO: 0 PER 100 WBC
P-R INTERVAL, ECG05: 208 MS
PLATELET # BLD AUTO: 196 K/UL (ref 150–400)
PMV BLD AUTO: 11.1 FL (ref 8.9–12.9)
POTASSIUM SERPL-SCNC: 3.2 MMOL/L (ref 3.5–5.1)
PROCALCITONIN SERPL-MCNC: <0.05 NG/ML
PROT SERPL-MCNC: 7.3 G/DL (ref 6.4–8.2)
Q-T INTERVAL, ECG07: 458 MS
QRS DURATION, ECG06: 100 MS
QTC CALCULATION (BEZET), ECG08: 476 MS
RBC # BLD AUTO: 3.63 M/UL (ref 3.8–5.2)
SAMPLES BEING HELD,HOLD: NORMAL
SERVICE CMNT-IMP: ABNORMAL
SODIUM SERPL-SCNC: 140 MMOL/L (ref 136–145)
SOURCE, COVRS: NORMAL
TROPONIN-HIGH SENSITIVITY: 12 NG/L (ref 0–51)
VENTRICULAR RATE, ECG03: 65 BPM
WBC # BLD AUTO: 7.3 K/UL (ref 3.6–11)

## 2022-10-26 PROCEDURE — 0202U NFCT DS 22 TRGT SARS-COV-2: CPT

## 2022-10-26 PROCEDURE — 93308 TTE F-UP OR LMTD: CPT | Performed by: INTERNAL MEDICINE

## 2022-10-26 PROCEDURE — 83880 ASSAY OF NATRIURETIC PEPTIDE: CPT

## 2022-10-26 PROCEDURE — 65270000029 HC RM PRIVATE

## 2022-10-26 PROCEDURE — 85025 COMPLETE CBC W/AUTO DIFF WBC: CPT

## 2022-10-26 PROCEDURE — 87635 SARS-COV-2 COVID-19 AMP PRB: CPT

## 2022-10-26 PROCEDURE — 93005 ELECTROCARDIOGRAM TRACING: CPT

## 2022-10-26 PROCEDURE — 80053 COMPREHEN METABOLIC PANEL: CPT

## 2022-10-26 PROCEDURE — 84145 PROCALCITONIN (PCT): CPT

## 2022-10-26 PROCEDURE — 93325 DOPPLER ECHO COLOR FLOW MAPG: CPT | Performed by: INTERNAL MEDICINE

## 2022-10-26 PROCEDURE — 77030013140 HC MSK NEB VYRM -A

## 2022-10-26 PROCEDURE — 83735 ASSAY OF MAGNESIUM: CPT

## 2022-10-26 PROCEDURE — 36415 COLL VENOUS BLD VENIPUNCTURE: CPT

## 2022-10-26 PROCEDURE — 94640 AIRWAY INHALATION TREATMENT: CPT

## 2022-10-26 PROCEDURE — 77030038269 HC DRN EXT URIN PURWCK BARD -A

## 2022-10-26 PROCEDURE — 93321 DOPPLER ECHO F-UP/LMTD STD: CPT | Performed by: INTERNAL MEDICINE

## 2022-10-26 PROCEDURE — 99285 EMERGENCY DEPT VISIT HI MDM: CPT

## 2022-10-26 PROCEDURE — G0378 HOSPITAL OBSERVATION PER HR: HCPCS

## 2022-10-26 PROCEDURE — 87804 INFLUENZA ASSAY W/OPTIC: CPT

## 2022-10-26 PROCEDURE — 71046 X-RAY EXAM CHEST 2 VIEWS: CPT

## 2022-10-26 PROCEDURE — 74011250636 HC RX REV CODE- 250/636: Performed by: INTERNAL MEDICINE

## 2022-10-26 PROCEDURE — 93308 TTE F-UP OR LMTD: CPT

## 2022-10-26 PROCEDURE — 82962 GLUCOSE BLOOD TEST: CPT

## 2022-10-26 PROCEDURE — 74011250637 HC RX REV CODE- 250/637: Performed by: INTERNAL MEDICINE

## 2022-10-26 PROCEDURE — 74011636637 HC RX REV CODE- 636/637: Performed by: INTERNAL MEDICINE

## 2022-10-26 PROCEDURE — 74011000250 HC RX REV CODE- 250: Performed by: INTERNAL MEDICINE

## 2022-10-26 PROCEDURE — 84484 ASSAY OF TROPONIN QUANT: CPT

## 2022-10-26 RX ORDER — GUAIFENESIN 600 MG/1
600 TABLET, EXTENDED RELEASE ORAL EVERY 12 HOURS
Status: DISCONTINUED | OUTPATIENT
Start: 2022-10-26 | End: 2022-10-28 | Stop reason: HOSPADM

## 2022-10-26 RX ORDER — SODIUM CHLORIDE 0.9 % (FLUSH) 0.9 %
5-40 SYRINGE (ML) INJECTION EVERY 8 HOURS
Status: DISCONTINUED | OUTPATIENT
Start: 2022-10-26 | End: 2022-10-28 | Stop reason: HOSPADM

## 2022-10-26 RX ORDER — IPRATROPIUM BROMIDE AND ALBUTEROL SULFATE 2.5; .5 MG/3ML; MG/3ML
3 SOLUTION RESPIRATORY (INHALATION)
Status: DISCONTINUED | OUTPATIENT
Start: 2022-10-26 | End: 2022-10-28 | Stop reason: HOSPADM

## 2022-10-26 RX ORDER — INSULIN LISPRO 100 [IU]/ML
INJECTION, SOLUTION INTRAVENOUS; SUBCUTANEOUS
Status: DISCONTINUED | OUTPATIENT
Start: 2022-10-26 | End: 2022-10-28 | Stop reason: HOSPADM

## 2022-10-26 RX ORDER — SODIUM CHLORIDE 0.9 % (FLUSH) 0.9 %
5-40 SYRINGE (ML) INJECTION AS NEEDED
Status: DISCONTINUED | OUTPATIENT
Start: 2022-10-26 | End: 2022-10-28 | Stop reason: HOSPADM

## 2022-10-26 RX ORDER — ATORVASTATIN CALCIUM 20 MG/1
20 TABLET, FILM COATED ORAL DAILY
Status: DISCONTINUED | OUTPATIENT
Start: 2022-10-27 | End: 2022-10-28 | Stop reason: HOSPADM

## 2022-10-26 RX ORDER — BUMETANIDE 0.25 MG/ML
1 INJECTION INTRAMUSCULAR; INTRAVENOUS EVERY 12 HOURS
Status: DISCONTINUED | OUTPATIENT
Start: 2022-10-26 | End: 2022-10-27

## 2022-10-26 RX ORDER — MAGNESIUM SULFATE 100 %
4 CRYSTALS MISCELLANEOUS AS NEEDED
Status: DISCONTINUED | OUTPATIENT
Start: 2022-10-26 | End: 2022-10-28 | Stop reason: HOSPADM

## 2022-10-26 RX ORDER — ACETAMINOPHEN 650 MG/1
650 SUPPOSITORY RECTAL
Status: DISCONTINUED | OUTPATIENT
Start: 2022-10-26 | End: 2022-10-28 | Stop reason: HOSPADM

## 2022-10-26 RX ORDER — FACIAL-BODY WIPES
10 EACH TOPICAL DAILY PRN
Status: DISCONTINUED | OUTPATIENT
Start: 2022-10-26 | End: 2022-10-28 | Stop reason: HOSPADM

## 2022-10-26 RX ORDER — METOPROLOL TARTRATE 50 MG/1
50 TABLET ORAL EVERY 12 HOURS
Status: DISCONTINUED | OUTPATIENT
Start: 2022-10-26 | End: 2022-10-28 | Stop reason: HOSPADM

## 2022-10-26 RX ORDER — OXYCODONE HYDROCHLORIDE 5 MG/1
5 TABLET ORAL
Status: DISCONTINUED | OUTPATIENT
Start: 2022-10-26 | End: 2022-10-28 | Stop reason: HOSPADM

## 2022-10-26 RX ORDER — PANTOPRAZOLE SODIUM 40 MG/1
40 TABLET, DELAYED RELEASE ORAL
Status: DISCONTINUED | OUTPATIENT
Start: 2022-10-27 | End: 2022-10-28 | Stop reason: HOSPADM

## 2022-10-26 RX ORDER — ONDANSETRON 2 MG/ML
4 INJECTION INTRAMUSCULAR; INTRAVENOUS
Status: DISCONTINUED | OUTPATIENT
Start: 2022-10-26 | End: 2022-10-28 | Stop reason: HOSPADM

## 2022-10-26 RX ORDER — GUAIFENESIN 100 MG/5ML
81 LIQUID (ML) ORAL DAILY
Status: DISCONTINUED | OUTPATIENT
Start: 2022-10-27 | End: 2022-10-28 | Stop reason: HOSPADM

## 2022-10-26 RX ORDER — BENZONATATE 100 MG/1
100 CAPSULE ORAL
Status: DISCONTINUED | OUTPATIENT
Start: 2022-10-26 | End: 2022-10-28 | Stop reason: HOSPADM

## 2022-10-26 RX ORDER — AMOXICILLIN 250 MG
1 CAPSULE ORAL 2 TIMES DAILY
Status: DISCONTINUED | OUTPATIENT
Start: 2022-10-26 | End: 2022-10-28 | Stop reason: HOSPADM

## 2022-10-26 RX ORDER — ACETAMINOPHEN 325 MG/1
650 TABLET ORAL
Status: DISCONTINUED | OUTPATIENT
Start: 2022-10-26 | End: 2022-10-28 | Stop reason: HOSPADM

## 2022-10-26 RX ORDER — SODIUM CHLORIDE 9 MG/ML
25 INJECTION, SOLUTION INTRAVENOUS AS NEEDED
Status: DISCONTINUED | OUTPATIENT
Start: 2022-10-26 | End: 2022-10-28 | Stop reason: HOSPADM

## 2022-10-26 RX ORDER — AMLODIPINE BESYLATE 5 MG/1
5 TABLET ORAL DAILY
COMMUNITY
End: 2022-10-28

## 2022-10-26 RX ORDER — AMLODIPINE BESYLATE 5 MG/1
5 TABLET ORAL DAILY
Status: DISCONTINUED | OUTPATIENT
Start: 2022-10-27 | End: 2022-10-27

## 2022-10-26 RX ADMIN — SODIUM CHLORIDE, PRESERVATIVE FREE 10 ML: 5 INJECTION INTRAVENOUS at 17:55

## 2022-10-26 RX ADMIN — SODIUM CHLORIDE, PRESERVATIVE FREE 10 ML: 5 INJECTION INTRAVENOUS at 21:19

## 2022-10-26 RX ADMIN — IPRATROPIUM BROMIDE AND ALBUTEROL SULFATE 3 ML: .5; 3 SOLUTION RESPIRATORY (INHALATION) at 19:56

## 2022-10-26 RX ADMIN — IPRATROPIUM BROMIDE AND ALBUTEROL SULFATE 3 ML: .5; 3 SOLUTION RESPIRATORY (INHALATION) at 14:43

## 2022-10-26 RX ADMIN — BUMETANIDE 1 MG: 0.25 INJECTION INTRAMUSCULAR; INTRAVENOUS at 21:16

## 2022-10-26 RX ADMIN — INSULIN LISPRO 7 UNITS: 100 INJECTION, SOLUTION INTRAVENOUS; SUBCUTANEOUS at 21:16

## 2022-10-26 RX ADMIN — METHYLPREDNISOLONE SODIUM SUCCINATE 40 MG: 40 INJECTION, POWDER, FOR SOLUTION INTRAMUSCULAR; INTRAVENOUS at 21:16

## 2022-10-26 RX ADMIN — SENNOSIDES AND DOCUSATE SODIUM 1 TABLET: 50; 8.6 TABLET ORAL at 21:17

## 2022-10-26 RX ADMIN — CEFTRIAXONE 1 G: 1 INJECTION, POWDER, FOR SOLUTION INTRAMUSCULAR; INTRAVENOUS at 15:06

## 2022-10-26 RX ADMIN — METHYLPREDNISOLONE SODIUM SUCCINATE 40 MG: 40 INJECTION, POWDER, FOR SOLUTION INTRAMUSCULAR; INTRAVENOUS at 14:57

## 2022-10-26 RX ADMIN — GUAIFENESIN 600 MG: 600 TABLET, EXTENDED RELEASE ORAL at 21:16

## 2022-10-26 RX ADMIN — METOPROLOL TARTRATE 50 MG: 50 TABLET ORAL at 21:17

## 2022-10-26 RX ADMIN — AZITHROMYCIN 500 MG: 500 INJECTION, POWDER, LYOPHILIZED, FOR SOLUTION INTRAVENOUS at 15:26

## 2022-10-26 RX ADMIN — BUMETANIDE 1 MG: 0.25 INJECTION INTRAMUSCULAR; INTRAVENOUS at 14:47

## 2022-10-26 RX ADMIN — GUAIFENESIN 600 MG: 600 TABLET, EXTENDED RELEASE ORAL at 14:39

## 2022-10-26 NOTE — PROGRESS NOTES
TRANSFER - OUT REPORT:    Verbal report given to VICTOR M Schreiber(name) on Marya Valle  being transferred to [facility] [place] [facility]LO[facility](unit) for routine progression of care       Report consisted of patients Situation, Background, Assessment and   Recommendations(SBAR). Information from the following report(s) SBAR, Kardex, and MAR was reviewed with the receiving nurse. Lines:   Peripheral IV 10/26/22 Left Forearm (Active)   Site Assessment Clean, dry, & intact 10/26/22 1738   Phlebitis Assessment 0 10/26/22 1738   Infiltration Assessment 0 10/26/22 1738   Dressing Status Clean, dry, & intact 10/26/22 1738   Dressing Type Transparent;Tape 10/26/22 1738   Hub Color/Line Status Pink;Patent; Flushed 10/26/22 1738   Action Taken Blood drawn 10/26/22 1152   Alcohol Cap Used Yes 10/26/22 1738        Opportunity for questions and clarification was provided.       Patient transported with:   [employer]les Jungles

## 2022-10-26 NOTE — ED PROVIDER NOTES
Roland Bailey is an 79 yo F with h/o CAD and CHF and DM who has had productive cough for the past 5 days. She is bringing up light yellow mucous and sometimes it get caught in her throat and makes her choke. She denies fever or chills. She has not noticed weight gain but has had some swelling in her legs. Past Medical History:   Diagnosis Date    CAD (coronary artery disease)     CHF (congestive heart failure), NYHA class I, chronic, systolic (HCC)     Systolic and diastolic CHF per echocardiogram of 12/25/2021. Patient was found to have LVEF 45%    Chronic kidney disease     stage III/IV:Creatinine 1.45-1.7 with GFR in the low 30s-high 20s    Diabetes mellitus type 2 in nonobese ([address]     Hypertension     Ischemic cardiomyopathy     Echocardiogram of 12/25/2021: mild left ventricular systolic dysfunction (EF 84%) with inferior and basal-mid septal akinesis. Diastolic dysfunction.  Peripheral vascular disease ([address]        Past Surgical History:   Procedure Laterality Date    COLONOSCOPY N/A 2/4/2022    COLONOSCOPY performed by Mali Godinez MD at [facility] ENDOSCOPY    HX APPENDECTOMY      HX CORONARY STENT PLACEMENT      stents x 3 to mid-distal right coronary artery going into PDA for 100% occlusion of RCA; and also had nonobstructive disease in 30% LAD stenosis, 70% diagonal 1 stenosis. She had proximal 30% stenosis in the codominant left circumflex artery     HX HYSTERECTOMY      HX KNEE REPLACEMENT Left     HX OTHER SURGICAL      head sx x 2 nerve related    HX OTHER SURGICAL      Back surgery x 2    HX TONSILLECTOMY      [place] CARDIAC SURG PROCEDURE UNLIST      heart stents. x 3 in january 2022.           Family History:   Problem Relation Age of Onset    Heart Disease Mother        Social History     Socioeconomic History    Marital status:      Spouse name: Not on file    Number of children: Not on file    Years of education: Not on file    Highest education level: Not on file   Occupational History    Not on file   Tobacco Use    Smoking status: Former     Years: 15.00     Types: Cigarettes    Smokeless tobacco: Never   Substance and Sexual Activity    Alcohol use: Yes     Comment: very rarely     Drug use: Never    Sexual activity: Not on file   Other Topics Concern     Service Not Asked    Blood Transfusions Not Asked    Caffeine Concern Not Asked    Occupational Exposure Not Asked    Hobby Hazards Not Asked    Sleep Concern Not Asked    Stress Concern Not Asked    Weight Concern Not Asked    Special Diet Not Asked    Back Care Not Asked    Exercise Not Asked    Bike Helmet Not Asked   [address] Not Asked    Self-Exams Not Asked   Social History Narrative    Not on file     Social Determinants of Health     Financial Resource Strain: Not on file   Food Insecurity: Not on file   Transportation Needs: Not on file   Physical Activity: Not on file   Stress: Not on file   Social Connections: Not on file   Intimate Partner Violence: Not on file   Housing Stability: Not on file         ALLERGIES: Codeine, Compazine [prochlorperazine], Dilaudid [hydromorphone], Morphine, and Sulfa (sulfonamide antibiotics)    Review of Systems   Constitutional:  Negative for fever. HENT:  Negative for sore throat. Eyes:  Negative for visual disturbance. Respiratory:  Positive for cough and shortness of breath. Cardiovascular:  Negative for chest pain. Gastrointestinal:  Negative for abdominal pain. Genitourinary:  Negative for dysuria. Musculoskeletal:  Negative for back pain. Skin:  Negative for rash. Neurological:  Negative for headaches. Vitals:    10/26/22 1130   BP: (!) 153/62   Pulse: 66   Resp: 16   Temp: 97.9 °F (36.6 °C)   SpO2: 97%   Weight: 61.2 kg (135 lb)   Height: 5' 2\" (1.575 m)            Physical Exam  Vitals and nursing note reviewed. Constitutional:       General: She is not in acute distress. Appearance: She is well-developed. HENT:      Head: Normocephalic and atraumatic. Mouth/Throat:      Mouth: Mucous membranes are moist.   Eyes:      Extraocular Movements: Extraocular movements intact. Conjunctiva/sclera: Conjunctivae normal.   Neck:      Trachea: Phonation normal.   Cardiovascular:      Rate and Rhythm: Normal rate. Pulmonary:      Effort: Pulmonary effort is normal. No respiratory distress. Breath sounds: No wheezing or rales. Abdominal:      General: There is no distension. Musculoskeletal:         General: No tenderness. Normal range of motion. Cervical back: Normal range of motion. Right lower leg: Edema present. Left lower leg: Edema present. Skin:     General: Skin is warm and dry. Neurological:      General: No focal deficit present. Mental Status: She is alert. She is not disoriented. Motor: No abnormal muscle tone. ED EKG interpretation:  Rhythm: normal sinus rhythm and PVC's; and regular . Rate (approx.): 65; Axis: normal; P wave: normal; QRS interval: normal ; ST/T wave: non-specific changes; Other findings: abnormal ekg. This EKG was interpreted by Gerhardt Nanny, MD,ED Provider. MDM       Perfect Serve Consult for Admission  1:04 PM    ED Room Number: Room/bed info not found  Patient Name and age:  Sugar Caller 80 y.o.  female  Working Diagnosis:   1. Acute cough    2. Dyspnea on exertion    3. Acute on chronic congestive heart failure, unspecified heart failure type ([facility]    4. JOANNA (acute kidney injury) ([facility]        COVID-19 Suspicion:  Other RAPID COVID negative  Sepsis present:  no  Reassessment needed: no  Code Status:  Full Code  Readmission: no  Isolation Requirements:  no  Recommended Level of Care:  telemetry  Department:[facility] ED - [phone]  Other:  h/o CHF and  DM. Cough and dyspnea with exertion x 5 days and leg swelling. CXR normal. But Cr. 2.88, up from 1.6 in June. Rapid COVID and flu negative.     Procedures

## 2022-10-26 NOTE — PROGRESS NOTES
1845 Received to room 416 alert accompanied by her daughter. VS taken    1942 Bedside shift change report given to Katina (oncoming nurse) by Negro Deutsch (offgoing nurse). Report included the following information ED Summary and Recent Results.

## 2022-10-26 NOTE — PROGRESS NOTES
My last office note:        Kaye Marshall 1934   Office/Outpatient Visit  Visit Date: Thu, Jun 23, 2022 9:15 am  Provider: Rabia Sanchez MD (Assistant: Allie Vargas RN )  Location: [facility] AT [facility] ([facility])[address] [place] [address] [phone]    Electronically signed by India Schlatter, MD on  06/23/2022 09:53:40 AM                           Subjective:    CC: Mrs. Jluis Campos is a 80year old White female. Her primary care physician is Tilford Schlatter, NP. This is a recent hospital stay follow-up visit. She is here today following a transition of care from the inpatient hospital setting. She was admitted to [facility] on 6/6/2022. Patient did not bring medication list or bottles for review. Pt verbalized meds She has a history of coronary artery disease of the native coronary artery and essential hypertension. Last labs were done 06/14/22 by pcp. Hgb 10.4. She will be getting labs monthly. HPI:           Coronary Artery Disease:   She is here today hospital follow-up. The course of the disease has been stable. Currently, her treatment regimen consists of daily 81 mg aspirin,  Lopressor,  Lipitor, and Plavix. She denies angina, chest pain, lower extremity edema, orthopnea, paroxysmal nocturnal dyspnea and shortness of breath. Current level of activity includes ability to walk with a walker. Mrs. Jluis Campos is compliant with alcohol avoidance, tobacco avoidance, taking medications as recommended and prescribed, and the treatment plan. Regarding hypertension:  Type Primary Hypertension Currently, her treatment regimen consists of Norvasc and a diuretic ( furosemide ). ROS:   Discussed relevant lab and imaging studies along with the plan of treatment with the nurse. EYES:  Negative for blurred vision and eye pain. E/N/T:  Negative for epistaxis and hoarseness.     CARDIOVASCULAR:  Negative for chest pain, orthopnea, palpitations and pedal edema. RESPIRATORY:  Negative for cough and hemoptysis. GASTROINTESTINAL:  Negative for abdominal pain and dysphagia. MUSCULOSKELETAL:  Negative for arthralgias and back pain. NEUROLOGICAL:  Negative for headaches, paresthesias, and weakness. HEMATOLOGIC/LYMPHATIC:  Negative for easy bruising, bleeding, and lymphadenopathy. PSYCHIATRIC:  No symptoms reported. Past Medical History / Family History / Social History:     Last Reviewed on 2022 09:34 AM by Jacinto Walker  Past Medical History:     Coronary artery disease  Hypertension  Myocardial Infarction: in 2021;   GI bleed 2022   Type 1 Diabetes   INFLUENZA VACCINE: was last done    COVID-19 Vaccine: Moderna x2 Pfizer Booster     Past Cardiac Procedures:  TESTING(Dr Dagoberto Onofre)     1.  CORONARY ARTERY DISEASE  a. Cath (21): LAD p30, m30; D1 70. LCx p30 (co-dom). RCA m100  b. GERRY RCA (21): \"full metal jacket\" of mid RCA into PDA (21): Mid 2.75x28 Xience, Mid-dist 2.5x28 Xience, Dist-PDA 2.5x18 Xience. All post dil with 2.75x27 NC Trek. Focal mid-dist area post-dil with 3.0x8 NC Euphora  c.  Echo (21):  EF 45% with inf & basal-mid sept AK, DD.  d.  Echo (22):  EF 55-60%, pseudo. Mod dil LA. Mild AVSC. Mild MR.    2.  DYSLIPIDEMIA  a. FLP (21): Tot 169, TG 71, HDL 49,  (no Rx). 3.  H/O GI BLEED  a. Pill Cam (2022): Intestinal AVMs. Surgical History:   Surgical/Procedural History:   Appendectomy  : X 1  Hysterectomy  Joint Replacement:  Joint Replacement  ; Joint Replacement  Tonsillectomy  Head surgery for Headaches  Vein Ligation     Cardiac Procedural/Surgical History:  Cardiac Catheterization:  2021-     Family History:   Father: Healthy;    Mother: ;  congestive heart failure     Social History:   Social History:   Occupation: Retired   Marital Status:     Children: 4 children     Tobacco/Alcohol/Supplements:   Last Reviewed on 6/23/2022 09:34 AM by Eddi Mittal  TOBACCO/ALCOHOL/SUPPLEMENTS   Tobacco: She has a past history of cigarette smoking; quit 2007. Alcohol: Non-drinker   Caffeine:  She admits to consuming caffeine via coffee ( 1 serving per day ) and tea ( 1 serving per day ). Substance Abuse History:   Last Reviewed on 6/23/2022 09:34 AM by Mary Carrascousivenkat History:   Last Reviewed on 6/23/2022 09:34 AM by Eddi Mittal    Communicable Diseases (eg STDs):    Last Reviewed on 6/23/2022 09:34 AM by Grant CASH    Current Problems:   Last Reviewed on 6/23/2022 09:34 AM by Sarthak Hobbs (primary) hypertension  Atherosclerotic heart disease of native coronary artery  Atherosclerotic heart disease of native coronary artery without angina pectoris  Localized edema    Allergies:   Last Reviewed on 6/23/2022 09:34 AM by Grant CASH  codeine: Nausea   Compazine: Nausea   Dilaudid: Nausea   Sulfa (Sulfonamide Antibiotics): Nausea     Current Medications:   Last Reviewed on 6/23/2022 09:34 AM by Grant CASH  aspirin 81 mg oral tablet, delayed release (enteric coated) [take 1 tablet (81 mg) by oral route once daily]  atorvastatin 20 mg oral tablet [take 1 tablet (20 mg) by oral route once daily]  metoprolol tartrate 50 mg oral tablet [take 1 tablet (50 mg) by oral route 2 times per day with meals (HOLD per PCP due to low hr)]  amLODIPine 5 mg oral tablet [take 1 tablet (5 mg) by oral route once daily]  ferrous sulfate 325 mg (65 mg iron) oral tablet [take 1 tablet (325 mg) by oral route once per day]  pantoprazole 40 mg oral tablet, delayed release (enteric coated) [take 1 tablet (40 mg) by oral route once per day]  Tradjenta 5 mg oral tablet [take 1 tablet (5 mg) by oral route once daily prn]  Vitamin D3  [5000 ius daily]  furosemide 40 mg oral tablet [take 1 tablet (40 mg) by oral route once daily]    Objective:    Vitals:     Historical:   2/9/2022  BP:   136/52 mm Hg ( (left arm, , sitting, );) 2/9/2022  Wt:   140lbs  Current: 6/23/2022 9:33:59 AM  Ht:  5 ft, 2 in; Wt: 148 lbs;  BMI: 27. 1BP: 161/52 mm Hg (left arm, sitting);  P: 65 bpm (left arm (BP Cuff), sitting);  sCr: 2.22 mg/dL;  GFR: 17.18    Exams:   GENERAL:  Alert, oriented to person, place and time x3. EYES:  Normal lids without xanthelasma; conjunctiva unremarkable; no scleral icterus. HEENT:  Face symmetric, voice clear, extraocular muscles intact. NECK:  Supple. No bruits, No JVD. LUNGS:  Clear to auscultation. No rales, no wheezing. CARDIAC: Regular rate and rhythm. S1 and S2 normal. No murmur , gallops or rubs. ABDOMEN:  Soft. Positive bowel sounds. Non-tender. MUSCULOSKELETAL:  Normal range of motion, strength and tone. EXTREMITIES:  No edema. Good muscle tone and strength. SKIN: No significant rashes or lesions; no suspicious moles. NEUROLOGICAL:  No focal deficits, cranial nerves II-XII are grossly intact. PSYCHIATRIC:  Appropriate affect and demeanor; normal speech pattern; grossly normal memory. Lab/Test Results:     Sodium, Serum: 142 (03/14/2022),   Potassium, Serum: 3.3 (03/14/2022),   Glucose Serum: 160 (03/14/2022),   BUN serum/plasma (sCnc): 28 (03/14/2022),   Creatinine, Serum: 2.22 (03/14/2022),   HgbA1c: 5.5 (02/04/2022),   AST (SGOT): 18 (03/14/2022),   ALT (SGPT): 24 (03/14/2022),   WBC count: 6.02 (03/14/2022),   RBC count: 3.48 (03/14/2022),   Hgb: 10.1 (03/14/2022),   Hct: 32.4 (03/14/2022),   MCV: 93.1 (03/14/2022),   Platelets: 291 (44/06/1136),   eGFR-AA: 25 (03/14/2022),   eGFR-MOHIT: 21 (03/14/2022),       Procedures:   Atherosclerotic heart disease of native coronary artery without angina pectoris    ECG INTERPRETATION: See scanned EKG for results. SB @ 47, PAF, 1st AVB, old IMI        Assessment:   Doing well from a cardiac standpoint. Not resuming plavix given propensity to GIB  Discussed diet/exercise. F/U 6 months.     Going to [facility] soon.

## 2022-10-26 NOTE — ED TRIAGE NOTES
Patient reports approximately 5 days of shortness of breath-     Patient reports a cardiac history but denies any respiratory history-

## 2022-10-26 NOTE — CONSULTS
Sharmin Archibald MD, [address] [place] [address]  [address]  [phone]    Date of  Admission: 10/26/2022  1:26 PM         IMPRESSION and RECOMMENDATIONS      SOB:  Seems to be primarily due to bronchitis. No overt left-sided CHF. No rales, unremarkable CXR. Normal LVF earlier this year. Will obtain limited echo for EF to be sure no decline. Would be conservative with diuresis given worsening renal function. I suspect some of her edema is due to norvasc: stop. No evidence of ischemia or dysrhythmia. Resume ASA/statin. I have discussed this plan with the patient. She appears to understand this plan and wishes to proceed ahead.            Problem List  Date Reviewed: 10/26/2022            Codes Class Noted    * (Principal) Acute on chronic diastolic (congestive) heart failure (HCC) ICD-10-CM: I50.33  ICD-9-CM: 428.33, 428.0  10/26/2022        Acute bronchitis ICD-10-CM: J20.9  ICD-9-CM: 466.0  10/26/2022        Asthma exacerbation ICD-10-CM: J45.901  ICD-9-CM: 493.92  10/26/2022        Dehydration ICD-10-CM: E86.0  ICD-9-CM: 276.51  6/6/2022        GI bleed ICD-10-CM: K92.2  ICD-9-CM: 578.9  6/6/2022        JOANNA (acute kidney injury) ([facility] ICD-10-CM: N17.9  ICD-9-CM: 584.9  6/6/2022        Iron deficiency anemia ICD-10-CM: D50.9  ICD-9-CM: 280.9  2/3/2022        Chronic GI bleeding ICD-10-CM: K92.2  ICD-9-CM: 578.9  2/3/2022        Acute GI bleeding ICD-10-CM: K92.2  ICD-9-CM: 578.9  2/2/2022        CAD (coronary artery disease) ICD-10-CM: I25.10  ICD-9-CM: 414.00  Unknown        DM type 2 causing renal disease (HCC) ICD-10-CM: E11.29  ICD-9-CM: 250.40  Unknown        DM type 2 causing vascular disease (HCC) ICD-10-CM: E11.59  ICD-9-CM: 250.70, 443.81  Unknown        Hypertension ICD-10-CM: I10  ICD-9-CM: 401.9  Unknown        CHF (congestive heart failure), NYHA class I, chronic, systolic (HCC) [facility]-RY: I50.22  ICD-9-CM: 428.22, 428.0  Unknown        Ischemic cardiomyopathy ICD-10-CM: I25.5  ICD-9-CM: 414.8  Unknown        CKD (chronic kidney disease) stage 3, GFR 30-59 ml/min [facility]) ICD-10-CM: N18.30  ICD-9-CM: 585.3  10/18/2021           History of Present Illness:     Nura Patel is a 80 y.o. female with the above problem list who was admitted for Acute on chronic diastolic (congestive) heart failure ([facility] [I50.33]. The patient is a 81 yo hx of HTN, DM, CAD s/p GERRY, GI bleed from AVMs, CKD 3, presented w/ cough, dyspnea, bronchitis, CHF, JOANNA. The patient stated that she has had worsening SOB for 1 week, associated with a productive cough of clear sputum, and worsening LE edema. She denied chest pain, fevers, chills, nausea, vomiting, diarrhea. In the ED, CXR was unremarkable. Pro BNP was 1,219. Rapid COVID and Flu neg. She denies chest pain/discomfort, orthopnea, paroxysmal noctural dyspnea, palpitations, syncope, or near-syncope. Current Facility-Administered Medications   Medication Dose Route Frequency    guaiFENesin ER (MUCINEX) tablet 600 mg  600 mg Oral Q12H    benzonatate (TESSALON) capsule 100 mg  100 mg Oral TID PRN    methylPREDNISolone (PF) (SOLU-MEDROL) injection 40 mg  40 mg IntraVENous Q8H    cefTRIAXone (ROCEPHIN) 1 g in 0.9% sodium chloride 10 mL IV syringe  1 g IntraVENous Q24H    azithromycin (ZITHROMAX) 500 mg in 0.9% sodium chloride 250 mL ([id number])  500 mg IntraVENous Q24H    bumetanide (BUMEX) injection 1 mg  1 mg IntraVENous Q12H    albuterol-ipratropium (DUO-NEB) 2.5 MG-0.5 MG/3 ML  3 mL Nebulization Q6H RT     Current Outpatient Medications   Medication Sig    furosemide (Lasix) 40 mg tablet Take 40 mg by mouth daily. Indications: accumulation of fluid resulting from chronic heart failure    pantoprazole (PROTONIX) 40 mg granules for oral suspension Take 40 mg by mouth Daily (before breakfast). amLODIPine (NORVASC) 10 mg tablet Take 1 Tablet by mouth daily.  (Patient taking differently: Take 5 mg by mouth daily.)    aspirin 81 mg chewable tablet Take 1 Tablet by mouth daily. ferrous sulfate (Iron) 325 mg (65 mg iron) tablet Take 1 Tablet by mouth Daily (before breakfast). senna (Senna) 8.6 mg tablet Take 1 Tablet by mouth daily as needed for Constipation. (Patient not taking: Reported on 6/6/2022)    ondansetron hcl (Zofran) 4 mg tablet Take 4 mg by mouth daily as needed for Nausea or Vomiting. cholecalciferol, vitamin D3, (Vitamin D3) 50 mcg (2,000 unit) tab Take  by mouth. atorvastatin (LIPITOR) 20 mg tablet Take 1 Tablet by mouth daily. metoprolol tartrate (LOPRESSOR) 50 mg tablet Take 1 Tablet by mouth every twelve (12) hours.       Allergies   Allergen Reactions    Codeine Nausea and Vomiting    Compazine [Prochlorperazine] Other (comments)     Facial droop    Dilaudid [Hydromorphone] Nausea and Vomiting    Morphine Nausea and Vomiting    Sulfa (Sulfonamide Antibiotics) Nausea and Vomiting      Family History   Problem Relation Age of Onset    Heart Disease Mother       Social History     Socioeconomic History    Marital status:      Spouse name: Not on file    Number of children: Not on file    Years of education: Not on file    Highest education level: Not on file   Occupational History    Not on file   Tobacco Use    Smoking status: Former     Years: 15.00     Types: Cigarettes    Smokeless tobacco: Never   Substance and Sexual Activity    Alcohol use: Yes     Comment: very rarely     Drug use: Never    Sexual activity: Not on file   Other Topics Concern     Service Not Asked    Blood Transfusions Not Asked    Caffeine Concern Not Asked    Occupational Exposure Not Asked    Hobby Hazards Not Asked    Sleep Concern Not Asked    Stress Concern Not Asked    Weight Concern Not Asked    Special Diet Not Asked    Back Care Not Asked    Exercise Not Asked    Bike Helmet Not Asked    Seat Belt Not Asked    Self-Exams Not Asked   Social History Narrative    Not on file Social Determinants of Health     Financial Resource Strain: Not on file   Food Insecurity: Not on file   Transportation Needs: Not on file   Physical Activity: Not on file   Stress: Not on file   Social Connections: Not on file   Intimate Partner Violence: Not on file   Housing Stability: Not on file       Physical Exam:     Patient Vitals for the past 16 hrs:   BP Temp Pulse Resp SpO2 Height Weight   10/26/22 1130 (!) 153/62 97.9 °F (36.6 °C) 66 16 97 % 5' 2\" (1.575 m) 61.2 kg (135 lb)       HEENT Exam:     Normocephalic, atraumatic. EOMI. Oropharynx negative. Neck supple. No lymphadenopathy. Lung Exam:     The patient is not dyspneic. There is no cough. Breath sounds are heard equally in all lung fields. There are no rales, rhonchi, or rubs heard on auscultation. Mild wheeze anteriorly. Heart Exam:     The rhythm is regular. The PMI is in the 5th intercostal space of the MCL. Apical impulse is normal. S1 is regular. S2 is physiologic. There is no S3, S4 gallop, click, or rub. 2/6 decrescendo SM @ RUSB without radiation         Abdomen Exam:     Bowel sounds are normoactive. Abdomen soft in all quadrants. No tenderness. No palpable masses. No organomegaly. No hernias noted. No bruits or pulsatile mass. Extremities Exam:     The extremities are atraumatic appearing. There is no clubbing, cyanosis, ulcers, varicose veins, rash, erythemia noted in the extremities. The neurovascular status is grossly intact with normal distal sensation and pulses. Mod edema. Vascular Exam:     The radial, brachial, dorsalis pedis, posterior tibial, are equal and strong bilaterally The carotids are equal bilaterally without bruits.           Labs:     Lab Results   Component Value Date/Time    Glucose 198 (H) 10/26/2022 11:57 AM    Sodium 140 10/26/2022 11:57 AM    Potassium 3.2 (L) 10/26/2022 11:57 AM    Chloride 107 10/26/2022 11:57 AM    CO2 24 10/26/2022 11:57 AM    BUN 40 (H) 10/26/2022 11:57 AM Creatinine 2.88 (H) 10/26/2022 11:57 AM    Calcium 9.0 10/26/2022 11:57 AM     Recent Labs     10/26/22  1157   WBC 7.3   HGB 10.7*   HCT 33.2*        Recent Labs     10/26/22  1157   ALT 20   AP 79   TBILI 0.7   TP 7.3   ALB 3.4*   GLOB 3.9     No results for input(s): INR, PTP, APTT, INREXT in the last 72 hours. No results for input(s): CPK, CKMB, TNIPOC in the last 72 hours. No lab exists for component: TROPONINI, ITNL  No results for input(s): TROIQ in the last 72 hours.   Lab Results   Component Value Date/Time    Cholesterol, total 169 12/25/2021 04:15 AM    HDL Cholesterol 49 12/25/2021 04:15 AM    LDL, calculated 105.8 (H) 12/25/2021 04:15 AM    Triglyceride 71 12/25/2021 04:15 AM    CHOL/HDL Ratio 3.4 12/25/2021 04:15 AM       EKG:  NSR @ 65, PVC

## 2022-10-26 NOTE — H&P
Jan Mondragon zofia [place] [address]  [address] [facility], [address] [place] [address]  [phone]    Hospitalist Admission History and Physical      NAME:  Mabel Dow   :   1934   MRN:  [id number]     PCP:  Madi Moran NP     Date/Time of service:  10/26/2022  1:57 PM        Subjective:     CHIEF COMPLAINT: cough, dyspnea     HISTORY OF PRESENT ILLNESS:     The patient is a 79 yo hx of HTN, DM, CAD s/p GERRY, GI bleed from AVMs, CKD 3, presented w/ cough, dyspnea, bronchitis, CHF, JOANNA. The patient stated that she has had worsening SOB for 1 week, associated with a productive cough of clear sputum, and worsening LE edema. She denied chest pain, fevers, chills, nausea, vomiting, diarrhea. In the ED, CXR was unremarkable. Pro BNP was 1,219. Rapid COVID and Flu neg. Allergies   Allergen Reactions    Codeine Nausea and Vomiting    Compazine [Prochlorperazine] Other (comments)     Facial droop    Dilaudid [Hydromorphone] Nausea and Vomiting    Morphine Nausea and Vomiting    Sulfa (Sulfonamide Antibiotics) Nausea and Vomiting       Prior to Admission medications    Medication Sig Start Date End Date Taking? Authorizing Provider   furosemide (Lasix) 40 mg tablet Take 40 mg by mouth daily. Indications: accumulation of fluid resulting from chronic heart failure    Provider, Historical   pantoprazole (PROTONIX) 40 mg granules for oral suspension Take 40 mg by mouth Daily (before breakfast). 22   Josi Pulling, DO   amLODIPine (NORVASC) 10 mg tablet Take 1 Tablet by mouth daily. Patient taking differently: Take 5 mg by mouth daily. 22   Josi Pulling, DO   aspirin 81 mg chewable tablet Take 1 Tablet by mouth daily. 22   Josi Pulling, DO   ferrous sulfate (Iron) 325 mg (65 mg iron) tablet Take 1 Tablet by mouth Daily (before breakfast). 22   Josi Pulling, DO   senna (Senna) 8.6 mg tablet Take 1 Tablet by mouth daily as needed for Constipation.   Patient not taking: Reported on 2022 Mary Arellano,    ondansetron hcl (Zofran) 4 mg tablet Take 4 mg by mouth daily as needed for Nausea or Vomiting. Provider, Historical   cholecalciferol, vitamin D3, (Vitamin D3) 50 mcg (2,000 unit) tab Take  by mouth. Provider, Historical   atorvastatin (LIPITOR) 20 mg tablet Take 1 Tablet by mouth daily. 12/26/21   Giovanni Leventhal, MD   metoprolol tartrate (LOPRESSOR) 50 mg tablet Take 1 Tablet by mouth every twelve (12) hours. 12/25/21   Giovanni Leventhal, MD       Past Medical History:   Diagnosis Date    CAD (coronary artery disease)     CHF (congestive heart failure), NYHA class I, chronic, systolic (HCC)     Systolic and diastolic CHF per echocardiogram of 12/25/2021. Patient was found to have LVEF 45%    Chronic kidney disease     stage III/IV:Creatinine 1.45-1.7 with GFR in the low 30s-high 20s    Diabetes mellitus type 2 in nonobese [facility])     Hypertension     Ischemic cardiomyopathy     Echocardiogram of 12/25/2021: mild left ventricular systolic dysfunction (EF 38%) with inferior and basal-mid septal akinesis. Diastolic dysfunction. Peripheral vascular disease ([address]         Past Surgical History:   Procedure Laterality Date    COLONOSCOPY N/A 2/4/2022    COLONOSCOPY performed by Birgit Philippe MD at [facility]    HX APPENDECTOMY      HX CORONARY STENT PLACEMENT      stents x 3 to mid-distal right coronary artery going into PDA for 100% occlusion of RCA; and also had nonobstructive disease in 30% LAD stenosis, 70% diagonal 1 stenosis. She had proximal 30% stenosis in the codominant left circumflex artery     HX HYSTERECTOMY      HX KNEE REPLACEMENT Left     HX OTHER SURGICAL      head sx x 2 nerve related    HX OTHER SURGICAL      Back surgery x 2    HX TONSILLECTOMY      [place] CARDIAC SURG PROCEDURE UNLIST      heart stents. x 3 in january 2022.         Social History     Tobacco Use    Smoking status: Former     Years: 15.00     Types: Cigarettes    Smokeless tobacco: Never   Substance Use Topics Alcohol use: Yes     Comment: very rarely         Family History   Problem Relation Age of Onset    Heart Disease Mother         Review of Systems:  (bold if positive, if negative)    Gen:  Eyes:  ENT:  CVS:  Pulm:  Cough, dyspneaGI:  :  MS:  Skin:  Psych:  Endo:  Hem:  Renal:  Neuro:          Objective:      VITALS:    Vital signs reviewed; most recent are:    Visit Vitals  BP (!) 153/62   Pulse 66   Temp 97.9 °F (36.6 °C)   Resp 16   Ht 5' 2\" (1.575 m)   Wt 61.2 kg (135 lb)   SpO2 97%   BMI 24.69 kg/m²     SpO2 Readings from Last 6 Encounters:   10/26/22 97%   06/09/22 93%   02/05/22 97%   01/13/22 97%   01/08/22 96%   12/27/21 99%        No intake or output data in the 24 hours ending 10/26/22 1357     Exam:     Physical Exam:    Gen:  elderly, frail, NAD  HEENT:  Pink conjunctivae, PERRL, hearing intact to voice, moist mucous membranes  Neck:  Supple, without masses, thyroid non-tender  Resp:  No accessory muscle use, trace crackles at bases, bilateral wheezing  Card:  No murmurs, normal S1, S2 without thrills, 3+ edema  Abd:  Soft, non-tender, non-distended, normoactive bowel sounds are present  Lymph:  No cervical adenopathy  Musc:  No cyanosis or clubbing  Skin:  No rashes  Neuro:  Cranial nerves 3-12 are grossly intact, follows commands appropriately  Psych:  Alert with fair insight. Oriented to person, place, and time    Labs:    Recent Labs     10/26/22  1157   WBC 7.3   HGB 10.7*   HCT 33.2*        Recent Labs     10/26/22  1157      K 3.2*      CO2 24   *   BUN 40*   CREA 2.88*   CA 9.0   MG 2.7*   ALB 3.4*   TBILI 0.7   ALT 20     Lab Results   Component Value Date/Time    Glucose (POC) 105 02/04/2022 11:48 AM    Glucose (POC) 87 02/03/2022 06:48 AM     No results for input(s): PH, PCO2, PO2, HCO3, FIO2 in the last 72 hours. No results for input(s): INR, INREXT in the last 72 hours.     Radiology and EKG reviewed:   CXR reviewed    **Old Records reviewed in [facility]**       Assessment/Plan:       Principal Problem:    81 yo hx of HTN, DM, CAD s/p GERRY, GI bleed from AVMs, CKD 3, presented w/ cough, dyspnea, bronchitis, CHF, JOANNA    1) Dyspnea/acute bronchitis/acute asthma: viral URI vs heart failure symptoms. Will check viral panel, sputum Cx, procalcitonin. Empirically start on IV solumedrol, IV CTX/Azithro, nebs    2) Acute on chronic diastolic (congestive) heart failure: high proBNP and 3+ LE edema. Last echo with EF 55-60%. Will start IV bumex, monitor I/O, BMP    3) JOANNA/CKD 3: Cr fluctuates from 1.5 to 2.0.  follows by [facility] nephrology. Will monitor BMP while on diuretics. Consult Renal    4) CAD: s/p GERRY. Cont BB, statin. Off ASA due to GI bleed from AVMs    5) DM type 2: check A1C.   Start SSI    Risk of deterioration: high      Total time spent with patient care: 79 Minutes **I personally saw and examined the patient during this time period**                 Care Plan discussed with: Patient, nursing, family     Discussed:  Care Plan    Prophylaxis:  SCD's    Probable Disposition:  Home w/Family           ___________________________________________________    Attending Physician: Alona James MD

## 2022-10-27 LAB
ALBUMIN SERPL-MCNC: 3.1 G/DL (ref 3.5–5)
ALBUMIN/GLOB SERPL: 0.8 {RATIO} (ref 1.1–2.2)
ALP SERPL-CCNC: 81 U/L (ref 45–117)
ALT SERPL-CCNC: 33 U/L (ref 12–78)
ANION GAP SERPL CALC-SCNC: 10 MMOL/L (ref 5–15)
AST SERPL-CCNC: 36 U/L (ref 15–37)
B PERT DNA SPEC QL NAA+PROBE: NOT DETECTED
BILIRUB DIRECT SERPL-MCNC: 0.2 MG/DL (ref 0–0.2)
BILIRUB SERPL-MCNC: 0.6 MG/DL (ref 0.2–1)
BNP SERPL-MCNC: 2157 PG/ML
BORDETELLA PARAPERTUSSIS PCR, BORPAR: NOT DETECTED
BUN SERPL-MCNC: 40 MG/DL (ref 6–20)
BUN/CREAT SERPL: 16 (ref 12–20)
C PNEUM DNA SPEC QL NAA+PROBE: NOT DETECTED
CALCIUM SERPL-MCNC: 8.8 MG/DL (ref 8.5–10.1)
CALCIUM SERPL-MCNC: 9.4 MG/DL (ref 8.5–10.1)
CHLORIDE SERPL-SCNC: 105 MMOL/L (ref 97–108)
CO2 SERPL-SCNC: 25 MMOL/L (ref 21–32)
CREAT SERPL-MCNC: 2.54 MG/DL (ref 0.55–1.02)
CREAT UR-MCNC: 54.3 MG/DL
CREAT UR-MCNC: 57 MG/DL
ERYTHROCYTE [DISTWIDTH] IN BLOOD BY AUTOMATED COUNT: 12.9 % (ref 11.5–14.5)
EST. AVERAGE GLUCOSE BLD GHB EST-MCNC: 148 MG/DL
FLUAV SUBTYP SPEC NAA+PROBE: NOT DETECTED
FLUBV RNA SPEC QL NAA+PROBE: NOT DETECTED
GLOBULIN SER CALC-MCNC: 3.7 G/DL (ref 2–4)
GLUCOSE BLD STRIP.AUTO-MCNC: 186 MG/DL (ref 65–117)
GLUCOSE BLD STRIP.AUTO-MCNC: 213 MG/DL (ref 65–117)
GLUCOSE BLD STRIP.AUTO-MCNC: 303 MG/DL (ref 65–117)
GLUCOSE BLD STRIP.AUTO-MCNC: 333 MG/DL (ref 65–117)
GLUCOSE SERPL-MCNC: 238 MG/DL (ref 65–100)
HADV DNA SPEC QL NAA+PROBE: NOT DETECTED
HBA1C MFR BLD: 6.8 % (ref 4–5.6)
HCOV 229E RNA SPEC QL NAA+PROBE: NOT DETECTED
HCOV HKU1 RNA SPEC QL NAA+PROBE: NOT DETECTED
HCOV NL63 RNA SPEC QL NAA+PROBE: NOT DETECTED
HCOV OC43 RNA SPEC QL NAA+PROBE: NOT DETECTED
HCT VFR BLD AUTO: 32.3 % (ref 35–47)
HGB BLD-MCNC: 10.4 G/DL (ref 11.5–16)
HMPV RNA SPEC QL NAA+PROBE: NOT DETECTED
HPIV1 RNA SPEC QL NAA+PROBE: NOT DETECTED
HPIV2 RNA SPEC QL NAA+PROBE: NOT DETECTED
HPIV3 RNA SPEC QL NAA+PROBE: NOT DETECTED
HPIV4 RNA SPEC QL NAA+PROBE: NOT DETECTED
M PNEUMO DNA SPEC QL NAA+PROBE: NOT DETECTED
MAGNESIUM SERPL-MCNC: 2.6 MG/DL (ref 1.6–2.4)
MCH RBC QN AUTO: 29.2 PG (ref 26–34)
MCHC RBC AUTO-ENTMCNC: 32.2 G/DL (ref 30–36.5)
MCV RBC AUTO: 90.7 FL (ref 80–99)
MICROALBUMIN UR-MCNC: 4.16 MG/DL
MICROALBUMIN/CREAT UR-RTO: 77 MG/G (ref 0–30)
NRBC # BLD: 0 K/UL (ref 0–0.01)
NRBC BLD-RTO: 0 PER 100 WBC
PHOSPHATE SERPL-MCNC: 2.9 MG/DL (ref 2.6–4.7)
PLATELET # BLD AUTO: 168 K/UL (ref 150–400)
PMV BLD AUTO: 11.1 FL (ref 8.9–12.9)
POTASSIUM SERPL-SCNC: 3.1 MMOL/L (ref 3.5–5.1)
PROT SERPL-MCNC: 6.8 G/DL (ref 6.4–8.2)
PROT UR-MCNC: 17 MG/DL (ref 0–11.9)
PROT/CREAT UR-RTO: 0.3
PTH-INTACT SERPL-MCNC: 186.9 PG/ML (ref 18.4–88)
RBC # BLD AUTO: 3.56 M/UL (ref 3.8–5.2)
RSV RNA SPEC QL NAA+PROBE: NOT DETECTED
RV+EV RNA SPEC QL NAA+PROBE: NOT DETECTED
SARS-COV-2 PCR, COVPCR: NOT DETECTED
SERVICE CMNT-IMP: ABNORMAL
SODIUM SERPL-SCNC: 140 MMOL/L (ref 136–145)
WBC # BLD AUTO: 7.2 K/UL (ref 3.6–11)

## 2022-10-27 PROCEDURE — 84156 ASSAY OF PROTEIN URINE: CPT

## 2022-10-27 PROCEDURE — 74011636637 HC RX REV CODE- 636/637: Performed by: INTERNAL MEDICINE

## 2022-10-27 PROCEDURE — 74011000250 HC RX REV CODE- 250: Performed by: INTERNAL MEDICINE

## 2022-10-27 PROCEDURE — 65270000029 HC RM PRIVATE

## 2022-10-27 PROCEDURE — 82962 GLUCOSE BLOOD TEST: CPT

## 2022-10-27 PROCEDURE — 74011250637 HC RX REV CODE- 250/637: Performed by: INTERNAL MEDICINE

## 2022-10-27 PROCEDURE — 97165 OT EVAL LOW COMPLEX 30 MIN: CPT

## 2022-10-27 PROCEDURE — 85027 COMPLETE CBC AUTOMATED: CPT

## 2022-10-27 PROCEDURE — 80076 HEPATIC FUNCTION PANEL: CPT

## 2022-10-27 PROCEDURE — 83880 ASSAY OF NATRIURETIC PEPTIDE: CPT

## 2022-10-27 PROCEDURE — 84100 ASSAY OF PHOSPHORUS: CPT

## 2022-10-27 PROCEDURE — 94761 N-INVAS EAR/PLS OXIMETRY MLT: CPT

## 2022-10-27 PROCEDURE — 83970 ASSAY OF PARATHORMONE: CPT

## 2022-10-27 PROCEDURE — 82043 UR ALBUMIN QUANTITATIVE: CPT

## 2022-10-27 PROCEDURE — 94640 AIRWAY INHALATION TREATMENT: CPT

## 2022-10-27 PROCEDURE — 83036 HEMOGLOBIN GLYCOSYLATED A1C: CPT

## 2022-10-27 PROCEDURE — 74011250636 HC RX REV CODE- 250/636: Performed by: INTERNAL MEDICINE

## 2022-10-27 PROCEDURE — 97535 SELF CARE MNGMENT TRAINING: CPT

## 2022-10-27 PROCEDURE — 97116 GAIT TRAINING THERAPY: CPT

## 2022-10-27 PROCEDURE — 36415 COLL VENOUS BLD VENIPUNCTURE: CPT

## 2022-10-27 PROCEDURE — 83735 ASSAY OF MAGNESIUM: CPT

## 2022-10-27 PROCEDURE — 80048 BASIC METABOLIC PNL TOTAL CA: CPT

## 2022-10-27 PROCEDURE — 2709999900 HC NON-CHARGEABLE SUPPLY

## 2022-10-27 PROCEDURE — 74011250637 HC RX REV CODE- 250/637: Performed by: SPECIALIST

## 2022-10-27 PROCEDURE — 97161 PT EVAL LOW COMPLEX 20 MIN: CPT

## 2022-10-27 RX ORDER — BUMETANIDE 0.25 MG/ML
0.5 INJECTION INTRAMUSCULAR; INTRAVENOUS EVERY 12 HOURS
Status: DISCONTINUED | OUTPATIENT
Start: 2022-10-27 | End: 2022-10-28

## 2022-10-27 RX ORDER — POTASSIUM CHLORIDE 750 MG/1
40 TABLET, FILM COATED, EXTENDED RELEASE ORAL ONCE
Status: COMPLETED | OUTPATIENT
Start: 2022-10-27 | End: 2022-10-27

## 2022-10-27 RX ORDER — POTASSIUM CHLORIDE 750 MG/1
40 TABLET, FILM COATED, EXTENDED RELEASE ORAL
Status: COMPLETED | OUTPATIENT
Start: 2022-10-27 | End: 2022-10-27

## 2022-10-27 RX ADMIN — INSULIN LISPRO 3 UNITS: 100 INJECTION, SOLUTION INTRAVENOUS; SUBCUTANEOUS at 08:31

## 2022-10-27 RX ADMIN — GUAIFENESIN 600 MG: 600 TABLET, EXTENDED RELEASE ORAL at 08:32

## 2022-10-27 RX ADMIN — BUMETANIDE 1 MG: 0.25 INJECTION INTRAMUSCULAR; INTRAVENOUS at 08:32

## 2022-10-27 RX ADMIN — METOPROLOL TARTRATE 50 MG: 50 TABLET ORAL at 21:27

## 2022-10-27 RX ADMIN — IPRATROPIUM BROMIDE AND ALBUTEROL SULFATE 3 ML: .5; 3 SOLUTION RESPIRATORY (INHALATION) at 01:25

## 2022-10-27 RX ADMIN — INSULIN HUMAN 20 UNITS: 100 INJECTION, SUSPENSION SUBCUTANEOUS at 16:26

## 2022-10-27 RX ADMIN — IPRATROPIUM BROMIDE AND ALBUTEROL SULFATE 3 ML: .5; 3 SOLUTION RESPIRATORY (INHALATION) at 13:21

## 2022-10-27 RX ADMIN — METHYLPREDNISOLONE SODIUM SUCCINATE 40 MG: 40 INJECTION, POWDER, FOR SOLUTION INTRAMUSCULAR; INTRAVENOUS at 06:56

## 2022-10-27 RX ADMIN — POTASSIUM CHLORIDE 40 MEQ: 750 TABLET, FILM COATED, EXTENDED RELEASE ORAL at 06:56

## 2022-10-27 RX ADMIN — INSULIN LISPRO 7 UNITS: 100 INJECTION, SOLUTION INTRAVENOUS; SUBCUTANEOUS at 16:27

## 2022-10-27 RX ADMIN — GUAIFENESIN 600 MG: 600 TABLET, EXTENDED RELEASE ORAL at 21:27

## 2022-10-27 RX ADMIN — INSULIN LISPRO 7 UNITS: 100 INJECTION, SOLUTION INTRAVENOUS; SUBCUTANEOUS at 12:07

## 2022-10-27 RX ADMIN — AZITHROMYCIN 500 MG: 500 INJECTION, POWDER, LYOPHILIZED, FOR SOLUTION INTRAVENOUS at 15:43

## 2022-10-27 RX ADMIN — ASPIRIN 81 MG: 81 TABLET, CHEWABLE ORAL at 08:32

## 2022-10-27 RX ADMIN — IPRATROPIUM BROMIDE AND ALBUTEROL SULFATE 3 ML: .5; 3 SOLUTION RESPIRATORY (INHALATION) at 20:58

## 2022-10-27 RX ADMIN — BUMETANIDE 0.5 MG: 0.25 INJECTION INTRAMUSCULAR; INTRAVENOUS at 18:29

## 2022-10-27 RX ADMIN — CEFTRIAXONE 1 G: 1 INJECTION, POWDER, FOR SOLUTION INTRAMUSCULAR; INTRAVENOUS at 14:01

## 2022-10-27 RX ADMIN — SODIUM CHLORIDE, PRESERVATIVE FREE 10 ML: 5 INJECTION INTRAVENOUS at 21:26

## 2022-10-27 RX ADMIN — METHYLPREDNISOLONE SODIUM SUCCINATE 20 MG: 40 INJECTION, POWDER, FOR SOLUTION INTRAMUSCULAR; INTRAVENOUS at 21:26

## 2022-10-27 RX ADMIN — POTASSIUM CHLORIDE 40 MEQ: 750 TABLET, FILM COATED, EXTENDED RELEASE ORAL at 10:38

## 2022-10-27 RX ADMIN — SENNOSIDES AND DOCUSATE SODIUM 1 TABLET: 50; 8.6 TABLET ORAL at 08:32

## 2022-10-27 RX ADMIN — SODIUM CHLORIDE, PRESERVATIVE FREE 10 ML: 5 INJECTION INTRAVENOUS at 15:44

## 2022-10-27 RX ADMIN — SODIUM CHLORIDE, PRESERVATIVE FREE 10 ML: 5 INJECTION INTRAVENOUS at 06:57

## 2022-10-27 RX ADMIN — IPRATROPIUM BROMIDE AND ALBUTEROL SULFATE 3 ML: .5; 3 SOLUTION RESPIRATORY (INHALATION) at 07:09

## 2022-10-27 RX ADMIN — PANTOPRAZOLE SODIUM 40 MG: 40 TABLET, DELAYED RELEASE ORAL at 06:56

## 2022-10-27 RX ADMIN — METOPROLOL TARTRATE 50 MG: 50 TABLET ORAL at 08:32

## 2022-10-27 RX ADMIN — POTASSIUM CHLORIDE 40 MEQ: 750 TABLET, FILM COATED, EXTENDED RELEASE ORAL at 14:00

## 2022-10-27 RX ADMIN — ATORVASTATIN CALCIUM 20 MG: 20 TABLET, FILM COATED ORAL at 08:32

## 2022-10-27 NOTE — PROGRESS NOTES
Problem: Falls - Risk of  Goal: *Absence of Falls  Description: Document Cem Alvarado Fall Risk and appropriate interventions in the flowsheet.   Outcome: Progressing Towards Goal  Note: Fall Risk Interventions:  Mobility Interventions: Patient to call before getting OOB, PT Consult for mobility concerns         Medication Interventions: Bed/chair exit alarm, Evaluate medications/consider consulting pharmacy, Patient to call before getting OOB, Teach patient to arise slowly, Utilize gait belt for transfers/ambulation    Elimination Interventions: Bed/chair exit alarm, Call light in reach              Problem: Patient Education: Go to Patient Education Activity  Goal: Patient/Family Education  Outcome: Progressing Towards Goal

## 2022-10-27 NOTE — PROGRESS NOTES
Problem: Falls - Risk of  Goal: *Absence of Falls  Description: Document Darlen Dayton Fall Risk and appropriate interventions in the flowsheet.   Outcome: Progressing Towards Goal  Note: Fall Risk Interventions:  Mobility Interventions: Bed/chair exit alarm         Medication Interventions: Bed/chair exit alarm    Elimination Interventions: Call light in reach, Bed/chair exit alarm

## 2022-10-27 NOTE — PROGRESS NOTES
Problem: Mobility Impaired (Adult and Pediatric)  Goal: *Acute Goals and Plan of Care (Insert Text)  Outcome: Resolved/Met   PHYSICAL THERAPY EVALUATION/DISCHARGE  Patient: Ebony Musa (80 y.o. female)  Date: 10/27/2022  Primary Diagnosis: Acute on chronic diastolic (congestive) heart failure (HCC) [I50.33]       Precautions: Universal         ASSESSMENT  Based on the objective data described below, the patient presents with good mobility she is at her baseline of using a rolling walker for ambulation and no assist needed with transfers out of bed, on/off toilet and sit <> stand from various surfaces. She is cleared for discharge home from a mobility standpoint. Functional Outcome Measure: The patient scored 85 out of 100 on the Barthel outcome measure      Other factors to consider for discharge: lives with daughter     Further skilled acute physical therapy is not indicated at this time. PLAN :  Recommendation for discharge: (in order for the patient to meet his/her long term goals)  No skilled physical therapy/ follow up rehabilitation needs identified at this time. This discharge recommendation:  Has been made in collaboration with the attending provider and/or case management    IF patient discharges home will need the following DME: patient owns DME required for discharge       SUBJECTIVE:   Patient stated I make my own breakfast and lunch.     OBJECTIVE DATA SUMMARY:   HISTORY:    Past Medical History:   Diagnosis Date    CAD (coronary artery disease)     CHF (congestive heart failure), NYHA class I, chronic, systolic (HCC)     Systolic and diastolic CHF per echocardiogram of 12/25/2021.   Patient was found to have LVEF 45%    Chronic kidney disease     stage III/IV:Creatinine 1.45-1.7 with GFR in the low 30s-high 20s    Diabetes mellitus type 2 in nonobese [facility])     Hypertension     Ischemic cardiomyopathy     Echocardiogram of 12/25/2021: mild left ventricular systolic dysfunction (EF 05%) with inferior and basal-mid septal akinesis. Diastolic dysfunction. Peripheral vascular disease ([address]      Past Surgical History:   Procedure Laterality Date    COLONOSCOPY N/A 2/4/2022    COLONOSCOPY performed by Frederick Edmonds MD at [facility]    HX APPENDECTOMY      HX CORONARY STENT PLACEMENT      stents x 3 to mid-distal right coronary artery going into PDA for 100% occlusion of RCA; and also had nonobstructive disease in 30% LAD stenosis, 70% diagonal 1 stenosis. She had proximal 30% stenosis in the codominant left circumflex artery     HX HYSTERECTOMY      HX KNEE REPLACEMENT Left     HX OTHER SURGICAL      head sx x 2 nerve related    HX OTHER SURGICAL      Back surgery x 2    HX TONSILLECTOMY      [place] CARDIAC SURG PROCEDURE UNLIST      heart stents. x 3 in january 2022. Prior level of function: indep with the rw, no recent falls  Personal factors and/or comorbidities impacting plan of care: lives with daughter    [address]: Other (comment) (condominium)  One/Two Story Residence: Other (Comment)  Living Alone: No  Support Systems: Child(watson)  Patient Expects to be Discharged to[de-identified] Unable to determine at this time  Current DME Used/Available at Home: Shower chair    EXAMINATION/PRESENTATION/DECISION MAKING:   Critical Behavior:  Neurologic State: Alert  Orientation Level: Oriented X4  Cognition: Appropriate decision making     Hearing: Auditory  Auditory Impairment: None    Range Of Motion:  AROM: Within functional limits           PROM: Within functional limits           Strength:    Strength:  Within functional limits                    Tone & Sensation:   Tone: Normal              Sensation: Intact               Coordination:  Coordination: Within functional limits  Vision:      Functional Mobility:  Bed Mobility:     Supine to Sit: Minimum assistance;Assist x1        Transfers:  Sit to Stand: Supervision  Stand to Sit: Supervision        Bed to Chair: Supervision Balance:   Sitting: Intact  Standing: Intact; With support  Ambulation/Gait Training:  Distance (ft): 150 Feet (ft)  Assistive Device: Walker, rolling;Gait belt  Ambulation - Level of Assistance: Supervision                                                    Functional Measure:  Barthel Index:    Bathin  Bladder: 10  Bowels: 10  Groomin  Dressin  Feeding: 10  Mobility: 15  Stairs: 0  Toilet Use: 10  Transfer (Bed to Chair and Back): 15  Total: 85/100       The Barthel ADL Index: Guidelines  1. The index should be used as a record of what a patient does, not as a record of what a patient could do. 2. The main aim is to establish degree of independence from any help, physical or verbal, however minor and for whatever reason. 3. The need for supervision renders the patient not independent. 4. A patient's performance should be established using the best available evidence. Asking the patient, friends/relatives and nurses are the usual sources, but direct observation and common sense are also important. However direct testing is not needed. 5. Usually the patient's performance over the preceding 24-48 hours is important, but occasionally longer periods will be relevant. 6. Middle categories imply that the patient supplies over 50 per cent of the effort. 7. Use of aids to be independent is allowed. Score Interpretation (from [address] [address])    Independent   60-79 Minimally independent   40-59 Partially dependent   20-39 Very dependent   <20 Totally dependent     -Hua Aden., Barthel, D.W. (1965). Functional evaluation: the Barthel Index. [address] [facility] ([address] (). The Barthel activities of daily living index: self-reporting versus actual performance in the old (> or = 75 years). Journal of [address] [place] [address] 45(7), [facility], .ARIEL, Javier Carlson., Micaela Oconnor. (1999).  Measuring the change in disability after inpatient rehabilitation; comparison of the responsiveness of the Barthel Index and Functional Natalia Measure. Journal of Neurology, Neurosurgery, and Psychiatry, 664), 412-422. MARNIE Ortiz, MARLYS Dotson, & Elton Marie M.A. (2004) Assessment of post-stroke quality of life in cost-effectiveness studies: The usefulness of the Barthel Index and the EuroQoL-5D. Quality of Life Research, 15, 763-57           Physical Therapy Evaluation Charge Determination   History Examination Presentation Decision-Making   LOW Complexity : Zero comorbidities / personal factors that will impact the outcome / POC LOW Complexity : 1-2 Standardized tests and measures addressing body structure, function, activity limitation and / or participation in recreation  LOW Complexity : Stable, uncomplicated  LOW Complexity : FOTO score of       Based on the above components, the patient evaluation is determined to be of the following complexity level: LOW     Pain Rating:  None reported    Activity Tolerance:   Good      After treatment patient left in no apparent distress:   Sitting in chair, Call bell within reach, and Bed / chair alarm activated    COMMUNICATION/EDUCATION:   The patients plan of care was discussed with: Occupational therapist and Registered nurse. Fall prevention education was provided and the patient/caregiver indicated understanding., Patient/family have participated as able in goal setting and plan of care. , and Patient/family agree to work toward stated goals and plan of care.     Thank you for this referral.  Brock Stroud, PT   Time Calculation: 27 mins

## 2022-10-27 NOTE — PROGRESS NOTES
Michael Hammond MD, [address], [address]  [phone]      IMPRESSION and RECOMMENDATIONS      SOB:  Seems to be primarily due to bronchitis. No overt left-sided CHF. No rales, unremarkable CXR. Normal LVF earlier this year. Echo shows normal LVF. Would be conservative with diuresis given worsening renal function. I suspect some of her edema is due to norvasc: stop. No evidence of ischemia or dysrhythmia. Resume ASA/statin. No other cardiac issues at this time. Will see prn, but let me know. I have discussed this plan with the patient. She appears to understand this plan and wishes to proceed ahead. Subjective:       No complaints. Still coughing. Objective:   Patient Vitals for the past 16 hrs:   BP Temp Pulse Resp SpO2   10/27/22 1109 (!) 121/57 97.6 °F (36.4 °C) 69 16 94 %   10/27/22 0754 (!) 157/83 97.8 °F (36.6 °C) 82 16 95 %   10/27/22 0709 -- -- -- -- 95 %   10/27/22 0700 -- -- 75 -- --   10/26/22 2314 (!) 133/54 98 °F (36.7 °C) 78 16 94 %   10/26/22 2253 -- -- 79 -- --   10/26/22 2034 (!) 147/73 97.6 °F (36.4 °C) 83 16 93 %       HEENT Exam:     WNL         Lung Exam:     The patient is not dyspneic. Breath sounds are heard equally in all lung fields. There are no wheezes, rales, rhonchi, or rubs heard on auscultation. Heart Exam:     The rhythm is regular. The PMI is in the 5th intercostal space of the MCL. Apical impulse is normal. S1 is regular. S2 is physiologic. There is no S3, S4 gallop, murmur, click, or rub. Abdomen Exam:     Benign. Extremities Exam:     No cyanosis, clubbing. Distal pulses intact. Mild edema.            Lab Results   Component Value Date/Time    Glucose 238 (H) 10/27/2022 03:35 AM    Sodium 140 10/27/2022 03:35 AM    Potassium 3.1 (L) 10/27/2022 03:35 AM    Chloride 105 10/27/2022 03:35 AM    CO2 25 10/27/2022 03:35 AM    BUN 40 (H) 10/27/2022 03:35 AM    Creatinine 2.54 (H) 10/27/2022 03:35 AM    Calcium 8.8 10/27/2022 03:35 AM     Recent Labs     10/27/22  0335 10/26/22  1157   WBC 7.2 7.3   HGB 10.4* 10.7*   HCT 32.3* 33.2*    196     Recent Labs     10/27/22  0335 10/26/22  1157   ALT 33 20   AP 81 79   TBILI 0.6 0.7   TP 6.8 7.3   ALB 3.1* 3.4*   GLOB 3.7 3.9     No results for input(s): INR, PTP, APTT, INREXT in the last 72 hours. No results for input(s): CPK, CKMB, TNIPOC in the last 72 hours. No lab exists for component: TROPONINI, ITNL  No results for input(s): TROIQ in the last 72 hours.   Lab Results   Component Value Date/Time    Cholesterol, total 169 12/25/2021 04:15 AM    HDL Cholesterol 49 12/25/2021 04:15 AM    LDL, calculated 105.8 (H) 12/25/2021 04:15 AM    Triglyceride 71 12/25/2021 04:15 AM    CHOL/HDL Ratio 3.4 12/25/2021 04:15 AM

## 2022-10-27 NOTE — PROGRESS NOTES
Occupational Therapy:  10/27/22    Orders received, chart reviewed and patient evaluated by occupational therapy. Pending progression with skilled acute occupational therapy, recommend:  No skilled occupational therapy/ follow up rehabilitation needs identified at this time. Recommend with nursing ADLs with supervision/setup, OOB to chair 3x/day and toileting via functional mobility to and from bathroom with 1 assist and walker. Thank you for completing as able in order to maintain patient strength, endurance and independence. Full evaluation to follow.     Thank you,  Shy Rosenberg OTR/L

## 2022-10-27 NOTE — PROGRESS NOTES
Physician Progress Note      PATIENT:               Taty Yanez  CSN #:                  [id number]  :                       1934  ADMIT DATE:       10/26/2022 1:26 PM  [address] [place] DATE:  RESPONDING  PROVIDER #:        Dotty Velasquez MD          QUERY TEXTFlex Afternoon    This patient admitted on 10/26/2022 for Acute Bronchitis. The H&P notes Acute on Chronic diastolic CHF. The documentation of Acute on Chronic Diastolic CHF starting on , and  these progress notes \"Diastolic Heart failure, Compensated\". If possible, can you please clarify and please document in the progress notes and discharge summary if Acute on Chronic Diastolic CHF was: The medical record reflects the following:  Risk Factors: HTN, Age, CKD 3, [place], CAD  Clinical Indicators: Presented with SOB, 3+ LE edema. Echo 55-60%. Pro BNP @ 1219, and today is up to 214- CXR negative, Echo Normal left ventricular systolic function. EF  mildly increased wall thickness. Cards consulted and notes LE due to the Norvasc  Treatment: Echo, IV Bumex, BNP, I&O, Telemetry    Thank you for clarifying  Geronimo Crockett ,OTNIEN,Rn, CPHQ, CCDS, SMART  Options provided:  -- Acute on chronic Diastolic CHF has been ruled out. The patient has Chronic diastolic CHF only. -- Acute on Chronic Diastolic CHF has been confirmed, and Chronic Diastolic ruled out. .  -- Other - I will add my own diagnosis  -- Disagree - Not applicable / Not valid  -- Disagree - Clinically unable to determine / Unknown  -- Refer to Clinical Documentation Reviewer    PROVIDER RESPONSE TEXT:    Acute on Chronic Diastolic CHF has been ruled out. The patient has Chronic Diastolic CHF only.     Query created by: Celestino Avery on 10/27/2022 2:27 PM      Electronically signed by:  Dotty Velasquez MD 10/27/2022 3:11 PM

## 2022-10-27 NOTE — PROGRESS NOTES
10/27/2022  10:50 AM  Care Management Assessment      Reason for Admission: Emergency -       ICD-10-CM ICD-9-CM    1. Acute cough  R05.1 786.2       2. Dyspnea on exertion  R06.09 786.09       3. Acute on chronic congestive heart failure, unspecified heart failure type (HCC)  I50.9 428.0       4. JOANNA (acute kidney injury) ([facility] [address]  N17.9 584.9           Assessment:   []In person with pt   [x]Via p/c with pt   []With family member in person. Who/Relation:     []With family member via p/c. Who/Relation:   []Chart Review    RUR: 17%  Risk Level: []Low [x]Moderate []High  Value-based purchasing: [] Yes [] No    Advance Directive: Full Code.    [] No AD on file. [] AD on file. [x] Current AD not on file. Copy requested. [] Requests AD, and referral submitted to [facility]. Healthcare Decision Maker:   Primary Decision Maker: Otoniel Oh - Daughter - [phone]    Secondary Decision Maker: Faustino Muñozgriselda - [phone]        Assessment:    Age: 80 y.o. Sex: [] Male [x]Female     Residency: []Private residence [x]Apartment (cond)[]Assisted Living []LTC []Other:   Exterior Steps: 1  Interior Steps: 0    Lives With: []With spouse [x]Other family members (DTR) []Underage children []Alone []Care provider []Other:    Prior functioning:  [x]Independent with ADLs and iADLS []Dependent with ADLs and iADLs []Partial dependence, Specify:     Cognition: [x]Intact []Some spheres some of the time. []Some spheres all of the time. []All spheres all of the time.      Prior transportation method: [x]Self [x]Spouse []Other family members []Medicaid transport []Other:     Prior DME required:  []None [x]RW []Cane []Crutches []Bedside commode []CPAP []Home O2 (Liter/Provider: ) []Nebulizer   [x]Shower Chair []Wheelchair []Hospital Bed []Taras []Stair lift []Rollator [x]Other: transport chair for long distances    Rehab history: []None [x]Outpatient PT []Home Health (Provider/Date: ) []SNF (Provider/Date: ) []IPR (Provider/Date: ) []LTC (Provider/Date: ) []Hospice (Provider/Date: )  []Other:     Covid vaccination status:   [] Yes, Type:  [] Booster 1 [] Booster 2  [] No  [x] N/A / Not asked    Insurer:   Insurance Information                  [place] [address] [employer] Phone: [phone]    Subscriber: Angie Javier Subscriber#: [id number]    Group#: -- Precert#: --        BLUE CROSS/[employer] BLUE CROSS SUPPLEMENT MEDICARE Phone: --    SubscribPoonam Rosales Subscriber#: [id number]    Group#: [id number] Precert#: --            PCP: Gomez Romero   Address: [address]  / [place] [address]   Phone number: [phone]   Current patient: [x]Yes []No   Approximate date of last visit: within 1 year   Access to virtual PCP visits: []Yes [x]No     Financial concerns/barriers: []Yes, explain: [x]No []Unknown/Not discussed    Pharmacy: [facility]    DC Transport: Family     Discharge Concerns: []Yes [x]No []Unknown   Describe:    Comments: Pt informed CM that if dialysis is ever recommended, she will refuse. Transition of care plan:    [x]Unable to determine at this time. Awaiting clinical progress, and disposition recommendations. Pending PT/OT reqs. [] Home with family assistance as needed, and outpatient follow-up. [] Home with Outpatient PT and outpatient follow-up   Pt aware of OP appt? []Yes, Provider:   []Not scheduled   Transport provider:     [] Home with Home Health   - Provider:     []SNF/IPR   -[]Preferences given:   []Listing provided and preferences requested   -Status: []Pending []Accepted:    -Auth required: []Yes []No    -Auth initiated date:   -3 midnight stay required: []Yes []No  Date satisfied:     [] LTC:     [] Home with Hospice   -Provider:     [] Dis[facility] information provided. [] Other:     Anabelle Chandra MA    Care Management Interventions  PCP Verified by CM: Yes Moo Dia)  Mode of Transport at Discharge:  Other (see comment)  Transition of Care Consult (CM Consult): Discharge Planning  MyChart Signup: No  Discharge Durable Medical Equipment: No  Physical Therapy Consult: Yes  Occupational Therapy Consult: Yes  Speech Therapy Consult: No  Support Systems: Child(watson)  Confirm Follow Up Transport: Family  Discharge Location  Patient Expects to be Discharged to[de-identified] Unable to determine at this time

## 2022-10-27 NOTE — CONSULTS
[place] office   [address] [place] [address], [address] [place] [address]  Phone: [phone]  [phone]   [email or web address]    NEPHROLOGY CONSULT NOTE     Patient: Fortunato Moreno MRN: [id number]  PCP: Stan Rivera NP   :     1934  Age:   80 y.o. Sex:  female      Referring physician: Carol Trejo MD  Reason for consultation: 80 y.o. female with Acute on chronic diastolic (congestive) heart failure ([facility] [address] [S91.96] complicated by   Admission Date: 10/26/2022  1:26 PM  LOS: 1 day        ASSESSMENT and PLAN :   JOANNA on CKD   CKD stage IV f/b  [facility])  Fluid overload  Hypokalemia     -cr/GFR improving   -cont Bumex 1 mg IV bid  -check UPC,ACR  -replete KCL 40 meq po x1  -renally dose all med's  -she is clear that she does not want any type of RRT in future           Subjective:   HPI: Fortunato Moreno is a 80 y.o. with PMHx of CKD stage IV, HTN, DM, CAD s/p GERRY, GI bleed from AVMs, admitted to the hospital for CHF exacerbation. Labs from hospital show cr 2.88 and elevated pro BNP. Patient is treated with Loop diuretics. repeat lab show cr improved 2.5. patient has cr 1.6 from 22. At home she was taking Lasix 40 mg daily. Past Medical Hx:   Past Medical History:   Diagnosis Date    CAD (coronary artery disease)     CHF (congestive heart failure), NYHA class I, chronic, systolic (HCC)     Systolic and diastolic CHF per echocardiogram of 2021. Patient was found to have LVEF 45%    Chronic kidney disease     stage III/IV:Creatinine 1.45-1.7 with GFR in the low 30s-high 20s    Diabetes mellitus type 2 in nonobese [facility])     Hypertension     Ischemic cardiomyopathy     Echocardiogram of 2021: mild left ventricular systolic dysfunction (EF 39%) with inferior and basal-mid septal akinesis. Diastolic dysfunction.     Peripheral vascular disease [facility])         Past Surgical Hx:     Past Surgical History:   Procedure Laterality Date    COLONOSCOPY N/A 2022 COLONOSCOPY performed by Gerry Vincent MD at [facility] ENDOSCOPY    HX APPENDECTOMY      HX CORONARY STENT PLACEMENT      stents x 3 to mid-distal right coronary artery going into PDA for 100% occlusion of RCA; and also had nonobstructive disease in 30% LAD stenosis, 70% diagonal 1 stenosis. She had proximal 30% stenosis in the codominant left circumflex artery     HX HYSTERECTOMY      HX KNEE REPLACEMENT Left     HX OTHER SURGICAL      head sx x 2 nerve related    HX OTHER SURGICAL      Back surgery x 2    HX TONSILLECTOMY      IN CARDIAC SURG PROCEDURE UNLIST      heart stents. x 3 in january 2022. Medications:  Prior to Admission medications    Medication Sig Start Date End Date Taking? Authorizing Provider   pantoprazole (PROTONIX) 40 mg granules for oral suspension Take 40 mg by mouth Daily (before breakfast). 2/5/22  Yes Khris Shaw DO   amLODIPine (NORVASC) 5 mg tablet Take 5 mg by mouth daily. Provider, Historical   furosemide (LASIX) 40 mg tablet Take 40 mg by mouth daily. Indications: accumulation of fluid resulting from chronic heart failure    Provider, Historical   aspirin 81 mg chewable tablet Take 1 Tablet by mouth daily. 2/6/22   Khris Shaw DO   ferrous sulfate (Iron) 325 mg (65 mg iron) tablet Take 1 Tablet by mouth Daily (before breakfast). 2/5/22   Khris Shaw DO   senna (Senna) 8.6 mg tablet Take 1 Tablet by mouth daily as needed for Constipation. Patient not taking: No sig reported 2/5/22   Khris Shaw DO   ondansetron hcl (ZOFRAN) 4 mg tablet Take 4 mg by mouth daily as needed for Nausea or Vomiting. Provider, Historical   cholecalciferol, vitamin D3, 50 mcg (2,000 unit) tab Take  by mouth. Provider, Historical   atorvastatin (LIPITOR) 20 mg tablet Take 1 Tablet by mouth daily. 12/26/21   Brandi Grewal MD   metoprolol tartrate (LOPRESSOR) 50 mg tablet Take 1 Tablet by mouth every twelve (12) hours.  12/25/21   Brandi Grewal MD       Allergies   Allergen Reactions    Codeine Nausea and Vomiting    Compazine [Prochlorperazine] Other (comments)     Facial droop    Dilaudid [Hydromorphone] Nausea and Vomiting    Morphine Nausea and Vomiting    Sulfa (Sulfonamide Antibiotics) Nausea and Vomiting       Social Hx:  reports that she has quit smoking. She has never used smokeless tobacco. She reports current alcohol use. She reports that she does not use drugs. Family History   Problem Relation Age of Onset    Heart Disease Mother        Review of Systems:  A twelve point review of system was performed today. Pertinent positives and negatives are mentioned in the HPI. The reminder of the ROS is negative and noncontributory. Objective:    Vitals:    Vitals:    10/27/22 0700 10/27/22 0709 10/27/22 0754 10/27/22 1109   BP:   (!) 157/83 (!) 121/57   Pulse: 75  82 69   Resp:   16 16   Temp:   97.8 °F (36.6 °C) 97.6 °F (36.4 °C)   SpO2:  95% 95% 94%   Weight:       Height:         I&O's:  10/26 0701 - 10/27 0700  In: 260 [I.V.:260]  Out: 800 [Urine:800]  Visit Vitals  BP (!) 121/57 (BP 1 Location: Right upper arm, BP Patient Position: Sitting)   Pulse 69   Temp 97.6 °F (36.4 °C)   Resp 16   Ht 5' 2\" (1.575 m)   Wt 61.2 kg (135 lb)   SpO2 94%   BMI 24.69 kg/m²       Physical Exam:      GENERAL : Lying down in bed with no acute distress  HEENT: AT [facility]   CVS: S1 S2 RRR  RS: diminished bs +  ABDOMEN: soft NT ND positive BS  EXTREMITY: No edema clubbing or cyanosis, pedal pulse +  NEUROLOGY: AAA X3, no focal deficit or asterixis      Laboratory Results:    BMP:   Lab Results   Component Value Date/Time     10/27/2022 03:35 AM    K 3.1 (L) 10/27/2022 03:35 AM     10/27/2022 03:35 AM    CO2 25 10/27/2022 03:35 AM    AGAP 10 10/27/2022 03:35 AM     (H) 10/27/2022 03:35 AM    BUN 40 (H) 10/27/2022 03:35 AM    CREA 2.54 (H) 10/27/2022 03:35 AM         No results found for this or any previous visit.         CBC W/O DIFF    Collection Time: 10/27/22  3:35 AM   Result Value Ref Range    WBC 7.2 3.6 - 11.0 K/uL    RBC 3.56 (L) 3.80 - 5.20 M/uL    HGB 10.4 (L) 11.5 - 16.0 g/dL    HCT 32.3 (L) 35.0 - 47.0 %    MCV 90.7 80.0 - 99.0 FL    MCH 29.2 26.0 - 34.0 PG    MCHC 32.2 30.0 - 36.5 g/dL    RDW 12.9 11.5 - 14.5 %    PLATELET 150 204 - 959 K/uL    MPV 11.1 8.9 - 12.9 FL    NRBC 0.0 0  WBC    ABSOLUTE NRBC 0.00 0.00 - 0.01 K/uL          Thank you for consulting [facility] in the care of your patient.

## 2022-10-27 NOTE — PROGRESS NOTES
Jan Mondragon [facility] [address]  [address] [facility], [place], [id number] [place] [address]  [phone]      Hospitalist Progress Note      NAME: Marya Valle   :  1934  MRM:  [id number]    Date of service: 10/27/2022  12:06 PM       Assessment and Plan:    Dyspnea/acute bronchitis/acute asthma: CXR is unremarkable. Not hypoxic. RVP is negative. Check sputum for gram stain and Cx. Negative procalcitonin. On IV solumedrol, IV CTX/Azithro, nebs     2. Diastolic (congestive) heart failure/ high proBNP/ 3+ LE edema. compensated. Edema is thought to be due to amlodipine. Last echo with EF 55-60%. Evaluated by cardiology. 3.  JOANNA/CKD 3: Cr fluctuates from 1.5 to 2.0.  follows by [facility] nephrology. Will monitor BMP while on diuretics. nephrology following      4. CAD: s/p GERRY. Cont BB, statin. Off plavix due to GI bleed from AVMs     5.   DM type 2: A1C 6.8. On SSI, diabetic diet. Add NPH as pt's BG is high due to steroid. 6.  Hypokalemia. Replete. Subjective:     Chief Complaint[de-identified] Patient was seen and examined as a follow up for SOB. Chart was reviewed. getting better     ROS:  (bold if positive, if negative)    SOBTolerating PT  Tolerating Diet        Objective:     Last 24hrs VS reviewed since prior progress note.  Most recent are:    Visit Vitals  BP (!) 121/57 (BP 1 Location: Right upper arm, BP Patient Position: Sitting)   Pulse 69   Temp 97.6 °F (36.4 °C)   Resp 16   Ht 5' 2\" (1.575 m)   Wt 61.2 kg (135 lb)   SpO2 94%   BMI 24.69 kg/m²     SpO2 Readings from Last 6 Encounters:   10/27/22 94%   22 93%   22 97%   22 97%   22 96%   21 99%          Intake/Output Summary (Last 24 hours) at 10/27/2022 1206  Last data filed at 10/27/2022 0930  Gross per 24 hour   Intake 460 ml   Output 800 ml   Net -340 ml        Physical Exam:    Gen:  Well-developed, well-nourished, in no acute distress  HEENT:  Pink conjunctivae, PERRL, hearing intact to voice, moist mucous membranes  Neck:  Supple, without masses, thyroid non-tender  Resp:  No accessory muscle use, clear breath sounds without wheezes rales or rhonchi  Card:  No murmurs, normal S1, S2 without thrills, bruits. +++ peripheral edema  Abd:  Soft, non-tender, non-distended, normoactive bowel sounds are present, no palpable organomegaly and no detectable hernias  Lymph:  No cervical or inguinal adenopathy  Musc:  No cyanosis or clubbing  Skin:  No rashes or ulcers, skin turgor is good  Neuro:  Cranial nerves are grossly intact, no focal motor weakness, follows commands appropriately  Psych:  Good insight, oriented to person, place and time, alert  __________________________________________________________________  Medications Reviewed: (see below)  Medications:     Current Facility-Administered Medications   Medication Dose Route Frequency    potassium chloride SR (KLOR-CON 10) tablet 40 mEq  40 mEq Oral NOW    guaiFENesin ER (MUCINEX) tablet 600 mg  600 mg Oral Q12H    benzonatate (TESSALON) capsule 100 mg  100 mg Oral TID PRN    methylPREDNISolone (PF) (SOLU-MEDROL) injection 40 mg  40 mg IntraVENous Q8H    cefTRIAXone (ROCEPHIN) 1 g in 0.9% sodium chloride 10 mL IV syringe  1 g IntraVENous Q24H    azithromycin (ZITHROMAX) 500 mg in 0.9% sodium chloride 250 mL (Yaal2Ddt)  500 mg IntraVENous Q24H    bumetanide (BUMEX) injection 1 mg  1 mg IntraVENous Q12H    albuterol-ipratropium (DUO-NEB) 2.5 MG-0.5 MG/3 ML  3 mL Nebulization Q6H RT    atorvastatin (LIPITOR) tablet 20 mg  20 mg Oral DAILY    metoprolol tartrate (LOPRESSOR) tablet 50 mg  50 mg Oral Q12H    pantoprazole (PROTONIX) tablet 40 mg  40 mg Oral ACB    sodium chloride (NS) flush 5-40 mL  5-40 mL IntraVENous Q8H    sodium chloride (NS) flush 5-40 mL  5-40 mL IntraVENous PRN    0.9% sodium chloride infusion 25 mL  25 mL IntraVENous PRN    acetaminophen (TYLENOL) tablet 650 mg  650 mg Oral Q6H PRN    Or    acetaminophen (TYLENOL) suppository 650 mg  650 mg Rectal Q6H PRN    senna-docusate (PERICOLACE) 8.6-50 mg per tablet 1 Tablet  1 Tablet Oral BID    bisacodyL (DULCOLAX) suppository 10 mg  10 mg Rectal DAILY PRN    ondansetron (ZOFRAN) injection 4 mg  4 mg IntraVENous Q6H PRN    oxyCODONE IR (ROXICODONE) tablet 5 mg  5 mg Oral Q4H PRN    insulin lispro (HUMALOG) injection   SubCUTAneous AC&HS    glucose chewable tablet 16 g  4 Tablet Oral PRN    glucagon (GLUCAGEN) injection 1 mg  1 mg IntraMUSCular PRN    aspirin chewable tablet 81 mg  81 mg Oral DAILY        Lab Data Reviewed: (see below)  Lab Review:     Recent Labs     10/27/22  0335 10/26/22  1157   WBC 7.2 7.3   HGB 10.4* 10.7*   HCT 32.3* 33.2*    196     Recent Labs     10/27/22  0335 10/26/22  1157    140   K 3.1* 3.2*    107   CO2 25 24   * 198*   BUN 40* 40*   CREA 2.54* 2.88*   CA 8.8 9.0   MG 2.6* 2.7*   PHOS 2.9  --    ALB 3.1* 3.4*   TBILI 0.6 0.7   ALT 33 20     Lab Results   Component Value Date/Time    Glucose (POC) 333 (H) 10/27/2022 11:12 AM    Glucose (POC) 213 (H) 10/27/2022 07:52 AM    Glucose (POC) 343 (H) 10/26/2022 09:10 PM    Glucose (POC) 105 02/04/2022 11:48 AM    Glucose (POC) 87 02/03/2022 06:48 AM     No results for input(s): PH, PCO2, PO2, HCO3, FIO2 in the last 72 hours. No results for input(s): INR, INREXT in the last 72 hours.   All Micro Results       Procedure Component Value Units Date/Time    RESPIRATORY VIRUS PANEL W/COVID-19, PCR [[id number]] Collected: 10/26/22 1433    Order Status: Completed Specimen: Nasopharyngeal Updated: 10/27/22 0059     Adenovirus Not detected        Coronavirus 229E Not detected        Coronavirus HKU1 Not detected        Coronavirus CVNL63 Not detected        Coronavirus OC43 Not detected        SARS-CoV-2, PCR Not detected        Metapneumovirus Not detected        Rhinovirus and Enterovirus Not detected        Influenza A Not detected        Influenza B Not detected        Parainfluenza 1 Not detected        Parainfluenza 2 Not detected        Parainfluenza 3 Not detected        Parainfluenza virus 4 Not detected        RSV by PCR Not detected        B. parapertussis, PCR Not detected        Bordetella pertussis - PCR Not detected        Chlamydophila pneumoniae DNA, QL, PCR Not detected        Mycoplasma pneumoniae DNA, QL, PCR Not detected       CULTURE, RESPIRATORY/SPUTUM/BRONCH Chelsea Jazlyn [[id number]]     Order Status: Sent Specimen: Sputum     COVID-19 RAPID TEST [[id number]] Collected: 10/26/22 1157    Order Status: Completed Specimen: Nasopharyngeal Updated: 10/26/22 1233     Specimen source Nasopharyngeal        COVID-19 rapid test Not detected        Comment: Rapid Abbott ID Now       Rapid NAAT:  The specimen is NEGATIVE for SARS-CoV-2, the novel coronavirus associated with COVID-19. Negative results should be treated as presumptive and, if inconsistent with clinical signs and symptoms or necessary for patient management, should be tested with an alternative molecular assay. Negative results do not preclude SARS-CoV-2 infection and should not be used as the sole basis for patient management decisions. This test has been authorized by the FDA under an Emergency Use Authorization (EUA) for use by authorized laboratories. Fact sheet for Healthcare Providers:  http://www.christie-tania.biholden/  Fact sheet for Patients: http://www.soriano-marin.biz/       Methodology: Isothermal Nucleic Acid Amplification                 I have reviewed notes of prior 24hr. Other pertinent lab: Total time spent with patient: 35 minutes I personally reviewed chart, notes, data and current medications in the medical record. I have personally examined and treated the patient at bedside during this period.                  Care Plan discussed with: Patient, Nursing Staff, and >50% of time spent in counseling and coordination of care    Discussed:  Care Plan    Prophylaxis:  SCD's    Disposition:  Home w/Family           ___________________________________________________    Attending Physician: Deandre Valdivia MD

## 2022-10-27 NOTE — PROGRESS NOTES
Medicare pt has received, reviewed, and signed IM letter informing them of their right to appeal the discharge. Signed copy has been placed on pt bedside chart.   Nasim Lopez CMS

## 2022-10-27 NOTE — PROGRESS NOTES
OCCUPATIONAL THERAPY EVALUATION/DISCHARGE  Patient: David Rodriguez (5 y.o. female)  Date: 10/27/2022  Primary Diagnosis: Acute on chronic diastolic (congestive) heart failure (HCC) [I50.33]       Precautions:       ASSESSMENT  Based on the objective data described below, the patient presents with good ADL performance and mobility near her baseline level of function following admission for acute on chronic CHF. Pt today is pleasant, alert, oriented, and motivated to participate. She demonstrates serial ADL and mobility tasks with overall Supervision/SBA with use of RW, including bathroom ADLs. She reports having LH AE to manage LB dressing at home and reports no difficulty with managing AE. Vitals stable throughout and pt receptive to all instruction. All questions answered. No further skilled acute OT services indicated at this time. Current Level of Function (ADLs/self-care): overall MOD I to supervision/SBA    Functional Outcome Measure: The patient scored 85/100 on the Barthel outcome measure which is indicative of minimal functional impairment. Other factors to consider for discharge: lives with daughter     PLAN :  Recommend with staff: OOB to chair for meals; mobility to bathroom for toileting; ADLs as needed    Recommendation for discharge: (in order for the patient to meet his/her long term goals)  No skilled occupational therapy/ follow up rehabilitation needs identified at this time. This discharge recommendation:  Has been made in collaboration with the attending provider and/or case management    IF patient discharges home will need the following DME: patient owns DME required for discharge       SUBJECTIVE:   Patient stated I remember you from last time, I think it was after I had my shoulder done.  -this OT recalled pt from previous admission    OBJECTIVE DATA SUMMARY:   HISTORY:   Past Medical History:   Diagnosis Date    CAD (coronary artery disease)     CHF (congestive heart failure), NYHA class I, chronic, systolic (HCC)     Systolic and diastolic CHF per echocardiogram of 12/25/2021. Patient was found to have LVEF 45%    Chronic kidney disease     stage III/IV:Creatinine 1.45-1.7 with GFR in the low 30s-high 20s    Diabetes mellitus type 2 in nonobese [facility])     Hypertension     Ischemic cardiomyopathy     Echocardiogram of 12/25/2021: mild left ventricular systolic dysfunction (EF 63%) with inferior and basal-mid septal akinesis. Diastolic dysfunction. Peripheral vascular disease ([address]      Past Surgical History:   Procedure Laterality Date    COLONOSCOPY N/A 2/4/2022    COLONOSCOPY performed by Hiro Sargent MD at [facility] ENDOSCOPY    HX APPENDECTOMY      HX CORONARY STENT PLACEMENT      stents x 3 to mid-distal right coronary artery going into PDA for 100% occlusion of RCA; and also had nonobstructive disease in 30% LAD stenosis, 70% diagonal 1 stenosis. She had proximal 30% stenosis in the codominant left circumflex artery     HX HYSTERECTOMY      HX KNEE REPLACEMENT Left     HX OTHER SURGICAL      head sx x 2 nerve related    HX OTHER SURGICAL      Back surgery x 2    HX TONSILLECTOMY      NM CARDIAC SURG PROCEDURE UNLIST      heart stents. x 3 in january 2022. Prior Level of Function/Environment/Context: MOD I for ADLs; lives with daughter  Expanded or extensive additional review of patient history:   Home Situation  Home Environment: Other (comment) (condominium)  One/Two Story Residence: Other (Comment)  Living Alone: No  Support Systems: Child(watson)  Patient Expects to be Discharged to[de-identified] Unable to determine at this time  Current DME Used/Available at Home: Shower chair    Hand dominance: Right    EXAMINATION OF PERFORMANCE DEFICITS:  Cognitive/Behavioral Status:  Neurologic State: Alert  Orientation Level: Oriented X4  Cognition: Appropriate decision making; Appropriate for age attention/concentration; Appropriate safety awareness; Follows commands  Perception: Appears intact  Perseveration: No perseveration noted  Safety/Judgement: Awareness of environment; Fall prevention;Good awareness of safety precautions; Home safety; Insight into deficits    Hearing: Auditory  Auditory Impairment: None    Range of Motion:  AROM: Within functional limits  PROM: Within functional limits    Strength:  Strength: Within functional limits    Coordination:  Coordination: Within functional limits  Fine Motor Skills-Upper: Left Intact; Right Intact    Gross Motor Skills-Upper: Left Intact; Right Intact    Tone & Sensation:  Tone: Normal  Sensation: Intact    Balance:  Sitting: Intact  Standing: Intact; With support    Functional Mobility and Transfers for ADLs:  Bed Mobility:  Supine to Sit: Minimum assistance;Assist x1    Transfers:  Sit to Stand: Supervision  Stand to Sit: Supervision  Bed to Chair: Supervision  Toilet Transfer : Supervision    ADL Assessment:  Feeding: Independent    Oral Facial Hygiene/Grooming: Supervision    Bathing: Stand-by assistance    Upper Body Dressing: Independent    Lower Body Dressing: Setup;Minimum assistance    Toileting: Supervision    ADL Intervention and task modifications:  Grooming  Grooming Assistance: Supervision  Position Performed: Standing (at sink)  Washing Hands: Supervision    Toileting  Bladder Hygiene: Modified independent  Adaptive Equipment: Walker;Grab bars    Cognitive Retraining  Safety/Judgement: Awareness of environment; Fall prevention;Good awareness of safety precautions; Home safety; Insight into deficits    Functional Measure:    Barthel Index:  Bathin  Bladder: 10  Bowels: 10  Groomin  Dressin  Feeding: 10  Mobility: 15  Stairs: 0  Toilet Use: 10  Transfer (Bed to Chair and Back): 15  Total: 85/100      The Barthel ADL Index: Guidelines  1. The index should be used as a record of what a patient does, not as a record of what a patient could do.   2. The main aim is to establish degree of independence from any help, physical or verbal, however minor and for whatever reason. 3. The need for supervision renders the patient not independent. 4. A patient's performance should be established using the best available evidence. Asking the patient, friends/relatives and nurses are the usual sources, but direct observation and common sense are also important. However direct testing is not needed. 5. Usually the patient's performance over the preceding 24-48 hours is important, but occasionally longer periods will be relevant. 6. Middle categories imply that the patient supplies over 50 per cent of the effort. 7. Use of aids to be independent is allowed. Score Interpretation (from [address] [facility])    Independent   60-79 Minimally independent   40-59 Partially dependent   20-39 Very dependent   <20 Totally dependent     -Hua Aden., Barthel, D.W. (1965). Functional evaluation: the Barthel Index. [address] [facility] ([address] (1997). The Barthel activities of daily living index: self-reporting versus actual performance in the old (> or = 75 years). Journal [address] 45(7), 14 [facility], RIGOBERTO, Asia Elizondo., Alberta Medina. (1999). Measuring the change in disability after inpatient rehabilitation; comparison of the responsiveness of the Barthel Index and Functional [place] Measure. Journal of Neurology, Neurosurgery, and Psychiatry, 66(4), 113-790. Lynnette Claire N.J.GERMAINE, MARLYS Duran, & Yeyo Robledo, M.A. (2004) Assessment of post-stroke quality of life in cost-effectiveness studies: The usefulness of the Barthel Index and the EuroQoL-5D.  Quality of Life Research, 15, 219-30     Occupational Therapy Evaluation Charge Determination   History Examination Decision-Making   LOW Complexity : Brief history review  LOW Complexity : 1-3 performance deficits relating to physical, cognitive , or psychosocial skils that result in activity limitations and / or participation restrictions  LOW Complexity : No comorbidities that affect functional and no verbal or physical assistance needed to complete eval tasks       Based on the above components, the patient evaluation is determined to be of the following complexity level: LOW   Pain Rating:  Pt reporting minimal pain    Activity Tolerance:   Good    After treatment patient left in no apparent distress: Up with Physical Therapist    COMMUNICATION/EDUCATION:   The patients plan of care was discussed with: Physical therapist and Registered nurse.      Thank you for this referral.  Celine Duncan OT  Time Calculation: 18 mins

## 2022-10-28 VITALS
BODY MASS INDEX: 28.52 KG/M2 | DIASTOLIC BLOOD PRESSURE: 52 MMHG | TEMPERATURE: 97.7 F | SYSTOLIC BLOOD PRESSURE: 151 MMHG | HEART RATE: 68 BPM | HEIGHT: 62 IN | RESPIRATION RATE: 18 BRPM | OXYGEN SATURATION: 96 % | WEIGHT: 154.98 LBS

## 2022-10-28 PROBLEM — J45.901 ASTHMA EXACERBATION: Status: RESOLVED | Noted: 2022-10-26 | Resolved: 2022-10-28

## 2022-10-28 LAB
ANION GAP SERPL CALC-SCNC: 6 MMOL/L (ref 5–15)
BASOPHILS # BLD: 0 K/UL (ref 0–0.1)
BASOPHILS NFR BLD: 0 % (ref 0–1)
BUN SERPL-MCNC: 46 MG/DL (ref 6–20)
BUN/CREAT SERPL: 18 (ref 12–20)
CALCIUM SERPL-MCNC: 9.3 MG/DL (ref 8.5–10.1)
CHLORIDE SERPL-SCNC: 110 MMOL/L (ref 97–108)
CO2 SERPL-SCNC: 25 MMOL/L (ref 21–32)
CREAT SERPL-MCNC: 2.54 MG/DL (ref 0.55–1.02)
DIFFERENTIAL METHOD BLD: ABNORMAL
EOSINOPHIL # BLD: 0 K/UL (ref 0–0.4)
EOSINOPHIL NFR BLD: 0 % (ref 0–7)
ERYTHROCYTE [DISTWIDTH] IN BLOOD BY AUTOMATED COUNT: 13.2 % (ref 11.5–14.5)
GLUCOSE BLD STRIP.AUTO-MCNC: 220 MG/DL (ref 65–117)
GLUCOSE SERPL-MCNC: 224 MG/DL (ref 65–100)
HCT VFR BLD AUTO: 32 % (ref 35–47)
HGB BLD-MCNC: 10.2 G/DL (ref 11.5–16)
IMM GRANULOCYTES # BLD AUTO: 0.1 K/UL (ref 0–0.04)
IMM GRANULOCYTES NFR BLD AUTO: 1 % (ref 0–0.5)
LYMPHOCYTES # BLD: 0.9 K/UL (ref 0.8–3.5)
LYMPHOCYTES NFR BLD: 7 % (ref 12–49)
MCH RBC QN AUTO: 28.7 PG (ref 26–34)
MCHC RBC AUTO-ENTMCNC: 31.9 G/DL (ref 30–36.5)
MCV RBC AUTO: 90.1 FL (ref 80–99)
MONOCYTES # BLD: 0.3 K/UL (ref 0–1)
MONOCYTES NFR BLD: 2 % (ref 5–13)
NEUTS SEG # BLD: 11.1 K/UL (ref 1.8–8)
NEUTS SEG NFR BLD: 90 % (ref 32–75)
NRBC # BLD: 0 K/UL (ref 0–0.01)
NRBC BLD-RTO: 0 PER 100 WBC
PLATELET # BLD AUTO: 184 K/UL (ref 150–400)
PMV BLD AUTO: 11.7 FL (ref 8.9–12.9)
POTASSIUM SERPL-SCNC: 5.2 MMOL/L (ref 3.5–5.1)
RBC # BLD AUTO: 3.55 M/UL (ref 3.8–5.2)
SERVICE CMNT-IMP: ABNORMAL
SODIUM SERPL-SCNC: 141 MMOL/L (ref 136–145)
WBC # BLD AUTO: 12.4 K/UL (ref 3.6–11)

## 2022-10-28 PROCEDURE — 82962 GLUCOSE BLOOD TEST: CPT

## 2022-10-28 PROCEDURE — 74011250637 HC RX REV CODE- 250/637: Performed by: SPECIALIST

## 2022-10-28 PROCEDURE — 74011250637 HC RX REV CODE- 250/637: Performed by: INTERNAL MEDICINE

## 2022-10-28 PROCEDURE — 94761 N-INVAS EAR/PLS OXIMETRY MLT: CPT

## 2022-10-28 PROCEDURE — 36415 COLL VENOUS BLD VENIPUNCTURE: CPT

## 2022-10-28 PROCEDURE — 94640 AIRWAY INHALATION TREATMENT: CPT

## 2022-10-28 PROCEDURE — 74011000250 HC RX REV CODE- 250: Performed by: INTERNAL MEDICINE

## 2022-10-28 PROCEDURE — 85025 COMPLETE CBC W/AUTO DIFF WBC: CPT

## 2022-10-28 PROCEDURE — 74011636637 HC RX REV CODE- 636/637: Performed by: INTERNAL MEDICINE

## 2022-10-28 PROCEDURE — 80048 BASIC METABOLIC PNL TOTAL CA: CPT

## 2022-10-28 PROCEDURE — 74011250636 HC RX REV CODE- 250/636: Performed by: INTERNAL MEDICINE

## 2022-10-28 RX ORDER — FUROSEMIDE 40 MG/1
40 TABLET ORAL DAILY
Status: DISCONTINUED | OUTPATIENT
Start: 2022-10-29 | End: 2022-10-28 | Stop reason: HOSPADM

## 2022-10-28 RX ORDER — FUROSEMIDE 40 MG/1
40 TABLET ORAL DAILY
Status: DISCONTINUED | OUTPATIENT
Start: 2022-10-28 | End: 2022-10-28 | Stop reason: SDUPTHER

## 2022-10-28 RX ORDER — AMLODIPINE BESYLATE 5 MG/1
10 TABLET ORAL DAILY
Status: DISCONTINUED | OUTPATIENT
Start: 2022-10-29 | End: 2022-10-28 | Stop reason: HOSPADM

## 2022-10-28 RX ORDER — AMLODIPINE BESYLATE 5 MG/1
5 TABLET ORAL DAILY
Status: DISCONTINUED | OUTPATIENT
Start: 2022-10-28 | End: 2022-10-28

## 2022-10-28 RX ORDER — DOXYCYCLINE 100 MG/1
100 CAPSULE ORAL 2 TIMES DAILY
Qty: 10 CAPSULE | Refills: 0 | Status: SHIPPED | OUTPATIENT
Start: 2022-10-28

## 2022-10-28 RX ORDER — PREDNISONE 20 MG/1
TABLET ORAL
Qty: 9 TABLET | Refills: 0 | Status: SHIPPED | OUTPATIENT
Start: 2022-10-28 | End: 2022-11-03

## 2022-10-28 RX ORDER — AMLODIPINE BESYLATE 10 MG/1
10 TABLET ORAL DAILY
Qty: 30 TABLET | Refills: 0 | Status: SHIPPED | OUTPATIENT
Start: 2022-10-29 | End: 2022-10-28

## 2022-10-28 RX ADMIN — ASPIRIN 81 MG: 81 TABLET, CHEWABLE ORAL at 08:31

## 2022-10-28 RX ADMIN — AMLODIPINE BESYLATE 5 MG: 5 TABLET ORAL at 06:02

## 2022-10-28 RX ADMIN — PANTOPRAZOLE SODIUM 40 MG: 40 TABLET, DELAYED RELEASE ORAL at 06:03

## 2022-10-28 RX ADMIN — INSULIN HUMAN 20 UNITS: 100 INJECTION, SUSPENSION SUBCUTANEOUS at 08:30

## 2022-10-28 RX ADMIN — GUAIFENESIN 600 MG: 600 TABLET, EXTENDED RELEASE ORAL at 08:31

## 2022-10-28 RX ADMIN — METHYLPREDNISOLONE SODIUM SUCCINATE 20 MG: 40 INJECTION, POWDER, FOR SOLUTION INTRAMUSCULAR; INTRAVENOUS at 08:31

## 2022-10-28 RX ADMIN — BUMETANIDE 0.5 MG: 0.25 INJECTION INTRAMUSCULAR; INTRAVENOUS at 08:31

## 2022-10-28 RX ADMIN — IPRATROPIUM BROMIDE AND ALBUTEROL SULFATE 3 ML: .5; 3 SOLUTION RESPIRATORY (INHALATION) at 07:06

## 2022-10-28 RX ADMIN — INSULIN LISPRO 3 UNITS: 100 INJECTION, SOLUTION INTRAVENOUS; SUBCUTANEOUS at 08:30

## 2022-10-28 RX ADMIN — ATORVASTATIN CALCIUM 20 MG: 20 TABLET, FILM COATED ORAL at 08:31

## 2022-10-28 RX ADMIN — SODIUM CHLORIDE, PRESERVATIVE FREE 10 ML: 5 INJECTION INTRAVENOUS at 06:02

## 2022-10-28 RX ADMIN — METOPROLOL TARTRATE 50 MG: 50 TABLET ORAL at 08:31

## 2022-10-28 NOTE — PROGRESS NOTES
Jan Mondragon [facility]  [address] [facility], [place], [address] [place] [address]  [phone]      Hospitalist Progress Note      NAME: Frank Wall   :  1934  MRM:  [id number]    Date of service: 10/28/2022  12:06 PM       Assessment and Plan:    Dyspnea/acute bronchitis: CXR is unremarkable. Not hypoxic. RVP is negative. Check sputum for gram stain and Cx. Negative procalcitonin. On IV solumedrol, IV CTX/Azithro, nebs. Will discharge on doxy     2. Diastolic (congestive) heart failure/ high proBNP/ 3+ LE edema. compensated. Edema is thought to be due to amlodipine. Last echo with EF 55-60%. Evaluated by cardiology. 3.  JOANNA/CKD 3: Cr fluctuates from 1.5 to 2.0.  follows by [facility] [place] nephrology. Will monitor BMP while on diuretics. nephrology following      4. CAD: s/p GERRY. Cont BB, statin. Off plavix due to GI bleed from AVMs     5.   DM type 2: A1C 6.8. On SSI, diabetic diet. Add NPH as pt's BG is high due to steroid. 6.  Hyperkalemia. Audra Ward. Subjective:     Chief Complaint[de-identified] Patient was seen and examined as a follow up for SOB. Chart was reviewed. getting better     ROS:  (bold if positive, if negative)    SOBTolerating PT  Tolerating Diet        Objective:     Last 24hrs VS reviewed since prior progress note. Most recent are:    Visit Vitals  BP (!) 151/52 (BP 1 Location: Right upper arm, BP Patient Position: Lying; At rest)   Pulse 68   Temp 97.7 °F (36.5 °C)   Resp 18   Ht 5' 2\" (1.575 m)   Wt 70.3 kg (154 lb 15.7 oz)   SpO2 96%   BMI 28.35 kg/m²     SpO2 Readings from Last 6 Encounters:   10/28/22 96%   22 93%   22 97%   22 97%   22 96%   21 99%          Intake/Output Summary (Last 24 hours) at 10/28/2022 0845  Last data filed at 10/27/2022 2058  Gross per 24 hour   Intake 300 ml   Output 500 ml   Net -200 ml          Physical Exam:    Gen:  Well-developed, well-nourished, in no acute distress  HEENT:  Pink conjunctivae, PERRL, hearing intact to voice, moist mucous membranes  Neck:  Supple, without masses, thyroid non-tender  Resp:  No accessory muscle use, clear breath sounds without wheezes rales or rhonchi  Card:  No murmurs, normal S1, S2 without thrills, bruits. +++ peripheral edema  Abd:  Soft, non-tender, non-distended, normoactive bowel sounds are present, no palpable organomegaly and no detectable hernias  Lymph:  No cervical or inguinal adenopathy  Musc:  No cyanosis or clubbing  Skin:  No rashes or ulcers, skin turgor is good  Neuro:  Cranial nerves are grossly intact, no focal motor weakness, follows commands appropriately  Psych:  Good insight, oriented to person, place and time, alert  __________________________________________________________________  Medications Reviewed: (see below)  Medications:     Current Facility-Administered Medications   Medication Dose Route Frequency    [START ON 10/29/2022] amLODIPine (NORVASC) tablet 10 mg  10 mg Oral DAILY    furosemide (LASIX) tablet 40 mg  40 mg Oral DAILY    insulin NPH (NOVOLIN N, HUMULIN N) injection 20 Units  20 Units SubCUTAneous ACB&D    methylPREDNISolone (PF) (SOLU-MEDROL) injection 20 mg  20 mg IntraVENous Q12H    guaiFENesin ER (MUCINEX) tablet 600 mg  600 mg Oral Q12H    benzonatate (TESSALON) capsule 100 mg  100 mg Oral TID PRN    cefTRIAXone (ROCEPHIN) 1 g in 0.9% sodium chloride 10 mL IV syringe  1 g IntraVENous Q24H    azithromycin (ZITHROMAX) 500 mg in 0.9% sodium chloride 250 mL ([id number])  500 mg IntraVENous Q24H    albuterol-ipratropium (DUO-NEB) 2.5 MG-0.5 MG/3 ML  3 mL Nebulization Q6H RT    atorvastatin (LIPITOR) tablet 20 mg  20 mg Oral DAILY    metoprolol tartrate (LOPRESSOR) tablet 50 mg  50 mg Oral Q12H    pantoprazole (PROTONIX) tablet 40 mg  40 mg Oral ACB    sodium chloride (NS) flush 5-40 mL  5-40 mL IntraVENous Q8H    sodium chloride (NS) flush 5-40 mL  5-40 mL IntraVENous PRN    0.9% sodium chloride infusion 25 mL  25 mL IntraVENous PRN acetaminophen (TYLENOL) tablet 650 mg  650 mg Oral Q6H PRN    Or    acetaminophen (TYLENOL) suppository 650 mg  650 mg Rectal Q6H PRN    senna-docusate (PERICOLACE) 8.6-50 mg per tablet 1 Tablet  1 Tablet Oral BID    bisacodyL (DULCOLAX) suppository 10 mg  10 mg Rectal DAILY PRN    ondansetron (ZOFRAN) injection 4 mg  4 mg IntraVENous Q6H PRN    oxyCODONE IR (ROXICODONE) tablet 5 mg  5 mg Oral Q4H PRN    insulin lispro (HUMALOG) injection   SubCUTAneous AC&HS    glucose chewable tablet 16 g  4 Tablet Oral PRN    glucagon (GLUCAGEN) injection 1 mg  1 mg IntraMUSCular PRN    aspirin chewable tablet 81 mg  81 mg Oral DAILY        Lab Data Reviewed: (see below)  Lab Review:     Recent Labs     10/28/22  0405 10/27/22  0335 10/26/22  1157   WBC 12.4* 7.2 7.3   HGB 10.2* 10.4* 10.7*   HCT 32.0* 32.3* 33.2*    168 196       Recent Labs     10/28/22  0405 10/27/22  1316 10/27/22  0335 10/26/22  1157     --  140 140   K 5.2*  --  3.1* 3.2*   *  --  105 107   CO2 25  --  25 24   *  --  238* 198*   BUN 46*  --  40* 40*   CREA 2.54*  --  2.54* 2.88*   CA 9.3 9.4 8.8 9.0   MG  --   --  2.6* 2.7*   PHOS  --   --  2.9  --    ALB  --   --  3.1* 3.4*   TBILI  --   --  0.6 0.7   ALT  --   --  33 20       Lab Results   Component Value Date/Time    Glucose (POC) 220 (H) 10/28/2022 07:23 AM    Glucose (POC) 186 (H) 10/27/2022 09:14 PM    Glucose (POC) 303 (H) 10/27/2022 03:32 PM    Glucose (POC) 333 (H) 10/27/2022 11:12 AM    Glucose (POC) 213 (H) 10/27/2022 07:52 AM     No results for input(s): PH, PCO2, PO2, HCO3, FIO2 in the last 72 hours. No results for input(s): INR, INREXT, INREXT in the last 72 hours.   All Micro Results       Procedure Component Value Units Date/Time    RESPIRATORY VIRUS PANEL W/COVID-19, PCR [[id number]] Collected: 10/26/22 1433    Order Status: Completed Specimen: Nasopharyngeal Updated: 10/27/22 0059     Adenovirus Not detected        Coronavirus 229E Not detected        Coronavirus HKU1 Not detected        Coronavirus CVNL63 Not detected        Coronavirus OC43 Not detected        SARS-CoV-2, PCR Not detected        Metapneumovirus Not detected        Rhinovirus and Enterovirus Not detected        Influenza A Not detected        Influenza B Not detected        Parainfluenza 1 Not detected        Parainfluenza 2 Not detected        Parainfluenza 3 Not detected        Parainfluenza virus 4 Not detected        RSV by PCR Not detected        B. parapertussis, PCR Not detected        Bordetella pertussis - PCR Not detected        Chlamydophila pneumoniae DNA, QL, PCR Not detected        Mycoplasma pneumoniae DNA, QL, PCR Not detected       CULTURE, RESPIRATORY/SPUTUM/BRONCH Evette Locker [[id number]]     Order Status: Sent Specimen: Sputum     COVID-19 RAPID TEST [[id number]] Collected: 10/26/22 1157    Order Status: Completed Specimen: Nasopharyngeal Updated: 10/26/22 1233     Specimen source Nasopharyngeal        COVID-19 rapid test Not detected        Comment: Rapid Abbott ID Now       Rapid NAAT:  The specimen is NEGATIVE for SARS-CoV-2, the novel coronavirus associated with COVID-19. Negative results should be treated as presumptive and, if inconsistent with clinical signs and symptoms or necessary for patient management, should be tested with an alternative molecular assay. Negative results do not preclude SARS-CoV-2 infection and should not be used as the sole basis for patient management decisions. This test has been authorized by the FDA under an Emergency Use Authorization (EUA) for use by authorized laboratories. Fact sheet for Healthcare Providers:  http://www.maria elena.alfred/  Fact sheet for Patients: http://www.christie-tania.alfred/       Methodology: Isothermal Nucleic Acid Amplification                 I have reviewed notes of prior 24hr. Other pertinent lab:      Total time spent with patient: 35 minutes I personally reviewed chart, notes, data and current medications in the medical record. I have personally examined and treated the patient at bedside during this period.                  Care Plan discussed with: Patient, Nursing Staff, and >50% of time spent in counseling and coordination of care    Discussed:  Care Plan    Prophylaxis:  SCD's    Disposition:  Home w/Family           ___________________________________________________    Attending Physician: Claudette Caldwell MD

## 2022-10-28 NOTE — DISCHARGE SUMMARY
Hospitalist Discharge Summary     Patient ID:    Austen Gonzales  [id number]  80 y.o.  1934    Admit date of service: 10/26/2022    Discharge date of service: 10/28/2022    Admission Diagnoses: Acute on chronic diastolic (congestive) heart failure (HCC) [I50.33]    Chronic Diagnoses:    Problem List as of 10/28/2022 Date Reviewed: 10/26/2022            Codes Class Noted - Resolved    * (Principal) Acute on chronic diastolic (congestive) heart failure (HCC) ICD-10-CM: I50.33  ICD-9-CM: 428.33, 428.0  10/26/2022 - Present        Acute bronchitis ICD-10-CM: J20.9  ICD-9-CM: 466.0  10/26/2022 - Present        Dehydration ICD-10-CM: E86.0  ICD-9-CM: 276.51  6/6/2022 - Present        GI bleed ICD-10-CM: K92.2  ICD-9-CM: 578.9  6/6/2022 - Present        JOANNA (acute kidney injury) ([facility] ICD-10-CM: N17.9  ICD-9-CM: 584.9  6/6/2022 - Present        Iron deficiency anemia ICD-10-CM: D50.9  ICD-9-CM: 280.9  2/3/2022 - Present        Chronic GI bleeding ICD-10-CM: K92.2  ICD-9-CM: 578.9  2/3/2022 - Present        Acute GI bleeding ICD-10-CM: K92.2  ICD-9-CM: 578.9  2/2/2022 - Present        CAD (coronary artery disease) ICD-10-CM: I25.10  ICD-9-CM: 414.00  Unknown - Present        DM type 2 causing renal disease (HCC) ICD-10-CM: E11.29  ICD-9-CM: 250.40  Unknown - Present        DM type 2 causing vascular disease (HCC) ICD-10-CM: E11.59  ICD-9-CM: 250.70, 443.81  Unknown - Present        Hypertension ICD-10-CM: I10  ICD-9-CM: 401.9  Unknown - Present        CHF (congestive heart failure), NYHA class I, chronic, systolic (HCC) [facility]: I50.22  ICD-9-CM: 428.22, 428.0  Unknown - Present        Ischemic cardiomyopathy ICD-10-CM: I25.5  ICD-9-CM: 414.8  Unknown - Present        CKD (chronic kidney disease) stage 3, GFR 30-59 ml/min ([facility]) ICD-10-CM: N18.30  ICD-9-CM: 585.3  10/18/2021 - Present        RESOLVED: Asthma exacerbation ICD-10-CM: J45.901  ICD-9-CM: 493.92  10/26/2022 - 10/28/2022        RESOLVED: Chest pain ICD-10-CM: R07.9  ICD-9-CM: 786.50  1/12/2022 - 1/13/2022        RESOLVED: Hypoglycemia ICD-10-CM: E16.2  ICD-9-CM: 251.2  12/26/2021 - 2/3/2022        RESOLVED: ARF (acute renal failure) ([facility] ICD-10-CM: N17.9  ICD-9-CM: 584.9  12/26/2021 - 2/3/2022        RESOLVED: Acute metabolic encephalopathy IWQ-21-GZ: G93.41  ICD-9-CM: 348.31  12/26/2021 - 2/3/2022        RESOLVED: CKD (chronic kidney disease), stage III ([facility] ICD-10-CM: N18.30  ICD-9-CM: 653. 3  Unknown - 12/26/2021        RESOLVED: Chest pain ICD-10-CM: R07.9  ICD-9-CM: 786.50  12/24/2021 - 12/26/2021        RESOLVED: Dysphagia ICD-10-CM: R13.10  ICD-9-CM: 787.20  12/24/2021 - 2/3/2022        RESOLVED: STEMI (ST elevation myocardial infarction) ([facility] ICD-10-CM: I21.3  ICD-9-CM: 410.90  12/24/2021 - 12/26/2021        RESOLVED: Right knee pain ICD-10-CM: M25.561  ICD-9-CM: 719.46  10/21/2021 - 12/26/2021        RESOLVED: Humeral surgical neck fracture ICD-10-CM: U98.517X  ICD-9-CM: 812.01  10/18/2021 - 12/26/2021        RESOLVED: Closed fracture of multiple pubic rami ([facility] ICD-10-CM: S32.599A  ICD-9-CM: 808.2  10/18/2021 - 12/26/2021        RESOLVED: HTN (hypertension), benign ICD-10-CM: I10  ICD-9-CM: 401.1  10/18/2021 - 2/3/2022        RESOLVED: DM (diabetes mellitus), type 2 ([facility] ICD-10-CM: E11.9  ICD-9-CM: 250.00  10/18/2021 - 2/3/2022        RESOLVED: Leukocytosis ICD-10-CM: O44.660  ICD-9-CM: 288.60  10/18/2021 - 12/26/2021        RESOLVED: Hypokalemia due to loss of potassium ICD-10-CM: E87.6  ICD-9-CM: 276.8  10/18/2021 - 12/26/2021           Discharge Medications:   Current Discharge Medication List        START taking these medications    Details   doxycycline (MONODOX) 100 mg capsule Take 1 Capsule by mouth two (2) times a day. Qty: 10 Capsule, Refills: 0      predniSONE (DELTASONE) 20 mg tablet Take 2 Tablets by mouth daily (with breakfast) for 3 days, THEN 1 Tablet daily (with breakfast) for 3 days.   Qty: 9 Tablet, Refills: 0           CONTINUE these medications which have CHANGED    Details   amLODIPine (NORVASC) 10 mg tablet Take 1 Tablet by mouth daily. Indications: high blood pressure  Qty: 30 Tablet, Refills: 0           CONTINUE these medications which have NOT CHANGED    Details   pantoprazole (PROTONIX) 40 mg granules for oral suspension Take 40 mg by mouth Daily (before breakfast). Qty: 90 Each, Refills: 0      furosemide (LASIX) 40 mg tablet Take 40 mg by mouth daily. Indications: accumulation of fluid resulting from chronic heart failure      aspirin 81 mg chewable tablet Take 1 Tablet by mouth daily. Qty: 90 Tablet, Refills: 0      ferrous sulfate (Iron) 325 mg (65 mg iron) tablet Take 1 Tablet by mouth Daily (before breakfast). Qty: 90 Tablet, Refills: 0      ondansetron hcl (ZOFRAN) 4 mg tablet Take 4 mg by mouth daily as needed for Nausea or Vomiting. cholecalciferol, vitamin D3, 50 mcg (2,000 unit) tab Take  by mouth. atorvastatin (LIPITOR) 20 mg tablet Take 1 Tablet by mouth daily. Qty: 30 Tablet, Refills: 1      metoprolol tartrate (LOPRESSOR) 50 mg tablet Take 1 Tablet by mouth every twelve (12) hours. Qty: 60 Tablet, Refills: 1           STOP taking these medications       senna (Senna) 8.6 mg tablet Comments:   Reason for Stopping: Follow up Care:    1. Karthik Cunha NP in 1-2 weeks  2. Nephrology     Diet:  Diabetic Diet    Disposition:  Home. Advanced Directive:    Discharge Exam:  See today's note.     CONSULTATIONS: Nephrology    Significant Diagnostic Studies:   Recent Labs     10/28/22  0405 10/27/22  0335   WBC 12.4* 7.2   HGB 10.2* 10.4*   HCT 32.0* 32.3*    168     Recent Labs     10/28/22  0405 10/27/22  1316 10/27/22  0335 10/26/22  1157     --  140 140   K 5.2*  --  3.1* 3.2*   *  --  105 107   CO2 25  --  25 24   BUN 46*  --  40* 40*   CREA 2.54*  --  2.54* 2.88*   *  --  238* 198*   CA 9.3 9.4 8.8 9.0   MG  --   --  2.6* 2.7*   PHOS  --   --  2.9  --      Recent Labs     10/27/22  0335 10/26/22  1157   ALT 33 20   AP 81 79   TBILI 0.6 0.7   TP 6.8 7.3   ALB 3.1* 3.4*   GLOB 3.7 3.9     No results for input(s): INR, PTP, APTT, INREXT in the last 72 hours. No results for input(s): FE, TIBC, PSAT, FERR in the last 72 hours. No results for input(s): PH, PCO2, PO2 in the last 72 hours. No results for input(s): CPK, CKMB in the last 72 hours. No lab exists for component: TROPONINI  Lab Results   Component Value Date/Time    Glucose (POC) 220 (H) 10/28/2022 07:23 AM    Glucose (POC) 186 (H) 10/27/2022 09:14 PM    Glucose (POC) 303 (H) 10/27/2022 03:32 PM    Glucose (POC) 333 (H) 10/27/2022 11:12 AM    Glucose (POC) 213 (H) 10/27/2022 07:52 AM             HOSPITAL COURSE & TREATMENT RENDERED:    Dyspnea/acute bronchitis: CXR is unremarkable. Not hypoxic. RVP is negative. Check sputum for gram stain and Cx. Negative procalcitonin. was on IV solumedrol, IV CTX/Azithro, nebs. Will discharge on doxy, tapering dose of prednisone. 2.   Diastolic (congestive) heart failure/ high proBNP/ 3+ LE edema. compensated. Edema is thought to be due to amlodipine. Last echo with EF 55-60%. Evaluated by cardiology. 3.  JOANNA/CKD 3: Cr fluctuates from 1.5 to 2.0.  follows by [address] [place] [address] nephrology. Will monitor BMP while on diuretics. nephrology following      4. CAD: s/p GERRY. Cont BB, statin. Off plavix due to GI bleed from AVMs     5.   DM type 2: A1C 6.8. On SSI, diabetic diet. Add NPH as pt's BG is high due to steroid. 6.  Hyperkalemia. lokelma         Discharged in improved condition.     Spent 35 minutes    Signed:  Georgia Taylor MD  10/28/2022  9:02 AM

## 2022-10-28 NOTE — PROGRESS NOTES
10/28/2022  9:12 AM    Care Management Progress Note      ICD-10-CM ICD-9-CM    1. Acute cough  R05.1 786.2       2. Dyspnea on exertion  R06.09 786.09       3. Acute on chronic congestive heart failure, unspecified heart failure type (HCC)  I50.9 428.0       4. JOANNA (acute kidney injury) ([facility]  N17.9 584.9           RUR: 19%   Risk Level: []Low [x]Moderate []High  Value-based purchasing: [] Yes [x] No  Bundle patient: [] Yes [x] No   Specify:     Transition of care plan:  Medically stable with discharge order  Home with family assistance; no PT/OT needs found on evaluation  Outpatient follow-up. Pt's family to transport  No further CM needs identified    Care Management Interventions  PCP Verified by CM: Yes Jake Velasquez  Mode of Transport at Discharge:  Other (see comment)  Transition of Care Consult (CM Consult): Discharge Planning  MyChart Signup: No  Discharge Durable Medical Equipment: No  Physical Therapy Consult: Yes  Occupational Therapy Consult: Yes  Speech Therapy Consult: No  Support Systems: Child(watson)  Confirm Follow Up Transport: Family  Discharge Location  Patient Expects to be Discharged to[de-identified] Home with family assistance    Ron Herman RN

## 2022-10-28 NOTE — PROGRESS NOTES
Late entry  At 2100 Patient accidentally cut her skin on the right pointer finger while moving the table. Verbalized she hit the edge of the table with her finger. Laceration noted with mild bleeding. Bleeding controlled with gauze. Wound dressing done. Environment kept safe. Nursing supervisor made aware. At 0530 Patient noted with high blood pressure. Latest is 177/73. Patient not in pain, is asymptmatic. N o complaints. Infomred Dr Angeles Childs via [employer]. [address] [place] [address] with Dr Angeles Childs over the phone, can resume patient's amlodipine 5 mg daily. Can give once dose now and continue to monitor. At 0600 amlodipine 5mg given, patient needs attended    Bedside shift change report given to Dilshad Roque RN (oncoming nurse) by Samy Song (offgoing nurse). Report included the following information SBAR, Intake/Output, MAR, Recent Results, and Med Rec Status.

## 2022-10-28 NOTE — PROGRESS NOTES
0945: I have reviewed discharge instructions with the patient and caregiver. The patient and caregiver verbalized understanding. Discharge packet provided to patient, offered to answer questions. No complaints of pain, IV removed. Patient refusing Clemetine Endo due to it making her nausea/vomiting to the point she is dehydrated. Notified Dr. Regis Regalado about this, patient okay to discharge.

## 2022-10-28 NOTE — DISCHARGE INSTRUCTIONS
ACUTE DIAGNOSES:  Acute on chronic diastolic (congestive) heart failure (HCC) [I50.33]    CHRONIC MEDICAL DIAGNOSES:  [unfilled]    DISCHARGE MEDICATIONS:          It is important that you take the medication exactly as they are prescribed. Keep your medication in the bottles provided by the pharmacist and keep a list of the medication names, dosages, and times to be taken in your wallet. Do not take other medications without consulting your doctor. DIET:  Diabetic Diet    ACTIVITY: Activity as tolerated    ADDITIONAL INFORMATION: If you experience any of the following symptoms then please call your primary care physician or return to the emergency room if you cannot get hold of your doctor: Fever, chills, nausea, vomiting, diarrhea, change in mentation, falling, bleeding, shortness of breath. FOLLOW UP CARE:  Primary care physician. you are to call and set up an appointment to see them in 5 days. Follow-up with nephrology      Information obtained by :  I understand that if any problems occur once I am at home I am to contact my physician. I understand and acknowledge receipt of the instructions indicated above.                                                                                                                                            Physician's or R.N.'s Signature                                                                  Date/Time                                                                                                                                              Patient or Representative Signature                                                          Date/Time

## 2022-10-28 NOTE — PROGRESS NOTES
Problem: Falls - Risk of  Goal: *Absence of Falls  Description: Document Dennie Oak Fall Risk and appropriate interventions in the flowsheet. Outcome: Resolved/Met  Note: Fall Risk Interventions:  Mobility Interventions: Bed/chair exit alarm         Medication Interventions: Bed/chair exit alarm    Elimination Interventions: Call light in reach              Problem: Patient Education: Go to Patient Education Activity  Goal: Patient/Family Education  Outcome: Resolved/Met     Problem: Diabetes Self-Management  Goal: *Disease process and treatment process  Description: Define diabetes and identify own type of diabetes; list 3 options for treating diabetes. Outcome: Resolved/Met  Goal: *Incorporating nutritional management into lifestyle  Description: Describe effect of type, amount and timing of food on blood glucose; list 3 methods for planning meals. Outcome: Resolved/Met  Goal: *Incorporating physical activity into lifestyle  Description: State effect of exercise on blood glucose levels. Outcome: Resolved/Met  Goal: *Developing strategies to promote health/change behavior  Description: Define the ABC's of diabetes; identify appropriate screenings, schedule and personal plan for screenings. Outcome: Resolved/Met  Goal: *Using medications safely  Description: State effect of diabetes medications on diabetes; name diabetes medication taking, action and side effects. Outcome: Resolved/Met  Goal: *Monitoring blood glucose, interpreting and using results  Description: Identify recommended blood glucose targets  and personal targets. Outcome: Resolved/Met  Goal: *Prevention, detection, treatment of acute complications  Description: List symptoms of hyper- and hypoglycemia; describe how to treat low blood sugar and actions for lowering  high blood glucose level.   Outcome: Resolved/Met  Goal: *Prevention, detection and treatment of chronic complications  Description: Define the natural course of diabetes and describe the relationship of blood glucose levels to long term complications of diabetes.   Outcome: Resolved/Met  Goal: *Developing strategies to address psychosocial issues  Description: Describe feelings about living with diabetes; identify support needed and support network  Outcome: Resolved/Met  Goal: *Insulin pump training  Outcome: Resolved/Met  Goal: *Sick day guidelines  Outcome: Resolved/Met  Goal: *Patient Specific Goal (EDIT GOAL, INSERT TEXT)  Outcome: Resolved/Met     Problem: Patient Education: Go to Patient Education Activity  Goal: Patient/Family Education  Outcome: Resolved/Met     Problem: Patient Education: Go to Patient Education Activity  Goal: Patient/Family Education  Outcome: Resolved/Met

## 2022-11-01 ENCOUNTER — APPOINTMENT (OUTPATIENT)
Dept: GENERAL RADIOLOGY | Age: 87
End: 2022-11-01
Attending: STUDENT IN AN ORGANIZED HEALTH CARE EDUCATION/TRAINING PROGRAM
Payer: MEDICARE

## 2022-11-01 ENCOUNTER — HOSPITAL ENCOUNTER (EMERGENCY)
Age: 87
Discharge: HOME | End: 2022-11-01
Attending: STUDENT IN AN ORGANIZED HEALTH CARE EDUCATION/TRAINING PROGRAM
Payer: MEDICARE

## 2022-11-01 VITALS
OXYGEN SATURATION: 98 % | DIASTOLIC BLOOD PRESSURE: 71 MMHG | TEMPERATURE: 97.9 F | SYSTOLIC BLOOD PRESSURE: 124 MMHG | RESPIRATION RATE: 17 BRPM | HEART RATE: 60 BPM

## 2022-11-01 DIAGNOSIS — R11.2 NAUSEA AND VOMITING, UNSPECIFIED VOMITING TYPE: ICD-10-CM

## 2022-11-01 DIAGNOSIS — R05.9 COUGH, UNSPECIFIED TYPE: Primary | ICD-10-CM

## 2022-11-01 LAB
ALBUMIN SERPL-MCNC: 3.7 G/DL (ref 3.5–5)
ALBUMIN/GLOB SERPL: 0.9 {RATIO} (ref 1.1–2.2)
ALP SERPL-CCNC: 82 U/L (ref 45–117)
ALT SERPL-CCNC: 39 U/L (ref 12–78)
ANION GAP SERPL CALC-SCNC: 10 MMOL/L (ref 5–15)
APPEARANCE UR: CLEAR
AST SERPL-CCNC: 22 U/L (ref 15–37)
BACTERIA URNS QL MICRO: NEGATIVE /HPF
BASOPHILS # BLD: 0 K/UL (ref 0–0.1)
BASOPHILS NFR BLD: 0 % (ref 0–1)
BILIRUB SERPL-MCNC: 0.7 MG/DL (ref 0.2–1)
BILIRUB UR QL: NEGATIVE
BUN SERPL-MCNC: 37 MG/DL (ref 6–20)
BUN/CREAT SERPL: 16 (ref 12–20)
CALCIUM SERPL-MCNC: 9.3 MG/DL (ref 8.5–10.1)
CHLORIDE SERPL-SCNC: 104 MMOL/L (ref 97–108)
CO2 SERPL-SCNC: 27 MMOL/L (ref 21–32)
COLOR UR: NORMAL
COMMENT, HOLDF: NORMAL
CREAT SERPL-MCNC: 2.32 MG/DL (ref 0.55–1.02)
DIFFERENTIAL METHOD BLD: ABNORMAL
EOSINOPHIL # BLD: 0 K/UL (ref 0–0.4)
EOSINOPHIL NFR BLD: 0 % (ref 0–7)
EPITH CASTS URNS QL MICRO: NORMAL /LPF
ERYTHROCYTE [DISTWIDTH] IN BLOOD BY AUTOMATED COUNT: 12.9 % (ref 11.5–14.5)
FLUAV AG NPH QL IA: NEGATIVE
FLUBV AG NOSE QL IA: NEGATIVE
GLOBULIN SER CALC-MCNC: 4 G/DL (ref 2–4)
GLUCOSE SERPL-MCNC: 164 MG/DL (ref 65–100)
GLUCOSE UR STRIP.AUTO-MCNC: NEGATIVE MG/DL
HCT VFR BLD AUTO: 38.7 % (ref 35–47)
HGB BLD-MCNC: 12.7 G/DL (ref 11.5–16)
HGB UR QL STRIP: NEGATIVE
HYALINE CASTS URNS QL MICRO: NORMAL /LPF (ref 0–2)
IMM GRANULOCYTES # BLD AUTO: 0.2 K/UL (ref 0–0.04)
IMM GRANULOCYTES NFR BLD AUTO: 1 % (ref 0–0.5)
KETONES UR QL STRIP.AUTO: NEGATIVE MG/DL
LEUKOCYTE ESTERASE UR QL STRIP.AUTO: NEGATIVE
LYMPHOCYTES # BLD: 1.4 K/UL (ref 0.8–3.5)
LYMPHOCYTES NFR BLD: 12 % (ref 12–49)
MCH RBC QN AUTO: 29.5 PG (ref 26–34)
MCHC RBC AUTO-ENTMCNC: 32.8 G/DL (ref 30–36.5)
MCV RBC AUTO: 89.8 FL (ref 80–99)
MONOCYTES # BLD: 0.2 K/UL (ref 0–1)
MONOCYTES NFR BLD: 2 % (ref 5–13)
NEUTS SEG # BLD: 9.5 K/UL (ref 1.8–8)
NEUTS SEG NFR BLD: 85 % (ref 32–75)
NITRITE UR QL STRIP.AUTO: NEGATIVE
NRBC # BLD: 0 K/UL (ref 0–0.01)
NRBC BLD-RTO: 0 PER 100 WBC
PH UR STRIP: 6.5 [PH] (ref 5–8)
PLATELET # BLD AUTO: 265 K/UL (ref 150–400)
PMV BLD AUTO: 10.8 FL (ref 8.9–12.9)
POTASSIUM SERPL-SCNC: 4 MMOL/L (ref 3.5–5.1)
PROT SERPL-MCNC: 7.7 G/DL (ref 6.4–8.2)
PROT UR STRIP-MCNC: NEGATIVE MG/DL
RBC # BLD AUTO: 4.31 M/UL (ref 3.8–5.2)
RBC #/AREA URNS HPF: NORMAL /HPF (ref 0–5)
SAMPLES BEING HELD,HOLD: NORMAL
SODIUM SERPL-SCNC: 141 MMOL/L (ref 136–145)
SP GR UR REFRACTOMETRY: 1.01 (ref 1–1.03)
TROPONIN-HIGH SENSITIVITY: 11 NG/L (ref 0–51)
TROPONIN-HIGH SENSITIVITY: 11 NG/L (ref 0–51)
UA: UC IF INDICATED,UAUC: NORMAL
UROBILINOGEN UR QL STRIP.AUTO: 0.2 EU/DL (ref 0.2–1)
WBC # BLD AUTO: 11.3 K/UL (ref 3.6–11)
WBC URNS QL MICRO: NORMAL /HPF (ref 0–4)

## 2022-11-01 PROCEDURE — 74011250637 HC RX REV CODE- 250/637: Performed by: STUDENT IN AN ORGANIZED HEALTH CARE EDUCATION/TRAINING PROGRAM

## 2022-11-01 PROCEDURE — 85025 COMPLETE CBC W/AUTO DIFF WBC: CPT

## 2022-11-01 PROCEDURE — 87804 INFLUENZA ASSAY W/OPTIC: CPT

## 2022-11-01 PROCEDURE — 36415 COLL VENOUS BLD VENIPUNCTURE: CPT

## 2022-11-01 PROCEDURE — 99285 EMERGENCY DEPT VISIT HI MDM: CPT

## 2022-11-01 PROCEDURE — 71046 X-RAY EXAM CHEST 2 VIEWS: CPT

## 2022-11-01 PROCEDURE — 81001 URINALYSIS AUTO W/SCOPE: CPT

## 2022-11-01 PROCEDURE — 84484 ASSAY OF TROPONIN QUANT: CPT

## 2022-11-01 PROCEDURE — 93005 ELECTROCARDIOGRAM TRACING: CPT

## 2022-11-01 PROCEDURE — 80053 COMPREHEN METABOLIC PANEL: CPT

## 2022-11-01 RX ORDER — BENZONATATE 100 MG/1
100 CAPSULE ORAL
Qty: 30 CAPSULE | Refills: 0 | Status: SHIPPED | OUTPATIENT
Start: 2022-11-01 | End: 2022-11-08

## 2022-11-01 RX ORDER — BENZONATATE 100 MG/1
100 CAPSULE ORAL
Status: COMPLETED | OUTPATIENT
Start: 2022-11-01 | End: 2022-11-01

## 2022-11-01 RX ADMIN — BENZONATATE 100 MG: 100 CAPSULE ORAL at 19:55

## 2022-11-01 NOTE — ED PROVIDER NOTES
HPI     Date of Service:  11/1/2022    Patient:  Benji Mcgee    Chief Complaint:  Chest Pain and Shortness of Breath       HPI:  Benji Mcgee is a 80 y.o.  female with a past medical history of HTN, DM, CAD, CKD, CHF who presents for evaluation of chest pain and shortness of breath. Patient was recently hospitalized on 10/26-10/28 for acute bronchitis and CHF exacerbation. Per daughter, patient has continued to have a cough, chest pain and shortness of breath. States today symptoms worsened. Cough is non-productive. No known fevers. Dad reports that patient was receiving an antibiotic for her bronchitis, felt this was causing the patient to have nausea and vomiting therefore she discontinued it Sunday. Chest pain is central and described as \"pain. \" No radiation, nausea, vomiting, or diaphoresis. Past Medical History:   Diagnosis Date    CAD (coronary artery disease)     CHF (congestive heart failure), NYHA class I, chronic, systolic (HCC)     Systolic and diastolic CHF per echocardiogram of 12/25/2021. Patient was found to have LVEF 45%    Chronic kidney disease     stage III/IV:Creatinine 1.45-1.7 with GFR in the low 30s-high 20s    Diabetes mellitus type 2 in nonobese [facility])     Hypertension     Ischemic cardiomyopathy     Echocardiogram of 12/25/2021: mild left ventricular systolic dysfunction (EF 99%) with inferior and basal-mid septal akinesis. Diastolic dysfunction. Peripheral vascular disease ([address]        Past Surgical History:   Procedure Laterality Date    COLONOSCOPY N/A 2/4/2022    [facility]    HX APPENDECTOMY      HX CORONARY STENT PLACEMENT      stents x 3 to mid-distal right coronary artery going into PDA for 100% occlusion of RCA; and also had nonobstructive disease in 30% LAD stenosis, 70% diagonal 1 stenosis.   She had proximal 30% stenosis in the codominant left circumflex artery     HX HYSTERECTOMY      HX KNEE REPLACEMENT Left HX OTHER SURGICAL      head sx x 2 nerve related    HX OTHER SURGICAL      Back surgery x 2    HX TONSILLECTOMY      IN CARDIAC SURG PROCEDURE UNLIST      heart stents. x 3 in january 2022. Family History:   Problem Relation Age of Onset    Heart Disease Mother        Social History     Socioeconomic History    Marital status:      Spouse name: Not on file    Number of children: Not on file    Years of education: Not on file    Highest education level: Not on file   Occupational History    Not on file   Tobacco Use    Smoking status: Former     Years: 15.00     Types: Cigarettes    Smokeless tobacco: Never   Substance and Sexual Activity    Alcohol use: Yes     Comment: very rarely     Drug use: Never    Sexual activity: Not on file   Other Topics Concern     Service Not Asked    Blood Transfusions Not Asked    Caffeine Concern Not Asked    Occupational Exposure Not Asked    Hobby Hazards Not Asked    Sleep Concern Not Asked    Stress Concern Not Asked    Weight Concern Not Asked    Special Diet Not Asked    Back Care Not Asked    Exercise Not Asked    Bike Helmet Not Asked    Seat Belt Not Asked    Self-Exams Not Asked   Social History Narrative    Not on file     Social Determinants of Health     Financial Resource Strain: Not on file   Food Insecurity: Not on file   Transportation Needs: Not on file   Physical Activity: Not on file   Stress: Not on file   Social Connections: Not on file   Intimate Partner Violence: Not on file   Housing Stability: Not on file         ALLERGIES: Codeine, Compazine [prochlorperazine], Dilaudid [hydromorphone], Morphine, and Sulfa (sulfonamide antibiotics)    Review of Systems   Constitutional:  Positive for appetite change. Negative for chills and fever. HENT:  Negative for congestion and rhinorrhea. Eyes:  Negative for discharge and redness. Respiratory:  Positive for cough and shortness of breath. Cardiovascular:  Positive for chest pain. Gastrointestinal:  Positive for nausea. Negative for abdominal pain, diarrhea and vomiting. Genitourinary:  Negative for dysuria and hematuria. Musculoskeletal:  Negative for back pain and neck stiffness. Skin:  Negative for rash and wound. Neurological:  Negative for speech difficulty and headaches. Psychiatric/Behavioral:  Negative for agitation and confusion. Vitals:    11/01/22 1325   BP: 124/71   Pulse: 60   Resp: 17   Temp: 97.9 °F (36.6 °C)   SpO2: 98%            Physical Exam  Vitals and nursing note reviewed. Constitutional:       General: She is not in acute distress. Appearance: Normal appearance. She is not ill-appearing or toxic-appearing. HENT:      Head: Normocephalic. Eyes:      Extraocular Movements: Extraocular movements intact. Conjunctiva/sclera: Conjunctivae normal.   Cardiovascular:      Rate and Rhythm: Normal rate. Pulses: Normal pulses. Radial pulses are 2+ on the right side and 2+ on the left side. Pulmonary:      Effort: Pulmonary effort is normal. No respiratory distress. Breath sounds: Normal breath sounds. Abdominal:      General: Abdomen is flat. Palpations: Abdomen is soft. Tenderness: There is no abdominal tenderness. Musculoskeletal:         General: Normal range of motion. Skin:     General: Skin is warm and dry. Capillary Refill: Capillary refill takes less than 2 seconds. Neurological:      General: No focal deficit present. Mental Status: She is alert and oriented to person, place, and time. Psychiatric:         Mood and Affect: Mood normal.         Behavior: Behavior normal.        MDM  Number of Diagnoses or Management Options  Cough, unspecified type  Nausea and vomiting, unspecified vomiting type  Diagnosis management comments:     DECISION MAKING:  Marie Adam is a 80 y.o. female who comes in as above. Patient is afebrile. Her vital signs are stable.   On my examination she is well-appearing, no acute distress. EKG on arrival without any acute ischemic changes. No significant leukocytosis. Electrolytes are normal.  BUN and creatinine are elevated at 37 and 2.32, respectively. This is actually improved when compared to 10/28 at which time BUN was 46 and creatinine was 2.54. High-sensitivity troponin within normal limits and unchanged at repeat testing. Influenza negative. Chest x-ray without any acute infiltrate or other findings. On reevaluation, patient continues to be well-appearing, no acute distress. She will be given prescription for Tessalon Perles to help with her cough as this seems to be worsening her chest discomfort. Discussed results and follow-up needs with patient and daughter. They verbalized understanding and patient was discharged. Amount and/or Complexity of Data Reviewed  Clinical lab tests: reviewed  Tests in the radiology section of CPT®: reviewed           Procedures    ECG at 1328, interpreted by me: Sinus bradycardia, rate 56 bpm. Normal axis. Normal intervals. No ST elevaitons. LABS:  No results found for this or any previous visit (from the past 6 hour(s)). IMAGING:  XR CHEST PA LAT   Final Result      Acute findings               Medications During Visit:  Medications   benzonatate (TESSALON) capsule 100 mg (100 mg Oral Given 11/1/22 1955)         IMPRESSION:  1. Cough, unspecified type    2. Nausea and vomiting, unspecified vomiting type        DISPOSITION:  Discharged      Discharge Medication List as of 11/1/2022  7:33 PM           Follow-up Information       Follow up With Specialties Details Why Contact Info    Gladis Campo NP Nurse Practitioner Schedule an appointment as soon as possible for a visit   Kellen Vanegas [address] [place] [address] [id number] [phone]                The patient is asked to follow-up with their primary care provider in the next several days. They are to call tomorrow for an appointment.   The patient is asked to return promptly for any increased concerns or worsening of symptoms. They can return to this emergency department or any other emergency department.        Sabi Casey,

## 2022-11-01 NOTE — DISCHARGE INSTRUCTIONS
Continue your medications for your bronchitis. Take antinausea medication as prescribed. Return to the emergency department for any new or worsening problems.

## 2022-11-01 NOTE — ED NOTES
Pt was discharged at this time. pt verbalized understanding of all discharge instructions. Pt remains a&ox3. Resps are even and unlabored. Skin warm and dry. No distress noted.

## 2022-11-01 NOTE — ED TRIAGE NOTES
Pt was dx with bronchitis a week ago. Pt has had SOB and CP since dx but worse today. No cough per EMS.

## 2022-11-03 LAB
ATRIAL RATE: 56 BPM
CALCULATED P AXIS, ECG09: 50 DEGREES
CALCULATED R AXIS, ECG10: 12 DEGREES
CALCULATED T AXIS, ECG11: 36 DEGREES
DIAGNOSIS, 93000: NORMAL
P-R INTERVAL, ECG05: 172 MS
Q-T INTERVAL, ECG07: 484 MS
QRS DURATION, ECG06: 94 MS
QTC CALCULATION (BEZET), ECG08: 467 MS
VENTRICULAR RATE, ECG03: 56 BPM

## 2023-02-04 ENCOUNTER — APPOINTMENT (OUTPATIENT)
Dept: GENERAL RADIOLOGY | Age: 88
DRG: 638 | End: 2023-02-04
Attending: EMERGENCY MEDICINE
Payer: MEDICARE

## 2023-02-04 ENCOUNTER — APPOINTMENT (OUTPATIENT)
Dept: CT IMAGING | Age: 88
DRG: 638 | End: 2023-02-04
Attending: EMERGENCY MEDICINE
Payer: MEDICARE

## 2023-02-04 ENCOUNTER — HOSPITAL ENCOUNTER (INPATIENT)
Age: 88
LOS: 5 days | Discharge: HOME | DRG: 638 | End: 2023-02-09
Attending: EMERGENCY MEDICINE | Admitting: INTERNAL MEDICINE
Payer: MEDICARE

## 2023-02-04 DIAGNOSIS — N18.30 TYPE 2 DIABETES MELLITUS WITH STAGE 3 CHRONIC KIDNEY DISEASE, WITHOUT LONG-TERM CURRENT USE OF INSULIN, UNSPECIFIED WHETHER STAGE 3A OR 3B CKD (HCC): ICD-10-CM

## 2023-02-04 DIAGNOSIS — E11.00 HYPEROSMOLAR HYPERGLYCEMIC STATE (HHS) (HCC): ICD-10-CM

## 2023-02-04 DIAGNOSIS — R73.9 HYPERGLYCEMIA: ICD-10-CM

## 2023-02-04 DIAGNOSIS — E11.22 TYPE 2 DIABETES MELLITUS WITH STAGE 3 CHRONIC KIDNEY DISEASE, WITHOUT LONG-TERM CURRENT USE OF INSULIN, UNSPECIFIED WHETHER STAGE 3A OR 3B CKD (HCC): ICD-10-CM

## 2023-02-04 DIAGNOSIS — R56.9 SEIZURE (HCC): Primary | ICD-10-CM

## 2023-02-04 LAB
ADMINISTERED INITIALS, ADMINIT: NORMAL
ALBUMIN SERPL-MCNC: 3.9 G/DL (ref 3.5–5)
ALBUMIN/GLOB SERPL: 1 (ref 1.1–2.2)
ALP SERPL-CCNC: 109 U/L (ref 45–117)
ALT SERPL-CCNC: 18 U/L (ref 12–78)
ANION GAP SERPL CALC-SCNC: 15 MMOL/L (ref 5–15)
APPEARANCE UR: CLEAR
AST SERPL-CCNC: 16 U/L (ref 15–37)
BACTERIA URNS QL MICRO: NEGATIVE /HPF
BASOPHILS # BLD: 0.1 K/UL (ref 0–0.1)
BASOPHILS NFR BLD: 1 % (ref 0–1)
BILIRUB SERPL-MCNC: 0.9 MG/DL (ref 0.2–1)
BILIRUB UR QL: NEGATIVE
BUN SERPL-MCNC: 28 MG/DL (ref 6–20)
BUN/CREAT SERPL: 11 (ref 12–20)
CALCIUM SERPL-MCNC: 9.4 MG/DL (ref 8.5–10.1)
CHLORIDE SERPL-SCNC: 89 MMOL/L (ref 97–108)
CO2 SERPL-SCNC: 24 MMOL/L (ref 21–32)
COLOR UR: ABNORMAL
CREAT SERPL-MCNC: 2.6 MG/DL (ref 0.55–1.02)
D50 ADMINISTERED, D50ADM: 0 ML
D50 ORDER, D50ORD: 0 ML
DIFFERENTIAL METHOD BLD: ABNORMAL
EOSINOPHIL # BLD: 0.1 K/UL (ref 0–0.4)
EOSINOPHIL NFR BLD: 1 % (ref 0–7)
EPITH CASTS URNS QL MICRO: ABNORMAL /LPF
ERYTHROCYTE [DISTWIDTH] IN BLOOD BY AUTOMATED COUNT: 13.1 % (ref 11.5–14.5)
GLOBULIN SER CALC-MCNC: 3.8 G/DL (ref 2–4)
GLUCOSE BLD STRIP.AUTO-MCNC: >600 MG/DL (ref 65–117)
GLUCOSE SERPL-MCNC: 1199 MG/DL (ref 65–100)
GLUCOSE UR STRIP.AUTO-MCNC: >1000 MG/DL
GLUCOSE, GLC: 601 MG/DL
HCT VFR BLD AUTO: 40.1 % (ref 35–47)
HGB BLD-MCNC: 13 G/DL (ref 11.5–16)
HGB UR QL STRIP: ABNORMAL
HIGH TARGET, HITG: 200 MG/DL
HIGH TARGET, HITG: 300 MG/DL
IMM GRANULOCYTES # BLD AUTO: 0 K/UL (ref 0–0.04)
IMM GRANULOCYTES NFR BLD AUTO: 0 % (ref 0–0.5)
INR PPP: 1.1 (ref 0.9–1.1)
INSULIN ADMINSTERED, INSADM: 10.8 UNITS/HOUR
INSULIN ADMINSTERED, INSADM: 16.2 UNITS/HOUR
INSULIN ADMINSTERED, INSADM: 21.6 UNITS/HOUR
INSULIN ADMINSTERED, INSADM: 27.1 UNITS/HOUR
INSULIN ORDER, INSORD: 10.8 UNITS/HOUR
INSULIN ORDER, INSORD: 16.2 UNITS/HOUR
INSULIN ORDER, INSORD: 21.6 UNITS/HOUR
INSULIN ORDER, INSORD: 27.1 UNITS/HOUR
KETONES UR QL STRIP.AUTO: NEGATIVE MG/DL
LEUKOCYTE ESTERASE UR QL STRIP.AUTO: NEGATIVE
LOW TARGET, LOT: 150 MG/DL
LOW TARGET, LOT: 200 MG/DL
LYMPHOCYTES # BLD: 1 K/UL (ref 0.8–3.5)
LYMPHOCYTES NFR BLD: 15 % (ref 12–49)
MAGNESIUM SERPL-MCNC: 2.5 MG/DL (ref 1.6–2.4)
MCH RBC QN AUTO: 29.9 PG (ref 26–34)
MCHC RBC AUTO-ENTMCNC: 32.4 G/DL (ref 30–36.5)
MCV RBC AUTO: 92.2 FL (ref 80–99)
MINUTES UNTIL NEXT BG, NBG: 60 MIN
MONOCYTES # BLD: 0.3 K/UL (ref 0–1)
MONOCYTES NFR BLD: 5 % (ref 5–13)
MULTIPLIER, MUL: 0.02
MULTIPLIER, MUL: 0.03
MULTIPLIER, MUL: 0.04
MULTIPLIER, MUL: 0.05
NEUTS SEG # BLD: 5.4 K/UL (ref 1.8–8)
NEUTS SEG NFR BLD: 78 % (ref 32–75)
NITRITE UR QL STRIP.AUTO: NEGATIVE
NRBC # BLD: 0 K/UL (ref 0–0.01)
NRBC BLD-RTO: 0 PER 100 WBC
ORDER INITIALS, ORDINIT: NORMAL
PH UR STRIP: 6.5 (ref 5–8)
PHOSPHATE SERPL-MCNC: 3.3 MG/DL (ref 2.6–4.7)
PLATELET # BLD AUTO: 151 K/UL (ref 150–400)
PMV BLD AUTO: 12.2 FL (ref 8.9–12.9)
POTASSIUM SERPL-SCNC: 3.6 MMOL/L (ref 3.5–5.1)
PROT SERPL-MCNC: 7.7 G/DL (ref 6.4–8.2)
PROT UR STRIP-MCNC: 30 MG/DL
PROTHROMBIN TIME: 11.1 SEC (ref 9–11.1)
RBC # BLD AUTO: 4.35 M/UL (ref 3.8–5.2)
RBC #/AREA URNS HPF: ABNORMAL /HPF (ref 0–5)
SERVICE CMNT-IMP: ABNORMAL
SODIUM SERPL-SCNC: 128 MMOL/L (ref 136–145)
SP GR UR REFRACTOMETRY: 1.02 (ref 1–1.03)
TROPONIN I SERPL HS-MCNC: 16 NG/L (ref 0–51)
UR CULT HOLD, URHOLD: NORMAL
UROBILINOGEN UR QL STRIP.AUTO: 0.2 EU/DL (ref 0.2–1)
WBC # BLD AUTO: 6.9 K/UL (ref 3.6–11)
WBC URNS QL MICRO: ABNORMAL /HPF (ref 0–4)

## 2023-02-04 PROCEDURE — 93005 ELECTROCARDIOGRAM TRACING: CPT

## 2023-02-04 PROCEDURE — 74011250636 HC RX REV CODE- 250/636: Performed by: EMERGENCY MEDICINE

## 2023-02-04 PROCEDURE — 74011250636 HC RX REV CODE- 250/636

## 2023-02-04 PROCEDURE — 74011250636 HC RX REV CODE- 250/636: Performed by: INTERNAL MEDICINE

## 2023-02-04 PROCEDURE — 96375 TX/PRO/DX INJ NEW DRUG ADDON: CPT

## 2023-02-04 PROCEDURE — 99285 EMERGENCY DEPT VISIT HI MDM: CPT

## 2023-02-04 PROCEDURE — 96365 THER/PROPH/DIAG IV INF INIT: CPT

## 2023-02-04 PROCEDURE — 74011000258 HC RX REV CODE- 258: Performed by: EMERGENCY MEDICINE

## 2023-02-04 PROCEDURE — 36415 COLL VENOUS BLD VENIPUNCTURE: CPT

## 2023-02-04 PROCEDURE — 65270000029 HC RM PRIVATE

## 2023-02-04 PROCEDURE — 84484 ASSAY OF TROPONIN QUANT: CPT

## 2023-02-04 PROCEDURE — 81001 URINALYSIS AUTO W/SCOPE: CPT

## 2023-02-04 PROCEDURE — 82962 GLUCOSE BLOOD TEST: CPT

## 2023-02-04 PROCEDURE — 74011000250 HC RX REV CODE- 250: Performed by: EMERGENCY MEDICINE

## 2023-02-04 PROCEDURE — 85610 PROTHROMBIN TIME: CPT

## 2023-02-04 PROCEDURE — 85025 COMPLETE CBC W/AUTO DIFF WBC: CPT

## 2023-02-04 PROCEDURE — 71045 X-RAY EXAM CHEST 1 VIEW: CPT

## 2023-02-04 PROCEDURE — 80053 COMPREHEN METABOLIC PANEL: CPT

## 2023-02-04 PROCEDURE — 83735 ASSAY OF MAGNESIUM: CPT

## 2023-02-04 PROCEDURE — 84100 ASSAY OF PHOSPHORUS: CPT

## 2023-02-04 PROCEDURE — 70450 CT HEAD/BRAIN W/O DYE: CPT

## 2023-02-04 PROCEDURE — 74011636637 HC RX REV CODE- 636/637: Performed by: EMERGENCY MEDICINE

## 2023-02-04 PROCEDURE — 82947 ASSAY GLUCOSE BLOOD QUANT: CPT

## 2023-02-04 PROCEDURE — 83036 HEMOGLOBIN GLYCOSYLATED A1C: CPT

## 2023-02-04 RX ORDER — LEVETIRACETAM 500 MG/5ML
1000 INJECTION, SOLUTION, CONCENTRATE INTRAVENOUS ONCE
Status: COMPLETED | OUTPATIENT
Start: 2023-02-04 | End: 2023-02-04

## 2023-02-04 RX ORDER — POLYETHYLENE GLYCOL 3350 17 G/17G
17 POWDER, FOR SOLUTION ORAL DAILY PRN
Status: DISCONTINUED | OUTPATIENT
Start: 2023-02-04 | End: 2023-02-09 | Stop reason: HOSPADM

## 2023-02-04 RX ORDER — ACETAMINOPHEN 650 MG/1
650 SUPPOSITORY RECTAL
Status: DISCONTINUED | OUTPATIENT
Start: 2023-02-04 | End: 2023-02-09 | Stop reason: HOSPADM

## 2023-02-04 RX ORDER — LABETALOL HYDROCHLORIDE 5 MG/ML
20 INJECTION, SOLUTION INTRAVENOUS
Status: COMPLETED | OUTPATIENT
Start: 2023-02-04 | End: 2023-02-04

## 2023-02-04 RX ORDER — ONDANSETRON 4 MG/1
4 TABLET, ORALLY DISINTEGRATING ORAL
Status: DISCONTINUED | OUTPATIENT
Start: 2023-02-04 | End: 2023-02-09 | Stop reason: HOSPADM

## 2023-02-04 RX ORDER — DEXTROSE MONOHYDRATE 100 MG/ML
0-250 INJECTION, SOLUTION INTRAVENOUS AS NEEDED
Status: DISCONTINUED | OUTPATIENT
Start: 2023-02-04 | End: 2023-02-05

## 2023-02-04 RX ORDER — LEVETIRACETAM 500 MG/5ML
1000 INJECTION, SOLUTION, CONCENTRATE INTRAVENOUS ONCE
Status: DISCONTINUED | OUTPATIENT
Start: 2023-02-04 | End: 2023-02-04 | Stop reason: SDUPTHER

## 2023-02-04 RX ORDER — LORAZEPAM 2 MG/ML
INJECTION INTRAMUSCULAR
Status: COMPLETED
Start: 2023-02-04 | End: 2023-02-04

## 2023-02-04 RX ORDER — AMLODIPINE BESYLATE 2.5 MG/1
2.5 TABLET ORAL DAILY
Status: ON HOLD | COMMUNITY
End: 2023-02-09 | Stop reason: SDUPTHER

## 2023-02-04 RX ORDER — SODIUM CHLORIDE 0.9 % (FLUSH) 0.9 %
5-40 SYRINGE (ML) INJECTION EVERY 8 HOURS
Status: DISCONTINUED | OUTPATIENT
Start: 2023-02-04 | End: 2023-02-09 | Stop reason: HOSPADM

## 2023-02-04 RX ORDER — IBUPROFEN 200 MG
4 TABLET ORAL AS NEEDED
Status: DISCONTINUED | OUTPATIENT
Start: 2023-02-04 | End: 2023-02-05

## 2023-02-04 RX ORDER — INSULIN LISPRO 100 [IU]/ML
INJECTION, SOLUTION INTRAVENOUS; SUBCUTANEOUS
Status: DISCONTINUED | OUTPATIENT
Start: 2023-02-05 | End: 2023-02-05

## 2023-02-04 RX ORDER — ACETAMINOPHEN 325 MG/1
650 TABLET ORAL
Status: DISCONTINUED | OUTPATIENT
Start: 2023-02-04 | End: 2023-02-09 | Stop reason: HOSPADM

## 2023-02-04 RX ORDER — ONDANSETRON 2 MG/ML
4 INJECTION INTRAMUSCULAR; INTRAVENOUS
Status: DISCONTINUED | OUTPATIENT
Start: 2023-02-04 | End: 2023-02-09 | Stop reason: HOSPADM

## 2023-02-04 RX ORDER — HEPARIN SODIUM 5000 [USP'U]/ML
5000 INJECTION, SOLUTION INTRAVENOUS; SUBCUTANEOUS EVERY 8 HOURS
Status: DISCONTINUED | OUTPATIENT
Start: 2023-02-04 | End: 2023-02-09 | Stop reason: HOSPADM

## 2023-02-04 RX ORDER — SODIUM CHLORIDE 0.9 % (FLUSH) 0.9 %
5-40 SYRINGE (ML) INJECTION AS NEEDED
Status: DISCONTINUED | OUTPATIENT
Start: 2023-02-04 | End: 2023-02-09 | Stop reason: HOSPADM

## 2023-02-04 RX ORDER — GLIMEPIRIDE 1 MG/1
1 TABLET ORAL
COMMUNITY
End: 2023-02-09

## 2023-02-04 RX ADMIN — LABETALOL HYDROCHLORIDE 20 MG: 5 INJECTION INTRAVENOUS at 20:14

## 2023-02-04 RX ADMIN — Medication 27.1 UNITS/HR: at 23:48

## 2023-02-04 RX ADMIN — Medication 10.8 UNITS/HR: at 20:43

## 2023-02-04 RX ADMIN — HEPARIN SODIUM 5000 UNITS: 5000 INJECTION INTRAVENOUS; SUBCUTANEOUS at 23:07

## 2023-02-04 RX ADMIN — LORAZEPAM 1 MG: 2 INJECTION INTRAMUSCULAR at 21:13

## 2023-02-04 RX ADMIN — SODIUM CHLORIDE 1000 ML: 9 INJECTION, SOLUTION INTRAVENOUS at 21:55

## 2023-02-04 RX ADMIN — LEVETIRACETAM 1000 MG: 100 INJECTION, SOLUTION, CONCENTRATE INTRAVENOUS at 21:55

## 2023-02-04 RX ADMIN — Medication 21.6 UNITS/HR: at 22:46

## 2023-02-04 RX ADMIN — SODIUM CHLORIDE 1000 ML: 9 INJECTION, SOLUTION INTRAVENOUS at 23:07

## 2023-02-05 ENCOUNTER — APPOINTMENT (OUTPATIENT)
Dept: MRI IMAGING | Age: 88
DRG: 638 | End: 2023-02-05
Attending: PSYCHIATRY & NEUROLOGY
Payer: MEDICARE

## 2023-02-05 PROBLEM — E87.6 HYPOKALEMIA: Status: ACTIVE | Noted: 2023-02-05

## 2023-02-05 PROBLEM — I50.32 CHRONIC HEART FAILURE WITH PRESERVED EJECTION FRACTION (HFPEF) (HCC): Status: ACTIVE | Noted: 2023-02-05

## 2023-02-05 LAB
ADMINISTERED INITIALS, ADMINIT: NORMAL
ANION GAP SERPL CALC-SCNC: 10 MMOL/L (ref 5–15)
ANION GAP SERPL CALC-SCNC: 5 MMOL/L (ref 5–15)
ANION GAP SERPL CALC-SCNC: 7 MMOL/L (ref 5–15)
BUN SERPL-MCNC: 22 MG/DL (ref 6–20)
BUN SERPL-MCNC: 25 MG/DL (ref 6–20)
BUN SERPL-MCNC: 26 MG/DL (ref 6–20)
BUN/CREAT SERPL: 12 (ref 12–20)
BUN/CREAT SERPL: 13 (ref 12–20)
BUN/CREAT SERPL: 14 (ref 12–20)
CALCIUM SERPL-MCNC: 8.2 MG/DL (ref 8.5–10.1)
CALCIUM SERPL-MCNC: 8.4 MG/DL (ref 8.5–10.1)
CALCIUM SERPL-MCNC: 8.6 MG/DL (ref 8.5–10.1)
CHLORIDE SERPL-SCNC: 108 MMOL/L (ref 97–108)
CHLORIDE SERPL-SCNC: 112 MMOL/L (ref 97–108)
CHLORIDE SERPL-SCNC: 113 MMOL/L (ref 97–108)
CO2 SERPL-SCNC: 25 MMOL/L (ref 21–32)
CO2 SERPL-SCNC: 25 MMOL/L (ref 21–32)
CO2 SERPL-SCNC: 27 MMOL/L (ref 21–32)
CREAT SERPL-MCNC: 1.8 MG/DL (ref 0.55–1.02)
CREAT SERPL-MCNC: 1.82 MG/DL (ref 0.55–1.02)
CREAT SERPL-MCNC: 1.98 MG/DL (ref 0.55–1.02)
D50 ADMINISTERED, D50ADM: 0 ML
D50 ORDER, D50ORD: 0 ML
EST. AVERAGE GLUCOSE BLD GHB EST-MCNC: 303 MG/DL
GLUCOSE BLD STRIP.AUTO-MCNC: 102 MG/DL (ref 65–117)
GLUCOSE BLD STRIP.AUTO-MCNC: 119 MG/DL (ref 65–117)
GLUCOSE BLD STRIP.AUTO-MCNC: 123 MG/DL (ref 65–117)
GLUCOSE BLD STRIP.AUTO-MCNC: 144 MG/DL (ref 65–117)
GLUCOSE BLD STRIP.AUTO-MCNC: 179 MG/DL (ref 65–117)
GLUCOSE BLD STRIP.AUTO-MCNC: 201 MG/DL (ref 65–117)
GLUCOSE BLD STRIP.AUTO-MCNC: 215 MG/DL (ref 65–117)
GLUCOSE BLD STRIP.AUTO-MCNC: 220 MG/DL (ref 65–117)
GLUCOSE BLD STRIP.AUTO-MCNC: 232 MG/DL (ref 65–117)
GLUCOSE BLD STRIP.AUTO-MCNC: 236 MG/DL (ref 65–117)
GLUCOSE BLD STRIP.AUTO-MCNC: 261 MG/DL (ref 65–117)
GLUCOSE BLD STRIP.AUTO-MCNC: 320 MG/DL (ref 65–117)
GLUCOSE BLD STRIP.AUTO-MCNC: 345 MG/DL (ref 65–117)
GLUCOSE BLD STRIP.AUTO-MCNC: 462 MG/DL (ref 65–117)
GLUCOSE BLD STRIP.AUTO-MCNC: 96 MG/DL (ref 65–117)
GLUCOSE SERPL-MCNC: 107 MG/DL (ref 65–100)
GLUCOSE SERPL-MCNC: 222 MG/DL (ref 65–100)
GLUCOSE SERPL-MCNC: 337 MG/DL (ref 65–100)
GLUCOSE SERPL-MCNC: 735 MG/DL (ref 65–100)
GLUCOSE, GLC: 102 MG/DL
GLUCOSE, GLC: 119 MG/DL
GLUCOSE, GLC: 123 MG/DL
GLUCOSE, GLC: 144 MG/DL
GLUCOSE, GLC: 179 MG/DL
GLUCOSE, GLC: 201 MG/DL
GLUCOSE, GLC: 220 MG/DL
GLUCOSE, GLC: 232 MG/DL
GLUCOSE, GLC: 236 MG/DL
GLUCOSE, GLC: 261 MG/DL
GLUCOSE, GLC: 345 MG/DL
GLUCOSE, GLC: 462 MG/DL
GLUCOSE, GLC: 96 MG/DL
HBA1C MFR BLD: 12.2 % (ref 4–5.6)
HIGH TARGET, HITG: 300 MG/DL
INSULIN ADMINSTERED, INSADM: 0 UNITS/HOUR
INSULIN ADMINSTERED, INSADM: 0 UNITS/HOUR
INSULIN ADMINSTERED, INSADM: 0.1 UNITS/HOUR
INSULIN ADMINSTERED, INSADM: 0.6 UNITS/HOUR
INSULIN ADMINSTERED, INSADM: 0.8 UNITS/HOUR
INSULIN ADMINSTERED, INSADM: 1.1 UNITS/HOUR
INSULIN ADMINSTERED, INSADM: 14.3 UNITS/HOUR
INSULIN ADMINSTERED, INSADM: 2.5 UNITS/HOUR
INSULIN ADMINSTERED, INSADM: 20.1 UNITS/HOUR
INSULIN ADMINSTERED, INSADM: 8.8 UNITS/HOUR
INSULIN ORDER, INSORD: 0 UNITS/HOUR
INSULIN ORDER, INSORD: 0 UNITS/HOUR
INSULIN ORDER, INSORD: 0.1 UNITS/HOUR
INSULIN ORDER, INSORD: 0.6 UNITS/HOUR
INSULIN ORDER, INSORD: 0.8 UNITS/HOUR
INSULIN ORDER, INSORD: 1.1 UNITS/HOUR
INSULIN ORDER, INSORD: 14.3 UNITS/HOUR
INSULIN ORDER, INSORD: 2.5 UNITS/HOUR
INSULIN ORDER, INSORD: 20.1 UNITS/HOUR
INSULIN ORDER, INSORD: 8.8 UNITS/HOUR
LOW TARGET, LOT: 200 MG/DL
MAGNESIUM SERPL-MCNC: 2 MG/DL (ref 1.6–2.4)
MAGNESIUM SERPL-MCNC: 2 MG/DL (ref 1.6–2.4)
MAGNESIUM SERPL-MCNC: 2.1 MG/DL (ref 1.6–2.4)
MINUTES UNTIL NEXT BG, NBG: 60 MIN
MULTIPLIER, MUL: 0
MULTIPLIER, MUL: 0.01
MULTIPLIER, MUL: 0.02
MULTIPLIER, MUL: 0.03
MULTIPLIER, MUL: 0.04
MULTIPLIER, MUL: 0.05
ORDER INITIALS, ORDINIT: NORMAL
POTASSIUM SERPL-SCNC: 2.4 MMOL/L (ref 3.5–5.1)
POTASSIUM SERPL-SCNC: 2.7 MMOL/L (ref 3.5–5.1)
POTASSIUM SERPL-SCNC: 3.9 MMOL/L (ref 3.5–5.1)
SERVICE CMNT-IMP: ABNORMAL
SERVICE CMNT-IMP: NORMAL
SERVICE CMNT-IMP: NORMAL
SODIUM SERPL-SCNC: 143 MMOL/L (ref 136–145)
SODIUM SERPL-SCNC: 144 MMOL/L (ref 136–145)
SODIUM SERPL-SCNC: 145 MMOL/L (ref 136–145)

## 2023-02-05 PROCEDURE — 80048 BASIC METABOLIC PNL TOTAL CA: CPT

## 2023-02-05 PROCEDURE — 36415 COLL VENOUS BLD VENIPUNCTURE: CPT

## 2023-02-05 PROCEDURE — 83735 ASSAY OF MAGNESIUM: CPT

## 2023-02-05 PROCEDURE — 74011636637 HC RX REV CODE- 636/637: Performed by: EMERGENCY MEDICINE

## 2023-02-05 PROCEDURE — 74011636637 HC RX REV CODE- 636/637: Performed by: INTERNAL MEDICINE

## 2023-02-05 PROCEDURE — 65270000046 HC RM TELEMETRY

## 2023-02-05 PROCEDURE — 74011000250 HC RX REV CODE- 250: Performed by: INTERNAL MEDICINE

## 2023-02-05 PROCEDURE — 74011250636 HC RX REV CODE- 250/636: Performed by: INTERNAL MEDICINE

## 2023-02-05 PROCEDURE — 99223 1ST HOSP IP/OBS HIGH 75: CPT | Performed by: PSYCHIATRY & NEUROLOGY

## 2023-02-05 PROCEDURE — 82962 GLUCOSE BLOOD TEST: CPT

## 2023-02-05 PROCEDURE — 95706 EEG WO VID 2-12HR INTMT MNTR: CPT | Performed by: PSYCHIATRY & NEUROLOGY

## 2023-02-05 PROCEDURE — 74011000258 HC RX REV CODE- 258: Performed by: EMERGENCY MEDICINE

## 2023-02-05 PROCEDURE — 70551 MRI BRAIN STEM W/O DYE: CPT

## 2023-02-05 PROCEDURE — C9113 INJ PANTOPRAZOLE SODIUM, VIA: HCPCS | Performed by: INTERNAL MEDICINE

## 2023-02-05 RX ORDER — IBUPROFEN 200 MG
4 TABLET ORAL AS NEEDED
Status: DISCONTINUED | OUTPATIENT
Start: 2023-02-05 | End: 2023-02-09 | Stop reason: HOSPADM

## 2023-02-05 RX ORDER — POTASSIUM CHLORIDE 750 MG/1
40 TABLET, FILM COATED, EXTENDED RELEASE ORAL
Status: DISCONTINUED | OUTPATIENT
Start: 2023-02-05 | End: 2023-02-05

## 2023-02-05 RX ORDER — DEXTROSE MONOHYDRATE 50 MG/ML
125 INJECTION, SOLUTION INTRAVENOUS CONTINUOUS
Status: DISCONTINUED | OUTPATIENT
Start: 2023-02-05 | End: 2023-02-05

## 2023-02-05 RX ORDER — POTASSIUM CHLORIDE 7.45 MG/ML
10 INJECTION INTRAVENOUS ONCE
Status: DISCONTINUED | OUTPATIENT
Start: 2023-02-05 | End: 2023-02-05

## 2023-02-05 RX ORDER — INSULIN GLARGINE 100 [IU]/ML
0.2 INJECTION, SOLUTION SUBCUTANEOUS DAILY
Status: DISCONTINUED | OUTPATIENT
Start: 2023-02-05 | End: 2023-02-06

## 2023-02-05 RX ORDER — INSULIN GLARGINE 100 [IU]/ML
5 INJECTION, SOLUTION SUBCUTANEOUS
Status: DISCONTINUED | OUTPATIENT
Start: 2023-02-05 | End: 2023-02-05

## 2023-02-05 RX ORDER — POTASSIUM CHLORIDE, DEXTROSE MONOHYDRATE AND SODIUM CHLORIDE 300; 5; 900 MG/100ML; G/100ML; MG/100ML
INJECTION, SOLUTION INTRAVENOUS CONTINUOUS
Status: DISCONTINUED | OUTPATIENT
Start: 2023-02-05 | End: 2023-02-06

## 2023-02-05 RX ORDER — POTASSIUM CHLORIDE 7.45 MG/ML
10 INJECTION INTRAVENOUS
Status: COMPLETED | OUTPATIENT
Start: 2023-02-05 | End: 2023-02-05

## 2023-02-05 RX ORDER — INSULIN LISPRO 100 [IU]/ML
INJECTION, SOLUTION INTRAVENOUS; SUBCUTANEOUS
Status: DISCONTINUED | OUTPATIENT
Start: 2023-02-05 | End: 2023-02-05

## 2023-02-05 RX ORDER — LORAZEPAM 2 MG/ML
2 INJECTION INTRAMUSCULAR
Status: DISCONTINUED | OUTPATIENT
Start: 2023-02-05 | End: 2023-02-07

## 2023-02-05 RX ORDER — SODIUM CHLORIDE AND POTASSIUM CHLORIDE 300; 900 MG/100ML; MG/100ML
INJECTION, SOLUTION INTRAVENOUS CONTINUOUS
Status: DISCONTINUED | OUTPATIENT
Start: 2023-02-05 | End: 2023-02-05

## 2023-02-05 RX ORDER — INSULIN LISPRO 100 [IU]/ML
INJECTION, SOLUTION INTRAVENOUS; SUBCUTANEOUS EVERY 6 HOURS
Status: DISCONTINUED | OUTPATIENT
Start: 2023-02-05 | End: 2023-02-05

## 2023-02-05 RX ORDER — INSULIN LISPRO 100 [IU]/ML
INJECTION, SOLUTION INTRAVENOUS; SUBCUTANEOUS
Status: DISCONTINUED | OUTPATIENT
Start: 2023-02-05 | End: 2023-02-09 | Stop reason: HOSPADM

## 2023-02-05 RX ORDER — LEVETIRACETAM 500 MG/5ML
500 INJECTION, SOLUTION, CONCENTRATE INTRAVENOUS EVERY 12 HOURS
Status: DISCONTINUED | OUTPATIENT
Start: 2023-02-05 | End: 2023-02-07

## 2023-02-05 RX ORDER — DEXTROSE MONOHYDRATE 100 MG/ML
0-250 INJECTION, SOLUTION INTRAVENOUS AS NEEDED
Status: DISCONTINUED | OUTPATIENT
Start: 2023-02-05 | End: 2023-02-09 | Stop reason: HOSPADM

## 2023-02-05 RX ORDER — SODIUM CHLORIDE 9 MG/ML
150 INJECTION, SOLUTION INTRAVENOUS CONTINUOUS
Status: DISCONTINUED | OUTPATIENT
Start: 2023-02-05 | End: 2023-02-05

## 2023-02-05 RX ADMIN — SODIUM CHLORIDE 40 MG: 9 INJECTION, SOLUTION INTRAMUSCULAR; INTRAVENOUS; SUBCUTANEOUS at 08:04

## 2023-02-05 RX ADMIN — POTASSIUM CHLORIDE 10 MEQ: 7.46 INJECTION, SOLUTION INTRAVENOUS at 11:02

## 2023-02-05 RX ADMIN — INSULIN LISPRO 10 UNITS: 100 INJECTION, SOLUTION INTRAVENOUS; SUBCUTANEOUS at 16:58

## 2023-02-05 RX ADMIN — HEPARIN SODIUM 5000 UNITS: 5000 INJECTION INTRAVENOUS; SUBCUTANEOUS at 06:57

## 2023-02-05 RX ADMIN — POTASSIUM CHLORIDE 10 MEQ: 7.46 INJECTION, SOLUTION INTRAVENOUS at 06:57

## 2023-02-05 RX ADMIN — POTASSIUM CHLORIDE 10 MEQ: 7.46 INJECTION, SOLUTION INTRAVENOUS at 10:07

## 2023-02-05 RX ADMIN — SODIUM CHLORIDE, PRESERVATIVE FREE 10 ML: 5 INJECTION INTRAVENOUS at 14:08

## 2023-02-05 RX ADMIN — HEPARIN SODIUM 5000 UNITS: 5000 INJECTION INTRAVENOUS; SUBCUTANEOUS at 21:06

## 2023-02-05 RX ADMIN — POTASSIUM CHLORIDE 10 MEQ: 7.46 INJECTION, SOLUTION INTRAVENOUS at 05:51

## 2023-02-05 RX ADMIN — POTASSIUM CHLORIDE 10 MEQ: 7.46 INJECTION, SOLUTION INTRAVENOUS at 07:59

## 2023-02-05 RX ADMIN — INSULIN LISPRO 4 UNITS: 100 INJECTION, SOLUTION INTRAVENOUS; SUBCUTANEOUS at 21:05

## 2023-02-05 RX ADMIN — LEVETIRACETAM 500 MG: 100 INJECTION, SOLUTION INTRAVENOUS at 08:04

## 2023-02-05 RX ADMIN — DEXTROSE MONOHYDRATE, SODIUM CHLORIDE, AND POTASSIUM CHLORIDE: 50; 9; 2.98 INJECTION, SOLUTION INTRAVENOUS at 04:40

## 2023-02-05 RX ADMIN — SODIUM CHLORIDE, PRESERVATIVE FREE 10 ML: 5 INJECTION INTRAVENOUS at 21:06

## 2023-02-05 RX ADMIN — SODIUM CHLORIDE, PRESERVATIVE FREE 10 ML: 5 INJECTION INTRAVENOUS at 06:57

## 2023-02-05 RX ADMIN — Medication 0.1 UNITS/HR: at 11:00

## 2023-02-05 RX ADMIN — SODIUM CHLORIDE, PRESERVATIVE FREE 10 ML: 5 INJECTION INTRAVENOUS at 00:57

## 2023-02-05 RX ADMIN — POTASSIUM CHLORIDE 10 MEQ: 7.46 INJECTION, SOLUTION INTRAVENOUS at 04:40

## 2023-02-05 RX ADMIN — HEPARIN SODIUM 5000 UNITS: 5000 INJECTION INTRAVENOUS; SUBCUTANEOUS at 15:15

## 2023-02-05 RX ADMIN — INSULIN GLARGINE 13 UNITS: 100 INJECTION, SOLUTION SUBCUTANEOUS at 12:00

## 2023-02-05 RX ADMIN — LEVETIRACETAM 500 MG: 100 INJECTION, SOLUTION INTRAVENOUS at 20:35

## 2023-02-05 RX ADMIN — SODIUM CHLORIDE 150 ML/HR: 9 INJECTION, SOLUTION INTRAVENOUS at 00:53

## 2023-02-05 NOTE — CONSULTS
[facility]rology [facility] and [address] at [facility] Neurology Clinics at [address] [place] [address], [address] [place] [address] [address]  [phone] Office  [phone] Facsimile           Referring: Dr. Wu Castellano    Chief Complaint   Patient presents with    Seizure     29-year-old lady we are asked to see in neurologic consultation regarding an acute change in her neurologic status manifest as seizure-like activity. The patient presented to the emergency department noting past medical history of hypertension, coronary artery disease with congestive heart failure and ischemic cardiomyopathy, chronic kidney disease, peripheral vascular disease and diabetes noting difficulty moving her right hand. Granddaughter noted the patient had seizure-like activity with head and eyes deviated to the right and unresponsive x3 minutes. EMS noted her right arm was flaccid and her blood sugar was so high it would not read with the glucometer and her pressure was greater than 200/100. In the emergency department her neurologic examination was said to be unremarkable. EKG unremarkable  CT of the head unremarkable and film personally reviewed  Metabolic panel with glucose 1200 and sodium 128 as well as BUN and creatinine 28 and 2.6 respectively  CBC unremarkable  Troponin unremarkable  Urinalysis with proteinuria, glucosuria, hematuria  Magnesium 2.5  Phosphorus 3.3  Hemoglobin A1c is pending  Coags unremarkable  Most recent metabolic panel with sodium 144 potassium 2.7 and glucose 107 and BUN and creatinine 26 and 1.82 respectively. Calcium 8.4    She was admitted to the ICU with hyperosmolar hyperglycemic state and was started on insulin drip. She was given 2 L of saline as a bolus followed by ongoing fluids. She was started on Keppra for seizure. The patient apparently became a bit sleepy after the Keppra load.   Per Dr. Jose Hiu admission note the granddaughter reported seizure-like activity described as right arm rigid and then tremulous and then rigid again. Patient was then speaking to the granddaughter stating she could not move her right arm and then she had an episode of 30 seconds of generalized shaking. In the emergency department Dr. Lara Suero and her nurse witnessed what was thought to be a partial seizure where her right arm became very rigid. History today is obtained from her granddaughter witnessed the events. The patient lives with her daughter and her daughter is out of town currently in route back home following the events of last evening. In any regard the granddaughter was doing some crafts with the patient last evening when the patient started to have difficulty opening a Ziploc bag. She then called out because her right hand was shaking. A few moments later the right upper extremity extended and was stiff and she was unable to move it. The granddaughter was unable to move it. A while later after that dissipated the same exact process of events occurred with right arm stiffening and shaking and then the patient turned her head to the right eyes were deviated right and upward and her mouth was open. She was not responsive. This was brief and resolved while the granddaughter was on the phone with EMS. No lingual trauma. Another witnessed event was in the emergency department. Since she was given Ativan and Keppra she has been rather sleepy. She did awaken earlier this morning and stated that she needed to get out of here. No history of seizure. No history of any cognitive disorder/dementia. Prior to the events stated above the patient was acting her normal self. Past Medical History:   Diagnosis Date    CAD (coronary artery disease)     CHF (congestive heart failure), NYHA class I, chronic, systolic (HCC)     Systolic and diastolic CHF per echocardiogram of 12/25/2021.   Patient was found to have LVEF 45% Chronic kidney disease     stage III/IV:Creatinine 1.45-1.7 with GFR in the low 30s-high 20s    Diabetes mellitus type 2 in nonobese [facility])     Hypertension     Ischemic cardiomyopathy     Echocardiogram of 12/25/2021: mild left ventricular systolic dysfunction (EF 91%) with inferior and basal-mid septal akinesis. Diastolic dysfunction. Peripheral vascular disease [facility])        Past Surgical History:   Procedure Laterality Date    COLONOSCOPY N/A 02/04/2022    [facility]    HX APPENDECTOMY      HX CORONARY STENT PLACEMENT  12/24/2021    stents x 3 to mid-distal right coronary artery going into PDA for 100% occlusion of RCA; and also had nonobstructive disease in 30% LAD stenosis, 70% diagonal 1 stenosis.   She had proximal 30% stenosis in the codominant left circumflex artery     HX HYSTERECTOMY      HX KNEE REPLACEMENT Left     HX OTHER SURGICAL      head sx x 2 nerve related    HX OTHER SURGICAL      Back surgery x 2    HX TONSILLECTOMY         Current Facility-Administered Medications   Medication Dose Route Frequency Provider Last Rate Last Admin    dextrose 5% - 0.9% NaCl with KCl 40 mEq/L infusion   IntraVENous CONTINUOUS Keron Evener H,  mL/hr at 02/05/23 0440 New Bag at 02/05/23 0440    potassium chloride 10 mEq in 100 ml IVPB  10 mEq IntraVENous Q1H Keron Evener H,  mL/hr at 02/05/23 1007 10 mEq at 02/05/23 1007    levETIRAcetam (KEPPRA) injection 500 mg  500 mg IntraVENous Q12H Darrin Franks MD   500 mg at 02/05/23 0804    pantoprazole (PROTONIX) 40 mg in 0.9% sodium chloride 10 mL injection  40 mg IntraVENous DAILY Darrin Franks MD   40 mg at 02/05/23 0804    LORazepam (ATIVAN) injection 2 mg  2 mg IntraVENous Q4H PRN Roly Perez MD        insulin lispro (HUMALOG) injection   SubCUTAneous Vicki Velasco MD        glucose chewable tablet 16 g  4 Tablet Oral PRN Marylene Fuss, MD        glucagon (GLUCAGEN) injection 1 mg  1 mg IntraMUSCular PRN Trish Olson MD        dextrose 10% infusion 0-250 mL  0-250 mL IntraVENous PRN Trish Olson MD        insulin regular (Latasha Hu R, HUMULIN R) 100 Units in 0.9% sodium chloride 100 mL infusion  0-50 Units/hr IntraVENous TITRATE Trish Olson MD 0.1 mL/hr at 02/05/23 1003 0.1 Units/hr at 02/05/23 1003    sodium chloride (NS) flush 5-40 mL  5-40 mL IntraVENous Q8H Racquel Silva H, DO   10 mL at 02/05/23 0657    sodium chloride (NS) flush 5-40 mL  5-40 mL IntraVENous PRN Racquel Silva H, DO        acetaminophen (TYLENOL) tablet 650 mg  650 mg Oral Q6H PRN Racquel Silva H, DO        Or    acetaminophen (TYLENOL) suppository 650 mg  650 mg Rectal Q6H PRN Racquel Silva H, DO        polyethylene glycol (MIRALAX) packet 17 g  17 g Oral DAILY PRN Racqule Silva H, DO        ondansetron (ZOFRAN ODT) tablet 4 mg  4 mg Oral Q8H PRN Racquel Silva H, DO        Or    ondans[facility]ron [facility] injection 4 mg  4 mg IntraVENous Q6H PRN Racquel Silva H, DO        heparin (porcine) injection 5,000 Units  5,000 Units SubCUTAneous Q8H Racquel Silva H, DO   5,000 Units at 02/05/23 4362        Allergies   Allergen Reactions    Codeine Nausea and Vomiting    Compazine [Prochlorperazine] Other (comments)     Facial droop    Dilaudid [Hydromorphone] Nausea and Vomiting    Morphine Nausea and Vomiting    Sulfa (Sulfonamide Antibiotics) Nausea and Vomiting       Social History     Tobacco Use    Smoking status: Former     Years: 15.00     Types: Cigarettes    Smokeless tobacco: Never   Substance Use Topics    Alcohol use: Yes     Comment: very rarely     Drug use: Never       Family History   Problem Relation Age of Onset    Heart Disease Mother        Review of Systems  Pertinent positives and negatives as noted.       Examination  Visit Vitals  BP (!) 125/59   Pulse 72   Temp 97.4 °F (36.3 °C)   Resp 22   Ht 5' 2\" (1.575 m)   Wt 64.9 kg (143 lb)   SpO2 99%   BMI 26.16 kg/m²   She is comfortable appearing laying in bed asleep when I entered the room. She arouses to tactile and vocal stimulation. She attends well. She tells me were in [facility] and its February somewhere around the fifth 2023 and Shonda Benitez is president. She follows all commands for examination. Her cranial nerves are intact. There is no sign of lingual trauma on her tongue. She has no pronation or drift. She is a bit tremulous with a postural tremor. She has full strength in the upper and lower extremities normal prescription today testing. Reflexes globally depressed but symmetrical.  No pathologic reflex. No ataxia. Impression/Plan  26-year-old lady with 3 events as described indicative of focal onset seizure in the setting of hyperglycemia and certainly hyperglycemia to this degree can cause strokelike symptoms, ictus etc. however the focal onset is worrisome for structural abnormality  Examination is reassuring  CT is reassuring  MRI of the brain  Rapid EEG today and follow with a routine EEG tomorrow  We will follow-up      Mary Anne Portillo MD          This note was created using voice recognition software. Despite editing, there may be syntax errors.

## 2023-02-05 NOTE — PROGRESS NOTES
Jan Noeelsen [facility]  [address] [facility], [address] [place] [address]  [phone]      Hospitalist  Progress Note      NAME:         Nilsa Amador   :        1934  MRM:        [id number]    Date of service: 2023      Subjective: Patient admitted with [facility] and seizure. No prior hx of seizures. Now somnolent and not able to give much hx. I have discussed with her grand-daughter and nurse for collaborative Hx.       Objective:    Vital Signs:    Visit Vitals  BP (!) 107/38   Pulse (!) 58   Temp 97.5 °F (36.4 °C)   Resp 19   Ht 5' 2\" (1.575 m)   Wt 64.9 kg (143 lb)   SpO2 99%   BMI 26.16 kg/m²        Intake/Output Summary (Last 24 hours) at 2023 5975  Last data filed at 2023 0400  Gross per 24 hour   Intake 2590.26 ml   Output 900 ml   Net 1690.26 ml        Current inpatient medications reviewed:  Current Facility-Administered Medications   Medication Dose Route Frequency    dextrose 5% - 0.9% NaCl with KCl 40 mEq/L infusion   IntraVENous CONTINUOUS    potassium chloride 10 mEq in 100 ml IVPB  10 mEq IntraVENous Q1H    levETIRAcetam (KEPPRA) injection 500 mg  500 mg IntraVENous Q12H    insulin lispro (HUMALOG) injection   SubCUTAneous TIDAC    glucose chewable tablet 16 g  4 Tablet Oral PRN    glucagon (GLUCAGEN) injection 1 mg  1 mg IntraMUSCular PRN    dextrose 10% infusion 0-250 mL  0-250 mL IntraVENous PRN    insulin regular (NOVOLIN R, HUMULIN R) 100 Units in 0.9% sodium chloride 100 mL infusion  0-50 Units/hr IntraVENous TITRATE    sodium chloride (NS) flush 5-40 mL  5-40 mL IntraVENous Q8H    sodium chloride (NS) flush 5-40 mL  5-40 mL IntraVENous PRN    acetaminophen (TYLENOL) tablet 650 mg  650 mg Oral Q6H PRN    Or    acetaminophen (TYLENOL) suppository 650 mg  650 mg Rectal Q6H PRN    polyethylene glycol (MIRALAX) packet 17 g  17 g Oral DAILY PRN    ondansetron (ZOFRAN ODT) tablet 4 mg  4 mg Oral Q8H PRN    Or ondansetron (ZOFRAN) injection 4 mg  4 mg IntraVENous Q6H PRN    heparin (porcine) injection 5,000 Units  5,000 Units SubCUTAneous Q8H       Physical Examination:    General:   Weak and critically ill but stable. Eyes:   pink conjunctivae, PERRLA with no discharge. ENT:   no ottorrhea or rhinorrhea with dry mucous membranes  Neck: no masses, thyroid non-tender and trachea central.  Pulm:  no accessory muscle use, decreased but clear breath sounds without crackles or wheezes  Card:  no JVD or murmurs, has bradycardia, without thrills, bruits or peripheral edema  Abd:  Soft, non-distended, normoactive bowel sounds   Musc:  No cyanosis, clubbing, atrophy or deformities. Skin:  No rashes, bruising or ulcers. Neuro: Sleepy but rouzable, non focal exam     Diagnostic testing:    Laboratory data reviewed and independently interpreted:    Recent Labs     02/04/23 2010   WBC 6.9   HGB 13.0   HCT 40.1        Recent Labs     02/05/23  0606 02/05/23  0145 02/04/23  2330 02/04/23 2013 02/04/23 2010    143  --   --  128*   K 2.7* 2.4*  --   --  3.6   * 108  --   --  89*   CO2 27 25  --   --  24   * 337* 735*  --  1,199*   BUN 26* 25*  --   --  28*   CREA 1.82* 1.98*  --   --  2.60*   CA 8.4* 8.6  --   --  9.4   MG 2.0 2.1  --   --  2.5*   PHOS  --   --   --   --  3.3   ALB  --   --   --   --  3.9   ALT  --   --   --   --  18   INR  --   --   --  1.1  --      No components found for: [place]    Imaging studies reviewed and reports noted: CXR, CT scan head    12 lead EKG and telemetry reviewed and independently interpreted by me:   normal sinus rhythm    Notes reviewed: Consults, CM, PT, OT etc    Assessment and Plan:    Hyperosmolar hyperglycemic state (HHS) / DM type 2 causing renal and vascular disease POA: admitted with HHNK. Blood glucose much better. A1c is pending. Still on the insulin gtt depending on blood glucose.  Start basal Lantus insulin with SSi per protocol when more awake and eating. Consult DM team. DM diet when able. Monitor lytes    Seizure ([address] (2/4/2023) POA: witnessed stiffening right side. Likely due to uncontrolled DM. CT scan head neg for acute changes. Seen by tele-neurology. Continue empiric Keppra. Consult in-house neurology    CKD (chronic kidney disease) stage 3, GFR 30-59 ml/min /  Hypokalemia (2/5/2023) POA: monitor renal function, replenish K+ and monitor lytes    Chronic heart failure with preserved ejection fraction (HFpEF) ([address] (2/5/2023) / Ischemic cardiomyopathy POA: CXR clear. Recent Echo showed an EF of 55-60%. Caution with IV fluids. Monitor clinical progress    Hypertension POA: BPs low normal. Hold all meds. IV fluids    Care Plan discussed with: Family and nursing     Prophylaxis:  Hep SQ    Diet:  NPO    Patient status: Inpatient     Level of care: ICU    Code status: FULL code    Total time spent for the patient's care: 35 Minutes Critical care        Anticipated Disposition:  Home w/Family    PCP:      Le Haynes NP     I have personally examined and treated the patient at bedside during this period.  To assist coordination of care and communication with nursing and staff, this note may be preliminary early in the day, but finalized by end of the day.         ___________________________________________________    Attending Physician:   Gerald Tellez MD

## 2023-02-05 NOTE — ED NOTES
Patient had a partial seizures with right sided of body twitching. Dr. Galvan Arms at bedside.  Ativan 1mg IV given per MD order

## 2023-02-05 NOTE — WOUND CARE
Wound Nurse Note    Initial consult received to see patient regarding right lateral ankle skin tear. Patient currently resting on an ICU [facility] bed; alert and oriented. Turns well without assist.    Assessment:  Right lateral ankle - dry scab approx 0.3cm x 0.3cm. Bilateral heels - intact; dry skin. Sacral area/buttocks - intact; no redness. Recommendations; Turn q2h and float heels. Foam dressing to right lateral ankle scab; change MWF. Plan: Will notify MD of visit findings; will follow if needed. Reconsult if further assistance is needed.     Luis Moreno RN [facility], [facility].  [facility] Wound Dept  [phone]

## 2023-02-05 NOTE — ED PROVIDER NOTES
History of hypertension, diabetes, coronary disease, congestive heart failure/ischemic cardiomyopathy, chronic kidney disease, peripheral vascular disease. She presents via EMS accompanied by her granddaughter who gives most of the history. She reports that proximately 30 minutes ago, the patient began having trouble moving her right hand. Soon after that, the granddaughter noted what sounds to be seizure activity. She describes the patient with \"convulsions. \"  She states that the patient's head and eyes were deviated to the right, and she was unresponsive for about 3 minutes. Upon EMS arrival, EMS personnel report that her right arm was flaccid and her blood sugar read \"high. \"  They also noted some difficulty with her speech. They report that her blood pressure was greater than 200/100. Past Medical History:   Diagnosis Date    CAD (coronary artery disease)     CHF (congestive heart failure), NYHA class I, chronic, systolic (HCC)     Systolic and diastolic CHF per echocardiogram of 12/25/2021. Patient was found to have LVEF 45%    Chronic kidney disease     stage III/IV:Creatinine 1.45-1.7 with GFR in the low 30s-high 20s    Diabetes mellitus type 2 in nonobese [facility])     Hypertension     Ischemic cardiomyopathy     Echocardiogram of 12/25/2021: mild left ventricular systolic dysfunction (EF 53%) with inferior and basal-mid septal akinesis. Diastolic dysfunction. Peripheral vascular disease ([address]        Past Surgical History:   Procedure Laterality Date    COLONOSCOPY N/A 2/4/2022    COLON[facility] by Delphine Murphy MD [facility]    HX APPENDECTOMY      HX CORONARY STENT PLACEMENT      stents x 3 to mid-distal right coronary artery going into PDA for 100% occlusion of RCA; and also had nonobstructive disease in 30% LAD stenosis, 70% diagonal 1 stenosis.   She had proximal 30% stenosis in the codominant left circumflex artery     HX HYSTERECTOMY      HX KNEE REPLACEMENT Left     HX OTHER SURGICAL      head sx x 2 nerve related    HX OTHER SURGICAL      Back surgery x 2    HX TONSILLECTOMY      [place] CARDIAC SURG PROCEDURE UNLIST      heart stents. x 3 in january 2022. Family History:   Problem Relation Age of Onset    Heart Disease Mother        Social History     Socioeconomic History    Marital status:      Spouse name: Not on file    Number of children: Not on file    Years of education: Not on file    Highest education level: Not on file   Occupational History    Not on file   Tobacco Use    Smoking status: Former     Years: 15.00     Types: Cigarettes    Smokeless tobacco: Never   Substance and Sexual Activity    Alcohol use: Yes     Comment: very rarely     Drug use: Never    Sexual activity: Not on file   Other Topics Concern     Service Not Asked    Blood Transfusions Not Asked    Caffeine Concern Not Asked    Occupational Exposure Not Asked    Hobby Hazards Not Asked    Sleep Concern Not Asked    Stress Concern Not Asked    Weight Concern Not Asked    Special Diet Not Asked    Back Care Not Asked    Exercise Not Asked    Bike Helmet Not Asked    Seat Belt Not Asked    Self-Exams Not Asked   Social History Narrative    Not on file     Social Determinants of Health     Financial Resource Strain: Not on file   Food Insecurity: Not on file   Transportation Needs: Not on file   Physical Activity: Not on file   Stress: Not on file   Social Connections: Not on file   Intimate Partner Violence: Not on file   Housing Stability: Not on file         ALLERGIES: Codeine, Compazine [prochlorperazine], Dilaudid [hydromorphone], Morphine, and Sulfa (sulfonamide antibiotics)    Review of Systems   All other systems reviewed and are negative. There were no vitals filed for this visit. Physical Exam  Vitals and nursing note reviewed. Constitutional:       Appearance: She is well-developed. HENT:      Head: Normocephalic and atraumatic.    Eyes:      Conjunctiva/sclera: Conjunctivae normal.   Neck:      Trachea: No tracheal deviation. Cardiovascular:      Rate and Rhythm: Normal rate and regular rhythm. Heart sounds: Normal heart sounds. No murmur heard. No friction rub. No gallop. Pulmonary:      Effort: Pulmonary effort is normal.      Breath sounds: Normal breath sounds. Abdominal:      Palpations: Abdomen is soft. Tenderness: There is no abdominal tenderness. Musculoskeletal:         General: No deformity. Cervical back: Neck supple. Skin:     General: Skin is warm and dry. Neurological:      Mental Status: She is alert. Comments: oriented. Normal extremity strength and sensation. No cranial nerve deficit. Medical Decision Making  Amount and/or Complexity of Data Reviewed  Labs: ordered. Radiology: ordered. ECG/medicine tests: ordered. Risk  OTC drugs. Prescription drug management. Decision regarding hospitalization. Procedures    Consult note: Dr. Mykel Mcneil (neurology) -evaluated the patient. She recommends blood pressure and blood sugar control. Moni Aguayo MD      EKG: Normal sinus rhythm; rate of 81; incomplete left bundle branch block; nonspecific ST, T wave abnormality; prolonged QT interval.  Interpreted by me. Moni Aguayo MD  9 PM    Perfect Serve Consult for Admission  9:06 PM    ED Room Number: GO28/70  Patient Name and age:  Chaya Neal 80 y.o.  female  Working Diagnosis: Strokelike symptoms/seizure activity/hyperglycemia    COVID-19 Suspicion:  no  Sepsis present:  Other  Reassessment needed: Other  Code Status:  Full Code  Readmission: no  Isolation Requirements:  no  Recommended Level of Care:  step down  Department: [facility] ED - [phone]  Admitting Provider: Dr. April Aguilar    Other: She presented as a code stroke after developing acute onset right upper extremity weakness and aphasia. Her granddaughter reports seizure activity. She was evaluated by teleneurology.   She returned to her baseline neurological function. However, she just had a second seizure. CT head okay. Blood sugar greater than 1100. Insulin drip. Total critical care time spent exclusive of procedures: 37 minutes. Progress note: Patient just had a generalized tonic-clonic seizure witnessed by me. It lasted about a minute. Maria D Martinez MD  9:13 PM    Consult note: I reached out to the hospitalist service via [employer] for admission. Maria D Martinez MD  9:14 PM    I spoke with Dr. Rakesh Bhakta again. She recommends Keppra 1000 mg load followed by 500 mg every 12 hours.   Maria D Martinez MD  9:20 PM

## 2023-02-05 NOTE — PROGRESS NOTES
Problem: Falls - Risk of  Goal: *Absence of Falls  Description: Document Dennie Rous Fall Risk and appropriate interventions in the flowsheet.   Outcome: Progressing Towards Goal  Note: Fall Risk Interventions:                                Problem: Patient Education: Go to Patient Education Activity  Goal: Patient/Family Education  Outcome: Progressing Towards Goal     Problem: Patient Education: Go to Patient Education Activity  Goal: Patient/Family Education  Outcome: Progressing Towards Goal     Problem: TIA/CVA Stroke: 0-24 hours  Goal: Off Pathway (Use only if patient is Off Pathway)  Outcome: Progressing Towards Goal  Goal: Activity/Safety  Outcome: Progressing Towards Goal  Goal: Consults, if ordered  Outcome: Progressing Towards Goal  Goal: Diagnostic Test/Procedures  Outcome: Progressing Towards Goal  Goal: Nutrition/Diet  Outcome: Progressing Towards Goal  Goal: Discharge Planning  Outcome: Progressing Towards Goal  Goal: Medications  Outcome: Progressing Towards Goal  Goal: Respiratory  Outcome: Progressing Towards Goal  Goal: Treatments/Interventions/Procedures  Outcome: Progressing Towards Goal  Goal: Minimize risk of bleeding post-thrombolytic infusion  Outcome: Progressing Towards Goal  Goal: Monitor for complications post-thrombolytic infusion  Outcome: Progressing Towards Goal  Goal: Psychosocial  Outcome: Progressing Towards Goal  Goal: *Hemodynamically stable  Outcome: Progressing Towards Goal  Goal: *Neurologically stable  Description: Absence of additional neurological deficits    Outcome: Progressing Towards Goal  Goal: *Verbalizes anxiety and depression are reduced or absent  Outcome: Progressing Towards Goal  Goal: *Absence of Signs of Aspiration on Current Diet  Outcome: Progressing Towards Goal  Goal: *Absence of deep venous thrombosis signs and symptoms(Stroke Metric)  Outcome: Progressing Towards Goal  Goal: *Ability to perform ADLs and demonstrates progressive mobility and function  Outcome: Progressing Towards Goal  Goal: *Stroke education started(Stroke Metric)  Outcome: Progressing Towards Goal  Goal: *Dysphagia screen performed(Stroke Metric)  Outcome: Progressing Towards Goal  Goal: *Rehab consulted(Stroke Metric)  Outcome: Progressing Towards Goal     Problem: TIA/CVA Stroke: Day 2 Until Discharge  Goal: Off Pathway (Use only if patient is Off Pathway)  Outcome: Progressing Towards Goal  Goal: Activity/Safety  Outcome: Progressing Towards Goal  Goal: Diagnostic Test/Procedures  Outcome: Progressing Towards Goal  Goal: Nutrition/Diet  Outcome: Progressing Towards Goal  Goal: Discharge Planning  Outcome: Progressing Towards Goal  Goal: Medications  Outcome: Progressing Towards Goal  Goal: Respiratory  Outcome: Progressing Towards Goal  Goal: Treatments/Interventions/Procedures  Outcome: Progressing Towards Goal  Goal: Psychosocial  Outcome: Progressing Towards Goal  Goal: *Verbalizes anxiety and depression are reduced or absent  Outcome: Progressing Towards Goal  Goal: *Absence of aspiration  Outcome: Progressing Towards Goal  Goal: *Absence of deep venous thrombosis signs and symptoms(Stroke Metric)  Outcome: Progressing Towards Goal  Goal: *Optimal pain control at patient's stated goal  Outcome: Progressing Towards Goal  Goal: *Tolerating diet  Outcome: Progressing Towards Goal  Goal: *Ability to perform ADLs and demonstrates progressive mobility and function  Outcome: Progressing Towards Goal  Goal: *Stroke education continued(Stroke Metric)  Outcome: Progressing Towards Goal     Problem: Ischemic Stroke: Discharge Outcomes  Goal: *Verbalizes anxiety and depression are reduced or absent  Outcome: Progressing Towards Goal  Goal: *Verbalize understanding of risk factor modification(Stroke Metric)  Outcome: Progressing Towards Goal  Goal: *Hemodynamically stable  Outcome: Progressing Towards Goal  Goal: *Absence of aspiration pneumonia  Outcome: Progressing Towards Goal  Goal: *Aware of needed dietary changes  Outcome: Progressing Towards Goal  Goal: *Verbalize understanding of prescribed medications including anti-coagulants, anti-lipid, and/or anti-platelets(Stroke Metric)  Outcome: Progressing Towards Goal  Goal: *Tolerating diet  Outcome: Progressing Towards Goal  Goal: *Aware of follow-up diagnostics related to anticoagulants  Outcome: Progressing Towards Goal  Goal: *Ability to perform ADLs and demonstrates progressive mobility and function  Outcome: Progressing Towards Goal  Goal: *Absence of DVT(Stroke Metric)  Outcome: Progressing Towards Goal  Goal: *Absence of aspiration  Outcome: Progressing Towards Goal  Goal: *Optimal pain control at patient's stated goal  Outcome: Progressing Towards Goal  Goal: *Home safety concerns addressed  Outcome: Progressing Towards Goal  Goal: *Describes available resources and support systems  Outcome: Progressing Towards Goal  Goal: *Verbalizes understanding of activation of EMS(911) for stroke symptoms(Stroke Metric)  Outcome: Progressing Towards Goal  Goal: *Understands and describes signs and symptoms to report to providers(Stroke Metric)  Outcome: Progressing Towards Goal  Goal: *Neurolgocially stable (absence of additional neurological deficits)  Outcome: Progressing Towards Goal  Goal: *Verbalizes importance of follow-up with primary care physician(Stroke Metric)  Outcome: Progressing Towards Goal  Goal: *Smoking cessation discussed,if applicable(Stroke Metric)  Outcome: Progressing Towards Goal  Goal: *Depression screening completed(Stroke Metric)  Outcome: Progressing Towards Goal

## 2023-02-05 NOTE — ED NOTES
Ultrasound IV by ED Staff Procedure Note    Patient meets criteria for US IV insertion. Ultrasound IV verbal education provided to patient. Opportunities for questions given. IV ultrasound technique used for PIV placement:  20 gauge Arrow Endurance Extended Dwell(may stay in place for 29 days)  Left Cephalic location. 1 X Attempt(s). Procedure tolerated well. Primary RN aware of IV placement and added to LDA.                                 Jesus Magdaleno

## 2023-02-05 NOTE — ED NOTES
Patient arrived to ED alert and oriented and went straight CT.  EMS states patient had right sided weakness at home

## 2023-02-05 NOTE — PROGRESS NOTES
Reason for Admission:  HHNS, blood glucose 1199, seizure activity, JOANNA. Hx - CAD with stents, CHF, CKD, ischemic cardiomyopathy, PVD. RUR Score:     15%/ moderate risk             PCP: First and Last name:   Suzy Narvaez NP     Name of Practice:    Are you a current patient: Yes/No:  yes   Approximate date of last visit:  3 months ago   Can you participate in a virtual visit if needed:  yes    Do you (patient/family) have any concerns for transition/discharge? Good family support. Lives with daughter. Plan for utilizing home health:   unsure at this time, [facility] in the past.  DME - walker, transport wheelchair    Current Advanced Directive/Advance Care Plan:  Full Code    Healthcare Decision Maker:   Click here to complete [address] including selection of the Healthcare Decision Maker Relationship (ie \"Primary\")            Primary Decision MakerMehdi Banerjee - Daughter - [phone]    Secondary Decision Maker: Mayur Gross - [phone]    Transition of Care Plan:        Chart reviewed, demographics verified. CM role and follow up discussed. Spoke with patient's daughter Kristyn Desai via phone, CM assessment completed. Patient lives with her daughter, granddaughter assists with patient's care at home. Patient lives in a three story home with 1 steps to enter home and bed/bath on main level. Patient has prescription drug coverage, uses [facility] at [address] Patient requires some assistance with ADLs, can provide self hygiene needs but needs assist with mobility and safety. Patient performs ADLs with assist.     Current status:  Patient currently requiring medical management including ongoing assessment and monitoring. Continues on IV fluids, glucose monitoring, IV protonix, and IV Keppra. Monitoring hypotension. PLAN:  1. Monitor patient response to treatment and recommendations.   2. Medical management continues. 3. CM needs identified:  unsure at this time. 4. Lives with daughter, granddaughter assists with care. 5. Patient transport home per daughter at discharge. 6. CM to monitor clinical progress and disposition recommendations. Care Management Interventions  PCP Verified by CM: Yes Sha Benson NP)  Mode of Transport at Discharge:  Other (see comment) (per daughter)  Transition of Care Consult (CM Consult): Discharge Planning  Support Systems: Child(watson), Other Family Member(s)  Confirm Follow Up Transport: Family  The Plan for Transition of Care is Related to the Following Treatment Goals : return home  Discharge Location  Patient Expects to be Discharged to[de-identified] Home with family assistance    Ashly Garcia RN, MSN, Care manager

## 2023-02-05 NOTE — ED NOTES
Patient returned from [place] Neurology on Tele-neuro for assessment.  Patient able to move all extrimities

## 2023-02-05 NOTE — H&P
History and Physical  NAME: Isael Greenberg  MRN: [id number]  YOB: 1934  AGE: 80 y.o.  female  SOCIAL SECURITY NUMBER: [id number]  Primary Care Provider: Hannah Mercado NP  CODE STATUS: Full Code      ASSESSMENT/PLAN:  Hyperosmolar hyperglycemic state. Patient does have a mild anion gap at this time at 15, but no ketones in the urine. Blood glucose was 1199 at presentation. Patient has been started on insulin drip in the emergency department, but was not given any IV fluids. 2 L normal saline bolus, followed by normal saline at 150 cc/h. We are cognizant that patient does have mildly low LVEF. Seizure. Feel that this is likely secondary to hyperglycemia. Patient was started on Keppra by teleneurologist.  Will request in-house neurology consultation in the morning. Acute kidney injury. Baseline creatinine is 1.45-1.7, and now creatinine is 2.6. Likely secondary to diuresis in the setting of hyperglycemia. Continue IV fluids. Pseudohyponatremia. Sodium levels are expected to increase with correction of hyperglycemia. Coronary artery disease. S/p 3 stents to the RCA on 12/24/2021. Continue metoprolol, aspirin, statin with atorvastatin 20 mg p.o. daily. Heart failure with mildly reduced ejection fraction. Per echocardiogram of LVEF was 45%. History of Presenting Illness:   Isael Greenberg is a 80 y.o. female who lives at home with her daughter. Patient's daughter is currently out of town since Thursday, 2/2/2023. Patient's granddaughter has been spending most of the day with her, but patient is alone at nighttime. Per patient's granddaughter at bedside, patient has been urinating frequently, and drinking copious amounts of water. Her mouth is very dry on exam.  Patient has been feeling weak. She has had no vomiting. Per granddaughter, patient has not been patient's granddaughter called her mother, patient's daughter on the phone. Per daughter, patient has not been on any diabetic medications for about a year now. Her diabetic medications (previously on glimepiride per record) were stopped while she was hospitalized at [facility] as there was concern for hypoglycemia. Patient is alert, oriented only to person at this time. Per granddaughter, patient became groggy after being given Keppra in the emergency department. Per granddaughter, patient had seizure-like activity at home today. Her right arm became very rigid, then started becoming tremulous, and then became rigid again. Patient was speaking to the granddaughter during that time, stating that she could not move her right arm. Then, she had an episode of about 30 seconds of generalized shaking. In the emergency department, in the presence of nurse and emergency department physician, Dr. Claire Trejo, patient had another partial seizure where her right arm became very rigid. Patient has been evaluated by tele-neurologist who recommended starting Keppra. Review of Systems:  A comprehensive review of systems was negative except for that written in the History of Present Illness. Past Medical History:   Diagnosis Date    CAD (coronary artery disease)     CHF (congestive heart failure), NYHA class I, chronic, systolic (HCC)     Systolic and diastolic CHF per echocardiogram of 12/25/2021. Patient was found to have LVEF 45%    Chronic kidney disease     stage III/IV:Creatinine 1.45-1.7 with GFR in the low 30s-high 20s    Diabetes mellitus type 2 in nonobese [facility])     Hypertension     Ischemic cardiomyopathy     Echocardiogram of 12/25/2021: mild left ventricular systolic dysfunction (EF 61%) with inferior and basal-mid septal akinesis. Diastolic dysfunction.     Peripheral vascular disease ([address]         PAST SURGICAL HISTORY:   Past Surgical History:   Procedure Laterality Date    COLONOSCOPY N/A 02/04/2022    COLONOSCOPY performed by Madelin Antunez MD at [facility] ENDOSCOPY    HX APPENDECTOMY      HX CORONARY STENT PLACEMENT  12/24/2021    stents x 3 to mid-distal right coronary artery going into PDA for 100% occlusion of RCA; and also had nonobstructive disease in 30% LAD stenosis, 70% diagonal 1 stenosis. She had proximal 30% stenosis in the codominant left circumflex artery     HX HYSTERECTOMY      HX KNEE REPLACEMENT Left     HX OTHER SURGICAL      head sx x 2 nerve related    HX OTHER SURGICAL      Back surgery x 2    HX TONSILLECTOMY         Prior to Admission medications    Medication Sig Start Date End Date Taking? Authorizing Provider   glimepiride (AMARYL) 1 mg tablet Take 1 mg by mouth Daily (before breakfast). Yes Other, MD Portia   amLODIPine (NORVASC) 2.5 mg tablet Take 2.5 mg by mouth daily. Yes Other, MD Portia   furosemide (LASIX) 40 mg tablet Take 40 mg by mouth two (2) times a day. Indications: accumulation of fluid resulting from chronic heart failure   Yes Provider, Historical   cholecalciferol, vitamin D3, 50 mcg (2,000 unit) tab Take  by mouth. Yes Provider, Historical   doxycycline (MONODOX) 100 mg capsule Take 1 Capsule by mouth two (2) times a day. Patient not taking: Reported on 2/4/2023 10/28/22   Trevor Harris MD   pantoprazole (PROTONIX) 40 mg granules for oral suspension Take 40 mg by mouth Daily (before breakfast). 2/5/22   Drew Daniels DO   aspirin 81 mg chewable tablet Take 1 Tablet by mouth daily. 2/6/22   Drew Daniels DO   ferrous sulfate (Iron) 325 mg (65 mg iron) tablet Take 1 Tablet by mouth Daily (before breakfast). Patient not taking: Reported on 2/4/2023 2/5/22   Drew Daniels DO   [facility]ron [facility] 4 mg tablet Take 4 mg by mouth daily as needed for Nausea or Vomiting. Patient not taking: Reported on 2/4/2023    Provider, Historical   atorvastatin (LIPITOR) 20 mg tablet Take 1 Tablet by mouth daily.   Patient not taking: Reported on 2/4/2023 12/26/21   Trevor Harris MD   metoprolol tartrate (LOPRESSOR) 50 mg tablet Take 1 Tablet by mouth every twelve (12) hours. Patient not taking: Reported on 2/4/2023 12/25/21   Trevor Harris MD       Allergies   Allergen Reactions    Codeine Nausea and Vomiting    Compazine [Prochlorperazine] Other (comments)     Facial droop    Dilaudid [Hydromorphone] Nausea and Vomiting    Morphine Nausea and Vomiting    Sulfa (Sulfonamide Antibiotics) Nausea and Vomiting        Family History   Problem Relation Age of Onset    Heart Disease Mother         Social History     Tobacco Use    Smoking status: Former     Years: 15.00     Types: Cigarettes    Smokeless tobacco: Never   Substance Use Topics    Alcohol use: Yes     Comment: very rarely     Drug use: Never       Physical exam  Patient Vitals for the past 24 hrs:   BP Temp Pulse Resp SpO2   02/04/23 2230 (!) 157/55 -- 77 17 97 %   02/04/23 2215 (!) 140/55 -- 76 17 97 %   02/04/23 2200 (!) 136/50 -- 76 20 97 %   02/04/23 2145 (!) 125/50 -- 78 23 95 %   02/04/23 2130 (!) 127/49 -- 80 21 97 %   02/04/23 2121 -- -- -- 22 --   02/04/23 2100 129/60 -- 86 17 96 %   02/04/23 2052 122/74 -- (!) 108 (!) 31 96 %   02/04/23 2037 (!) 122/108 -- 82 19 96 %   02/04/23 2022 (!) 143/110 -- 83 16 92 %   02/04/23 2012 -- -- 83 -- --   02/04/23 2007 (!) 221/81 -- 81 20 95 %   02/04/23 2007 (!) 221/81 -- 79 18 96 %   02/04/23 2005 -- 97.8 °F (36.6 °C) -- -- --   02/04/23 2000 (!) 223/83 -- 80 16 96 %   02/04/23 2000 -- -- -- -- 96 %     Estimated body mass index is 26.16 kg/m² as calculated from the following:    Height as of this encounter: 5' 2\" (1.575 m). Weight as of this encounter: 64.9 kg (143 lb). General: Ill-appearing, but in no acute distress. Well developed, well nourished. Head: Normocephalic, atraumatic. Eyes: Anicteric sclera. PERRL. Extraocular muscles intact. ENT: External ears and nose appear normal.  Oral mucosa extremely dry. Neck: Supple. No jugular venous distention. Heart: Regular rate and rhythm. No murmurs appreciated.   Chest: Symmetrical excursion. Clear to auscultation bilaterally. Abdomen: Soft, nontender. No abnormal distention. Bowel sounds are present throughout. Extremities: No gross deformities. No edema, no cyanosis. Feet are warm to touch. Neurological: Moving all extremities spontaneously. Alert, oriented only to person. Skin: No jaundice. No rashes. Recent Results (from the past 24 hour(s))   GLUCOSE, POC    Collection Time: 02/04/23  8:01 PM   Result Value Ref Range    Glucose (POC) >600 (HH) 65 - 117 mg/dL    Performed by Abdirahman Mahoney    METABOLIC PANEL, COMPREHENSIVE    Collection Time: 02/04/23  8:10 PM   Result Value Ref Range    Sodium 128 (L) 136 - 145 mmol/L    Potassium 3.6 3.5 - 5.1 mmol/L    Chloride 89 (L) 97 - 108 mmol/L    CO2 24 21 - 32 mmol/L    Anion gap 15 5 - 15 mmol/L    Glucose 1,199 (HH) 65 - 100 mg/dL    BUN 28 (H) 6 - 20 MG/DL    Creatinine 2.60 (H) 0.55 - 1.02 MG/DL    BUN/Creatinine ratio 11 (L) 12 - 20      eGFR 17 (L) >60 ml/min/1.73m2    Calcium 9.4 8.5 - 10.1 MG/DL    Bilirubin, total 0.9 0.2 - 1.0 MG/DL    ALT (SGPT) 18 12 - 78 U/L    AST (SGOT) 16 15 - 37 U/L    Alk. phosphatase 109 45 - 117 U/L    Protein, total 7.7 6.4 - 8.2 g/dL    Albumin 3.9 3.5 - 5.0 g/dL    Globulin 3.8 2.0 - 4.0 g/dL    A-G Ratio 1.0 (L) 1.1 - 2.2     CBC WITH AUTOMATED DIFF    Collection Time: 02/04/23  8:10 PM   Result Value Ref Range    WBC 6.9 3.6 - 11.0 K/uL    RBC 4.35 3.80 - 5.20 M/uL    HGB 13.0 11.5 - 16.0 g/dL    HCT 40.1 35.0 - 47.0 %    MCV 92.2 80.0 - 99.0 FL    MCH 29.9 26.0 - 34.0 PG    MCHC 32.4 30.0 - 36.5 g/dL    RDW 13.1 11.5 - 14.5 %    PLATELET 478 325 - 498 K/uL    MPV 12.2 8.9 - 12.9 FL    NRBC 0.0 0  WBC    ABSOLUTE NRBC 0.00 0.00 - 0.01 K/uL    NEUTROPHILS 78 (H) 32 - 75 %    LYMPHOCYTES 15 12 - 49 %    MONOCYTES 5 5 - 13 %    EOSINOPHILS 1 0 - 7 %    BASOPHILS 1 0 - 1 %    IMMATURE GRANULOCYTES 0 0.0 - 0.5 %    ABS. NEUTROPHILS 5.4 1.8 - 8.0 K/UL    ABS. LYMPHOCYTES 1.0 0.8 - 3.5 K/UL    ABS. MONOCYTES 0.3 0.0 - 1.0 K/UL    ABS. EOSINOPHILS 0.1 0.0 - 0.4 K/UL    ABS. BASOPHILS 0.1 0.0 - 0.1 K/UL    ABS. IMM. GRANS. 0.0 0.00 - 0.04 K/UL    DF AUTOMATED     TROPONIN-HIGH SENSITIVITY    Collection Time: 02/04/23  8:10 PM   Result Value Ref Range    Troponin-High Sensitivity 16 0 - 51 ng/L   URINALYSIS W/MICROSCOPIC    Collection Time: 02/04/23  8:10 PM   Result Value Ref Range    Color YELLOW/STRAW      Appearance CLEAR CLEAR      Specific gravity 1.018 1.003 - 1.030      pH (UA) 6.5 5.0 - 8.0      Protein 30 (A) NEG mg/dL    Glucose >1,000 (A) NEG mg/dL    Ketone Negative NEG mg/dL    Bilirubin Negative NEG      Blood SMALL (A) NEG      Urobilinogen 0.2 0.2 - 1.0 EU/dL    Nitrites Negative NEG      Leukocyte Esterase Negative NEG      WBC 0-4 0 - 4 /hpf    RBC 0-5 0 - 5 /hpf    Epithelial cells MODERATE (A) FEW /lpf    Bacteria Negative NEG /hpf   URINE CULTURE HOLD SAMPLE    Collection Time: 02/04/23  8:10 PM    Specimen: Urine   Result Value Ref Range    Urine culture hold        Urine on hold in Microbiology dept for 2 days. If unpreserved urine is submitted, it cannot be used for addtional testing after 24 hours, recollection will be required.    MAGNESIUM    Collection Time: 02/04/23  8:10 PM   Result Value Ref Range    Magnesium 2.5 (H) 1.6 - 2.4 mg/dL   PHOSPHORUS    Collection Time: 02/04/23  8:10 PM   Result Value Ref Range    Phosphorus 3.3 2.6 - 4.7 MG/DL   PROTHROMBIN TIME + INR    Collection Time: 02/04/23  8:13 PM   Result Value Ref Range    INR 1.1 0.9 - 1.1      Prothrombin time 11.1 9.0 - 11.1 sec   GLUCOSTABILIZER    Collection Time: 02/04/23  8:37 PM   Result Value Ref Range    Glucose 601 mg/dL    Insulin order 10.8 units/hour    Insulin adminstered 10.8 units/hour    Multiplier 0.020     Low target 150 mg/dL    High target 200 mg/dL    D50 order 0.0 ml    D50 administered 0.00 ml    Minutes until next BG 60 min    Order initials tiq Administered initials VS    EKG, 12 LEAD, INITIAL    Collection Time: 02/04/23  8:57 PM   Result Value Ref Range    Ventricular Rate 82 BPM    Atrial Rate 82 BPM    P-R Interval 184 ms    QRS Duration 106 ms    Q-T Interval 452 ms    QTC Calculation (Bezet) 528 ms    Calculated P Axis 92 degrees    Calculated R Axis 0 degrees    Calculated T Axis 22 degrees    Diagnosis       Normal sinus rhythm  Incomplete left bundle branch block  ST & T wave abnormality, consider inferolateral ischemia  Prolonged QT  Abnormal ECG  When compared with ECG of 01-NOV-2022 13:28,  ST now depressed in Anterior leads  T wave inversion now evident in Inferior leads  T wave inversion now evident in Lateral leads  QT has lengthened     GLUCOSE, POC    Collection Time: 02/04/23  9:38 PM   Result Value Ref Range    Glucose (POC) >600 (HH) 65 - 117 mg/dL    Performed by Michelle Hall    Collection Time: 02/04/23  9:41 PM   Result Value Ref Range    Glucose 601 mg/dL    Insulin order 16.2 units/hour    Insulin adminstered 16.2 units/hour    Multiplier 0.030     Low target 200 mg/dL    High target 300 mg/dL    D50 order 0.0 ml    D50 administered 0.00 ml    Minutes until next BG 60 min    Order initials tiq     Administered initials vs    GLUCOSE, POC    Collection Time: 02/04/23 10:42 PM   Result Value Ref Range    Glucose (POC) >600 (HH) 65 - 117 mg/dL    Performed by Barbara Washburn RN Float    GLUCOSTABILIZER    Collection Time: 02/04/23 10:44 PM   Result Value Ref Range    Glucose 601 mg/dL    Insulin order 21.6 units/hour    Insulin adminstered 21.6 units/hour    Multiplier 0.040     Low target 200 mg/dL    High target 300 mg/dL    D50 order 0.0 ml    D50 administered 0.00 ml    Minutes until next BG 60 min    Order initials tiq     Administered initials vs    GLUCOSE, RANDOM    Collection Time: 02/04/23 11:30 PM   Result Value Ref Range    Glucose 735 (HH) 65 - 100 mg/dL   GLUCOSE, POC    Collection Time: 02/04/23 11:46 PM   Result Value Ref Range    Glucose (POC) >600 (HH) 65 - 117 mg/dL    Performed by Michelle Hall    Collection Time: 02/04/23 11:47 PM   Result Value Ref Range    Glucose 601 mg/dL    Insulin order 27.1 units/hour    Insulin adminstered 27.1 units/hour    Multiplier 0.050     Low target 200 mg/dL    High target 300 mg/dL    D50 order 0.0 ml    D50 administered 0.00 ml    Minutes until next BG 60 min    Order initials tiq     Administered initials vs    GLUCOSE, POC    Collection Time: 02/05/23 12:49 AM   Result Value Ref Range    Glucose (POC) 462 (H) 65 - 117 mg/dL    Performed by Kelin Torres RN    GLUCOSTABILIZER    Collection Time: 02/05/23 12:50 AM   Result Value Ref Range    Glucose 462 mg/dL    Insulin order 20.1 units/hour    Insulin adminstered 20.1 units/hour    Multiplier 0.050     Low target 200 mg/dL    High target 300 mg/dL    D50 order 0.0 ml    D50 administered 0.00 ml    Minutes until next BG 60 min    Order initials HR     Administered initials HR        XR CHEST PORT  Narrative: Clinical history: Seizure  INDICATION:   Seizure  COMPARISON: 11/1/2022    FINDINGS:  AP portable upright view of the chest demonstrates a stable  cardiopericardial  silhouette. There is no pleural effusion. .There is no focal consolidation. .There  is no pneumothorax. . Patient is on a cardiac monitor. Right shoulder  arthroplasty. Impression: No acute intrathoracic process is identified. CT CODE NEURO HEAD WO CONTRAST  Narrative: EXAM:  CT CODE NEURO HEAD WO CONTRAST    INDICATION:   Right-sided weakness    COMPARISON: CT head 12/26/2021. TECHNIQUE: Unenhanced CT of the head was performed using 5 mm images. Brain and  bone windows were generated. CT dose reduction was achieved through use of a  standardized protocol tailored for this examination and automatic exposure  control for dose modulation. FINDINGS:  The ventricles are normal in size and position. Basilar cisterns are patent. No  midline shift. There is no evidence of acute infarct, hemorrhage, or extraaxial  fluid collection. Unchanged scattered periventricular and deep white matter  hypodensities, consistent with mild chronic microangiopathic ischemic changes. 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 The paranasal sinuses, mastoid air cells, and middle ears are clear. The orbital  contents are within normal limits with bilateral lens implants. There are no  significant osseous or extracranial soft tissue lesions. Impression: 1. No evidence of acute intracranial abnormality. 2. Unchanged mild chronic microvascular ischemic disease and small chronic  infarcts in the right basal ganglia. 10/26/22    ECHO ADULT FOLLOW-UP OR LIMITED 10/26/2022 10/26/2022    Interpretation Summary    Left Ventricle: Normal left ventricular systolic function with a visually estimated EF of 55 - 60%. Left ventricle size is normal. Mildly increased wall thickness.     Signed by: Brenda Huggins MD on 10/26/2022  4:39 PM            Signature:  Sean Holm DO

## 2023-02-05 NOTE — ED NOTES
Signs and symptoms: Patient had right sided weakness per granddaughter then had a seizure that lasted about a min. Code Stroke activation time: Code stroke called prior to EMS arrival. Patient arrived alert and oriented and went straight to CT. VAN score was negative. All symptoms had resolved in route. Last known well around 1900. Bedside accucheck read HI. Blood Pressure: Blood pressure 220/110 per EMS  Patient does not take anticoagulants.

## 2023-02-05 NOTE — PROGRESS NOTES
2315: Notified of ED attempt to call report. Due to emergency on unit, unable to accept report at this time. ED RN and nursing supervisor aware. TRANSFER - IN REPORT: 46    Verbal report received from Regla(name) on [employer]  being received from ED(unit) for routine progression of care      Report consisted of patients Situation, Background, Assessment and   Recommendations(SBAR). Information from the following report(s) SBAR, Kardex, ED Summary, Intake/Output, and Recent Results was reviewed with the receiving nurse. Opportunity for questions and clarification was provided. Assessment completed upon patients arrival to unit and care assumed. 12: notified provider, Owen Vivar, NP of patient potassium 2.4. Also notified of pt history of CHF with DBP in the 30s. SBP are above 100, requested change of parameters to SBP of greater than 90. Awaiting response. 0300: Notified Owen Vivar, NP of blood sugar below 250, at 236, requested fluids with dextrose and K.    0311: Received response from NP. Regarding BP, stated to \"keep MAP at and above\". Regarding fluids, stated start [email or web address].     0317: Received message from NP saying to disregard, \"we are going to stop the drip, put in for lantus, have her start eating, add k to fluids\"     0878: Received orders for oral K    0319: Notified NP that per the insulin drip order, we are not able to stop insulin drips on night shift and have to wait for day team to assess patient. Also notified NP that patient is NPO due to alertness. 4876: No further orders or response received, sent a perfectserve again stating if she had any questions she could call the unit, and provided the unit number. 1051: Response from NP that Dr Linda May put the orders in. When asked if this RN needed to speak to Dr Linda May instead, NP said yes. 0403: Called Dr Linda May regarding pt orders, at this time no orders have been started due to lack of clarification.  Discussed pt BS below 250 now at 123, and K 2.4, and discussed keeping pt on insulin drip per parameters stated in the order. 0405: MD at bedside. Received orders for D5 NS 40meQ K, as well as 6 runs of K. Primary Nurse Olesya Sarmiento, VICTOR M and Flower Leiva, RN performed a dual skin assessment on this patient No impairment noted  Raghu score is see flowsheets    See flowsheets for all assessments, see MAR for all medication administrations. Bedside and Verbal shift change report given to [facility] [address] (oncoming nurse) by Porter Burns RN (offgoing nurse). Report included the following information SBAR, Intake/Output, MAR, Recent Results, Cardiac Rhythm: and Alarm Parameters . Stroke Education provided to patient and relative(s) and the following topics were discussed    1. Patients personal risk factors for stroke are hypertension, carotid stenosis, hyperlipidemia, diabetes mellitus, and CHF    2. Warning signs of Stroke:        * Sudden numbness or weakness of the face, arm or leg, especially on one side of          The body            * Sudden confusion, trouble speaking or understanding        * Sudden trouble seeing in one or both eyes        * Sudden trouble walking, dizziness, loss of balance or coordination        * Sudden severe headache with no known cause      3. Importance of activation Emergency Medical Services ( 9-1-1 ) immediately if experience any warning signs of stroke. 4. Be sure and schedule a follow-up appointment with your primary care doctor or any specialists as instructed. 5. You must take medicine every day to treat your risk factors for stroke. Be sure to take your medicines exactly as your doctor tells you: no more, no less. Know what your medicines are for , what they do. Anti-thrombotics /anticoagulants can help prevent strokes. You are taking the following medicine(s)  heparin     6. Smoking and second-hand smoke greatly increase your risk of stroke, cardiovascular disease and death.  Smoking history never    7. Information provided was [facility] Stroke Education Binder, Stroke Handouts, or Verbal Education    8. Documentation of teaching completed in Patient Education Activity and on Care Plan with teaching response noted?   yes

## 2023-02-05 NOTE — ED TRIAGE NOTES
Sudden on onset of right upper extremity weakness around 1930. Patient had a seizures per granddaughter after that lasting about 1 min.

## 2023-02-05 NOTE — ED NOTES
Patient laying down with eyes closed since Ativan. Will respond to verbal stimuli.  Family at bedside

## 2023-02-05 NOTE — ED NOTES
Attempted to call report times 2 to ICU.  Nurse is unable to give report or receive patient at this time

## 2023-02-05 NOTE — PROGRESS NOTES
0700- Bedside and Verbal shift change report given to Eladia (oncoming nurse) by [facility] (offgoing nurse). Report included the following information SBAR, OR Summary, Recent Results, Med Rec Status, Cardiac Rhythm sinus kevin, and Alarm Parameters . Verified drips with off going nurse  Insulin 0.6 units/hr    0800- Shift assessment complete     1200- re-assessment complete, changes documented in flowsheet.  Lantus given in preparation to stop insulin gtt    1407- Insulin gtt stopped     1430- Pt down to MRI with transport

## 2023-02-05 NOTE — ED NOTES
Verbal report given to ICU. Report included SBAR, medications, and current neurological status.  Family updated on plan of care

## 2023-02-05 NOTE — ED NOTES
Attempted to call daughter Clay Cabrera at [phone] for verification of medication list. Will hand off number to ICU nurse

## 2023-02-06 ENCOUNTER — APPOINTMENT (OUTPATIENT)
Dept: CT IMAGING | Age: 88
DRG: 638 | End: 2023-02-06
Attending: INTERNAL MEDICINE
Payer: MEDICARE

## 2023-02-06 ENCOUNTER — APPOINTMENT (OUTPATIENT)
Dept: VASCULAR SURGERY | Age: 88
DRG: 638 | End: 2023-02-06
Attending: INTERNAL MEDICINE
Payer: MEDICARE

## 2023-02-06 LAB
ANION GAP SERPL CALC-SCNC: 6 MMOL/L (ref 5–15)
ATRIAL RATE: 81 BPM
BUN SERPL-MCNC: 15 MG/DL (ref 6–20)
BUN/CREAT SERPL: 9 (ref 12–20)
CALCIUM SERPL-MCNC: 8.2 MG/DL (ref 8.5–10.1)
CALCULATED P AXIS, ECG09: 53 DEGREES
CALCULATED T AXIS, ECG11: 13 DEGREES
CHLORIDE SERPL-SCNC: 119 MMOL/L (ref 97–108)
CO2 SERPL-SCNC: 23 MMOL/L (ref 21–32)
CREAT SERPL-MCNC: 1.59 MG/DL (ref 0.55–1.02)
DIAGNOSIS, 93000: NORMAL
GLUCOSE BLD STRIP.AUTO-MCNC: 165 MG/DL (ref 65–117)
GLUCOSE BLD STRIP.AUTO-MCNC: 174 MG/DL (ref 65–117)
GLUCOSE BLD STRIP.AUTO-MCNC: 245 MG/DL (ref 65–117)
GLUCOSE BLD STRIP.AUTO-MCNC: 286 MG/DL (ref 65–117)
GLUCOSE SERPL-MCNC: 256 MG/DL (ref 65–100)
P-R INTERVAL, ECG05: 200 MS
POTASSIUM SERPL-SCNC: 4.9 MMOL/L (ref 3.5–5.1)
Q-T INTERVAL, ECG07: 462 MS
QRS DURATION, ECG06: 110 MS
QTC CALCULATION (BEZET), ECG08: 536 MS
SERVICE CMNT-IMP: ABNORMAL
SODIUM SERPL-SCNC: 148 MMOL/L (ref 136–145)
VENTRICULAR RATE, ECG03: 81 BPM

## 2023-02-06 PROCEDURE — 2709999900 HC NON-CHARGEABLE SUPPLY

## 2023-02-06 PROCEDURE — 74011250637 HC RX REV CODE- 250/637: Performed by: INTERNAL MEDICINE

## 2023-02-06 PROCEDURE — 65270000046 HC RM TELEMETRY

## 2023-02-06 PROCEDURE — 36415 COLL VENOUS BLD VENIPUNCTURE: CPT

## 2023-02-06 PROCEDURE — 74011000250 HC RX REV CODE- 250: Performed by: INTERNAL MEDICINE

## 2023-02-06 PROCEDURE — 92610 EVALUATE SWALLOWING FUNCTION: CPT

## 2023-02-06 PROCEDURE — 99233 SBSQ HOSP IP/OBS HIGH 50: CPT | Performed by: PSYCHIATRY & NEUROLOGY

## 2023-02-06 PROCEDURE — 77030038269 HC DRN EXT URIN PURWCK BARD -A

## 2023-02-06 PROCEDURE — 70450 CT HEAD/BRAIN W/O DYE: CPT

## 2023-02-06 PROCEDURE — 70496 CT ANGIOGRAPHY HEAD: CPT

## 2023-02-06 PROCEDURE — 95720 EEG PHY/QHP EA INCR W/VEEG: CPT | Performed by: PSYCHIATRY & NEUROLOGY

## 2023-02-06 PROCEDURE — 80048 BASIC METABOLIC PNL TOTAL CA: CPT

## 2023-02-06 PROCEDURE — C9113 INJ PANTOPRAZOLE SODIUM, VIA: HCPCS | Performed by: INTERNAL MEDICINE

## 2023-02-06 PROCEDURE — 74011000636 HC RX REV CODE- 636: Performed by: INTERNAL MEDICINE

## 2023-02-06 PROCEDURE — 4A03X5D MEASUREMENT OF ARTERIAL FLOW, INTRACRANIAL, EXTERNAL APPROACH: ICD-10-PCS | Performed by: INTERNAL MEDICINE

## 2023-02-06 PROCEDURE — 82962 GLUCOSE BLOOD TEST: CPT

## 2023-02-06 PROCEDURE — 74011250636 HC RX REV CODE- 250/636: Performed by: INTERNAL MEDICINE

## 2023-02-06 PROCEDURE — 0042T CT CODE NEURO PERF W CBF: CPT

## 2023-02-06 PROCEDURE — 93971 EXTREMITY STUDY: CPT

## 2023-02-06 PROCEDURE — 74011636637 HC RX REV CODE- 636/637: Performed by: INTERNAL MEDICINE

## 2023-02-06 RX ORDER — METOPROLOL TARTRATE 50 MG/1
50 TABLET ORAL EVERY 12 HOURS
Status: DISCONTINUED | OUTPATIENT
Start: 2023-02-06 | End: 2023-02-08

## 2023-02-06 RX ORDER — AMLODIPINE BESYLATE 5 MG/1
2.5 TABLET ORAL DAILY
Status: DISCONTINUED | OUTPATIENT
Start: 2023-02-06 | End: 2023-02-09 | Stop reason: HOSPADM

## 2023-02-06 RX ORDER — INSULIN GLARGINE 100 [IU]/ML
0.3 INJECTION, SOLUTION SUBCUTANEOUS EVERY 24 HOURS
Status: DISCONTINUED | OUTPATIENT
Start: 2023-02-06 | End: 2023-02-09

## 2023-02-06 RX ADMIN — HEPARIN SODIUM 5000 UNITS: 5000 INJECTION INTRAVENOUS; SUBCUTANEOUS at 14:26

## 2023-02-06 RX ADMIN — HEPARIN SODIUM 5000 UNITS: 5000 INJECTION INTRAVENOUS; SUBCUTANEOUS at 05:08

## 2023-02-06 RX ADMIN — SODIUM CHLORIDE 40 MG: 9 INJECTION, SOLUTION INTRAMUSCULAR; INTRAVENOUS; SUBCUTANEOUS at 09:17

## 2023-02-06 RX ADMIN — HEPARIN SODIUM 5000 UNITS: 5000 INJECTION INTRAVENOUS; SUBCUTANEOUS at 21:43

## 2023-02-06 RX ADMIN — LEVETIRACETAM 500 MG: 100 INJECTION, SOLUTION INTRAVENOUS at 21:42

## 2023-02-06 RX ADMIN — LEVETIRACETAM 500 MG: 100 INJECTION, SOLUTION INTRAVENOUS at 09:19

## 2023-02-06 RX ADMIN — INSULIN GLARGINE 19 UNITS: 100 INJECTION, SOLUTION SUBCUTANEOUS at 12:56

## 2023-02-06 RX ADMIN — DEXTROSE MONOHYDRATE, SODIUM CHLORIDE, AND POTASSIUM CHLORIDE: 50; 9; 2.98 INJECTION, SOLUTION INTRAVENOUS at 00:20

## 2023-02-06 RX ADMIN — INSULIN LISPRO 4 UNITS: 100 INJECTION, SOLUTION INTRAVENOUS; SUBCUTANEOUS at 17:20

## 2023-02-06 RX ADMIN — SODIUM CHLORIDE, PRESERVATIVE FREE 10 ML: 5 INJECTION INTRAVENOUS at 21:43

## 2023-02-06 RX ADMIN — INSULIN LISPRO 7 UNITS: 100 INJECTION, SOLUTION INTRAVENOUS; SUBCUTANEOUS at 09:18

## 2023-02-06 RX ADMIN — INSULIN LISPRO 3 UNITS: 100 INJECTION, SOLUTION INTRAVENOUS; SUBCUTANEOUS at 12:55

## 2023-02-06 RX ADMIN — METOPROLOL 50 MG: 50 TABLET ORAL at 14:26

## 2023-02-06 RX ADMIN — AMLODIPINE BESYLATE 2.5 MG: 5 TABLET ORAL at 14:26

## 2023-02-06 RX ADMIN — IOPAMIDOL 140 ML: 755 INJECTION, SOLUTION INTRAVENOUS at 08:50

## 2023-02-06 RX ADMIN — SODIUM CHLORIDE, PRESERVATIVE FREE 10 ML: 5 INJECTION INTRAVENOUS at 14:27

## 2023-02-06 NOTE — PROGRESS NOTES
Problem: Diabetes Self-Management  Goal: *Disease process and treatment process  Description: Define diabetes and identify own type of diabetes; list 3 options for treating diabetes. 2/6/2023 0550 by Jessica Stein RN  Outcome: Progressing Towards Goal  2/6/2023 0549 by Jessica Stein RN  Outcome: Progressing Towards Goal  Goal: *Incorporating nutritional management into lifestyle  Description: Describe effect of type, amount and timing of food on blood glucose; list 3 methods for planning meals. 2/6/2023 0550 by Jessiac Stein RN  Outcome: Progressing Towards Goal  2/6/2023 0549 by Jessica Stein RN  Outcome: Progressing Towards Goal  Goal: *Incorporating physical activity into lifestyle  Description: State effect of exercise on blood glucose levels. 2/6/2023 0550 by Jessica Stein RN  Outcome: Progressing Towards Goal  2/6/2023 0549 by Jessica Stein RN  Outcome: Progressing Towards Goal  Goal: *Developing strategies to promote health/change behavior  Description: Define the ABC's of diabetes; identify appropriate screenings, schedule and personal plan for screenings. 2/6/2023 0550 by Jessica Stein RN  Outcome: Progressing Towards Goal  2/6/2023 0549 by Jessica Stein RN  Outcome: Progressing Towards Goal  Goal: *Using medications safely  Description: State effect of diabetes medications on diabetes; name diabetes medication taking, action and side effects. 2/6/2023 0550 by Jessica Stein RN  Outcome: Progressing Towards Goal  2/6/2023 0549 by Jessica Stein RN  Outcome: Progressing Towards Goal  Goal: *Monitoring blood glucose, interpreting and using results  Description: Identify recommended blood glucose targets  and personal targets.   Outcome: Progressing Towards Goal  Goal: *Prevention, detection, treatment of acute complications  Description: List symptoms of hyper- and hypoglycemia; describe how to treat low blood sugar and actions for lowering  high blood glucose level.  Outcome: Progressing Towards Goal  Goal: *Prevention, detection and treatment of chronic complications  Description: Define the natural course of diabetes and describe the relationship of blood glucose levels to long term complications of diabetes.   Outcome: Progressing Towards Goal  Goal: *Developing strategies to address psychosocial issues  Description: Describe feelings about living with diabetes; identify support needed and support network  Outcome: Progressing Towards Goal  Goal: *Insulin pump training  Outcome: Progressing Towards Goal  Goal: *Sick day guidelines  Outcome: Progressing Towards Goal  Goal: *Patient Specific Goal (EDIT GOAL, INSERT TEXT)  Outcome: Progressing Towards Goal

## 2023-02-06 NOTE — PROGRESS NOTES
Problem: TIA/CVA Stroke: 0-24 hours  Goal: Activity/Safety  Outcome: Progressing Towards Goal  Goal: Consults, if ordered  Outcome: Progressing Towards Goal  Seen by neurologist and teleneurologist    Goal: Diagnostic Test/Procedures  Outcome: Progressing Towards Goal  CT and CTA completted  Goal: Medications  Outcome: Progressing Towards Goal  TNK not recommended, heparin already scheduled  Goal: Respiratory  Outcome: Progressing Towards Goal  Patient is room air  Goal: *Hemodynamically stable  Outcome: Progressing Towards Goal  Spoke with dr in regards to hypertension  Goal: *Neurologically stable  Description: Absence of additional neurological deficits    Outcome: Progressing Towards Goal  Goal: *Stroke education started(Stroke Metric)  Outcome: Progressing Towards Goal     Stroke Education provided to patient and relative(s) and the following topics were discussed    1. Patients personal risk factors for stroke are hypertension and diabetes mellitus    2. Warning signs of Stroke:        * Sudden numbness or weakness of the face, arm or leg, especially on one side of          The body            * Sudden confusion, trouble speaking or understanding        * Sudden trouble seeing in one or both eyes        * Sudden trouble walking, dizziness, loss of balance or coordination        * Sudden severe headache with no known cause      3. Importance of activation Emergency Medical Services ( 9-1-1 ) immediately if experience any warning signs of stroke. 4. Be sure and schedule a follow-up appointment with your primary care doctor or any specialists as instructed. 5. You must take medicine every day to treat your risk factors for stroke. Be sure to take your medicines exactly as your doctor tells you: no more, no less. Know what your medicines are for , what they do. Anti-thrombotics /anticoagulants can help prevent strokes. You are taking the following medicine(s)  heparin     6.   Smoking and second-hand smoke greatly increase your risk of stroke, cardiovascular disease and death. Smoking history former smoker    7. Information provided was [facility] Stroke Education Binder    8. Documentation of teaching completed in Patient Education Activity and on Care Plan with teaching response noted?   yes

## 2023-02-06 NOTE — PROGRESS NOTES
Jan Mondragon [facility]  [address] [facility], [place], [address] [place] [address]  [phone]      Hospitalist  Progress Note      NAME:         Carissa Pérez   :        1934  MRM:        [id number]    Date of service: 2023      Subjective: Patient admitted with [facility] and seizure. No prior hx of seizures. Had a RUE weakness with speech changes and a code S called. She does have a right shoulder disease. More awake. I have discussed with her daughter, grand-daughter, nurse, tele-neurology and neurology.        Objective:    Vital Signs:    Visit Vitals  BP (!) 165/70   Pulse 86   Temp 98.3 °F (36.8 °C)   Resp 26   Ht 5' 2\" (1.575 m)   Wt 64.9 kg (143 lb)   SpO2 99%   BMI 26.16 kg/m²        Intake/Output Summary (Last 24 hours) at 2023 1158  Last data filed at 2023 5852  Gross per 24 hour   Intake 1815 ml   Output 1475 ml   Net 340 ml        Current inpatient medications reviewed:  Current Facility-Administered Medications   Medication Dose Route Frequency    insulin glargine (LANTUS) injection 19 Units  0.3 Units/kg SubCUTAneous Q24H    metoprolol tartrate (LOPRESSOR) tablet 50 mg  50 mg Oral Q12H    amLODIPine (NORVASC) tablet 2.5 mg  2.5 mg Oral DAILY    levETIRAcetam (KEPPRA) injection 500 mg  500 mg IntraVENous Q12H    pantoprazole (PROTONIX) 40 mg in 0.9% sodium chloride 10 mL injection  40 mg IntraVENous DAILY    LORazepam (ATIVAN) injection 2 mg  2 mg IntraVENous Q4H PRN    glucose chewable tablet 16 g  4 Tablet Oral PRN    glucagon (GLUCAGEN) injection 1 mg  1 mg IntraMUSCular PRN    dextrose 10% infusion 0-250 mL  0-250 mL IntraVENous PRN    insulin lispro (HUMALOG) injection   SubCUTAneous AC&HS    sodium chloride (NS) flush 5-40 mL  5-40 mL IntraVENous Q8H    sodium chloride (NS) flush 5-40 mL  5-40 mL IntraVENous PRN    acetaminophen (TYLENOL) tablet 650 mg  650 mg Oral Q6H PRN    Or    acetaminophen (TYLENOL) suppository 650 mg  650 mg Rectal Q6H PRN    polyethylene glycol (MIRALAX) packet 17 g  17 g Oral DAILY PRN    ondansetron (ZOFRAN ODT) tablet 4 mg  4 mg Oral Q8H PRN    Or    ondansetron (ZOFRAN) injection 4 mg  4 mg IntraVENous Q6H PRN    heparin (porcine) injection 5,000 Units  5,000 Units SubCUTAneous Q8H       Physical Examination:    General:   Weak and ill but not in acute distress    Eyes:   pink conjunctivae, PERRLA with no discharge. ENT:   no ottorrhea or rhinorrhea with dry mucous membranes  Neck: no masses, thyroid non-tender and trachea central.  Pulm:  decreased but clear breath sounds without crackles or wheezes  Card:  no JVD or murmurs, has bradycardia, without thrills, bruits or peripheral edema  Abd:  Soft, non-distended, normoactive bowel sounds   Musc:  No cyanosis, clubbing, atrophy. Reduced ROM right shoulder   Skin:  No rashes, bruising or ulcers. Neuro: awake and alert but frail, Follows command. Diagnostic testing:    Laboratory data reviewed and independently interpreted:    Recent Labs     02/04/23 2010   WBC 6.9   HGB 13.0   HCT 40.1        Recent Labs     02/06/23  0250 02/05/23  1006 02/05/23  0606 02/05/23  0145 02/04/23  2330 02/04/23  2013 02/04/23 2010   * 145 144 143  --   --  128*   K 4.9 3.9 2.7* 2.4*  --   --  3.6   * 113* 112* 108  --   --  89*   CO2 23 25 27 25  --   --  24   * 222* 107* 337*   < >  --  1,199*   BUN 15 22* 26* 25*  --   --  28*   CREA 1.59* 1.80* 1.82* 1.98*  --   --  2.60*   CA 8.2* 8.2* 8.4* 8.6  --   --  9.4   MG  --  2.0 2.0 2.1  --   --  2.5*   PHOS  --   --   --   --   --   --  3.3   ALB  --   --   --   --   --   --  3.9   ALT  --   --   --   --   --   --  18   INR  --   --   --   --   --  1.1  --     < > = values in this interval not displayed.      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 Blood glucose much high. A1c 12.2. Off insulin gtt. DC IV fluids. Increase basal Lantus to 0.3 units/kg, SSi per protocol. DM diet after speech eval. DM team to see. PT, OT as tolerated. ?Seizure ([facility] [address] (2/4/2023) / AMS POA: witnessed stiffening right side prior to admission. CT scan head neg for acute changes. MRi brain neg for acute changes. EEG pending. Given her change in her speech, a CT code neuro was neg. CTA head and neck showed no large vessel occlusion or stenosis. Continue empiric Keppra. Neurology following. Speech eval.    Chronic heart failure with preserved ejection fraction (HFpEF) ([address] (2/5/2023) / Ischemic cardiomyopathy POA: CXR clear. Recent Echo showed an EF of 55-60%. Stable. Monitor clinical progress    CKD (chronic kidney disease) stage 3, GFR 30-59 ml/min /  Hypokalemia (2/5/2023) POA: K+ now normal. SCr better. Continue to follow renal function. Hypertension POA: BPs now better. Resume Metoprolol and Amlodipine      Care Plan discussed with: Family, nursing and consultants    Prophylaxis:  Hep SQ    Diet:  Diabetic    Patient status: Inpatient     Level of care: Telemetry    Code status: FULL code       Anticipated Disposition:  Home w/Family    PCP:      Gaston Garcia NP     I have personally examined and treated the patient at bedside during this period.  To assist coordination of care and communication with nursing and staff, this note may be preliminary early in the day, but finalized by end of the day.         ___________________________________________________    Attending Physician:   Diaz Hall MD

## 2023-02-06 NOTE — PROGRESS NOTES
[facility] and [address] at [facility] at [address] [address] [address]   [phone]              Chief Complaint   Patient presents with    Seizure     Current Facility-Administered Medications   Medication Dose Route Frequency Provider Last Rate Last Admin    insulin glargine (LANTUS) injection 19 Units  0.3 Units/kg SubCUTAneous Q24H Merlin Liner, MD        levETIRAcetam (KEPPRA) injection 500 mg  500 mg IntraVENous Q12H Merlin Liner, MD   500 mg at 02/06/23 0919    pantoprazole (PROTONIX) 40 mg in 0.9% sodium chloride 10 mL injection  40 mg IntraVENous DAILY Merlin Liner, MD   40 mg at 02/06/23 0917    LORazepam (ATIVAN) injection 2 mg  2 mg IntraVENous Q4H PRN Anderson Amaya MD        glucose chewable tablet 16 g  4 Tablet Oral PRN Merlin Liner, MD        glucagon (GLUCAGEN) injection 1 mg  1 mg IntraMUSCular PRN Merlin Liner, MD        dextrose 10% infusion 0-250 mL  0-250 mL IntraVENous PRN Merlin Liner, MD        insulin lispro (HUMALOG) injection   SubCUTAneous AC&HS Merlin Liner, MD   7 Units at 02/06/23 3790    sodium chloride (NS) flush 5-40 mL  5-40 mL IntraVENous Q8H Racquel Silva H, DO   10 mL at 02/05/23 2106    sodium chloride (NS) flush 5-40 mL  5-40 mL IntraVENous PRN Racquel Silva H, DO        acetaminophen (TYLENOL) tablet 650 mg  650 mg Oral Q6H PRN Racquel Silva H, DO        Or    acetaminophen (TYLENOL) suppository 650 mg  650 mg Rectal Q6H PRN Racquel Silva H, DO        polyethylene glycol (MIRALAX) packet 17 g  17 g Oral DAILY PRN Racquel Silva H, DO        ondansetron (ZOFRAN ODT) tablet 4 mg  4 mg Oral Q8H PRN Racquel Silva H, DO        Or    ondansetron [facility]) injection 4 mg  4 mg IntraVENous Q6H PRN Racquel Silva H, DO        heparin (porcine) injection 5,000 Units  5,000 Units SubCUTAneous Monique Coombs Sid Hartman, DO   5,000 Units at 02/06/23 0503      Allergies   Allergen Reactions    Codeine Nausea and Vomiting    Compazine [Prochlorperazine] Other (comments)     Facial droop    Dilaudid [Hydromorphone] Nausea and Vomiting    Morphine Nausea and Vomiting    Sulfa (Sulfonamide Antibiotics) Nausea and Vomiting     Social History     Tobacco Use    Smoking status: Former     Years: 15.00     Types: Cigarettes    Smokeless tobacco: Never   Substance Use Topics    Alcohol use: Yes     Comment: very rarely     Drug use: Never   We are following up this 54-year-old lady with new onset seizure admitted with hyperosmolar hyperglycemic state. She had what was described as focal seizure witnessed by her granddaughter and also by the ED physician. MRI of the brain personally reviewed with age-related changes  Stroke code was called this morning for garbled speech  CTA and dry CT of the head unremarkable. 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 Ramana Pires discussed with me this am.     She is seen with her family at the bedside. Her daughter notes this morning she went to give her an egg to eat breakfast and the patient had difficulty moving that right upper extremity. She was very shaky and unable to control it. Code stroke was called with the results above. Examination  Visit Vitals  BP (!) 177/54 (BP 1 Location: Right upper arm, BP Patient Position: At rest)   Pulse 91   Temp 98.3 °F (36.8 °C)   Resp 17   Ht 5' 2\" (1.575 m)   Wt 64.9 kg (143 lb)   SpO2 96%   BMI 26.16 kg/m²     She is awake and alert. She is oriented to February 7 and she knows the president is Brittni Mccracken and she is in the hospital and when I give her clues she says [facility]. Follows all commands. She has no pronation or drift. She resist symmetrically this morning.    is a little weaker on the right versus the left    Impression/Plan  80year-old lady with recurrent stereotypical episodes as above  EEG rapid was not done yesterday  We will put her on continuous EEG  Physical and Occupational Therapy to see    Cinthia Martin MD        This note was created using voice recognition software. Despite editing, there may be syntax errors.

## 2023-02-06 NOTE — PROGRESS NOTES
Problem: Patient Education: Go to Patient Education Activity  Goal: Patient/Family Education  Outcome: Progressing Towards Goal     Problem: Falls - Risk of  Goal: *Absence of Falls  Description: Document Nissa Huizar Fall Risk and appropriate interventions in the flowsheet.   Outcome: Progressing Towards Goal  Note: Fall Risk Interventions:            Medication Interventions: Bed/chair exit alarm, Teach patient to arise slowly, Patient to call before getting OOB    Elimination Interventions: Call light in reach, Patient to call for help with toileting needs

## 2023-02-06 NOTE — PROGRESS NOTES
RUR 14%    Prior to hospitalization    Patient lives with her daughter . Lissa Quiñones Granddaughter assists with patient's care at home. Patient lives in a three story home with 1 entry step . The bed/bath on main level. Patient has prescription drug coverage. Pt uses [facility]  pharmacy at [address] Patient requires some assistance with ADLs. Pt can provide her own self hygiene needs but needs assist with mobility and safety. Patient performs her ADLs with assistance. Home DME:walker,transport chair  Pharmacy of choice:[id number]       Primary Decision MakerRolywill Metz - [phone]    Secondary Decision Maker: Maame Medina - [phone]    Code stroke this am.  Stroke education provided by pt./family. Telemetry orders. Attending:Please consider PT/OT when pt is stable to work with therapy.     Yinka Palacio

## 2023-02-06 NOTE — PROGRESS NOTES
Signs and symptoms: garbled speech   Time of Code Stroke Activation:0800  Provider at bedside time:  0800  VAN score: Negative  Last Known Well (Time): 0730  Blood Glucose Result/Time: 286   Blood Pressure: 172/57    RRT RN arrived at room at 0802 to assist with code stroke. RRT and primary RN transported pt to CT with monitor. Teleneuro evaluated pt. Pt was transported back to ICU. All stroke critical times and NIHSS documented by primary RN.

## 2023-02-06 NOTE — PROGRESS NOTES
Problem: Dysphagia (Adult)  Goal: *Acute Goals and Plan of Care (Insert Text)  Description: Speech pathology goals  Initiated 2/6/2023  1. Patient will tolerate regular/thin liquid diet with no overt s/s aspiration or adverse effects within 7 days  Outcome: Progressing Towards Goal     SPEECH LANGUAGE PATHOLOGY BEDSIDE SWALLOW EVALUATION  Patient: Inocencia Fagan (55 y.o. female)  Date: 2/6/2023  Primary Diagnosis: Hyperosmolar hyperglycemic state (HHS) ([facility] [address] [E11.00]  Seizure ([facility] [address] [R56.9]       Precautions:        ASSESSMENT :  Note SLP was consulted due to Code S called this morning and patient failed the nursing dysphagia screen for coughing delayed after thin liquid. MRI pending. Based on the objective data described below, the patient presents with suspected baseline oral/pharyngeal swallow. Patient edentulous, daughter reported that she only wears her top dentures and only when she goes out. She does not always wear her dentures to eat. Patient with cough prior to and during PO intake, therefore doubt related to aspiration. Patient and daughter reported that swallow function appears at baseline as well. Daughter also reported speech is improving however not yet at baseline. Patient will benefit from skilled intervention to address the above impairments. Patients rehabilitation potential is considered to be Good     PLAN :  Recommendations and Planned Interventions:  -Regular/thin liquid diet. Given patient is a picky eater with limited appetite, recommend most liberal diet so she can choose food items that are appetizing  -SLP to follow for diet tolerance and further evaluation pending MRI results  Frequency/Duration: Patient will be followed by speech-language pathology 2 times a week to address goals. Discharge Recommendations: To Be Determined     SUBJECTIVE:   Patient stated I can have it?  re: grape juice. Ox4.     OBJECTIVE:     Past Medical History:   Diagnosis Date    CAD (coronary artery disease)     CHF (congestive heart failure), NYHA class I, chronic, systolic (HCC)     Systolic and diastolic CHF per echocardiogram of 12/25/2021. Patient was found to have LVEF 45%    Chronic kidney disease     stage III/IV:Creatinine 1.45-1.7 with GFR in the low 30s-high 20s    Diabetes mellitus type 2 in nonobese [facility])     Hypertension     Ischemic cardiomyopathy     Echocardiogram of 12/25/2021: mild left ventricular systolic dysfunction (EF 28%) with inferior and basal-mid septal akinesis. Diastolic dysfunction. Peripheral vascular disease [facility])      Past Surgical History:   Procedure Laterality Date    COLONOSCOPY N/A 02/04/2022    COLONOSCOPY performed by Madelin Antunez MD at [facility] ENDOSCOPY    HX APPENDECTOMY      HX CORONARY STENT PLACEMENT  12/24/2021    stents x 3 to mid-distal right coronary artery going into PDA for 100% occlusion of RCA; and also had nonobstructive disease in 30% LAD stenosis, 70% diagonal 1 stenosis. She had proximal 30% stenosis in the codominant left circumflex artery     HX HYSTERECTOMY      HX KNEE REPLACEMENT Left     HX OTHER SURGICAL      head sx x 2 nerve related    HX OTHER SURGICAL      Back surgery x 2    HX TONSILLECTOMY       Prior Level of Function/Home Situation:   Home Situation  Support Systems: Child(watson), Other Family Member(s)  Patient Expects to be Discharged to[de-identified] Home with family assistance  Diet prior to admission: regular/thin  Current Diet:  NPO   Cognitive and Communication Status:  Neurologic State: Alert, Appropriate for age  Orientation Level: Oriented X4  Cognition: Follows commands           Oral Assessment:  Oral Assessment  Labial: No impairment  Dentition: Edentulous; Other (comment) (has upper dentures but does not always wear them to eat per daughter)  Oral Hygiene: moist  Lingual: No impairment  Velum: No impairment  Mandible: No impairment  P.O.  Trials:  Patient Position: up in bed  Vocal quality prior to P.O.: Low volume  Consistency Presented: Thin liquid; Solid  How Presented: Self-fed/presented;SLP-fed/presented;Straw     Bolus Acceptance: No impairment  Bolus Formation/Control: Impaired  Type of Impairment: Delayed  Propulsion: Delayed (# of seconds)  Oral Residue: None        Aspiration Signs/Symptoms: Strong cough (did not appear related to aspiration)  Pharyngeal Phase Characteristics: No impairment, issues, or problems                      NOMS:   The NOMS functional outcome measure was used to quantify this patient's level of swallowing impairment. Based on the NOMS, the patient was determined to be at level 6 for swallow function       NOMS Swallowing Levels:  Level 1 (CN): NPO  Level 2 (CM): NPO but takes consistency in therapy  Level 3 (CL): Takes less than 50% of nutrition p.o. and continues with nonoral feedings; and/or safe with mod cues; and/or max diet restriction  Level 4 (CK): Safe swallow but needs mod cues; and/or mod diet restriction; and/or still requires some nonoral feeding/supplements  Level 5 (CJ): Safe swallow with min diet restriction; and/or needs min cues  Level 6 (CI): Independent with p.o.; rare cues; usually self cues; may need to avoid some foods or needs extra time  Level 7 ([address] [place] [address]): Independent for all p.o.  GRAY. (2003). National Outcomes Measurement System (NOMS): Adult Speech-Language Pathology User's Guide. Pain:  Pain Scale 1: Numeric (0 - 10)  Pain Intensity 1: 0       After treatment:   Patient left in no apparent distress in bed, Call bell within reach, Nursing notified, and Caregiver / family present    COMMUNICATION/EDUCATION:   Patient was educated regarding her deficit(s) of WFL swallow as this relates to her diagnosis. She demonstrated Good understanding as evidenced by verbalizing understanding. The patient's plan of care including recommendations, planned interventions, and recommended diet changes were discussed with: Registered nurse.      Patient/family have participated as able in goal setting and plan of care. Patient/family agree to work toward stated goals and plan of care.     Thank you for this referral.  Gab Moeller, SLP  Time Calculation: 15 mins

## 2023-02-06 NOTE — CONSULTS
Consult Date: 2/5/2023    Consults    88F w HHNK and seizure episode. Subjective   Somnolent. Daughter at bedisde. Past Medical History:   Diagnosis Date    CAD (coronary artery disease)     CHF (congestive heart failure), NYHA class I, chronic, systolic (HCC)     Systolic and diastolic CHF per echocardiogram of 12/25/2021. Patient was found to have LVEF 45%    Chronic kidney disease     stage III/IV:Creatinine 1.45-1.7 with GFR in the low 30s-high 20s    Diabetes mellitus type 2 in nonobese [facility])     Hypertension     Ischemic cardiomyopathy     Echocardiogram of 12/25/2021: mild left ventricular systolic dysfunction (EF 94%) with inferior and basal-mid septal akinesis. Diastolic dysfunction. Peripheral vascular disease [facility])       Past Surgical History:   Procedure Laterality Date    COLONOSCOPY N/A 02/04/2022    [facility]    HX APPENDECTOMY      HX CORONARY STENT PLACEMENT  12/24/2021    stents x 3 to mid-distal right coronary artery going into PDA for 100% occlusion of RCA; and also had nonobstructive disease in 30% LAD stenosis, 70% diagonal 1 stenosis.   She had proximal 30% stenosis in the codominant left circumflex artery     HX HYSTERECTOMY      HX KNEE REPLACEMENT Left     HX OTHER SURGICAL      head sx x 2 nerve related    HX OTHER SURGICAL      Back surgery x 2    HX TONSILLECTOMY       Family History   Problem Relation Age of Onset    Heart Disease Mother       Social History     Tobacco Use    Smoking status: Former     Years: 15.00     Types: Cigarettes    Smokeless tobacco: Never   Substance Use Topics    Alcohol use: Yes     Comment: very rarely        Current Facility-Administered Medications   Medication Dose Route Frequency Provider Last Rate Last Admin    dextrose 5% - 0.9% NaCl with KCl 40 mEq/L infusion   IntraVENous CONTINUOUS Tashi Roche  mL/hr at 02/05/23 1351 Rate Change at 02/05/23 1351    levETIRAcetam (KEPPRA) injection 500 mg  500 mg IntraVENous Q12H Elmo Degroot MD   500 mg at 02/05/23 2035    pantoprazole (PROTONIX) 40 mg in 0.9% sodium chloride 10 mL injection  40 mg IntraVENous DAILY Elmo Degroot MD   40 mg at 02/05/23 0804    LORazepam (ATIVAN) injection 2 mg  2 mg IntraVENous Q4H PRN Sari Lemus MD        insulin glargine (LANTUS) injection 13 Units  0.2 Units/kg SubCUTAneous DAILY Elmo Degroot MD   13 Units at 02/05/23 1200    glucose chewable tablet 16 g  4 Tablet Oral PRN Elmo Degroot MD        glucagon (GLUCAGEN) injection 1 mg  1 mg IntraMUSCular PRN Elmo Degroot MD        dextrose 10% infusion 0-250 mL  0-250 mL IntraVENous PRN Elmo Degroot MD        insulin lispro (HUMALOG) injection   SubCUTAneous AC&HS Elmo Degroot MD   4 Units at 02/05/23 2105    sodium chloride (NS) flush 5-40 mL  5-40 mL IntraVENous Q8H Raenette Albe H, DO   10 mL at 02/05/23 2106    sodium chloride (NS) flush 5-40 mL  5-40 mL IntraVENous PRN Raenette Albe H, DO        acetaminophen (TYLENOL) tablet 650 mg  650 mg Oral Q6H PRN Raenette Albe H, DO        Or    acetaminophen (TYLENOL) suppository 650 mg  650 mg Rectal Q6H PRN Raenette Albe H, DO        polyethylene glycol (MIRALAX) packet 17 g  17 g Oral DAILY PRN Raenette Albe H, DO        ondansetron (ZOFRAN ODT) tablet 4 mg  4 mg Oral Q8H PRN Raenette Albe H, DO        Or    ondansetron [facility]) injection 4 mg  4 mg IntraVENous Q6H PRN Raenette Albe H, DO        heparin (porcine) injection 5,000 Units  5,000 Units SubCUTAneous Q8H Raenette Albe H, DO   5,000 Units at 02/05/23 2106        Review of Systems   Unable to perform ROS: Acuity of condition     Objective     Vital signs for last 24 hours:  Visit Vitals  BP (!) 146/42   Pulse 82   Temp 97.8 °F (36.6 °C)   Resp 20   Ht 5' 2\" (1.575 m)   Wt 64.9 kg (143 lb)   SpO2 100%   BMI 26.16 kg/m²       Intake/Output this shift:  Current Shift: No intake/output data recorded.   Last 3 Shifts: 02/04 0701 - 02/05 1900  In: 3411.7 [I.V.:3411.7]  Out: 900 [Urine:900]    Data Review:   Recent Results (from the past 24 hour(s))   GLUCOSE, POC    Collection Time: 02/04/23 10:42 PM   Result Value Ref Range    Glucose (POC) >600 (HH) 65 - 117 mg/dL    Performed by Michelle Hall    Collection Time: 02/04/23 10:44 PM   Result Value Ref Range    Glucose 601 mg/dL    Insulin order 21.6 units/hour    Insulin adminstered 21.6 units/hour    Multiplier 0.040     Low target 200 mg/dL    High target 300 mg/dL    D50 order 0.0 ml    D50 administered 0.00 ml    Minutes until next BG 60 min    Order initials tiq     Administered initials vs    GLUCOSE, RANDOM    Collection Time: 02/04/23 11:30 PM   Result Value Ref Range    Glucose 735 (HH) 65 - 100 mg/dL   GLUCOSE, POC    Collection Time: 02/04/23 11:46 PM   Result Value Ref Range    Glucose (POC) >600 (HH) 65 - 117 mg/dL    Performed by Michelle Hall    Collection Time: 02/04/23 11:47 PM   Result Value Ref Range    Glucose 601 mg/dL    Insulin order 27.1 units/hour    Insulin adminstered 27.1 units/hour    Multiplier 0.050     Low target 200 mg/dL    High target 300 mg/dL    D50 order 0.0 ml    D50 administered 0.00 ml    Minutes until next BG 60 min    Order initials tiq     Administered initials vs    GLUCOSE, POC    Collection Time: 02/05/23 12:49 AM   Result Value Ref Range    Glucose (POC) 462 (H) 65 - 117 mg/dL    Performed by Hamilton Peterson RN    GLUCOSTABILIZER    Collection Time: 02/05/23 12:50 AM   Result Value Ref Range    Glucose 462 mg/dL    Insulin order 20.1 units/hour    Insulin adminstered 20.1 units/hour    Multiplier 0.050     Low target 200 mg/dL    High target 300 mg/dL    D50 order 0.0 ml    D50 administered 0.00 ml    Minutes until next BG 60 min    Order initials HR     Administered initials HR    METABOLIC PANEL, BASIC    Collection Time: 02/05/23  1:45 AM   Result Value Ref Range    Sodium 143 136 - 145 mmol/L Potassium 2.4 (LL) 3.5 - 5.1 mmol/L    Chloride 108 97 - 108 mmol/L    CO2 25 21 - 32 mmol/L    Anion gap 10 5 - 15 mmol/L    Glucose 337 (H) 65 - 100 mg/dL    BUN 25 (H) 6 - 20 MG/DL    Creatinine 1.98 (H) 0.55 - 1.02 MG/DL    BUN/Creatinine ratio 13 12 - 20      eGFR 24 (L) >60 ml/min/1.73m2    Calcium 8.6 8.5 - 10.1 MG/DL   MAGNESIUM    Collection Time: 02/05/23  1:45 AM   Result Value Ref Range    Magnesium 2.1 1.6 - 2.4 mg/dL   GLUCOSE, POC    Collection Time: 02/05/23  1:49 AM   Result Value Ref Range    Glucose (POC) 345 (H) 65 - 117 mg/dL    Performed by Prisca Mario RN    GLUCOSTABILIZER    Collection Time: 02/05/23  1:50 AM   Result Value Ref Range    Glucose 345 mg/dL    Insulin order 14.3 units/hour    Insulin adminstered 14.3 units/hour    Multiplier 0.050     Low target 200 mg/dL    High target 300 mg/dL    D50 order 0.0 ml    D50 administered 0.00 ml    Minutes until next BG 60 min    Order initials HR     Administered initials HR    GLUCOSE, POC    Collection Time: 02/05/23  2:51 AM   Result Value Ref Range    Glucose (POC) 236 (H) 65 - 117 mg/dL    Performed by Prisca Mario RN    GLUCOSTABILIZER    Collection Time: 02/05/23  2:52 AM   Result Value Ref Range    Glucose 236 mg/dL    Insulin order 8.8 units/hour    Insulin adminstered 8.8 units/hour    Multiplier 0.050     Low target 200 mg/dL    High target 300 mg/dL    D50 order 0.0 ml    D50 administered 0.00 ml    Minutes until next BG 60 min    Order initials HR     Administered initials HR    GLUCOSE, POC    Collection Time: 02/05/23  3:55 AM   Result Value Ref Range    Glucose (POC) 123 (H) 65 - 117 mg/dL    Performed by Prisca Mario RN    GLUCOSTABILIZER    Collection Time: 02/05/23  3:55 AM   Result Value Ref Range    Glucose 123 mg/dL    Insulin order 2.5 units/hour    Insulin adminstered 2.5 units/hour    Multiplier 0.040     Low target 200 mg/dL    High target 300 mg/dL    D50 order 0.0 ml    D50 administered 0.00 ml    Minutes until next BG 60 min    Order initials HR     Administered initials HR    GLUCOSE, POC    Collection Time: 02/05/23  4:58 AM   Result Value Ref Range    Glucose (POC) 96 65 - 117 mg/dL    Performed by Prisca Mario RN    GLUCOSTABILIZER    Collection Time: 02/05/23  4:58 AM   Result Value Ref Range    Glucose 96 mg/dL    Insulin order 1.1 units/hour    Insulin adminstered 1.1 units/hour    Multiplier 0.030     Low target 200 mg/dL    High target 300 mg/dL    D50 order 0.0 ml    D50 administered 0.00 ml    Minutes until next BG 60 min    Order initials HR     Administered initials HR    GLUCOSE, POC    Collection Time: 02/05/23  5:57 AM   Result Value Ref Range    Glucose (POC) 102 65 - 117 mg/dL    Performed by Prisca Mario RN    GLUCOSTABILIZER    Collection Time: 02/05/23  5:58 AM   Result Value Ref Range    Glucose 102 mg/dL    Insulin order 0.8 units/hour    Insulin adminstered 0.8 units/hour    Multiplier 0.020     Low target 200 mg/dL    High target 300 mg/dL    D50 order 0.0 ml    D50 administered 0.00 ml    Minutes until next BG 60 min    Order initials hr     Administered initials hr    MAGNESIUM    Collection Time: 02/05/23  6:06 AM   Result Value Ref Range    Magnesium 2.0 1.6 - 2.4 mg/dL   METABOLIC PANEL, BASIC    Collection Time: 02/05/23  6:06 AM   Result Value Ref Range    Sodium 144 136 - 145 mmol/L    Potassium 2.7 (LL) 3.5 - 5.1 mmol/L    Chloride 112 (H) 97 - 108 mmol/L    CO2 27 21 - 32 mmol/L    Anion gap 5 5 - 15 mmol/L    Glucose 107 (H) 65 - 100 mg/dL    BUN 26 (H) 6 - 20 MG/DL    Creatinine 1.82 (H) 0.55 - 1.02 MG/DL    BUN/Creatinine ratio 14 12 - 20      eGFR 26 (L) >60 ml/min/1.73m2    Calcium 8.4 (L) 8.5 - 10.1 MG/DL   GLUCOSE, POC    Collection Time: 02/05/23  7:00 AM   Result Value Ref Range    Glucose (POC) 119 (H) 65 - 117 mg/dL    Performed by Prisca Mario RN    GLUCOSTABILIZER    Collection Time: 02/05/23  7:00 AM   Result Value Ref Range    Glucose 119 mg/dL    Insulin order 0.6 units/hour    Insulin adminstered 0.6 units/hour    Multiplier 0.010     Low target 200 mg/dL    High target 300 mg/dL    D50 order 0.0 ml    D50 administered 0.00 ml    Minutes until next BG 60 min    Order initials hr     Administered initials hr    GLUCOSE, POC    Collection Time: 02/05/23  8:01 AM   Result Value Ref Range    Glucose (POC) 144 (H) 65 - 117 mg/dL    Performed by Jovana Trujillo    Collection Time: 02/05/23  8:02 AM   Result Value Ref Range    Glucose 144 mg/dL    Insulin order 0.0 units/hour    Insulin adminstered 0.0 units/hour    Multiplier 0.000     Low target 200 mg/dL    High target 300 mg/dL    D50 order 0.0 ml    D50 administered 0.00 ml    Minutes until next BG 60 min    Order initials sm     Administered initials sm    GLUCOSE, POC    Collection Time: 02/05/23  8:59 AM   Result Value Ref Range    Glucose (POC) 179 (H) 65 - 117 mg/dL    Performed by Jovana Trujillo    Collection Time: 02/05/23  8:59 AM   Result Value Ref Range    Glucose 179 mg/dL    Insulin order 0.0 units/hour    Insulin adminstered 0.0 units/hour    Multiplier 0.000     Low target 200 mg/dL    High target 300 mg/dL    D50 order 0.0 ml    D50 administered 0.00 ml    Minutes until next BG 60 min    Order initials sm     Administered initials sm    GLUCOSE, POC    Collection Time: 02/05/23 10:02 AM   Result Value Ref Range    Glucose (POC) 201 (H) 65 - 117 mg/dL    Performed by Jovana Trujillo    Collection Time: 02/05/23 10:02 AM   Result Value Ref Range    Glucose 201 mg/dL    Insulin order 0.1 units/hour    Insulin adminstered 0.1 units/hour    Multiplier 0.000     Low target 200 mg/dL    High target 300 mg/dL    D50 order 0.0 ml    D50 administered 0.00 ml    Minutes until next BG 60 min    Order initials sm     Administered initials sm    MAGNESIUM    Collection Time: 02/05/23 10:06 AM   Result Value Ref Range    Magnesium 2.0 1.6 - 2.4 mg/dL   METABOLIC PANEL, BASIC    Collection Time: 02/05/23 10:06 AM   Result Value Ref Range    Sodium 145 136 - 145 mmol/L    Potassium 3.9 3.5 - 5.1 mmol/L    Chloride 113 (H) 97 - 108 mmol/L    CO2 25 21 - 32 mmol/L    Anion gap 7 5 - 15 mmol/L    Glucose 222 (H) 65 - 100 mg/dL    BUN 22 (H) 6 - 20 MG/DL    Creatinine 1.80 (H) 0.55 - 1.02 MG/DL    BUN/Creatinine ratio 12 12 - 20      eGFR 27 (L) >60 ml/min/1.73m2    Calcium 8.2 (L) 8.5 - 10.1 MG/DL   GLUCOSE, POC    Collection Time: 02/05/23 11:00 AM   Result Value Ref Range    Glucose (POC) 232 (H) 65 - 117 mg/dL    Performed by Corinthia Sicard    Collection Time: 02/05/23 11:00 AM   Result Value Ref Range    Glucose 232 mg/dL    Insulin order 0.1 units/hour    Insulin adminstered 0.1 units/hour    Multiplier 0.000     Low target 200 mg/dL    High target 300 mg/dL    D50 order 0.0 ml    D50 administered 0.00 ml    Minutes until next BG 60 min    Order initials sm     Administered initials sm    GLUCOSE, POC    Collection Time: 02/05/23 11:59 AM   Result Value Ref Range    Glucose (POC) 220 (H) 65 - 117 mg/dL    Performed by Corinthia Sicard    Collection Time: 02/05/23 11:59 AM   Result Value Ref Range    Glucose 220 mg/dL    Insulin order 0.1 units/hour    Insulin adminstered 0.1 units/hour    Multiplier 0.000     Low target 200 mg/dL    High target 300 mg/dL    D50 order 0.0 ml    D50 administered 0.00 ml    Minutes until next BG 60 min    Order initials sm     Administered initials sm    GLUCOSE, POC    Collection Time: 02/05/23  1:03 PM   Result Value Ref Range    Glucose (POC) 261 (H) 65 - 117 mg/dL    Performed by Corinthia Sicard    Collection Time: 02/05/23  1:03 PM   Result Value Ref Range    Glucose 261 mg/dL    Insulin order 0.1 units/hour    Insulin adminstered 0.1 units/hour    Multiplier 0.000     Low target 200 mg/dL    High target 300 mg/dL    D50 order 0.0 ml    D50 administered 0.00 ml    Minutes until next BG 60 min    Order initials sm     Administered initials sm    GLUCOSE, POC    Collection Time: 02/05/23  4:55 PM   Result Value Ref Range    Glucose (POC) 320 (H) 65 - 117 mg/dL    Performed by Topher Stephens    GLUCOSE, POC    Collection Time: 02/05/23  8:46 PM   Result Value Ref Range    Glucose (POC) 215 (H) 65 - 117 mg/dL    Performed by Soco Stuart        Physical Exam  Constitutional:       General: She is not in acute distress. Appearance: Normal appearance. She is not toxic-appearing. HENT:      Head: Normocephalic. Nose: Nose normal.      Mouth/Throat:      Pharynx: Oropharynx is clear. Cardiovascular:      Rate and Rhythm: Normal rate. Pulmonary:      Effort: Pulmonary effort is normal. No respiratory distress. Breath sounds: No wheezing. Abdominal:      General: Abdomen is flat. Skin:     General: Skin is dry. Neurological:      General: No focal deficit present. Assessment & Plan    Neuro: Remains mildly encephalopathic, following treatment for hyperosmolar state, and seizure. Cont empiric keppra, followup neurology. Ativan prn for breakthrough seizures. CVS: Hemodynamically stable. Previous LEVF normal.     Pulm: Maintaining oxygenation on supplemental O2    Renal: JOANNA due to osmotic diuresis. Improving. Monitor Cr and urine put. Avoid nephrotoxic agents    GI: Advance diet when able     FEN: IVF fluid replacement, avoid NS for hyperchloremia in setting of JOANNA. Replete K. Heme: No issues    ID: No evidence of underlying infection. Endo: Gycemic control -180    Ppx: PPI, Hep sQ    Critical care time: 35 minutes for seizure, encephalopathy, acute renal failure with high probability of imminent  life-threatening deterioration, spent in patient evaluation via synchronous audio-visual communication platform, chart and data review, development and ordering of treatment plan, reassessment, physician discussion.  Time was exclusive of separately billable procedures and treating other patients and teaching time.

## 2023-02-07 LAB
ANION GAP SERPL CALC-SCNC: 6 MMOL/L (ref 5–15)
BUN SERPL-MCNC: 10 MG/DL (ref 6–20)
BUN/CREAT SERPL: 6 (ref 12–20)
CALCIUM SERPL-MCNC: 8.6 MG/DL (ref 8.5–10.1)
CHLORIDE SERPL-SCNC: 117 MMOL/L (ref 97–108)
CO2 SERPL-SCNC: 24 MMOL/L (ref 21–32)
CREAT SERPL-MCNC: 1.59 MG/DL (ref 0.55–1.02)
GLUCOSE BLD STRIP.AUTO-MCNC: 147 MG/DL (ref 65–117)
GLUCOSE BLD STRIP.AUTO-MCNC: 185 MG/DL (ref 65–117)
GLUCOSE BLD STRIP.AUTO-MCNC: 204 MG/DL (ref 65–117)
GLUCOSE BLD STRIP.AUTO-MCNC: 300 MG/DL (ref 65–117)
GLUCOSE SERPL-MCNC: 141 MG/DL (ref 65–100)
POTASSIUM SERPL-SCNC: 4.7 MMOL/L (ref 3.5–5.1)
SERVICE CMNT-IMP: ABNORMAL
SODIUM SERPL-SCNC: 147 MMOL/L (ref 136–145)

## 2023-02-07 PROCEDURE — 97530 THERAPEUTIC ACTIVITIES: CPT

## 2023-02-07 PROCEDURE — 97162 PT EVAL MOD COMPLEX 30 MIN: CPT

## 2023-02-07 PROCEDURE — 82962 GLUCOSE BLOOD TEST: CPT

## 2023-02-07 PROCEDURE — 99233 SBSQ HOSP IP/OBS HIGH 50: CPT | Performed by: PSYCHIATRY & NEUROLOGY

## 2023-02-07 PROCEDURE — 97535 SELF CARE MNGMENT TRAINING: CPT

## 2023-02-07 PROCEDURE — 74011000250 HC RX REV CODE- 250: Performed by: INTERNAL MEDICINE

## 2023-02-07 PROCEDURE — C9113 INJ PANTOPRAZOLE SODIUM, VIA: HCPCS | Performed by: INTERNAL MEDICINE

## 2023-02-07 PROCEDURE — 80048 BASIC METABOLIC PNL TOTAL CA: CPT

## 2023-02-07 PROCEDURE — 74011636637 HC RX REV CODE- 636/637: Performed by: INTERNAL MEDICINE

## 2023-02-07 PROCEDURE — 74011250636 HC RX REV CODE- 250/636: Performed by: INTERNAL MEDICINE

## 2023-02-07 PROCEDURE — 74011250637 HC RX REV CODE- 250/637: Performed by: INTERNAL MEDICINE

## 2023-02-07 PROCEDURE — 2709999900 HC NON-CHARGEABLE SUPPLY

## 2023-02-07 PROCEDURE — 97165 OT EVAL LOW COMPLEX 30 MIN: CPT

## 2023-02-07 PROCEDURE — 74011250637 HC RX REV CODE- 250/637: Performed by: NURSE PRACTITIONER

## 2023-02-07 PROCEDURE — 36415 COLL VENOUS BLD VENIPUNCTURE: CPT

## 2023-02-07 PROCEDURE — 65270000029 HC RM PRIVATE

## 2023-02-07 RX ORDER — METOPROLOL TARTRATE 25 MG/1
25 TABLET, FILM COATED ORAL ONCE
Status: COMPLETED | OUTPATIENT
Start: 2023-02-07 | End: 2023-02-07

## 2023-02-07 RX ORDER — PANTOPRAZOLE SODIUM 40 MG/1
40 TABLET, DELAYED RELEASE ORAL
Status: DISCONTINUED | OUTPATIENT
Start: 2023-02-08 | End: 2023-02-09 | Stop reason: HOSPADM

## 2023-02-07 RX ADMIN — METOPROLOL TARTRATE 25 MG: 25 TABLET, FILM COATED ORAL at 05:02

## 2023-02-07 RX ADMIN — AMLODIPINE BESYLATE 2.5 MG: 5 TABLET ORAL at 08:15

## 2023-02-07 RX ADMIN — INSULIN LISPRO 4 UNITS: 100 INJECTION, SOLUTION INTRAVENOUS; SUBCUTANEOUS at 11:17

## 2023-02-07 RX ADMIN — HEPARIN SODIUM 5000 UNITS: 5000 INJECTION INTRAVENOUS; SUBCUTANEOUS at 14:15

## 2023-02-07 RX ADMIN — HEPARIN SODIUM 5000 UNITS: 5000 INJECTION INTRAVENOUS; SUBCUTANEOUS at 21:24

## 2023-02-07 RX ADMIN — SODIUM CHLORIDE 40 MG: 9 INJECTION, SOLUTION INTRAMUSCULAR; INTRAVENOUS; SUBCUTANEOUS at 08:15

## 2023-02-07 RX ADMIN — INSULIN GLARGINE 19 UNITS: 100 INJECTION, SOLUTION SUBCUTANEOUS at 11:17

## 2023-02-07 RX ADMIN — INSULIN LISPRO 10 UNITS: 100 INJECTION, SOLUTION INTRAVENOUS; SUBCUTANEOUS at 16:20

## 2023-02-07 RX ADMIN — SODIUM CHLORIDE, PRESERVATIVE FREE 10 ML: 5 INJECTION INTRAVENOUS at 05:02

## 2023-02-07 RX ADMIN — HEPARIN SODIUM 5000 UNITS: 5000 INJECTION INTRAVENOUS; SUBCUTANEOUS at 05:02

## 2023-02-07 RX ADMIN — SODIUM CHLORIDE, PRESERVATIVE FREE 10 ML: 5 INJECTION INTRAVENOUS at 21:26

## 2023-02-07 RX ADMIN — LEVETIRACETAM 500 MG: 100 INJECTION, SOLUTION INTRAVENOUS at 08:15

## 2023-02-07 RX ADMIN — INSULIN LISPRO 3 UNITS: 100 INJECTION, SOLUTION INTRAVENOUS; SUBCUTANEOUS at 08:15

## 2023-02-07 NOTE — PROGRESS NOTES
Transition of care note:    RUR 14%    Today:  I discussed pt with attending. PT/OT ordered. Depending upon therapy's evaluation,will determine pt.'s needs @ discharge. Patient lives with her daughter . Kendy Bae Granddaughter assists with patient's care at home. Patient lives in a three story home with 1 entry step . The bed/bath on main level. Patient has prescription drug coverage. Pt uses [facility]  [facility] at [address] Patient requires some assistance with ADLs. Pt can provide her own self hygiene needs but needs assist with mobility and safety. Patient performs her ADLs with assistance. Home DME:walker,transport chair  Pharmacy of choice:[facility] [address] are for pt to transfer to the floor today.     Gerhardt Hirschfeld

## 2023-02-07 NOTE — PROGRESS NOTES
Pharmacy Dosing Note    Ordered medication: Pantoprazole 40 mg IV every 24 hours    New medication: Change to pantoprazole 40 mg PO every 24 hours per IV to PO protocol (taking other PO meds, regular diet ordered, indication is symptomatic GERD)    Estimated Creatinine Clearance: 21.6 mL/min (A) (based on SCr of 1.59 mg/dL (H)).     Thank you,  Jaiden Alvarado, PHARMD

## 2023-02-07 NOTE — PROGRESS NOTES
Problem: Falls - Risk of  Goal: *Absence of Falls  Description: Document [place] Fall Risk and appropriate interventions in the flowsheet.   Outcome: Progressing Towards Goal  Note: Fall Risk Interventions:            Medication Interventions: Bed/chair exit alarm, Teach patient to arise slowly, Patient to call before getting OOB    Elimination Interventions: Call light in reach, Patient to call for help with toileting needs              Problem: Patient Education: Go to Patient Education Activity  Goal: Patient/Family Education  Outcome: Progressing Towards Goal     Problem: Patient Education: Go to Patient Education Activity  Goal: Patient/Family Education  Outcome: Progressing Towards Goal     Problem: TIA/CVA Stroke: 0-24 hours  Goal: Off Pathway (Use only if patient is Off Pathway)  Outcome: Progressing Towards Goal  Goal: Activity/Safety  Outcome: Progressing Towards Goal  Goal: Consults, if ordered  Outcome: Progressing Towards Goal  Goal: Diagnostic Test/Procedures  Outcome: Progressing Towards Goal  Goal: Nutrition/Diet  Outcome: Progressing Towards Goal  Goal: Discharge Planning  Outcome: Progressing Towards Goal  Goal: Medications  Outcome: Progressing Towards Goal  Goal: Respiratory  Outcome: Progressing Towards Goal  Goal: Treatments/Interventions/Procedures  Outcome: Progressing Towards Goal  Goal: Minimize risk of bleeding post-thrombolytic infusion  Outcome: Progressing Towards Goal  Goal: Monitor for complications post-thrombolytic infusion  Outcome: Progressing Towards Goal  Goal: Psychosocial  Outcome: Progressing Towards Goal  Goal: *Hemodynamically stable  Outcome: Progressing Towards Goal  Goal: *Neurologically stable  Description: Absence of additional neurological deficits    Outcome: Progressing Towards Goal  Goal: *Verbalizes anxiety and depression are reduced or absent  Outcome: Progressing Towards Goal  Goal: *Absence of Signs of Aspiration on Current Diet  Outcome: Progressing Towards Goal  Goal: *Absence of deep venous thrombosis signs and symptoms(Stroke Metric)  Outcome: Progressing Towards Goal  Goal: *Ability to perform ADLs and demonstrates progressive mobility and function  Outcome: Progressing Towards Goal  Goal: *Stroke education started(Stroke Metric)  Outcome: Progressing Towards Goal  Goal: *Dysphagia screen performed(Stroke Metric)  Outcome: Progressing Towards Goal  Goal: *Rehab consulted(Stroke Metric)  Outcome: Progressing Towards Goal     Problem: TIA/CVA Stroke: Day 2 Until Discharge  Goal: Off Pathway (Use only if patient is Off Pathway)  Outcome: Progressing Towards Goal  Goal: Activity/Safety  Outcome: Progressing Towards Goal  Goal: Diagnostic Test/Procedures  Outcome: Progressing Towards Goal  Goal: Nutrition/Diet  Outcome: Progressing Towards Goal  Goal: Discharge Planning  Outcome: Progressing Towards Goal  Goal: Medications  Outcome: Progressing Towards Goal  Goal: Respiratory  Outcome: Progressing Towards Goal  Goal: Treatments/Interventions/Procedures  Outcome: Progressing Towards Goal  Goal: Psychosocial  Outcome: Progressing Towards Goal  Goal: *Verbalizes anxiety and depression are reduced or absent  Outcome: Progressing Towards Goal  Goal: *Absence of aspiration  Outcome: Progressing Towards Goal  Goal: *Absence of deep venous thrombosis signs and symptoms(Stroke Metric)  Outcome: Progressing Towards Goal  Goal: *Optimal pain control at patient's stated goal  Outcome: Progressing Towards Goal  Goal: *Tolerating diet  Outcome: Progressing Towards Goal  Goal: *Ability to perform ADLs and demonstrates progressive mobility and function  Outcome: Progressing Towards Goal  Goal: *Stroke education continued(Stroke Metric)  Outcome: Progressing Towards Goal     Problem: Ischemic Stroke: Discharge Outcomes  Goal: *Verbalizes anxiety and depression are reduced or absent  Outcome: Progressing Towards Goal  Goal: *Verbalize understanding of risk factor modification(Stroke Metric)  Outcome: Progressing Towards Goal  Goal: *Hemodynamically stable  Outcome: Progressing Towards Goal  Goal: *Absence of aspiration pneumonia  Outcome: Progressing Towards Goal  Goal: *Aware of needed dietary changes  Outcome: Progressing Towards Goal  Goal: *Verbalize understanding of prescribed medications including anti-coagulants, anti-lipid, and/or anti-platelets(Stroke Metric)  Outcome: Progressing Towards Goal  Goal: *Tolerating diet  Outcome: Progressing Towards Goal  Goal: *Aware of follow-up diagnostics related to anticoagulants  Outcome: Progressing Towards Goal  Goal: *Ability to perform ADLs and demonstrates progressive mobility and function  Outcome: Progressing Towards Goal  Goal: *Absence of DVT(Stroke Metric)  Outcome: Progressing Towards Goal  Goal: *Absence of aspiration  Outcome: Progressing Towards Goal  Goal: *Optimal pain control at patient's stated goal  Outcome: Progressing Towards Goal  Goal: *Home safety concerns addressed  Outcome: Progressing Towards Goal  Goal: *Describes available resources and support systems  Outcome: Progressing Towards Goal  Goal: *Verbalizes understanding of activation of EMS(911) for stroke symptoms(Stroke Metric)  Outcome: Progressing Towards Goal  Goal: *Understands and describes signs and symptoms to report to providers(Stroke Metric)  Outcome: Progressing Towards Goal  Goal: *Neurolgocially stable (absence of additional neurological deficits)  Outcome: Progressing Towards Goal  Goal: *Verbalizes importance of follow-up with primary care physician(Stroke Metric)  Outcome: Progressing Towards Goal  Goal: *Smoking cessation discussed,if applicable(Stroke Metric)  Outcome: Progressing Towards Goal  Goal: *Depression screening completed(Stroke Metric)  Outcome: Progressing Towards Goal     Problem: Diabetes Self-Management  Goal: *Disease process and treatment process  Description: Define diabetes and identify own type of diabetes; list 3 options for treating diabetes. Outcome: Progressing Towards Goal  Goal: *Incorporating nutritional management into lifestyle  Description: Describe effect of type, amount and timing of food on blood glucose; list 3 methods for planning meals. Outcome: Progressing Towards Goal  Goal: *Incorporating physical activity into lifestyle  Description: State effect of exercise on blood glucose levels. Outcome: Progressing Towards Goal  Goal: *Developing strategies to promote health/change behavior  Description: Define the ABC's of diabetes; identify appropriate screenings, schedule and personal plan for screenings. Outcome: Progressing Towards Goal  Goal: *Using medications safely  Description: State effect of diabetes medications on diabetes; name diabetes medication taking, action and side effects. Outcome: Progressing Towards Goal  Goal: *Monitoring blood glucose, interpreting and using results  Description: Identify recommended blood glucose targets  and personal targets. Outcome: Progressing Towards Goal  Goal: *Prevention, detection, treatment of acute complications  Description: List symptoms of hyper- and hypoglycemia; describe how to treat low blood sugar and actions for lowering  high blood glucose level. Outcome: Progressing Towards Goal  Goal: *Prevention, detection and treatment of chronic complications  Description: Define the natural course of diabetes and describe the relationship of blood glucose levels to long term complications of diabetes.   Outcome: Progressing Towards Goal  Goal: *Developing strategies to address psychosocial issues  Description: Describe feelings about living with diabetes; identify support needed and support network  Outcome: Progressing Towards Goal  Goal: *Insulin pump training  Outcome: Progressing Towards Goal  Goal: *Sick day guidelines  Outcome: Progressing Towards Goal  Goal: *Patient Specific Goal (EDIT GOAL, INSERT TEXT)  Outcome: Progressing Towards Goal

## 2023-02-07 NOTE — PROGRESS NOTES
Problem: Self Care Deficits Care Plan (Adult)  Goal: *Acute Goals and Plan of Care (Insert Text)  Description: FUNCTIONAL STATUS PRIOR TO ADMISSION: Per daughter, pt ambulated with mod I using rolling walker, transferred and performed bed mobility without assistance. Pt experienced difficulty with car transfers. She performed light meal prep for 2 meals/day. HOME SUPPORT PRIOR TO ADMISSION: The patient lived with daughter. Granddaughter provides additional assistance as needed. Occupational Therapy Goals  Initiated 2/7/2023  1. Patient will perform self-feeding with modified independence using R, dominant, UE as fine manipulator within 7 day(s). 2.  Patient will perform grooming with modified independence within 7 day(s). 3.  Patient will perform upper body dressing and bathing with modified independence within 7 day(s). 4.  Patient will perform lower body dressing and bathing with minimum assistance within 7 days. 5.  Patient will perform toilet transfers with supervision/set-up using rolling walker within 7 day(s). 6.  Patient will perform all aspects of toileting with minimal assistance within 7 day(s). 7.  Patient will participate in upper extremity therapeutic exercise/activities with supervision/set-up for 10 minutes within 7 day(s). Outcome: Progressing Towards Goal   OCCUPATIONAL THERAPY EVALUATION  Patient: Carissa Pérez (73 y.o. female)  Date: 2/7/2023  Primary Diagnosis: Hyperosmolar hyperglycemic state (HHS) ([facility] [address] [E11.00]  Seizure ([facility] [address] [R56.9]       Precautions:  Fall, Skin    ASSESSMENT  Based on the objective data described below, the patient presents with decreased activity tolerance, R (dominant) side weakness (UE>LE), impaired balance, decreased endurance, and difficulty expressing self d/t speech deficit following admission on 2/4/23 for witnessed seizure and elevated glucose.   Patient is undergoing neurological work-up that is negative for acute process thus far; CT did reveal unknown chronic R corona radiata infarct. Patient is able to answer simple questions but overall relies on daughter to provide background information. Daughter and granddaughter present for full tx (another family member entered room during tx). Patient performed bed mobility, transfer into the bathroom for ADL retraining, and return transfer to the chair to remain up with x1 person assist and RW (used at baseline). Patient initially with minimal to no active movement throughout R UE however with repetition, positioning, and functional tasks she demonstrates partial to full range at elbow, wrist, and hand (flexion/extension. She was able to hold spoon, bring hand to mouth to simulate utensil use, bring cup to mouth and return to table (B cylindrical ), hold/maintain of RW with B UE, and perform bladder hygiene; This becomes compromised with fatigue and patient requires increased assist.  Patient remained up to the chair at end of tx and family agreeable to assist with calling for staff prior to getting up. Patient would benefit from continued skilled OT to progress towards goals and improve overall independence. Current Level of Function Impacting Discharge (ADLs/self-care): Functional mobility, min A; UB ADLs, min A; LB ADLs, mod- max A    Functional Outcome Measure: The patient scored Total: 25/100 on the Barthel Index outcome measure. Patient will benefit from skilled therapy intervention to address the above noted impairments. PLAN :  Recommendations and Planned Interventions: self care training, functional mobility training, therapeutic exercise, balance training, therapeutic activities, endurance activities, patient education, home safety training, and family training/education    Frequency/Duration: Patient will be followed by occupational therapy 5 times a week to address goals.     Recommendation for discharge: (in order for the patient to meet his/her long term goals)  Therapy up to 5 days/week in rehab setting based on patient's current functional presentation; Daughter reports they prefer to take patient home at discharge; If returning home, will need 24/7 supervision/assistance and home health services     This discharge recommendation:  Has been made in collaboration with the attending provider and/or case management    IF patient discharges home will need the following DME: none       SUBJECTIVE:   Patient agreeable to OT evaluation. OBJECTIVE DATA SUMMARY:   HISTORY:   Past Medical History:   Diagnosis Date    CAD (coronary artery disease)     CHF (congestive heart failure), NYHA class I, chronic, systolic (HCC)     Systolic and diastolic CHF per echocardiogram of 12/25/2021. Patient was found to have LVEF 45%    Chronic kidney disease     stage III/IV:Creatinine 1.45-1.7 with GFR in the low 30s-high 20s    Diabetes mellitus type 2 in nonobese [facility])     Hypertension     Ischemic cardiomyopathy     Echocardiogram of 12/25/2021: mild left ventricular systolic dysfunction (EF 39%) with inferior and basal-mid septal akinesis. Diastolic dysfunction. Peripheral vascular disease [facility])      Past Surgical History:   Procedure Laterality Date    COLONOSCOPY N/A 02/04/2022    COLONOSCOPY performed by Trevon Mendoza MD at [facility] ENDOSCOPY    HX APPENDECTOMY      HX CORONARY STENT PLACEMENT  12/24/2021    stents x 3 to mid-distal right coronary artery going into PDA for 100% occlusion of RCA; and also had nonobstructive disease in 30% LAD stenosis, 70% diagonal 1 stenosis.   She had proximal 30% stenosis in the codominant left circumflex artery     HX HYSTERECTOMY      HX KNEE REPLACEMENT Left     HX OTHER SURGICAL      head sx x 2 nerve related    HX OTHER SURGICAL      Back surgery x 2    HX TONSILLECTOMY         Expanded or extensive additional review of patient history:     Home Situation  Home Environment: Private residence  # Steps to Enter: 1  Rails to Enter: No  One/Two Story Residence:  (three story, lives on first floor)  Living Alone: No  Support Systems: Child(watson)  Patient Expects to be Discharged to[de-identified] Home with family assistance  Current DME Used/Available at Home: Walker, rolling, Wheelchair, [employer], straight, Shower chair, Safety frame [employer], Raised toilet seat, Grab bars  Tub or Shower Type: Shower    Hand dominance: Right    EXAMINATION OF PERFORMANCE DEFICITS:  Cognitive/Behavioral Status:  Neurologic State: Alert  Orientation Level: Oriented X4  Cognition: Follows commands  Perception: Appears intact  Perseveration: No perseveration noted  Safety/Judgement: Awareness of environment    Skin: Intact in the uppers    Edema: None noted in the uppers    Hearing: Auditory  Auditory Impairment: Hard of hearing, bilateral    Vision/Perceptual:    Tracking: Able to track stimulus in all quadrants w/o difficulty    Saccades: Within Defined Limits    Visual Fields:  (WDL)  Diplopia: No    Acuity: Within Defined Limits;Able to read clock/calendar on wall without difficulty; Able to read employee name badge without difficulty       Range of Motion:  Right upper: Decreased but functional  Left upper: WDL    Strength:  Right upper: Decreased but functional; minimally functional with fatigue and initially with assessment   Left upper: WDL    Coordination:  Fine Motor Skills-Upper: Left Intact; Right Impaired    Gross Motor Skills-Upper: Left Intact; Right Impaired    Tone & Sensation:  Tone: normal  Sensation: intact      Balance:  Sitting: Without support  Sitting - Static: Good (unsupported)  Sitting - Dynamic: Good (unsupported)  Standing: With support  Standing - Static: Fair  Standing - Dynamic : Fair    Functional Mobility and Transfers for ADLs:  Bed Mobility:  Supine to Sit: Additional time;Minimum assistance; Adaptive equipment;Bed Modified  Scooting: Additional time; Moderate assistance    Transfers:  Sit to Stand:  Additional time;Minimum assistance (verbal cues)  Stand to Sit: Minimum assistance; Additional time  Bed to Chair: Minimum assistance; Additional time  Bathroom Mobility: Minimum assistance  Toilet Transfer : Minimum assistance; Additional time    ADL Assessment:  Feeding: Additional time;Minimum assistance (Setup to min A)    Oral Facial Hygiene/Grooming: Minimum assistance    Bathing: Maximum assistance     Upper Body Dressing: Minimum assistance    Lower Body Dressing: Maximum assistance    Toileting: Moderate assistance (min A for hygiene; mod A for clothing management)     Cognitive Retraining  Safety/Judgement: Awareness of environment    Functional Measure:    Barthel Index:  Bathin  Bladder: 5  Bowels: 5  Groomin  Dressin  Feedin  Mobility: 0  Stairs: 0  Toilet Use: 0  Transfer (Bed to Chair and Back): 10  Total: 25/100      The Barthel ADL Index: Guidelines  1. The index should be used as a record of what a patient does, not as a record of what a patient could do. 2. The main aim is to establish degree of independence from any help, physical or verbal, however minor and for whatever reason. 3. The need for supervision renders the patient not independent. 4. A patient's performance should be established using the best available evidence. Asking the patient, friends/relatives and nurses are the usual sources, but direct observation and common sense are also important. However direct testing is not needed. 5. Usually the patient's performance over the preceding 24-48 hours is important, but occasionally longer periods will be relevant. 6. Middle categories imply that the patient supplies over 50 per cent of the effort. 7. Use of aids to be independent is allowed. Score Interpretation (from [address] [place] [address])    Independent   60-79 Minimally independent   40-59 Partially dependent   20-39 Very dependent   <20 Totally dependent     -Hua Aden., Barthel, D.W. (1965). Functional evaluation: the Barthel Index.  [address] [facility] (14)2.  -CANDACE Goncalves, [address] (1997). The Barthel activities of daily living index: self-reporting versus actual performance in the old (> or = 75 years). Journal [address] 45(7), [facility], LuisBRITTANYF, Reece Purcell., Domitila Gallo. (1999). Measuring the change in disability after inpatient rehabilitation; comparison of the responsiveness of the Barthel Index and Functional [place] Measure. Journal of Neurology, Neurosurgery, and Psychiatry, 66(4), 751-210. Romana Lefevre, N.J.A, MARLYS Dotson, & Ghazala Balderrama M.A. (2004) Assessment of post-stroke quality of life in cost-effectiveness studies: The usefulness of the Barthel Index and the EuroQoL-5D. Quality of Life Research, 15, 365-45     Occupational Therapy Evaluation Charge Determination   History Examination Decision-Making   LOW Complexity : Brief history review  LOW Complexity : 1-3 performance deficits relating to physical, cognitive , or psychosocial skils that result in activity limitations and / or participation restrictions  LOW Complexity : No comorbidities that affect functional and no verbal or physical assistance needed to complete eval tasks       Based on the above components, the patient evaluation is determined to be of the following complexity level: LOW     Activity Tolerance:   Good    After treatment patient left in no apparent distress:    Sitting in chair, Call bell within reach, Bed / chair alarm activated, and Caregiver / family present    COMMUNICATION/EDUCATION:   The patients plan of care was discussed with: Physical therapist, Registered nurse, and patient . Home safety education was provided and the patient/caregiver indicated understanding., Patient/family have participated as able in goal setting and plan of care. , and Patient/family agree to work toward stated goals and plan of care. This patients plan of care is appropriate for delegation to [facility] [place].     Thank you for this referral.  Richard Prescott, OTR/L  Time Calculation: 57 mins

## 2023-02-07 NOTE — PROGRESS NOTES
Problem: Falls - Risk of  Goal: *Absence of Falls  Description: Document Ofelia Yousif Fall Risk and appropriate interventions in the flowsheet.   Outcome: Progressing Towards Goal  Note: Fall Risk Interventions:            Medication Interventions: Bed/chair exit alarm, Teach patient to arise slowly, Patient to call before getting OOB    Elimination Interventions: Call light in reach, Patient to call for help with toileting needs              Problem: Patient Education: Go to Patient Education Activity  Goal: Patient/Family Education  Outcome: Progressing Towards Goal     Problem: Patient Education: Go to Patient Education Activity  Goal: Patient/Family Education  Outcome: Progressing Towards Goal     Problem: TIA/CVA Stroke: 0-24 hours  Goal: Off Pathway (Use only if patient is Off Pathway)  Outcome: Progressing Towards Goal  Goal: Activity/Safety  Outcome: Progressing Towards Goal  Goal: Consults, if ordered  Outcome: Progressing Towards Goal  Goal: Diagnostic Test/Procedures  Outcome: Progressing Towards Goal  Goal: Nutrition/Diet  Outcome: Progressing Towards Goal  Goal: Discharge Planning  Outcome: Progressing Towards Goal  Goal: Medications  Outcome: Progressing Towards Goal  Goal: Respiratory  Outcome: Progressing Towards Goal  Goal: Treatments/Interventions/Procedures  Outcome: Progressing Towards Goal  Goal: Minimize risk of bleeding post-thrombolytic infusion  Outcome: Progressing Towards Goal  Goal: Monitor for complications post-thrombolytic infusion  Outcome: Progressing Towards Goal  Goal: Psychosocial  Outcome: Progressing Towards Goal  Goal: *Hemodynamically stable  Outcome: Progressing Towards Goal  Goal: *Neurologically stable  Description: Absence of additional neurological deficits    Outcome: Progressing Towards Goal  Goal: *Verbalizes anxiety and depression are reduced or absent  Outcome: Progressing Towards Goal  Goal: *Absence of Signs of Aspiration on Current Diet  Outcome: Progressing Towards Goal  Goal: *Absence of deep venous thrombosis signs and symptoms(Stroke Metric)  Outcome: Progressing Towards Goal  Goal: *Ability to perform ADLs and demonstrates progressive mobility and function  Outcome: Progressing Towards Goal  Goal: *Stroke education started(Stroke Metric)  Outcome: Progressing Towards Goal  Goal: *Dysphagia screen performed(Stroke Metric)  Outcome: Progressing Towards Goal  Goal: *Rehab consulted(Stroke Metric)  Outcome: Progressing Towards Goal     Problem: TIA/CVA Stroke: Day 2 Until Discharge  Goal: Off Pathway (Use only if patient is Off Pathway)  Outcome: Progressing Towards Goal  Goal: Activity/Safety  Outcome: Progressing Towards Goal  Goal: Diagnostic Test/Procedures  Outcome: Progressing Towards Goal  Goal: Nutrition/Diet  Outcome: Progressing Towards Goal  Goal: Discharge Planning  Outcome: Progressing Towards Goal  Goal: Medications  Outcome: Progressing Towards Goal  Goal: Respiratory  Outcome: Progressing Towards Goal  Goal: Treatments/Interventions/Procedures  Outcome: Progressing Towards Goal  Goal: Psychosocial  Outcome: Progressing Towards Goal  Goal: *Verbalizes anxiety and depression are reduced or absent  Outcome: Progressing Towards Goal  Goal: *Absence of aspiration  Outcome: Progressing Towards Goal  Goal: *Absence of deep venous thrombosis signs and symptoms(Stroke Metric)  Outcome: Progressing Towards Goal  Goal: *Optimal pain control at patient's stated goal  Outcome: Progressing Towards Goal  Goal: *Tolerating diet  Outcome: Progressing Towards Goal  Goal: *Ability to perform ADLs and demonstrates progressive mobility and function  Outcome: Progressing Towards Goal  Goal: *Stroke education continued(Stroke Metric)  Outcome: Progressing Towards Goal     Problem: Ischemic Stroke: Discharge Outcomes  Goal: *Verbalizes anxiety and depression are reduced or absent  Outcome: Progressing Towards Goal  Goal: *Verbalize understanding of risk factor modification(Stroke Metric)  Outcome: Progressing Towards Goal  Goal: *Hemodynamically stable  Outcome: Progressing Towards Goal  Goal: *Absence of aspiration pneumonia  Outcome: Progressing Towards Goal  Goal: *Aware of needed dietary changes  Outcome: Progressing Towards Goal  Goal: *Verbalize understanding of prescribed medications including anti-coagulants, anti-lipid, and/or anti-platelets(Stroke Metric)  Outcome: Progressing Towards Goal  Goal: *Tolerating diet  Outcome: Progressing Towards Goal  Goal: *Aware of follow-up diagnostics related to anticoagulants  Outcome: Progressing Towards Goal  Goal: *Ability to perform ADLs and demonstrates progressive mobility and function  Outcome: Progressing Towards Goal  Goal: *Absence of DVT(Stroke Metric)  Outcome: Progressing Towards Goal  Goal: *Absence of aspiration  Outcome: Progressing Towards Goal  Goal: *Optimal pain control at patient's stated goal  Outcome: Progressing Towards Goal  Goal: *Home safety concerns addressed  Outcome: Progressing Towards Goal  Goal: *Describes available resources and support systems  Outcome: Progressing Towards Goal  Goal: *Verbalizes understanding of activation of EMS(911) for stroke symptoms(Stroke Metric)  Outcome: Progressing Towards Goal  Goal: *Understands and describes signs and symptoms to report to providers(Stroke Metric)  Outcome: Progressing Towards Goal  Goal: *Neurolgocially stable (absence of additional neurological deficits)  Outcome: Progressing Towards Goal  Goal: *Verbalizes importance of follow-up with primary care physician(Stroke Metric)  Outcome: Progressing Towards Goal  Goal: *Smoking cessation discussed,if applicable(Stroke Metric)  Outcome: Progressing Towards Goal  Goal: *Depression screening completed(Stroke Metric)  Outcome: Progressing Towards Goal     Problem: Diabetes Self-Management  Goal: *Disease process and treatment process  Description: Define diabetes and identify own type of diabetes; list 3 options for treating diabetes. Outcome: Progressing Towards Goal  Goal: *Incorporating nutritional management into lifestyle  Description: Describe effect of type, amount and timing of food on blood glucose; list 3 methods for planning meals. Outcome: Progressing Towards Goal  Goal: *Incorporating physical activity into lifestyle  Description: State effect of exercise on blood glucose levels. Outcome: Progressing Towards Goal  Goal: *Developing strategies to promote health/change behavior  Description: Define the ABC's of diabetes; identify appropriate screenings, schedule and personal plan for screenings. Outcome: Progressing Towards Goal  Goal: *Using medications safely  Description: State effect of diabetes medications on diabetes; name diabetes medication taking, action and side effects. Outcome: Progressing Towards Goal  Goal: *Monitoring blood glucose, interpreting and using results  Description: Identify recommended blood glucose targets  and personal targets. Outcome: Progressing Towards Goal  Goal: *Prevention, detection, treatment of acute complications  Description: List symptoms of hyper- and hypoglycemia; describe how to treat low blood sugar and actions for lowering  high blood glucose level. Outcome: Progressing Towards Goal  Goal: *Prevention, detection and treatment of chronic complications  Description: Define the natural course of diabetes and describe the relationship of blood glucose levels to long term complications of diabetes.   Outcome: Progressing Towards Goal  Goal: *Developing strategies to address psychosocial issues  Description: Describe feelings about living with diabetes; identify support needed and support network  Outcome: Progressing Towards Goal  Goal: *Insulin pump training  Outcome: Progressing Towards Goal  Goal: *Sick day guidelines  Outcome: Progressing Towards Goal  Goal: *Patient Specific Goal (EDIT GOAL, INSERT TEXT)  Outcome: Progressing Towards Goal

## 2023-02-07 NOTE — PROGRESS NOTES
[facility]  [address] [facility] [place], [address] [place] [address]  [phone]      Hospitalist  Progress Note      NAME:         Tamiko Finney   :        1934  MRM:        [id number]    Date of service: 2023      Subjective: Patient admitted with [facility] and seizure. No prior hx of seizures. RUE weakness/lack of coordination persists today, word finding difficulty noted. Family at bedside, all questions answered.      Objective:    Vital Signs:    Visit Vitals  BP (!) 160/77 (BP 1 Location: Left lower arm, BP Patient Position: At rest) Comment: notified primary rn   Pulse 76   Temp 98.4 °F (36.9 °C)   Resp 23   Ht 5' 2\" (1.575 m)   Wt 64.9 kg (143 lb)   SpO2 92%   BMI 26.16 kg/m²        Intake/Output Summary (Last 24 hours) at 2023 1153  Last data filed at 2023 0800  Gross per 24 hour   Intake --   Output 1000 ml   Net -1000 ml          Current inpatient medications reviewed:  Current Facility-Administered Medications   Medication Dose Route Frequency    [START ON 2023] pantoprazole (PROTONIX) tablet 40 mg  40 mg Oral ACB    insulin glargine (LANTUS) injection 19 Units  0.3 Units/kg SubCUTAneous Q24H    metoprolol tartrate (LOPRESSOR) tablet 50 mg  50 mg Oral Q12H    amLODIPine (NORVASC) tablet 2.5 mg  2.5 mg Oral DAILY    LORazepam (ATIVAN) injection 2 mg  2 mg IntraVENous Q4H PRN    glucose chewable tablet 16 g  4 Tablet Oral PRN    glucagon (GLUCAGEN) injection 1 mg  1 mg IntraMUSCular PRN    dextrose 10% infusion 0-250 mL  0-250 mL IntraVENous PRN    insulin lispro (HUMALOG) injection   SubCUTAneous AC&HS    sodium chloride (NS) flush 5-40 mL  5-40 mL IntraVENous Q8H    sodium chloride (NS) flush 5-40 mL  5-40 mL IntraVENous PRN    acetaminophen (TYLENOL) tablet 650 mg  650 mg Oral Q6H PRN    Or    acetaminophen (TYLENOL) suppository 650 mg  650 mg Rectal Q6H PRN    polyethylene glycol (MIRALAX) packet 17 g 17 g Oral DAILY PRN    ondansetron (ZOFRAN ODT) tablet 4 mg  4 mg Oral Q8H PRN    Or    ondansetron (ZOFRAN) injection 4 mg  4 mg IntraVENous Q6H PRN    heparin (porcine) injection 5,000 Units  5,000 Units SubCUTAneous Q8H       Physical Examination:    General:   Weak and ill but not in acute distress    Eyes:   pink conjunctivae, PERRLA with no discharge. ENT:   no ottorrhea or rhinorrhea with dry mucous membranes  Neck: no masses, thyroid non-tender and trachea central.  Pulm:  decreased but clear breath sounds without crackles or wheezes  Card:  no JVD or murmurs, has bradycardia, without thrills, bruits or peripheral edema  Abd:  Soft, non-distended, normoactive bowel sounds   Musc:  No cyanosis, clubbing, atrophy. Reduced ROM right shoulder   Skin:  No rashes, bruising or ulcers. Neuro: awake and alert but frail, Follows command. RUE 4/5; word finding difficulty    Diagnostic testing:    Laboratory data reviewed and independently interpreted:    Recent Labs     02/04/23 2010   WBC 6.9   HGB 13.0   HCT 40.1          Recent Labs     02/07/23  0347 02/06/23  0250 02/05/23  1006 02/05/23  0606 02/05/23  0145 02/04/23  2330 02/04/23  2013 02/04/23 2010   * 148* 145 144 143  --   --  128*   K 4.7 4.9 3.9 2.7* 2.4*  --   --  3.6   * 119* 113* 112* 108  --   --  89*   CO2 24 23 25 27 25  --   --  24   * 256* 222* 107* 337*   < >  --  1,199*   BUN 10 15 22* 26* 25*  --   --  28*   CREA 1.59* 1.59* 1.80* 1.82* 1.98*  --   --  2.60*   CA 8.6 8.2* 8.2* 8.4* 8.6  --   --  9.4   MG  --   --  2.0 2.0 2.1  --   --  2.5*   PHOS  --   --   --   --   --   --   --  3.3   ALB  --   --   --   --   --   --   --  3.9   ALT  --   --   --   --   --   --   --  18   INR  --   --   --   --   --   --  1.1  --     < > = values in this interval not displayed.        No components found for: [place]    Imaging studies reviewed and reports noted: CXR, CT scan head    12 lead EKG and telemetry reviewed and independently interpreted by me:   normal sinus rhythm    Notes reviewed: Consults, CM, PT, OT etc    Assessment and Plan:    RUE Weakness/Word Finding Difficulty: MRI Neg for acute stroke. 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 I suspect encephalopathy 2/2 transcellular shits in s/o HHS, hypernatremia as well as possible med side effect (Keppra). May also have some underlying dementia as well. I discussed her case personally with Neurology and we will plan to stop keppra and monitor neuro status. 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 Blood glucose much high. A1c 12.2%. S/p insulin gtt. - Cont Lantus to 0.3 units/kg, SSi per protocol  - DM mgmt to see, appreciated    ? Seizure ([facility] [address] (2/4/2023) / AMS POA: witnessed stiffening right side prior to admission. CT scan head neg for acute changes. MRi brain neg for acute changes. EEG pending. Given her change in her speech, a CT code neuro was neg. CTA head and neck showed no large vessel occlusion or stenosis. Suspect seizure driven by HHS. As above, will stop keppra and monitor clinical status    Chronic heart failure with preserved ejection fraction (HFpEF) ([facility] [address] (2/5/2023) / Ischemic cardiomyopathy POA: CXR clear. Recent Echo showed an EF of 55-60%. Stable. Monitor clinical progress    CKD (chronic kidney disease) stage 3, GFR 30-59 ml/min /  Hypokalemia (2/5/2023) POA: Now improved to her baseline    Hypertension POA: BPs now better.  Resume Metoprolol and Amlodipine      Care Plan discussed with: Family, nursing and consultants    Prophylaxis:  Hep SQ    Diet:  Diabetic    Patient status: Inpatient     Level of care: Telemetry    Code status: FULL code       Anticipated Disposition:  PT recs pending, suspect SNF    PCP:      Cayetano Peralta NP           ___________________________________________________    Attending Physician: Patti Vásquez MD

## 2023-02-07 NOTE — PROGRESS NOTES
Physical Therapy Note:    PT evaluation deferred. Pt preparing for transport from ICU to med surg unit and unavailable to participate.     Antwon Ordoñez, PT, DPT, Mamta Paz

## 2023-02-07 NOTE — PROGRESS NOTES
Bedside and Verbal shift change report given to [address] (oncoming nurse) by Arnie Singh (offgoing nurse). Report included the following information SBAR, Kardex, Intake/Output, MAR, Med Rec Status, Cardiac Rhythm SR, and Quality Measures. Stroke Education provided to patient and relative(s) and the following topics were discussed    1. Patients personal risk factors for stroke are hypertension, diabetes mellitus, and CHF    2. Warning signs of Stroke:        * Sudden numbness or weakness of the face, arm or leg, especially on one side of          The body            * Sudden confusion, trouble speaking or understanding        * Sudden trouble seeing in one or both eyes        * Sudden trouble walking, dizziness, loss of balance or coordination        * Sudden severe headache with no known cause      3. Importance of activation Emergency Medical Services ( 9-1-1 ) immediately if experience any warning signs of stroke. 4. Be sure and schedule a follow-up appointment with your primary care doctor or any specialists as instructed. 5. You must take medicine every day to treat your risk factors for stroke. Be sure to take your medicines exactly as your doctor tells you: no more, no less. Know what your medicines are for , what they do. Anti-thrombotics /anticoagulants can help prevent strokes. You are taking the following medicine(s)  Heparin     6. Smoking and second-hand smoke greatly increase your risk of stroke, cardiovascular disease and death. Smoking history Former smoker    7. Information provided was [facility] Stroke Education Binder    8. Documentation of teaching completed in Patient Education Activity and on Care Plan with teaching response noted?  yes     2000  Initial assessment complete-see flowsheets. 0000  Reassessment complete-see flowsheets. 0400  Reassessment complete-see flowsheets. 0413   Pt /63. Metoprolol held overnight d/t BP being on there lower side and HR in 60s.  NP notified. 0700  Bedside and Verbal shift change report given to NISHI LICEA (oncoming nurse) by Nini Benitez (offgoing nurse). Report included the following information SBAR, Kardex, Intake/Output, MAR, Med Rec Status, Cardiac Rhythm SR, and Quality Measures.

## 2023-02-07 NOTE — PROGRESS NOTES
0700 Bedside and Verbal shift change report given to Jeremias Daley RN (oncoming nurse) by Enzo Fernandez RN (offgoing nurse). Report included the following information SBAR, ED Summary, Intake/Output, MAR, Recent Results, Med Rec Status, and Cardiac Rhythm NSR . Primary Nurse Rd Dickson RN and Enzo Fernandez RN performed a dual skin assessment on this patient No impairment noted  Raghu score is see flowsheet. 0815 Morning meds given. Patient assessed, see flowsheet. Patient turned and repositioned. 1000 Patient turned and repositioned. 1117 4 units of Lispro and 19 units of Lantus given. 1130 TRANSFER - OUT REPORT:    Verbal report given to Joseph Castleman, RN (name) on Renetta Solders  being transferred to 5th Floor (unit) for routine progression of care       Report consisted of patients Situation, Background, Assessment and   Recommendations(SBAR). Information from the following report(s) SBAR, ED Summary, Intake/Output, MAR, Recent Results, Med Rec Status, and Cardiac Rhythm NSR  was reviewed with the receiving nurse. Lines:   Peripheral IV 13/04/56 Left Cephalic (Active)   Site Assessment Clean, dry, & intact 02/07/23 0800   Phlebitis Assessment 0 02/07/23 0800   Infiltration Assessment 0 02/07/23 0800   Dressing Status Clean, dry, & intact 02/07/23 0800   Dressing Type Transparent 02/07/23 0800   Hub Color/Line Status Pink 02/07/23 0800   Action Taken Open ports on tubing capped 02/07/23 0800   Alcohol Cap Used Yes 02/07/23 0800        Opportunity for questions and clarification was provided.       Patient transported with:   Registered Nurse

## 2023-02-07 NOTE — PROCEDURES
[address]   Electroencephalogram  Report    Procedure ID: [id number] Procedure Date: 2/6/2023   Patient Name: Renetta Marcano YOB: 1934   Procedure Type: 24 hour video Medical Record No: [id number]     Continuous EEG with simultaneous video monitoring is requested in this 22-year-old lady with seizure-like activity. Medications said to include Keppra, Ativan, Protonix    During wakefulness the background consists of mixed alpha and theta range activities with overriding beta wave activity. Normal sleep architecture is seen. No clinical events were reviewed    Review of computerized spike and seizure detection software reveals no spikes and no seizures    Interpretation  Continuous EEG with simultaneous video monitoring is abnormal secondary to diffuse slowing and disorganization of the background indicative of a mild degree of bihemispheric dysfunction as is commonly seen with an encephalopathy nonspecific as to cause and is also seen in chronic neurodegenerative disorder such as dementia and clinical correlation is recommended. The overriding beta wave activity is likely secondary to medication effect namely benzodiazepine. No epileptiform abnormalities are seen. No evidence for subclinical ictus.       Tamy Man MD

## 2023-02-07 NOTE — PROGRESS NOTES
Speech pathology note  Reviewed chart and note MRI showed Mild chronic white matter disease and remote right corona radiata  infarction. No acute infarction. Discussed case with RN who reported patient with no difficulty swallow. Checked in with patient, daughter, and granddaughter at bedside who all reported no concerns regarding patient's swallow function. SLP will sign off at this time given no acute infarct and no difficulty swallowing. Please re-consult if further needs arise. Thank you.     Preethi Hirsch., CCC-SLP

## 2023-02-07 NOTE — PROGRESS NOTES
[facility] and [address] at [facility] at [address] [facility], [address] [place] [address] [address]   [phone]              Chief Complaint   Patient presents with    Seizure     Current Facility-Administered Medications   Medication Dose Route Frequency Provider Last Rate Last Admin    insulin glargine (LANTUS) injection 19 Units  0.3 Units/kg SubCUTAneous Q24H Jonathan Fine MD   19 Units at 02/06/23 1256    metoprolol tartrate (LOPRESSOR) tablet 50 mg  50 mg Oral Q12H Jonathan Fine MD   50 mg at 02/06/23 1426    amLODIPine (NORVASC) tablet 2.5 mg  2.5 mg Oral DAILY Jonathan Fine MD   2.5 mg at 02/07/23 0815    levETIRAcetam (KEPPRA) injection 500 mg  500 mg IntraVENous Q12H Jonathan Fine MD   500 mg at 02/07/23 0815    pantoprazole (PROTONIX) 40 mg in 0.9% sodium chloride 10 mL injection  40 mg IntraVENous DAILY Jonathan Fine MD   40 mg at 02/07/23 0815    LORazepam (ATIVAN) injection 2 mg  2 mg IntraVENous Q4H PRN Sammi Cole MD        glucose chewable tablet 16 g  4 Tablet Oral PRN Jonathan Fine MD        glucagon (GLUCAGEN) injection 1 mg  1 mg IntraMUSCular PRN Jonathan Fine MD        dextrose 10% infusion 0-250 mL  0-250 mL IntraVENous PRN Jonathan Fine MD        insulin lispro (HUMALOG) injection   SubCUTAneous AC&HS Jonathan Fine MD   3 Units at 02/07/23 0815    sodium chloride (NS) flush 5-40 mL  5-40 mL IntraVENous Q8H Pheobe Leonardo H, DO   10 mL at 02/07/23 0502    sodium chloride (NS) flush 5-40 mL  5-40 mL IntraVENous PRN Pheobe Leonardo H, DO        acetaminophen (TYLENOL) tablet 650 mg  650 mg Oral Q6H PRN Pheobe Leonardo H, DO        Or    acetaminophen (TYLENOL) suppository 650 mg  650 mg Rectal Q6H PRN Pheobe Leonardo H, DO        polyethylene glycol (MIRALAX) packet 17 g  17 g Oral DAILY PRN Pheobe Leonardo H, DO        ondansetron (ZOFRAN ODT) tablet 4 mg  4 mg Oral Q8H PRN Althia Mealy H, DO        Or    ondansetron [facility]) injection 4 mg  4 mg IntraVENous Q6H PRN Althia Mealy H, DO        heparin (porcine) injection 5,000 Units  5,000 Units SubCUTAneous Q8H Althia Mealy H, DO   5,000 Units at 02/07/23 0502      Allergies   Allergen Reactions    Codeine Nausea and Vomiting    Compazine [Prochlorperazine] Other (comments)     Facial droop    Dilaudid [Hydromorphone] Nausea and Vomiting    Morphine Nausea and Vomiting    Sulfa (Sulfonamide Antibiotics) Nausea and Vomiting     Social History     Tobacco Use    Smoking status: Former     Years: 15.00     Types: Cigarettes    Smokeless tobacco: Never   Substance Use Topics    Alcohol use: Yes     Comment: very rarely     Drug use: Never     We are following up on this 79-year-old lady who came in with seizure in the setting of significant hyperglycemia/hyperosmolar state. Seizure was described as a focal onset with right arm outstretched head deviated to the right and a generalized shaking. She has not had any further events. Her continuous EEG was unremarkable. Discussed with Dr. Kevin Story this morning and the patient is having some difficulty with her right upper extremity and that she is able to move it but seems to have some dexterity issues. 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 Laboratory analysis this morning demonstrates sodium 147 glucose 141 and creatinine 1.59  Her granddaughter and other family member at the bedside this morning we discussed the plan for the day and a provided history. They note that she still has some tremulousness of the right upper extremity from time to time. No alteration in consciousness.   She has been quite sleepy. Examination  Visit Vitals  BP (!) 143/53   Pulse 70   Temp 97.9 °F (36.6 °C)   Resp 25   Ht 5' 2\" (1.575 m)   Wt 64.9 kg (143 lb)   SpO2 97%   BMI 26.16 kg/m²   This morning she is asleep but awakens to voice. Answers questions appropriately. She is able to raise the right upper extremity with no pronation or drift. She resists. in all muscle groups in that extremity      Impression/Plan  80year-old lady with seizure in the setting of hyperosmolar hyperglycemic state and currently seems quite sedated and her family does relate that each time she gets a dose of Keppra she becomes very sleepy as even this occurred last evening  We will stop Keppra as I do not think this is needed at this point  Agree with transfer to medical floor with shades up, PT and OT interventions and trying to keep her active  We will watch what happens in terms of the right upper extremity  If after she has complete correction of her electrolytes then has some time to be out of the influence of medications she still has difficulty with the right upper extremity then we will reimage with a repeat MRI  We will follow-up tomorrow    Guillaume Alonso MD        This note was created using voice recognition software. Despite editing, there may be syntax errors.

## 2023-02-08 LAB
ANION GAP SERPL CALC-SCNC: 5 MMOL/L (ref 5–15)
BUN SERPL-MCNC: 13 MG/DL (ref 6–20)
BUN/CREAT SERPL: 9 (ref 12–20)
CALCIUM SERPL-MCNC: 8.7 MG/DL (ref 8.5–10.1)
CHLORIDE SERPL-SCNC: 115 MMOL/L (ref 97–108)
CO2 SERPL-SCNC: 22 MMOL/L (ref 21–32)
CREAT SERPL-MCNC: 1.49 MG/DL (ref 0.55–1.02)
GLUCOSE BLD STRIP.AUTO-MCNC: 175 MG/DL (ref 65–117)
GLUCOSE BLD STRIP.AUTO-MCNC: 190 MG/DL (ref 65–117)
GLUCOSE BLD STRIP.AUTO-MCNC: 192 MG/DL (ref 65–117)
GLUCOSE BLD STRIP.AUTO-MCNC: 236 MG/DL (ref 65–117)
GLUCOSE SERPL-MCNC: 143 MG/DL (ref 65–100)
POTASSIUM SERPL-SCNC: 4.3 MMOL/L (ref 3.5–5.1)
SERVICE CMNT-IMP: ABNORMAL
SODIUM SERPL-SCNC: 142 MMOL/L (ref 136–145)

## 2023-02-08 PROCEDURE — 97116 GAIT TRAINING THERAPY: CPT

## 2023-02-08 PROCEDURE — 65270000029 HC RM PRIVATE

## 2023-02-08 PROCEDURE — 80048 BASIC METABOLIC PNL TOTAL CA: CPT

## 2023-02-08 PROCEDURE — 36415 COLL VENOUS BLD VENIPUNCTURE: CPT

## 2023-02-08 PROCEDURE — 74011000250 HC RX REV CODE- 250: Performed by: INTERNAL MEDICINE

## 2023-02-08 PROCEDURE — 74011250637 HC RX REV CODE- 250/637: Performed by: INTERNAL MEDICINE

## 2023-02-08 PROCEDURE — 99232 SBSQ HOSP IP/OBS MODERATE 35: CPT | Performed by: PSYCHIATRY & NEUROLOGY

## 2023-02-08 PROCEDURE — 74011636637 HC RX REV CODE- 636/637: Performed by: INTERNAL MEDICINE

## 2023-02-08 PROCEDURE — 74011250636 HC RX REV CODE- 250/636: Performed by: INTERNAL MEDICINE

## 2023-02-08 PROCEDURE — 97530 THERAPEUTIC ACTIVITIES: CPT

## 2023-02-08 PROCEDURE — 82962 GLUCOSE BLOOD TEST: CPT

## 2023-02-08 PROCEDURE — 77030038269 HC DRN EXT URIN PURWCK BARD -A

## 2023-02-08 RX ORDER — METFORMIN HYDROCHLORIDE 500 MG/1
500 TABLET ORAL 2 TIMES DAILY WITH MEALS
Status: DISCONTINUED | OUTPATIENT
Start: 2023-02-08 | End: 2023-02-09 | Stop reason: HOSPADM

## 2023-02-08 RX ADMIN — HEPARIN SODIUM 5000 UNITS: 5000 INJECTION INTRAVENOUS; SUBCUTANEOUS at 06:32

## 2023-02-08 RX ADMIN — HEPARIN SODIUM 5000 UNITS: 5000 INJECTION INTRAVENOUS; SUBCUTANEOUS at 21:43

## 2023-02-08 RX ADMIN — METFORMIN HYDROCHLORIDE 500 MG: 500 TABLET ORAL at 17:32

## 2023-02-08 RX ADMIN — INSULIN LISPRO 3 UNITS: 100 INJECTION, SOLUTION INTRAVENOUS; SUBCUTANEOUS at 13:03

## 2023-02-08 RX ADMIN — PANTOPRAZOLE SODIUM 40 MG: 40 TABLET, DELAYED RELEASE ORAL at 06:32

## 2023-02-08 RX ADMIN — SODIUM CHLORIDE, PRESERVATIVE FREE 10 ML: 5 INJECTION INTRAVENOUS at 21:44

## 2023-02-08 RX ADMIN — INSULIN LISPRO 4 UNITS: 100 INJECTION, SOLUTION INTRAVENOUS; SUBCUTANEOUS at 17:33

## 2023-02-08 RX ADMIN — INSULIN GLARGINE 19 UNITS: 100 INJECTION, SOLUTION SUBCUTANEOUS at 13:03

## 2023-02-08 RX ADMIN — SODIUM CHLORIDE, PRESERVATIVE FREE 10 ML: 5 INJECTION INTRAVENOUS at 15:49

## 2023-02-08 RX ADMIN — SODIUM CHLORIDE, PRESERVATIVE FREE 10 ML: 5 INJECTION INTRAVENOUS at 06:32

## 2023-02-08 RX ADMIN — AMLODIPINE BESYLATE 2.5 MG: 5 TABLET ORAL at 09:00

## 2023-02-08 RX ADMIN — INSULIN LISPRO 3 UNITS: 100 INJECTION, SOLUTION INTRAVENOUS; SUBCUTANEOUS at 10:09

## 2023-02-08 RX ADMIN — HEPARIN SODIUM 5000 UNITS: 5000 INJECTION INTRAVENOUS; SUBCUTANEOUS at 15:47

## 2023-02-08 NOTE — PROGRESS NOTES
Jan Mondragon [facility]  [address] [facility] [place], [place], [address] [place] [address]  [phone]      Hospitalist  Progress Note      NAME:         Hamilton Christopher   :        1934  MRM:        [id number]    Date of service: 2023      Subjective: No acute events overnight. She is much clearer today and nearly back to her baseline per family. Still weak though.      Objective:    Vital Signs:    Visit Vitals  /80 (BP 1 Location: Left lower arm, BP Patient Position: Sitting)   Pulse 82   Temp 98.2 °F (36.8 °C)   Resp 20   Ht 5' 2\" (1.575 m)   Wt 64.9 kg (143 lb)   SpO2 96%   BMI 26.16 kg/m²        Intake/Output Summary (Last 24 hours) at 2023 1238  Last data filed at 2023 0600  Gross per 24 hour   Intake 590 ml   Output 300 ml   Net 290 ml          Current inpatient medications reviewed:  Current Facility-Administered Medications   Medication Dose Route Frequency    metFORMIN (GLUCOPHAGE) tablet 500 mg  500 mg Oral BID WITH MEALS    pantoprazole (PROTONIX) tablet 40 mg  40 mg Oral ACB    insulin glargine (LANTUS) injection 19 Units  0.3 Units/kg SubCUTAneous Q24H    amLODIPine (NORVASC) tablet 2.5 mg  2.5 mg Oral DAILY    glucose chewable tablet 16 g  4 Tablet Oral PRN    glucagon (GLUCAGEN) injection 1 mg  1 mg IntraMUSCular PRN    dextrose 10% infusion 0-250 mL  0-250 mL IntraVENous PRN    insulin lispro (HUMALOG) injection   SubCUTAneous AC&HS    sodium chloride (NS) flush 5-40 mL  5-40 mL IntraVENous Q8H    sodium chloride (NS) flush 5-40 mL  5-40 mL IntraVENous PRN    acetaminophen (TYLENOL) tablet 650 mg  650 mg Oral Q6H PRN    Or    acetaminophen (TYLENOL) suppository 650 mg  650 mg Rectal Q6H PRN    polyethylene glycol (MIRALAX) packet 17 g  17 g Oral DAILY PRN    ondansetron (ZOFRAN ODT) tablet 4 mg  4 mg Oral Q8H PRN    Or    ondansetron (ZOFRAN) injection 4 mg  4 mg IntraVENous Q6H PRN    heparin (porcine) injection 5,000 Units  5,000 Units SubCUTAneous Q8H       Physical Examination:    General:   Weak and ill but not in acute distress    Eyes:   pink conjunctivae, PERRLA with no discharge. ENT:   no ottorrhea or rhinorrhea with dry mucous membranes  Neck: no masses, thyroid non-tender and trachea central.  Pulm:  decreased but clear breath sounds without crackles or wheezes  Card:  no JVD or murmurs, has bradycardia, without thrills, bruits or peripheral edema  Abd:  Soft, non-distended, normoactive bowel sounds   Musc:  No cyanosis, clubbing, atrophy. Reduced ROM right shoulder   Skin:  No rashes, bruising or ulcers. Neuro: awake and alert but frail, Follows command. RUE 4/5; word finding difficulty    Diagnostic testing:    Laboratory data reviewed and independently interpreted:    No results for input(s): WBC, HGB, HCT, PLT, HGBEXT, HCTEXT, PLTEXT, HGBEXT, HCTEXT, PLTEXT in the last 72 hours. Recent Labs     02/08/23  0059 02/07/23  0347 02/06/23  0250    147* 148*   K 4.3 4.7 4.9   * 117* 119*   CO2 22 24 23   * 141* 256*   BUN 13 10 15   CREA 1.49* 1.59* 1.59*   CA 8.7 8.6 8.2*       No components found for: [place]    Imaging studies reviewed and reports noted: CXR, CT scan head    12 lead EKG and telemetry reviewed and independently interpreted by me:   normal sinus rhythm    Notes reviewed: Consults, CM, PT, OT etc    Assessment and Plan:    RUE Weakness/Word Finding Difficulty:  Resolved. MRI Neg for acute stroke. 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 I suspect encephalopathy 2/2 transcellular shits in s/o HHS, hypernatremia as well as med side effect (Keppra). She has improved greatly today and is much clearer. Bgs at goal, off keppra. Will have PT re-evalaute as I suspect she may not require inpt rehab. Anticipate home with [facility] tmr. 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 Blood glucose much high. A1c 12.2%. S/p insulin gtt. A1c had been 6.8% just 4 months ago. She seems fairly sensitive to insulin. If her renal disease remains CKD3, she could simply be treated with metformin monotherapy. If she progresses, will likely need insulin. Will have DM team assist today; recs appreciated  - Cont Lantus to 0.3 units/kg, SSi per protocol  - DM mgmt to see, appreciated  - Add metformin 500mg bid    Seizure ([address] (2/4/2023) / AMS POA: Likely driven by HHS. Resolved and no further episodes. Appreciate Neuro following    Chronic heart failure with preserved ejection fraction (HFpEF) ([address] (2/5/2023) / Ischemic cardiomyopathy POA: CXR clear. Recent Echo showed an EF of 55-60%. Stable. Monitor clinical progress    CKD (chronic kidney disease) stage 3, GFR 30-59 ml/min /  Hypokalemia (2/5/2023) POA: Now improved to her baseline    Hypertension POA: BPs now better.  Cont Amlodipine      Care Plan discussed with: Family, nursing and consultants    Total time at bedside with pt and family discussing plan of care, answering questions, and examining patient: 40 min (10:45 - 11:25)    Prophylaxis:  Hep SQ    Diet:  Diabetic    Patient status: Inpatient     Level of care: Medical    Code status: FULL code       Anticipated Disposition: Home 24 - 48 hrs    PCP:      Darryl Forbes NP           ___________________________________________________    Attending Physician:   Ev Cuevas MD

## 2023-02-08 NOTE — PROGRESS NOTES
Problem: Mobility Impaired (Adult and Pediatric)  Goal: *Acute Goals and Plan of Care (Insert Text)  Description: FUNCTIONAL STATUS PRIOR TO ADMISSION: Per daughter pt ambulated with mod I using rolling walker, transferred and performed bed mobility without assistance. Pt experienced difficulty with car transfers. She performed light meal prep for 2 meals/day. HOME SUPPORT PRIOR TO ADMISSION: The patient lived with daughter. Granddaughter provides additional assistance as needed. Physical Therapy Goals  Initiated 2/7/2023  1. Patient will move from supine to sit and sit to supine  in bed with supervision/set-up within 7 day(s). 2.  Patient will transfer from bed to chair and chair to bed with supervision/set-up using the least restrictive device within 7 day(s). 3.  Patient will perform sit to stand with supervision/set-up within 7 day(s). 4.  Patient will ambulate with supervision/set-up for 50 feet with the least restrictive device within 7 day(s). 5.  Patient will ascend/descend 4 stairs with one handrail(s) with minimal assistance/contact guard assist within 7 day(s). Note:   PHYSICAL THERAPY TREATMENT  Patient: Nilsa Amador (86 y.o. female)  Date: 2/8/2023  Diagnosis: Hyperosmolar hyperglycemic state (HHS) ([address] [E11.00]  Seizure ([address] [R56.9] Hyperosmolar hyperglycemic state (HHS) ([address]      Precautions: Fall, Skin  Chart, physical therapy assessment, plan of care and goals were reviewed. ASSESSMENT  Patient continues with skilled PT services. Pt seen this AM.Pt supine to sit with CGA to min assist.Pt sit to stand with CGA. Pt ambulated 5ft to chair  with RW CGA. Pt left sitting. Pt progressing with mobility. Pt afternoon note to follow. PLAN :  Patient continues to benefit from skilled intervention to address the above impairments. Continue treatment per established plan of care. to address goals.     Recommendation for discharge: (in order for the patient to meet his/her long term goals)  Physical therapy at least 2 days/week in the home AND ensure assist and/or supervision for safety    This discharge recommendation:  Has been made in collaboration with the attending provider and/or case management    IF patient discharges home will need the following DME: rolling walker       SUBJECTIVE:       OBJECTIVE DATA SUMMARY:   Critical Behavior:  Neurologic State: Alert  Orientation Level: Oriented X4  Cognition: Follows commands  Safety/Judgement: Awareness of environment  Functional Mobility Training:  Bed Mobility:     Supine to Sit: Contact guard assistance;Minimum assistance              Transfers:  Sit to Stand: Contact guard assistance  Stand to Sit: Contact guard assistance                             Balance:  Sitting: Intact  Sitting - Static: Good (unsupported)  Standing: With support  Standing - Static: Good  Ambulation/Gait Training:  Distance (ft): 5 Feet (ft)  Assistive Device: Gait belt;Walker, rolling  Ambulation - Level of Assistance: Contact guard assistance        Gait Abnormalities: Decreased step clearance        Base of Support: Narrowed     Speed/Tonia: Pace decreased (<100 feet/min)  Step Length: Right shortened;Left shortened                      Activity Tolerance:   Good    After treatment patient left in no apparent distress:   Supine in bed    COMMUNICATION/COLLABORATION:   The patients plan of care was discussed with: Physical therapist.     Nely Aquino PTA   Time Calculation: 23 mins

## 2023-02-08 NOTE — PROGRESS NOTES
2/8/2023   CARE MANAGEMENT NOTE:  Pt transferred from ICU to the 5th floor. EMR reviewed and handoff received from previous  (Germaine). Pt was admitted with hyperosmolar hyperglycemic state/DM; also RUE weakness/word finding difficulty. Reportedly, pt lives with her dtr. Jennifer Fortune ([phone]). Granddtr also assists with pt's care. RUR 15%; [place] 3 days 12 hrs. Transition Plan of Care:  Neurology following for medical management  PT/OT/ST evals complete; pt ambulated 15 feet on 2/7 and rehab is recommended. CM discussed with pt's dtr and she prefers home health. Per PT note on 2/8, pt ambulated 80 feet and home health is now recommended. CM sent referral to [facility] (skilled nursing, PT) agency per family choice and they have accepted. Outpatient follow up  Mode of transportation to be determined    CM will continue to follow pt for definitive discharge plan.   Joana

## 2023-02-08 NOTE — PROGRESS NOTES
Problem: Mobility Impaired (Adult and Pediatric)  Goal: *Acute Goals and Plan of Care (Insert Text)  Description: FUNCTIONAL STATUS PRIOR TO ADMISSION: Per daughter pt ambulated with mod I using rolling walker, transferred and performed bed mobility without assistance. Pt experienced difficulty with car transfers. She performed light meal prep for 2 meals/day. HOME SUPPORT PRIOR TO ADMISSION: The patient lived with daughter. Granddaughter provides additional assistance as needed. Physical Therapy Goals  Initiated 2/7/2023  1. Patient will move from supine to sit and sit to supine  in bed with supervision/set-up within 7 day(s). 2.  Patient will transfer from bed to chair and chair to bed with supervision/set-up using the least restrictive device within 7 day(s). 3.  Patient will perform sit to stand with supervision/set-up within 7 day(s). 4.  Patient will ambulate with supervision/set-up for 50 feet with the least restrictive device within 7 day(s). 5.  Patient will ascend/descend 4 stairs with one handrail(s) with minimal assistance/contact guard assist within 7 day(s). 2/8/2023 1413 by Ryan Robins PTA  Note:   PHYSICAL THERAPY TREATMENT  Patient: Kamron Pierce (63 y.o. female)  Date: 2/8/2023  Diagnosis: Hyperosmolar hyperglycemic state (HHS) ([address] [E11.00]  Seizure ([address] [R56.9] Hyperosmolar hyperglycemic state (HHS) ([address]      Precautions: Fall, Skin  Chart, physical therapy assessment, plan of care and goals were reviewed. ASSESSMENT  Patient continues with skilled PT services. Pt sit to stand with CGA. Pt ambulated 80ft with RW CGA. Pt with stable steady gait. Pt left sitting. Pts daughter comfortable with pt mobility level. Pt is appropriate for HHPT and family assist.Pt progressing well. Continue goals. PLAN :  Patient continues to benefit from skilled intervention to address the above impairments. Continue treatment per established plan of care.   to address goals. Recommendation for discharge: (in order for the patient to meet his/her long term goals)  Physical therapy at least 2 days/week in the home AND ensure assist and/or supervision for safety  This discharge recommendation:  Has been made in collaboration with the attending provider and/or case management    IF patient discharges home will need the following DME: rolling walker       SUBJECTIVE:       OBJECTIVE DATA SUMMARY:   Critical Behavior:  Neurologic State: Alert  Orientation Level: Oriented X4  Cognition: Follows commands  Safety/Judgement: Awareness of environment  Functional Mobility Training:          Transfers:  Sit to Stand: Contact guard assistance  Stand to Sit: Contact guard assistance                             Balance:  Sitting: Intact  Sitting - Static: Good (unsupported)  Standing: With support  Standing - Static: Good  Ambulation/Gait Training:  Distance (ft): 80Feet (ft)  Assistive Device: Gait belt;Walker, rolling  Ambulation - Level of Assistance: Contact guard assistance        Gait Abnormalities: Decreased step clearance        Base of Support: Narrowed     Speed/Tonia: Pace decreased (<100 feet/min)  Step Length: Right shortened;Left shortened                  Activity Tolerance:   Good    After treatment patient left in no apparent distress:   Sitting in chair    COMMUNICATION/COLLABORATION:   The patients plan of care was discussed with: Physical therapist.     Laura Curling, PTA   Time Calculation: 23 mins         2/8/2023 1407 by Ghazala Lund PTA  Note:   PHYSICAL THERAPY TREATMENT  Patient: Merritt Landeros (88 y.o. female)  Date: 2/8/2023  Diagnosis: Hyperosmolar hyperglycemic state (HHS) ([facility] [E11.00]  Seizure ([address] [R56.9] Hyperosmolar hyperglycemic state (HHS) ([address]      Precautions: Fall, Skin  Chart, physical therapy assessment, plan of care and goals were reviewed. ASSESSMENT  Patient continues with skilled PT services. Pt seen this AM.Pt supine to sit with CGA to min assist.Pt sit to stand with CGA. Pt ambulated 5ft to chair  with RW CGA. Pt left sitting. Pt progressing with mobility. Pt afternoon note to follow. PLAN :  Patient continues to benefit from skilled intervention to address the above impairments. Continue treatment per established plan of care. to address goals.     Recommendation for discharge: (in order for the patient to meet his/her long term goals)  Physical therapy at least 2 days/week in the home AND ensure assist and/or supervision for safety    This discharge recommendation:  Has been made in collaboration with the attending provider and/or case management    IF patient discharges home will need the following DME: rolling walker       SUBJECTIVE:       OBJECTIVE DATA SUMMARY:   Critical Behavior:  Neurologic State: Alert  Orientation Level: Oriented X4  Cognition: Follows commands  Safety/Judgement: Awareness of environment  Functional Mobility Training:  Bed Mobility:     Supine to Sit: Contact guard assistance;Minimum assistance              Transfers:  Sit to Stand: Contact guard assistance  Stand to Sit: Contact guard assistance                             Balance:  Sitting: Intact  Sitting - Static: Good (unsupported)  Standing: With support  Standing - Static: Good  Ambulation/Gait Training:  Distance (ft): 5 Feet (ft)  Assistive Device: Gait belt;Walker, rolling  Ambulation - Level of Assistance: Contact guard assistance        Gait Abnormalities: Decreased step clearance        Base of Support: Narrowed     Speed/Tonia: Pace decreased (<100 feet/min)  Step Length: Right shortened;Left shortened                      Activity Tolerance:   Good    After treatment patient left in no apparent distress:   Supine in bed    COMMUNICATION/COLLABORATION:   The patients plan of care was discussed with: Physical therapist.     Fran Obrien PTA   Time Calculation: 23 mins

## 2023-02-08 NOTE — PROGRESS NOTES
Problem: Falls - Risk of  Goal: *Absence of Falls  Description: Document Betsy Hernandez Fall Risk and appropriate interventions in the flowsheet.   Outcome: Progressing Towards Goal  Note: Fall Risk Interventions:            Medication Interventions: Bed/chair exit alarm, Teach patient to arise slowly, Patient to call before getting OOB    Elimination Interventions: Call light in reach, Patient to call for help with toileting needs              Problem: Patient Education: Go to Patient Education Activity  Goal: Patient/Family Education  Outcome: Progressing Towards Goal

## 2023-02-08 NOTE — PROGRESS NOTES
[facility] and [address] at [facility] at [address], [address] [place] [address] [address]   [phone]              Chief Complaint   Patient presents with    Seizure     Current Facility-Administered Medications   Medication Dose Route Frequency Provider Last Rate Last Admin    metFORMIN (GLUCOPHAGE) tablet 500 mg  500 mg Oral BID WITH MEALS Diane Calderón MD        pantoprazole (PROTONIX) tablet 40 mg  40 mg Oral ACB Diane Calderón MD   40 mg at 02/08/23 8821    insulin glargine (LANTUS) injection 19 Units  0.3 Units/kg SubCUTAneous Q24H Kodi Crowe MD   19 Units at 02/07/23 1117    amLODIPine (NORVASC) tablet 2.5 mg  2.5 mg Oral DAILY Kodi Crowe MD   2.5 mg at 02/08/23 0900    glucose chewable tablet 16 g  4 Tablet Oral PRN Kodi Crowe MD        glucagon (GLUCAGEN) injection 1 mg  1 mg IntraMUSCular PRN Kodi Crowe MD        dextrose 10% infusion 0-250 mL  0-250 mL IntraVENous PRN Kodi Crowe MD        insulin lispro (HUMALOG) injection   SubCUTAneous AC&HS Kodi Crowe MD   3 Units at 02/08/23 1009    sodium chloride (NS) flush 5-40 mL  5-40 mL IntraVENous Q8H Niki Honey H, DO   10 mL at 02/08/23 3726    sodium chloride (NS) flush 5-40 mL  5-40 mL IntraVENous PRN Niki Honey H, DO        acetaminophen (TYLENOL) tablet 650 mg  650 mg Oral Q6H PRN Niki Honey H, DO        Or    acetaminophen (TYLENOL) suppository 650 mg  650 mg Rectal Q6H PRN Niki Honey H, DO        polyethylene glycol (MIRALAX) packet 17 g  17 g Oral DAILY PRN Niki Honey H, DO        ondansetron (ZOFRAN ODT) tablet 4 mg  4 mg Oral Q8H PRN Niki Honey H, DO        Or    ondansetron [facility]) injection 4 mg  4 mg IntraVENous Q6H PRN Niki Honey H, DO        heparin (porcine) injection 5,000 Units  5,000 Units SubCUTAneous Q8H Niki Honey H, DO   5,000 Units at 02/08/23 4637      Allergies   Allergen Reactions    Codeine Nausea and Vomiting    Compazine [Prochlorperazine] Other (comments)     Facial droop    Dilaudid [Hydromorphone] Nausea and Vomiting    Morphine Nausea and Vomiting    Sulfa (Sulfonamide Antibiotics) Nausea and Vomiting     Social History     Tobacco Use    Smoking status: Former     Years: 15.00     Types: Cigarettes    Smokeless tobacco: Never   Substance Use Topics    Alcohol use: Yes     Comment: very rarely     Drug use: Never     We are following up this 26-year-old lady who came in with hyperosmolar hyperglycemia and episodes suspicious for focal onset seizure. MRI of the brain unremarkable  EEG 24-hour unremarkable  Yesterday we stopped her Keppra  Metabolic panel this morning with sodium 142, glucose 143 and creatinine 1.49    Today she is being seen on the medical floor having been moved out of the ICU. She is seen with her daughter and Dr. Brennan Doctor at the bedside. Both report she is much different today. She says she feels well. No further shaking episodes. She is moving her right upper extremity well      Examination  Visit Vitals  /61 (BP 1 Location: Left lower arm, BP Patient Position: At rest)   Pulse 71   Temp 98.2 °F (36.8 °C)   Resp 18   Ht 5' 2\" (1.575 m)   Wt 64.9 kg (143 lb)   SpO2 96%   BMI 26.16 kg/m²   . Pleasant lady who is comfortable appearing. She is awake alert oriented to February 8. She knows the president and the previous president. She follows all commands. There is no pronation and no drift. No abnormal movement.   Moves her right upper extremity without any difficulty at all and her movements are also much      Impression/Plan  Episode described as focal seizure and really sounds like such in the setting of marked hyperglycemia and I think that was provoked by such  Continue off the Keppra as she clearly is better today off of that  We will follow-up PRAKASH Portillo MD        This note was created using voice recognition software. Despite editing, there may be syntax errors.

## 2023-02-08 NOTE — PROGRESS NOTES
Medicare pt has received, reviewed, and signed 2nd IM letter informing them of their right to appeal the discharge. Signed copy has been placed on pt bedside chart.     Yves Clifford LCSW

## 2023-02-08 NOTE — PROGRESS NOTES
Bedside and Verbal shift change report given to Fela Flowers LPN (oncoming nurse) by Dennies Jones RN (offgoing nurse). Report included the following information SBAR, ED Summary, Intake/Output, MAR, and Recent Results.

## 2023-02-09 VITALS
TEMPERATURE: 98.2 F | DIASTOLIC BLOOD PRESSURE: 63 MMHG | RESPIRATION RATE: 16 BRPM | SYSTOLIC BLOOD PRESSURE: 152 MMHG | WEIGHT: 143 LBS | HEIGHT: 62 IN | OXYGEN SATURATION: 97 % | HEART RATE: 83 BPM | BODY MASS INDEX: 26.31 KG/M2

## 2023-02-09 LAB
ANION GAP SERPL CALC-SCNC: 4 MMOL/L (ref 5–15)
BUN SERPL-MCNC: 16 MG/DL (ref 6–20)
BUN/CREAT SERPL: 9 (ref 12–20)
CALCIUM SERPL-MCNC: 8.4 MG/DL (ref 8.5–10.1)
CHLORIDE SERPL-SCNC: 115 MMOL/L (ref 97–108)
CO2 SERPL-SCNC: 23 MMOL/L (ref 21–32)
CREAT SERPL-MCNC: 1.74 MG/DL (ref 0.55–1.02)
GLUCOSE BLD STRIP.AUTO-MCNC: 133 MG/DL (ref 65–117)
GLUCOSE BLD STRIP.AUTO-MCNC: 191 MG/DL (ref 65–117)
GLUCOSE SERPL-MCNC: 119 MG/DL (ref 65–100)
POTASSIUM SERPL-SCNC: 4.3 MMOL/L (ref 3.5–5.1)
SERVICE CMNT-IMP: ABNORMAL
SERVICE CMNT-IMP: ABNORMAL
SODIUM SERPL-SCNC: 142 MMOL/L (ref 136–145)

## 2023-02-09 PROCEDURE — 74011636637 HC RX REV CODE- 636/637: Performed by: INTERNAL MEDICINE

## 2023-02-09 PROCEDURE — 74011250637 HC RX REV CODE- 250/637: Performed by: INTERNAL MEDICINE

## 2023-02-09 PROCEDURE — 97116 GAIT TRAINING THERAPY: CPT

## 2023-02-09 PROCEDURE — 80048 BASIC METABOLIC PNL TOTAL CA: CPT

## 2023-02-09 PROCEDURE — 74011000250 HC RX REV CODE- 250: Performed by: INTERNAL MEDICINE

## 2023-02-09 PROCEDURE — 97535 SELF CARE MNGMENT TRAINING: CPT

## 2023-02-09 PROCEDURE — 74011250636 HC RX REV CODE- 250/636: Performed by: INTERNAL MEDICINE

## 2023-02-09 PROCEDURE — 97530 THERAPEUTIC ACTIVITIES: CPT

## 2023-02-09 PROCEDURE — 82962 GLUCOSE BLOOD TEST: CPT

## 2023-02-09 PROCEDURE — 36415 COLL VENOUS BLD VENIPUNCTURE: CPT

## 2023-02-09 PROCEDURE — 99233 SBSQ HOSP IP/OBS HIGH 50: CPT

## 2023-02-09 RX ORDER — LANCETS
EACH MISCELLANEOUS
Qty: 1 EACH | Refills: 11 | Status: SHIPPED | OUTPATIENT
Start: 2023-02-09 | End: 2023-02-09 | Stop reason: SDUPTHER

## 2023-02-09 RX ORDER — LANCETS
EACH MISCELLANEOUS
Qty: 1 EACH | Refills: 11 | Status: SHIPPED | OUTPATIENT
Start: 2023-02-09

## 2023-02-09 RX ORDER — IBUPROFEN 200 MG
CAPSULE ORAL
Qty: 100 STRIP | Refills: 1 | Status: SHIPPED | OUTPATIENT
Start: 2023-02-09 | End: 2023-02-09 | Stop reason: SDUPTHER

## 2023-02-09 RX ORDER — INSULIN GLARGINE 100 [IU]/ML
19 INJECTION, SOLUTION SUBCUTANEOUS DAILY
Qty: 5.7 ML | Refills: 1 | Status: SHIPPED | OUTPATIENT
Start: 2023-02-09 | End: 2023-03-11

## 2023-02-09 RX ORDER — INSULIN PUMP SYRINGE, 3 ML
EACH MISCELLANEOUS
Qty: 1 KIT | Refills: 0 | Status: SHIPPED | OUTPATIENT
Start: 2023-02-09

## 2023-02-09 RX ORDER — INSULIN GLARGINE 100 [IU]/ML
0.3 INJECTION, SOLUTION SUBCUTANEOUS DAILY
Status: DISCONTINUED | OUTPATIENT
Start: 2023-02-10 | End: 2023-02-09 | Stop reason: HOSPADM

## 2023-02-09 RX ORDER — IBUPROFEN 200 MG
CAPSULE ORAL
Qty: 100 STRIP | Refills: 1 | Status: SHIPPED | OUTPATIENT
Start: 2023-02-09

## 2023-02-09 RX ORDER — INSULIN PUMP SYRINGE, 3 ML
EACH MISCELLANEOUS
Qty: 1 KIT | Refills: 0 | Status: SHIPPED | OUTPATIENT
Start: 2023-02-09 | End: 2023-02-09 | Stop reason: SDUPTHER

## 2023-02-09 RX ORDER — FUROSEMIDE 40 MG/1
40 TABLET ORAL DAILY
Qty: 30 TABLET | Refills: 0 | Status: SHIPPED
Start: 2023-02-09 | End: 2023-03-11

## 2023-02-09 RX ORDER — INSULIN GLARGINE 100 [IU]/ML
10 INJECTION, SOLUTION SUBCUTANEOUS DAILY
Status: DISCONTINUED | OUTPATIENT
Start: 2023-02-09 | End: 2023-02-09

## 2023-02-09 RX ORDER — AMLODIPINE BESYLATE 2.5 MG/1
5 TABLET ORAL DAILY
Qty: 60 TABLET | Refills: 0 | Status: SHIPPED
Start: 2023-02-09 | End: 2023-03-11

## 2023-02-09 RX ORDER — INSULIN GLARGINE 100 [IU]/ML
19 INJECTION, SOLUTION SUBCUTANEOUS DAILY
Qty: 1 ML | Refills: 1 | Status: SHIPPED | OUTPATIENT
Start: 2023-02-09 | End: 2023-02-09

## 2023-02-09 RX ADMIN — SODIUM CHLORIDE, PRESERVATIVE FREE 5 ML: 5 INJECTION INTRAVENOUS at 05:27

## 2023-02-09 RX ADMIN — PANTOPRAZOLE SODIUM 40 MG: 40 TABLET, DELAYED RELEASE ORAL at 06:52

## 2023-02-09 RX ADMIN — INSULIN GLARGINE 10 UNITS: 100 INJECTION, SOLUTION SUBCUTANEOUS at 08:30

## 2023-02-09 RX ADMIN — AMLODIPINE BESYLATE 2.5 MG: 5 TABLET ORAL at 08:30

## 2023-02-09 RX ADMIN — INSULIN LISPRO 3 UNITS: 100 INJECTION, SOLUTION INTRAVENOUS; SUBCUTANEOUS at 12:14

## 2023-02-09 RX ADMIN — HEPARIN SODIUM 5000 UNITS: 5000 INJECTION INTRAVENOUS; SUBCUTANEOUS at 05:44

## 2023-02-09 NOTE — PROGRESS NOTES
1640 sent Dr. Suzy Rush a message re: the patients daughter stated that she is at the pharmacy trying to  the medication  but she needs the diagnosis put on the prescriptions if you could please help her. [facility] Dr Suzy Rush advised that he had called the pharmacy     1700 the problem with the medications was solved.

## 2023-02-09 NOTE — PROGRESS NOTES
Bedside and Verbal shift change report given to [place] (oncoming nurse) by Edith Ochoa (offgoing nurse). Report included the following information SBAR, Kardex, Procedure Summary, Intake/Output, and MAR.

## 2023-02-09 NOTE — PROGRESS NOTES
Problem: Falls - Risk of  Goal: *Absence of Falls  Description: Document Benoit Mendosa Fall Risk and appropriate interventions in the flowsheet.   Outcome: Progressing Towards Goal  Note: Fall Risk Interventions:            Medication Interventions: Bed/chair exit alarm, Teach patient to arise slowly, Patient to call before getting OOB    Elimination Interventions: Call light in reach, Patient to call for help with toileting needs

## 2023-02-09 NOTE — DIABETES MGMT
[facility]  DIABETES MANAGEMENT CONSULT    Consulted by Provider for advanced nursing evaluation and care for inpatient blood glucose management. Evaluation and Action Plan   This 80 y.o. female with Type 2 diabetes was admitted on 2/4/23 with HHS and BG 1,199. Daughter provided history over phone (patient asleep) and states that patient has been in and out of the hospital since 2021. She states that they were told different things by different providers along this course, with some providers stating she did not need to be on any medications for diabetes. Patient is followed by PCP, Balyne Archuleta NP, who tried to find an appropriate diabetes medication for patient. Daughter states at one time, patient was prescribed a DPP-4 (which would be appropriate), but it cost $600/month. Prior to admission, patient was not taking anything, and therefore, was not checking BGs. Patient eats a fairly high CHO/high sugar diet. Daughter agrees that some changes will need to be made to her diet within reason, so I will review healthy plate with family. BGs have been fairly stable on a 0.3 unit/kg/D dose, even without patient eating much. Daughter states that yesterday was patient's best day of eating. Patient was started on Metformin last evening, but eGFR this morning was 28. ADCES recommedation is to discontinue Metformin with GFR<30. Spoke with daughter about family's comfort level with patient going home on insulin. Given patient's current A1c, her BGs the last few days on current insulin dose, cost factor, and the fact that patient lives with family keeping close tabs on her, insulin would likely be the best plan of action for discharge. At 80 y.o., would like to just have her be on a safe basal dose and over time, try to get A1c lowered. Will review with patient and family once daughter arrives later today.      Management Rationale Action Plan   Medication   Basal needs Using 0.3 units/kg/D based on weight and age 25 units   Corrective insulin Using normal sensitivity based on weight and renal status        Diabetes Discharge Plan   Medication: Lantus 18 units daily     Referral  []        Outpatient diabetes education   Additional orders: AC& HS blood sugar checks and keep log  Follow up with PCP          Initial Presentation   Daljit Mukherjee is a 80 y.o. female admitted 2/4/23 after experiencing seizure like activity and hyperglycemia. LAB:BG 1,199, Na 128, AG 15, eGFR 17, A1c 12.2%,  CXR:IMPRESSION  No acute intrathoracic process is identified. CT from 2/6:IMPRESSION  1. No large vessel occlusion or significant flow-limiting stenosis. 2.  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 3.  Less than 25% stenosis left cervical ICA origin. 4.  9 and 12 mm right parotid gland lesions, indeterminate. ENT correlation  advised. 5.  2 mm right apical nodule. Nonemergent dedicated CT evaluation recommended. MRI Brain:IMPRESSION  1. Mild chronic white matter disease and remote right corona radiata  infarction. No acute infarction. 2.  No MR evidence of seizure focus allowing for motion artifact. HX:   Past Medical History:   Diagnosis Date    CAD (coronary artery disease)     CHF (congestive heart failure), NYHA class I, chronic, systolic (HCC)     Systolic and diastolic CHF per echocardiogram of 12/25/2021. Patient was found to have LVEF 45%    Chronic kidney disease     stage III/IV:Creatinine 1.45-1.7 with GFR in the low 30s-high 20s    Diabetes mellitus type 2 in nonobese [facility])     Hypertension     Ischemic cardiomyopathy     Echocardiogram of 12/25/2021: mild left ventricular systolic dysfunction (EF 61%) with inferior and basal-mid septal akinesis. Diastolic dysfunction.     Peripheral vascular disease ([address]         INITIAL DX:   Hyperosmolar hyperglycemic state (HHS) ([address] [E11.00]  Seizure ([address] [R56.9]     Current Treatment     TX: basal/bolus insulin, DVT and GI prophylaxis, BP management, neurology following    Hospital Course   Clinical progress has been complicated by HHS. Diabetes History   Type 2. Has been on and off medications    Diabetes-related Medical History  Acute complications  HHNK on admission; now resolved  Microvascular disease  Nephropathy/CKD  Macrovascular disease  CAD    Diabetes Medication History  Key Antihyperglycemic Medications               glimepiride (AMARYL) 1 mg tablet Take 1 mg by mouth Daily (before breakfast). Diabetes self-management practices:   Eating pattern   [x] Not eating a carbohydrate-controlled meal plan  [x] Breakfast  Donut, muffin, fruit cup  [x] Lunch   Tuna sandwich, cheese and crackers, chicken nuggets  [x] Dinner   Meat , starch, vegetables  [x] Beverages  Milk, water, sometimes juice  [x] Dentition status No major issues  Physical activity pattern   [x] Not employing a physical activity program to control BG  Daughter reports they travel a lot  Monitoring pattern   Recently has not been checking BGs since she was not on any medications  Taking medications pattern  Was not on anything  Reducing risks  [] Influenza: There is no immunization history on file for this patient. [] Pneumonia:   There is no immunization history on file for this patient. [] Hepatitis:   There is no immunization history on file for this patient. Social determinants of health impacting diabetes self-management practices   Lives at home with her daughter and granddaughter, who are very involved with her care  Overall evaluation:    [x] Not achieving A1c target with drug therapy & self-care practices: A1c 12.2%    Subjective   Patient sleeping     Objective   Physical exam  General Normal weight female in no acute distress, sleeping.    Neuro    Vital Signs Visit Vitals  BP (!) 152/63 (BP 1 Location: Left lower arm, BP Patient Position: At rest)   Pulse 83   Temp 98.2 °F (36.8 °C)   Resp 16   Ht 5' 2\" (1.575 m)   Wt 64.9 kg (143 lb) SpO2 97%   BMI 26.16 kg/m²       Diabetic foot exam:   Deferred at this time    Laboratory  Recent Labs     02/09/23  0153 02/08/23  0059 02/07/23  0347   * 143* 141*   AGAP 4* 5 6   CREA 1.74* 1.49* 1.59*       Factors impacting BG management  Factor Dose Comments   Nutrition:  Standard meals     60 grams/meal      Other:   Kidney function  Liver function   eGFR 28/CKD  Normal       Blood glucose pattern    Significant diabetes-related events over the past 24-72 hours   on 0.3 basal  Required 10 units of correctional insulin yesterday    Assessment and Nursing Intervention   Nursing Diagnosis Risk for unstable blood glucose pattern   Nursing Intervention Domain 5250 Decision-making Support   Nursing Interventions Examined current inpatient diabetes/blood glucose control   Explored factors facilitating and impeding inpatient management  Explored corrective strategies with patient and responsible inpatient provider   Informed patient of rational for insulin strategy while hospitalized     Nursing Diagnosis 28138 Ineffective Health Management   Nursing Intervention Domain 5250 Decision-making Support   Nursing Interventions Identified diabetes self-management practices impeding diabetes control  Discussed diabetes survival skills related to  Healthy Plate eating plan; given handouts  Role of physical activity in improving insulin sensitivity and action  Procedure for blood glucose monitoring & options for low-cost products  Medications plan at discharge     Billing Code(s)   [x] 50691 IP subsequent hospital care - 50 minutes   [] 86159 IP subsequent hospital care - 35 minutes   [] [phone] IP subsequent hospital care - 25 minutes   [] [id number] IP initial hospital care - 40 minutes     Before making these care recommendations, I personally reviewed the hospitalization record, including notes, laboratory & diagnostic data and current medications, and examined the patient at the bedside (circumstances permitting) before determining care. More than fifty (50) percent of the time was spent in patient counseling and/or care coordination.   Total minutes: [address], CNS  Diabetes Clinical Nurse Specialist  [facility]  Access via [address] [place] [address]

## 2023-02-09 NOTE — PROGRESS NOTES
Bedside shift change report given to Gabrielle Parks LPN (oncoming nurse) by Diane Orellana RN (offgoing nurse). Report included the following information SBAR, Kardex, Intake/Output, MAR, and Recent Results.

## 2023-02-09 NOTE — PROGRESS NOTES
Problem: Falls - Risk of  Goal: *Absence of Falls  Description: Document Sejal All Fall Risk and appropriate interventions in the flowsheet.   Outcome: Progressing Towards Goal  Note: Fall Risk Interventions:            Medication Interventions: Bed/chair exit alarm, Teach patient to arise slowly, Patient to call before getting OOB    Elimination Interventions: Call light in reach, Patient to call for help with toileting needs              Problem: Patient Education: Go to Patient Education Activity  Goal: Patient/Family Education  Outcome: Progressing Towards Goal

## 2023-02-09 NOTE — DISCHARGE INSTRUCTIONS
HOSPITALIST DISCHARGE INSTRUCTIONS  NAME: Jackson Jin   :  1934   MRN:  [id number]     Date/Time:  2023 10:46 AM    ADMIT DATE: 2023     DISCHARGE DATE: 2023     ADMITTING DIAGNOSIS:  Hyperglycemia, diabetic emergency  Seizure    DISCHARGE DIAGNOSIS:  You were admitted for evaluation and treatment of the above. Your seizure was mot likely driven by extremely high blood sugar (a condition known as HHS), for which you required admission and treatment in the ICU. You improved with IV insulin and fluids. You will need to start taking 19 units of Lantus insulin daily. Beyond this there are no changes in your home medicines. MEDICATIONS:    It is important that you take the medication exactly as they are prescribed. Keep your medication in the bottles provided by the pharmacist and keep a list of the medication names, dosages, and times to be taken in your wallet. Do not take other medications without consulting your doctor. If you experience any of the following symptoms then please call your primary care physician or return to the emergency room if you cannot get hold of your doctor:  Fever, chills, nausea, vomiting, diarrhea, change in mentation, falling, bleeding, shortness of breath    Follow Up:  Please call and set up an appointment to see them in 1 week. [facility]  [phone]  Follow up  Homecare will provide skilled nursing, and PT. Please call agency directly with any questions. [address]a [address] [facility]  [phone]              Information obtained by :  I understand that if any problems occur once I am at home I am to contact my physician. I understand and acknowledge receipt of the instructions indicated above. Physician's or R.N.'s Signature                                                                  Date/Time                                                                                                                                              Patient or Representative Signature                                                          Date/Time

## 2023-02-09 NOTE — PROGRESS NOTES
Problem: Mobility Impaired (Adult and Pediatric)  Goal: *Acute Goals and Plan of Care (Insert Text)  Description: FUNCTIONAL STATUS PRIOR TO ADMISSION: Per daughter pt ambulated with mod I using rolling walker, transferred and performed bed mobility without assistance. Pt experienced difficulty with car transfers. She performed light meal prep for 2 meals/day. HOME SUPPORT PRIOR TO ADMISSION: The patient lived with daughter. Granddaughter provides additional assistance as needed. Physical Therapy Goals  Initiated 2/7/2023  1. Patient will move from supine to sit and sit to supine  in bed with supervision/set-up within 7 day(s). 2.  Patient will transfer from bed to chair and chair to bed with supervision/set-up using the least restrictive device within 7 day(s). 3.  Patient will perform sit to stand with supervision/set-up within 7 day(s). 4.  Patient will ambulate with supervision/set-up for 50 feet with the least restrictive device within 7 day(s). 5.  Patient will ascend/descend 4 stairs with one handrail(s) with minimal assistance/contact guard assist within 7 day(s). Outcome: Progressing Towards Goal   PHYSICAL THERAPY TREATMENT  Patient: Kamron Pierce (11 y.o. female)  Date: 2/9/2023  Diagnosis: Hyperosmolar hyperglycemic state (HHS) ([address] [E11.00]  Seizure ([address] [R56.9] Hyperosmolar hyperglycemic state (HHS) ([address]      Precautions: Fall, Skin  Chart, physical therapy assessment, plan of care and goals were reviewed. ASSESSMENT  Patient continues with skilled PT services and is progressing towards goals. Communicated with nurse cleared for therapy. Patient supine on bed when received, during therapy family arrived and agreed with all goals set for the patient. rolled on the edge of bed SBA, supine to sit SBA, sit to stand CGA, ambulate with rolling walker in the room, bathroom and out on the [facility] hallway decreased no loss of balance steady sitting and standing.  OOB to chair as tolerated performed some active range of motion exercise on both LE all planes. Educate and instructed patient to continue active range of motion exercise on both legs while up on chair or on bed multiple times. Recommend patient to be up on the chair at least 3 times a day every meal times as tolerated. Activated chair alarm and notified nurse who agreed to monitor patient. Current Level of Function Impacting Discharge (mobility/balance): CGA with transfers and ambulation using a rolling walker    Other factors to consider for discharge: fall         PLAN :  Patient continues to benefit from skilled intervention to address the above impairments. Continue treatment per established plan of care. to address goals. Recommendation for discharge: (in order for the patient to meet his/her long term goals)  Physical therapy at least 2 days/week in the home AND ensure assist and/or supervision for safety with rolling walker    This discharge recommendation:  Has been made in collaboration with the attending provider and/or case management    IF patient discharges home will need the following DME: patient owns DME required for discharge       SUBJECTIVE:   Patient stated [address]     OBJECTIVE DATA SUMMARY:   Critical Behavior:  Neurologic State: Alert  Orientation Level: Oriented X4  Cognition: Follows commands  Safety/Judgement: Awareness of environment  Functional Mobility Training:  Bed Mobility:  Rolling: Stand-by assistance  Supine to Sit: Stand-by assistance (HOB elevated slightly)  Sit to Supine: Stand-by assistance  Scooting: Stand-by assistance        Transfers:  Sit to Stand: Contact guard assistance  Stand to Sit: Contact guard assistance  Stand Pivot Transfers: Contact guard assistance     Bed to Chair: Contact guard assistance                    Balance:  Sitting: Intact; High guard  Sitting - Static: Good (unsupported)  Sitting - Dynamic: Good (unsupported)  Standing: Impaired; With support  Standing - Static: Good  Standing - Dynamic : Fair  Ambulation/Gait Training:  Distance (ft): 120 Feet (ft)  Assistive Device: Walker, rolling;Gait belt  Ambulation - Level of Assistance: Contact guard assistance     Gait Description (WDL): Exceptions to WDL  Gait Abnormalities: Path deviations        Base of Support: Widened     Speed/Tonia: Pace decreased (<100 feet/min)  Step Length: Right shortened;Right lengthened                   Therapeutic Exercises:   Educate and instructed patient to continue active range of motion exercise on both legs while up on chair or on bed multiple times. Recommend patient to be up on the chair at least 3 times a day every meal times as tolerated. Pain Ratin/10    Activity Tolerance:   Good    After treatment patient left in no apparent distress:   Sitting in chair, Heels elevated for pressure relief, Call bell within reach, Bed / chair alarm activated, and Caregiver / family present    COMMUNICATION/COLLABORATION:   The patients plan of care was discussed with: Occupational therapist, Registered nurse, and Case management. Irma Gilbert, PT,WCC.    Time Calculation: 30 mins

## 2023-02-09 NOTE — PROGRESS NOTES
2/9/2023   CARE MANAGEMENT NOTE:  CM reviewed EMR for clinical updates. Pt was admitted with hyperosmolar hyperglycemic state/DM; also RUE weakness/word finding difficulty. Reportedly, pt lives with her dtr. Mir Schulte [phone]). Granddtr also assists with pt's care. RUR 15%; LOS 4 days     Transition Plan of Care:  Neurology following for medical management  Plan is for pt to return home with her dtr  [facility] (skilled nursing, PT) has been arranged. CM notified agency of today's discharge and AVS was sent to them. Outpatient follow up  Mode of transportation to be determined     CM will continue to follow pt until discharged.   Joana

## 2023-02-09 NOTE — DISCHARGE SUMMARY
Hospitalist Discharge Summary     Patient ID:  Inocencia Fagan  [id number]  89 y.o.  2/21/1934    Admit date: 2/4/2023    Discharge date and time: 2/9/2023    Admission Diagnoses: Hyperosmolar hyperglycemic state (HHS) ([address] [E11.00]  Seizure ([address] [R56.9]    Discharge Diagnoses:    Principal Problem:    Hyperosmolar hyperglycemic state (HHS) ([address] (2/4/2023)    Active Problems:    CKD (chronic kidney disease) stage 3, GFR 30-59 ml/min (HCC) (10/18/2021)      DM type 2 causing renal disease (HCC) ()      DM type 2 causing vascular disease (HCC) ()      Hypertension ()      Ischemic cardiomyopathy ()      Seizure ([address] (2/4/2023)      Hypokalemia (2/5/2023)      Chronic heart failure with preserved ejection fraction (HFpEF) ([address] (2/5/2023)           Hospital Course:   Inocencia Fagan is a 80 y.o. female who lives at home with her daughter. Per patient's granddaughter at bedside, patient has been urinating frequently, and drinking copious amounts of water. Per daughter, patient has not been on any diabetic medications for about a year now. Her diabetic medications (previously on glimepiride per record) were stopped while she was hospitalized at [facility] as there was concern for hypoglycemia. Per granddaughter, patient had seizure-like activity at home today. Her right arm became very rigid, then started becoming tremulous, and then became rigid again. Patient was speaking to the granddaughter during that time, stating that she could not move her right arm. Then, she had an episode of about 30 seconds of generalized shaking. In the emergency department, in the presence of nurse and emergency department physician, Dr. Sue Multani, patient had another partial seizure where her right arm became very rigid. Patient was evaluated by tele-neurologist who recommended starting Keppra.     She was admitted for further evaluation and treatment of the following:        Seizure ([address] (2/4/2023) / AMS POA: Likely driven by HHS. Resolved and no further episodes. Appreciate Neuro following. No need for AED. RUE Weakness/Word Finding Difficulty:  Resolved. MRI Neg for acute stroke. 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 I suspect encephalopathy 2/2 transcellular shits in s/o HHS, hypernatremia as well as med side effect (Keppra). She has improved greatly and is back to baseline. 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 Blood glucose much high. A1c 12.2%. S/p insulin gtt. A1c had been 6.8% just 4 months ago. She seems fairly sensitive to insulin. Considered metformin but her renal disease fluctuates too frequently for this to be safe. She was seen and evaluated by DM management team here and will discharge on 19 units lantus daily. Chronic heart failure with preserved ejection fraction (HFpEF) ([address] (2/5/2023) / Ischemic cardiomyopathy POA: CXR clear. Recent Echo showed an EF of 55-60%. Stable. CKD (chronic kidney disease) stage 3/4: Fluctuating between 3 and 4 here, precluding use of metformin as noted. Reduced furosemide to once dialy from BID     Hypertension POA: BPs now better. Cont Amlodipine 5mg as home dose.          PCP: Agueda Hassan NP     Consults: Pulmonary/Intensive care, Neurology, and Endocrine    Condition of patient at discharge: good and improved    Discharge Exam:    Physical Exam:    Gen: Well-developed, well-nourished, in no acute distress  HEENT:  Pink conjunctivae, PERRL, hearing intact to voice, moist mucous membranes  Neck: Supple, without masses, thyroid non-tender  Resp: No accessory muscle use, clear breath sounds without wheezes rales or rhonchi  Card: No murmurs, normal S1, S2 without thrills, bruits or peripheral edema  Abd:  Soft, non-tender, non-distended, normoactive bowel sounds are present, no palpable organomegaly and no detectable hernias  Lymph:  No cervical or inguinal adenopathy  Musc: No cyanosis or clubbing  Skin: No rashes or ulcers, skin turgor is good  Neuro:  Cranial nerves are grossly intact, no focal motor weakness, follows commands appropriately  Psych:  Good insight, oriented to person, place and time, alert          Disposition: home    Patient Instructions:   Current Discharge Medication List        START taking these medications    Details   insulin glargine (Lantus U-100 Insulin) 100 unit/mL injection 19 Units by SubCUTAneous route daily for 30 days. Qty: 1 mL, Refills: 1      Blood-Glucose Meter monitoring kit Check blood sugar twice daily  Qty: 1 Kit, Refills: 0      glucose blood VI test strips (blood glucose test) strip Check blood sugar twice daily  Qty: 100 Strip, Refills: 1      lancets misc Check BG twice daily  Qty: 1 Each, Refills: 11    Comments: Patient choice           CONTINUE these medications which have CHANGED    Details   furosemide (LASIX) 40 mg tablet Take 1 Tablet by mouth daily for 30 days. Indications: accumulation of fluid resulting from chronic heart failure  Qty: 30 Tablet, Refills: 0      amLODIPine (NORVASC) 2.5 mg tablet Take 2 Tablets by mouth daily for 30 days. Qty: 60 Tablet, Refills: 0           CONTINUE these medications which have NOT CHANGED    Details   pantoprazole (PROTONIX) 40 mg granules for oral suspension Take 40 mg by mouth Daily (before breakfast). Qty: 90 Each, Refills: 0      aspirin 81 mg chewable tablet Take 1 Tablet by mouth daily. Qty: 90 Tablet, Refills: 0      ferrous sulfate (Iron) 325 mg (65 mg iron) tablet Take 1 Tablet by mouth Daily (before breakfast). Qty: 90 Tablet, Refills: 0      ondansetron hcl (ZOFRAN) 4 mg tablet Take 4 mg by mouth daily as needed for Nausea or Vomiting. cholecalciferol, vitamin D3, 50 mcg (2,000 unit) tab Take  by mouth. atorvastatin (LIPITOR) 20 mg tablet Take 1 Tablet by mouth daily.   Qty: 30 Tablet, Refills: 1           STOP taking these medications       glimepiride (AMARYL) 1 mg tablet Comments:   Reason for Stopping:         doxycycline (MONODOX) 100 mg capsule Comments:   Reason for Stopping:         metoprolol tartrate (LOPRESSOR) 50 mg tablet Comments:   Reason for Stopping:             Activity: Activity as tolerated  Diet: Diabetic Diet  Wound Care: None needed    Follow-up with Oneil Ramos NP in 1 week. Follow-up tests/labs    - BG log    Approximate time spent in patient care on day of discharge: 60 min    Signed:   Eliecer Senior MD  2/9/2023  10:48 AM

## 2023-02-09 NOTE — PROGRESS NOTES
Problem: Self Care Deficits Care Plan (Adult)  Goal: *Acute Goals and Plan of Care (Insert Text)  Description: FUNCTIONAL STATUS PRIOR TO ADMISSION: Per daughter, pt ambulated with mod I using rolling walker, transferred and performed bed mobility without assistance. Pt experienced difficulty with car transfers. She performed light meal prep for 2 meals/day. HOME SUPPORT PRIOR TO ADMISSION: The patient lived with daughter. Granddaughter provides additional assistance as needed. Occupational Therapy Goals  Initiated 2/7/2023  1. Patient will perform self-feeding with modified independence using R, dominant, UE as fine manipulator within 7 day(s). 2.  Patient will perform grooming with modified independence within 7 day(s). 3.  Patient will perform upper body dressing and bathing with modified independence within 7 day(s). 4.  Patient will perform lower body dressing and bathing with minimum assistance within 7 days. 5.  Patient will perform toilet transfers with supervision/set-up using rolling walker within 7 day(s). 6.  Patient will perform all aspects of toileting with minimal assistance within 7 day(s). 7.  Patient will participate in upper extremity therapeutic exercise/activities with supervision/set-up for 10 minutes within 7 day(s). Outcome: Progressing Towards Goal   OCCUPATIONAL THERAPY TREATMENT  Patient: Any Lobato (27 y.o. female)  Date: 2/9/2023  Diagnosis: Hyperosmolar hyperglycemic state (HHS) ([facility] [E11.00]  Seizure ([address] [R56.9] Hyperosmolar hyperglycemic state (HHS) ([address]      Precautions: Fall, Skin  Chart, occupational therapy assessment, plan of care, and goals were reviewed. ASSESSMENT  Patient continues with skilled OT services and is progressing towards goals. Patient received supine in bed, sleeping, agreeable to activity. Patient requires CGA for bed mobility today. She is able to stand with CGA and transfers with same level using RW.   Patient requires assist for protective brief, secondary to large size and tabs to close. Patient does well with pulling up/down over hips. Patient performs bladder hygiene in sitting with supervision using RUE. SBA for washing hands at sink. Patient left in chair at end of session in NAD, granddaughter present. Current Level of Function Impacting Discharge (ADLs): CGA    Other factors to consider for discharge: lives with daughter         PLAN :  Patient continues to benefit from skilled intervention to address the above impairments. Continue treatment per established plan of care to address goals. Recommend with staff: Brennon Freire for meals, toileting in bathroom     Recommend next OT session: continue goals     Recommendation for discharge: (in order for the patient to meet his/her long term goals)  Occupational therapy at least 2 days/week in the home AND ensure assist and/or supervision for safety with ADLs and transfers     This discharge recommendation:  Has been made in collaboration with the attending provider and/or case management    IF patient discharges home will need the following DME: none       SUBJECTIVE:   Patient stated I didn't think I needed to go to the bathroom.     OBJECTIVE DATA SUMMARY:   Cognitive/Behavioral Status:  Neurologic State: Alert  Orientation Level: Oriented X4  Cognition: Follows commands  Perception: Appears intact  Perseveration: No perseveration noted  Safety/Judgement: Awareness of environment    Functional Mobility and Transfers for ADLs:  Bed Mobility:  Supine to Sit: Stand-by assistance (HOB elevated slightly)    Transfers:  Sit to Stand: Contact guard assistance  Functional Transfers  Bathroom Mobility: Contact guard assistance  Toilet Transfer : Contact guard assistance  Bed to Chair: Contact guard assistance    Balance:  Sitting: Intact  Standing: With support    ADL Intervention:  Feeding  Feeding Assistance: Independent    Grooming  Washing Hands: Stand-by assistance  Cues: Verbal cues provided         Type of Bath: Patient refused              Lower Body Dressing Assistance  Protective Undergarmet: Moderate assistance    Toileting  Toileting Assistance: Contact guard assistance  Bladder Hygiene: Supervision  Clothing Management: Contact guard assistance  Cues:  (over/below hips)  Adaptive Equipment: Walker;Grab bars    Cognitive Retraining  Safety/Judgement: Awareness of environment    Therapeutic Exercises:       Pain:  Does not complain of pain    Activity Tolerance:   Good    After treatment patient left in no apparent distress:   Sitting in chair, Call bell within reach, Bed / chair alarm activated, and Caregiver / family present    COMMUNICATION/COLLABORATION:   The patients plan of care was discussed with: Physical therapy assistant and Registered nurse.      Sangeeta Sosa, OTR/L  Time Calculation: 32 mins

## 2023-02-09 NOTE — PROGRESS NOTES
I have reviewed discharge instructions with the patient and caregiver. The patient and caregiver verbalized understanding.  IV was remove, diabetic teaching done with the daughter at bedside, medications sent to the pharmacy all paper work gone over with the family

## 2023-02-10 ENCOUNTER — PATIENT OUTREACH (OUTPATIENT)
Dept: CASE MANAGEMENT | Age: 88
End: 2023-02-10

## 2023-02-10 NOTE — PROGRESS NOTES
Care Transitions Initial Call    Call within 2 business days of discharge: Yes     Patient Current Location: [place]    Patient: Carissa Pérez Patient : 1934 MRN: [id number]    Last Discharge  [address]       Date Complaint Diagnosis Description Type Department Provider    23 Seizure Seizure ([facility] . .. ED to Hosp-Admission (Discharged) (ADMIT) Ramone Jarrell MD; Bernadine Trimble. .. Was this an external facility discharge? No     Challenges to be reviewed by the provider   Additional needs identified to be addressed with provider: no           Method of communication with provider : none    Discussed COVID-19 related testing which was not done at this time. Advance Care Planning:   Does patient have an Advance Directive: not on file. Education deferred    Inpatient Readmission Risk score: Unplanned Readmit Risk Score: 14.8    Was this a readmission? no   Patient stated reason for the admission: blood sugar    Patients top risk factors for readmission: medical condition-DM and medication management   Interventions to address risk factors: Scheduled appointment with PCP-23, Obtained and reviewed discharge summary and/or continuity of care documents, Education of patient/family/caregiver/guardian to support self-management-DM, and Assessment and support for treatment adherence and medication management-lantus    Care Transition Nurse (CTN) contacted the family by telephone to perform post hospital discharge assessment. Verified name and  with family as identifiers. Provided introduction to self, and explanation of the CTN role. CTN reviewed discharge instructions, medical action plan and red flags with family who verbalized understanding. Were discharge instructions available to patient? yes. Reviewed appropriate site of care based on symptoms and resources available to patient including: PCP, Urgent Care Clinics, Home Health, and Condition related references.  Family given an opportunity to ask questions and does not have any further questions or concerns at this time. The family agrees to contact the PCP office for questions related to their healthcare. Medication reconciliation was performed with family, who verbalizes understanding of administration of home medications. Advised obtaining a 90-day supply of all daily and as-needed medications. Referral to Pharm D needed: no     Home Health/Outpatient orders at discharge: home health care  [address] [place] [address] Way: [facility]  Date of initial visit: 2/10/23    Durable Medical Equipment ordered at discharge: None      Was patient discharged with a pulse oximeter? no    Discussed follow-up appointments. If no appointment was previously scheduled, appointment scheduling offered:  na . Is follow up appointment scheduled within 7 days of discharge? yes. [facility] follow up appointment(s): No future appointments. Non-[facility] follow up appointment(s): PCP 2/16/23    Plan for follow-up call in 5-7 days based on severity of symptoms and risk factors. Plan for next call: self management-BS readings, food diary and follow up appointment-pcp  CTN provided contact information for future needs. Goals Addressed                   This Visit's Progress     Prevent complications post hospitalization.         2/10/23  Family able to verbalize s/s hypoglycemia/hyperglycemia and treatment  Diet- maintain consistent carb diet, eat 3 evenly spaced meals (4-5 hours apart) a day with snacks  Blood sugar monitor frequency, insulin administration, endocrinologist fu, stress / steroids can increase BS  Attend RELL appt-2/16/23GILBERTO

## 2023-02-14 NOTE — PROGRESS NOTES
Physician Progress Note      PATIENT:               Cj Nuno  CSN #:                  [id number]  :                       1934  ADMIT DATE:       2023 7:47 PM  [address] [place] DATE:        2023 3:05 PM  RESPONDING  PROVIDER #:        Avani Boland MD          QUERY TEXT:    Good afternoon  Pt admitted with HHS and has encephalopathy documented. If possible, please document in progress notes and discharge summary further specificity regarding the type of encephalopathy:    The medical record reflects the following:  Risk Factors: HHS, JOANNA, psuedohyponatremia, seizure, DM2  Clinical Indicators: EEG-diffuse slowing and disorganization of the background indicative of a mild degree of bihemispheric dysfunction as is commonly seen with an encephalopathy   PN \"I suspect encephalopathy 2/2 transcellular shits in s/o HHS, hypernatremia as well as possible med side effect (Keppra)\"  Treatment: EEG, neurology consult    Thank you  Julian Plata RN CDI  [phone]  Options provided:  -- Metabolic encephalopathy  -- Encephalopathy due to ##, . Please document cause  -- Encephalopathy with unknown etiology  -- Encephalopathy ruled out  -- Other - I will add my own diagnosis  -- Disagree - Not applicable / Not valid  -- Disagree - Clinically unable to determine / Unknown  -- Refer to Clinical Documentation Reviewer    PROVIDER RESPONSE TEXT:    This patient has metabolic encephalopathy.     Query created by: Tiarra Hubbard on 2023 1:48 PM      Electronically signed by:  Avani Boland MD 2023 10:40 AM

## 2023-02-17 ENCOUNTER — PATIENT OUTREACH (OUTPATIENT)
Dept: CASE MANAGEMENT | Age: 88
End: 2023-02-17

## 2023-02-17 NOTE — PROGRESS NOTES
Goals Addressed                   This Visit's Progress     Prevent complications post hospitalization. 2/10/23  Family able to verbalize s/s hypoglycemia/hyperglycemia and treatment  Diet- maintain consistent carb diet, eat 3 evenly spaced meals (4-5 hours apart) a day with snacks  Blood sugar monitor frequency, insulin administration, endocrinologist fu, stress / steroids can increase BS  Attend RELL appt-2/16/23, GILBERTO  2/17/23 Patient daughter called on home number on file. Message left on voicemail with contact information to return call to this writer.  GILBERTO

## 2023-02-24 ENCOUNTER — PATIENT OUTREACH (OUTPATIENT)
Dept: CASE MANAGEMENT | Age: 88
End: 2023-02-24

## 2023-02-24 NOTE — PROGRESS NOTES
Goals Addressed                   This Visit's Progress     Prevent complications post hospitalization. 2/10/23  Family able to verbalize s/s hypoglycemia/hyperglycemia and treatment  Diet- maintain consistent carb diet, eat 3 evenly spaced meals (4-5 hours apart) a day with snacks  Blood sugar monitor frequency, insulin administration, endocrinologist fu, stress / steroids can increase BS  Attend RELL appt-2/16/23, GILBERTO  2/17/23 Patient daughter called on home number on file. Message left on voicemail with contact information to return call to this writer. GILBERTO  2/24/23 Patient daughter called on home number on file. Message left on voicemail with contact information to return call to this writer.  GILBERTO

## 2023-03-03 ENCOUNTER — PATIENT OUTREACH (OUTPATIENT)
Dept: CASE MANAGEMENT | Age: 88
End: 2023-03-03

## 2023-03-10 ENCOUNTER — PATIENT OUTREACH (OUTPATIENT)
Dept: CASE MANAGEMENT | Age: 88
End: 2023-03-10

## 2023-03-10 NOTE — PROGRESS NOTES
Patient has graduated from the Transitions of Care Coordination  program on 3/10/23. Patient/family has the ability to self-manage at this time Care management goals have been completed. Patient was not referred to the [facility] team for further management. Goals Addressed                   This Visit's Progress     COMPLETED: Prevent complications post hospitalization. 2/10/23  Family able to verbalize s/s hypoglycemia/hyperglycemia and treatment  Diet- maintain consistent carb diet, eat 3 evenly spaced meals (4-5 hours apart) a day with snacks  Blood sugar monitor frequency, insulin administration, endocrinologist fu, stress / steroids can increase BS  Attend RELL appt-2/16/23, [facility]  2/17/23 Patient daughter called on home number on file. Message left on voicemail with contact information to return call to this writer. [facility] [place] [facility]  2/24/23 Patient daughter called on home number on file. Message left on voicemail with contact information to return call to this writer. [facility] [place]  3/3/23 Patient daughter called on home number on file. Message left on voicemail with contact information to return call to this writer. [facility] [place]  3/10/23 Patient was lost to follow up since no outreach calls from daughter was returned. Per review of chart patient attended follow up appointments. Patient has had no ED or hospital admissions 30 days post discharge. Goal complete. [facility] [place]                 Patient has Care Transition Nurse's contact information for any further questions, concerns, or needs. Patients upcoming visits:  No future appointments.

## 2023-04-03 PROBLEM — E11.9 DM (DIABETES MELLITUS), TYPE 2 (HCC): Status: RESOLVED | Noted: 2021-10-18 | Resolved: 2022-02-03

## 2023-06-22 ENCOUNTER — APPOINTMENT (OUTPATIENT)
Facility: HOSPITAL | Age: 88
DRG: 378 | End: 2023-06-22
Payer: MEDICARE

## 2023-06-22 ENCOUNTER — HOSPITAL ENCOUNTER (INPATIENT)
Facility: HOSPITAL | Age: 88
LOS: 4 days | Discharge: HOME | DRG: 378 | End: 2023-06-26
Attending: EMERGENCY MEDICINE | Admitting: INTERNAL MEDICINE
Payer: MEDICARE

## 2023-06-22 DIAGNOSIS — D64.9 SYMPTOMATIC ANEMIA: Primary | ICD-10-CM

## 2023-06-22 LAB
ALBUMIN SERPL-MCNC: 3.9 G/DL (ref 3.5–5)
ALBUMIN/GLOB SERPL: 1.2 (ref 1.1–2.2)
ALP SERPL-CCNC: 74 U/L (ref 45–117)
ALT SERPL-CCNC: 33 U/L (ref 12–78)
ANION GAP SERPL CALC-SCNC: 8 MMOL/L (ref 5–15)
AST SERPL-CCNC: 19 U/L (ref 15–37)
BASOPHILS # BLD: 0.1 K/UL (ref 0–0.1)
BASOPHILS NFR BLD: 1 % (ref 0–1)
BILIRUB SERPL-MCNC: 0.4 MG/DL (ref 0.2–1)
BUN SERPL-MCNC: 38 MG/DL (ref 6–20)
BUN/CREAT SERPL: 19 (ref 12–20)
CALCIUM SERPL-MCNC: 9.2 MG/DL (ref 8.5–10.1)
CHLORIDE SERPL-SCNC: 108 MMOL/L (ref 97–108)
CO2 SERPL-SCNC: 23 MMOL/L (ref 21–32)
COMMENT:: NORMAL
CREAT SERPL-MCNC: 2.02 MG/DL (ref 0.55–1.02)
DIFFERENTIAL METHOD BLD: ABNORMAL
EOSINOPHIL # BLD: 0.1 K/UL (ref 0–0.4)
EOSINOPHIL NFR BLD: 2 % (ref 0–7)
ERYTHROCYTE [DISTWIDTH] IN BLOOD BY AUTOMATED COUNT: 15.1 % (ref 11.5–14.5)
GLOBULIN SER CALC-MCNC: 3.2 G/DL (ref 2–4)
GLUCOSE SERPL-MCNC: 132 MG/DL (ref 65–100)
HCT VFR BLD AUTO: 19.5 % (ref 35–47)
HGB BLD-MCNC: 6.1 G/DL (ref 11.5–16)
HISTORY CHECK: NORMAL
IMM GRANULOCYTES # BLD AUTO: 0 K/UL (ref 0–0.04)
IMM GRANULOCYTES NFR BLD AUTO: 0 % (ref 0–0.5)
LYMPHOCYTES # BLD: 1.2 K/UL (ref 0.8–3.5)
LYMPHOCYTES NFR BLD: 23 % (ref 12–49)
MAGNESIUM SERPL-MCNC: 2.5 MG/DL (ref 1.6–2.4)
MCH RBC QN AUTO: 22.6 PG (ref 26–34)
MCHC RBC AUTO-ENTMCNC: 31.3 G/DL (ref 30–36.5)
MCV RBC AUTO: 72.2 FL (ref 80–99)
MONOCYTES # BLD: 0.3 K/UL (ref 0–1)
MONOCYTES NFR BLD: 5 % (ref 5–13)
NEUTS SEG # BLD: 3.7 K/UL (ref 1.8–8)
NEUTS SEG NFR BLD: 69 % (ref 32–75)
NRBC # BLD: 0 K/UL (ref 0–0.01)
NRBC BLD-RTO: 0 PER 100 WBC
NT PRO BNP: 634 PG/ML
PLATELET # BLD AUTO: 260 K/UL (ref 150–400)
PMV BLD AUTO: 10.4 FL (ref 8.9–12.9)
POTASSIUM SERPL-SCNC: 3.6 MMOL/L (ref 3.5–5.1)
PROT SERPL-MCNC: 7.1 G/DL (ref 6.4–8.2)
RBC # BLD AUTO: 2.7 M/UL (ref 3.8–5.2)
RBC MORPH BLD: ABNORMAL
SODIUM SERPL-SCNC: 139 MMOL/L (ref 136–145)
SPECIMEN HOLD: NORMAL
TROPONIN I SERPL HS-MCNC: 15 NG/L (ref 0–51)
WBC # BLD AUTO: 5.4 K/UL (ref 3.6–11)

## 2023-06-22 PROCEDURE — 83735 ASSAY OF MAGNESIUM: CPT

## 2023-06-22 PROCEDURE — P9016 RBC LEUKOCYTES REDUCED: HCPCS

## 2023-06-22 PROCEDURE — 36430 TRANSFUSION BLD/BLD COMPNT: CPT

## 2023-06-22 PROCEDURE — 80053 COMPREHEN METABOLIC PANEL: CPT

## 2023-06-22 PROCEDURE — 82272 OCCULT BLD FECES 1-3 TESTS: CPT

## 2023-06-22 PROCEDURE — 85025 COMPLETE CBC W/AUTO DIFF WBC: CPT

## 2023-06-22 PROCEDURE — 86901 BLOOD TYPING SEROLOGIC RH(D): CPT

## 2023-06-22 PROCEDURE — 1100000000 HC RM PRIVATE

## 2023-06-22 PROCEDURE — 71045 X-RAY EXAM CHEST 1 VIEW: CPT

## 2023-06-22 PROCEDURE — 83880 ASSAY OF NATRIURETIC PEPTIDE: CPT

## 2023-06-22 PROCEDURE — 86900 BLOOD TYPING SEROLOGIC ABO: CPT

## 2023-06-22 PROCEDURE — 86850 RBC ANTIBODY SCREEN: CPT

## 2023-06-22 PROCEDURE — 93005 ELECTROCARDIOGRAM TRACING: CPT | Performed by: EMERGENCY MEDICINE

## 2023-06-22 PROCEDURE — 84484 ASSAY OF TROPONIN QUANT: CPT

## 2023-06-22 PROCEDURE — 36415 COLL VENOUS BLD VENIPUNCTURE: CPT

## 2023-06-22 PROCEDURE — 86923 COMPATIBILITY TEST ELECTRIC: CPT

## 2023-06-22 PROCEDURE — 99285 EMERGENCY DEPT VISIT HI MDM: CPT

## 2023-06-22 RX ORDER — SODIUM CHLORIDE 0.9 % (FLUSH) 0.9 %
5-40 SYRINGE (ML) INJECTION PRN
Status: DISCONTINUED | OUTPATIENT
Start: 2023-06-22 | End: 2023-06-26 | Stop reason: HOSPADM

## 2023-06-22 RX ORDER — SODIUM CHLORIDE 0.9 % (FLUSH) 0.9 %
5-40 SYRINGE (ML) INJECTION EVERY 12 HOURS SCHEDULED
Status: DISCONTINUED | OUTPATIENT
Start: 2023-06-22 | End: 2023-06-26 | Stop reason: HOSPADM

## 2023-06-22 RX ORDER — PANTOPRAZOLE SODIUM 40 MG/10ML
40 INJECTION, POWDER, LYOPHILIZED, FOR SOLUTION INTRAVENOUS EVERY 12 HOURS
Status: DISCONTINUED | OUTPATIENT
Start: 2023-06-22 | End: 2023-06-24

## 2023-06-22 RX ORDER — ONDANSETRON 4 MG/1
4 TABLET, ORALLY DISINTEGRATING ORAL EVERY 8 HOURS PRN
Status: DISCONTINUED | OUTPATIENT
Start: 2023-06-22 | End: 2023-06-26 | Stop reason: HOSPADM

## 2023-06-22 RX ORDER — SODIUM CHLORIDE 9 MG/ML
INJECTION, SOLUTION INTRAVENOUS PRN
Status: DISCONTINUED | OUTPATIENT
Start: 2023-06-22 | End: 2023-06-26 | Stop reason: HOSPADM

## 2023-06-22 RX ORDER — POLYETHYLENE GLYCOL 3350 17 G/17G
17 POWDER, FOR SOLUTION ORAL DAILY PRN
Status: DISCONTINUED | OUTPATIENT
Start: 2023-06-22 | End: 2023-06-26 | Stop reason: HOSPADM

## 2023-06-22 RX ORDER — ACETAMINOPHEN 325 MG/1
650 TABLET ORAL EVERY 6 HOURS PRN
Status: DISCONTINUED | OUTPATIENT
Start: 2023-06-22 | End: 2023-06-26 | Stop reason: HOSPADM

## 2023-06-22 RX ORDER — SODIUM CHLORIDE 9 MG/ML
INJECTION, SOLUTION INTRAVENOUS PRN
Status: DISCONTINUED | OUTPATIENT
Start: 2023-06-22 | End: 2023-06-24

## 2023-06-22 RX ORDER — ACETAMINOPHEN 650 MG/1
650 SUPPOSITORY RECTAL EVERY 6 HOURS PRN
Status: DISCONTINUED | OUTPATIENT
Start: 2023-06-22 | End: 2023-06-26 | Stop reason: HOSPADM

## 2023-06-22 RX ORDER — ONDANSETRON 2 MG/ML
4 INJECTION INTRAMUSCULAR; INTRAVENOUS EVERY 6 HOURS PRN
Status: DISCONTINUED | OUTPATIENT
Start: 2023-06-22 | End: 2023-06-26 | Stop reason: HOSPADM

## 2023-06-22 ASSESSMENT — PAIN - FUNCTIONAL ASSESSMENT: PAIN_FUNCTIONAL_ASSESSMENT: NONE - DENIES PAIN

## 2023-06-23 LAB
ANION GAP SERPL CALC-SCNC: 6 MMOL/L (ref 5–15)
BASOPHILS # BLD: 0.1 K/UL (ref 0–0.1)
BASOPHILS NFR BLD: 1 % (ref 0–1)
BUN SERPL-MCNC: 35 MG/DL (ref 6–20)
BUN/CREAT SERPL: 19 (ref 12–20)
CALCIUM SERPL-MCNC: 8.5 MG/DL (ref 8.5–10.1)
CHLORIDE SERPL-SCNC: 113 MMOL/L (ref 97–108)
CO2 SERPL-SCNC: 25 MMOL/L (ref 21–32)
COMMENT:: NORMAL
CREAT SERPL-MCNC: 1.89 MG/DL (ref 0.55–1.02)
DIFFERENTIAL METHOD BLD: ABNORMAL
EKG ATRIAL RATE: 84 BPM
EKG DIAGNOSIS: NORMAL
EKG P AXIS: 47 DEGREES
EKG P-R INTERVAL: 206 MS
EKG Q-T INTERVAL: 418 MS
EKG QRS DURATION: 94 MS
EKG QTC CALCULATION (BAZETT): 493 MS
EKG R AXIS: 4 DEGREES
EKG T AXIS: 30 DEGREES
EKG VENTRICULAR RATE: 84 BPM
EOSINOPHIL # BLD: 0.3 K/UL (ref 0–0.4)
EOSINOPHIL NFR BLD: 5 % (ref 0–7)
ERYTHROCYTE [DISTWIDTH] IN BLOOD BY AUTOMATED COUNT: 15 % (ref 11.5–14.5)
FERRITIN SERPL-MCNC: 6 NG/ML (ref 8–252)
GLUCOSE BLD STRIP.AUTO-MCNC: 107 MG/DL (ref 65–117)
GLUCOSE BLD STRIP.AUTO-MCNC: 143 MG/DL (ref 65–117)
GLUCOSE BLD STRIP.AUTO-MCNC: 185 MG/DL (ref 65–117)
GLUCOSE SERPL-MCNC: 104 MG/DL (ref 65–100)
HAPTOGLOB SERPL-MCNC: 115 MG/DL (ref 30–200)
HCT VFR BLD AUTO: 20.3 % (ref 35–47)
HCT VFR BLD AUTO: 28.5 % (ref 35–47)
HEMOCCULT STL QL: POSITIVE
HGB BLD-MCNC: 6.4 G/DL (ref 11.5–16)
HGB BLD-MCNC: 9.1 G/DL (ref 11.5–16)
HISTORY CHECK: NORMAL
HISTORY CHECK: NORMAL
IMM GRANULOCYTES # BLD AUTO: 0 K/UL (ref 0–0.04)
IMM GRANULOCYTES NFR BLD AUTO: 0 % (ref 0–0.5)
IRON SATN MFR SERPL: 19 % (ref 20–50)
IRON SERPL-MCNC: 71 UG/DL (ref 35–150)
LDH SERPL L TO P-CCNC: 142 U/L (ref 81–246)
LYMPHOCYTES # BLD: 1.7 K/UL (ref 0.8–3.5)
LYMPHOCYTES NFR BLD: 32 % (ref 12–49)
MAGNESIUM SERPL-MCNC: 2.6 MG/DL (ref 1.6–2.4)
MCH RBC QN AUTO: 23.2 PG (ref 26–34)
MCHC RBC AUTO-ENTMCNC: 31.5 G/DL (ref 30–36.5)
MCV RBC AUTO: 73.6 FL (ref 80–99)
MONOCYTES # BLD: 0.4 K/UL (ref 0–1)
MONOCYTES NFR BLD: 8 % (ref 5–13)
NEUTS SEG # BLD: 2.9 K/UL (ref 1.8–8)
NEUTS SEG NFR BLD: 55 % (ref 32–75)
NRBC # BLD: 0 K/UL (ref 0–0.01)
NRBC BLD-RTO: 0 PER 100 WBC
PHOSPHATE SERPL-MCNC: 3.7 MG/DL (ref 2.6–4.7)
PLATELET # BLD AUTO: 236 K/UL (ref 150–400)
PMV BLD AUTO: 10.9 FL (ref 8.9–12.9)
POTASSIUM SERPL-SCNC: 3.3 MMOL/L (ref 3.5–5.1)
RBC # BLD AUTO: 2.76 M/UL (ref 3.8–5.2)
RETICS # AUTO: 0.03 M/UL (ref 0.02–0.08)
RETICS/RBC NFR AUTO: 1 % (ref 0.7–2.1)
SERVICE CMNT-IMP: ABNORMAL
SERVICE CMNT-IMP: ABNORMAL
SERVICE CMNT-IMP: NORMAL
SODIUM SERPL-SCNC: 144 MMOL/L (ref 136–145)
SPECIMEN HOLD: NORMAL
TIBC SERPL-MCNC: 369 UG/DL (ref 250–450)
WBC # BLD AUTO: 5.3 K/UL (ref 3.6–11)

## 2023-06-23 PROCEDURE — 2580000003 HC RX 258: Performed by: INTERNAL MEDICINE

## 2023-06-23 PROCEDURE — 84100 ASSAY OF PHOSPHORUS: CPT

## 2023-06-23 PROCEDURE — 36430 TRANSFUSION BLD/BLD COMPNT: CPT

## 2023-06-23 PROCEDURE — 82728 ASSAY OF FERRITIN: CPT

## 2023-06-23 PROCEDURE — 83540 ASSAY OF IRON: CPT

## 2023-06-23 PROCEDURE — 6360000002 HC RX W HCPCS: Performed by: HOSPITALIST

## 2023-06-23 PROCEDURE — 82962 GLUCOSE BLOOD TEST: CPT

## 2023-06-23 PROCEDURE — 30233N1 TRANSFUSION OF NONAUTOLOGOUS RED BLOOD CELLS INTO PERIPHERAL VEIN, PERCUTANEOUS APPROACH: ICD-10-PCS | Performed by: FAMILY MEDICINE

## 2023-06-23 PROCEDURE — 6370000000 HC RX 637 (ALT 250 FOR IP): Performed by: HOSPITALIST

## 2023-06-23 PROCEDURE — 94761 N-INVAS EAR/PLS OXIMETRY MLT: CPT

## 2023-06-23 PROCEDURE — 6360000002 HC RX W HCPCS: Performed by: INTERNAL MEDICINE

## 2023-06-23 PROCEDURE — 36415 COLL VENOUS BLD VENIPUNCTURE: CPT

## 2023-06-23 PROCEDURE — 83550 IRON BINDING TEST: CPT

## 2023-06-23 PROCEDURE — 1100000000 HC RM PRIVATE

## 2023-06-23 PROCEDURE — 93010 ELECTROCARDIOGRAM REPORT: CPT | Performed by: SPECIALIST

## 2023-06-23 PROCEDURE — 85018 HEMOGLOBIN: CPT

## 2023-06-23 PROCEDURE — 83010 ASSAY OF HAPTOGLOBIN QUANT: CPT

## 2023-06-23 PROCEDURE — 85014 HEMATOCRIT: CPT

## 2023-06-23 PROCEDURE — 80048 BASIC METABOLIC PNL TOTAL CA: CPT

## 2023-06-23 PROCEDURE — 83615 LACTATE (LD) (LDH) ENZYME: CPT

## 2023-06-23 PROCEDURE — 83735 ASSAY OF MAGNESIUM: CPT

## 2023-06-23 PROCEDURE — C9113 INJ PANTOPRAZOLE SODIUM, VIA: HCPCS | Performed by: INTERNAL MEDICINE

## 2023-06-23 PROCEDURE — 85025 COMPLETE CBC W/AUTO DIFF WBC: CPT

## 2023-06-23 PROCEDURE — P9016 RBC LEUKOCYTES REDUCED: HCPCS

## 2023-06-23 PROCEDURE — 85045 AUTOMATED RETICULOCYTE COUNT: CPT

## 2023-06-23 RX ORDER — POTASSIUM CHLORIDE 7.45 MG/ML
10 INJECTION INTRAVENOUS ONCE
Status: COMPLETED | OUTPATIENT
Start: 2023-06-23 | End: 2023-06-23

## 2023-06-23 RX ORDER — AMLODIPINE BESYLATE 5 MG/1
5 TABLET ORAL DAILY
Status: DISCONTINUED | OUTPATIENT
Start: 2023-06-23 | End: 2023-06-26 | Stop reason: HOSPADM

## 2023-06-23 RX ORDER — ATORVASTATIN CALCIUM 20 MG/1
20 TABLET, FILM COATED ORAL NIGHTLY
Status: DISCONTINUED | OUTPATIENT
Start: 2023-06-23 | End: 2023-06-26 | Stop reason: HOSPADM

## 2023-06-23 RX ORDER — SODIUM CHLORIDE 9 MG/ML
INJECTION, SOLUTION INTRAVENOUS PRN
Status: DISCONTINUED | OUTPATIENT
Start: 2023-06-23 | End: 2023-06-24

## 2023-06-23 RX ORDER — INSULIN GLARGINE 100 [IU]/ML
18 INJECTION, SOLUTION SUBCUTANEOUS NIGHTLY
Status: DISCONTINUED | OUTPATIENT
Start: 2023-06-23 | End: 2023-06-26 | Stop reason: HOSPADM

## 2023-06-23 RX ORDER — FUROSEMIDE 40 MG/1
40 TABLET ORAL DAILY
Status: DISCONTINUED | OUTPATIENT
Start: 2023-06-23 | End: 2023-06-26 | Stop reason: HOSPADM

## 2023-06-23 RX ORDER — SODIUM CHLORIDE 9 MG/ML
INJECTION, SOLUTION INTRAVENOUS PRN
Status: DISCONTINUED | OUTPATIENT
Start: 2023-06-23 | End: 2023-06-26 | Stop reason: HOSPADM

## 2023-06-23 RX ADMIN — SODIUM CHLORIDE, PRESERVATIVE FREE 10 ML: 5 INJECTION INTRAVENOUS at 21:45

## 2023-06-23 RX ADMIN — PANTOPRAZOLE SODIUM 40 MG: 40 INJECTION, POWDER, FOR SOLUTION INTRAVENOUS at 10:53

## 2023-06-23 RX ADMIN — AMLODIPINE BESYLATE 5 MG: 5 TABLET ORAL at 10:53

## 2023-06-23 RX ADMIN — POTASSIUM CHLORIDE 10 MEQ: 7.46 INJECTION, SOLUTION INTRAVENOUS at 10:59

## 2023-06-23 RX ADMIN — SODIUM CHLORIDE, PRESERVATIVE FREE 10 ML: 5 INJECTION INTRAVENOUS at 09:00

## 2023-06-23 RX ADMIN — SODIUM CHLORIDE, PRESERVATIVE FREE 10 ML: 5 INJECTION INTRAVENOUS at 02:34

## 2023-06-23 RX ADMIN — FUROSEMIDE 40 MG: 40 TABLET ORAL at 10:53

## 2023-06-23 RX ADMIN — PANTOPRAZOLE SODIUM 40 MG: 40 INJECTION, POWDER, FOR SOLUTION INTRAVENOUS at 02:32

## 2023-06-23 RX ADMIN — ATORVASTATIN CALCIUM 20 MG: 20 TABLET, FILM COATED ORAL at 21:44

## 2023-06-23 RX ADMIN — INSULIN GLARGINE 18 UNITS: 100 INJECTION, SOLUTION SUBCUTANEOUS at 22:04

## 2023-06-23 NOTE — ED TRIAGE NOTES
Pt  presents via ems with complaints of chills and shaking. No complaints of pain. No shortness of breath. Denies recent medication changes and exposure to sickness. Alert and oriented x4.

## 2023-06-23 NOTE — ED NOTES
Report called to Fawad Farias RN on 5th floor for continuation of care. Opportunity to answer questions given.         Ted Baeza RN  06/23/23 [id number]

## 2023-06-23 NOTE — ED NOTES
Walked to blood bank. Blood bank states the blood is currently not ready and they will call when blood is ready.      Kaila Bauer RN  06/22/23 [id number]

## 2023-06-23 NOTE — ACP (ADVANCE CARE PLANNING)
Advance Care Planning     Advance Care Planning Inpatient Note  [facility] Department    Today's Date: 6/23/2023  Unit: [facility]  MED SURG 2    Received request from patient. Upon review of chart and communication with care team, patient's decision making abilities are not in question. . Patient and Child/Children was/were present in the room during visit. Goals of ACP Conversation:  Discuss advance care planning documents  Facilitate a discussion related to patient's goals of care as they align with the patient's values and beliefs. Health Care Decision Makers:       Primary Decision Maker: Sal Rocha Child - [phone]    Secondary Decision Maker: Nelson Swartz - [phone]  Summary:  Completed New Documents    Advance Care Planning Documents (Patient Wishes):  Healthcare Power of /Advance Directive Appointment of Health Care Agent     Assessment:  Visited patient in response to her request to complete an AMD. Explained the purpose of an AMD ant an overview of the sections therein. She chose to complete an AMD naming her daughter Sal Rcoha as MPOA and grand daughter Radha Hunter as secondary. Rosa was present for the conversation. Interventions:  Provided education on documents for clarity and greater understanding  Discussed and provided education on state decision maker hierarchy  Assisted in the completion of documents according to patient's wishes at this time  Encouraged ongoing ACP conversation with future decision makers and loved ones    Care Preferences Communicated:     Hospitalization:  If the patient's health worsens and it becomes clear that the chance of recovery is unlikely,     the patient wants hospitalization. Ventilation:   If the patient, in their present state of health, suddenly became very ill and unable to breathe on their own,     the patient would NOT desire the use of a ventilator (breathing machine).     If their health worsens

## 2023-06-23 NOTE — H&P
History and Physical    Date of Service:  6/22/2023  Primary Care Provider: TAMARA Pandya NP  Source of information: Patient, Family, External Medical Records    Chief Complaint: Chills and Shaking      History of Presenting Illness:   Tamara Colin is a 80 y.o. female with a past medical history of CKD 3, DM, HTN, ICM, seizures, HFpEF, GI bleed, who presented to the emergency room due to shortness of breath    Patient states that she had been in her normal state of health till about few days ago when she started to notice increased shortness of breath. She states she had some dizziness with bending over, and shortness of breath with walking very short distances. This afternoon she became diaphoretic, lightheaded, dizzy, and felt as though something were very wrong. Her symptoms are similar to when she was admitted for a GI bleed last year, so her daughter brought her to the emergency room for further evaluation    In the emergency room patient had a hemoglobin of 6.1 and have a positive fecal occult blood test.  Remainder of work-up was unremarkable. Patient was started on MO BCs and internal medicine was consulted for admission    The patient denies any headache, blurry vision, sore throat, trouble swallowing, trouble with speech, chest pain, SOB, cough, fever, chills, N/V/D, abd pain, urinary symptoms, constipation, recent travels, sick contacts, focal or generalized neurological symptoms, falls, injuries, rashes, contact with COVID-19 diagnosed patients, hematemesis, melena, hemoptysis, hematuria, rashes, denies starting any new medications and denies any other concerns or problems besides as mentioned above. REVIEW OF SYSTEMS:  A comprehensive review of systems was negative except for that written in the History of Present Illness.      Past Medical History:   Diagnosis Date    CAD (coronary artery disease)     CHF (congestive heart failure), NYHA class I, chronic, systolic ([address])

## 2023-06-23 NOTE — ACP (ADVANCE CARE PLANNING)
Advance Care Planning     Advance Care Planning Inpatient Note  Spiritual Care Department    Today's Date: 6/23/2023  Unit: [facility]  MED SURG 2    Received request from IDT Member. Upon review of chart and communication with care team, patient's decision making abilities are not in question. . Child/Children was/were present in the room during visit. Goals of ACP Conversation:  Discuss advance care planning documents  Facilitate a discussion related to patient's goals of care as they align with the patient's values and beliefs. Health Care Decision Makers:       Primary Decision Maker: Goldy Thayer Child - [phone]    Secondary Decision Maker: Lorinkaley Zuniga - [phone]  Summary:  No Decision Maker named by patient at this time    Advance Care Planning Documents (Patient Wishes):  None     Assessment:  Visited patient in response to a spiritual care order for an AMD conversation. At time of the visit she was sleeping with her daughter Goldy Thayer at bedside. Rosa reports that the patient is a  with four children. She lives with Andrez Workman and needs an AMD. The purpose of an AMD and an overview of the various sections therein was explained to Andrez Workman. She will have a conversation with the patient when she awakes and page spiritual care if needed.              Interventions:  Requested patient/family to submit existing document for our records: None    Care Preferences Communicated:   No    Outcomes/Plan:  ACP Discussion: Postponed    Electronically signed by Felice Ferguson, [address] [place] [address] on 6/23/2023 at 2:39 PM

## 2023-06-24 ENCOUNTER — APPOINTMENT (OUTPATIENT)
Facility: HOSPITAL | Age: 88
DRG: 378 | End: 2023-06-24
Payer: MEDICARE

## 2023-06-24 LAB
ABO + RH BLD: NORMAL
ANION GAP SERPL CALC-SCNC: 8 MMOL/L (ref 5–15)
BASOPHILS # BLD: 0.1 K/UL (ref 0–0.1)
BASOPHILS NFR BLD: 1 % (ref 0–1)
BLD PROD TYP BPU: NORMAL
BLD PROD TYP BPU: NORMAL
BLOOD BANK DISPENSE STATUS: NORMAL
BLOOD BANK DISPENSE STATUS: NORMAL
BLOOD GROUP ANTIBODIES SERPL: NORMAL
BPU ID: NORMAL
BPU ID: NORMAL
BUN SERPL-MCNC: 38 MG/DL (ref 6–20)
BUN/CREAT SERPL: 18 (ref 12–20)
CALCIUM SERPL-MCNC: 8.6 MG/DL (ref 8.5–10.1)
CHLORIDE SERPL-SCNC: 111 MMOL/L (ref 97–108)
CO2 SERPL-SCNC: 24 MMOL/L (ref 21–32)
COMMENT:: NORMAL
CREAT SERPL-MCNC: 2.09 MG/DL (ref 0.55–1.02)
CROSSMATCH RESULT: NORMAL
CROSSMATCH RESULT: NORMAL
DIFFERENTIAL METHOD BLD: ABNORMAL
EOSINOPHIL # BLD: 0.3 K/UL (ref 0–0.4)
EOSINOPHIL NFR BLD: 4 % (ref 0–7)
ERYTHROCYTE [DISTWIDTH] IN BLOOD BY AUTOMATED COUNT: 16.5 % (ref 11.5–14.5)
ERYTHROCYTE [DISTWIDTH] IN BLOOD BY AUTOMATED COUNT: 16.7 % (ref 11.5–14.5)
GLUCOSE BLD STRIP.AUTO-MCNC: 119 MG/DL (ref 65–117)
GLUCOSE BLD STRIP.AUTO-MCNC: 164 MG/DL (ref 65–117)
GLUCOSE BLD STRIP.AUTO-MCNC: 174 MG/DL (ref 65–117)
GLUCOSE BLD STRIP.AUTO-MCNC: 207 MG/DL (ref 65–117)
GLUCOSE SERPL-MCNC: 97 MG/DL (ref 65–100)
HCT VFR BLD AUTO: 27.6 % (ref 35–47)
HCT VFR BLD AUTO: 31.1 % (ref 35–47)
HEMOCCULT STL QL: POSITIVE
HGB BLD-MCNC: 10 G/DL (ref 11.5–16)
HGB BLD-MCNC: 8.7 G/DL (ref 11.5–16)
IMM GRANULOCYTES # BLD AUTO: 0 K/UL (ref 0–0.04)
IMM GRANULOCYTES NFR BLD AUTO: 0 % (ref 0–0.5)
LYMPHOCYTES # BLD: 1.7 K/UL (ref 0.8–3.5)
LYMPHOCYTES NFR BLD: 28 % (ref 12–49)
MCH RBC QN AUTO: 24.4 PG (ref 26–34)
MCH RBC QN AUTO: 24.9 PG (ref 26–34)
MCHC RBC AUTO-ENTMCNC: 31.5 G/DL (ref 30–36.5)
MCHC RBC AUTO-ENTMCNC: 32.2 G/DL (ref 30–36.5)
MCV RBC AUTO: 77.3 FL (ref 80–99)
MCV RBC AUTO: 77.6 FL (ref 80–99)
MONOCYTES # BLD: 0.6 K/UL (ref 0–1)
MONOCYTES NFR BLD: 10 % (ref 5–13)
NEUTS SEG # BLD: 3.4 K/UL (ref 1.8–8)
NEUTS SEG NFR BLD: 57 % (ref 32–75)
NRBC # BLD: 0 K/UL (ref 0–0.01)
NRBC # BLD: 0 K/UL (ref 0–0.01)
NRBC BLD-RTO: 0 PER 100 WBC
NRBC BLD-RTO: 0 PER 100 WBC
PHOSPHATE SERPL-MCNC: 3.3 MG/DL (ref 2.6–4.7)
PLATELET # BLD AUTO: 253 K/UL (ref 150–400)
PLATELET # BLD AUTO: 284 K/UL (ref 150–400)
PMV BLD AUTO: 10.7 FL (ref 8.9–12.9)
PMV BLD AUTO: 10.7 FL (ref 8.9–12.9)
POTASSIUM SERPL-SCNC: 3.7 MMOL/L (ref 3.5–5.1)
RBC # BLD AUTO: 3.57 M/UL (ref 3.8–5.2)
RBC # BLD AUTO: 4.01 M/UL (ref 3.8–5.2)
SERVICE CMNT-IMP: ABNORMAL
SODIUM SERPL-SCNC: 143 MMOL/L (ref 136–145)
SPECIMEN EXP DATE BLD: NORMAL
SPECIMEN HOLD: NORMAL
UNIT DIVISION: 0
UNIT DIVISION: 0
WBC # BLD AUTO: 6.1 K/UL (ref 3.6–11)
WBC # BLD AUTO: 8.2 K/UL (ref 3.6–11)

## 2023-06-24 PROCEDURE — 6370000000 HC RX 637 (ALT 250 FOR IP): Performed by: HOSPITALIST

## 2023-06-24 PROCEDURE — 80048 BASIC METABOLIC PNL TOTAL CA: CPT

## 2023-06-24 PROCEDURE — 6360000002 HC RX W HCPCS: Performed by: INTERNAL MEDICINE

## 2023-06-24 PROCEDURE — 85025 COMPLETE CBC W/AUTO DIFF WBC: CPT

## 2023-06-24 PROCEDURE — C9113 INJ PANTOPRAZOLE SODIUM, VIA: HCPCS | Performed by: INTERNAL MEDICINE

## 2023-06-24 PROCEDURE — 1100000000 HC RM PRIVATE

## 2023-06-24 PROCEDURE — 97535 SELF CARE MNGMENT TRAINING: CPT

## 2023-06-24 PROCEDURE — 97165 OT EVAL LOW COMPLEX 30 MIN: CPT

## 2023-06-24 PROCEDURE — 2580000003 HC RX 258: Performed by: INTERNAL MEDICINE

## 2023-06-24 PROCEDURE — 2580000003 HC RX 258

## 2023-06-24 PROCEDURE — 73562 X-RAY EXAM OF KNEE 3: CPT

## 2023-06-24 PROCEDURE — 84100 ASSAY OF PHOSPHORUS: CPT

## 2023-06-24 PROCEDURE — 85027 COMPLETE CBC AUTOMATED: CPT

## 2023-06-24 PROCEDURE — 82272 OCCULT BLD FECES 1-3 TESTS: CPT

## 2023-06-24 PROCEDURE — 97530 THERAPEUTIC ACTIVITIES: CPT

## 2023-06-24 PROCEDURE — 97116 GAIT TRAINING THERAPY: CPT

## 2023-06-24 PROCEDURE — 36415 COLL VENOUS BLD VENIPUNCTURE: CPT

## 2023-06-24 PROCEDURE — 97163 PT EVAL HIGH COMPLEX 45 MIN: CPT

## 2023-06-24 PROCEDURE — 82962 GLUCOSE BLOOD TEST: CPT

## 2023-06-24 RX ORDER — SODIUM CHLORIDE 9 MG/ML
INJECTION INTRAVENOUS
Status: COMPLETED
Start: 2023-06-24 | End: 2023-06-24

## 2023-06-24 RX ADMIN — SODIUM CHLORIDE, PRESERVATIVE FREE 10 ML: 5 INJECTION INTRAVENOUS at 08:47

## 2023-06-24 RX ADMIN — FUROSEMIDE 40 MG: 40 TABLET ORAL at 08:45

## 2023-06-24 RX ADMIN — INSULIN GLARGINE 18 UNITS: 100 INJECTION, SOLUTION SUBCUTANEOUS at 21:16

## 2023-06-24 RX ADMIN — PANTOPRAZOLE SODIUM 40 MG: 40 INJECTION, POWDER, FOR SOLUTION INTRAVENOUS at 11:00

## 2023-06-24 RX ADMIN — SODIUM CHLORIDE, PRESERVATIVE FREE 10 ML: 5 INJECTION INTRAVENOUS at 22:15

## 2023-06-24 RX ADMIN — SODIUM CHLORIDE, PRESERVATIVE FREE 10 ML: 5 INJECTION INTRAVENOUS at 11:00

## 2023-06-24 RX ADMIN — PANTOPRAZOLE SODIUM 40 MG: 40 INJECTION, POWDER, FOR SOLUTION INTRAVENOUS at 00:15

## 2023-06-24 RX ADMIN — SODIUM CHLORIDE, PRESERVATIVE FREE 10 ML: 5 INJECTION INTRAVENOUS at 21:13

## 2023-06-24 RX ADMIN — AMLODIPINE BESYLATE 5 MG: 5 TABLET ORAL at 08:45

## 2023-06-24 RX ADMIN — PANTOPRAZOLE SODIUM 40 MG: 40 INJECTION, POWDER, FOR SOLUTION INTRAVENOUS at 22:13

## 2023-06-24 RX ADMIN — ATORVASTATIN CALCIUM 20 MG: 20 TABLET, FILM COATED ORAL at 21:13

## 2023-06-25 LAB
ALBUMIN SERPL-MCNC: 3.1 G/DL (ref 3.5–5)
ALBUMIN/GLOB SERPL: 1 (ref 1.1–2.2)
ALP SERPL-CCNC: 67 U/L (ref 45–117)
ALT SERPL-CCNC: 20 U/L (ref 12–78)
ANION GAP SERPL CALC-SCNC: 5 MMOL/L (ref 5–15)
APPEARANCE SNV: ABNORMAL
AST SERPL-CCNC: 10 U/L (ref 15–37)
BASOPHILS # BLD: 0.1 K/UL (ref 0–0.1)
BASOPHILS NFR BLD: 1 % (ref 0–1)
BILIRUB SERPL-MCNC: 0.6 MG/DL (ref 0.2–1)
BODY FLD TYPE: NORMAL
BUN SERPL-MCNC: 43 MG/DL (ref 6–20)
BUN/CREAT SERPL: 19 (ref 12–20)
CALCIUM SERPL-MCNC: 8.9 MG/DL (ref 8.5–10.1)
CHLORIDE SERPL-SCNC: 109 MMOL/L (ref 97–108)
CO2 SERPL-SCNC: 26 MMOL/L (ref 21–32)
COLOR SNV: YELLOW
CREAT SERPL-MCNC: 2.25 MG/DL (ref 0.55–1.02)
CRYSTALS FLD MICRO: NORMAL
DIFFERENTIAL METHOD BLD: ABNORMAL
EOSINOPHIL # BLD: 0.4 K/UL (ref 0–0.4)
EOSINOPHIL NFR BLD: 6 % (ref 0–7)
ERYTHROCYTE [DISTWIDTH] IN BLOOD BY AUTOMATED COUNT: 16.9 % (ref 11.5–14.5)
ERYTHROCYTE [DISTWIDTH] IN BLOOD BY AUTOMATED COUNT: 17 % (ref 11.5–14.5)
ERYTHROCYTE [DISTWIDTH] IN BLOOD BY AUTOMATED COUNT: 17.3 % (ref 11.5–14.5)
GLOBULIN SER CALC-MCNC: 3.2 G/DL (ref 2–4)
GLUCOSE BLD STRIP.AUTO-MCNC: 102 MG/DL (ref 65–117)
GLUCOSE BLD STRIP.AUTO-MCNC: 182 MG/DL (ref 65–117)
GLUCOSE BLD STRIP.AUTO-MCNC: 214 MG/DL (ref 65–117)
GLUCOSE BLD STRIP.AUTO-MCNC: 239 MG/DL (ref 65–117)
GLUCOSE SERPL-MCNC: 112 MG/DL (ref 65–100)
HCT VFR BLD AUTO: 28.3 % (ref 35–47)
HCT VFR BLD AUTO: 28.5 % (ref 35–47)
HCT VFR BLD AUTO: 29.8 % (ref 35–47)
HGB BLD-MCNC: 9 G/DL (ref 11.5–16)
HGB BLD-MCNC: 9 G/DL (ref 11.5–16)
HGB BLD-MCNC: 9.4 G/DL (ref 11.5–16)
IMM GRANULOCYTES # BLD AUTO: 0 K/UL (ref 0–0.04)
IMM GRANULOCYTES NFR BLD AUTO: 0 % (ref 0–0.5)
LYMPHOCYTES # BLD: 1.8 K/UL (ref 0.8–3.5)
LYMPHOCYTES NFR BLD: 25 % (ref 12–49)
MCH RBC QN AUTO: 24.3 PG (ref 26–34)
MCH RBC QN AUTO: 24.3 PG (ref 26–34)
MCH RBC QN AUTO: 24.4 PG (ref 26–34)
MCHC RBC AUTO-ENTMCNC: 31.5 G/DL (ref 30–36.5)
MCHC RBC AUTO-ENTMCNC: 31.6 G/DL (ref 30–36.5)
MCHC RBC AUTO-ENTMCNC: 31.8 G/DL (ref 30–36.5)
MCV RBC AUTO: 76.7 FL (ref 80–99)
MCV RBC AUTO: 76.8 FL (ref 80–99)
MCV RBC AUTO: 77 FL (ref 80–99)
MONOCYTES # BLD: 0.7 K/UL (ref 0–1)
MONOCYTES NFR BLD: 10 % (ref 5–13)
MONOCYTES NFR SNV MANUAL: 27 % (ref 0–69)
NEUTROPHILS NFR SNV MANUAL: 73 % (ref 0–24)
NEUTS SEG # BLD: 4.2 K/UL (ref 1.8–8)
NEUTS SEG NFR BLD: 58 % (ref 32–75)
NRBC # BLD: 0 K/UL (ref 0–0.01)
NRBC BLD-RTO: 0 PER 100 WBC
PLATELET # BLD AUTO: 247 K/UL (ref 150–400)
PLATELET # BLD AUTO: 251 K/UL (ref 150–400)
PLATELET # BLD AUTO: 258 K/UL (ref 150–400)
PMV BLD AUTO: 10.3 FL (ref 8.9–12.9)
PMV BLD AUTO: 11.1 FL (ref 8.9–12.9)
PMV BLD AUTO: 11.2 FL (ref 8.9–12.9)
POTASSIUM SERPL-SCNC: 3.9 MMOL/L (ref 3.5–5.1)
PROT SERPL-MCNC: 6.3 G/DL (ref 6.4–8.2)
RBC # BLD AUTO: 3.69 M/UL (ref 3.8–5.2)
RBC # BLD AUTO: 3.71 M/UL (ref 3.8–5.2)
RBC # BLD AUTO: 3.87 M/UL (ref 3.8–5.2)
RBC # SNV: >100 /CU MM
SERVICE CMNT-IMP: ABNORMAL
SERVICE CMNT-IMP: NORMAL
SODIUM SERPL-SCNC: 140 MMOL/L (ref 136–145)
SPECIMEN SOURCE FLD: ABNORMAL
WBC # BLD AUTO: 7.2 K/UL (ref 3.6–11)
WBC # BLD AUTO: 7.5 K/UL (ref 3.6–11)
WBC # BLD AUTO: 8.5 K/UL (ref 3.6–11)
WBC # SNV: 5220 /CU MM (ref 0–150)

## 2023-06-25 PROCEDURE — 85025 COMPLETE CBC W/AUTO DIFF WBC: CPT

## 2023-06-25 PROCEDURE — 89060 EXAM SYNOVIAL FLUID CRYSTALS: CPT

## 2023-06-25 PROCEDURE — 82962 GLUCOSE BLOOD TEST: CPT

## 2023-06-25 PROCEDURE — 2580000003 HC RX 258: Performed by: INTERNAL MEDICINE

## 2023-06-25 PROCEDURE — 2500000003 HC RX 250 WO HCPCS: Performed by: NURSE PRACTITIONER

## 2023-06-25 PROCEDURE — 85027 COMPLETE CBC AUTOMATED: CPT

## 2023-06-25 PROCEDURE — 6360000002 HC RX W HCPCS: Performed by: INTERNAL MEDICINE

## 2023-06-25 PROCEDURE — C9113 INJ PANTOPRAZOLE SODIUM, VIA: HCPCS | Performed by: INTERNAL MEDICINE

## 2023-06-25 PROCEDURE — 89050 BODY FLUID CELL COUNT: CPT

## 2023-06-25 PROCEDURE — 6370000000 HC RX 637 (ALT 250 FOR IP): Performed by: HOSPITALIST

## 2023-06-25 PROCEDURE — 6360000002 HC RX W HCPCS: Performed by: HOSPITALIST

## 2023-06-25 PROCEDURE — A4216 STERILE WATER/SALINE, 10 ML: HCPCS | Performed by: INTERNAL MEDICINE

## 2023-06-25 PROCEDURE — 6370000000 HC RX 637 (ALT 250 FOR IP): Performed by: INTERNAL MEDICINE

## 2023-06-25 PROCEDURE — 80053 COMPREHEN METABOLIC PANEL: CPT

## 2023-06-25 PROCEDURE — 36415 COLL VENOUS BLD VENIPUNCTURE: CPT

## 2023-06-25 PROCEDURE — 1100000000 HC RM PRIVATE

## 2023-06-25 PROCEDURE — 6360000002 HC RX W HCPCS: Performed by: NURSE PRACTITIONER

## 2023-06-25 RX ORDER — ACETAMINOPHEN 325 MG/1
650 TABLET ORAL EVERY 8 HOURS
Status: DISCONTINUED | OUTPATIENT
Start: 2023-06-25 | End: 2023-06-26 | Stop reason: HOSPADM

## 2023-06-25 RX ORDER — METHYLPREDNISOLONE ACETATE 40 MG/ML
40 INJECTION, SUSPENSION INTRA-ARTICULAR; INTRALESIONAL; INTRAMUSCULAR; SOFT TISSUE ONCE
Status: COMPLETED | OUTPATIENT
Start: 2023-06-25 | End: 2023-06-25

## 2023-06-25 RX ORDER — LIDOCAINE HYDROCHLORIDE 10 MG/ML
5 INJECTION, SOLUTION EPIDURAL; INFILTRATION; INTRACAUDAL; PERINEURAL ONCE
Status: COMPLETED | OUTPATIENT
Start: 2023-06-25 | End: 2023-06-25

## 2023-06-25 RX ADMIN — INSULIN GLARGINE 18 UNITS: 100 INJECTION, SOLUTION SUBCUTANEOUS at 20:57

## 2023-06-25 RX ADMIN — ATORVASTATIN CALCIUM 20 MG: 20 TABLET, FILM COATED ORAL at 20:56

## 2023-06-25 RX ADMIN — SODIUM CHLORIDE 40 MG: 9 INJECTION INTRAMUSCULAR; INTRAVENOUS; SUBCUTANEOUS at 08:46

## 2023-06-25 RX ADMIN — IRON SUCROSE 200 MG: 20 INJECTION, SOLUTION INTRAVENOUS at 10:03

## 2023-06-25 RX ADMIN — SODIUM CHLORIDE, PRESERVATIVE FREE 10 ML: 5 INJECTION INTRAVENOUS at 08:47

## 2023-06-25 RX ADMIN — SODIUM CHLORIDE 40 MG: 9 INJECTION INTRAMUSCULAR; INTRAVENOUS; SUBCUTANEOUS at 20:56

## 2023-06-25 RX ADMIN — ACETAMINOPHEN 650 MG: 325 TABLET ORAL at 17:07

## 2023-06-25 RX ADMIN — FUROSEMIDE 40 MG: 40 TABLET ORAL at 08:45

## 2023-06-25 RX ADMIN — ACETAMINOPHEN 650 MG: 325 TABLET ORAL at 10:03

## 2023-06-25 RX ADMIN — AMLODIPINE BESYLATE 5 MG: 5 TABLET ORAL at 08:47

## 2023-06-25 RX ADMIN — LIDOCAINE HYDROCHLORIDE 5 ML: 10 INJECTION, SOLUTION EPIDURAL; INFILTRATION; INTRACAUDAL; PERINEURAL at 17:34

## 2023-06-25 RX ADMIN — METHYLPREDNISOLONE ACETATE 40 MG: 40 INJECTION, SUSPENSION INTRA-ARTICULAR; INTRALESIONAL; INTRAMUSCULAR; INTRASYNOVIAL; SOFT TISSUE at 17:34

## 2023-06-25 RX ADMIN — SODIUM CHLORIDE, PRESERVATIVE FREE 10 ML: 5 INJECTION INTRAVENOUS at 21:00

## 2023-06-25 ASSESSMENT — PAIN DESCRIPTION - ORIENTATION
ORIENTATION: RIGHT
ORIENTATION: RIGHT

## 2023-06-25 ASSESSMENT — PAIN SCALES - GENERAL
PAINLEVEL_OUTOF10: 0
PAINLEVEL_OUTOF10: 8
PAINLEVEL_OUTOF10: 6
PAINLEVEL_OUTOF10: 2

## 2023-06-25 ASSESSMENT — PAIN DESCRIPTION - LOCATION
LOCATION: KNEE
LOCATION: KNEE

## 2023-06-25 NOTE — CONSULTS
[address] [facility]                         GASTROENTEROLOGY CONSULTATION NOTE              NAME:  Jak Patterson   :   1934   MRN:   [id number]       Referring Physician:    Dr. Jonas Christine Date:   2023 3:37 PM    Chief Complaint:    Anemia      History of Present Illness:    Patient is a 80 y.o. female with history of colon cancer, CKD, DM, HTN, ICM, seizures, HrpEF, hx of GI bleeding who we were asked to see for recurrent GI bleeding. Daughter Nighat Fowler who is POA is at bedside. Patient presented to ED last night with SOB, fatigue, feeling dizzy and hgb on admission 6.1. Last known hgb in the 9s 2023. Patient denies melena, hematochezia however they are unsure if patient would be aware if this was occurring. No nausea, vomiting, abdominal pain. Patient was admitted 1 year ago with GI bleed, EGD/colon unremarkable and subsequent pill camera showing nonbleeding angiodysplasias in the proximal and mid jejunum. These were likely source of bleeding. They decided at that time to stop anticoagulant and not pursue further treatment for these lesions. Hgb has been stable for the last year. Today she has received 1u pRBC and hgb up to 6.4, receiving another unit now. BUN elevated at 35 however cr also elevated at 1.89. Iron studies are low and occult blood is positive    Patient denies recent nsaid use, steroids. She takes asa 81mg daily. PMH:  Past Medical History:   Diagnosis Date    CAD (coronary artery disease)     CHF (congestive heart failure), NYHA class I, chronic, systolic (HCC)     Systolic and diastolic CHF per echocardiogram of 2021.   Patient was found to have LVEF 45%    Chronic kidney disease     stage III/IV:Creatinine 1.45-1.7 with GFR in the low 30s-high 20s    Diabetes mellitus type 2 in nonobese [facility])     Hypertension     Ischemic cardiomyopathy     Echocardiogram of 2021: mild left ventricular systolic dysfunction (EF 10%) with
(L) 11.5 - 16.0 g/dL    Hematocrit 28.5 (L) 35.0 - 47.0 %    MCV 76.8 (L) 80.0 - 99.0 FL    MCH 24.3 (L) 26.0 - 34.0 PG    MCHC 31.6 30.0 - 36.5 g/dL    RDW 16.9 (H) 11.5 - 14.5 %    Platelets 452 692 - 360 K/uL    MPV 11.1 8.9 - 12.9 FL    Nucleated RBCs 0.0 0  WBC    nRBC 0.00 0.00 - 0.01 K/uL    Neutrophils % 58 32 - 75 %    Lymphocytes % 25 12 - 49 %    Monocytes % 10 5 - 13 %    Eosinophils % 6 0 - 7 %    Basophils % 1 0 - 1 %    Immature Granulocytes 0 0.0 - 0.5 %    Neutrophils Absolute 4.2 1.8 - 8.0 K/UL    Lymphocytes Absolute 1.8 0.8 - 3.5 K/UL    Monocytes Absolute 0.7 0.0 - 1.0 K/UL    Eosinophils Absolute 0.4 0.0 - 0.4 K/UL    Basophils Absolute 0.1 0.0 - 0.1 K/UL    Absolute Immature Granulocyte 0.0 0.00 - 0.04 K/UL    Differential Type AUTOMATED     Comprehensive Metabolic Panel    Collection Time: 06/25/23  3:38 AM   Result Value Ref Range    Sodium 140 136 - 145 mmol/L    Potassium 3.9 3.5 - 5.1 mmol/L    Chloride 109 (H) 97 - 108 mmol/L    CO2 26 21 - 32 mmol/L    Anion Gap 5 5 - 15 mmol/L    Glucose 112 (H) 65 - 100 mg/dL    BUN 43 (H) 6 - 20 MG/DL    Creatinine 2.25 (H) 0.55 - 1.02 MG/DL    Bun/Cre Ratio 19 12 - 20      Est, Glom Filt Rate 20 (L) >60 ml/min/1.73m2    Calcium 8.9 8.5 - 10.1 MG/DL    Total Bilirubin 0.6 0.2 - 1.0 MG/DL    ALT 20 12 - 78 U/L    AST 10 (L) 15 - 37 U/L    Alk Phosphatase 67 45 - 117 U/L    Total Protein 6.3 (L) 6.4 - 8.2 g/dL    Albumin 3.1 (L) 3.5 - 5.0 g/dL    Globulin 3.2 2.0 - 4.0 g/dL    Albumin/Globulin Ratio 1.0 (L) 1.1 - 2.2     POCT Glucose    Collection Time: 06/25/23  7:08 AM   Result Value Ref Range    POC Glucose 102 65 - 117 mg/dL    Performed by: [facility] POCT Glucose    Collection Time: 06/25/23 11:14 AM   Result Value Ref Range    POC Glucose 182 (H) 65 - 117 mg/dL    Performed by: [facility]     POCT Glucose    Collection Time: 06/25/23  4:12 PM   Result Value Ref Range    POC Glucose 214 (H) 65 - 117 mg/dL

## 2023-06-25 NOTE — CARE COORDINATION
6/25/23  2:10 PM       06/25/23 4219   Service Assessment   Patient Orientation Alert and Oriented   History Provided By Child/Family   Primary Caregiver Self  (also daughter assists when not feeling well)   [address] is: Legal Next of Kin   PCP Verified by CM Yes   Last Visit to PCP Within last 3 months   Prior Functional Level Independent in ADLs/IADLs;Assistance with the following:;Housework;Cooking; Shopping;Mobility  (is able to cook herself Breakfast and Lunch)   Current Functional Level Assistance with the following:;Cooking;Housework; Shopping;Mobility   Can patient return to prior living arrangement Yes   Ability to make needs known: Good   Family able to assist with home care needs: Yes   Would you like for me to discuss the discharge plan with any other family members/significant others, and if so, who? Yes   Financial Resources Medicare   Social/Functional History   Lives With Daughter   Type of Home Condo   Home Access Stairs to enter with rails   Entrance Stairs - Number of Steps 1   Bathroom Shower/Tub Walk-in shower   Bathroom Equipment Shower chair;Grab bars in shower; Toilet raiser   [address]; Wheelchair-manual;Walker, rolling;Grab bars   Receives Help From Family   ADL Assistance Independent   Homemaking Assistance Needs assistance  (Family does cooking for dinner, patient is usually able to make her breakfast and lunch.)   Ambulation Assistance Independent  (last couple of days has been having trouble walking and transferring due to knee pain)   Active  No   Patient's  Pretty Lee- daughter   Mode of Transportation Car   Occupation Retired   [address] [place] [address]  (Patient is traveling for vacation at the end of the week and once she returns will call [address] to ask for assistance in getting an order from the PCP.)   Patient expects to be discharged to:  Other (comment)  (Condo)

## 2023-06-25 NOTE — PROCEDURES
Consent signed and on the chart   Procedure, risks, benefits, & alternatives explained to patient, who voiced understanding and agreed to proceed with the arthrocentesis. Consent signed and on chart Area prepped and draped in a sterile fashion using Chlorhexidine. Local anesthesia administered using 1% Lidocaine. Patient placed in position of comfort with  knee comfortably extended.   Small gauge needle introduced into joint space in a direction parallel to the plane of the posterior surface of the patella in a lateral position 20 cc of clear yellow fluid removed and sent for cell count, crystal analysis

## 2023-06-25 NOTE — PLAN OF CARE
Problem: Chronic Conditions and Co-morbidities  Goal: Patient's chronic conditions and co-morbidity symptoms are monitored and maintained or improved  Outcome: Progressing     Problem: Safety - Adult  Goal: Free from fall injury  Outcome: Progressing     Problem: ABCDS Injury Assessment  Goal: Absence of physical injury  Outcome: Progressing     Problem: Skin/Tissue Integrity  Goal: Absence of new skin breakdown  Description: 1. Monitor for areas of redness and/or skin breakdown  2. Assess vascular access sites hourly  3. Every 4-6 hours minimum:  Change oxygen saturation probe site  4. Every 4-6 hours:  If on nasal continuous positive airway pressure, respiratory therapy assess nares and determine need for appliance change or resting period.   Outcome: Progressing     Problem: Pain  Goal: Verbalizes/displays adequate comfort level or baseline comfort level  Outcome: Progressing
Problem: Chronic Conditions and Co-morbidities  Goal: Patient's chronic conditions and co-morbidity symptoms are monitored and maintained or improved  Outcome: Progressing     Problem: Safety - Adult  Goal: Free from fall injury  Outcome: Progressing     Problem: Skin/Tissue Integrity  Goal: Absence of new skin breakdown  Description: 1. Monitor for areas of redness and/or skin breakdown  2. Assess vascular access sites hourly  3. Every 4-6 hours minimum:  Change oxygen saturation probe site  4. Every 4-6 hours:  If on nasal continuous positive airway pressure, respiratory therapy assess nares and determine need for appliance change or resting period.   Outcome: Progressing     Problem: Pain  Goal: Verbalizes/displays adequate comfort level or baseline comfort level  Outcome: Progressing
Problem: Occupational Therapy - Adult  Goal: By Discharge: Performs self-care activities at highest level of function for planned discharge setting. See evaluation for individualized goals. Description: FUNCTIONAL STATUS PRIOR TO ADMISSION:  Patient is MI for self care and functional transfers/mobility at baseline  HOME SUPPORT: Patient lived with daughter but didn't require assistance. Occupational Therapy Goals:  Initiated 6/24/2023  1. Patient will perform grooming with Modified Kelly within 7 day(s). 2.  Patient will perform upper body dressing with Modified Kelly within 7 day(s). 3.  Patient will perform lower body dressing with Modified Kelly within 7 day(s). 4.  Patient will perform toilet transfers with Modified Kelly  within 7 day(s). 5.  Patient will perform all aspects of toileting with Modified Kelly within 7 day(s). 6.  Patient will participate in upper extremity therapeutic exercise/activities with Modified Kelly for 10 minutes within 7 day(s). 7.  Patient will utilize energy conservation techniques during functional activities with verbal cues within 7 day(s). Outcome: Progressing    OCCUPATIONAL THERAPY EVALUATION    Patient: Mae Arellano (33 y.o. female)  Date: 6/24/2023  Primary Diagnosis: GI bleed [K92.2]  Symptomatic anemia [D64.9]         Precautions:                    ASSESSMENT :  Patient received semi supine in bed A&OX4 and agreeable for OT eval/tx. Per pt and daughter report, pt lives with daughter in 3 level condo with 1 step to enter (stays on first level) and is independent/MI for self care and functional transfers/mobility with RW at baseline.     Patient presents with decreased balance (intact with use of RW), decreased activity tolerance 2/2 pain, generalized weakness and increased need for assist with self care (SBA grooming standing sink, SBA UB dressing, max A jocelynn socks seated EOB, CGA toileting/cloth mgmt) and
Problem: Physical Therapy - Adult  Goal: By Discharge: Performs mobility at highest level of function for planned discharge setting. See evaluation for individualized goals. Description: FUNCTIONAL STATUS PRIOR TO ADMISSION: Patient was modified independent using a rolling walker for functional mobility. Transport chair for distances    [address]: The patient [facility] and required minimal assistance for ADLs . Physical Therapy Goals  Initiated 6/24/2023  1. Patient will move from supine to sit and sit to supine, scoot up and down, and roll side to side in bed with modified independence within 7 day(s). 2.  Patient will perform sit to stand with supervision/set-up within 7 day(s). 3.  Patient will transfer from bed to chair and chair to bed with supervision/set-up using the least restrictive device within 7 day(s). 4.  Patient will ambulate with supervision/set-up for 75 feet with the least restrictive device within 7 day(s). Outcome: Progressing    PHYSICAL THERAPY EVALUATION    Patient: Haley Mccollum (44 y.o. female)  Date: 6/24/2023  Primary Diagnosis: GI bleed [K92.2]  Symptomatic anemia [D64.9]       Precautions: Fall Risk                    ASSESSMENT :   DEFICITS/IMPAIRMENTS:   The patient is limited by decreased functional mobility, ROM, strength, activity tolerance, endurance, coordination, balance, increased pain levels particularly R knee weight bearing as bel for amb. Daughter insists that current pain level and inability to tolerate WBAT R knee is far below patient's baseline level of function. Patient and daughter deny any trauma to RLE and deny GLFs. Patient has not been OOB x 2 days but dgt reports patient tearful trying to attempt amb earlier today. Dgt states patient has R knee OA and knee pain but that current pain is far more severe than patient's baseline.  Patient with OA R knee - daughter requesting ortho consult while inpatient and admission for
Dr. Rawls

## 2023-06-26 VITALS
OXYGEN SATURATION: 95 % | SYSTOLIC BLOOD PRESSURE: 147 MMHG | BODY MASS INDEX: 26.5 KG/M2 | DIASTOLIC BLOOD PRESSURE: 64 MMHG | WEIGHT: 144 LBS | HEART RATE: 104 BPM | RESPIRATION RATE: 17 BRPM | HEIGHT: 62 IN | TEMPERATURE: 98 F

## 2023-06-26 LAB
ALBUMIN SERPL-MCNC: 3.1 G/DL (ref 3.5–5)
ALBUMIN/GLOB SERPL: 0.9 (ref 1.1–2.2)
ALP SERPL-CCNC: 73 U/L (ref 45–117)
ALT SERPL-CCNC: 24 U/L (ref 12–78)
ANION GAP SERPL CALC-SCNC: 6 MMOL/L (ref 5–15)
AST SERPL-CCNC: 18 U/L (ref 15–37)
BASOPHILS # BLD: 0.1 K/UL (ref 0–0.1)
BASOPHILS NFR BLD: 1 % (ref 0–1)
BILIRUB SERPL-MCNC: 0.7 MG/DL (ref 0.2–1)
BUN SERPL-MCNC: 45 MG/DL (ref 6–20)
BUN/CREAT SERPL: 20 (ref 12–20)
CALCIUM SERPL-MCNC: 8.9 MG/DL (ref 8.5–10.1)
CHLORIDE SERPL-SCNC: 110 MMOL/L (ref 97–108)
CO2 SERPL-SCNC: 24 MMOL/L (ref 21–32)
CREAT SERPL-MCNC: 2.3 MG/DL (ref 0.55–1.02)
DIFFERENTIAL METHOD BLD: ABNORMAL
EOSINOPHIL # BLD: 0 K/UL (ref 0–0.4)
EOSINOPHIL NFR BLD: 0 % (ref 0–7)
ERYTHROCYTE [DISTWIDTH] IN BLOOD BY AUTOMATED COUNT: 17.5 % (ref 11.5–14.5)
GLOBULIN SER CALC-MCNC: 3.3 G/DL (ref 2–4)
GLUCOSE BLD STRIP.AUTO-MCNC: 224 MG/DL (ref 65–117)
GLUCOSE BLD STRIP.AUTO-MCNC: 244 MG/DL (ref 65–117)
GLUCOSE SERPL-MCNC: 204 MG/DL (ref 65–100)
HCT VFR BLD AUTO: 30.3 % (ref 35–47)
HGB BLD-MCNC: 9.3 G/DL (ref 11.5–16)
IMM GRANULOCYTES # BLD AUTO: 0 K/UL (ref 0–0.04)
IMM GRANULOCYTES NFR BLD AUTO: 0 % (ref 0–0.5)
LYMPHOCYTES # BLD: 0.7 K/UL (ref 0.8–3.5)
LYMPHOCYTES NFR BLD: 10 % (ref 12–49)
MCH RBC QN AUTO: 24.3 PG (ref 26–34)
MCHC RBC AUTO-ENTMCNC: 30.7 G/DL (ref 30–36.5)
MCV RBC AUTO: 79.3 FL (ref 80–99)
MONOCYTES # BLD: 0.1 K/UL (ref 0–1)
MONOCYTES NFR BLD: 2 % (ref 5–13)
NEUTS SEG # BLD: 6.3 K/UL (ref 1.8–8)
NEUTS SEG NFR BLD: 87 % (ref 32–75)
NRBC # BLD: 0 K/UL (ref 0–0.01)
NRBC BLD-RTO: 0 PER 100 WBC
PLATELET # BLD AUTO: 248 K/UL (ref 150–400)
PMV BLD AUTO: 11.6 FL (ref 8.9–12.9)
POTASSIUM SERPL-SCNC: 4.1 MMOL/L (ref 3.5–5.1)
PROT SERPL-MCNC: 6.4 G/DL (ref 6.4–8.2)
RBC # BLD AUTO: 3.82 M/UL (ref 3.8–5.2)
RBC MORPH BLD: ABNORMAL
SERVICE CMNT-IMP: ABNORMAL
SERVICE CMNT-IMP: ABNORMAL
SODIUM SERPL-SCNC: 140 MMOL/L (ref 136–145)
WBC # BLD AUTO: 7.2 K/UL (ref 3.6–11)

## 2023-06-26 PROCEDURE — 82962 GLUCOSE BLOOD TEST: CPT

## 2023-06-26 PROCEDURE — C9113 INJ PANTOPRAZOLE SODIUM, VIA: HCPCS | Performed by: INTERNAL MEDICINE

## 2023-06-26 PROCEDURE — A4216 STERILE WATER/SALINE, 10 ML: HCPCS | Performed by: INTERNAL MEDICINE

## 2023-06-26 PROCEDURE — 36415 COLL VENOUS BLD VENIPUNCTURE: CPT

## 2023-06-26 PROCEDURE — 2580000003 HC RX 258: Performed by: INTERNAL MEDICINE

## 2023-06-26 PROCEDURE — 6370000000 HC RX 637 (ALT 250 FOR IP): Performed by: HOSPITALIST

## 2023-06-26 PROCEDURE — 80053 COMPREHEN METABOLIC PANEL: CPT

## 2023-06-26 PROCEDURE — 6360000002 HC RX W HCPCS: Performed by: HOSPITALIST

## 2023-06-26 PROCEDURE — 85025 COMPLETE CBC W/AUTO DIFF WBC: CPT

## 2023-06-26 PROCEDURE — 6360000002 HC RX W HCPCS: Performed by: INTERNAL MEDICINE

## 2023-06-26 PROCEDURE — 94761 N-INVAS EAR/PLS OXIMETRY MLT: CPT

## 2023-06-26 RX ORDER — FUROSEMIDE 40 MG/1
40 TABLET ORAL DAILY
Qty: 60 TABLET | Refills: 3 | Status: SHIPPED | OUTPATIENT
Start: 2023-06-27

## 2023-06-26 RX ORDER — AMLODIPINE BESYLATE 5 MG/1
5 TABLET ORAL DAILY
Qty: 30 TABLET | Refills: 3 | Status: SHIPPED | OUTPATIENT
Start: 2023-06-27

## 2023-06-26 RX ADMIN — AMLODIPINE BESYLATE 5 MG: 5 TABLET ORAL at 10:21

## 2023-06-26 RX ADMIN — SODIUM CHLORIDE 40 MG: 9 INJECTION INTRAMUSCULAR; INTRAVENOUS; SUBCUTANEOUS at 10:21

## 2023-06-26 RX ADMIN — FUROSEMIDE 40 MG: 40 TABLET ORAL at 10:21

## 2023-06-26 RX ADMIN — IRON SUCROSE 200 MG: 20 INJECTION, SOLUTION INTRAVENOUS at 10:21

## 2023-06-26 RX ADMIN — SODIUM CHLORIDE, PRESERVATIVE FREE 10 ML: 5 INJECTION INTRAVENOUS at 10:22

## 2023-06-26 RX ADMIN — ACETAMINOPHEN 650 MG: 325 TABLET ORAL at 06:15

## 2023-06-26 RX ADMIN — ACETAMINOPHEN 650 MG: 325 TABLET ORAL at 13:35

## 2023-06-26 ASSESSMENT — PAIN SCALES - GENERAL
PAINLEVEL_OUTOF10: 0

## 2023-06-26 NOTE — CONSENT
Informed Consent for Blood Component Transfusion Note    I have discussed with the patient the rationale for blood component transfusion; its benefits in treating or preventing fatigue, organ damage, or death; and its risk which includes mild transfusion reactions, rare risk of blood borne infection, or more serious but rare reactions. I have discussed the alternatives to transfusion, including the risk and consequences of not receiving transfusion. The patient had an opportunity to ask questions and had agreed to proceed with transfusion of blood components.     Electronically signed by Beverly Loo MD on 6/26/23 at 2:06 PM EDT

## 2023-06-26 NOTE — CARE COORDINATION
6/26/2023   CARE MANAGEMENT NOTE:  CM reviewed EMR and handoff received from previous  Sera Edmond). Pt was admitted with GIB. Reportedly, pt lives with her dtr Ami Vonda ([phone]). RUR 14%    Transition Plan of Care:  GI, Ortho following for medical management  PT/OT evals complete; pt ambulated 22 feet on 6/24 and HH vs SNF was recommended  Reportedly, dtr declines SNF and she also wants to hold off on [facility] as they are leaving on vacation Friday  Pt will discharge home  Outpatient follow up  Dtr will transport pt home    CM will continue to follow pt until discharged.   Yudith pt seen in am  Presently feels comfortable  ICU Vital Signs Last 24 Hrs  T(C): 36.6 (19 Feb 2020 13:43), Max: 36.7 (18 Feb 2020 21:46)  T(F): 97.9 (19 Feb 2020 13:43), Max: 98 (18 Feb 2020 21:46)  HR: 61 (19 Feb 2020 13:43) (61 - 70)  BP: 117/69 (19 Feb 2020 13:43) (106/62 - 117/69)  BP(mean): --  ABP: --  ABP(mean): --  RR: 17 (19 Feb 2020 13:43) (17 - 18)  SpO2: 98% (19 Feb 2020 13:43) (96% - 98%)                            16.8   5.92  )-----------( 210      ( 19 Feb 2020 10:12 )             48.4       02-19    140  |  107  |  9   ----------------------------<  103<H>  3.8   |  25  |  1.09    Ca    9.4      19 Feb 2020 10:12    TPro  7.6  /  Alb  4.0  /  TBili  1.4<H>  /  DBili  0.5<H>  /  AST  137<H>  /  ALT  477<H>  /  AlkPhos  159<H>  02-19    well healed abndominal scar in the middle with an incisional hernia above the umbilicus  Had MRCP which did not show CBD stones    Had a long d/w the pt  He wants the GB surgery before he leaves the hospital  Had a long d/w the pt  Patient admitted with Cholelithiasis and abdominal pain on and off. Radiology studies reviewed. Had also elevated lfts which are getting better. Laparoscopic cholecystectomy possible open was discussed. Will also repair the incisional hernia. The potential for bleeding, CBD injury, bowel injury and other related complications were discussed. All the questions were answered.  The potential for open surgery was clearly schussed with the pt. given the previous colon surgery

## 2023-06-26 NOTE — DISCHARGE SUMMARY
[address] [place] [address]  [phone]    [address] [place] [address] Adult  Hospitalist Group     Discharge Summary       PATIENT ID: Nunu Edouard  MRN: [id number]   YOB: 1934    DATE OF ADMISSION: 6/22/2023  9:22 PM    DATE OF DISCHARGE: 06/26/23   PRIMARY CARE PROVIDER: TAMARA Padron NP     DISCHARGING PROVIDER: Cristel Garcia MD      CONSULTATIONS: IP CONSULT TO SPIRITUAL SERVICES  IP CONSULT TO GI  IP CONSULT TO ORTHOPEDIC SURGERY    PROCEDURES/SURGERIES: * No surgery found *    ADMITTING [facility] [address] COURSE:     Acute blood loss Microcytic anemia Recurrent : Hemoglobin at admission was 6.1 now 9.3 after 2 units PRBC. Stool Positive for blood. GI consulted. Ferritin 6 . Will . It seems she has GI bleed in past as well. EGD/colon last year was unremarkable Capsule study at that time showed last year non bleeding angiodysplasias seen in the proximal and mid-jejunum. Hold ASA. GI help appreciated no plan for Endo or Colonoscopy as the problem could recur and patient does not want to undergo procedures. On PO iron. CKD: Base line creatinine 2      DM: At home takes Lantus 19 units    History of Hypertension: On Norvasc 5 mg at home. History of CHF with preserved EF on Lasix daily. Hypokalemia: replace K    R knee pain. Xray shows Significant tricompartmental degenerative changes and small joint effusion. Ortho evaluated and did an arthrocentesis which did not reveal any crystals and WBC <10k.       PENDING TEST RESULTS:   At the time of discharge the following test results are still pending: none    FOLLOW UP APPOINTMENTS:    TAMARA Padron NP  [id number]  [address]  [phone]    Follow up in 1 week(s)      Madison Bernard MD  [address] [facility]  [address] [address] [phone]    Follow up      Yaritza Lundberg MD  [address] [place] [address]

## 2023-10-10 ENCOUNTER — HOSPITAL ENCOUNTER (EMERGENCY)
Facility: HOSPITAL | Age: 88
Discharge: HOME | End: 2023-10-11
Attending: EMERGENCY MEDICINE
Payer: MEDICARE

## 2023-10-10 DIAGNOSIS — R55 NEAR SYNCOPE: ICD-10-CM

## 2023-10-10 DIAGNOSIS — R53.1 GENERALIZED WEAKNESS: ICD-10-CM

## 2023-10-10 DIAGNOSIS — K85.00 IDIOPATHIC ACUTE PANCREATITIS WITHOUT INFECTION OR NECROSIS: ICD-10-CM

## 2023-10-10 DIAGNOSIS — R42 LIGHTHEADEDNESS: Primary | ICD-10-CM

## 2023-10-10 LAB
ALBUMIN SERPL-MCNC: 3.7 G/DL (ref 3.5–5)
ALBUMIN/GLOB SERPL: 1 (ref 1.1–2.2)
ALP SERPL-CCNC: 95 U/L (ref 45–117)
ALT SERPL-CCNC: 31 U/L (ref 12–78)
AMYLASE SERPL-CCNC: 135 U/L (ref 25–115)
ANION GAP SERPL CALC-SCNC: 5 MMOL/L (ref 5–15)
APTT PPP: 22 SEC (ref 22.1–31)
AST SERPL-CCNC: 15 U/L (ref 15–37)
BASOPHILS # BLD: 0.1 K/UL (ref 0–0.1)
BASOPHILS NFR BLD: 1 % (ref 0–1)
BILIRUB SERPL-MCNC: 0.6 MG/DL (ref 0.2–1)
BUN SERPL-MCNC: 39 MG/DL (ref 6–20)
BUN/CREAT SERPL: 16 (ref 12–20)
CALCIUM SERPL-MCNC: 9.3 MG/DL (ref 8.5–10.1)
CHLORIDE SERPL-SCNC: 113 MMOL/L (ref 97–108)
CK SERPL-CCNC: 43 U/L (ref 26–192)
CO2 SERPL-SCNC: 24 MMOL/L (ref 21–32)
CREAT SERPL-MCNC: 2.51 MG/DL (ref 0.55–1.02)
DIFFERENTIAL METHOD BLD: ABNORMAL
EOSINOPHIL # BLD: 0.1 K/UL (ref 0–0.4)
EOSINOPHIL NFR BLD: 1 % (ref 0–7)
ERYTHROCYTE [DISTWIDTH] IN BLOOD BY AUTOMATED COUNT: 14 % (ref 11.5–14.5)
GLOBULIN SER CALC-MCNC: 3.6 G/DL (ref 2–4)
GLUCOSE BLD STRIP.AUTO-MCNC: 215 MG/DL (ref 65–117)
GLUCOSE SERPL-MCNC: 217 MG/DL (ref 65–100)
HCT VFR BLD AUTO: 28.9 % (ref 35–47)
HGB BLD-MCNC: 9.3 G/DL (ref 11.5–16)
IMM GRANULOCYTES # BLD AUTO: 0 K/UL (ref 0–0.04)
IMM GRANULOCYTES NFR BLD AUTO: 0 % (ref 0–0.5)
INR PPP: 1 (ref 0.9–1.1)
LIPASE SERPL-CCNC: 146 U/L (ref 13–75)
LYMPHOCYTES # BLD: 1.1 K/UL (ref 0.8–3.5)
LYMPHOCYTES NFR BLD: 17 % (ref 12–49)
MAGNESIUM SERPL-MCNC: 2.3 MG/DL (ref 1.6–2.4)
MCH RBC QN AUTO: 27.6 PG (ref 26–34)
MCHC RBC AUTO-ENTMCNC: 32.2 G/DL (ref 30–36.5)
MCV RBC AUTO: 85.8 FL (ref 80–99)
MONOCYTES # BLD: 0.3 K/UL (ref 0–1)
MONOCYTES NFR BLD: 4 % (ref 5–13)
NEUTS SEG # BLD: 5.2 K/UL (ref 1.8–8)
NEUTS SEG NFR BLD: 77 % (ref 32–75)
NRBC # BLD: 0 K/UL (ref 0–0.01)
NRBC BLD-RTO: 0 PER 100 WBC
PLATELET # BLD AUTO: 218 K/UL (ref 150–400)
PMV BLD AUTO: 10.7 FL (ref 8.9–12.9)
POTASSIUM SERPL-SCNC: 4.4 MMOL/L (ref 3.5–5.1)
PROT SERPL-MCNC: 7.3 G/DL (ref 6.4–8.2)
PROTHROMBIN TIME: 10.7 SEC (ref 9–11.1)
RBC # BLD AUTO: 3.37 M/UL (ref 3.8–5.2)
SERVICE CMNT-IMP: ABNORMAL
SODIUM SERPL-SCNC: 142 MMOL/L (ref 136–145)
THERAPEUTIC RANGE: ABNORMAL SECS (ref 58–77)
TROPONIN I SERPL HS-MCNC: 14 NG/L (ref 0–51)
TSH SERPL DL<=0.05 MIU/L-ACNC: 1.02 UIU/ML (ref 0.36–3.74)
WBC # BLD AUTO: 6.8 K/UL (ref 3.6–11)

## 2023-10-10 PROCEDURE — 85730 THROMBOPLASTIN TIME PARTIAL: CPT

## 2023-10-10 PROCEDURE — 82962 GLUCOSE BLOOD TEST: CPT

## 2023-10-10 PROCEDURE — 99284 EMERGENCY DEPT VISIT MOD MDM: CPT

## 2023-10-10 PROCEDURE — 80053 COMPREHEN METABOLIC PANEL: CPT

## 2023-10-10 PROCEDURE — 82550 ASSAY OF CK (CPK): CPT

## 2023-10-10 PROCEDURE — 85610 PROTHROMBIN TIME: CPT

## 2023-10-10 PROCEDURE — 85025 COMPLETE CBC W/AUTO DIFF WBC: CPT

## 2023-10-10 PROCEDURE — 96361 HYDRATE IV INFUSION ADD-ON: CPT

## 2023-10-10 PROCEDURE — 80184 ASSAY OF PHENOBARBITAL: CPT

## 2023-10-10 PROCEDURE — 82150 ASSAY OF AMYLASE: CPT

## 2023-10-10 PROCEDURE — 96360 HYDRATION IV INFUSION INIT: CPT

## 2023-10-10 PROCEDURE — 83690 ASSAY OF LIPASE: CPT

## 2023-10-10 PROCEDURE — 36415 COLL VENOUS BLD VENIPUNCTURE: CPT

## 2023-10-10 PROCEDURE — 2580000003 HC RX 258: Performed by: EMERGENCY MEDICINE

## 2023-10-10 PROCEDURE — 84443 ASSAY THYROID STIM HORMONE: CPT

## 2023-10-10 PROCEDURE — 83735 ASSAY OF MAGNESIUM: CPT

## 2023-10-10 PROCEDURE — 84484 ASSAY OF TROPONIN QUANT: CPT

## 2023-10-10 RX ORDER — 0.9 % SODIUM CHLORIDE 0.9 %
1000 INTRAVENOUS SOLUTION INTRAVENOUS ONCE
Status: COMPLETED | OUTPATIENT
Start: 2023-10-10 | End: 2023-10-11

## 2023-10-10 RX ADMIN — SODIUM CHLORIDE 1000 ML: 9 INJECTION, SOLUTION INTRAVENOUS at 22:44

## 2023-10-10 ASSESSMENT — PAIN SCALES - GENERAL: PAINLEVEL_OUTOF10: 0

## 2023-10-10 ASSESSMENT — PAIN - FUNCTIONAL ASSESSMENT: PAIN_FUNCTIONAL_ASSESSMENT: 0-10

## 2023-10-11 VITALS
BODY MASS INDEX: 22.08 KG/M2 | HEART RATE: 84 BPM | TEMPERATURE: 97.3 F | RESPIRATION RATE: 18 BRPM | WEIGHT: 120 LBS | DIASTOLIC BLOOD PRESSURE: 66 MMHG | HEIGHT: 62 IN | SYSTOLIC BLOOD PRESSURE: 149 MMHG | OXYGEN SATURATION: 99 %

## 2023-10-11 LAB
APPEARANCE UR: CLEAR
BACTERIA URNS QL MICRO: NEGATIVE /HPF
BILIRUB UR QL: NEGATIVE
COLOR UR: ABNORMAL
EKG ATRIAL RATE: 87 BPM
EKG DIAGNOSIS: NORMAL
EKG P AXIS: 56 DEGREES
EKG P-R INTERVAL: 198 MS
EKG Q-T INTERVAL: 428 MS
EKG QRS DURATION: 100 MS
EKG QTC CALCULATION (BAZETT): 515 MS
EKG R AXIS: 2 DEGREES
EKG T AXIS: 59 DEGREES
EKG VENTRICULAR RATE: 87 BPM
EPITH CASTS URNS QL MICRO: ABNORMAL /LPF
GLUCOSE UR STRIP.AUTO-MCNC: 100 MG/DL
HGB UR QL STRIP: NEGATIVE
HYALINE CASTS URNS QL MICRO: ABNORMAL /LPF (ref 0–2)
KETONES UR QL STRIP.AUTO: NEGATIVE MG/DL
LEUKOCYTE ESTERASE UR QL STRIP.AUTO: ABNORMAL
NITRITE UR QL STRIP.AUTO: NEGATIVE
PH UR STRIP: 7.5 (ref 5–8)
PHENOBARB SERPL-MCNC: <2.1 UG/ML (ref 15–40)
PROT UR STRIP-MCNC: NEGATIVE MG/DL
RBC #/AREA URNS HPF: ABNORMAL /HPF (ref 0–5)
SP GR UR REFRACTOMETRY: 1.01 (ref 1–1.03)
URINE CULTURE IF INDICATED: ABNORMAL
UROBILINOGEN UR QL STRIP.AUTO: 0.2 EU/DL (ref 0.2–1)
WBC URNS QL MICRO: ABNORMAL /HPF (ref 0–4)

## 2023-10-11 PROCEDURE — 93005 ELECTROCARDIOGRAM TRACING: CPT | Performed by: EMERGENCY MEDICINE

## 2023-10-11 PROCEDURE — 81001 URINALYSIS AUTO W/SCOPE: CPT

## 2023-10-11 NOTE — ED NOTES
Pt ambulated with a walker down the imaging musa and back. Pt denies dizziness, SOB, or extremity weakness. Replaced pts purewick upon returning to bed.      Robert Rao RN  10/11/23 [id number]

## 2023-10-11 NOTE — ED PROVIDER NOTES
[facility] EMERGENCY DEPT  EMERGENCY DEPARTMENT ENCOUNTER      Pt Name: Mae Arellano  MRN: [id number]  [address] [facility] [place] 1934  Date of evaluation: 10/10/2023  Provider: Regan Szymanski MD    [address]       Chief Complaint   Patient presents with    Dizziness    Extremity Weakness         HISTORY OF PRESENT ILLNESS   (Location/Symptom, Timing/Onset, Context/Setting, Quality, Duration, Modifying Factors, Severity)  Note limiting factors. 79-year-old female with a past medical history significant for CHF, coronary disease, chronic kidney disease, diabetes, hypertension, ischemic cardiomyopathy, anemia who presents to the ER by EMS for evaluation for dizziness, lightheadedness and feeling as if she was going to pass out that began this evening after dinner. The patient also stated that she felt nauseous earlier today had 2 episodes of nausea and vomiting the symptoms subsided at this time. She is a very limited historian but denies any chest pain, headache, abdominal pain, diarrhea, constipation, dysuria, sick contact or recent travel. anemia, hypercholesterolemia, who presents to the ER by EMS    Review of External Medical Records:     Nursing Notes were reviewed. REVIEW OF SYSTEMS    (2-9 systems for level 4, 10 or more for level 5)     Review of Systems   All other systems reviewed and are negative. Except as noted above the remainder of the review of systems was reviewed and negative. PAST MEDICAL HISTORY     Past Medical History:   Diagnosis Date    CAD (coronary artery disease)     CHF (congestive heart failure), NYHA class I, chronic, systolic (HCC)     Systolic and diastolic CHF per echocardiogram of 12/25/2021.   Patient was found to have LVEF 45%    Chronic kidney disease     stage III/IV:Creatinine 1.45-1.7 with GFR in the low 30s-high 20s    Diabetes mellitus type 2 in nonobese [facility])     Hypertension     Ischemic cardiomyopathy     Echocardiogram of 12/25/2021: mild left

## 2023-10-11 NOTE — ED TRIAGE NOTES
Pt arrived via EMS due to having dizziness and weakness all day today. Ems explained that the pt has a leaking vein in her body and the pt's daughter is concerned that this might be the cause of the dizziness and weakness.  Denies any type of falls and fever

## 2023-11-04 ENCOUNTER — APPOINTMENT (OUTPATIENT)
Facility: HOSPITAL | Age: 88
DRG: 554 | End: 2023-11-04
Payer: MEDICARE

## 2023-11-04 ENCOUNTER — APPOINTMENT (OUTPATIENT)
Dept: VASCULAR SURGERY | Facility: HOSPITAL | Age: 88
DRG: 554 | End: 2023-11-04
Attending: INTERNAL MEDICINE
Payer: MEDICARE

## 2023-11-04 ENCOUNTER — HOSPITAL ENCOUNTER (INPATIENT)
Facility: HOSPITAL | Age: 88
LOS: 2 days | Discharge: HOME | DRG: 554 | End: 2023-11-06
Attending: STUDENT IN AN ORGANIZED HEALTH CARE EDUCATION/TRAINING PROGRAM | Admitting: INTERNAL MEDICINE
Payer: MEDICARE

## 2023-11-04 DIAGNOSIS — R60.9 SWELLING: ICD-10-CM

## 2023-11-04 DIAGNOSIS — R07.9 CHEST PAIN, UNSPECIFIED TYPE: Primary | ICD-10-CM

## 2023-11-04 LAB
ANION GAP SERPL CALC-SCNC: 4 MMOL/L (ref 5–15)
BASOPHILS # BLD: 0 K/UL (ref 0–0.1)
BASOPHILS NFR BLD: 1 % (ref 0–1)
BUN SERPL-MCNC: 25 MG/DL (ref 6–20)
BUN/CREAT SERPL: 13 (ref 12–20)
CALCIUM SERPL-MCNC: 8.5 MG/DL (ref 8.5–10.1)
CHLORIDE SERPL-SCNC: 114 MMOL/L (ref 97–108)
CO2 SERPL-SCNC: 24 MMOL/L (ref 21–32)
COMMENT:: NORMAL
COMMENT:: NORMAL
CREAT SERPL-MCNC: 1.96 MG/DL (ref 0.55–1.02)
D DIMER PPP FEU-MCNC: 0.82 MG/L FEU (ref 0–0.65)
DIFFERENTIAL METHOD BLD: ABNORMAL
EOSINOPHIL # BLD: 0.1 K/UL (ref 0–0.4)
EOSINOPHIL NFR BLD: 3 % (ref 0–7)
ERYTHROCYTE [DISTWIDTH] IN BLOOD BY AUTOMATED COUNT: 13.8 % (ref 11.5–14.5)
GLUCOSE SERPL-MCNC: 160 MG/DL (ref 65–100)
HCT VFR BLD AUTO: 23.4 % (ref 35–47)
HCT VFR BLD AUTO: 28.8 % (ref 35–47)
HGB BLD-MCNC: 7.2 G/DL (ref 11.5–16)
HGB BLD-MCNC: 9.1 G/DL (ref 11.5–16)
HISTORY CHECK: NORMAL
IMM GRANULOCYTES # BLD AUTO: 0 K/UL (ref 0–0.04)
IMM GRANULOCYTES NFR BLD AUTO: 0 % (ref 0–0.5)
LYMPHOCYTES # BLD: 0.8 K/UL (ref 0.8–3.5)
LYMPHOCYTES NFR BLD: 22 % (ref 12–49)
MCH RBC QN AUTO: 25.7 PG (ref 26–34)
MCHC RBC AUTO-ENTMCNC: 30.8 G/DL (ref 30–36.5)
MCV RBC AUTO: 83.6 FL (ref 80–99)
MONOCYTES # BLD: 0.3 K/UL (ref 0–1)
MONOCYTES NFR BLD: 9 % (ref 5–13)
NEUTS SEG # BLD: 2.3 K/UL (ref 1.8–8)
NEUTS SEG NFR BLD: 65 % (ref 32–75)
NRBC # BLD: 0 K/UL (ref 0–0.01)
NRBC BLD-RTO: 0 PER 100 WBC
NT PRO BNP: 510 PG/ML
PLATELET # BLD AUTO: 189 K/UL (ref 150–400)
PMV BLD AUTO: 11.4 FL (ref 8.9–12.9)
POTASSIUM SERPL-SCNC: 3.7 MMOL/L (ref 3.5–5.1)
RBC # BLD AUTO: 2.8 M/UL (ref 3.8–5.2)
SODIUM SERPL-SCNC: 142 MMOL/L (ref 136–145)
SPECIMEN HOLD: NORMAL
SPECIMEN HOLD: NORMAL
TROPONIN I SERPL HS-MCNC: 12 NG/L (ref 0–51)
TROPONIN I SERPL HS-MCNC: 13 NG/L (ref 0–51)
TROPONIN I SERPL HS-MCNC: 13 NG/L (ref 0–51)
WBC # BLD AUTO: 3.5 K/UL (ref 3.6–11)

## 2023-11-04 PROCEDURE — P9016 RBC LEUKOCYTES REDUCED: HCPCS

## 2023-11-04 PROCEDURE — 85018 HEMOGLOBIN: CPT

## 2023-11-04 PROCEDURE — 93005 ELECTROCARDIOGRAM TRACING: CPT | Performed by: INTERNAL MEDICINE

## 2023-11-04 PROCEDURE — 93970 EXTREMITY STUDY: CPT

## 2023-11-04 PROCEDURE — 99233 SBSQ HOSP IP/OBS HIGH 50: CPT | Performed by: INTERNAL MEDICINE

## 2023-11-04 PROCEDURE — 85379 FIBRIN DEGRADATION QUANT: CPT

## 2023-11-04 PROCEDURE — 85014 HEMATOCRIT: CPT

## 2023-11-04 PROCEDURE — 86923 COMPATIBILITY TEST ELECTRIC: CPT

## 2023-11-04 PROCEDURE — 80048 BASIC METABOLIC PNL TOTAL CA: CPT

## 2023-11-04 PROCEDURE — 71046 X-RAY EXAM CHEST 2 VIEWS: CPT

## 2023-11-04 PROCEDURE — 30233N1 TRANSFUSION OF NONAUTOLOGOUS RED BLOOD CELLS INTO PERIPHERAL VEIN, PERCUTANEOUS APPROACH: ICD-10-PCS | Performed by: INTERNAL MEDICINE

## 2023-11-04 PROCEDURE — 1100000000 HC RM PRIVATE

## 2023-11-04 PROCEDURE — 85025 COMPLETE CBC W/AUTO DIFF WBC: CPT

## 2023-11-04 PROCEDURE — 83880 ASSAY OF NATRIURETIC PEPTIDE: CPT

## 2023-11-04 PROCEDURE — 84484 ASSAY OF TROPONIN QUANT: CPT

## 2023-11-04 PROCEDURE — 99285 EMERGENCY DEPT VISIT HI MDM: CPT

## 2023-11-04 PROCEDURE — 36430 TRANSFUSION BLD/BLD COMPNT: CPT

## 2023-11-04 PROCEDURE — 94761 N-INVAS EAR/PLS OXIMETRY MLT: CPT

## 2023-11-04 PROCEDURE — 86900 BLOOD TYPING SEROLOGIC ABO: CPT

## 2023-11-04 PROCEDURE — 36415 COLL VENOUS BLD VENIPUNCTURE: CPT

## 2023-11-04 PROCEDURE — 86901 BLOOD TYPING SEROLOGIC RH(D): CPT

## 2023-11-04 PROCEDURE — 86850 RBC ANTIBODY SCREEN: CPT

## 2023-11-04 PROCEDURE — 6370000000 HC RX 637 (ALT 250 FOR IP): Performed by: STUDENT IN AN ORGANIZED HEALTH CARE EDUCATION/TRAINING PROGRAM

## 2023-11-04 RX ORDER — FUROSEMIDE 40 MG/1
40 TABLET ORAL DAILY
Status: DISCONTINUED | OUTPATIENT
Start: 2023-11-04 | End: 2023-11-06 | Stop reason: HOSPADM

## 2023-11-04 RX ORDER — SODIUM CHLORIDE 9 MG/ML
INJECTION, SOLUTION INTRAVENOUS PRN
Status: DISCONTINUED | OUTPATIENT
Start: 2023-11-04 | End: 2023-11-06 | Stop reason: HOSPADM

## 2023-11-04 RX ORDER — NITROGLYCERIN 0.4 MG/1
0.4 TABLET SUBLINGUAL ONCE
Status: COMPLETED | OUTPATIENT
Start: 2023-11-04 | End: 2023-11-04

## 2023-11-04 RX ORDER — ACETAMINOPHEN 650 MG/1
650 SUPPOSITORY RECTAL EVERY 6 HOURS PRN
Status: DISCONTINUED | OUTPATIENT
Start: 2023-11-04 | End: 2023-11-06 | Stop reason: HOSPADM

## 2023-11-04 RX ORDER — SODIUM CHLORIDE 0.9 % (FLUSH) 0.9 %
5-40 SYRINGE (ML) INJECTION PRN
Status: DISCONTINUED | OUTPATIENT
Start: 2023-11-04 | End: 2023-11-06 | Stop reason: HOSPADM

## 2023-11-04 RX ORDER — POLYETHYLENE GLYCOL 3350 17 G/17G
17 POWDER, FOR SOLUTION ORAL DAILY PRN
Status: DISCONTINUED | OUTPATIENT
Start: 2023-11-04 | End: 2023-11-06 | Stop reason: HOSPADM

## 2023-11-04 RX ORDER — SODIUM CHLORIDE 9 MG/ML
INJECTION, SOLUTION INTRAVENOUS PRN
Status: DISCONTINUED | OUTPATIENT
Start: 2023-11-04 | End: 2023-11-06

## 2023-11-04 RX ORDER — ATORVASTATIN CALCIUM 20 MG/1
20 TABLET, FILM COATED ORAL DAILY
Status: DISCONTINUED | OUTPATIENT
Start: 2023-11-04 | End: 2023-11-06 | Stop reason: HOSPADM

## 2023-11-04 RX ORDER — AMLODIPINE BESYLATE 5 MG/1
5 TABLET ORAL DAILY
Status: DISCONTINUED | OUTPATIENT
Start: 2023-11-04 | End: 2023-11-06 | Stop reason: HOSPADM

## 2023-11-04 RX ORDER — PANTOPRAZOLE SODIUM 40 MG/1
40 TABLET, DELAYED RELEASE ORAL
Status: DISCONTINUED | OUTPATIENT
Start: 2023-11-05 | End: 2023-11-06 | Stop reason: HOSPADM

## 2023-11-04 RX ORDER — ONDANSETRON 4 MG/1
4 TABLET, ORALLY DISINTEGRATING ORAL EVERY 8 HOURS PRN
Status: DISCONTINUED | OUTPATIENT
Start: 2023-11-04 | End: 2023-11-06 | Stop reason: HOSPADM

## 2023-11-04 RX ORDER — ACETAMINOPHEN 325 MG/1
650 TABLET ORAL EVERY 6 HOURS PRN
Status: DISCONTINUED | OUTPATIENT
Start: 2023-11-04 | End: 2023-11-05

## 2023-11-04 RX ORDER — ONDANSETRON 2 MG/ML
4 INJECTION INTRAMUSCULAR; INTRAVENOUS EVERY 6 HOURS PRN
Status: DISCONTINUED | OUTPATIENT
Start: 2023-11-04 | End: 2023-11-06 | Stop reason: HOSPADM

## 2023-11-04 RX ORDER — SODIUM CHLORIDE 0.9 % (FLUSH) 0.9 %
5-40 SYRINGE (ML) INJECTION EVERY 12 HOURS SCHEDULED
Status: DISCONTINUED | OUTPATIENT
Start: 2023-11-04 | End: 2023-11-06 | Stop reason: HOSPADM

## 2023-11-04 RX ADMIN — NITROGLYCERIN 0.4 MG: 0.4 TABLET, ORALLY DISINTEGRATING SUBLINGUAL at 15:59

## 2023-11-04 ASSESSMENT — PAIN DESCRIPTION - LOCATION: LOCATION: CHEST

## 2023-11-04 ASSESSMENT — PAIN DESCRIPTION - DESCRIPTORS: DESCRIPTORS: DISCOMFORT

## 2023-11-04 ASSESSMENT — PAIN - FUNCTIONAL ASSESSMENT: PAIN_FUNCTIONAL_ASSESSMENT: 0-10

## 2023-11-04 ASSESSMENT — PAIN DESCRIPTION - ORIENTATION: ORIENTATION: LEFT

## 2023-11-04 ASSESSMENT — HEART SCORE: ECG: 0

## 2023-11-04 ASSESSMENT — PAIN SCALES - GENERAL: PAINLEVEL_OUTOF10: 9

## 2023-11-04 NOTE — ED TRIAGE NOTES
Pt here with left sided chest pain without radiation. Pt with SOB and BLE edema. Pt denies any cough.

## 2023-11-04 NOTE — H&P
[address] [place] [address]  [phone]    Admission History and Physical      NAME:  Al Miller   :   1934   MRN:  [id number]     PCP:  TAMARA Cárdenas NP     Date/Time of service:  2023  3:28 PM        Subjective:     CHIEF COMPLAINT: Chest pain    HISTORY OF PRESENT ILLNESS:     Ms. Gregg Wolfe is a 80 y.o.  female with a past medical history of CHF with preserved EF, hypertension, diabetes, CKD, anemia with iron deficiency who is admitted with chest pain. Ms. Gregg Wolfe states yesterday night she began to experience severe left-sided chest pain with a pressure-like sensation. The pain does not radiate anywhere and she states it is constant. She denies any associated syncope, diaphoresis or nausea vomiting. She denies any overt blood loss. She is accompanied by her daughter who helps provide history. Allergies   Allergen Reactions    Prochlorperazine Other (See Comments)     Facial droop    Codeine Nausea And Vomiting    Hydromorphone Nausea And Vomiting    Morphine Nausea And Vomiting    Sulfa Antibiotics Nausea And Vomiting       Prior to Admission medications    Medication Sig Start Date End Date Taking?  Authorizing Provider   amLODIPine (NORVASC) 5 MG tablet Take 1 tablet by mouth daily 23   Adele Marie MD   furosemide (LASIX) 40 MG tablet Take 1 tablet by mouth daily 23   Adele Marie MD   Lancets MISC Check blood sugar AC & HS 23   Automatic Reconciliation, Ar   atorvastatin (LIPITOR) 20 MG tablet Take 1 tablet by mouth daily 21   Automatic Reconciliation, Ar   Cholecalciferol 50 MCG ( UT) TABS Take by mouth    Automatic Reconciliation, Ar   ferrous sulfate (IRON 325) 325 (65 Fe) MG tablet Take 1 tablet by mouth every morning (before breakfast) 22   Automatic Reconciliation, Ar   ondansetron (ZOFRAN) 4 MG tablet Take 1 tablet by mouth daily as needed    Automatic Reconciliation, Ar

## 2023-11-04 NOTE — ED PROVIDER NOTES
[facility] EMERGENCY DEPT  EMERGENCY DEPARTMENT ENCOUNTER      Pt Name: Alejandra Chavez  MRN: [id number]  [address] [facility], [place] 1934  Date of evaluation: 11/4/2023  Provider: Asia Mckenzie MD    CHIEF COMPLAINT       Chief Complaint   Patient presents with    Chest Pain         HISTORY OF PRESENT ILLNESS   (Location/Symptom, Timing/Onset, Context/Setting, Quality, Duration, Modifying Factors, Severity)  Note limiting factors. HPI    49-year-old female with history of CAD, CHF, CKD, diabetes, hypertension, PVD presenting for evaluation of chest pain. Patient reports that she began having left mid chest pressure last night, reports has been constant and unchanged since onset. Reports no radiation, denies exertional symptoms. Reports associated shortness of breath, denies nausea or vomiting. Reports has not had any increase in swelling in her lower extremities or cough. Denies fever or sputum production. Review of External Medical Records:         Nursing Notes were reviewed. REVIEW OF SYSTEMS    (2-9 systems for level 4, 10 or more for level 5)     Except as noted above the remainder of the review of systems was reviewed and negative. PAST MEDICAL HISTORY     Past Medical History:   Diagnosis Date    CAD (coronary artery disease)     CHF (congestive heart failure), NYHA class I, chronic, systolic (HCC)     Systolic and diastolic CHF per echocardiogram of 12/25/2021. Patient was found to have LVEF 45%    Chronic kidney disease     stage III/IV:Creatinine 1.45-1.7 with GFR in the low 30s-high 20s    Diabetes mellitus type 2 in nonobese [facility])     Hypertension     Ischemic cardiomyopathy     Echocardiogram of 12/25/2021: mild left ventricular systolic dysfunction (EF 63%) with inferior and basal-mid septal akinesis. Diastolic dysfunction.     Peripheral vascular disease ([address])          SURGICAL HISTORY       Past Surgical History:   Procedure Laterality Date    APPENDECTOMY      COLONOSCOPY N/A

## 2023-11-05 ENCOUNTER — APPOINTMENT (OUTPATIENT)
Facility: HOSPITAL | Age: 88
DRG: 554 | End: 2023-11-05
Attending: INTERNAL MEDICINE
Payer: MEDICARE

## 2023-11-05 LAB
ABO + RH BLD: NORMAL
ANION GAP SERPL CALC-SCNC: 4 MMOL/L (ref 5–15)
BASOPHILS # BLD: 0.1 K/UL (ref 0–0.1)
BASOPHILS NFR BLD: 1 % (ref 0–1)
BLD PROD TYP BPU: NORMAL
BLOOD BANK DISPENSE STATUS: NORMAL
BLOOD GROUP ANTIBODIES SERPL: NORMAL
BPU ID: NORMAL
BUN SERPL-MCNC: 24 MG/DL (ref 6–20)
BUN/CREAT SERPL: 14 (ref 12–20)
CALCIUM SERPL-MCNC: 8.1 MG/DL (ref 8.5–10.1)
CHLORIDE SERPL-SCNC: 116 MMOL/L (ref 97–108)
CHOLEST SERPL-MCNC: 116 MG/DL
CO2 SERPL-SCNC: 22 MMOL/L (ref 21–32)
CREAT SERPL-MCNC: 1.76 MG/DL (ref 0.55–1.02)
CROSSMATCH RESULT: NORMAL
DIFFERENTIAL METHOD BLD: ABNORMAL
ECHO AV AREA PEAK VELOCITY: 1.6 CM2
ECHO AV AREA VTI: 1.7 CM2
ECHO AV AREA/BSA PEAK VELOCITY: 1 CM2/M2
ECHO AV AREA/BSA VTI: 1.1 CM2/M2
ECHO AV MEAN GRADIENT: 4 MMHG
ECHO AV MEAN VELOCITY: 1 M/S
ECHO AV PEAK GRADIENT: 7 MMHG
ECHO AV PEAK VELOCITY: 1.3 M/S
ECHO AV VELOCITY RATIO: 0.62
ECHO AV VTI: 32.6 CM
ECHO BSA: 1.59 M2
ECHO BSA: 1.59 M2
ECHO LA DIAMETER INDEX: 1.77 CM/M2
ECHO LA DIAMETER: 2.8 CM
ECHO LA VOL A-L A2C: 42 ML (ref 22–52)
ECHO LA VOL A-L A2C: 42 ML (ref 22–52)
ECHO LA VOL A-L A4C: 63 ML (ref 22–52)
ECHO LA VOL A-L A4C: 68 ML (ref 22–52)
ECHO LA VOL BP: 54 ML (ref 22–52)
ECHO LA VOL/BSA BIPLANE: 34 ML/M2 (ref 16–34)
ECHO LA VOLUME AREA LENGTH: 57 ML
ECHO LA VOLUME INDEX AREA LENGTH: 36 ML/M2 (ref 16–34)
ECHO LV E' LATERAL VELOCITY: 7 CM/S
ECHO LV E' SEPTAL VELOCITY: 5 CM/S
ECHO LV EDV A2C: 104 ML
ECHO LV EDV A4C: 104 ML
ECHO LV EDV BP: 108 ML (ref 56–104)
ECHO LV EDV INDEX A4C: 66 ML/M2
ECHO LV EDV INDEX BP: 68 ML/M2
ECHO LV EDV NDEX A2C: 66 ML/M2
ECHO LV EJECTION FRACTION A2C: 60 %
ECHO LV EJECTION FRACTION A4C: 62 %
ECHO LV EJECTION FRACTION BIPLANE: 59 % (ref 55–100)
ECHO LV ESV A2C: 42 ML
ECHO LV ESV A4C: 39 ML
ECHO LV ESV BP: 44 ML (ref 19–49)
ECHO LV ESV INDEX A2C: 27 ML/M2
ECHO LV ESV INDEX A4C: 25 ML/M2
ECHO LV ESV INDEX BP: 28 ML/M2
ECHO LV FRACTIONAL SHORTENING: 29 % (ref 28–44)
ECHO LV INTERNAL DIMENSION DIASTOLE INDEX: 2.59 CM/M2
ECHO LV INTERNAL DIMENSION DIASTOLIC: 4.1 CM (ref 3.9–5.3)
ECHO LV INTERNAL DIMENSION SYSTOLIC INDEX: 1.84 CM/M2
ECHO LV INTERNAL DIMENSION SYSTOLIC: 2.9 CM
ECHO LV IVSD: 0.9 CM (ref 0.6–0.9)
ECHO LV MASS 2D: 132.1 G (ref 67–162)
ECHO LV MASS INDEX 2D: 83.6 G/M2 (ref 43–95)
ECHO LV POSTERIOR WALL DIASTOLIC: 1.1 CM (ref 0.6–0.9)
ECHO LV RELATIVE WALL THICKNESS RATIO: 0.54
ECHO LVOT AREA: 2.5 CM2
ECHO LVOT AV VTI INDEX: 0.66
ECHO LVOT DIAM: 1.8 CM
ECHO LVOT MEAN GRADIENT: 1 MMHG
ECHO LVOT PEAK GRADIENT: 3 MMHG
ECHO LVOT PEAK VELOCITY: 0.8 M/S
ECHO LVOT STROKE VOLUME INDEX: 34.4 ML/M2
ECHO LVOT SV: 54.4 ML
ECHO LVOT VTI: 21.4 CM
ECHO MV A VELOCITY: 1.06 M/S
ECHO MV AREA VTI: 1.7 CM2
ECHO MV E DECELERATION TIME (DT): 165 MS
ECHO MV E VELOCITY: 0.87 M/S
ECHO MV E/A RATIO: 0.82
ECHO MV E/E' LATERAL: 12.43
ECHO MV E/E' RATIO (AVERAGED): 14.91
ECHO MV E/E' SEPTAL: 17.4
ECHO MV LVOT VTI INDEX: 1.53
ECHO MV MAX VELOCITY: 1.3 M/S
ECHO MV MEAN GRADIENT: 3 MMHG
ECHO MV MEAN VELOCITY: 0.9 M/S
ECHO MV PEAK GRADIENT: 7 MMHG
ECHO MV VTI: 32.7 CM
ECHO PV MAX VELOCITY: 0.8 M/S
ECHO PV PEAK GRADIENT: 3 MMHG
ECHO RV INTERNAL DIMENSION: 3.6 CM
ECHO RV TAPSE: 2.2 CM (ref 1.7–?)
ECHO TV REGURGITANT MAX VELOCITY: 2.59 M/S
ECHO TV REGURGITANT PEAK GRADIENT: 27 MMHG
EOSINOPHIL # BLD: 0.2 K/UL (ref 0–0.4)
EOSINOPHIL NFR BLD: 5 % (ref 0–7)
ERYTHROCYTE [DISTWIDTH] IN BLOOD BY AUTOMATED COUNT: 14.1 % (ref 11.5–14.5)
EST. AVERAGE GLUCOSE BLD GHB EST-MCNC: 126 MG/DL
GLUCOSE BLD STRIP.AUTO-MCNC: 117 MG/DL (ref 65–117)
GLUCOSE BLD STRIP.AUTO-MCNC: 256 MG/DL (ref 65–117)
GLUCOSE SERPL-MCNC: 117 MG/DL (ref 65–100)
HBA1C MFR BLD: 6 % (ref 4–5.6)
HCT VFR BLD AUTO: 26.4 % (ref 35–47)
HDLC SERPL-MCNC: 57 MG/DL
HDLC SERPL: 2 (ref 0–5)
HGB BLD-MCNC: 8.2 G/DL (ref 11.5–16)
IMM GRANULOCYTES # BLD AUTO: 0 K/UL (ref 0–0.04)
IMM GRANULOCYTES NFR BLD AUTO: 0 % (ref 0–0.5)
LDLC SERPL CALC-MCNC: 47.2 MG/DL (ref 0–100)
LYMPHOCYTES # BLD: 1.2 K/UL (ref 0.8–3.5)
LYMPHOCYTES NFR BLD: 31 % (ref 12–49)
MCH RBC QN AUTO: 26.3 PG (ref 26–34)
MCHC RBC AUTO-ENTMCNC: 31.1 G/DL (ref 30–36.5)
MCV RBC AUTO: 84.6 FL (ref 80–99)
MONOCYTES # BLD: 0.5 K/UL (ref 0–1)
MONOCYTES NFR BLD: 12 % (ref 5–13)
NEUTS SEG # BLD: 1.9 K/UL (ref 1.8–8)
NEUTS SEG NFR BLD: 51 % (ref 32–75)
NRBC # BLD: 0 K/UL (ref 0–0.01)
NRBC BLD-RTO: 0 PER 100 WBC
PLATELET # BLD AUTO: 188 K/UL (ref 150–400)
PMV BLD AUTO: 11.2 FL (ref 8.9–12.9)
POTASSIUM SERPL-SCNC: 3.8 MMOL/L (ref 3.5–5.1)
RBC # BLD AUTO: 3.12 M/UL (ref 3.8–5.2)
SERVICE CMNT-IMP: ABNORMAL
SERVICE CMNT-IMP: NORMAL
SODIUM SERPL-SCNC: 142 MMOL/L (ref 136–145)
SPECIMEN EXP DATE BLD: NORMAL
TRIGL SERPL-MCNC: 59 MG/DL
TROPONIN I SERPL HS-MCNC: 12 NG/L (ref 0–51)
UNIT DIVISION: 0
VLDLC SERPL CALC-MCNC: 11.8 MG/DL
WBC # BLD AUTO: 3.8 K/UL (ref 3.6–11)

## 2023-11-05 PROCEDURE — 80061 LIPID PANEL: CPT

## 2023-11-05 PROCEDURE — 82962 GLUCOSE BLOOD TEST: CPT

## 2023-11-05 PROCEDURE — 36415 COLL VENOUS BLD VENIPUNCTURE: CPT

## 2023-11-05 PROCEDURE — 93306 TTE W/DOPPLER COMPLETE: CPT | Performed by: INTERNAL MEDICINE

## 2023-11-05 PROCEDURE — 2580000003 HC RX 258: Performed by: INTERNAL MEDICINE

## 2023-11-05 PROCEDURE — 6370000000 HC RX 637 (ALT 250 FOR IP): Performed by: STUDENT IN AN ORGANIZED HEALTH CARE EDUCATION/TRAINING PROGRAM

## 2023-11-05 PROCEDURE — 93306 TTE W/DOPPLER COMPLETE: CPT

## 2023-11-05 PROCEDURE — 83036 HEMOGLOBIN GLYCOSYLATED A1C: CPT

## 2023-11-05 PROCEDURE — 6370000000 HC RX 637 (ALT 250 FOR IP): Performed by: INTERNAL MEDICINE

## 2023-11-05 PROCEDURE — 1100000000 HC RM PRIVATE

## 2023-11-05 PROCEDURE — 80048 BASIC METABOLIC PNL TOTAL CA: CPT

## 2023-11-05 PROCEDURE — 84484 ASSAY OF TROPONIN QUANT: CPT

## 2023-11-05 PROCEDURE — 85025 COMPLETE CBC W/AUTO DIFF WBC: CPT

## 2023-11-05 RX ORDER — INSULIN LISPRO 100 [IU]/ML
0-4 INJECTION, SOLUTION INTRAVENOUS; SUBCUTANEOUS NIGHTLY
Status: DISCONTINUED | OUTPATIENT
Start: 2023-11-05 | End: 2023-11-06 | Stop reason: HOSPADM

## 2023-11-05 RX ORDER — OXYCODONE HYDROCHLORIDE 5 MG/1
10 TABLET ORAL EVERY 4 HOURS PRN
Status: DISCONTINUED | OUTPATIENT
Start: 2023-11-05 | End: 2023-11-06 | Stop reason: HOSPADM

## 2023-11-05 RX ORDER — ACETAMINOPHEN 325 MG/1
650 TABLET ORAL EVERY 4 HOURS PRN
Status: DISCONTINUED | OUTPATIENT
Start: 2023-11-05 | End: 2023-11-06 | Stop reason: HOSPADM

## 2023-11-05 RX ORDER — OXYCODONE HYDROCHLORIDE 5 MG/1
5 TABLET ORAL EVERY 4 HOURS PRN
Status: DISCONTINUED | OUTPATIENT
Start: 2023-11-05 | End: 2023-11-06 | Stop reason: HOSPADM

## 2023-11-05 RX ORDER — INSULIN GLARGINE 100 [IU]/ML
19 INJECTION, SOLUTION SUBCUTANEOUS NIGHTLY
Status: DISCONTINUED | OUTPATIENT
Start: 2023-11-05 | End: 2023-11-06 | Stop reason: HOSPADM

## 2023-11-05 RX ORDER — INSULIN LISPRO 100 [IU]/ML
0-8 INJECTION, SOLUTION INTRAVENOUS; SUBCUTANEOUS
Status: DISCONTINUED | OUTPATIENT
Start: 2023-11-05 | End: 2023-11-06 | Stop reason: HOSPADM

## 2023-11-05 RX ADMIN — SODIUM CHLORIDE, PRESERVATIVE FREE 10 ML: 5 INJECTION INTRAVENOUS at 08:07

## 2023-11-05 RX ADMIN — ATORVASTATIN CALCIUM 20 MG: 20 TABLET, FILM COATED ORAL at 08:06

## 2023-11-05 RX ADMIN — ALUMINUM HYDROXIDE, MAGNESIUM HYDROXIDE, AND SIMETHICONE 40 ML: 200; 200; 20 SUSPENSION ORAL at 09:11

## 2023-11-05 RX ADMIN — SODIUM CHLORIDE, PRESERVATIVE FREE 5 ML: 5 INJECTION INTRAVENOUS at 20:47

## 2023-11-05 RX ADMIN — OXYCODONE HYDROCHLORIDE 5 MG: 5 TABLET ORAL at 17:16

## 2023-11-05 RX ADMIN — FUROSEMIDE 40 MG: 40 TABLET ORAL at 08:06

## 2023-11-05 RX ADMIN — INSULIN GLARGINE 19 UNITS: 100 INJECTION, SOLUTION SUBCUTANEOUS at 20:49

## 2023-11-05 RX ADMIN — AMLODIPINE BESYLATE 5 MG: 5 TABLET ORAL at 08:06

## 2023-11-05 RX ADMIN — ONDANSETRON 4 MG: 4 TABLET, ORALLY DISINTEGRATING ORAL at 14:21

## 2023-11-05 RX ADMIN — PANTOPRAZOLE SODIUM 40 MG: 40 TABLET, DELAYED RELEASE ORAL at 08:06

## 2023-11-05 RX ADMIN — ONDANSETRON 4 MG: 4 TABLET, ORALLY DISINTEGRATING ORAL at 23:45

## 2023-11-05 ASSESSMENT — PAIN DESCRIPTION - DESCRIPTORS: DESCRIPTORS: ACHING

## 2023-11-05 ASSESSMENT — PAIN DESCRIPTION - LOCATION: LOCATION: CHEST

## 2023-11-05 ASSESSMENT — PAIN SCALES - GENERAL: PAINLEVEL_OUTOF10: 7

## 2023-11-05 ASSESSMENT — PAIN DESCRIPTION - ORIENTATION: ORIENTATION: LEFT

## 2023-11-05 NOTE — PROGRESS NOTES
8440- Dr Michael Conte made aware of Pt's bp of 178/67.  Also made aware of Pt's c/o chest pain and rating pain 7/10 on the left side of chest.

## 2023-11-05 NOTE — ED NOTES
Verbal shift change report given to Lucía FRITZ (oncoming nurse) by Gregoria Vizcarra (offgoing nurse). Report included the following information Nurse Handoff Report, Index, Intake/Output, MAR, and Recent Results.       Benjy Le RN  11/05/23 101

## 2023-11-05 NOTE — PROGRESS NOTES
Hospitalist Progress Note      NAME:  Mae Arellano   :  1934  MRM:  [id number]    Date/Time: 2023  1:28 PM           Assessment / Plan:     80 y.o.  female with a past medical history of CHF with preserved EF, hypertension, diabetes, CKD, anemia with iron deficiency who is admitted with chest pain    ##Chest pain  ---Cardiac versus pulmonary versus GI versus MSK  ##Costochondritis most likely MSK  Tenderness to palpation in the left costochondral junction  EKG with no overt ischemia.  , Troponin within normal limits; . D-dimer is slightly elevated at 0.83, addressed due to patient and the daughter they could be neuroma elevated with normal age differences and chronic disease, as patient's daughter was concerned, VQ scan has been ordered  2D echo done pending reading   Plan  Continue home statin. Continue home amlodipine. Surgery has been consulted for further recommendation       ##Concern for acute blood loss anemia /history of acute blood loss anemia, s/p 1 unit packed RBCs  ##history of iron deficiency anemia POA:   Recently admitted in  due to acute blood loss anemia and found to have bleeding angiodysplasias seen in the proximal and mid-jejunum. GI has been consulted with no further recommendations at the moment  Plan  Hemoccult. EGD colonoscopy done in the last year were unremarkable. #History of CHF with preserved EF: Currently appears compensated. Continue home Lasix as tolerated. Pending repeated echo     #DM2  Lantus 19 units at home dose  Sliding scale insulin  Follow-up on A1c/lipid panel    #History of CKD stage III POA:   Appears around baseline. Avoid nephrotoxins as able. Monitor. #LE swelling:   Doppler ultrasound negative for DVT       #GERD: Continue PPI       I have personally reviewed the radiographs, laboratory data in Epic and decisions and statements above are based partially on this personal interpretation.                  Care

## 2023-11-05 NOTE — CONSULTS
[address] [facility] Thurlow Prader, M.D.  [phone]                    GASTROENTEROLOGY CONSULTATION NOTE              NAME:  Haley Mccollum   :   1934   MRN:   [id number]       Referring Physician:    Dr. Loida Chavez Date:   2023 12:39 PM    Chief Complaint:    Anemia and chest pain     History of Present Illness:    Patient is a 80 y.o. who is known to us from previous admission with anemia and small bowel angiodysplasias, presented to the ER with acute onset left sided chest pain and found to have a hemoglobin at 7.2. Daughter at bedside, no reports of any black stools, or blood in the stools. She denies GI complaints. She got one unit of PRBCs yesterday with no change in chest discomfort. PMH:  Past Medical History:   Diagnosis Date    CAD (coronary artery disease)     CHF (congestive heart failure), NYHA class I, chronic, systolic (HCC)     Systolic and diastolic CHF per echocardiogram of 2021. Patient was found to have LVEF 45%    Chronic kidney disease     stage III/IV:Creatinine 1.45-1.7 with GFR in the low 30s-high 20s    Diabetes mellitus type 2 in nonobese [facility])     Hypertension     Ischemic cardiomyopathy     Echocardiogram of 2021: mild left ventricular systolic dysfunction (EF 32%) with inferior and basal-mid septal akinesis. Diastolic dysfunction. Peripheral vascular disease ([address])        PSH:  Past Surgical History:   Procedure Laterality Date    APPENDECTOMY      COLONOSCOPY N/A 2022    COLONOSCOPY performed by Boris Kelly MD at [address] [place]  2021    stents x 3 to mid-distal right coronary artery going into PDA for 100% occlusion of RCA; and also had nonobstructive disease in 30% LAD stenosis, 70% diagonal 1 stenosis.   She had proximal 30% stenosis in the codominant left circumflex artery     HYSTERECTOMY (CERVIX STATUS UNKNOWN)      OTHER SURGICAL HISTORY      Back surgery x 2    OTHER

## 2023-11-05 NOTE — ED NOTES
The ending of Daylight Saving Time occurred at 0200 hrs. Documentation of patient care and medications administered is done with respect to the time change.       Pablo Gutierrez RN  11/05/23 6796

## 2023-11-05 NOTE — CARE COORDINATION
11/5/23  2:27 PM    Care Management Initial Evaluation:       11/05/23 1417   Service Assessment   Patient Orientation Alert and Oriented   Cognition Alert   History Provided By Patient   Primary [facility]   Patient's Healthcare Decision Maker is: Legal Next of Kin   PCP Verified by CM Yes   Last Visit to PCP Within last 3 months   Prior Functional Level Assistance with the following:;Cooking;Housework  (Just for dinner, makes her own breakfast and lunch)   Current Functional Level Assistance with the following:;Cooking;Housework  (daughter assists for dinner and housework)   Can patient return to prior living arrangement Yes   Ability to make needs known: Good   Family able to assist with home care needs: Yes   Financial Resources Medicare   Social/Functional History   Lives With Daughter   Type of [address] [place] [address] Two level; Able to Live on Main level with bedroom/bathroom   Home Access Stairs to enter with rails   Entrance Stairs - Number of Steps 1   Bathroom Shower/Tub   (shower chair)   Bathroom Equipment Toilet raiser;Grab bars around toilet   Port tuan Dickerson  (Transporter chair for shopping)   Receives Help From [facility] Needs assistance   Homemaking Responsibilities No   Ambulation Assistance Independent   Transfer Assistance Independent   Active  No   Patient's  Info Rosa- daughter   Occupation Retired   Discharge Planning   Patient expects to be discharged to: [facility] Discharge   Mode of Transport at Discharge Other (see comment)  (Daughter will transport at [employer])   Condition of Participation: Discharge Planning   The Patient and/or Patient Representative was provided with a Choice of Provider? Patient   The Patient and/Or Patient Representative agree with the Discharge Plan?  Yes   Freedom of Choice list was provided with basic dialogue that supports the patient's

## 2023-11-05 NOTE — CONSULTS
[address] [facility]                    Cardiology Care Note     [x]Initial Encounter     []Follow-up    Patient Name: Annmarei Restrepo - ZWS:[birth year] - MSZ:[id number]  Primary Cardiologist:   Consulting Cardiologist: Arnaldo Wang MD     Reason for encounter: Chest pain    HPI:       Annmarie Restrepo is a 80 y.o. female with PMH significant for chronic HFpEF/CAD/CKD, diabetes mellitus, hypertension, anemia admitted for left-sided chest pain. No radiation. Patient states that she has pain sitting up taking a deep breath coughing and changing positions. Pain is constant. Denies any trauma. No dyspnea at rest.  No dizziness/syncope. Hemoglobin 7.2. Status post PRBC transfusion. Hemoglobin improved to 9.1. Troponins negative. Subjective:      Annmarie Restrepo reports chest pain. Assessment and Plan     Chest pain-history of CAD; left chest wall tenderness present  Chronic HFpEF  Severe anemia-status post blood transfusion  CKD  Diabetes mellitus  Hypertension    Plan:  Troponins negative. Echocardiogram.  Patient has tenderness over the left chest wall. Chest pain could be musculoskeletal.  However she does have history of CAD with prior stent. History of GI bleed in the past.  Medically manage CAD secondary to probably intolerance to DAPT if cath/PCI is performed. Currently taking aspirin PTA. On Lipitor/Lasix/amlodipine      Discussed with patient/daughter.             ____________________________________________________________    Cardiac testing  10/26/22  Left Ventricle: Normal left ventricular systolic function with a visually estimated EF of 55 - 60%. Left ventricle size is normal. Mildly increased wall thickness.       Most recent HS troponins:  Recent Labs     11/04/23  1213 11/04/23  1725 11/04/23  2127   TROPHS 12 13 13       ECG: NSR/Nml axis    Review of Systems:    [x]All other systems reviewed and all negative except as

## 2023-11-05 NOTE — PROGRESS NOTES
Orders acknowledged, chart reviewed, and spoke with nursing. Patient was received in bed reporting significant chest pain exacerbated with movement, specifically sitting up. Patient kindly requesting to defer PT evaluation today d/t pain. Patient's daughter arrived to room when speaking with patient and also requesting PT follow-up next tx day.     Thank you,  Jaciel Ryder, PT, DPT

## 2023-11-05 NOTE — PROGRESS NOTES
Occupational Therapy Note:  Orders acknowledged, chart reviewed, and spoke with nursing. Patient was received in bed reporting significant chest pain exacerbated with movement, specifically sitting up. Patient kindly requesting to defer OT evaluation today d/t pain. Patient's daughter arrived to room when speaking with patient and also requesting OT follow-up next tx day.     Karla Kinsey, OTR/L

## 2023-11-06 ENCOUNTER — APPOINTMENT (OUTPATIENT)
Facility: HOSPITAL | Age: 88
DRG: 554 | End: 2023-11-06
Payer: MEDICARE

## 2023-11-06 VITALS
TEMPERATURE: 98.2 F | DIASTOLIC BLOOD PRESSURE: 52 MMHG | BODY MASS INDEX: 23.55 KG/M2 | OXYGEN SATURATION: 97 % | RESPIRATION RATE: 16 BRPM | SYSTOLIC BLOOD PRESSURE: 135 MMHG | HEART RATE: 76 BPM | HEIGHT: 62 IN | WEIGHT: 128 LBS

## 2023-11-06 PROBLEM — E87.6 HYPOKALEMIA: Status: RESOLVED | Noted: 2023-02-05 | Resolved: 2023-11-06

## 2023-11-06 PROBLEM — E11.00 HYPEROSMOLAR HYPERGLYCEMIC STATE (HHS) (HCC): Status: RESOLVED | Noted: 2023-02-04 | Resolved: 2023-11-06

## 2023-11-06 PROBLEM — N17.9 AKI (ACUTE KIDNEY INJURY) (HCC): Status: RESOLVED | Noted: 2022-06-06 | Resolved: 2023-11-06

## 2023-11-06 PROBLEM — K92.2 ACUTE GI BLEEDING: Status: RESOLVED | Noted: 2022-02-02 | Resolved: 2023-11-06

## 2023-11-06 PROBLEM — D50.9 IRON DEFICIENCY ANEMIA: Status: ACTIVE | Noted: 2022-02-03

## 2023-11-06 PROBLEM — I50.32 CHRONIC HEART FAILURE WITH PRESERVED EJECTION FRACTION (HFPEF) (HCC): Status: ACTIVE | Noted: 2023-02-05

## 2023-11-06 PROBLEM — K92.2 CHRONIC GI BLEEDING: Status: ACTIVE | Noted: 2022-02-03

## 2023-11-06 PROBLEM — N18.30 CKD (CHRONIC KIDNEY DISEASE) STAGE 3, GFR 30-59 ML/MIN (HCC): Status: ACTIVE | Noted: 2021-10-18

## 2023-11-06 PROBLEM — I73.9 PERIPHERAL VASCULAR DISEASE (HCC): Status: ACTIVE | Noted: 2023-11-06

## 2023-11-06 PROBLEM — J20.9 ACUTE BRONCHITIS: Status: RESOLVED | Noted: 2022-10-26 | Resolved: 2023-11-06

## 2023-11-06 PROBLEM — K92.2 GI BLEED: Status: RESOLVED | Noted: 2022-06-06 | Resolved: 2023-11-06

## 2023-11-06 PROBLEM — I50.33 ACUTE ON CHRONIC DIASTOLIC (CONGESTIVE) HEART FAILURE (HCC): Status: RESOLVED | Noted: 2022-10-26 | Resolved: 2023-11-06

## 2023-11-06 PROBLEM — R56.9 SEIZURE (HCC): Status: RESOLVED | Noted: 2023-02-04 | Resolved: 2023-11-06

## 2023-11-06 LAB
ANION GAP SERPL CALC-SCNC: 5 MMOL/L (ref 5–15)
BASOPHILS # BLD: 0 K/UL (ref 0–0.1)
BASOPHILS NFR BLD: 0 % (ref 0–1)
BUN SERPL-MCNC: 27 MG/DL (ref 6–20)
BUN/CREAT SERPL: 14 (ref 12–20)
CALCIUM SERPL-MCNC: 8.3 MG/DL (ref 8.5–10.1)
CHLORIDE SERPL-SCNC: 114 MMOL/L (ref 97–108)
CO2 SERPL-SCNC: 25 MMOL/L (ref 21–32)
CREAT SERPL-MCNC: 1.89 MG/DL (ref 0.55–1.02)
DIFFERENTIAL METHOD BLD: ABNORMAL
EKG ATRIAL RATE: 81 BPM
EKG DIAGNOSIS: NORMAL
EKG P AXIS: 64 DEGREES
EKG P-R INTERVAL: 182 MS
EKG Q-T INTERVAL: 412 MS
EKG QRS DURATION: 102 MS
EKG QTC CALCULATION (BAZETT): 478 MS
EKG R AXIS: 1 DEGREES
EKG T AXIS: 41 DEGREES
EKG VENTRICULAR RATE: 81 BPM
EOSINOPHIL # BLD: 0.1 K/UL (ref 0–0.4)
EOSINOPHIL NFR BLD: 3 % (ref 0–7)
ERYTHROCYTE [DISTWIDTH] IN BLOOD BY AUTOMATED COUNT: 14 % (ref 11.5–14.5)
GLUCOSE BLD STRIP.AUTO-MCNC: 114 MG/DL (ref 65–117)
GLUCOSE BLD STRIP.AUTO-MCNC: 118 MG/DL (ref 65–117)
GLUCOSE SERPL-MCNC: 133 MG/DL (ref 65–100)
HCT VFR BLD AUTO: 29.3 % (ref 35–47)
HGB BLD-MCNC: 9.2 G/DL (ref 11.5–16)
IMM GRANULOCYTES # BLD AUTO: 0 K/UL
IMM GRANULOCYTES NFR BLD AUTO: 0 %
LYMPHOCYTES # BLD: 1.5 K/UL (ref 0.8–3.5)
LYMPHOCYTES NFR BLD: 31 % (ref 12–49)
MCH RBC QN AUTO: 26.3 PG (ref 26–34)
MCHC RBC AUTO-ENTMCNC: 31.4 G/DL (ref 30–36.5)
MCV RBC AUTO: 83.7 FL (ref 80–99)
MONOCYTES # BLD: 0.6 K/UL (ref 0–1)
MONOCYTES NFR BLD: 13 % (ref 5–13)
NEUTS SEG # BLD: 2.7 K/UL (ref 1.8–8)
NEUTS SEG NFR BLD: 53 % (ref 32–75)
NRBC # BLD: 0 K/UL (ref 0–0.01)
NRBC BLD-RTO: 0 PER 100 WBC
PLATELET # BLD AUTO: 184 K/UL (ref 150–400)
PMV BLD AUTO: 11 FL (ref 8.9–12.9)
POTASSIUM SERPL-SCNC: 3.8 MMOL/L (ref 3.5–5.1)
RBC # BLD AUTO: 3.5 M/UL (ref 3.8–5.2)
RBC MORPH BLD: ABNORMAL
SERVICE CMNT-IMP: ABNORMAL
SERVICE CMNT-IMP: NORMAL
SODIUM SERPL-SCNC: 144 MMOL/L (ref 136–145)
WBC # BLD AUTO: 4.9 K/UL (ref 3.6–11)
WBC MORPH BLD: ABNORMAL

## 2023-11-06 PROCEDURE — 36415 COLL VENOUS BLD VENIPUNCTURE: CPT

## 2023-11-06 PROCEDURE — 97530 THERAPEUTIC ACTIVITIES: CPT

## 2023-11-06 PROCEDURE — 6370000000 HC RX 637 (ALT 250 FOR IP): Performed by: STUDENT IN AN ORGANIZED HEALTH CARE EDUCATION/TRAINING PROGRAM

## 2023-11-06 PROCEDURE — 6370000000 HC RX 637 (ALT 250 FOR IP): Performed by: INTERNAL MEDICINE

## 2023-11-06 PROCEDURE — 97161 PT EVAL LOW COMPLEX 20 MIN: CPT

## 2023-11-06 PROCEDURE — 85025 COMPLETE CBC W/AUTO DIFF WBC: CPT

## 2023-11-06 PROCEDURE — 97116 GAIT TRAINING THERAPY: CPT

## 2023-11-06 PROCEDURE — A9540 TC99M MAA: HCPCS | Performed by: STUDENT IN AN ORGANIZED HEALTH CARE EDUCATION/TRAINING PROGRAM

## 2023-11-06 PROCEDURE — 97165 OT EVAL LOW COMPLEX 30 MIN: CPT

## 2023-11-06 PROCEDURE — 2580000003 HC RX 258: Performed by: INTERNAL MEDICINE

## 2023-11-06 PROCEDURE — 78580 LUNG PERFUSION IMAGING: CPT

## 2023-11-06 PROCEDURE — 3430000000 HC RX DIAGNOSTIC RADIOPHARMACEUTICAL: Performed by: STUDENT IN AN ORGANIZED HEALTH CARE EDUCATION/TRAINING PROGRAM

## 2023-11-06 PROCEDURE — 80048 BASIC METABOLIC PNL TOTAL CA: CPT

## 2023-11-06 PROCEDURE — 94761 N-INVAS EAR/PLS OXIMETRY MLT: CPT

## 2023-11-06 PROCEDURE — 82962 GLUCOSE BLOOD TEST: CPT

## 2023-11-06 RX ORDER — INSULIN GLARGINE 100 [IU]/ML
19 INJECTION, SOLUTION SUBCUTANEOUS NIGHTLY
COMMUNITY

## 2023-11-06 RX ORDER — TC 99M MEDRONATE 20 MG/10ML
25 INJECTION, POWDER, LYOPHILIZED, FOR SOLUTION INTRAVENOUS
Status: DISCONTINUED | OUTPATIENT
Start: 2023-11-06 | End: 2023-11-06

## 2023-11-06 RX ORDER — ASPIRIN 81 MG/1
81 TABLET ORAL DAILY
COMMUNITY

## 2023-11-06 RX ADMIN — OXYCODONE HYDROCHLORIDE 10 MG: 5 TABLET ORAL at 00:54

## 2023-11-06 RX ADMIN — KIT FOR THE PREPARATION OF TECHNETIUM TC 99M ALBUMIN AGGREGATED 4 MILLICURIE: 2.5 INJECTION, POWDER, FOR SOLUTION INTRAVENOUS at 09:00

## 2023-11-06 RX ADMIN — SODIUM CHLORIDE, PRESERVATIVE FREE 10 ML: 5 INJECTION INTRAVENOUS at 09:57

## 2023-11-06 RX ADMIN — AMLODIPINE BESYLATE 5 MG: 5 TABLET ORAL at 09:56

## 2023-11-06 RX ADMIN — PANTOPRAZOLE SODIUM 40 MG: 40 TABLET, DELAYED RELEASE ORAL at 05:33

## 2023-11-06 RX ADMIN — FUROSEMIDE 40 MG: 40 TABLET ORAL at 09:56

## 2023-11-06 RX ADMIN — ATORVASTATIN CALCIUM 20 MG: 20 TABLET, FILM COATED ORAL at 09:56

## 2023-11-06 ASSESSMENT — PAIN SCALES - GENERAL
PAINLEVEL_OUTOF10: 0
PAINLEVEL_OUTOF10: 7

## 2023-11-06 ASSESSMENT — PAIN DESCRIPTION - LOCATION: LOCATION: CHEST

## 2023-11-06 ASSESSMENT — PAIN DESCRIPTION - DESCRIPTORS: DESCRIPTORS: ACHING

## 2023-11-06 ASSESSMENT — PAIN DESCRIPTION - ORIENTATION: ORIENTATION: ANTERIOR

## 2023-11-06 ASSESSMENT — PAIN SCALES - WONG BAKER: WONGBAKER_NUMERICALRESPONSE: 0

## 2023-11-06 NOTE — DISCHARGE INSTRUCTIONS
Patient Discharge Instructions    Ruth Parrish / [id number] : 1934    Admitted 2023 Discharged: 2023     Primary Diagnoses  @Rprob@    Take Home Medications     It is important that you take the medication exactly as they are prescribed. Keep your medication in the bottles provided by the pharmacist and keep a list of the medication names, dosages, and times to be taken in your wallet. Do not take other medications without consulting your doctor. What to do at Home    Recommended diet: diabetic diet    Recommended activity: activity as tolerated    If you experience worse symptoms, please follow up with your PCP. Follow-up with your PCP in a few week    [unfilled]     Information obtained by :  I understand that if any problems occur once I am at home I am to contact my physician. I understand and acknowledge receipt of the instructions indicated above.                                                                                                                                            Physician's or R.N.'s Signature                                                                  Date/Time                                                                                                                                              Patient or Representative Signature                                                          Date/Time

## 2023-11-06 NOTE — WOUND CARE
Wound Consult:  new consult Visit. Chart reviewed. Consulted for right ankle wound. Spoke with patients nurse,  Luma Cordova RN. Patient is resting on a wendie bed with DAY mattress. Patient stood to transfer to chair  and for assessment  Franco score 19. Daughter stated patient has had wound for 3 months and has been quite painful  Assessment:  POA Right lateral ankle- 0.4x0.4x0.1cm-  dry,pink,yellow,no drainage, surrounding pink, blanches, with palpation pain occurs at upper ankle area with some swelling noted. Concern of osteomyelitis due to age of wound. Bilateral heels, buttocks, sacrum- no redness  Treatment:  Ankle- cleansed with NSS  foam applied  Wound Recommendations: Ankle- cleanse with wound cleanser, silvasorb gel and foam dressing, change every other day  Rule out osteomyelitis or Podiatry consult  Plan:  Handed off to Dr. Christina Suh regarding findings and recommendations,  We will continue to reassess weekly and as needed.   [facility], Wound / [address] Healing Office [phone]

## 2023-11-06 NOTE — PROGRESS NOTES
1200: d/c instructions reviewed with patient and daughter at bedside. Time for questions allowed. IV removed.

## 2023-11-06 NOTE — CARE COORDINATION
11/6/2023   CARE MANAGEMENT NOTE:  CM reviewed EMR and handoff received from weekend  Yeimy Laws). Pt was admitted with chest pain and hx of CHF. Reportedly, pt resides with her dtr. RUR 13%    Transition Plan of Care:  Cardiology, GI following for medical management   Plan is for pt to return home with her dtr  [facility] (PT,OT) has been arranged  Outpatient follow up  Dtr will transport pt home    No further post discharge needs indicated.   Yudith

## 2023-11-06 NOTE — PROGRESS NOTES
1020: D/c order in for patient. However, patient does not want to be discharged until the results of the lung scan are given to her. Results at this moment are still pending. MD made aware. 1100: Patients daughter at bedside requesting to speak with cardiologist regarding patients echo results that appeared on my chart.

## 2023-12-01 ENCOUNTER — TELEPHONE (OUTPATIENT)
Dept: PRIMARY CARE CLINIC | Facility: CLINIC | Age: 88
End: 2023-12-01

## 2023-12-01 ENCOUNTER — HOSPITAL ENCOUNTER (OUTPATIENT)
Facility: HOSPITAL | Age: 88
Discharge: HOME | End: 2023-12-04

## 2023-12-01 DIAGNOSIS — R07.89 LEFT-SIDED CHEST WALL PAIN: ICD-10-CM

## 2023-12-01 NOTE — TELEPHONE ENCOUNTER
Hey,     So this is occurring often in the past month.   Paradise Rubio NP is listed as working at our practice with our phone number in epic so any testing done with [facility], they are calling us.   How do we get this corrected,  she hasn't worked her in like 8 years?   Her phone number to her office is [phone]

## 2023-12-01 NOTE — TELEPHONE ENCOUNTER
Patient is not a patient at our office.  Gave Vanessa information for where [facility] now.

## 2023-12-01 NOTE — TELEPHONE ENCOUNTER
Vanessa @ [facility] Ul[facility]ound    Needs more info about what is being examined with the ultrasound that was ordered by DANIELLA Rubio.   Patient has appt at 2pm today (12/1/23)    [phone]

## 2023-12-05 ENCOUNTER — TRANSCRIBE ORDERS (OUTPATIENT)
Facility: HOSPITAL | Age: 88
End: 2023-12-05

## 2023-12-05 DIAGNOSIS — R07.89 OTHER CHEST PAIN: Primary | ICD-10-CM

## 2023-12-06 ENCOUNTER — HOSPITAL ENCOUNTER (OUTPATIENT)
Facility: HOSPITAL | Age: 88
Discharge: HOME | End: 2023-12-09
Payer: MEDICARE

## 2023-12-06 DIAGNOSIS — R07.89 OTHER CHEST PAIN: ICD-10-CM

## 2023-12-06 PROCEDURE — 71250 CT THORAX DX C-: CPT

## 2024-01-11 ENCOUNTER — APPOINTMENT (OUTPATIENT)
Facility: HOSPITAL | Age: 89
DRG: 194 | End: 2024-01-11
Payer: MEDICARE

## 2024-01-11 ENCOUNTER — HOSPITAL ENCOUNTER (INPATIENT)
Facility: HOSPITAL | Age: 89
LOS: 6 days | Discharge: HOME HEALTH | DRG: 194 | End: 2024-01-17
Attending: STUDENT IN AN ORGANIZED HEALTH CARE EDUCATION/TRAINING PROGRAM | Admitting: HOSPITALIST
Payer: MEDICARE

## 2024-01-11 DIAGNOSIS — N17.9 AKI (ACUTE KIDNEY INJURY) (HCC): ICD-10-CM

## 2024-01-11 DIAGNOSIS — J18.9 PNEUMONIA OF RIGHT UPPER LOBE DUE TO INFECTIOUS ORGANISM: Primary | ICD-10-CM

## 2024-01-11 LAB
ALBUMIN SERPL-MCNC: 3.4 G/DL (ref 3.5–5)
ALBUMIN/GLOB SERPL: 0.9 (ref 1.1–2.2)
ALP SERPL-CCNC: 99 U/L (ref 45–117)
ALT SERPL-CCNC: 17 U/L (ref 12–78)
ANION GAP SERPL CALC-SCNC: 7 MMOL/L (ref 5–15)
AST SERPL-CCNC: 11 U/L (ref 15–37)
BASOPHILS # BLD: 0.1 K/UL (ref 0–0.1)
BASOPHILS NFR BLD: 1 % (ref 0–1)
BILIRUB SERPL-MCNC: 0.7 MG/DL (ref 0.2–1)
BUN SERPL-MCNC: 24 MG/DL (ref 6–20)
BUN/CREAT SERPL: 10 (ref 12–20)
CALCIUM SERPL-MCNC: 8.8 MG/DL (ref 8.5–10.1)
CHLORIDE SERPL-SCNC: 106 MMOL/L (ref 97–108)
CO2 SERPL-SCNC: 24 MMOL/L (ref 21–32)
COMMENT:: NORMAL
COMMENT:: NORMAL
CREAT SERPL-MCNC: 2.33 MG/DL (ref 0.55–1.02)
D DIMER PPP FEU-MCNC: 1.15 MG/L FEU (ref 0–0.65)
DIFFERENTIAL METHOD BLD: ABNORMAL
EKG ATRIAL RATE: 91 BPM
EKG DIAGNOSIS: NORMAL
EKG P AXIS: 28 DEGREES
EKG P-R INTERVAL: 166 MS
EKG Q-T INTERVAL: 394 MS
EKG QRS DURATION: 96 MS
EKG QTC CALCULATION (BAZETT): 484 MS
EKG R AXIS: 0 DEGREES
EKG T AXIS: 29 DEGREES
EKG VENTRICULAR RATE: 91 BPM
EOSINOPHIL # BLD: 0 K/UL (ref 0–0.4)
EOSINOPHIL NFR BLD: 0 % (ref 0–7)
ERYTHROCYTE [DISTWIDTH] IN BLOOD BY AUTOMATED COUNT: 16 % (ref 11.5–14.5)
FLUAV AG NPH QL IA: NEGATIVE
FLUBV AG NOSE QL IA: NEGATIVE
GLOBULIN SER CALC-MCNC: 4 G/DL (ref 2–4)
GLUCOSE BLD STRIP.AUTO-MCNC: 125 MG/DL (ref 65–117)
GLUCOSE BLD STRIP.AUTO-MCNC: 235 MG/DL (ref 65–117)
GLUCOSE SERPL-MCNC: 201 MG/DL (ref 65–100)
HCT VFR BLD AUTO: 25 % (ref 35–47)
HGB BLD-MCNC: 7.8 G/DL (ref 11.5–16)
IMM GRANULOCYTES # BLD AUTO: 0 K/UL (ref 0–0.04)
IMM GRANULOCYTES NFR BLD AUTO: 0 % (ref 0–0.5)
LYMPHOCYTES # BLD: 0.6 K/UL (ref 0.8–3.5)
LYMPHOCYTES NFR BLD: 9 % (ref 12–49)
MCH RBC QN AUTO: 23.8 PG (ref 26–34)
MCHC RBC AUTO-ENTMCNC: 31.2 G/DL (ref 30–36.5)
MCV RBC AUTO: 76.2 FL (ref 80–99)
MONOCYTES # BLD: 0.6 K/UL (ref 0–1)
MONOCYTES NFR BLD: 8 % (ref 5–13)
NEUTS SEG # BLD: 5.9 K/UL (ref 1.8–8)
NEUTS SEG NFR BLD: 82 % (ref 32–75)
NRBC # BLD: 0 K/UL (ref 0–0.01)
NRBC BLD-RTO: 0 PER 100 WBC
NT PRO BNP: 1772 PG/ML
PLATELET # BLD AUTO: 202 K/UL (ref 150–400)
PMV BLD AUTO: 10.6 FL (ref 8.9–12.9)
POTASSIUM SERPL-SCNC: 3.5 MMOL/L (ref 3.5–5.1)
PROT SERPL-MCNC: 7.4 G/DL (ref 6.4–8.2)
RBC # BLD AUTO: 3.28 M/UL (ref 3.8–5.2)
RBC MORPH BLD: ABNORMAL
SARS-COV-2 RDRP RESP QL NAA+PROBE: NOT DETECTED
SERVICE CMNT-IMP: ABNORMAL
SERVICE CMNT-IMP: ABNORMAL
SODIUM SERPL-SCNC: 137 MMOL/L (ref 136–145)
SOURCE: NORMAL
SPECIMEN HOLD: NORMAL
SPECIMEN HOLD: NORMAL
TROPONIN I SERPL HS-MCNC: 20 NG/L (ref 0–51)
TROPONIN I SERPL HS-MCNC: 21 NG/L (ref 0–51)
TROPONIN I SERPL HS-MCNC: 21 NG/L (ref 0–51)
WBC # BLD AUTO: 7.2 K/UL (ref 3.6–11)

## 2024-01-11 PROCEDURE — 84484 ASSAY OF TROPONIN QUANT: CPT

## 2024-01-11 PROCEDURE — 36415 COLL VENOUS BLD VENIPUNCTURE: CPT

## 2024-01-11 PROCEDURE — 96374 THER/PROPH/DIAG INJ IV PUSH: CPT

## 2024-01-11 PROCEDURE — 82962 GLUCOSE BLOOD TEST: CPT

## 2024-01-11 PROCEDURE — 6360000002 HC RX W HCPCS: Performed by: STUDENT IN AN ORGANIZED HEALTH CARE EDUCATION/TRAINING PROGRAM

## 2024-01-11 PROCEDURE — 6370000000 HC RX 637 (ALT 250 FOR IP): Performed by: HOSPITALIST

## 2024-01-11 PROCEDURE — 1100000000 HC RM PRIVATE

## 2024-01-11 PROCEDURE — 93010 ELECTROCARDIOGRAM REPORT: CPT | Performed by: INTERNAL MEDICINE

## 2024-01-11 PROCEDURE — 85379 FIBRIN DEGRADATION QUANT: CPT

## 2024-01-11 PROCEDURE — 80053 COMPREHEN METABOLIC PANEL: CPT

## 2024-01-11 PROCEDURE — 87040 BLOOD CULTURE FOR BACTERIA: CPT

## 2024-01-11 PROCEDURE — 83880 ASSAY OF NATRIURETIC PEPTIDE: CPT

## 2024-01-11 PROCEDURE — 83036 HEMOGLOBIN GLYCOSYLATED A1C: CPT

## 2024-01-11 PROCEDURE — 2580000003 HC RX 258: Performed by: HOSPITALIST

## 2024-01-11 PROCEDURE — 85025 COMPLETE CBC W/AUTO DIFF WBC: CPT

## 2024-01-11 PROCEDURE — 87804 INFLUENZA ASSAY W/OPTIC: CPT

## 2024-01-11 PROCEDURE — 93005 ELECTROCARDIOGRAM TRACING: CPT | Performed by: STUDENT IN AN ORGANIZED HEALTH CARE EDUCATION/TRAINING PROGRAM

## 2024-01-11 PROCEDURE — 87635 SARS-COV-2 COVID-19 AMP PRB: CPT

## 2024-01-11 PROCEDURE — 71046 X-RAY EXAM CHEST 2 VIEWS: CPT

## 2024-01-11 PROCEDURE — 2580000003 HC RX 258: Performed by: STUDENT IN AN ORGANIZED HEALTH CARE EDUCATION/TRAINING PROGRAM

## 2024-01-11 PROCEDURE — 99285 EMERGENCY DEPT VISIT HI MDM: CPT

## 2024-01-11 PROCEDURE — 6360000002 HC RX W HCPCS: Performed by: HOSPITALIST

## 2024-01-11 RX ORDER — SODIUM CHLORIDE 9 MG/ML
INJECTION, SOLUTION INTRAVENOUS CONTINUOUS
Status: DISCONTINUED | OUTPATIENT
Start: 2024-01-11 | End: 2024-01-15

## 2024-01-11 RX ORDER — ONDANSETRON 4 MG/1
4 TABLET, ORALLY DISINTEGRATING ORAL EVERY 8 HOURS PRN
Status: DISCONTINUED | OUTPATIENT
Start: 2024-01-11 | End: 2024-01-17 | Stop reason: HOSPADM

## 2024-01-11 RX ORDER — ACETAMINOPHEN 325 MG/1
650 TABLET ORAL EVERY 6 HOURS PRN
Status: DISCONTINUED | OUTPATIENT
Start: 2024-01-11 | End: 2024-01-17 | Stop reason: HOSPADM

## 2024-01-11 RX ORDER — SODIUM CHLORIDE 0.9 % (FLUSH) 0.9 %
5-40 SYRINGE (ML) INJECTION PRN
Status: DISCONTINUED | OUTPATIENT
Start: 2024-01-11 | End: 2024-01-17 | Stop reason: HOSPADM

## 2024-01-11 RX ORDER — PANTOPRAZOLE SODIUM 40 MG/1
40 TABLET, DELAYED RELEASE ORAL
Status: DISCONTINUED | OUTPATIENT
Start: 2024-01-12 | End: 2024-01-12

## 2024-01-11 RX ORDER — INSULIN LISPRO 100 [IU]/ML
0-4 INJECTION, SOLUTION INTRAVENOUS; SUBCUTANEOUS
Status: DISCONTINUED | OUTPATIENT
Start: 2024-01-11 | End: 2024-01-17 | Stop reason: HOSPADM

## 2024-01-11 RX ORDER — SODIUM CHLORIDE 9 MG/ML
INJECTION, SOLUTION INTRAVENOUS PRN
Status: DISCONTINUED | OUTPATIENT
Start: 2024-01-11 | End: 2024-01-17 | Stop reason: HOSPADM

## 2024-01-11 RX ORDER — POLYETHYLENE GLYCOL 3350 17 G/17G
17 POWDER, FOR SOLUTION ORAL DAILY PRN
Status: DISCONTINUED | OUTPATIENT
Start: 2024-01-11 | End: 2024-01-17 | Stop reason: HOSPADM

## 2024-01-11 RX ORDER — FUROSEMIDE 40 MG/1
40 TABLET ORAL DAILY
Status: DISCONTINUED | OUTPATIENT
Start: 2024-01-12 | End: 2024-01-17 | Stop reason: HOSPADM

## 2024-01-11 RX ORDER — INSULIN GLARGINE 100 [IU]/ML
19 INJECTION, SOLUTION SUBCUTANEOUS NIGHTLY
Status: DISCONTINUED | OUTPATIENT
Start: 2024-01-11 | End: 2024-01-11

## 2024-01-11 RX ORDER — DOXYCYCLINE HYCLATE 100 MG
100 TABLET ORAL EVERY 12 HOURS SCHEDULED
Status: DISCONTINUED | OUTPATIENT
Start: 2024-01-11 | End: 2024-01-15

## 2024-01-11 RX ORDER — INSULIN LISPRO 100 [IU]/ML
0-4 INJECTION, SOLUTION INTRAVENOUS; SUBCUTANEOUS NIGHTLY
Status: DISCONTINUED | OUTPATIENT
Start: 2024-01-11 | End: 2024-01-17 | Stop reason: HOSPADM

## 2024-01-11 RX ORDER — HEPARIN SODIUM 5000 [USP'U]/ML
5000 INJECTION, SOLUTION INTRAVENOUS; SUBCUTANEOUS EVERY 8 HOURS SCHEDULED
Status: DISCONTINUED | OUTPATIENT
Start: 2024-01-11 | End: 2024-01-17 | Stop reason: HOSPADM

## 2024-01-11 RX ORDER — SODIUM CHLORIDE 0.9 % (FLUSH) 0.9 %
5-40 SYRINGE (ML) INJECTION EVERY 12 HOURS SCHEDULED
Status: DISCONTINUED | OUTPATIENT
Start: 2024-01-11 | End: 2024-01-17 | Stop reason: HOSPADM

## 2024-01-11 RX ORDER — ONDANSETRON 2 MG/ML
4 INJECTION INTRAMUSCULAR; INTRAVENOUS EVERY 6 HOURS PRN
Status: DISCONTINUED | OUTPATIENT
Start: 2024-01-11 | End: 2024-01-17 | Stop reason: HOSPADM

## 2024-01-11 RX ORDER — AMLODIPINE BESYLATE 5 MG/1
5 TABLET ORAL DAILY
Status: DISCONTINUED | OUTPATIENT
Start: 2024-01-11 | End: 2024-01-12

## 2024-01-11 RX ORDER — INSULIN GLARGINE 100 [IU]/ML
15 INJECTION, SOLUTION SUBCUTANEOUS NIGHTLY
Status: DISCONTINUED | OUTPATIENT
Start: 2024-01-11 | End: 2024-01-17 | Stop reason: HOSPADM

## 2024-01-11 RX ORDER — ATORVASTATIN CALCIUM 20 MG/1
20 TABLET, FILM COATED ORAL DAILY
Status: DISCONTINUED | OUTPATIENT
Start: 2024-01-12 | End: 2024-01-17 | Stop reason: HOSPADM

## 2024-01-11 RX ORDER — ASPIRIN 81 MG/1
81 TABLET, CHEWABLE ORAL DAILY
Status: DISCONTINUED | OUTPATIENT
Start: 2024-01-12 | End: 2024-01-17 | Stop reason: HOSPADM

## 2024-01-11 RX ORDER — DEXTROSE MONOHYDRATE 100 MG/ML
INJECTION, SOLUTION INTRAVENOUS CONTINUOUS PRN
Status: DISCONTINUED | OUTPATIENT
Start: 2024-01-11 | End: 2024-01-17 | Stop reason: HOSPADM

## 2024-01-11 RX ORDER — ACETAMINOPHEN 650 MG/1
650 SUPPOSITORY RECTAL EVERY 6 HOURS PRN
Status: DISCONTINUED | OUTPATIENT
Start: 2024-01-11 | End: 2024-01-17 | Stop reason: HOSPADM

## 2024-01-11 RX ADMIN — SODIUM CHLORIDE, PRESERVATIVE FREE 10 ML: 5 INJECTION INTRAVENOUS at 20:14

## 2024-01-11 RX ADMIN — DOXYCYCLINE HYCLATE 100 MG: 100 TABLET, COATED ORAL at 16:02

## 2024-01-11 RX ADMIN — ACETAMINOPHEN 650 MG: 325 TABLET ORAL at 23:43

## 2024-01-11 RX ADMIN — HEPARIN SODIUM 5000 UNITS: 5000 INJECTION INTRAVENOUS; SUBCUTANEOUS at 20:13

## 2024-01-11 RX ADMIN — AZITHROMYCIN DIHYDRATE 500 MG: 500 INJECTION, POWDER, LYOPHILIZED, FOR SOLUTION INTRAVENOUS at 15:06

## 2024-01-11 RX ADMIN — WATER 1000 MG: 1 INJECTION INTRAMUSCULAR; INTRAVENOUS; SUBCUTANEOUS at 14:59

## 2024-01-11 RX ADMIN — SODIUM CHLORIDE: 9 INJECTION, SOLUTION INTRAVENOUS at 16:34

## 2024-01-11 RX ADMIN — INSULIN GLARGINE 15 UNITS: 100 INJECTION, SOLUTION SUBCUTANEOUS at 20:16

## 2024-01-11 ASSESSMENT — PAIN DESCRIPTION - LOCATION: LOCATION: CHEST

## 2024-01-11 ASSESSMENT — PAIN - FUNCTIONAL ASSESSMENT: PAIN_FUNCTIONAL_ASSESSMENT: 0-10

## 2024-01-11 ASSESSMENT — PAIN SCALES - GENERAL
PAINLEVEL_OUTOF10: 8
PAINLEVEL_OUTOF10: 0

## 2024-01-11 ASSESSMENT — LIFESTYLE VARIABLES
HOW OFTEN DO YOU HAVE A DRINK CONTAINING ALCOHOL: NEVER
HOW MANY STANDARD DRINKS CONTAINING ALCOHOL DO YOU HAVE ON A TYPICAL DAY: PATIENT DOES NOT DRINK

## 2024-01-11 NOTE — H&P
Hospitalist Admission Note      NAME:  Janis Chadwick   :  1934   MRN:  [id number]     Date/Time:  2024 3:03 PM    Patient PCP: Paradise Rubio APRN - NP    ________________________________________________________________________    Given the patient's current clinical presentation, I have a high level of concern for decompensation if discharged from the emergency department.  Complex decision making was performed, which includes reviewing the patient's available past medical records, laboratory results, and x-ray films.       My assessment of this patient's clinical condition and my plan of care is as follows.    Assessment / Plan:  Patient is a 79-year-old female lives at home comes to the hospital with chief complaint of shortness of breath, cough and chest pain.  Her troponin are negative.  She is found to have right sided pneumonia and VIVIAN.  She is admitted for IV antibiotics.    1.  Community-acquired pneumonia  Start IV Rocephin and doxycycline.  Blood cultures drawn.    2.  Acute on CKD stage IV  Avoid nephrotoxic medications.  Gentle hydration.    3.  Hypertension  Blood pressure is elevated in the ER.  Continue Norvasc.    4.  Hyperlipidemia  On Lipitor at home.    5.  Chronic diastolic CHF  On Lasix and aspirin.  Daily weight    6.  Diabetes  Takes Lantus 19 units at home  Start sliding scale insulin.  Check hemoglobin A1c    Plan of care discussed with the patient and her daughter at bedside.      I have personally reviewed the radiographs, laboratory data in Epic and decisions and statements above are based partially on this personal interpretation.    Code Status: Full Code  DVT Prophylaxis: Lovenox  GI Prophylaxis: PPI       Subjective:   CHIEF COMPLAINT: \"Shortness of breath\"    HISTORY OF PRESENT ILLNESS:     Janis is a 89 y.o.   female with PMHx hypertension, CHF, CKD, diabetes comes to the hospital with chief complaint of chest pain and coughing.  As per the patient her

## 2024-01-11 NOTE — ED NOTES
Patient is ordered to be on contact and droplet isolation.  Patient is currently located in the hallway.  Charge nurse made aware

## 2024-01-11 NOTE — H&P
Hospitalist Admission Note      NAME:  Janis Chadwick   :  1934   MRN:  [id number]     Date/Time:  2024 3:03 PM    Patient PCP: Paradise Rubio APRN - NP    ________________________________________________________________________    Given the patient's current clinical presentation, I have a high level of concern for decompensation if discharged from the emergency department.  Complex decision making was performed, which includes reviewing the patient's available past medical records, laboratory results, and x-ray films.       My assessment of this patient's clinical condition and my plan of care is as follows.    Assessment / Plan:  Patient is a 79-year-old female lives at home comes to the hospital with chief complaint of shortness of breath, cough and chest pain.  Her troponin are negative.  She is found to have right sided pneumonia and VIVIAN.  She is admitted for IV antibiotics.    1.  Community-acquired pneumonia  Start IV Rocephin and doxycycline.  Blood cultures drawn.  Patient had small pulmonary nodules bilaterally on her previous CT scan done without contrast in 2023 which were stable when compared to the images done in 2023 and 2021.  They recommended repeat imaging in 12 months.    2.  Acute on CKD stage IV  Avoid nephrotoxic medications.  Gentle hydration.    3.  Hypertension  Blood pressure is elevated in the ER.  Continue Norvasc.    4.  Hyperlipidemia  On Lipitor at home.    5.  Chronic diastolic CHF  On Lasix and aspirin.  Daily weight    6.  Diabetes  Takes Lantus 19 units at home  Start sliding scale insulin.  Check hemoglobin A1c    Plan of care discussed with the patient and her daughter at bedside.      I have personally reviewed the radiographs, laboratory data in Epic and decisions and statements above are based partially on this personal interpretation.    Code Status: Full Code  DVT Prophylaxis: Lovenox  GI Prophylaxis: PPI       Subjective:

## 2024-01-11 NOTE — ED PROVIDER NOTES
chronic, systolic (HCC)     CKD (chronic kidney disease) stage 3, GFR 30-59 ml/min (HCC)     DM type 2 causing renal disease (HCC)     DM type 2 causing vascular disease (HCC)     Hypertension     Iron deficiency anemia     Ischemic cardiomyopathy     Peripheral vascular disease (HCC)        SURGICAL HISTORY       Past Surgical History:   Procedure Laterality Date    APPENDECTOMY      COLONOSCOPY N/A 02/04/2022    COLONOSCOPY performed by Rad Tineo MD at [facility] ENDOSCOPY    CORONARY ANGIOPLASTY WITH STENT PLACEMENT  12/24/2021    stents x 3 to mid-distal right coronary artery going into PDA for 100% occlusion of RCA; and also had nonobstructive disease in 30% LAD stenosis, 70% diagonal 1 stenosis.  She had proximal 30% stenosis in the codominant left circumflex artery     HYSTERECTOMY (CERVIX STATUS UNKNOWN)      OTHER SURGICAL HISTORY      Back surgery x 2    OTHER SURGICAL HISTORY      head sx x 2 nerve related    TONSILLECTOMY      TOTAL KNEE ARTHROPLASTY Left        CURRENT MEDICATIONS       Previous Medications    AMLODIPINE (NORVASC) 5 MG TABLET    Take 1 tablet by mouth daily    ASPIRIN 81 MG EC TABLET    Take 1 tablet by mouth daily    ATORVASTATIN (LIPITOR) 20 MG TABLET    Take 1 tablet by mouth daily    CHOLECALCIFEROL 50 MCG (2000 UT) TABS    Take by mouth    FERROUS SULFATE (IRON 325) 325 (65 FE) MG TABLET    Take 1 tablet by mouth every morning (before breakfast)    FUROSEMIDE (LASIX) 40 MG TABLET    Take 1 tablet by mouth daily    INSULIN GLARGINE (LANTUS) 100 UNIT/ML INJECTION VIAL    Inject 19 Units into the skin nightly    LANCETS MISC    Check blood sugar AC & HS    ONDANSETRON (ZOFRAN) 4 MG TABLET    Take 1 tablet by mouth daily as needed    PANTOPRAZOLE SODIUM (PROTONIX) 40 MG PACK PACKET    Take 1 packet by mouth every morning (before breakfast)       ALLERGIES     Prochlorperazine, Codeine, Hydromorphone, Morphine, and Sulfa antibiotics    FAMILY HISTORY       Family History   Problem Relation

## 2024-01-12 LAB
ANION GAP SERPL CALC-SCNC: 5 MMOL/L (ref 5–15)
BASOPHILS # BLD: 0 K/UL (ref 0–0.1)
BASOPHILS NFR BLD: 1 % (ref 0–1)
BUN SERPL-MCNC: 23 MG/DL (ref 6–20)
BUN/CREAT SERPL: 11 (ref 12–20)
CALCIUM SERPL-MCNC: 8.1 MG/DL (ref 8.5–10.1)
CHLORIDE SERPL-SCNC: 112 MMOL/L (ref 97–108)
CO2 SERPL-SCNC: 24 MMOL/L (ref 21–32)
CREAT SERPL-MCNC: 2.16 MG/DL (ref 0.55–1.02)
DIFFERENTIAL METHOD BLD: ABNORMAL
EOSINOPHIL # BLD: 0 K/UL (ref 0–0.4)
EOSINOPHIL NFR BLD: 0 % (ref 0–7)
ERYTHROCYTE [DISTWIDTH] IN BLOOD BY AUTOMATED COUNT: 16 % (ref 11.5–14.5)
EST. AVERAGE GLUCOSE BLD GHB EST-MCNC: 134 MG/DL
FERRITIN SERPL-MCNC: 34 NG/ML (ref 26–388)
GLUCOSE BLD STRIP.AUTO-MCNC: 114 MG/DL (ref 65–117)
GLUCOSE BLD STRIP.AUTO-MCNC: 149 MG/DL (ref 65–117)
GLUCOSE BLD STRIP.AUTO-MCNC: 75 MG/DL (ref 65–117)
GLUCOSE BLD STRIP.AUTO-MCNC: 87 MG/DL (ref 65–117)
GLUCOSE SERPL-MCNC: 76 MG/DL (ref 65–100)
HBA1C MFR BLD: 6.3 % (ref 4–5.6)
HCT VFR BLD AUTO: 20.9 % (ref 35–47)
HCT VFR BLD AUTO: 25.8 % (ref 35–47)
HGB BLD-MCNC: 6.4 G/DL (ref 11.5–16)
HGB BLD-MCNC: 8.2 G/DL (ref 11.5–16)
HISTORY CHECK: NORMAL
IMM GRANULOCYTES # BLD AUTO: 0 K/UL (ref 0–0.04)
IMM GRANULOCYTES NFR BLD AUTO: 0 % (ref 0–0.5)
IRON SATN MFR SERPL: 5 % (ref 20–50)
IRON SERPL-MCNC: 16 UG/DL (ref 35–150)
LACTATE SERPL-SCNC: 0.8 MMOL/L (ref 0.4–2)
LYMPHOCYTES # BLD: 0.9 K/UL (ref 0.8–3.5)
LYMPHOCYTES NFR BLD: 15 % (ref 12–49)
MAGNESIUM SERPL-MCNC: 2.2 MG/DL (ref 1.6–2.4)
MCH RBC QN AUTO: 23.5 PG (ref 26–34)
MCHC RBC AUTO-ENTMCNC: 30.6 G/DL (ref 30–36.5)
MCV RBC AUTO: 76.8 FL (ref 80–99)
MONOCYTES # BLD: 0.8 K/UL (ref 0–1)
MONOCYTES NFR BLD: 13 % (ref 5–13)
NEUTS SEG # BLD: 4.4 K/UL (ref 1.8–8)
NEUTS SEG NFR BLD: 72 % (ref 32–75)
NRBC # BLD: 0 K/UL (ref 0–0.01)
NRBC BLD-RTO: 0 PER 100 WBC
PLATELET # BLD AUTO: 152 K/UL (ref 150–400)
PMV BLD AUTO: 11.6 FL (ref 8.9–12.9)
POTASSIUM SERPL-SCNC: 3.1 MMOL/L (ref 3.5–5.1)
RBC # BLD AUTO: 2.72 M/UL (ref 3.8–5.2)
SERVICE CMNT-IMP: ABNORMAL
SERVICE CMNT-IMP: NORMAL
SODIUM SERPL-SCNC: 141 MMOL/L (ref 136–145)
TIBC SERPL-MCNC: 296 UG/DL (ref 250–450)
TROPONIN I SERPL HS-MCNC: 26 NG/L (ref 0–51)
WBC # BLD AUTO: 6.2 K/UL (ref 3.6–11)

## 2024-01-12 PROCEDURE — A4216 STERILE WATER/SALINE, 10 ML: HCPCS | Performed by: INTERNAL MEDICINE

## 2024-01-12 PROCEDURE — 0202U NFCT DS 22 TRGT SARS-COV-2: CPT

## 2024-01-12 PROCEDURE — 83605 ASSAY OF LACTIC ACID: CPT

## 2024-01-12 PROCEDURE — 83550 IRON BINDING TEST: CPT

## 2024-01-12 PROCEDURE — 6360000002 HC RX W HCPCS

## 2024-01-12 PROCEDURE — 94761 N-INVAS EAR/PLS OXIMETRY MLT: CPT

## 2024-01-12 PROCEDURE — 6370000000 HC RX 637 (ALT 250 FOR IP): Performed by: HOSPITALIST

## 2024-01-12 PROCEDURE — 1100000000 HC RM PRIVATE

## 2024-01-12 PROCEDURE — C9113 INJ PANTOPRAZOLE SODIUM, VIA: HCPCS | Performed by: INTERNAL MEDICINE

## 2024-01-12 PROCEDURE — 82728 ASSAY OF FERRITIN: CPT

## 2024-01-12 PROCEDURE — 86901 BLOOD TYPING SEROLOGIC RH(D): CPT

## 2024-01-12 PROCEDURE — 85018 HEMOGLOBIN: CPT

## 2024-01-12 PROCEDURE — 6360000002 HC RX W HCPCS: Performed by: HOSPITALIST

## 2024-01-12 PROCEDURE — 82962 GLUCOSE BLOOD TEST: CPT

## 2024-01-12 PROCEDURE — 6360000002 HC RX W HCPCS: Performed by: INTERNAL MEDICINE

## 2024-01-12 PROCEDURE — 83735 ASSAY OF MAGNESIUM: CPT

## 2024-01-12 PROCEDURE — 86923 COMPATIBILITY TEST ELECTRIC: CPT

## 2024-01-12 PROCEDURE — 85025 COMPLETE CBC W/AUTO DIFF WBC: CPT

## 2024-01-12 PROCEDURE — 2580000003 HC RX 258: Performed by: HOSPITALIST

## 2024-01-12 PROCEDURE — P9016 RBC LEUKOCYTES REDUCED: HCPCS

## 2024-01-12 PROCEDURE — 86900 BLOOD TYPING SEROLOGIC ABO: CPT

## 2024-01-12 PROCEDURE — 36415 COLL VENOUS BLD VENIPUNCTURE: CPT

## 2024-01-12 PROCEDURE — 85014 HEMATOCRIT: CPT

## 2024-01-12 PROCEDURE — 87040 BLOOD CULTURE FOR BACTERIA: CPT

## 2024-01-12 PROCEDURE — 2580000003 HC RX 258: Performed by: INTERNAL MEDICINE

## 2024-01-12 PROCEDURE — 30233N1 TRANSFUSION OF NONAUTOLOGOUS RED BLOOD CELLS INTO PERIPHERAL VEIN, PERCUTANEOUS APPROACH: ICD-10-PCS | Performed by: INTERNAL MEDICINE

## 2024-01-12 PROCEDURE — 83540 ASSAY OF IRON: CPT

## 2024-01-12 PROCEDURE — 86850 RBC ANTIBODY SCREEN: CPT

## 2024-01-12 PROCEDURE — 84484 ASSAY OF TROPONIN QUANT: CPT

## 2024-01-12 PROCEDURE — 80048 BASIC METABOLIC PNL TOTAL CA: CPT

## 2024-01-12 RX ORDER — AMLODIPINE BESYLATE 5 MG/1
5 TABLET ORAL DAILY
Status: DISCONTINUED | OUTPATIENT
Start: 2024-01-13 | End: 2024-01-17 | Stop reason: HOSPADM

## 2024-01-12 RX ORDER — AMLODIPINE BESYLATE 5 MG/1
10 TABLET ORAL DAILY
Status: DISCONTINUED | OUTPATIENT
Start: 2024-01-13 | End: 2024-01-12

## 2024-01-12 RX ORDER — SODIUM CHLORIDE 9 MG/ML
INJECTION, SOLUTION INTRAVENOUS PRN
Status: DISCONTINUED | OUTPATIENT
Start: 2024-01-12 | End: 2024-01-17 | Stop reason: HOSPADM

## 2024-01-12 RX ORDER — AMLODIPINE BESYLATE 5 MG/1
5 TABLET ORAL ONCE
Status: DISCONTINUED | OUTPATIENT
Start: 2024-01-12 | End: 2024-01-12

## 2024-01-12 RX ORDER — ACETAMINOPHEN 325 MG/1
650 TABLET ORAL ONCE
Status: DISCONTINUED | OUTPATIENT
Start: 2024-01-12 | End: 2024-01-16

## 2024-01-12 RX ORDER — POTASSIUM CHLORIDE 7.45 MG/ML
10 INJECTION INTRAVENOUS
Status: COMPLETED | OUTPATIENT
Start: 2024-01-12 | End: 2024-01-12

## 2024-01-12 RX ORDER — POTASSIUM CHLORIDE 7.45 MG/ML
10 INJECTION INTRAVENOUS
Status: DISPENSED | OUTPATIENT
Start: 2024-01-12 | End: 2024-01-12

## 2024-01-12 RX ADMIN — POTASSIUM CHLORIDE 10 MEQ: 7.45 INJECTION INTRAVENOUS at 08:37

## 2024-01-12 RX ADMIN — PANTOPRAZOLE SODIUM 40 MG: 40 INJECTION, POWDER, FOR SOLUTION INTRAVENOUS at 16:05

## 2024-01-12 RX ADMIN — POTASSIUM CHLORIDE 10 MEQ: 7.46 INJECTION, SOLUTION INTRAVENOUS at 18:27

## 2024-01-12 RX ADMIN — HEPARIN SODIUM 5000 UNITS: 5000 INJECTION INTRAVENOUS; SUBCUTANEOUS at 05:50

## 2024-01-12 RX ADMIN — ATORVASTATIN CALCIUM 20 MG: 20 TABLET, FILM COATED ORAL at 08:28

## 2024-01-12 RX ADMIN — VANCOMYCIN HYDROCHLORIDE 1250 MG: 1.25 INJECTION, POWDER, LYOPHILIZED, FOR SOLUTION INTRAVENOUS at 16:07

## 2024-01-12 RX ADMIN — POTASSIUM CHLORIDE 10 MEQ: 7.45 INJECTION INTRAVENOUS at 10:06

## 2024-01-12 RX ADMIN — DOXYCYCLINE HYCLATE 100 MG: 100 TABLET, COATED ORAL at 08:28

## 2024-01-12 RX ADMIN — PANTOPRAZOLE SODIUM 40 MG: 40 TABLET, DELAYED RELEASE ORAL at 05:50

## 2024-01-12 RX ADMIN — SODIUM CHLORIDE: 9 INJECTION, SOLUTION INTRAVENOUS at 18:27

## 2024-01-12 RX ADMIN — POTASSIUM CHLORIDE 10 MEQ: 7.45 INJECTION INTRAVENOUS at 19:28

## 2024-01-12 RX ADMIN — AMLODIPINE BESYLATE 5 MG: 5 TABLET ORAL at 08:28

## 2024-01-12 RX ADMIN — SODIUM CHLORIDE, PRESERVATIVE FREE 10 ML: 5 INJECTION INTRAVENOUS at 20:38

## 2024-01-12 RX ADMIN — POTASSIUM CHLORIDE 10 MEQ: 7.46 INJECTION, SOLUTION INTRAVENOUS at 14:54

## 2024-01-12 RX ADMIN — DOXYCYCLINE HYCLATE 100 MG: 100 TABLET, COATED ORAL at 20:35

## 2024-01-12 RX ADMIN — FUROSEMIDE 40 MG: 40 TABLET ORAL at 08:28

## 2024-01-12 RX ADMIN — WATER 1000 MG: 1 INJECTION INTRAMUSCULAR; INTRAVENOUS; SUBCUTANEOUS at 08:28

## 2024-01-12 RX ADMIN — INSULIN GLARGINE 15 UNITS: 100 INJECTION, SOLUTION SUBCUTANEOUS at 20:39

## 2024-01-12 RX ADMIN — ASPIRIN 81 MG: 81 TABLET, CHEWABLE ORAL at 08:28

## 2024-01-12 RX ADMIN — ACETAMINOPHEN 650 MG: 325 TABLET ORAL at 13:13

## 2024-01-12 ASSESSMENT — PAIN SCALES - GENERAL: PAINLEVEL_OUTOF10: 0

## 2024-01-12 NOTE — FLOWSHEET NOTE
Spoke w/ Daughter, Rosa, on phone about hgb of 6.4 Consent obtained for blood transfusion as patient is intermittently confused. Order for 2 units PRBC placed. Potasium 3.1 - will replete. Nursing to continue to monitor.

## 2024-01-12 NOTE — CONSENT
Informed Consent for Blood Component Transfusion Note    I have discussed with the daughter the rationale for blood component transfusion; its benefits in treating or preventing fatigue, organ damage, or death; and its risk which includes mild transfusion reactions, rare risk of blood borne infection, or more serious but rare reactions. I have discussed the alternatives to transfusion, including the risk and consequences of not receiving transfusion. The daughter had an opportunity to ask questions and had agreed to proceed with transfusion of blood components. DILLON Lee [phone]    Electronically signed by TAMARA Brunner NP on 1/12/24 at 6:53 AM EST

## 2024-01-12 NOTE — CONSULTS
[facility]                         GASTROENTEROLOGY CONSULTATION NOTE              NAME:  Janis Chadwick   :   1934   MRN:   [id number]       Referring Physician:    Dr. Castro     Consult Date:   2024 3:47 PM    Chief Complaint:    dyspnea     History of Present Illness:    Patient is a 89 y.o. with CAD, CHF, CKD, T2DM, admitted for pneumonia. We were asked to see for anemia. She is well known to our practice for hx of angiodysplasias causing GI bleeding.     Presented to ED yesterday for dyspnea, cough, chest pain and noted to have CAP and VIVIAN.   Today hgb dropped from 7.8 down to 6.4, now up to 8.2 after 1u prbc   BUN mildly elevated at 23 with creatinine of 2.16    She denies melena or hematochezia. Denies nsaid use or anticoagulation use.     Of note, she was admitted 2023 for anemia and elected not to undergo endoscopic evaluation at that time.     She had EGD/colon summer 2022 which were unremarkable and subsequent pill camera showing nonbleeding angiodysplasias in the proximal and mid jejunum.      PMH:  Past Medical History:   Diagnosis Date    CAD (coronary artery disease)     CHF (congestive heart failure), NYHA class I, chronic, systolic (HCC)     CKD (chronic kidney disease) stage 3, GFR 30-59 ml/min (HCC)     DM type 2 causing renal disease (HCC)     DM type 2 causing vascular disease (HCC)     Hypertension     Iron deficiency anemia     Ischemic cardiomyopathy     Peripheral vascular disease (HCC)        PSH:  Past Surgical History:   Procedure Laterality Date    APPENDECTOMY      COLONOSCOPY N/A 2022    COLONOSCOPY performed by Rad Tineo MD at [facility] ENDOSCOPY    CORONARY ANGIOPLASTY WITH STENT PLACEMENT  2021    stents x 3 to mid-distal right coronary artery going into PDA for 100% occlusion of RCA; and also had nonobstructive disease in 30% LAD stenosis, 70% diagonal 1 stenosis.  She had proximal 30% stenosis in the codominant left circumflex artery

## 2024-01-12 NOTE — CARE COORDINATION
Initial Case Management Assessment       01/12/24 1243   Service Assessment   Patient Orientation Alert and Oriented   Cognition Alert   History Provided By Patient   Primary Caregiver Self   Support Systems Children   Patient's Healthcare Decision Maker is: Named in Scanned ACP Document   PCP Verified by CM Yes  (Paradise Rubio NP)   Last Visit to PCP Within last year   Prior Functional Level Independent in ADLs/IADLs   Current Functional Level Independent in ADLs/IADLs   Can patient return to prior living arrangement Yes   Ability to make needs known: Good   Family able to assist with home care needs: Yes   Would you like for me to discuss the discharge plan with any other family members/significant others, and if so, who? Yes  (Rosa/daughter (if necessary))   Financial Resources Medicare   Community Resources None   Social/Functional History   Lives With Daughter   Type of Home House   Home Layout One level   Home Access Level entry   Bathroom Equipment Grab bars in shower;Shower chair;Toilet raiser   Home Equipment Walker, rolling  (transport chair)   ADL Assistance Independent   Homemaking Assistance Independent   Ambulation Assistance Independent   Transfer Assistance Independent   Active  No   Patient's  Info daughter   Occupation Retired   Discharge Planning   Type of Residence House   Living Arrangements Children   Current Services Prior To Admission None   Potential Assistance Needed N/A   DME Ordered? No   Potential Assistance Purchasing Medications No   Type of Home Care Services None   Patient expects to be discharged to: House   One/Two Story Residence One story   History of falls? 0   Services At/After Discharge   Transition of Care Consult (CM Consult) N/A   Services At/After Discharge None   [place] Resource Information Provided? No   Mode of Transport at Discharge Other (see comment)  (daughter)     Pt was admitted on 01/11/24 for CAP. CM met with Pt to complete initial assessment. JODI

## 2024-01-13 LAB
ANION GAP SERPL CALC-SCNC: 4 MMOL/L (ref 5–15)
B PERT DNA SPEC QL NAA+PROBE: NOT DETECTED
BACTERIA SPEC CULT: NORMAL
BACTERIA SPEC CULT: NORMAL
BORDETELLA PARAPERTUSSIS BY PCR: NOT DETECTED
BUN SERPL-MCNC: 27 MG/DL (ref 6–20)
BUN/CREAT SERPL: 12 (ref 12–20)
C PNEUM DNA SPEC QL NAA+PROBE: NOT DETECTED
CALCIUM SERPL-MCNC: 8.3 MG/DL (ref 8.5–10.1)
CHLORIDE SERPL-SCNC: 113 MMOL/L (ref 97–108)
CO2 SERPL-SCNC: 23 MMOL/L (ref 21–32)
CREAT SERPL-MCNC: 2.2 MG/DL (ref 0.55–1.02)
ERYTHROCYTE [DISTWIDTH] IN BLOOD BY AUTOMATED COUNT: 16.2 % (ref 11.5–14.5)
FLUAV SUBTYP SPEC NAA+PROBE: NOT DETECTED
FLUBV RNA SPEC QL NAA+PROBE: NOT DETECTED
GLUCOSE BLD STRIP.AUTO-MCNC: 122 MG/DL (ref 65–117)
GLUCOSE BLD STRIP.AUTO-MCNC: 162 MG/DL (ref 65–117)
GLUCOSE BLD STRIP.AUTO-MCNC: 175 MG/DL (ref 65–117)
GLUCOSE BLD STRIP.AUTO-MCNC: 97 MG/DL (ref 65–117)
GLUCOSE SERPL-MCNC: 88 MG/DL (ref 65–100)
HADV DNA SPEC QL NAA+PROBE: NOT DETECTED
HCOV 229E RNA SPEC QL NAA+PROBE: NOT DETECTED
HCOV HKU1 RNA SPEC QL NAA+PROBE: NOT DETECTED
HCOV NL63 RNA SPEC QL NAA+PROBE: NOT DETECTED
HCOV OC43 RNA SPEC QL NAA+PROBE: NOT DETECTED
HCT VFR BLD AUTO: 22.6 % (ref 35–47)
HGB BLD-MCNC: 7.1 G/DL (ref 11.5–16)
HMPV RNA SPEC QL NAA+PROBE: NOT DETECTED
HPIV1 RNA SPEC QL NAA+PROBE: NOT DETECTED
HPIV2 RNA SPEC QL NAA+PROBE: NOT DETECTED
HPIV3 RNA SPEC QL NAA+PROBE: NOT DETECTED
HPIV4 RNA SPEC QL NAA+PROBE: NOT DETECTED
M PNEUMO DNA SPEC QL NAA+PROBE: NOT DETECTED
MAGNESIUM SERPL-MCNC: 2.3 MG/DL (ref 1.6–2.4)
MCH RBC QN AUTO: 24.3 PG (ref 26–34)
MCHC RBC AUTO-ENTMCNC: 31.4 G/DL (ref 30–36.5)
MCV RBC AUTO: 77.4 FL (ref 80–99)
NRBC # BLD: 0 K/UL (ref 0–0.01)
NRBC BLD-RTO: 0 PER 100 WBC
PLATELET # BLD AUTO: 129 K/UL (ref 150–400)
PMV BLD AUTO: 11.6 FL (ref 8.9–12.9)
POTASSIUM SERPL-SCNC: 3.4 MMOL/L (ref 3.5–5.1)
RBC # BLD AUTO: 2.92 M/UL (ref 3.8–5.2)
RSV RNA SPEC QL NAA+PROBE: NOT DETECTED
RV+EV RNA SPEC QL NAA+PROBE: NOT DETECTED
SARS-COV-2 RNA RESP QL NAA+PROBE: NOT DETECTED
SERVICE CMNT-IMP: ABNORMAL
SERVICE CMNT-IMP: NORMAL
SERVICE CMNT-IMP: NORMAL
SODIUM SERPL-SCNC: 140 MMOL/L (ref 136–145)
VANCOMYCIN SERPL-MCNC: 12 UG/ML
WBC # BLD AUTO: 3.3 K/UL (ref 3.6–11)

## 2024-01-13 PROCEDURE — 80202 ASSAY OF VANCOMYCIN: CPT

## 2024-01-13 PROCEDURE — A4216 STERILE WATER/SALINE, 10 ML: HCPCS | Performed by: INTERNAL MEDICINE

## 2024-01-13 PROCEDURE — 2580000003 HC RX 258: Performed by: INTERNAL MEDICINE

## 2024-01-13 PROCEDURE — 6370000000 HC RX 637 (ALT 250 FOR IP): Performed by: HOSPITALIST

## 2024-01-13 PROCEDURE — 1100000000 HC RM PRIVATE

## 2024-01-13 PROCEDURE — C9113 INJ PANTOPRAZOLE SODIUM, VIA: HCPCS | Performed by: INTERNAL MEDICINE

## 2024-01-13 PROCEDURE — 85027 COMPLETE CBC AUTOMATED: CPT

## 2024-01-13 PROCEDURE — 2580000003 HC RX 258: Performed by: HOSPITALIST

## 2024-01-13 PROCEDURE — 6360000002 HC RX W HCPCS: Performed by: INTERNAL MEDICINE

## 2024-01-13 PROCEDURE — 82962 GLUCOSE BLOOD TEST: CPT

## 2024-01-13 PROCEDURE — 83735 ASSAY OF MAGNESIUM: CPT

## 2024-01-13 PROCEDURE — 94761 N-INVAS EAR/PLS OXIMETRY MLT: CPT

## 2024-01-13 PROCEDURE — 80048 BASIC METABOLIC PNL TOTAL CA: CPT

## 2024-01-13 PROCEDURE — 6360000002 HC RX W HCPCS: Performed by: HOSPITALIST

## 2024-01-13 PROCEDURE — 6370000000 HC RX 637 (ALT 250 FOR IP): Performed by: INTERNAL MEDICINE

## 2024-01-13 PROCEDURE — 36415 COLL VENOUS BLD VENIPUNCTURE: CPT

## 2024-01-13 RX ORDER — POTASSIUM CHLORIDE 7.45 MG/ML
10 INJECTION INTRAVENOUS
Status: COMPLETED | OUTPATIENT
Start: 2024-01-13 | End: 2024-01-13

## 2024-01-13 RX ADMIN — DOXYCYCLINE HYCLATE 100 MG: 100 TABLET, COATED ORAL at 09:30

## 2024-01-13 RX ADMIN — AMLODIPINE BESYLATE 5 MG: 5 TABLET ORAL at 09:54

## 2024-01-13 RX ADMIN — SODIUM CHLORIDE: 9 INJECTION, SOLUTION INTRAVENOUS at 15:28

## 2024-01-13 RX ADMIN — PANTOPRAZOLE SODIUM 40 MG: 40 INJECTION, POWDER, FOR SOLUTION INTRAVENOUS at 16:05

## 2024-01-13 RX ADMIN — DOXYCYCLINE HYCLATE 100 MG: 100 TABLET, COATED ORAL at 20:49

## 2024-01-13 RX ADMIN — POTASSIUM CHLORIDE 10 MEQ: 7.46 INJECTION, SOLUTION INTRAVENOUS at 18:31

## 2024-01-13 RX ADMIN — SODIUM CHLORIDE, PRESERVATIVE FREE 10 ML: 5 INJECTION INTRAVENOUS at 09:55

## 2024-01-13 RX ADMIN — POTASSIUM CHLORIDE 10 MEQ: 7.46 INJECTION, SOLUTION INTRAVENOUS at 20:44

## 2024-01-13 RX ADMIN — INSULIN GLARGINE 15 UNITS: 100 INJECTION, SOLUTION SUBCUTANEOUS at 20:48

## 2024-01-13 RX ADMIN — VANCOMYCIN HYDROCHLORIDE 750 MG: 750 INJECTION, POWDER, LYOPHILIZED, FOR SOLUTION INTRAVENOUS at 20:48

## 2024-01-13 RX ADMIN — PANTOPRAZOLE SODIUM 40 MG: 40 INJECTION, POWDER, FOR SOLUTION INTRAVENOUS at 02:31

## 2024-01-13 RX ADMIN — WATER 1000 MG: 1 INJECTION INTRAMUSCULAR; INTRAVENOUS; SUBCUTANEOUS at 09:30

## 2024-01-13 RX ADMIN — SODIUM CHLORIDE, PRESERVATIVE FREE 10 ML: 5 INJECTION INTRAVENOUS at 20:50

## 2024-01-13 RX ADMIN — ATORVASTATIN CALCIUM 20 MG: 20 TABLET, FILM COATED ORAL at 09:54

## 2024-01-13 ASSESSMENT — PAIN SCALES - GENERAL: PAINLEVEL_OUTOF10: 0

## 2024-01-14 LAB
ABO + RH BLD: NORMAL
ANION GAP SERPL CALC-SCNC: 5 MMOL/L (ref 5–15)
BASOPHILS # BLD: 0 K/UL (ref 0–0.1)
BASOPHILS NFR BLD: 1 % (ref 0–1)
BLD PROD TYP BPU: NORMAL
BLD PROD TYP BPU: NORMAL
BLOOD BANK DISPENSE STATUS: NORMAL
BLOOD BANK DISPENSE STATUS: NORMAL
BLOOD GROUP ANTIBODIES SERPL: NORMAL
BPU ID: NORMAL
BPU ID: NORMAL
BUN SERPL-MCNC: 27 MG/DL (ref 6–20)
BUN/CREAT SERPL: 14 (ref 12–20)
CALCIUM SERPL-MCNC: 8.1 MG/DL (ref 8.5–10.1)
CHLORIDE SERPL-SCNC: 117 MMOL/L (ref 97–108)
CO2 SERPL-SCNC: 20 MMOL/L (ref 21–32)
CREAT SERPL-MCNC: 1.89 MG/DL (ref 0.55–1.02)
CROSSMATCH RESULT: NORMAL
CROSSMATCH RESULT: NORMAL
DIFFERENTIAL METHOD BLD: ABNORMAL
EOSINOPHIL # BLD: 0.4 K/UL (ref 0–0.4)
EOSINOPHIL NFR BLD: 11 % (ref 0–7)
ERYTHROCYTE [DISTWIDTH] IN BLOOD BY AUTOMATED COUNT: 16.4 % (ref 11.5–14.5)
GLUCOSE BLD STRIP.AUTO-MCNC: 112 MG/DL (ref 65–117)
GLUCOSE BLD STRIP.AUTO-MCNC: 145 MG/DL (ref 65–117)
GLUCOSE BLD STRIP.AUTO-MCNC: 147 MG/DL (ref 65–117)
GLUCOSE BLD STRIP.AUTO-MCNC: 169 MG/DL (ref 65–117)
GLUCOSE SERPL-MCNC: 111 MG/DL (ref 65–100)
HCT VFR BLD AUTO: 25.9 % (ref 35–47)
HGB BLD-MCNC: 7.9 G/DL (ref 11.5–16)
IMM GRANULOCYTES # BLD AUTO: 0 K/UL (ref 0–0.04)
IMM GRANULOCYTES NFR BLD AUTO: 0 % (ref 0–0.5)
LYMPHOCYTES # BLD: 0.7 K/UL (ref 0.8–3.5)
LYMPHOCYTES NFR BLD: 18 % (ref 12–49)
MCH RBC QN AUTO: 24.2 PG (ref 26–34)
MCHC RBC AUTO-ENTMCNC: 30.5 G/DL (ref 30–36.5)
MCV RBC AUTO: 79.2 FL (ref 80–99)
MONOCYTES # BLD: 0.6 K/UL (ref 0–1)
MONOCYTES NFR BLD: 16 % (ref 5–13)
NEUTS SEG # BLD: 2 K/UL (ref 1.8–8)
NEUTS SEG NFR BLD: 54 % (ref 32–75)
NRBC # BLD: 0 K/UL (ref 0–0.01)
NRBC BLD-RTO: 0 PER 100 WBC
PLATELET # BLD AUTO: 159 K/UL (ref 150–400)
PMV BLD AUTO: 11.7 FL (ref 8.9–12.9)
POTASSIUM SERPL-SCNC: 3.5 MMOL/L (ref 3.5–5.1)
RBC # BLD AUTO: 3.27 M/UL (ref 3.8–5.2)
SERVICE CMNT-IMP: ABNORMAL
SERVICE CMNT-IMP: NORMAL
SODIUM SERPL-SCNC: 142 MMOL/L (ref 136–145)
SPECIMEN EXP DATE BLD: NORMAL
UNIT DIVISION: 0
UNIT DIVISION: 0
WBC # BLD AUTO: 3.7 K/UL (ref 3.6–11)

## 2024-01-14 PROCEDURE — 6360000002 HC RX W HCPCS: Performed by: INTERNAL MEDICINE

## 2024-01-14 PROCEDURE — 82962 GLUCOSE BLOOD TEST: CPT

## 2024-01-14 PROCEDURE — 1100000000 HC RM PRIVATE

## 2024-01-14 PROCEDURE — 97116 GAIT TRAINING THERAPY: CPT

## 2024-01-14 PROCEDURE — 97530 THERAPEUTIC ACTIVITIES: CPT

## 2024-01-14 PROCEDURE — 80048 BASIC METABOLIC PNL TOTAL CA: CPT

## 2024-01-14 PROCEDURE — 36415 COLL VENOUS BLD VENIPUNCTURE: CPT

## 2024-01-14 PROCEDURE — 2580000003 HC RX 258: Performed by: HOSPITALIST

## 2024-01-14 PROCEDURE — 6370000000 HC RX 637 (ALT 250 FOR IP): Performed by: HOSPITALIST

## 2024-01-14 PROCEDURE — 97535 SELF CARE MNGMENT TRAINING: CPT

## 2024-01-14 PROCEDURE — 97161 PT EVAL LOW COMPLEX 20 MIN: CPT

## 2024-01-14 PROCEDURE — 6370000000 HC RX 637 (ALT 250 FOR IP): Performed by: INTERNAL MEDICINE

## 2024-01-14 PROCEDURE — A4216 STERILE WATER/SALINE, 10 ML: HCPCS | Performed by: INTERNAL MEDICINE

## 2024-01-14 PROCEDURE — 2580000003 HC RX 258: Performed by: INTERNAL MEDICINE

## 2024-01-14 PROCEDURE — 94761 N-INVAS EAR/PLS OXIMETRY MLT: CPT

## 2024-01-14 PROCEDURE — 6360000002 HC RX W HCPCS: Performed by: HOSPITALIST

## 2024-01-14 PROCEDURE — 85025 COMPLETE CBC W/AUTO DIFF WBC: CPT

## 2024-01-14 PROCEDURE — C9113 INJ PANTOPRAZOLE SODIUM, VIA: HCPCS | Performed by: INTERNAL MEDICINE

## 2024-01-14 PROCEDURE — 97165 OT EVAL LOW COMPLEX 30 MIN: CPT

## 2024-01-14 RX ORDER — GUAIFENESIN 600 MG/1
600 TABLET, EXTENDED RELEASE ORAL 2 TIMES DAILY
Status: DISCONTINUED | OUTPATIENT
Start: 2024-01-14 | End: 2024-01-17 | Stop reason: HOSPADM

## 2024-01-14 RX ORDER — BENZONATATE 100 MG/1
100 CAPSULE ORAL 3 TIMES DAILY PRN
Status: DISCONTINUED | OUTPATIENT
Start: 2024-01-14 | End: 2024-01-17 | Stop reason: HOSPADM

## 2024-01-14 RX ORDER — IPRATROPIUM BROMIDE AND ALBUTEROL SULFATE 2.5; .5 MG/3ML; MG/3ML
1 SOLUTION RESPIRATORY (INHALATION) EVERY 4 HOURS PRN
Status: DISCONTINUED | OUTPATIENT
Start: 2024-01-14 | End: 2024-01-17 | Stop reason: HOSPADM

## 2024-01-14 RX ADMIN — AMLODIPINE BESYLATE 5 MG: 5 TABLET ORAL at 09:58

## 2024-01-14 RX ADMIN — GUAIFENESIN 600 MG: 600 TABLET ORAL at 23:34

## 2024-01-14 RX ADMIN — SODIUM CHLORIDE, PRESERVATIVE FREE 10 ML: 5 INJECTION INTRAVENOUS at 20:55

## 2024-01-14 RX ADMIN — ATORVASTATIN CALCIUM 20 MG: 20 TABLET, FILM COATED ORAL at 09:58

## 2024-01-14 RX ADMIN — SODIUM CHLORIDE: 9 INJECTION, SOLUTION INTRAVENOUS at 13:49

## 2024-01-14 RX ADMIN — IRON SUCROSE 100 MG: 20 INJECTION, SOLUTION INTRAVENOUS at 23:37

## 2024-01-14 RX ADMIN — DOXYCYCLINE HYCLATE 100 MG: 100 TABLET, COATED ORAL at 20:50

## 2024-01-14 RX ADMIN — BENZONATATE 100 MG: 100 CAPSULE ORAL at 23:34

## 2024-01-14 RX ADMIN — DOXYCYCLINE HYCLATE 100 MG: 100 TABLET, COATED ORAL at 09:58

## 2024-01-14 RX ADMIN — PANTOPRAZOLE SODIUM 40 MG: 40 INJECTION, POWDER, FOR SOLUTION INTRAVENOUS at 05:42

## 2024-01-14 RX ADMIN — INSULIN GLARGINE 15 UNITS: 100 INJECTION, SOLUTION SUBCUTANEOUS at 20:51

## 2024-01-14 RX ADMIN — PANTOPRAZOLE SODIUM 40 MG: 40 INJECTION, POWDER, FOR SOLUTION INTRAVENOUS at 16:17

## 2024-01-14 RX ADMIN — WATER 1000 MG: 1 INJECTION INTRAMUSCULAR; INTRAVENOUS; SUBCUTANEOUS at 09:58

## 2024-01-14 ASSESSMENT — PAIN SCALES - GENERAL
PAINLEVEL_OUTOF10: 0

## 2024-01-15 LAB
ANION GAP SERPL CALC-SCNC: 4 MMOL/L (ref 5–15)
BASOPHILS # BLD: 0 K/UL (ref 0–0.1)
BASOPHILS NFR BLD: 1 % (ref 0–1)
BUN SERPL-MCNC: 21 MG/DL (ref 6–20)
BUN/CREAT SERPL: 13 (ref 12–20)
CALCIUM SERPL-MCNC: 8.3 MG/DL (ref 8.5–10.1)
CHLORIDE SERPL-SCNC: 119 MMOL/L (ref 97–108)
CO2 SERPL-SCNC: 21 MMOL/L (ref 21–32)
CREAT SERPL-MCNC: 1.66 MG/DL (ref 0.55–1.02)
CRP SERPL-MCNC: 7.78 MG/DL (ref 0–0.6)
DIFFERENTIAL METHOD BLD: ABNORMAL
EOSINOPHIL # BLD: 0.4 K/UL (ref 0–0.4)
EOSINOPHIL NFR BLD: 9 % (ref 0–7)
ERYTHROCYTE [DISTWIDTH] IN BLOOD BY AUTOMATED COUNT: 16.4 % (ref 11.5–14.5)
EST. AVERAGE GLUCOSE BLD GHB EST-MCNC: 131 MG/DL
GLUCOSE BLD STRIP.AUTO-MCNC: 111 MG/DL (ref 65–117)
GLUCOSE BLD STRIP.AUTO-MCNC: 133 MG/DL (ref 65–117)
GLUCOSE BLD STRIP.AUTO-MCNC: 133 MG/DL (ref 65–117)
GLUCOSE BLD STRIP.AUTO-MCNC: 135 MG/DL (ref 65–117)
GLUCOSE BLD STRIP.AUTO-MCNC: 136 MG/DL (ref 65–117)
GLUCOSE SERPL-MCNC: 128 MG/DL (ref 65–100)
HBA1C MFR BLD: 6.2 % (ref 4–5.6)
HCT VFR BLD AUTO: 26.4 % (ref 35–47)
HGB BLD-MCNC: 8 G/DL (ref 11.5–16)
IMM GRANULOCYTES # BLD AUTO: 0 K/UL (ref 0–0.04)
IMM GRANULOCYTES NFR BLD AUTO: 0 % (ref 0–0.5)
LYMPHOCYTES # BLD: 0.7 K/UL (ref 0.8–3.5)
LYMPHOCYTES NFR BLD: 15 % (ref 12–49)
MAGNESIUM SERPL-MCNC: 2.1 MG/DL (ref 1.6–2.4)
MCH RBC QN AUTO: 24.2 PG (ref 26–34)
MCHC RBC AUTO-ENTMCNC: 30.3 G/DL (ref 30–36.5)
MCV RBC AUTO: 80 FL (ref 80–99)
MONOCYTES # BLD: 0.5 K/UL (ref 0–1)
MONOCYTES NFR BLD: 11 % (ref 5–13)
NEUTS SEG # BLD: 3.1 K/UL (ref 1.8–8)
NEUTS SEG NFR BLD: 65 % (ref 32–75)
NRBC # BLD: 0 K/UL (ref 0–0.01)
NRBC BLD-RTO: 0 PER 100 WBC
PHOSPHATE SERPL-MCNC: 2.2 MG/DL (ref 2.6–4.7)
PLATELET # BLD AUTO: 178 K/UL (ref 150–400)
PMV BLD AUTO: 10.9 FL (ref 8.9–12.9)
POTASSIUM SERPL-SCNC: 3.5 MMOL/L (ref 3.5–5.1)
RBC # BLD AUTO: 3.3 M/UL (ref 3.8–5.2)
SERVICE CMNT-IMP: ABNORMAL
SERVICE CMNT-IMP: NORMAL
SODIUM SERPL-SCNC: 144 MMOL/L (ref 136–145)
WBC # BLD AUTO: 4.7 K/UL (ref 3.6–11)

## 2024-01-15 PROCEDURE — 83036 HEMOGLOBIN GLYCOSYLATED A1C: CPT

## 2024-01-15 PROCEDURE — 6370000000 HC RX 637 (ALT 250 FOR IP): Performed by: INTERNAL MEDICINE

## 2024-01-15 PROCEDURE — 97530 THERAPEUTIC ACTIVITIES: CPT

## 2024-01-15 PROCEDURE — 2700000000 HC OXYGEN THERAPY PER DAY

## 2024-01-15 PROCEDURE — 6360000002 HC RX W HCPCS: Performed by: INTERNAL MEDICINE

## 2024-01-15 PROCEDURE — C9113 INJ PANTOPRAZOLE SODIUM, VIA: HCPCS | Performed by: INTERNAL MEDICINE

## 2024-01-15 PROCEDURE — 2500000003 HC RX 250 WO HCPCS: Performed by: INTERNAL MEDICINE

## 2024-01-15 PROCEDURE — 82962 GLUCOSE BLOOD TEST: CPT

## 2024-01-15 PROCEDURE — 2580000003 HC RX 258: Performed by: INTERNAL MEDICINE

## 2024-01-15 PROCEDURE — 1100000000 HC RM PRIVATE

## 2024-01-15 PROCEDURE — 97116 GAIT TRAINING THERAPY: CPT

## 2024-01-15 PROCEDURE — 6360000002 HC RX W HCPCS: Performed by: HOSPITALIST

## 2024-01-15 PROCEDURE — 84100 ASSAY OF PHOSPHORUS: CPT

## 2024-01-15 PROCEDURE — 2580000003 HC RX 258: Performed by: HOSPITALIST

## 2024-01-15 PROCEDURE — A4216 STERILE WATER/SALINE, 10 ML: HCPCS | Performed by: INTERNAL MEDICINE

## 2024-01-15 PROCEDURE — 6370000000 HC RX 637 (ALT 250 FOR IP): Performed by: HOSPITALIST

## 2024-01-15 PROCEDURE — 85025 COMPLETE CBC W/AUTO DIFF WBC: CPT

## 2024-01-15 PROCEDURE — 83735 ASSAY OF MAGNESIUM: CPT

## 2024-01-15 PROCEDURE — 36415 COLL VENOUS BLD VENIPUNCTURE: CPT

## 2024-01-15 PROCEDURE — 80048 BASIC METABOLIC PNL TOTAL CA: CPT

## 2024-01-15 PROCEDURE — 86140 C-REACTIVE PROTEIN: CPT

## 2024-01-15 PROCEDURE — 94761 N-INVAS EAR/PLS OXIMETRY MLT: CPT

## 2024-01-15 RX ORDER — LACTOBACILLUS RHAMNOSUS GG 10B CELL
1 CAPSULE ORAL
Status: DISCONTINUED | OUTPATIENT
Start: 2024-01-16 | End: 2024-01-17 | Stop reason: HOSPADM

## 2024-01-15 RX ADMIN — AMLODIPINE BESYLATE 5 MG: 5 TABLET ORAL at 08:56

## 2024-01-15 RX ADMIN — GUAIFENESIN 600 MG: 600 TABLET ORAL at 08:56

## 2024-01-15 RX ADMIN — SODIUM CHLORIDE, PRESERVATIVE FREE 10 ML: 5 INJECTION INTRAVENOUS at 21:40

## 2024-01-15 RX ADMIN — CEFTRIAXONE 1000 MG: 1 INJECTION, POWDER, FOR SOLUTION INTRAMUSCULAR; INTRAVENOUS at 15:42

## 2024-01-15 RX ADMIN — AZITHROMYCIN MONOHYDRATE 500 MG: 500 INJECTION, POWDER, LYOPHILIZED, FOR SOLUTION INTRAVENOUS at 15:37

## 2024-01-15 RX ADMIN — WATER 1000 MG: 1 INJECTION INTRAMUSCULAR; INTRAVENOUS; SUBCUTANEOUS at 08:56

## 2024-01-15 RX ADMIN — SODIUM CHLORIDE, PRESERVATIVE FREE 10 ML: 5 INJECTION INTRAVENOUS at 08:57

## 2024-01-15 RX ADMIN — PANTOPRAZOLE SODIUM 40 MG: 40 INJECTION, POWDER, FOR SOLUTION INTRAVENOUS at 03:34

## 2024-01-15 RX ADMIN — POTASSIUM PHOSPHATE, MONOBASIC POTASSIUM PHOSPHATE, DIBASIC 30 MMOL: 224; 236 INJECTION, SOLUTION, CONCENTRATE INTRAVENOUS at 11:17

## 2024-01-15 RX ADMIN — DOXYCYCLINE HYCLATE 100 MG: 100 TABLET, COATED ORAL at 08:56

## 2024-01-15 RX ADMIN — INSULIN GLARGINE 15 UNITS: 100 INJECTION, SOLUTION SUBCUTANEOUS at 21:38

## 2024-01-15 RX ADMIN — IRON SUCROSE 100 MG: 20 INJECTION, SOLUTION INTRAVENOUS at 21:40

## 2024-01-15 RX ADMIN — ATORVASTATIN CALCIUM 20 MG: 20 TABLET, FILM COATED ORAL at 11:17

## 2024-01-15 RX ADMIN — GUAIFENESIN 600 MG: 600 TABLET ORAL at 21:37

## 2024-01-15 RX ADMIN — PANTOPRAZOLE SODIUM 40 MG: 40 INJECTION, POWDER, FOR SOLUTION INTRAVENOUS at 15:49

## 2024-01-15 ASSESSMENT — PAIN SCALES - GENERAL
PAINLEVEL_OUTOF10: 0

## 2024-01-15 NOTE — WOUND CARE
Wound Consult:  new consult Visit. Chart reviewed.  Consulted for right ankle wound..  Patient is resting on a wendie bed with DAY mattress.  Heels off loaded with pillows.  Patient is awake, alert, cooperative; requires 1 assist to move side to side in bed.  Franco score 21.  Assessment:  POA Right lateral ankle-0.4x0.4x0.1cm- dry eschar, surrounding blanching pink, chronic, painful, no drainage, no odor.    Buttocks/sacrum- no redness noted  Bilateral heels- no redness, painful, elevated on pillows  Treatment:  Right ankle- cleansed with NSS, silvasorb gel and foam dressing  Wound Recommendations:  Right ankle- cleansed with NSS, silvasorb gel and foam dressing, change every MWF  Skin Care / PI Prevention Recommendations:  1. Minimize friction/shear: minimize layers of linen/pads under patient.  2. Off load pressure/reposition:  turn and reposition approximately every 2 hours; float heels with pillows or use off loading heel boots; waffle cushion for sitting; position wedge.  3. Manage Moisture - keep skin folds dry; incontinence skin care with incontinence wipes; zinc guard barrier ointment; purewick in use to help contain urine.  4. Continue to monitor nutrition, pain, and skin risk scale, and skin assessment.  Plan:  Spoke with Dr. Chapman regarding findings and proposed orders for treatment.  We will continue to reassess weekly and as needed.    Elisha Gaming RN  [facility], Wound / Ostomy Department  Wound Healing Office [phone]

## 2024-01-16 LAB
ANION GAP SERPL CALC-SCNC: 2 MMOL/L (ref 5–15)
BUN SERPL-MCNC: 16 MG/DL (ref 6–20)
BUN/CREAT SERPL: 11 (ref 12–20)
CALCIUM SERPL-MCNC: 8.2 MG/DL (ref 8.5–10.1)
CHLORIDE SERPL-SCNC: 122 MMOL/L (ref 97–108)
CO2 SERPL-SCNC: 20 MMOL/L (ref 21–32)
CREAT SERPL-MCNC: 1.42 MG/DL (ref 0.55–1.02)
CRP SERPL-MCNC: 5.54 MG/DL (ref 0–0.6)
ERYTHROCYTE [DISTWIDTH] IN BLOOD BY AUTOMATED COUNT: 16.9 % (ref 11.5–14.5)
GLUCOSE BLD STRIP.AUTO-MCNC: 107 MG/DL (ref 65–117)
GLUCOSE BLD STRIP.AUTO-MCNC: 156 MG/DL (ref 65–117)
GLUCOSE BLD STRIP.AUTO-MCNC: 159 MG/DL (ref 65–117)
GLUCOSE BLD STRIP.AUTO-MCNC: 80 MG/DL (ref 65–117)
GLUCOSE SERPL-MCNC: 75 MG/DL (ref 65–100)
HCT VFR BLD AUTO: 23.2 % (ref 35–47)
HCT VFR BLD AUTO: 35.5 % (ref 35–47)
HGB BLD-MCNC: 11.2 G/DL (ref 11.5–16)
HGB BLD-MCNC: 7.2 G/DL (ref 11.5–16)
HISTORY CHECK: NORMAL
MAGNESIUM SERPL-MCNC: 2 MG/DL (ref 1.6–2.4)
MCH RBC QN AUTO: 24.7 PG (ref 26–34)
MCHC RBC AUTO-ENTMCNC: 31 G/DL (ref 30–36.5)
MCV RBC AUTO: 79.5 FL (ref 80–99)
NRBC # BLD: 0 K/UL (ref 0–0.01)
NRBC BLD-RTO: 0 PER 100 WBC
PHOSPHATE SERPL-MCNC: 3.2 MG/DL (ref 2.6–4.7)
PLATELET # BLD AUTO: 183 K/UL (ref 150–400)
PMV BLD AUTO: 10.9 FL (ref 8.9–12.9)
POTASSIUM SERPL-SCNC: 3.7 MMOL/L (ref 3.5–5.1)
RBC # BLD AUTO: 2.92 M/UL (ref 3.8–5.2)
SERVICE CMNT-IMP: ABNORMAL
SERVICE CMNT-IMP: ABNORMAL
SERVICE CMNT-IMP: NORMAL
SERVICE CMNT-IMP: NORMAL
SODIUM SERPL-SCNC: 144 MMOL/L (ref 136–145)
WBC # BLD AUTO: 4.5 K/UL (ref 3.6–11)

## 2024-01-16 PROCEDURE — A4216 STERILE WATER/SALINE, 10 ML: HCPCS | Performed by: INTERNAL MEDICINE

## 2024-01-16 PROCEDURE — 36430 TRANSFUSION BLD/BLD COMPNT: CPT

## 2024-01-16 PROCEDURE — 86901 BLOOD TYPING SEROLOGIC RH(D): CPT

## 2024-01-16 PROCEDURE — 85018 HEMOGLOBIN: CPT

## 2024-01-16 PROCEDURE — 6360000002 HC RX W HCPCS: Performed by: INTERNAL MEDICINE

## 2024-01-16 PROCEDURE — 1100000000 HC RM PRIVATE

## 2024-01-16 PROCEDURE — 83735 ASSAY OF MAGNESIUM: CPT

## 2024-01-16 PROCEDURE — 86140 C-REACTIVE PROTEIN: CPT

## 2024-01-16 PROCEDURE — 80048 BASIC METABOLIC PNL TOTAL CA: CPT

## 2024-01-16 PROCEDURE — 2580000003 HC RX 258: Performed by: HOSPITALIST

## 2024-01-16 PROCEDURE — 2580000003 HC RX 258: Performed by: INTERNAL MEDICINE

## 2024-01-16 PROCEDURE — 86900 BLOOD TYPING SEROLOGIC ABO: CPT

## 2024-01-16 PROCEDURE — 86923 COMPATIBILITY TEST ELECTRIC: CPT

## 2024-01-16 PROCEDURE — 36415 COLL VENOUS BLD VENIPUNCTURE: CPT

## 2024-01-16 PROCEDURE — C9113 INJ PANTOPRAZOLE SODIUM, VIA: HCPCS | Performed by: INTERNAL MEDICINE

## 2024-01-16 PROCEDURE — 85027 COMPLETE CBC AUTOMATED: CPT

## 2024-01-16 PROCEDURE — 85014 HEMATOCRIT: CPT

## 2024-01-16 PROCEDURE — 6370000000 HC RX 637 (ALT 250 FOR IP): Performed by: HOSPITALIST

## 2024-01-16 PROCEDURE — P9016 RBC LEUKOCYTES REDUCED: HCPCS

## 2024-01-16 PROCEDURE — 82962 GLUCOSE BLOOD TEST: CPT

## 2024-01-16 PROCEDURE — 6370000000 HC RX 637 (ALT 250 FOR IP): Performed by: INTERNAL MEDICINE

## 2024-01-16 PROCEDURE — 86850 RBC ANTIBODY SCREEN: CPT

## 2024-01-16 PROCEDURE — 84100 ASSAY OF PHOSPHORUS: CPT

## 2024-01-16 PROCEDURE — 94761 N-INVAS EAR/PLS OXIMETRY MLT: CPT

## 2024-01-16 RX ORDER — PANTOPRAZOLE SODIUM 40 MG/1
40 TABLET, DELAYED RELEASE ORAL
Status: DISCONTINUED | OUTPATIENT
Start: 2024-01-16 | End: 2024-01-17 | Stop reason: HOSPADM

## 2024-01-16 RX ORDER — SODIUM CHLORIDE 9 MG/ML
INJECTION, SOLUTION INTRAVENOUS PRN
Status: DISCONTINUED | OUTPATIENT
Start: 2024-01-16 | End: 2024-01-17 | Stop reason: HOSPADM

## 2024-01-16 RX ADMIN — IRON SUCROSE 100 MG: 20 INJECTION, SOLUTION INTRAVENOUS at 22:52

## 2024-01-16 RX ADMIN — SODIUM CHLORIDE, PRESERVATIVE FREE 5 ML: 5 INJECTION INTRAVENOUS at 20:15

## 2024-01-16 RX ADMIN — CEFTRIAXONE 1000 MG: 1 INJECTION, POWDER, FOR SOLUTION INTRAMUSCULAR; INTRAVENOUS at 09:47

## 2024-01-16 RX ADMIN — GUAIFENESIN 600 MG: 600 TABLET ORAL at 09:46

## 2024-01-16 RX ADMIN — AMLODIPINE BESYLATE 5 MG: 5 TABLET ORAL at 09:47

## 2024-01-16 RX ADMIN — GUAIFENESIN 600 MG: 600 TABLET ORAL at 20:10

## 2024-01-16 RX ADMIN — SODIUM CHLORIDE, PRESERVATIVE FREE 10 ML: 5 INJECTION INTRAVENOUS at 09:47

## 2024-01-16 RX ADMIN — Medication 1 CAPSULE: at 09:46

## 2024-01-16 RX ADMIN — PANTOPRAZOLE SODIUM 40 MG: 40 INJECTION, POWDER, FOR SOLUTION INTRAVENOUS at 06:38

## 2024-01-16 RX ADMIN — PANTOPRAZOLE SODIUM 40 MG: 40 TABLET, DELAYED RELEASE ORAL at 16:53

## 2024-01-16 RX ADMIN — INSULIN GLARGINE 15 UNITS: 100 INJECTION, SOLUTION SUBCUTANEOUS at 20:13

## 2024-01-16 RX ADMIN — FUROSEMIDE 40 MG: 40 TABLET ORAL at 09:47

## 2024-01-16 RX ADMIN — AZITHROMYCIN MONOHYDRATE 500 MG: 500 INJECTION, POWDER, LYOPHILIZED, FOR SOLUTION INTRAVENOUS at 18:39

## 2024-01-16 RX ADMIN — ATORVASTATIN CALCIUM 20 MG: 20 TABLET, FILM COATED ORAL at 09:46

## 2024-01-16 ASSESSMENT — PAIN SCALES - GENERAL: PAINLEVEL_OUTOF10: 0

## 2024-01-16 NOTE — CARE COORDINATION
1/16/2024  4:38 PM  [facility] has accepted pt, order completed,  AVS updated  KEESHA Johansen      4:33 PM     01/16/24 1631   Service Assessment   Patient Orientation Alert and Oriented   Discharge Planning   Patient expects to be discharged to: House   Services At/After Discharge   Transition of Care Consult (CM Consult) Home Health   Internal Home Health No   Reason Outside Agency Chosen Script used patient chose alternate agency   Condition of Participation: Discharge Planning   The Plan for Transition of Care is related to the following treatment goals: PNA, VIVIAN, Home Health   The Patient and/or Patient Representative was provided with a Choice of Provider? Patient   The Patient and/Or Patient Representative agree with the Discharge Plan? Yes   Freedom of Choice list was provided with basic dialogue that supports the patient's individualized plan of care/goals, treatment preferences, and shares the quality data associated with the providers?  (S)  Yes     CM accessed EMR to assist JODI Delaney w/ HH services.  CM met w/ pt, she is agreeable to HH at [place], [facility] offered, she prefers [facility] as she has used this agency in the past.  Referral sent in Care Port, await response  Pt confirmed she lives w/ her Dtr  Rosa and Rosa will transport home at [place]   KEESHA Johansen

## 2024-01-17 VITALS
HEART RATE: 85 BPM | OXYGEN SATURATION: 96 % | SYSTOLIC BLOOD PRESSURE: 131 MMHG | HEIGHT: 62 IN | RESPIRATION RATE: 14 BRPM | WEIGHT: 114 LBS | DIASTOLIC BLOOD PRESSURE: 56 MMHG | BODY MASS INDEX: 20.98 KG/M2 | TEMPERATURE: 98.1 F

## 2024-01-17 LAB
ABO + RH BLD: NORMAL
ANION GAP SERPL CALC-SCNC: 5 MMOL/L (ref 5–15)
BACTERIA SPEC CULT: NORMAL
BACTERIA SPEC CULT: NORMAL
BLD PROD TYP BPU: NORMAL
BLOOD BANK DISPENSE STATUS: NORMAL
BLOOD GROUP ANTIBODIES SERPL: NORMAL
BPU ID: NORMAL
BUN SERPL-MCNC: 16 MG/DL (ref 6–20)
BUN/CREAT SERPL: 12 (ref 12–20)
CALCIUM SERPL-MCNC: 8.7 MG/DL (ref 8.5–10.1)
CHLORIDE SERPL-SCNC: 117 MMOL/L (ref 97–108)
CO2 SERPL-SCNC: 20 MMOL/L (ref 21–32)
CREAT SERPL-MCNC: 1.35 MG/DL (ref 0.55–1.02)
CROSSMATCH RESULT: NORMAL
CRP SERPL-MCNC: 3.68 MG/DL (ref 0–0.6)
ERYTHROCYTE [DISTWIDTH] IN BLOOD BY AUTOMATED COUNT: 16.8 % (ref 11.5–14.5)
GLUCOSE BLD STRIP.AUTO-MCNC: 168 MG/DL (ref 65–117)
GLUCOSE BLD STRIP.AUTO-MCNC: 78 MG/DL (ref 65–117)
GLUCOSE SERPL-MCNC: 72 MG/DL (ref 65–100)
HCT VFR BLD AUTO: 31 % (ref 35–47)
HGB BLD-MCNC: 9.8 G/DL (ref 11.5–16)
MAGNESIUM SERPL-MCNC: 2.1 MG/DL (ref 1.6–2.4)
MCH RBC QN AUTO: 25.3 PG (ref 26–34)
MCHC RBC AUTO-ENTMCNC: 31.6 G/DL (ref 30–36.5)
MCV RBC AUTO: 79.9 FL (ref 80–99)
NRBC # BLD: 0 K/UL (ref 0–0.01)
NRBC BLD-RTO: 0 PER 100 WBC
PHOSPHATE SERPL-MCNC: 2.5 MG/DL (ref 2.6–4.7)
PLATELET # BLD AUTO: 245 K/UL (ref 150–400)
PMV BLD AUTO: 11 FL (ref 8.9–12.9)
POTASSIUM SERPL-SCNC: 3.7 MMOL/L (ref 3.5–5.1)
RBC # BLD AUTO: 3.88 M/UL (ref 3.8–5.2)
SERVICE CMNT-IMP: ABNORMAL
SERVICE CMNT-IMP: NORMAL
SODIUM SERPL-SCNC: 142 MMOL/L (ref 136–145)
SPECIMEN EXP DATE BLD: NORMAL
UNIT DIVISION: 0
WBC # BLD AUTO: 6.4 K/UL (ref 3.6–11)

## 2024-01-17 PROCEDURE — 6360000002 HC RX W HCPCS: Performed by: INTERNAL MEDICINE

## 2024-01-17 PROCEDURE — 2580000003 HC RX 258: Performed by: HOSPITALIST

## 2024-01-17 PROCEDURE — 84100 ASSAY OF PHOSPHORUS: CPT

## 2024-01-17 PROCEDURE — 6370000000 HC RX 637 (ALT 250 FOR IP): Performed by: HOSPITALIST

## 2024-01-17 PROCEDURE — 97530 THERAPEUTIC ACTIVITIES: CPT

## 2024-01-17 PROCEDURE — 82962 GLUCOSE BLOOD TEST: CPT

## 2024-01-17 PROCEDURE — 6370000000 HC RX 637 (ALT 250 FOR IP): Performed by: INTERNAL MEDICINE

## 2024-01-17 PROCEDURE — 86140 C-REACTIVE PROTEIN: CPT

## 2024-01-17 PROCEDURE — 97116 GAIT TRAINING THERAPY: CPT

## 2024-01-17 PROCEDURE — 83735 ASSAY OF MAGNESIUM: CPT

## 2024-01-17 PROCEDURE — 36415 COLL VENOUS BLD VENIPUNCTURE: CPT

## 2024-01-17 PROCEDURE — 80048 BASIC METABOLIC PNL TOTAL CA: CPT

## 2024-01-17 PROCEDURE — 85027 COMPLETE CBC AUTOMATED: CPT

## 2024-01-17 PROCEDURE — 2580000003 HC RX 258: Performed by: INTERNAL MEDICINE

## 2024-01-17 RX ORDER — CEFDINIR 300 MG/1
300 CAPSULE ORAL 2 TIMES DAILY
Qty: 6 CAPSULE | Refills: 0 | Status: SHIPPED | OUTPATIENT
Start: 2024-01-17 | End: 2024-01-20

## 2024-01-17 RX ORDER — PANTOPRAZOLE SODIUM 40 MG/1
40 TABLET, DELAYED RELEASE ORAL
Qty: 60 TABLET | Refills: 0 | Status: SHIPPED | OUTPATIENT
Start: 2024-01-17

## 2024-01-17 RX ADMIN — ATORVASTATIN CALCIUM 20 MG: 20 TABLET, FILM COATED ORAL at 09:22

## 2024-01-17 RX ADMIN — PANTOPRAZOLE SODIUM 40 MG: 40 TABLET, DELAYED RELEASE ORAL at 05:49

## 2024-01-17 RX ADMIN — FUROSEMIDE 40 MG: 40 TABLET ORAL at 09:22

## 2024-01-17 RX ADMIN — AMLODIPINE BESYLATE 5 MG: 5 TABLET ORAL at 09:22

## 2024-01-17 RX ADMIN — SODIUM CHLORIDE, PRESERVATIVE FREE 10 ML: 5 INJECTION INTRAVENOUS at 09:22

## 2024-01-17 RX ADMIN — WATER 1000 MG: 1 INJECTION INTRAMUSCULAR; INTRAVENOUS; SUBCUTANEOUS at 09:22

## 2024-01-17 RX ADMIN — Medication 1 CAPSULE: at 09:22

## 2024-01-17 RX ADMIN — GUAIFENESIN 600 MG: 600 TABLET ORAL at 09:22

## 2024-01-17 NOTE — DISCHARGE INSTRUCTIONS
Patient or Representative Signature                                                          Date/Time                                                                                                                                              Patient or Representative Signature                                                          Date/Time      Signed By: Hugo Castro MD     January 17, 2024

## 2024-01-17 NOTE — PLAN OF CARE
Problem: Occupational Therapy - Adult  Goal: By Discharge: Performs self-care activities at highest level of function for planned discharge setting.  See evaluation for individualized goals.  Description: FUNCTIONAL STATUS PRIOR TO ADMISSION:  Patient was MOD I with rolling walker and was MOD I for ADLs. No home O2 use.    , ADL Assistance: Independent,  ,  ,  ,  ,  , Homemaking Assistance: Independent, Ambulation Assistance: Independent, Transfer Assistance: Independent, Active : No     HOME SUPPORT: Patient lived with her daughter.    Occupational Therapy Goals:  Initiated 1/14/2024  1.  Patient will perform grooming, standing at sink, with [place] and Modified [place] within 7 day(s).  2.  Patient will perform lower body dressing with Modified [place] within 7 day(s).  3.  Patient will perform bathing with Supervision within 7 day(s).  4.  Patient will perform toilet transfers with Modified [place]  within 7 day(s).  5.  Patient will perform all aspects of toileting with Modified [place] within 7 day(s).  6.  Patient will participate in upper extremity therapeutic exercise/activities with [place] for 10 minutes within 7 day(s).    Outcome: Progressing     OCCUPATIONAL THERAPY EVALUATION    Patient: Janis Chadwick (89 y.o. female)  Date: 1/14/2024  Primary Diagnosis: VIVIAN (acute kidney injury) (HCC) [N17.9]  Pneumonia of right upper lobe due to infectious organism [J18.9]  Community acquired pneumonia of right upper lobe of lung [J18.9]         Precautions: Fall Risk                  ASSESSMENT :  The patient is limited by decreased functional mobility, independence in ADLs, high-level IADLs, strength, activity tolerance, balance following admission for CAP. Pt also with hgb of 6.4 on arrival, now s/p transfusion and cleared to participate.    Based on the impairments listed above patient presents just below her baseline. She is alert and oriented and motivated to participate, 
  Problem: Physical Therapy - Adult  Goal: By Discharge: Performs mobility at highest level of function for planned discharge setting.  See evaluation for individualized goals.  Description: FUNCTIONAL STATUS PRIOR TO ADMISSION: Patient was modified independent using a rolling walker for functional mobility.    HOME SUPPORT PRIOR TO ADMISSION: The patient lived with her daughter but did not require assistance.    Physical Therapy Goals  Initiated 1/14/2024  1.  Patient will move from supine to sit and sit to supine, scoot up and down, and roll side to side in bed with independence within 7 day(s).    2.  Patient will perform sit to stand with modified independence within 7 day(s).  3.  Patient will transfer from bed to chair and chair to bed with modified independence using the least restrictive device within 7 day(s).  4.  Patient will ambulate with modified independence for 150 feet with the least restrictive device within 7 day(s).   5.  Patient will ascend/descend 1 stairs with no handrail(s) with modified independence within 7 day(s).   Outcome: Progressing     PHYSICAL THERAPY EVALUATION    Patient: Janis Chadwick (89 y.o. female)  Date: 1/14/2024  Primary Diagnosis: VIVIAN (acute kidney injury) ([facility]) [N17.9]  Pneumonia of right upper lobe due to infectious organism [J18.9]  Community acquired pneumonia of right upper lobe of lung [J18.9]       Precautions: Fall Risk                      ASSESSMENT :   DEFICITS/IMPAIRMENTS:   The patient is limited by decreased independence in ADLs, strength, activity tolerance, safety awareness, balance, increased pain levels s/p admission for CAP and acute on chronic GIB (s/p transfusion). Patient reports being in increased arthritic pain in RLE due to prolonged bedrest. She completed bed mobility with SBA, transfers and gait with CGA and use of RW. Progressed from step-to gait pattern to step-through as ROM increased. Mildly dyspneic but tolerated activity on RA with stable 
  Problem: Safety - Adult  Goal: Free from fall injury  1/14/2024 0143 by Sharri Reynoso RN  Outcome: Progressing  1/14/2024 0143 by Sharri Reynoso RN  Outcome: Progressing     Problem: Pain  Goal: Verbalizes/displays adequate comfort level or baseline comfort level  Outcome: Progressing     Problem: Skin/Tissue Integrity  Goal: Absence of new skin breakdown  Description: 1.  Monitor for areas of redness and/or skin breakdown  2.  Assess vascular access sites hourly  3.  Every 4-6 hours minimum:  Change oxygen saturation probe site  4.  Every 4-6 hours:  If on nasal continuous positive airway pressure, respiratory therapy assess nares and determine need for appliance change or resting period.  Outcome: Progressing     Problem: Chronic Conditions and Co-morbidities  Goal: Patient's chronic conditions and co-morbidity symptoms are monitored and maintained or improved  Outcome: Progressing     
  Problem: Safety - Adult  Goal: Free from fall injury  Outcome: Progressing     Problem: Pain  Goal: Verbalizes/displays adequate comfort level or baseline comfort level  Outcome: Progressing     Problem: Skin/Tissue Integrity  Goal: Absence of new skin breakdown  Description: 1.  Monitor for areas of redness and/or skin breakdown  2.  Assess vascular access sites hourly  3.  Every 4-6 hours minimum:  Change oxygen saturation probe site  4.  Every 4-6 hours:  If on nasal continuous positive airway pressure, respiratory therapy assess nares and determine need for appliance change or resting period.  Outcome: Progressing     Problem: Chronic Conditions and Co-morbidities  Goal: Patient's chronic conditions and co-morbidity symptoms are monitored and maintained or improved  Outcome: Progressing     Problem: ABCDS Injury Assessment  Goal: Absence of physical injury  Outcome: Progressing     
  Problem: Safety - Adult  Goal: Free from fall injury  Outcome: Progressing     Problem: Pain  Goal: Verbalizes/displays adequate comfort level or baseline comfort level  Outcome: Progressing     Problem: Skin/Tissue Integrity  Goal: Absence of new skin breakdown  Description: 1.  Monitor for areas of redness and/or skin breakdown  2.  Assess vascular access sites hourly  3.  Every 4-6 hours minimum:  Change oxygen saturation probe site  4.  Every 4-6 hours:  If on nasal continuous positive airway pressure, respiratory therapy assess nares and determine need for appliance change or resting period.  Outcome: Progressing     Problem: Chronic Conditions and Co-morbidities  Goal: Patient's chronic conditions and co-morbidity symptoms are monitored and maintained or improved  Outcome: Progressing     Problem: ABCDS Injury Assessment  Goal: Absence of physical injury  Outcome: Progressing     
  Problem: Safety - Adult  Goal: Free from fall injury  Outcome: Progressing     Problem: Pain  Goal: Verbalizes/displays adequate comfort level or baseline comfort level  Outcome: Progressing     Problem: Skin/Tissue Integrity  Goal: Absence of new skin breakdown  Description: 1.  Monitor for areas of redness and/or skin breakdown  2.  Assess vascular access sites hourly  3.  Every 4-6 hours minimum:  Change oxygen saturation probe site  4.  Every 4-6 hours:  If on nasal continuous positive airway pressure, respiratory therapy assess nares and determine need for appliance change or resting period.  Outcome: Progressing     Problem: Chronic Conditions and Co-morbidities  Goal: Patient's chronic conditions and co-morbidity symptoms are monitored and maintained or improved  Outcome: Progressing  Flowsheets (Taken 1/16/2024 1957)  Care Plan - Patient's Chronic Conditions and Co-Morbidity Symptoms are Monitored and Maintained or Improved: Monitor and assess patient's chronic conditions and comorbid symptoms for stability, deterioration, or improvement     Problem: ABCDS Injury Assessment  Goal: Absence of physical injury  Outcome: Progressing     
Given To: Patient;Family  Education Provided: Role of Therapy;Plan of Care  Education Provided Comments: PLB, neutral wrist on RW   Education Method: Verbal;Demonstration  Barriers to Learning: None  Education Outcome: Verbalized understanding      Lex Funez, CARL  Minutes: 26   
in chair, Call bell within reach, and Heels elevated for pressure relief      COMMUNICATION/EDUCATION:   The patient's plan of care was discussed with: occupational therapist and registered nurse    Patient Education  Education Given To: Patient  Education Provided: Role of Therapy;Plan of Care;Transfer Training;Fall Prevention Strategies  Education Method: Verbal;Demonstration  Barriers to Learning: None  Education Outcome: Verbalized understanding;Demonstrated understanding      Emanuel Hackett PT, DPT  Minutes: 25

## 2024-01-17 NOTE — DISCHARGE SUMMARY
Hospitalist Discharge Summary     Patient ID:  Janis Chadwick  [id number]  89 y.o.  2/21/1934    Admit date: 1/11/2024    Discharge date and time: 1/17/2024    Admission Diagnoses: VIVIAN (acute kidney injury) (HCC) [N17.9]  Pneumonia of right upper lobe due to infectious organism [J18.9]  Community acquired pneumonia of right upper lobe of lung [J18.9]      Discharge Diagnoses:      Principal Problem:    Community acquired pneumonia of right upper lobe of lung  Resolved Problems:    * No resolved hospital problems. 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     RUL pneumonia:   - was on doxy prior to admission   - started on rocephin and azithromcyin empirically.   - improved.  Weaned to room air  - blood cultures negative  - completed 5 days azithromyin  - will convert to omnicef for 3 more days on discharge to complete 8 day course.    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  No scope was planned.    - S/p 2u RBCs.  Gave IV iron.    - Hgb anastasia was 6.4 on 1/11/24.  Improved and remained stable.  9.8 on discharge.     VIVIAN/CKD 4:  - Cr peak 2.33, now 1.35  - improved with IVF.       HTN:   - lasix initially held for VIVIAN, resumed with improvement in creatinine  - continue norvasc.       DM type 2 w/ renal complications:   - A1C 6.3%.  -  cont Lantus, SSI     CAD: s/p stents.     - continue statin.   - Aspirin was held due to chronic GI bleeding.  May resume as outpatient if hemoglobin remains stable.          PCP: Paradise Rubio APRN - NP     Consults: GI    Condition of

## 2024-01-17 NOTE — PROGRESS NOTES
Hospitalist Progress Note    NAME: Janis Chadwick   :  1934   MRN:  [id number]     Date/Time:  2024 8:32 AM    Patient PCP: Paradise Rubio APRN - NP       Subjective:   CHIEF COMPLAINT:  pneumonia       Dyspnea and chest pain are stable. Spiked fever despite rocpehin and doxycycline.  Reports no gross GI bleeding or melena.  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  Follow by Dr. Tineo.        Objective:   VITALS:    Vitals:    24 0827   BP: (!) 147/57   Pulse: 83   Resp: 16   Temp: 98.6 °F (37 °C)   SpO2: 96%       PHYSICAL EXAM:      General:   No acute distress, resting comfortably in bed.  Heart:  RRR, No MRG  Lungs:  CTAB.  Non-labored breathing.  Abdomen:  Soft .  Nondistended.  Lower abdominal tenderness without rebound or guarding.  BS present.  Extremities:  No edema.  Skin:  No rash  Neuro:  Alert and interactive.  No focal deficits.  Psych: Mood stable.        LAB DATA REVIEWED:    Recent Results (from the past 24 hour(s))   EKG 12 Lead    Collection Time: 24  1:29 PM   Result Value Ref Range    Ventricular Rate 91 BPM    Atrial Rate 91 BPM    P-R Interval 166 ms    QRS Duration 96 ms    Q-T Interval 394 ms    QTc Calculation (Bazett) 484 ms    P Axis 28 degrees    R Axis 0 degrees    T Axis 29 degrees    Diagnosis       Sinus rhythm with premature atrial complexes  Possible Anterior infarct , age undetermined  Abnormal ECG  When compared with ECG of 2023 11:54,  premature atrial complexes are now present  Confirmed by Verónica Solorio ([id number]) on 2024 4:19:20 PM     CBC with Auto Differential    Collection Time: 24  1:39 PM   Result Value Ref Range    WBC 7.2 3.6 - 11.0 K/uL    RBC 3.28 (L) 3.80 - 5.20 M/uL    Hemoglobin 7.8 (L) 11.5 - 16.0 g/dL    Hematocrit 25.0 (L) 35.0 - 47.0 %    MCV 76.2 (L) 80.0 - 99.0 FL    MCH 23.8 (L) 26.0 - 34.0 PG    MCHC 31.2 30.0 - 36.5 g/dL    RDW 16.0 (H) 11.5 - 14.5 %    Platelets 202 150 - 400 K/uL    MPV 10.6 8.9 
.sbar  
1409 Patient getting discharged home , IV removed . Educated the discharge instructions and given a chance to ask questions .    Daughter at bedside to transport home .  
Chart reviewed in preparation for physical and occupational therapy session. Patient receiving 1u PRBC transfusion at this time and unavailable for PT interventions. Will continue to follow.    Thank you,  Emanuel Hackett, PT, DPT    
[facility]  [address] [place] [address] [phone]        Hospitalist Progress Note      NAME: Janis Chadwick   :  1934  MRM:  [id number]    Date/Time of service: 2024  11:17 AM       Subjective:     Chief Complaint:  Patient was personally seen and examined by me during this time period.  Chart reviewed.  No bleeding or abd pain       Objective:       Vitals:       Last 24hrs VS reviewed since prior progress note. Most recent are:    Vitals:    24 0730   BP: (!) 149/50   Pulse: 72   Resp: 18   Temp: 97.9 °F (36.6 °C)   SpO2: 94%     SpO2 Readings from Last 6 Encounters:   24 94%   23 97%   10/11/23 99%   23 95%          Intake/Output Summary (Last 24 hours) at 2024 1117  Last data filed at 2024 0638  Gross per 24 hour   Intake 10 ml   Output 200 ml   Net -190 ml          Exam:     Physical Exam:    Gen:  elderly, frail, NAD  HEENT:  Pink conjunctivae, PERRL, hearing intact to voice, pale mucous membranes  Neck:  Supple, without masses, thyroid non-tender  Resp:  No accessory muscle use, clear breath sounds without wheezes rales or rhonchi  Card:  No murmurs, normal S1, S2 without thrills, bruits or peripheral edema  Abd:  Soft, non-tender, non-distended, normoactive bowel sounds are present  Musc:  No cyanosis or clubbing  Skin:  No rashes  Neuro:  Cranial nerves 3-12 are grossly intact, follows commands appropriately  Psych:  poor insight, oriented to person, place and time, alert    Medications Reviewed: (see below)    Lab Data Reviewed: (see below)    ______________________________________________________________________    Medications:     Current Facility-Administered Medications   Medication Dose Route Frequency    0.9 % sodium chloride infusion   IntraVENous PRN    pantoprazole (PROTONIX) tablet 40 mg  40 mg Oral BID AC    [START ON 2024] cefTRIAXone (ROCEPHIN) 1,000 mg in sterile water 10 mL IV syringe  
[facility]  [address] [place] [address] [phone]        Hospitalist Progress Note      NAME: Janis Chdawick   :  1934  MRM:  [id number]    Date/Time of service: 1/15/2024  2:39 PM       Subjective:     Chief Complaint:  Patient was personally seen and examined by me during this time period.  Chart reviewed.  No fevers, chills, or bleeding       Objective:       Vitals:       Last 24hrs VS reviewed since prior progress note. Most recent are:    Vitals:    01/15/24 0803   BP: (!) 165/57   Pulse: 85   Resp: 16   Temp: 98.6 °F (37 °C)   SpO2: 95%     SpO2 Readings from Last 6 Encounters:   01/15/24 95%   23 97%   10/11/23 99%   23 95%          Intake/Output Summary (Last 24 hours) at 1/15/2024 1439  Last data filed at 1/15/2024 0700  Gross per 24 hour   Intake 915.27 ml   Output 200 ml   Net 715.27 ml        Exam:     Physical Exam:    Gen:  elderly, frail, NAD  HEENT:  Pink conjunctivae, PERRL, hearing intact to voice, pale mucous membranes  Neck:  Supple, without masses, thyroid non-tender  Resp:  No accessory muscle use, clear breath sounds without wheezes rales or rhonchi  Card:  No murmurs, normal S1, S2 without thrills, bruits or peripheral edema  Abd:  Soft, non-tender, non-distended, normoactive bowel sounds are present  Musc:  No cyanosis or clubbing  Skin:  No rashes  Neuro:  Cranial nerves 3-12 are grossly intact, follows commands appropriately  Psych:  poor insight, oriented to person, place and time, alert    Medications Reviewed: (see below)    Lab Data Reviewed: (see below)    ______________________________________________________________________    Medications:     Current Facility-Administered Medications   Medication Dose Route Frequency    potassium phosphate 30 mmol in sodium chloride 0.9 % 500 mL IVPB  30 mmol IntraVENous Once    guaiFENesin (MUCINEX) extended release tablet 600 mg  600 mg Oral BID    benzonatate (TESSALON) 
[facility] Dosing Services: Antimicrobial Stewardship Daily Doc  Consult for antibiotic dosing of Vancomycin by Dr. Castro  Indication: Pneumonia  Day of Therapy: 2    Ht Readings from Last 1 Encounters:   01/11/24 1.575 m (5' 2\")        Wt Readings from Last 1 Encounters:   01/11/24 51.7 kg (114 lb)      Vancomycin therapy:  Loading dose: Vancomycin 1250 mg x1 dose given on 1/12 @1607  Maintenance dose: Vancomycin dosing based on level due to VIVIAN  Dose calculated to approximate a           a. Target AUC/JAYCOB of 400-600          b. Trough of 15-20 mcg/mL   Last level: 12 mcg/ml today at 1835 (~24hrs from dose)  Plan: Will re-dose with Vancomycin 750 mg IV x1 dose now. Please order a 24hr level tomorrow    Other Antimicrobial   (not dosed by pharmacist) Rocephin 1 gm IV q24hr  Doxycycline 100 mg po BID   Cultures 1/12: Blood: NG x8hr pending  1/12: Blood: NG x8hr pending  1/12: Nares: pending  1/11: Blood: NG x2 days pending  1/11: Blood: NG x2 days pending   Serum Creatinine Lab Results   Component Value Date/Time    CREATININE 2.20 01/13/2024 06:29 AM          Creatinine Clearance Estimated Creatinine Clearance: 14 mL/min (A) (based on SCr of 2.2 mg/dL (H)).     Temp Temp: 98.6 °F (37 °C)       WBC Lab Results   Component Value Date/Time    WBC 3.3 01/13/2024 06:29 AM          Procalcitonin No results found for: \"PCT\"     For Antifungals, Metronidazole and Nafcillin: Lab Results   Component Value Date/Time    ALT 17 01/11/2024 01:39 PM    AST 11 01/11/2024 01:39 PM        Pharmacist  Kayleigh Christensen, PharmD   [phone]     
[facility] Dosing Services: Antimicrobial Stewardship Daily Doc  Consult for antibiotic dosing of vancomycin by Dr. Castro  Indication: pneumonia  Day of Therapy: 1    Ht Readings from Last 1 Encounters:   01/11/24 1.575 m (5' 2\")        Wt Readings from Last 1 Encounters:   01/11/24 51.7 kg (114 lb)      Vancomycin therapy:  Loading dose: Vancomycin 1250 mg x1 dose now/given  Pharmacist dosing based on level due to VIVIAN  Dose calculated to approximate a           a. Target AUC/JAYCOB of 400-600          b. Trough of 15-20 mcg/mL   Plan: Level ordered for 1/13 at 6 pm    Other Antimicrobial   (not dosed by pharmacist) Ceftriaxone, doxycycline   Cultures    Serum Creatinine Lab Results   Component Value Date/Time    CREATININE 2.16 01/12/2024 02:30 AM          Creatinine Clearance Estimated Creatinine Clearance: 14 mL/min (A) (based on SCr of 2.16 mg/dL (H)).     Temp Temp: (!) 102.4 °F (39.1 °C) (Oral)       WBC Lab Results   Component Value Date/Time    WBC 6.2 01/12/2024 02:30 AM          Procalcitonin No results found for: \"PCT\"     For Antifungals, Metronidazole and Nafcillin: Lab Results   Component Value Date/Time    ALT 17 01/11/2024 01:39 PM    AST 11 01/11/2024 01:39 PM        Pharmacist:   Rosalie Cisse, PharmD, BCPS   [phone]   
Orders received, chart reviewed and patient evaluated by physical therapy. Pending progression with skilled acute physical therapy, recommend:  Therapy 2 days/week in the home    Recommend with nursing OOB to chair 3x/day and walking daily with 1-person assist and rolling walker. Thank you for completing as able in order to maintain patient strength, endurance and independence.     Full evaluation to follow.      Thank you,  Lex Funez, PT, DPT  
Pharmacy Dosing Services: 1/16/24    The pharmacist has determined that this patient meets P & T approved criteria for conversion from IV to oral therapy for the following medication: protonix      The pharmacist has written the following order for the patient: protonix 40mg PO BID    The pharmacist will continue to monitor the patient's status and advise the physician if conversion back to IV therapy is recommended.    Signed BRENTON BELTRAN RPH Contact information: [phone]    
Pt with fever. 102.4. NP Masood notified. Tylenol given per order. Will continue to monitor  
Pt's daughter and POA wants to be notified if pt gets an inpt room.   Rosa  [phone]  
Spiritual Care Assessment/Progress Note  [facility]    Name: Janis Chadwick MRN: [id number]    Age: 89 y.o.     Sex: female   Language: English     Date: 2024            Total Time Calculated: 45 min              Spiritual Assessment begun in [facility] B5 MULTI-SPECIALTY ONCOLOGY 1  Service Provided For:: Patient     Encounter Overview/Reason : Initial Encounter    Spiritual beliefs:      [x] Involved in a rufus tradition/spiritual practice:  Pentecostal/Lutheran      [x] Supported by a rufus community:      [] Claims no spiritual orientation:      [] Seeking spiritual identity:           [] Adheres to an individual form of spirituality:      [] Not able to assess:                Identified resources for coping and support system:   Support System: Children, Family members, Worship/rufus community       [x] Prayer                  [] Devotional reading               [] Music                  [] Guided Imagery     [] Pet visits                                        [] Other: (COMMENT)     Specific area/focus of visit   Encounter:    Crisis:    Spiritual/Emotional needs: Type: Spiritual Support  Ritual, Rites and Sacraments:    Grief, Loss, and Adjustments:    Ethics/Mediation:    Behavioral Health:    Palliative Care:    Advance Care Planning:           Narrative:   Spiritual care assessment with Ms. Janis Chadwick on B5 Multi-specialty Oncology unit.  Reviewed chart prior to visit. Pt is sitting in chair, reclined, connected to monitors. She is fully awake, and very welcoming. She shared that her late  was a  and the Roman Catholic still going and praying for her. She has 5 adult children and grandchildren; one son . She also shared that she was  over 65 years. She shared life reviews and stories. She said that she should be discharged today and is hoping so. She lives with her daughter and looking forward to discharge. She expressed much gratitude and prayers.     Chaplain Salcedo 
TRANSFER - OUT REPORT:    Verbal report given to LAM Garcia on Janis Chadwick  being transferred to [facility] for routine progression of patient care       Report consisted of patient's Situation, Background, Assessment and   Recommendations(SBAR).     Information from the following report(s) Nurse Handoff Report, Index, ED SBAR, Intake/Output, MAR, Recent Results, and Cardiac Rhythm    was reviewed with the receiving nurse.    [place] Fall Assessment:    Presents to emergency department  because of falls (Syncope, seizure, or loss of consciousness): No  Age > 70: Yes  Altered Mental Status, Intoxication with alcohol or substance confusion (Disorientation, impaired judgment, poor safety awaremess, or inability to follow instructions): No  Impaired Mobility: Ambulates or transfers with assistive devices or assistance; Unable to ambulate or transer.: Yes  Nursing Judgement: Yes          Lines:   Peripheral IV 01/11/24 Left Antecubital (Active)   Site Assessment Clean, dry & intact 01/11/24 2030   Line Status Infusing 01/11/24 2030   Line Care Connections checked and tightened 01/11/24 2030   Phlebitis Assessment No symptoms 01/11/24 2030   Infiltration Assessment 0 01/11/24 2030   Alcohol Cap Used No 01/11/24 2030   Dressing Status Clean, dry & intact 01/11/24 2030   Dressing Type Transparent 01/11/24 2030       Peripheral IV 01/12/24 Right Antecubital (Active)        Opportunity for questions and clarification was provided.      Patient transported with:  Tech        
[facility] Pharm[facility] Dosing Services: Antimicrobial Stewardship Daily Doc  Consult for antibiotic dosing of Vancomycin by Dr. Castro  Indication: Pneumonia  Day of Therapy: 3    Ht Readings from Last 1 Encounters:   01/11/24 1.575 m (5' 2\")        Wt Readings from Last 1 Encounters:   01/11/24 51.7 kg (114 lb)      Vancomycin therapy:  Loading dose: Vancomycin 1250 mg x1 dose given on 1/12 @1607  Maintenance dose: Vancomycin dosing based on level due to VIVIAN  Dose calculated to approximate a           a. Target AUC/JAYCOB of 400-600          b. Trough of 15-20 mcg/mL   Last level: 12 mcg/ml today at 1835 (~24hrs from dose)  Plan: Vancomycin 750 mg IV x1 dose level ordered for 1/14/24 @ 1800, renal fx improved slightly    MRSA screen neg      Other Antimicrobial   (not dosed by pharmacist) Rocephin 1 gm IV q24hr  Doxycycline 100 mg po BID   Cultures 1/12: Blood: NG x8hr pending  1/12: Blood: NG x8hr pending  1/12: Nares: pending  1/11: Blood: NG x2 days pending  1/11: Blood: NG x2 days pending   Serum Creatinine Lab Results   Component Value Date/Time    CREATININE 1.89 01/14/2024 05:27 AM          Creatinine Clearance Estimated Creatinine Clearance: 16 mL/min (A) (based on SCr of 1.89 mg/dL (H)).     Temp Temp: 98.2 °F (36.8 °C) (Oral)       WBC Lab Results   Component Value Date/Time    WBC 3.7 01/14/2024 05:27 AM          Procalcitonin No results found for: \"PCT\"     For Antifungals, Metronidazole and Nafcillin: Lab Results   Component Value Date/Time    ALT 17 01/11/2024 01:39 PM    AST 11 01/11/2024 01:39 PM        Derrick Babcock PharmD  Remote Order Verification Pharmacist  [employer] Phone # [phone]        
    Coronavirus 229E by PCR Not detected        Coronavirus HKU1 by PCR Not detected        Coronavirus NL63 by PCR Not detected        Coronavirus OC43 by PCR Not detected        SARS-CoV-2, PCR Not detected        Human Metapneumovirus by PCR Not detected        Rhinovirus Enterovirus PCR Not detected        Influenza A by PCR Not detected        Influenza B PCR Not detected        Parainfluenza 1 PCR Not detected        Parainfluenza 2 PCR Not detected        Parainfluenza 3 PCR Not detected        Parainfluenza 4 PCR Not detected        Respiratory Syncytial Virus by PCR Not detected        Bordetella parapertussis by PCR Not detected        Bordetella pertussis by PCR Not detected        Chlamydophila Pneumonia PCR Not detected        Mycoplasma pneumo by PCR Not detected       Culture, MRSA, Screening [[id number]] Collected: 01/12/24 1952    Order Status: Completed Specimen: Nares Updated: 01/13/24 2334     Special Requests NO SPECIAL REQUESTS        Culture MRSA NOT PRESENT               Screening of patient nares for MRSA is for surveillance purposes and, if positive, to facilitate isolation considerations in high risk settings. It is not intended for automatic decolonization interventions per se as regimens are not sufficiently effective to warrant routine use.      Culture, Blood 1 [[id number]] Collected: 01/11/24 1453    Order Status: Completed Specimen: Blood Updated: 01/14/24 0556     Special Requests --        LEFT  Antecubital       Culture NO GROWTH 3 DAYS       Culture, Blood 2 [[id number]] Collected: 01/11/24 1453    Order Status: Completed Specimen: Blood Updated: 01/14/24 0556     Special Requests --        NO SPECIAL REQUESTS  RIGHT  Antecubital       Culture NO GROWTH 3 DAYS       Rapid influenza A/B antigens [[id number]] Collected: 01/11/24 1339    Order Status: Completed Specimen: Nasopharyngeal Updated: 01/11/24 1417     Influenza A Ag Negative        Influenza B Ag Negative       COVID-19, 
229E by PCR Not detected        Coronavirus HKU1 by PCR Not detected        Coronavirus NL63 by PCR Not detected        Coronavirus OC43 by PCR Not detected        SARS-CoV-2, PCR Not detected        Human Metapneumovirus by PCR Not detected        Rhinovirus Enterovirus PCR Not detected        Influenza A by PCR Not detected        Influenza B PCR Not detected        Parainfluenza 1 PCR Not detected        Parainfluenza 2 PCR Not detected        Parainfluenza 3 PCR Not detected        Parainfluenza 4 PCR Not detected        Respiratory Syncytial Virus by PCR Not detected        Bordetella parapertussis by PCR Not detected        Bordetella pertussis by PCR Not detected        Chlamydophila Pneumonia PCR Not detected        Mycoplasma pneumo by PCR Not detected       Culture, MRSA, Screening [[id number]] Collected: 01/12/24 1952    Order Status: Sent Specimen: Nares Updated: 01/12/24 2327    Culture, Blood 1 [[id number]] Collected: 01/11/24 1453    Order Status: Completed Specimen: Blood Updated: 01/13/24 0535     Special Requests --        LEFT  Antecubital       Culture NO GROWTH 2 DAYS       Culture, Blood 2 [[id number]] Collected: 01/11/24 1453    Order Status: Completed Specimen: Blood Updated: 01/13/24 0535     Special Requests --        NO SPECIAL REQUESTS  RIGHT  Antecubital       Culture NO GROWTH 2 DAYS       Rapid influenza A/B antigens [[id number]] Collected: 01/11/24 1339    Order Status: Completed Specimen: Nasopharyngeal Updated: 01/11/24 1417     Influenza A Ag Negative        Influenza B Ag Negative       COVID-19, Rapid [[id number]] Collected: 01/11/24 1339    Order Status: Completed Specimen: Nasopharyngeal Updated: 01/11/24 1415     Source Nasopharyngeal        SARS-CoV-2, Rapid Not detected        Comment: Rapid Abbott ID Now     Rapid NAAT:  The specimen is NEGATIVE for SARS-CoV-2, the novel coronavirus associated with COVID-19.     Negative results should be treated as presumptive and, if

## 2024-01-18 LAB
BACTERIA SPEC CULT: NORMAL
BACTERIA SPEC CULT: NORMAL
SERVICE CMNT-IMP: NORMAL
SERVICE CMNT-IMP: NORMAL

## 2024-06-20 NOTE — PROGRESS NOTES
Goals Addressed                   This Visit's Progress     Prevent complications post hospitalization. 2/10/23  Family able to verbalize s/s hypoglycemia/hyperglycemia and treatment  Diet- maintain consistent carb diet, eat 3 evenly spaced meals (4-5 hours apart) a day with snacks  Blood sugar monitor frequency, insulin administration, endocrinologist fu, stress / steroids can increase BS  Attend RELL appt-2/16/23, GILBERTO  2/17/23 Patient daughter called on home number on file. Message left on voicemail with contact information to return call to this writer. GILBERTO  2/24/23 Patient daughter called on home number on file. Message left on voicemail with contact information to return call to this writer. GILBERTO  3/3/23 Patient daughter called on home number on file. Message left on voicemail with contact information to return call to this writer.  GILBERTO
No

## 2024-07-09 ENCOUNTER — APPOINTMENT (OUTPATIENT)
Facility: HOSPITAL | Age: 89
End: 2024-07-09
Payer: MEDICARE

## 2024-07-09 ENCOUNTER — HOSPITAL ENCOUNTER (INPATIENT)
Facility: HOSPITAL | Age: 89
LOS: 2 days | Discharge: HOME | End: 2024-07-11
Attending: EMERGENCY MEDICINE | Admitting: INTERNAL MEDICINE
Payer: MEDICARE

## 2024-07-09 ENCOUNTER — APPOINTMENT (OUTPATIENT)
Dept: VASCULAR SURGERY | Facility: HOSPITAL | Age: 89
End: 2024-07-09
Attending: EMERGENCY MEDICINE
Payer: MEDICARE

## 2024-07-09 DIAGNOSIS — R06.00 DYSPNEA, UNSPECIFIED TYPE: ICD-10-CM

## 2024-07-09 DIAGNOSIS — R06.02 SHORTNESS OF BREATH: Primary | ICD-10-CM

## 2024-07-09 LAB
ABO + RH BLD: NORMAL
ALBUMIN SERPL-MCNC: 3.3 G/DL (ref 3.5–5)
ALBUMIN/GLOB SERPL: 1 (ref 1.1–2.2)
ALP SERPL-CCNC: 80 U/L (ref 45–117)
ALT SERPL-CCNC: 16 U/L (ref 12–78)
ANION GAP SERPL CALC-SCNC: 9 MMOL/L (ref 5–15)
APPEARANCE UR: CLEAR
AST SERPL-CCNC: 14 U/L (ref 15–37)
BACTERIA URNS QL MICRO: NEGATIVE /HPF
BASOPHILS # BLD: 0.1 K/UL (ref 0–0.1)
BASOPHILS NFR BLD: 1 % (ref 0–1)
BILIRUB SERPL-MCNC: 0.4 MG/DL (ref 0.2–1)
BILIRUB UR QL: NEGATIVE
BLOOD GROUP ANTIBODIES SERPL: NORMAL
BUN SERPL-MCNC: 23 MG/DL (ref 6–20)
BUN/CREAT SERPL: 13 (ref 12–20)
CALCIUM SERPL-MCNC: 8.5 MG/DL (ref 8.5–10.1)
CHLORIDE SERPL-SCNC: 112 MMOL/L (ref 97–108)
CO2 SERPL-SCNC: 21 MMOL/L (ref 21–32)
COLOR UR: ABNORMAL
CREAT SERPL-MCNC: 1.76 MG/DL (ref 0.55–1.02)
D DIMER PPP FEU-MCNC: 0.64 MG/L FEU (ref 0–0.65)
DIFFERENTIAL METHOD BLD: ABNORMAL
EKG ATRIAL RATE: 80 BPM
EKG DIAGNOSIS: NORMAL
EKG P AXIS: 49 DEGREES
EKG P-R INTERVAL: 158 MS
EKG Q-T INTERVAL: 422 MS
EKG QRS DURATION: 96 MS
EKG QTC CALCULATION (BAZETT): 486 MS
EKG R AXIS: 0 DEGREES
EKG T AXIS: 33 DEGREES
EKG VENTRICULAR RATE: 80 BPM
EOSINOPHIL # BLD: 0.2 K/UL (ref 0–0.4)
EOSINOPHIL NFR BLD: 4 % (ref 0–7)
EPITH CASTS URNS QL MICRO: ABNORMAL /LPF
ERYTHROCYTE [DISTWIDTH] IN BLOOD BY AUTOMATED COUNT: 13.3 % (ref 11.5–14.5)
FLUAV RNA SPEC QL NAA+PROBE: NOT DETECTED
FLUBV RNA SPEC QL NAA+PROBE: NOT DETECTED
GLOBULIN SER CALC-MCNC: 3.3 G/DL (ref 2–4)
GLUCOSE BLD STRIP.AUTO-MCNC: 126 MG/DL (ref 65–117)
GLUCOSE SERPL-MCNC: 227 MG/DL (ref 65–100)
GLUCOSE UR STRIP.AUTO-MCNC: NEGATIVE MG/DL
HCT VFR BLD AUTO: 28.8 % (ref 35–47)
HGB BLD-MCNC: 9.2 G/DL (ref 11.5–16)
HGB UR QL STRIP: NEGATIVE
HYALINE CASTS URNS QL MICRO: ABNORMAL /LPF (ref 0–2)
IMM GRANULOCYTES # BLD AUTO: 0 K/UL (ref 0–0.04)
IMM GRANULOCYTES NFR BLD AUTO: 0 % (ref 0–0.5)
KETONES UR QL STRIP.AUTO: NEGATIVE MG/DL
LEUKOCYTE ESTERASE UR QL STRIP.AUTO: ABNORMAL
LIPASE SERPL-CCNC: 54 U/L (ref 13–75)
LYMPHOCYTES # BLD: 1.1 K/UL (ref 0.8–3.5)
LYMPHOCYTES NFR BLD: 23 % (ref 12–49)
MAGNESIUM SERPL-MCNC: 2.2 MG/DL (ref 1.6–2.4)
MCH RBC QN AUTO: 27.3 PG (ref 26–34)
MCHC RBC AUTO-ENTMCNC: 31.9 G/DL (ref 30–36.5)
MCV RBC AUTO: 85.5 FL (ref 80–99)
MONOCYTES # BLD: 0.3 K/UL (ref 0–1)
MONOCYTES NFR BLD: 6 % (ref 5–13)
NEUTS SEG # BLD: 3.2 K/UL (ref 1.8–8)
NEUTS SEG NFR BLD: 66 % (ref 32–75)
NITRITE UR QL STRIP.AUTO: NEGATIVE
NRBC # BLD: 0 K/UL (ref 0–0.01)
NRBC BLD-RTO: 0 PER 100 WBC
NT PRO BNP: 535 PG/ML
PH UR STRIP: 7.5 (ref 5–8)
PLATELET # BLD AUTO: 160 K/UL (ref 150–400)
PMV BLD AUTO: 11.6 FL (ref 8.9–12.9)
POTASSIUM SERPL-SCNC: 3.3 MMOL/L (ref 3.5–5.1)
PROT SERPL-MCNC: 6.6 G/DL (ref 6.4–8.2)
PROT UR STRIP-MCNC: NEGATIVE MG/DL
RBC # BLD AUTO: 3.37 M/UL (ref 3.8–5.2)
RBC #/AREA URNS HPF: ABNORMAL /HPF (ref 0–5)
SARS-COV-2 RNA RESP QL NAA+PROBE: NOT DETECTED
SERVICE CMNT-IMP: ABNORMAL
SODIUM SERPL-SCNC: 142 MMOL/L (ref 136–145)
SP GR UR REFRACTOMETRY: 1.01 (ref 1–1.03)
SPECIMEN EXP DATE BLD: NORMAL
TROPONIN I SERPL HS-MCNC: 10 NG/L (ref 0–51)
TROPONIN I SERPL HS-MCNC: 12 NG/L (ref 0–51)
TROPONIN I SERPL HS-MCNC: 12 NG/L (ref 0–51)
URINE CULTURE IF INDICATED: ABNORMAL
UROBILINOGEN UR QL STRIP.AUTO: 0.2 EU/DL (ref 0.2–1)
WBC # BLD AUTO: 4.8 K/UL (ref 3.6–11)
WBC URNS QL MICRO: ABNORMAL /HPF (ref 0–4)

## 2024-07-09 PROCEDURE — 72100 X-RAY EXAM L-S SPINE 2/3 VWS: CPT

## 2024-07-09 PROCEDURE — 83735 ASSAY OF MAGNESIUM: CPT

## 2024-07-09 PROCEDURE — 83880 ASSAY OF NATRIURETIC PEPTIDE: CPT

## 2024-07-09 PROCEDURE — 71045 X-RAY EXAM CHEST 1 VIEW: CPT

## 2024-07-09 PROCEDURE — 85379 FIBRIN DEGRADATION QUANT: CPT

## 2024-07-09 PROCEDURE — 2580000003 HC RX 258: Performed by: INTERNAL MEDICINE

## 2024-07-09 PROCEDURE — 93970 EXTREMITY STUDY: CPT

## 2024-07-09 PROCEDURE — 81001 URINALYSIS AUTO W/SCOPE: CPT

## 2024-07-09 PROCEDURE — 99285 EMERGENCY DEPT VISIT HI MDM: CPT

## 2024-07-09 PROCEDURE — 0202U NFCT DS 22 TRGT SARS-COV-2: CPT

## 2024-07-09 PROCEDURE — 6360000002 HC RX W HCPCS: Performed by: INTERNAL MEDICINE

## 2024-07-09 PROCEDURE — 87636 SARSCOV2 & INF A&B AMP PRB: CPT

## 2024-07-09 PROCEDURE — 86901 BLOOD TYPING SEROLOGIC RH(D): CPT

## 2024-07-09 PROCEDURE — 83690 ASSAY OF LIPASE: CPT

## 2024-07-09 PROCEDURE — 86850 RBC ANTIBODY SCREEN: CPT

## 2024-07-09 PROCEDURE — 6370000000 HC RX 637 (ALT 250 FOR IP): Performed by: INTERNAL MEDICINE

## 2024-07-09 PROCEDURE — 93010 ELECTROCARDIOGRAM REPORT: CPT | Performed by: INTERNAL MEDICINE

## 2024-07-09 PROCEDURE — 85025 COMPLETE CBC W/AUTO DIFF WBC: CPT

## 2024-07-09 PROCEDURE — 93005 ELECTROCARDIOGRAM TRACING: CPT | Performed by: EMERGENCY MEDICINE

## 2024-07-09 PROCEDURE — 82962 GLUCOSE BLOOD TEST: CPT

## 2024-07-09 PROCEDURE — 80053 COMPREHEN METABOLIC PANEL: CPT

## 2024-07-09 PROCEDURE — 84484 ASSAY OF TROPONIN QUANT: CPT

## 2024-07-09 PROCEDURE — 2500000003 HC RX 250 WO HCPCS: Performed by: INTERNAL MEDICINE

## 2024-07-09 PROCEDURE — 36415 COLL VENOUS BLD VENIPUNCTURE: CPT

## 2024-07-09 PROCEDURE — 86900 BLOOD TYPING SEROLOGIC ABO: CPT

## 2024-07-09 PROCEDURE — 2060000000 HC ICU INTERMEDIATE R&B

## 2024-07-09 RX ORDER — SODIUM CHLORIDE 0.9 % (FLUSH) 0.9 %
5-40 SYRINGE (ML) INJECTION EVERY 12 HOURS SCHEDULED
Status: DISCONTINUED | OUTPATIENT
Start: 2024-07-09 | End: 2024-07-11 | Stop reason: HOSPADM

## 2024-07-09 RX ORDER — INSULIN LISPRO 100 [IU]/ML
0-4 INJECTION, SOLUTION INTRAVENOUS; SUBCUTANEOUS NIGHTLY
Status: DISCONTINUED | OUTPATIENT
Start: 2024-07-09 | End: 2024-07-11 | Stop reason: HOSPADM

## 2024-07-09 RX ORDER — INSULIN LISPRO 100 [IU]/ML
0-4 INJECTION, SOLUTION INTRAVENOUS; SUBCUTANEOUS
Status: DISCONTINUED | OUTPATIENT
Start: 2024-07-09 | End: 2024-07-11 | Stop reason: HOSPADM

## 2024-07-09 RX ORDER — ONDANSETRON 4 MG/1
4 TABLET, ORALLY DISINTEGRATING ORAL EVERY 8 HOURS PRN
Status: DISCONTINUED | OUTPATIENT
Start: 2024-07-09 | End: 2024-07-11 | Stop reason: HOSPADM

## 2024-07-09 RX ORDER — ATORVASTATIN CALCIUM 20 MG/1
20 TABLET, FILM COATED ORAL DAILY
Status: DISCONTINUED | OUTPATIENT
Start: 2024-07-09 | End: 2024-07-11 | Stop reason: HOSPADM

## 2024-07-09 RX ORDER — SODIUM CHLORIDE 9 MG/ML
INJECTION, SOLUTION INTRAVENOUS PRN
Status: DISCONTINUED | OUTPATIENT
Start: 2024-07-09 | End: 2024-07-11 | Stop reason: HOSPADM

## 2024-07-09 RX ORDER — ACETAMINOPHEN 650 MG/1
650 SUPPOSITORY RECTAL EVERY 6 HOURS PRN
Status: DISCONTINUED | OUTPATIENT
Start: 2024-07-09 | End: 2024-07-11 | Stop reason: HOSPADM

## 2024-07-09 RX ORDER — ACETAMINOPHEN 325 MG/1
650 TABLET ORAL EVERY 6 HOURS PRN
Status: DISCONTINUED | OUTPATIENT
Start: 2024-07-09 | End: 2024-07-11 | Stop reason: HOSPADM

## 2024-07-09 RX ORDER — POLYETHYLENE GLYCOL 3350 17 G/17G
17 POWDER, FOR SOLUTION ORAL DAILY PRN
Status: DISCONTINUED | OUTPATIENT
Start: 2024-07-09 | End: 2024-07-11 | Stop reason: HOSPADM

## 2024-07-09 RX ORDER — AMLODIPINE BESYLATE 5 MG/1
5 TABLET ORAL DAILY
Status: DISCONTINUED | OUTPATIENT
Start: 2024-07-09 | End: 2024-07-10

## 2024-07-09 RX ORDER — INSULIN GLARGINE 100 [IU]/ML
19 INJECTION, SOLUTION SUBCUTANEOUS NIGHTLY
Status: DISCONTINUED | OUTPATIENT
Start: 2024-07-09 | End: 2024-07-09

## 2024-07-09 RX ORDER — POTASSIUM CHLORIDE 750 MG/1
20 TABLET, FILM COATED, EXTENDED RELEASE ORAL ONCE
Status: COMPLETED | OUTPATIENT
Start: 2024-07-09 | End: 2024-07-09

## 2024-07-09 RX ORDER — PANTOPRAZOLE SODIUM 40 MG/1
40 TABLET, DELAYED RELEASE ORAL
Status: DISCONTINUED | OUTPATIENT
Start: 2024-07-10 | End: 2024-07-11 | Stop reason: HOSPADM

## 2024-07-09 RX ORDER — SODIUM CHLORIDE 0.9 % (FLUSH) 0.9 %
5-40 SYRINGE (ML) INJECTION PRN
Status: DISCONTINUED | OUTPATIENT
Start: 2024-07-09 | End: 2024-07-11 | Stop reason: HOSPADM

## 2024-07-09 RX ORDER — ASPIRIN 81 MG/1
81 TABLET, CHEWABLE ORAL DAILY
Status: DISCONTINUED | OUTPATIENT
Start: 2024-07-09 | End: 2024-07-11 | Stop reason: HOSPADM

## 2024-07-09 RX ORDER — ENOXAPARIN SODIUM 100 MG/ML
30 INJECTION SUBCUTANEOUS DAILY
Status: DISCONTINUED | OUTPATIENT
Start: 2024-07-09 | End: 2024-07-11 | Stop reason: HOSPADM

## 2024-07-09 RX ORDER — ONDANSETRON 2 MG/ML
4 INJECTION INTRAMUSCULAR; INTRAVENOUS EVERY 6 HOURS PRN
Status: DISCONTINUED | OUTPATIENT
Start: 2024-07-09 | End: 2024-07-11 | Stop reason: HOSPADM

## 2024-07-09 RX ORDER — INSULIN GLARGINE 100 [IU]/ML
9 INJECTION, SOLUTION SUBCUTANEOUS NIGHTLY
Status: DISCONTINUED | OUTPATIENT
Start: 2024-07-09 | End: 2024-07-11 | Stop reason: HOSPADM

## 2024-07-09 RX ADMIN — SODIUM CHLORIDE, PRESERVATIVE FREE 10 ML: 5 INJECTION INTRAVENOUS at 20:49

## 2024-07-09 RX ADMIN — DOXYCYCLINE 100 MG: 100 INJECTION, POWDER, LYOPHILIZED, FOR SOLUTION INTRAVENOUS at 20:52

## 2024-07-09 RX ADMIN — ENOXAPARIN SODIUM 30 MG: 100 INJECTION SUBCUTANEOUS at 20:46

## 2024-07-09 RX ADMIN — INSULIN GLARGINE 9 UNITS: 100 INJECTION, SOLUTION SUBCUTANEOUS at 20:48

## 2024-07-09 RX ADMIN — POTASSIUM CHLORIDE 20 MEQ: 750 TABLET, FILM COATED, EXTENDED RELEASE ORAL at 20:47

## 2024-07-09 NOTE — ED PROVIDER NOTES
negative.       PAST MEDICAL HISTORY     Past Medical History:   Diagnosis Date    CAD (coronary artery disease)     CHF (congestive heart failure), NYHA class I, chronic, systolic (HCC)     CKD (chronic kidney disease) stage 3, GFR 30-59 ml/min (HCC)     DM type 2 causing renal disease (HCC)     DM type 2 causing vascular disease (HCC)     Hypertension     Iron deficiency anemia     Ischemic cardiomyopathy     Peripheral vascular disease (HCC)          SURGICAL HISTORY       Past Surgical History:   Procedure Laterality Date    APPENDECTOMY      COLONOSCOPY N/A 02/04/2022    COLONOSCOPY performed by Rad Tineo MD at [facility] ENDOSCOPY    CORONARY ANGIOPLASTY WITH STENT PLACEMENT  12/24/2021    stents x 3 to mid-distal right coronary artery going into PDA for 100% occlusion of RCA; and also had nonobstructive disease in 30% LAD stenosis, 70% diagonal 1 stenosis.  She had proximal 30% stenosis in the codominant left circumflex artery     HYSTERECTOMY (CERVIX STATUS UNKNOWN)      OTHER SURGICAL HISTORY      Back surgery x 2    OTHER SURGICAL HISTORY      head sx x 2 nerve related    TONSILLECTOMY      TOTAL KNEE ARTHROPLASTY Left          CURRENT MEDICATIONS       Previous Medications    AMLODIPINE (NORVASC) 5 MG TABLET    Take 1 tablet by mouth daily    ATORVASTATIN (LIPITOR) 20 MG TABLET    Take 1 tablet by mouth daily    FERROUS SULFATE (IRON 325) 325 (65 FE) MG TABLET    Take 1 tablet by mouth every morning (before breakfast)    FUROSEMIDE (LASIX) 40 MG TABLET    Take 1 tablet by mouth daily    INSULIN GLARGINE (LANTUS) 100 UNIT/ML INJECTION VIAL    Inject 19 Units into the skin nightly    LANCETS MISC    Check blood sugar AC & HS    PANTOPRAZOLE (PROTONIX) 40 MG TABLET    Take 1 tablet by mouth 2 times daily (before meals)       ALLERGIES     Prochlorperazine, Codeine, Hydromorphone, Morphine, and Sulfa antibiotics    FAMILY HISTORY       Family History   Problem Relation Age of Onset    Heart Disease Mother        female  Working Diagnosis:   1. Shortness of breath        COVID-19 Suspicion: No  Sepsis present:  No  Reassessment needed: No  Code Status:  Full Code  Readmission: No  Isolation Requirements: no  Recommended Level of Care: telemetry  Department: [place] [facility] - [phone]  Consulting Provider:     Other:  family uncomfortable with d/c given the intermittent tachypnea and pt appearing sob. RR 30's when pt arrived.  Recent long car trip.  No dvt and negative ddimer.             CONSULTS:  None    PROCEDURES:  Unless otherwise noted below, none     Procedures      FINAL IMPRESSION      1. Shortness of breath          DISPOSITION/PLAN   DISPOSITION Decision To Admit 07/09/2024 06:20:44 PM      PATIENT REFERRED TO:  No follow-up provider specified.    DISCHARGE MEDICATIONS:  New Prescriptions    No medications on file         (Please note that portions of this note were completed with a voice recognition program.  Efforts were made to edit the dictations but occasionally words are mis-transcribed.)    Jarocho Mohr MD (electronically signed)  Emergency Attending Physician / Physician Assistant / Nurse Practitioner             Jarocho Mohr MD  07/10/24 1789

## 2024-07-09 NOTE — ED TRIAGE NOTES
Patient arrives to ER via EMS from home with c/c SOB.   Per EMS pt was 98% RA.   PT HX of CFH, HTN, Stage 4 kidney failure, DM 2.   PT reports feeing SOB and chest tightness.   Denies n/v/d

## 2024-07-09 NOTE — H&P
[facility]  [address] [place] [address] [phone]    Admission History and Physical      NAME:  Janis Chadwick   :   1934   MRN:  [id number]     PCP:  Paradise Rubio APRN - NP     Date/Time of service:  2024  7:54 PM        Subjective:     CHIEF COMPLAINT: Chest tightness    HISTORY OF PRESENT ILLNESS:     Ms. Chadwick is a 89 y.o.  female with a past medical history of coronary artery disease status post DALY, GI bleed with AVMs, CKD stage IV, diabetes, hypertension who is admitted with chest tightness.  Ms. Chadwick is accompanied by her daughter who helps provide history.  Daughter states that a few days ago patient developed a cough and then today she developed extreme fatigue, chest tightness and dyspnea.  Patient states dyspnea is worse with ambulation.  She denies any syncope.  She denies any nausea or vomiting.  Patient denies any urinary burning.  She does endorse some back pain.  No falls reported.    Allergies   Allergen Reactions    Prochlorperazine Other (See Comments)     Facial droop    Codeine Nausea And Vomiting    Hydromorphone Nausea And Vomiting    Morphine Nausea And Vomiting    Sulfa Antibiotics Nausea And Vomiting       Prior to Admission medications    Medication Sig Start Date End Date Taking? Authorizing Provider   pantoprazole (PROTONIX) 40 MG tablet Take 1 tablet by mouth 2 times daily (before meals) 24   Hugo Castro MD   insulin glargine (LANTUS) 100 UNIT/ML injection vial Inject 19 Units into the skin nightly    Provider, MD Rosa Maria   amLODIPine (NORVASC) 5 MG tablet Take 1 tablet by mouth daily 23   Annita Castillo MD   furosemide (LASIX) 40 MG tablet Take 1 tablet by mouth daily 23   Annita Castillo MD   Lancets MISC Check blood sugar AC & HS 23   Automatic Reconciliation, Ar   atorvastatin (LIPITOR) 20 MG tablet Take 1 tablet by mouth daily 21   Automatic Reconciliation, Ar   ferrous  high      Total time spent with patient: 50 Minutes **I personally saw and examined the patient during this time period**                 Care Plan discussed with: Patient, Family, and Nursing Staff    Discussed:  Care Plan    Prophylaxis:  SCD's    Probable Disposition:  HH PT, OT, RN           ___________________________________________________    Attending Physician: Rosalina Cohn DO

## 2024-07-10 LAB
ANION GAP SERPL CALC-SCNC: 10 MMOL/L (ref 5–15)
ANION GAP SERPL CALC-SCNC: 3 MMOL/L (ref 5–15)
B PERT DNA SPEC QL NAA+PROBE: NOT DETECTED
BACTERIA SPEC CULT: NORMAL
BACTERIA SPEC CULT: NORMAL
BASOPHILS # BLD: 0.1 K/UL (ref 0–0.1)
BASOPHILS NFR BLD: 1 % (ref 0–1)
BORDETELLA PARAPERTUSSIS BY PCR: NOT DETECTED
BUN SERPL-MCNC: 20 MG/DL (ref 6–20)
BUN SERPL-MCNC: 21 MG/DL (ref 6–20)
BUN/CREAT SERPL: 12 (ref 12–20)
BUN/CREAT SERPL: 14 (ref 12–20)
C PNEUM DNA SPEC QL NAA+PROBE: NOT DETECTED
CALCIUM SERPL-MCNC: 8.9 MG/DL (ref 8.5–10.1)
CALCIUM SERPL-MCNC: 9.4 MG/DL (ref 8.5–10.1)
CHLORIDE SERPL-SCNC: 108 MMOL/L (ref 97–108)
CHLORIDE SERPL-SCNC: 115 MMOL/L (ref 97–108)
CO2 SERPL-SCNC: 22 MMOL/L (ref 21–32)
CO2 SERPL-SCNC: 27 MMOL/L (ref 21–32)
CREAT SERPL-MCNC: 1.45 MG/DL (ref 0.55–1.02)
CREAT SERPL-MCNC: 1.8 MG/DL (ref 0.55–1.02)
DIFFERENTIAL METHOD BLD: ABNORMAL
EOSINOPHIL # BLD: 0.3 K/UL (ref 0–0.4)
EOSINOPHIL NFR BLD: 6 % (ref 0–7)
ERYTHROCYTE [DISTWIDTH] IN BLOOD BY AUTOMATED COUNT: 13.5 % (ref 11.5–14.5)
EST. AVERAGE GLUCOSE BLD GHB EST-MCNC: 143 MG/DL
FLUAV SUBTYP SPEC NAA+PROBE: NOT DETECTED
FLUBV RNA SPEC QL NAA+PROBE: NOT DETECTED
GLUCOSE BLD STRIP.AUTO-MCNC: 190 MG/DL (ref 65–117)
GLUCOSE BLD STRIP.AUTO-MCNC: 198 MG/DL (ref 65–117)
GLUCOSE BLD STRIP.AUTO-MCNC: 333 MG/DL (ref 65–117)
GLUCOSE BLD STRIP.AUTO-MCNC: 363 MG/DL (ref 65–117)
GLUCOSE SERPL-MCNC: 115 MG/DL (ref 65–100)
GLUCOSE SERPL-MCNC: 391 MG/DL (ref 65–100)
HADV DNA SPEC QL NAA+PROBE: NOT DETECTED
HBA1C MFR BLD: 6.6 % (ref 4–5.6)
HCOV 229E RNA SPEC QL NAA+PROBE: NOT DETECTED
HCOV HKU1 RNA SPEC QL NAA+PROBE: NOT DETECTED
HCOV NL63 RNA SPEC QL NAA+PROBE: NOT DETECTED
HCOV OC43 RNA SPEC QL NAA+PROBE: NOT DETECTED
HCT VFR BLD AUTO: 28.3 % (ref 35–47)
HCT VFR BLD AUTO: 33.3 % (ref 35–47)
HGB BLD-MCNC: 10.9 G/DL (ref 11.5–16)
HGB BLD-MCNC: 9.1 G/DL (ref 11.5–16)
HMPV RNA SPEC QL NAA+PROBE: NOT DETECTED
HPIV1 RNA SPEC QL NAA+PROBE: NOT DETECTED
HPIV2 RNA SPEC QL NAA+PROBE: NOT DETECTED
HPIV3 RNA SPEC QL NAA+PROBE: NOT DETECTED
HPIV4 RNA SPEC QL NAA+PROBE: NOT DETECTED
IMM GRANULOCYTES # BLD AUTO: 0 K/UL (ref 0–0.04)
IMM GRANULOCYTES NFR BLD AUTO: 0 % (ref 0–0.5)
LYMPHOCYTES # BLD: 1.3 K/UL (ref 0.8–3.5)
LYMPHOCYTES NFR BLD: 24 % (ref 12–49)
M PNEUMO DNA SPEC QL NAA+PROBE: NOT DETECTED
MCH RBC QN AUTO: 27.3 PG (ref 26–34)
MCHC RBC AUTO-ENTMCNC: 32.2 G/DL (ref 30–36.5)
MCV RBC AUTO: 85 FL (ref 80–99)
MONOCYTES # BLD: 0.4 K/UL (ref 0–1)
MONOCYTES NFR BLD: 7 % (ref 5–13)
NEUTS SEG # BLD: 3.4 K/UL (ref 1.8–8)
NEUTS SEG NFR BLD: 62 % (ref 32–75)
NRBC # BLD: 0 K/UL (ref 0–0.01)
NRBC BLD-RTO: 0 PER 100 WBC
NT PRO BNP: 741 PG/ML
PLATELET # BLD AUTO: 153 K/UL (ref 150–400)
PMV BLD AUTO: 10.9 FL (ref 8.9–12.9)
POTASSIUM SERPL-SCNC: 3.3 MMOL/L (ref 3.5–5.1)
POTASSIUM SERPL-SCNC: 3.5 MMOL/L (ref 3.5–5.1)
RBC # BLD AUTO: 3.33 M/UL (ref 3.8–5.2)
RSV RNA SPEC QL NAA+PROBE: NOT DETECTED
RV+EV RNA SPEC QL NAA+PROBE: NOT DETECTED
SARS-COV-2 RNA RESP QL NAA+PROBE: NOT DETECTED
SERVICE CMNT-IMP: ABNORMAL
SERVICE CMNT-IMP: NORMAL
SODIUM SERPL-SCNC: 140 MMOL/L (ref 136–145)
SODIUM SERPL-SCNC: 145 MMOL/L (ref 136–145)
TROPONIN I SERPL HS-MCNC: 9 NG/L (ref 0–51)
WBC # BLD AUTO: 5.4 K/UL (ref 3.6–11)

## 2024-07-10 PROCEDURE — 2500000003 HC RX 250 WO HCPCS: Performed by: INTERNAL MEDICINE

## 2024-07-10 PROCEDURE — 6370000000 HC RX 637 (ALT 250 FOR IP): Performed by: INTERNAL MEDICINE

## 2024-07-10 PROCEDURE — 94761 N-INVAS EAR/PLS OXIMETRY MLT: CPT

## 2024-07-10 PROCEDURE — 82962 GLUCOSE BLOOD TEST: CPT

## 2024-07-10 PROCEDURE — 97161 PT EVAL LOW COMPLEX 20 MIN: CPT

## 2024-07-10 PROCEDURE — 83036 HEMOGLOBIN GLYCOSYLATED A1C: CPT

## 2024-07-10 PROCEDURE — 6360000002 HC RX W HCPCS: Performed by: NURSE PRACTITIONER

## 2024-07-10 PROCEDURE — 6360000002 HC RX W HCPCS: Performed by: INTERNAL MEDICINE

## 2024-07-10 PROCEDURE — 84484 ASSAY OF TROPONIN QUANT: CPT

## 2024-07-10 PROCEDURE — 6370000000 HC RX 637 (ALT 250 FOR IP): Performed by: NURSE PRACTITIONER

## 2024-07-10 PROCEDURE — 36415 COLL VENOUS BLD VENIPUNCTURE: CPT

## 2024-07-10 PROCEDURE — 85018 HEMOGLOBIN: CPT

## 2024-07-10 PROCEDURE — 2580000003 HC RX 258: Performed by: INTERNAL MEDICINE

## 2024-07-10 PROCEDURE — 94640 AIRWAY INHALATION TREATMENT: CPT

## 2024-07-10 PROCEDURE — 85014 HEMATOCRIT: CPT

## 2024-07-10 PROCEDURE — 97116 GAIT TRAINING THERAPY: CPT

## 2024-07-10 PROCEDURE — 80048 BASIC METABOLIC PNL TOTAL CA: CPT

## 2024-07-10 PROCEDURE — 99222 1ST HOSP IP/OBS MODERATE 55: CPT | Performed by: SPECIALIST

## 2024-07-10 PROCEDURE — 85025 COMPLETE CBC W/AUTO DIFF WBC: CPT

## 2024-07-10 PROCEDURE — 83880 ASSAY OF NATRIURETIC PEPTIDE: CPT

## 2024-07-10 PROCEDURE — 6370000000 HC RX 637 (ALT 250 FOR IP): Performed by: SPECIALIST

## 2024-07-10 PROCEDURE — 1100000000 HC RM PRIVATE

## 2024-07-10 RX ORDER — AMLODIPINE BESYLATE 5 MG/1
10 TABLET ORAL DAILY
Status: DISCONTINUED | OUTPATIENT
Start: 2024-07-11 | End: 2024-07-11 | Stop reason: HOSPADM

## 2024-07-10 RX ORDER — AMLODIPINE BESYLATE 5 MG/1
5 TABLET ORAL ONCE
Status: DISCONTINUED | OUTPATIENT
Start: 2024-07-10 | End: 2024-07-11

## 2024-07-10 RX ORDER — POTASSIUM CHLORIDE 750 MG/1
40 TABLET, FILM COATED, EXTENDED RELEASE ORAL ONCE
Status: COMPLETED | OUTPATIENT
Start: 2024-07-10 | End: 2024-07-10

## 2024-07-10 RX ORDER — IPRATROPIUM BROMIDE AND ALBUTEROL SULFATE 2.5; .5 MG/3ML; MG/3ML
1 SOLUTION RESPIRATORY (INHALATION)
Status: DISCONTINUED | OUTPATIENT
Start: 2024-07-10 | End: 2024-07-11

## 2024-07-10 RX ORDER — SODIUM CHLORIDE 9 MG/ML
INJECTION, SOLUTION INTRAVENOUS CONTINUOUS
Status: DISCONTINUED | OUTPATIENT
Start: 2024-07-10 | End: 2024-07-10

## 2024-07-10 RX ORDER — LANOLIN ALCOHOL/MO/W.PET/CERES
3 CREAM (GRAM) TOPICAL NIGHTLY PRN
Status: DISCONTINUED | OUTPATIENT
Start: 2024-07-10 | End: 2024-07-11 | Stop reason: HOSPADM

## 2024-07-10 RX ORDER — ARFORMOTEROL TARTRATE 15 UG/2ML
15 SOLUTION RESPIRATORY (INHALATION)
Status: DISCONTINUED | OUTPATIENT
Start: 2024-07-10 | End: 2024-07-11 | Stop reason: HOSPADM

## 2024-07-10 RX ORDER — FUROSEMIDE 40 MG/1
40 TABLET ORAL DAILY
Status: DISCONTINUED | OUTPATIENT
Start: 2024-07-10 | End: 2024-07-11

## 2024-07-10 RX ORDER — HYDRALAZINE HYDROCHLORIDE 20 MG/ML
10 INJECTION INTRAMUSCULAR; INTRAVENOUS EVERY 6 HOURS PRN
Status: DISCONTINUED | OUTPATIENT
Start: 2024-07-10 | End: 2024-07-11 | Stop reason: HOSPADM

## 2024-07-10 RX ORDER — BUDESONIDE 0.5 MG/2ML
0.5 INHALANT ORAL
Status: DISCONTINUED | OUTPATIENT
Start: 2024-07-10 | End: 2024-07-11 | Stop reason: HOSPADM

## 2024-07-10 RX ORDER — PREDNISONE 20 MG/1
40 TABLET ORAL DAILY
Status: DISCONTINUED | OUTPATIENT
Start: 2024-07-10 | End: 2024-07-11 | Stop reason: HOSPADM

## 2024-07-10 RX ORDER — POTASSIUM CHLORIDE 750 MG/1
20 TABLET, FILM COATED, EXTENDED RELEASE ORAL ONCE
Status: DISCONTINUED | OUTPATIENT
Start: 2024-07-10 | End: 2024-07-10

## 2024-07-10 RX ADMIN — FUROSEMIDE 40 MG: 40 TABLET ORAL at 11:12

## 2024-07-10 RX ADMIN — Medication 3 MG: at 22:06

## 2024-07-10 RX ADMIN — INSULIN LISPRO 4 UNITS: 100 INJECTION, SOLUTION INTRAVENOUS; SUBCUTANEOUS at 20:00

## 2024-07-10 RX ADMIN — ASPIRIN 81 MG: 81 TABLET, CHEWABLE ORAL at 08:13

## 2024-07-10 RX ADMIN — POTASSIUM CHLORIDE 40 MEQ: 750 TABLET, FILM COATED, EXTENDED RELEASE ORAL at 08:59

## 2024-07-10 RX ADMIN — SODIUM CHLORIDE, PRESERVATIVE FREE 10 ML: 5 INJECTION INTRAVENOUS at 20:09

## 2024-07-10 RX ADMIN — PANTOPRAZOLE SODIUM 40 MG: 40 TABLET, DELAYED RELEASE ORAL at 08:13

## 2024-07-10 RX ADMIN — ENOXAPARIN SODIUM 30 MG: 100 INJECTION SUBCUTANEOUS at 20:32

## 2024-07-10 RX ADMIN — DOXYCYCLINE 100 MG: 100 INJECTION, POWDER, LYOPHILIZED, FOR SOLUTION INTRAVENOUS at 20:30

## 2024-07-10 RX ADMIN — INSULIN GLARGINE 9 UNITS: 100 INJECTION, SOLUTION SUBCUTANEOUS at 20:32

## 2024-07-10 RX ADMIN — AMLODIPINE BESYLATE 5 MG: 5 TABLET ORAL at 08:13

## 2024-07-10 RX ADMIN — ARFORMOTEROL TARTRATE 15 MCG: 15 SOLUTION RESPIRATORY (INHALATION) at 19:35

## 2024-07-10 RX ADMIN — ATORVASTATIN CALCIUM 20 MG: 20 TABLET, FILM COATED ORAL at 08:13

## 2024-07-10 RX ADMIN — DOXYCYCLINE 100 MG: 100 INJECTION, POWDER, LYOPHILIZED, FOR SOLUTION INTRAVENOUS at 08:19

## 2024-07-10 RX ADMIN — PREDNISONE 40 MG: 20 TABLET ORAL at 15:20

## 2024-07-10 RX ADMIN — HYDRALAZINE HYDROCHLORIDE 10 MG: 20 INJECTION INTRAMUSCULAR; INTRAVENOUS at 21:21

## 2024-07-10 RX ADMIN — SODIUM CHLORIDE: 9 INJECTION, SOLUTION INTRAVENOUS at 20:17

## 2024-07-10 RX ADMIN — BUDESONIDE 500 MCG: 0.5 SUSPENSION RESPIRATORY (INHALATION) at 19:35

## 2024-07-10 RX ADMIN — IPRATROPIUM BROMIDE AND ALBUTEROL SULFATE 1 DOSE: .5; 3 SOLUTION RESPIRATORY (INHALATION) at 19:40

## 2024-07-10 NOTE — PROGRESS NOTES
[facility]  [address] [place] [address] [phone]        Hospitalist Progress Note      NAME: Janis Chadwick   :  1934  MRM:  [id number]    Date/Time: 7/10/2024  3:14 PM         Subjective:     Chief Complaint:  \"I'm okay\"     Pt seen and examined. Reports dry cough, voice hoarseness (denies pain) and wheezing.     ROS:  (bold if positive, if negative)    Cough   Dyspnea        Objective:       Vitals:          Last 24hrs VS reviewed since prior progress note. Most recent are:    Vitals:    07/10/24 1155   BP: (!) 167/56   Pulse: 69   Resp:    Temp:    SpO2:      SpO2 Readings from Last 6 Encounters:   07/10/24 97%   24 96%   23 97%   10/11/23 99%   23 95%          Intake/Output Summary (Last 24 hours) at 7/10/2024 1514  Last data filed at 7/10/2024 0345  Gross per 24 hour   Intake 100 ml   Output 1800 ml   Net -1700 ml          Exam:     Physical Exam:    Gen:  Well-developed, well-nourished, in no acute distress  HEENT:  Pink conjunctivae, PERRL, hearing intact to voice, moist mucous membranes  Neck:  Supple, without masses, thyroid non-tender  Resp: scattered expiratory wheezing   Card:  No murmurs, normal S1, S2 without thrills, bruits   Abd:  Soft, non-tender, non-distended, normoactive bowel sounds are present  Musc:  No cyanosis or clubbing  Skin:  No rashes or ulcers, skin turgor is good  Neuro:  Cranial nerves 3-12 are grossly intact,  strength is 5/5 bilaterally and dorsi / plantarflexion is 5/5 bilaterally, follows commands appropriately  Psych:  Good insight, oriented to person, place and time, alert  RLE erythema     Medications Reviewed: (see below)    Lab Data Reviewed: (see below)    ______________________________________________________________________    Medications:     Current Facility-Administered Medications   Medication Dose Route Frequency    furosemide (LASIX) tablet 40 mg  40 mg Oral Daily    predniSONE  (DELTASONE) tablet 40 mg  40 mg Oral Daily    budesonide (PULMICORT) nebulizer suspension 500 mcg  0.5 mg Nebulization BID RT    arformoterol tartrate (BROVANA) nebulizer solution 15 mcg  15 mcg Nebulization BID RT    ipratropium 0.5 mg-albuterol 2.5 mg (DUONEB) nebulizer solution 1 Dose  1 Dose Inhalation 4x Daily RT    sodium chloride flush 0.9 % injection 5-40 mL  5-40 mL IntraVENous 2 times per day    sodium chloride flush 0.9 % injection 5-40 mL  5-40 mL IntraVENous PRN    0.9 % sodium chloride infusion   IntraVENous PRN    enoxaparin Sodium (LOVENOX) injection 30 mg  30 mg SubCUTAneous Daily    ondansetron (ZOFRAN-ODT) disintegrating tablet 4 mg  4 mg Oral Q8H PRN    Or    ondansetron (ZOFRAN) injection 4 mg  4 mg IntraVENous Q6H PRN    polyethylene glycol (GLYCOLAX) packet 17 g  17 g Oral Daily PRN    acetaminophen (TYLENOL) tablet 650 mg  650 mg Oral Q6H PRN    Or    acetaminophen (TYLENOL) suppository 650 mg  650 mg Rectal Q6H PRN    amLODIPine (NORVASC) tablet 5 mg  5 mg Oral Daily    atorvastatin (LIPITOR) tablet 20 mg  20 mg Oral Daily    pantoprazole (PROTONIX) tablet 40 mg  40 mg Oral BID AC    insulin lispro (HUMALOG,ADMELOG) injection vial 0-4 Units  0-4 Units SubCUTAneous TID WC    insulin lispro (HUMALOG,ADMELOG) injection vial 0-4 Units  0-4 Units SubCUTAneous Nightly    doxycycline (VIBRAMYCIN) 100 mg in sodium chloride 0.9 % 100 mL IVPB (mini-bag)  100 mg IntraVENous Q12H    aspirin chewable tablet 81 mg  81 mg Oral Daily    insulin glargine (LANTUS) injection vial 9 Units  9 Units SubCUTAneous Nightly            Lab Review:     Recent Labs     07/09/24  1510 07/10/24  0626   WBC 4.8 5.4   HGB 9.2* 9.1*   HCT 28.8* 28.3*    153     Recent Labs     07/09/24  1510 07/10/24  0626    145   K 3.3* 3.3*   * 115*   CO2 21 27   BUN 23* 20   MG 2.2  --    ALT 16  --      No components found for: \"GLPOC\"         Assessment / Plan:   Dyspnea/chest tightness/history of coronary disease  status post DALY POA: suspect RAD as wheezing with dry cough  -CXR without e/o acute process  -start nebs/Pulmicort/Brovana  -add PO steroids   -diuresis as per cardiology   -LE dopplers negative for DVT; low suspicion for PE      Concern for right lower extremity cellulitis POA:   -continue IV antibiotics     Acute back pain POA: Denies any trauma  -x-ray L spine without acute process     CKD stage IV: Baseline creatinine around 1.3-1.6  -monitor with diuresis      History of GI bleed with AVMs/anemia:   -monitor on ASA      Hypertension:   -on amlodipine; uptitrate to 10mg      Diabetes type 2 with renal complications  -ISS   -BG checks AC TID and qHS   -check A1c  -continue Lantus     Total time spent with patient: 35 Minutes                  Care Plan discussed with: Patient, Family, and Consultant/Specialist    Discussed:  Care Plan    Prophylaxis:  SCD's    Disposition:  Home w/Family           ___________________________________________________    Attending Physician: Darlene Trinidad MD

## 2024-07-10 NOTE — CONSULTS
[facility] CARDIOLOGY                       Cardiology Care Note     [x]Initial Encounter     []Follow-up    Patient Name: Janis Chadwick - :1934 - MRN:[id number]  Primary Cardiologist: Gardenia / Yuri  Consulting Cardiologist: Jil Garcia MD     Reason for encounter:  Chest pain       HPI:       Janis Chadwick is a 89 y.o. female with PMH significant for CAD, HFpEF, HTN, GI bleed, CKD, DM who comes to ED complaining of SOB and chest tightness.    Daughter states that a few days ago patient developed a cough and then today she developed extreme fatigue, chest tightness and dyspnea. She says patient traveled recently and had some increased LE edema from that.  Denies sig weight gain.  Had orthopnea - now doing better.          Assessment and Plan       SOB, CP, fatigue  - May be multifactorial --> CHF +/- vital process / bronchitis   - chest tightness likely related to SOB process and is better     Acute on chronic HFpEF  - p/w fatigue and SOB, a little more edema   - CXR unremarkable   - BNP mildly elevated at 535.  D-Dimer normal.   - She was given IV Bumex in the ED with good response (-1700) (on lasix 40 mg daily PTA)   - She is feeling better this AM after diuresis ---> will put her back on her outpatient Lasix 40 mg PO daily   - echo pending     CAD   - Inferior STEMI  ---> mRCA 100% occluded ---> DALY x3 in mRCA to PDA  - Since , she has had multiple admission for CP and SOB  - On 7/10/24 - p/w SOB, fatigue, and CP  - EKG without acute ischemic changes   - Trops normal   - would treat medically for CAD / CHF and do further testing (stress test, cath) only for refractory symptoms   - cont ASA, statin, CCB     HTN  - -170/50's on arrival   - cont norvasc   - daughter says BP Ok at home   - can add Coreg later if BP is high at home (for potential angina as well)     GI Bleed   - Admitted with GI Bleed 22 on DAPT  (Hgb    Result Value Ref Range    NT Pro- (H) <450 PG/ML   TYPE AND SCREEN    Collection Time: 07/09/24  3:10 PM   Result Value Ref Range    Crossmatch expiration date 07/12/2024,2359     ABO/Rh A POSITIVE     Antibody Screen NEG    D-Dimer, Quantitative    Collection Time: 07/09/24  3:10 PM   Result Value Ref Range    D-Dimer, Quant 0.64 0.00 - 0.65 mg/L FEU   COVID-19 & Influenza Combo    Collection Time: 07/09/24  3:24 PM    Specimen: Nasopharyngeal   Result Value Ref Range    SARS-CoV-2, PCR Not detected NOTD      Rapid Influenza A By PCR Not detected NOTD      Rapid Influenza B By PCR Not detected NOTD     Vascular duplex lower extremity venous bilateral    Collection Time: 07/09/24  4:54 PM   Result Value Ref Range    Body Surface Area 1.64 m2   POCT Glucose    Collection Time: 07/09/24  8:10 PM   Result Value Ref Range    POC Glucose 126 (H) 65 - 117 mg/dL    Performed by: Sterling Harris    Troponin    Collection Time: 07/09/24  8:17 PM   Result Value Ref Range    Troponin, High Sensitivity 12 0 - 51 ng/L   Lipase    Collection Time: 07/09/24  8:17 PM   Result Value Ref Range    Lipase 54 13 - 75 U/L   Urinalysis with Reflex to Culture    Collection Time: 07/09/24  8:17 PM    Specimen: Urine   Result Value Ref Range    Color, UA YELLOW/STRAW      Appearance CLEAR CLEAR      Specific Gravity, UA 1.007 1.003 - 1.030      pH, Urine 7.5 5.0 - 8.0      Protein, UA Negative NEG mg/dL    Glucose, Ur Negative NEG mg/dL    Ketones, Urine Negative NEG mg/dL    Bilirubin, Urine Negative NEG      Blood, Urine Negative NEG      Urobilinogen, Urine 0.2 0.2 - 1.0 EU/dL    Nitrite, Urine Negative NEG      Leukocyte Esterase, Urine TRACE (A) NEG      Urine Culture if Indicated CULTURE NOT INDICATED BY UA RESULT CNI      WBC, UA 0-4 0 - 4 /hpf    RBC, UA 0-5 0 - 5 /hpf    Epithelial Cells, UA FEW FEW /lpf    BACTERIA, URINE Negative NEG /hpf    Hyaline Casts, UA 0-2 0 - 2 /lpf   Troponin    Collection Time: 07/09/24  9:37 PM

## 2024-07-10 NOTE — CARE COORDINATION
Care Management Initial Assessment  7/10/2024 3:38 PM  If patient is discharged prior to next notation, then this note serves as note for discharge by case management.    Reason for Admission:   Shortness of breath [R06.02]  Dyspnea [R06.00]  SOB (shortness of breath) [R06.02]  Dyspnea, unspecified type [R06.00]         Patient Admission Status: Inpatient  RUR: Readmission Risk Score: 16.5    Hospitalization in the last 30 days (Readmission):  No        Advance Care Planning:  Code Status: DNR  Primary Healthcare Decision Maker: Named in Scanned ACP Document  Primary Decision Maker: Rosa Lee - Child - [phone]    Secondary Decision Maker: Afshin Nguyen - Grandchild - [phone]   Advance Directive: has an advanced directive - a copy has been provided     __________________________________________________________________________  Assessment:      07/10/24 1534   Service Assessment   Patient Orientation Alert and Oriented   Cognition Alert   History Provided By Child/Family   Primary Caregiver Family   Accompanied By/Relationship svetlana Lee   Support Systems Children   Patient's Healthcare Decision Maker is: Named in Scanned ACP Document   PCP Verified by CM Yes   Last Visit to PCP Within last 6 months   Prior Functional Level Independent in ADLs/IADLs   Current Functional Level Independent in ADLs/IADLs   Can patient return to prior living arrangement Yes   Ability to make needs known: Good   Family able to assist with home care needs: Yes   Would you like for me to discuss the discharge plan with any other family members/significant others, and if so, who? Yes  (svetlana Lee)   Social/Functional History   Lives With Daughter   Type of Home House   Home Layout Multi-level;Performs ADL's on one level   Home Access Stairs to enter without rails   Entrance Stairs - Number of Steps 1   Bathroom Shower/Tub Walk-in shower   Bathroom Equipment Shower chair;Toilet raiser;Grab bars around toilet   Home

## 2024-07-10 NOTE — PLAN OF CARE
Problem: Physical Therapy - Adult  Goal: By Discharge: Performs mobility at highest level of function for planned discharge setting.  See evaluation for individualized goals.  Description: FUNCTIONAL STATUS PRIOR TO ADMISSION: Patient was modified independent using a rolling walker for functional mobility. She was receiving  RN services but no therapy at this time. No falls since last admission.     HOME SUPPORT PRIOR TO ADMISSION: The patient lived with her daughter but did not require assistance.    Physical Therapy Goals  Initiated 7/10/2024  1.  Patient will move from supine to sit and sit to supine, scoot up and down, and roll side to side in bed with independence within 7 day(s).    2.  Patient will perform sit to stand with modified independence within 7 day(s).  3.  Patient will transfer from bed to chair and chair to bed with modified independence using the least restrictive device within 7 day(s).  4.  Patient will ambulate with modified independence for 150 feet with the least restrictive device within 7 day(s).   5.  Patient will ascend/descend 1 stairs with 0 handrail(s) with modified independence within 7 day(s).    Outcome: Progressing   PHYSICAL THERAPY EVALUATION    Patient: Janis Chadwick (89 y.o. female)  Date: 7/10/2024  Primary Diagnosis: Shortness of breath [R06.02]  Dyspnea [R06.00]  SOB (shortness of breath) [R06.02]  Dyspnea, unspecified type [R06.00]       Precautions: Restrictions/Precautions: Fall Risk (droplet + rule out)                      ASSESSMENT :   DEFICITS/IMPAIRMENTS:   The patient is limited by mildly decreased functional mobility, independence in ADLs, strength, balance in the setting of hospital admission for shortness of breath and chest tightness, also with right lower extremity cellulitis. Troponin negative, dopplers and d-dimer also negative. Patient today tolerates in room ambulation up to 35ft at a time with supervision and RW. She reports right knee pain  3-5 steps with a railing? []  1 []  2 [x]  3  []  4     Raw Score: 21/24                            Cutoff score ?171,2,3 had higher odds of discharging home with home health or need of SNF/IPR.    1. Nedra Mccracken, Rosetta Rivera, Roe Iniguez, Stephanie Blair, Anibal Hamm, Brian Mccracken.  Validity of the AM-PAC “6-Clicks” Inpatient Daily Activity and Basic Mobility Short Forms. Physical Therapy Mar 2014, [phone]; DOI: 10.2522/ptj.40508376  2. Sandro JONES, Harry J, Leslie J, Christiane J. Association of AM-PAC \"6-Clicks\" Basic Mobility and Daily Activity Scores With Discharge Destination. Phys Ther. 2021 Apr 4;101(4):mbgp272. doi: 10.1093/ptj/nrno223. PMID: 04279585.  3. Reji KNAPP, Carmelita WEN, Erlinda S, Aguilar K, John S. Activity Measure for Post-Acute Care \"6-Clicks\" Basic Mobility Scores Predict Discharge Destination After Acute Care Hospitalization in Select Patient Groups: A Retrospective, Observational Study. Arch Rehabil Res Clin Transl. 2022 Jul 16;4(3):443043. doi: 10.1016/j.arrct.2022.531647. PMID: 91667406; PMCID: [id number].  4. Nawaf SUERO, Vanessa S, Cary W, Prema P. AM-PAC Short Forms Manual 4.0. Revised 2/2020.                                                                                                                                                                                                                              Pain Rating:  Patient reporting right knee pain consistent with her baseline    Pain Intervention(s):   nursing notified    Activity Tolerance:   Good and tolerates ADLS without rest breaks    After treatment:   Patient left in no apparent distress sitting up in chair, Call bell within reach, Bed/ chair alarm activated, and Caregiver / family present    COMMUNICATION/EDUCATION:   The patient's plan of care was discussed with: registered nurse    Patient Education  Education Given To: Patient  Education Provided: Role of Therapy;Plan of

## 2024-07-11 ENCOUNTER — APPOINTMENT (OUTPATIENT)
Facility: HOSPITAL | Age: 89
End: 2024-07-11
Attending: INTERNAL MEDICINE
Payer: MEDICARE

## 2024-07-11 VITALS
OXYGEN SATURATION: 99 % | DIASTOLIC BLOOD PRESSURE: 58 MMHG | HEART RATE: 88 BPM | RESPIRATION RATE: 18 BRPM | SYSTOLIC BLOOD PRESSURE: 148 MMHG | BODY MASS INDEX: 25.03 KG/M2 | WEIGHT: 136 LBS | HEIGHT: 62 IN | TEMPERATURE: 98.1 F

## 2024-07-11 LAB
ANION GAP SERPL CALC-SCNC: 8 MMOL/L (ref 5–15)
BASOPHILS # BLD: 0 K/UL (ref 0–0.1)
BASOPHILS NFR BLD: 0 % (ref 0–1)
BUN SERPL-MCNC: 25 MG/DL (ref 6–20)
BUN/CREAT SERPL: 14 (ref 12–20)
CALCIUM SERPL-MCNC: 9 MG/DL (ref 8.5–10.1)
CHLORIDE SERPL-SCNC: 108 MMOL/L (ref 97–108)
CO2 SERPL-SCNC: 23 MMOL/L (ref 21–32)
CREAT SERPL-MCNC: 1.8 MG/DL (ref 0.55–1.02)
DIFFERENTIAL METHOD BLD: ABNORMAL
ECHO AO ASC DIAM: 1.9 CM
ECHO AO ASCENDING AORTA INDEX: 1.17 CM/M2
ECHO AO ROOT DIAM: 2.9 CM
ECHO AO ROOT INDEX: 1.79 CM/M2
ECHO AV AREA PEAK VELOCITY: 2.2 CM2
ECHO AV AREA/BSA PEAK VELOCITY: 1.4 CM2/M2
ECHO AV PEAK GRADIENT: 17 MMHG
ECHO AV PEAK VELOCITY: 2.1 M/S
ECHO AV VELOCITY RATIO: 0.67
ECHO BSA: 1.64 M2
ECHO BSA: 1.64 M2
ECHO LA DIAMETER INDEX: 1.98 CM/M2
ECHO LA DIAMETER: 3.2 CM
ECHO LA TO AORTIC ROOT RATIO: 1.1
ECHO LA VOL A-L A2C: 92 ML (ref 22–52)
ECHO LA VOL A-L A4C: 36 ML (ref 22–52)
ECHO LA VOL BP: 56 ML (ref 22–52)
ECHO LA VOL MOD A2C: 89 ML (ref 22–52)
ECHO LA VOL MOD A4C: 34 ML (ref 22–52)
ECHO LA VOL/BSA BIPLANE: 35 ML/M2 (ref 16–34)
ECHO LA VOLUME AREA LENGTH: 59 ML
ECHO LA VOLUME INDEX A-L A2C: 57 ML/M2 (ref 16–34)
ECHO LA VOLUME INDEX A-L A4C: 22 ML/M2 (ref 16–34)
ECHO LA VOLUME INDEX AREA LENGTH: 36 ML/M2 (ref 16–34)
ECHO LA VOLUME INDEX MOD A2C: 55 ML/M2 (ref 16–34)
ECHO LA VOLUME INDEX MOD A4C: 21 ML/M2 (ref 16–34)
ECHO LV E' LATERAL VELOCITY: 6 CM/S
ECHO LV E' SEPTAL VELOCITY: 4 CM/S
ECHO LV EDV A2C: 67 ML
ECHO LV EDV A4C: 51 ML
ECHO LV EDV BP: 63 ML (ref 56–104)
ECHO LV EDV INDEX A4C: 31 ML/M2
ECHO LV EDV INDEX BP: 39 ML/M2
ECHO LV EDV NDEX A2C: 41 ML/M2
ECHO LV EJECTION FRACTION A2C: 66 %
ECHO LV EJECTION FRACTION A4C: 69 %
ECHO LV EJECTION FRACTION BIPLANE: 65 % (ref 55–100)
ECHO LV ESV A2C: 23 ML
ECHO LV ESV A4C: 16 ML
ECHO LV ESV BP: 22 ML (ref 19–49)
ECHO LV ESV INDEX A2C: 14 ML/M2
ECHO LV ESV INDEX A4C: 10 ML/M2
ECHO LV ESV INDEX BP: 14 ML/M2
ECHO LV FRACTIONAL SHORTENING: 37 % (ref 28–44)
ECHO LV INTERNAL DIMENSION DIASTOLE INDEX: 3.02 CM/M2
ECHO LV INTERNAL DIMENSION DIASTOLIC: 4.9 CM (ref 3.9–5.3)
ECHO LV INTERNAL DIMENSION SYSTOLIC INDEX: 1.91 CM/M2
ECHO LV INTERNAL DIMENSION SYSTOLIC: 3.1 CM
ECHO LV IVSD: 1 CM (ref 0.6–0.9)
ECHO LV MASS 2D: 164.3 G (ref 67–162)
ECHO LV MASS INDEX 2D: 101.4 G/M2 (ref 43–95)
ECHO LV POSTERIOR WALL DIASTOLIC: 0.9 CM (ref 0.6–0.9)
ECHO LV RELATIVE WALL THICKNESS RATIO: 0.37
ECHO LVOT AREA: 3.1 CM2
ECHO LVOT DIAM: 2 CM
ECHO LVOT MEAN GRADIENT: 4 MMHG
ECHO LVOT PEAK GRADIENT: 8 MMHG
ECHO LVOT PEAK VELOCITY: 1.4 M/S
ECHO LVOT STROKE VOLUME INDEX: 61.6 ML/M2
ECHO LVOT SV: 99.9 ML
ECHO LVOT VTI: 31.8 CM
ECHO MV A VELOCITY: 1.21 M/S
ECHO MV E DECELERATION TIME (DT): 300.1 MS
ECHO MV E VELOCITY: 0.88 M/S
ECHO MV E/A RATIO: 0.73
ECHO MV E/E' LATERAL: 14.67
ECHO MV E/E' RATIO (AVERAGED): 18.33
ECHO MV E/E' SEPTAL: 22
ECHO MV REGURGITANT PEAK GRADIENT: 112 MMHG
ECHO MV REGURGITANT PEAK VELOCITY: 5.3 M/S
ECHO PV MAX VELOCITY: 1.3 M/S
ECHO PV PEAK GRADIENT: 7 MMHG
ECHO RV FREE WALL PEAK S': 22 CM/S
ECHO RV INTERNAL DIMENSION: 3 CM
ECHO RV TAPSE: 2 CM (ref 1.7–?)
ECHO RVOT MEAN GRADIENT: 3 MMHG
ECHO RVOT PEAK GRADIENT: 6 MMHG
ECHO RVOT PEAK VELOCITY: 1.2 M/S
ECHO RVOT VTI: 21.8 CM
ECHO TV REGURGITANT MAX VELOCITY: 3 M/S
ECHO TV REGURGITANT PEAK GRADIENT: 36 MMHG
EOSINOPHIL # BLD: 0 K/UL (ref 0–0.4)
EOSINOPHIL NFR BLD: 0 % (ref 0–7)
ERYTHROCYTE [DISTWIDTH] IN BLOOD BY AUTOMATED COUNT: 13.5 % (ref 11.5–14.5)
GLUCOSE BLD STRIP.AUTO-MCNC: 254 MG/DL (ref 65–117)
GLUCOSE BLD STRIP.AUTO-MCNC: 256 MG/DL (ref 65–117)
GLUCOSE SERPL-MCNC: 312 MG/DL (ref 65–100)
HCT VFR BLD AUTO: 28.7 % (ref 35–47)
HGB BLD-MCNC: 9.3 G/DL (ref 11.5–16)
IMM GRANULOCYTES # BLD AUTO: 0 K/UL (ref 0–0.04)
IMM GRANULOCYTES NFR BLD AUTO: 0 % (ref 0–0.5)
LYMPHOCYTES # BLD: 0.6 K/UL (ref 0.8–3.5)
LYMPHOCYTES NFR BLD: 10 % (ref 12–49)
MCH RBC QN AUTO: 27.6 PG (ref 26–34)
MCHC RBC AUTO-ENTMCNC: 32.4 G/DL (ref 30–36.5)
MCV RBC AUTO: 85.2 FL (ref 80–99)
MONOCYTES # BLD: 0.2 K/UL (ref 0–1)
MONOCYTES NFR BLD: 3 % (ref 5–13)
NEUTS SEG # BLD: 4.8 K/UL (ref 1.8–8)
NEUTS SEG NFR BLD: 87 % (ref 32–75)
NRBC # BLD: 0 K/UL (ref 0–0.01)
NRBC BLD-RTO: 0 PER 100 WBC
NT PRO BNP: 1235 PG/ML
PLATELET # BLD AUTO: 163 K/UL (ref 150–400)
PMV BLD AUTO: 11.9 FL (ref 8.9–12.9)
POTASSIUM SERPL-SCNC: 4 MMOL/L (ref 3.5–5.1)
RBC # BLD AUTO: 3.37 M/UL (ref 3.8–5.2)
RBC MORPH BLD: ABNORMAL
SERVICE CMNT-IMP: ABNORMAL
SERVICE CMNT-IMP: ABNORMAL
SODIUM SERPL-SCNC: 139 MMOL/L (ref 136–145)
WBC # BLD AUTO: 5.6 K/UL (ref 3.6–11)

## 2024-07-11 PROCEDURE — 93306 TTE W/DOPPLER COMPLETE: CPT

## 2024-07-11 PROCEDURE — 6360000002 HC RX W HCPCS: Performed by: SPECIALIST

## 2024-07-11 PROCEDURE — 6370000000 HC RX 637 (ALT 250 FOR IP): Performed by: INTERNAL MEDICINE

## 2024-07-11 PROCEDURE — 93306 TTE W/DOPPLER COMPLETE: CPT | Performed by: SPECIALIST

## 2024-07-11 PROCEDURE — 2500000003 HC RX 250 WO HCPCS: Performed by: INTERNAL MEDICINE

## 2024-07-11 PROCEDURE — 94761 N-INVAS EAR/PLS OXIMETRY MLT: CPT

## 2024-07-11 PROCEDURE — 6360000002 HC RX W HCPCS: Performed by: INTERNAL MEDICINE

## 2024-07-11 PROCEDURE — 82962 GLUCOSE BLOOD TEST: CPT

## 2024-07-11 PROCEDURE — 80048 BASIC METABOLIC PNL TOTAL CA: CPT

## 2024-07-11 PROCEDURE — 36415 COLL VENOUS BLD VENIPUNCTURE: CPT

## 2024-07-11 PROCEDURE — 83880 ASSAY OF NATRIURETIC PEPTIDE: CPT

## 2024-07-11 PROCEDURE — 2580000003 HC RX 258: Performed by: INTERNAL MEDICINE

## 2024-07-11 PROCEDURE — 85025 COMPLETE CBC W/AUTO DIFF WBC: CPT

## 2024-07-11 PROCEDURE — 99232 SBSQ HOSP IP/OBS MODERATE 35: CPT | Performed by: SPECIALIST

## 2024-07-11 PROCEDURE — APPSS45 APP SPLIT SHARED TIME 31-45 MINUTES: Performed by: NURSE PRACTITIONER

## 2024-07-11 PROCEDURE — 94640 AIRWAY INHALATION TREATMENT: CPT

## 2024-07-11 RX ORDER — BUMETANIDE 0.25 MG/ML
1 INJECTION INTRAMUSCULAR; INTRAVENOUS ONCE
Status: COMPLETED | OUTPATIENT
Start: 2024-07-11 | End: 2024-07-11

## 2024-07-11 RX ORDER — DOXYCYCLINE HYCLATE 100 MG
100 TABLET ORAL EVERY 12 HOURS SCHEDULED
Qty: 14 TABLET | Refills: 0 | Status: SHIPPED | OUTPATIENT
Start: 2024-07-11 | End: 2024-07-18

## 2024-07-11 RX ORDER — IPRATROPIUM BROMIDE AND ALBUTEROL SULFATE 2.5; .5 MG/3ML; MG/3ML
1 SOLUTION RESPIRATORY (INHALATION)
Status: DISCONTINUED | OUTPATIENT
Start: 2024-07-11 | End: 2024-07-11 | Stop reason: HOSPADM

## 2024-07-11 RX ORDER — DOXYCYCLINE HYCLATE 100 MG
100 TABLET ORAL EVERY 12 HOURS SCHEDULED
Status: DISCONTINUED | OUTPATIENT
Start: 2024-07-11 | End: 2024-07-11 | Stop reason: HOSPADM

## 2024-07-11 RX ORDER — FUROSEMIDE 40 MG/1
40 TABLET ORAL DAILY
Status: DISCONTINUED | OUTPATIENT
Start: 2024-07-12 | End: 2024-07-11 | Stop reason: HOSPADM

## 2024-07-11 RX ORDER — PREDNISONE 20 MG/1
40 TABLET ORAL DAILY
Qty: 6 TABLET | Refills: 0 | Status: SHIPPED | OUTPATIENT
Start: 2024-07-12 | End: 2024-07-15

## 2024-07-11 RX ORDER — BUMETANIDE 0.25 MG/ML
1 INJECTION INTRAMUSCULAR; INTRAVENOUS ONCE
Status: DISCONTINUED | OUTPATIENT
Start: 2024-07-11 | End: 2024-07-11

## 2024-07-11 RX ORDER — AMLODIPINE BESYLATE 5 MG/1
10 TABLET ORAL DAILY
Qty: 30 TABLET | Refills: 3 | Status: SHIPPED | OUTPATIENT
Start: 2024-07-11

## 2024-07-11 RX ORDER — DOXYCYCLINE HYCLATE 100 MG
100 TABLET ORAL EVERY 12 HOURS SCHEDULED
Qty: 10 TABLET | Refills: 0 | Status: SHIPPED | OUTPATIENT
Start: 2024-07-11 | End: 2024-07-11

## 2024-07-11 RX ADMIN — IPRATROPIUM BROMIDE AND ALBUTEROL SULFATE 1 DOSE: .5; 3 SOLUTION RESPIRATORY (INHALATION) at 14:02

## 2024-07-11 RX ADMIN — AMLODIPINE BESYLATE 10 MG: 5 TABLET ORAL at 08:42

## 2024-07-11 RX ADMIN — PANTOPRAZOLE SODIUM 40 MG: 40 TABLET, DELAYED RELEASE ORAL at 08:41

## 2024-07-11 RX ADMIN — DOXYCYCLINE 100 MG: 100 INJECTION, POWDER, LYOPHILIZED, FOR SOLUTION INTRAVENOUS at 08:43

## 2024-07-11 RX ADMIN — BUDESONIDE 500 MCG: 0.5 SUSPENSION RESPIRATORY (INHALATION) at 07:56

## 2024-07-11 RX ADMIN — PREDNISONE 40 MG: 20 TABLET ORAL at 08:42

## 2024-07-11 RX ADMIN — INSULIN LISPRO 2 UNITS: 100 INJECTION, SOLUTION INTRAVENOUS; SUBCUTANEOUS at 12:33

## 2024-07-11 RX ADMIN — BUMETANIDE 1 MG: 0.25 INJECTION INTRAMUSCULAR; INTRAVENOUS at 10:30

## 2024-07-11 RX ADMIN — INSULIN LISPRO 2 UNITS: 100 INJECTION, SOLUTION INTRAVENOUS; SUBCUTANEOUS at 08:42

## 2024-07-11 RX ADMIN — ATORVASTATIN CALCIUM 20 MG: 20 TABLET, FILM COATED ORAL at 08:41

## 2024-07-11 RX ADMIN — SODIUM CHLORIDE, PRESERVATIVE FREE 10 ML: 5 INJECTION INTRAVENOUS at 08:42

## 2024-07-11 RX ADMIN — ARFORMOTEROL TARTRATE 15 MCG: 15 SOLUTION RESPIRATORY (INHALATION) at 07:57

## 2024-07-11 RX ADMIN — ASPIRIN 81 MG: 81 TABLET, CHEWABLE ORAL at 08:42

## 2024-07-11 RX ADMIN — IPRATROPIUM BROMIDE AND ALBUTEROL SULFATE 1 DOSE: .5; 3 SOLUTION RESPIRATORY (INHALATION) at 07:50

## 2024-07-11 NOTE — CASE COMMUNICATION
At approximately 10:45PM tech informed nurse that patient had the sensation to urinate but could not do so. Daughter of the patient requested a bladder scan for the patient . Tech informed the nurse about the situation.

## 2024-07-11 NOTE — PROGRESS NOTES
[facility] CARDIOLOGY                       Cardiology Care Note     []Initial Encounter     [x]Follow-up    Patient Name: Janis Chadwick - :1934 - MRN:[id number]  Primary Cardiologist: Gardenia / Yuri  Consulting Cardiologist: Jil Garcia MD     Reason for encounter:  Chest pain       HPI:       Janis Chadwick is a 89 y.o. female with PMH significant for CAD, HFpEF, HTN, GI bleed, CKD, DM who comes to ED complaining of SOB and chest tightness.    Daughter states that a few days ago patient developed a cough and then today she developed extreme fatigue, chest tightness and dyspnea. She says patient traveled recently and had some increased LE edema from that.  Denies sig weight gain.      Overnight in WOB, urinary rentention         Assessment and Plan       SOB, CP, fatigue  - May be multifactorial --> CHF +/- vital process / bronchitis     Acute on chronic HFpEF  - p/w fatigue and SOB, a little more edema   - CXR unremarkable   - BNP mildly elevated at 535 > 1225.  D-Dimer normal.   - She was given IV Bumex in the ED with good response (-1700) (on lasix 40 mg daily PTA)   - dose bumex 1 mg IV this am, resume home lasix tomorrow  - ECHO pending     CAD   - Inferior STEMI  ---> mRCA 100% occluded ---> DALY x3 in mRCA to PDA  - Since , she has had multiple admission for CP and SOB  - On 7/10/24 - p/w SOB, fatigue, and CP  - EKG without acute ischemic changes   - Trops normal   - would treat medically for CAD / CHF and do further testing (stress test, cath) only for refractory symptoms   - cont ASA, statin, CCB     HTN  - -170/50's on arrival   - from today increase norvasc to 10 mg  - daughter says BP Ok at home   - can add Coreg later if BP is high at home (for potential angina as well)     GI Bleed   - Admitted with GI Bleed 22 on DAPT  (Hgb 6.8)   - Pill Cam (2022): Intestinal AVMs.   - needed PRBC  (Hgb 7.2)  97 - 108 mmol/L    CO2 22 21 - 32 mmol/L    Anion Gap 10 5 - 15 mmol/L    Glucose 391 (H) 65 - 100 mg/dL    BUN 21 (H) 6 - 20 MG/DL    Creatinine 1.80 (H) 0.55 - 1.02 MG/DL    BUN/Creatinine Ratio 12 12 - 20      Est, Glom Filt Rate 26 (L) >60 ml/min/1.73m2    Calcium 9.4 8.5 - 10.1 MG/DL   Hemoglobin and Hematocrit    Collection Time: 07/10/24  9:39 PM   Result Value Ref Range    Hemoglobin 10.9 (L) 11.5 - 16.0 g/dL    Hematocrit 33.3 (L) 35.0 - 47.0 %   CBC with Auto Differential    Collection Time: 07/11/24  4:16 AM   Result Value Ref Range    WBC 5.6 3.6 - 11.0 K/uL    RBC 3.37 (L) 3.80 - 5.20 M/uL    Hemoglobin 9.3 (L) 11.5 - 16.0 g/dL    Hematocrit 28.7 (L) 35.0 - 47.0 %    MCV 85.2 80.0 - 99.0 FL    MCH 27.6 26.0 - 34.0 PG    MCHC 32.4 30.0 - 36.5 g/dL    RDW 13.5 11.5 - 14.5 %    Platelets 163 150 - 400 K/uL    MPV 11.9 8.9 - 12.9 FL    Nucleated RBCs 0.0 0  WBC    nRBC 0.00 0.00 - 0.01 K/uL    Neutrophils % 87 (H) 32 - 75 %    Lymphocytes % 10 (L) 12 - 49 %    Monocytes % 3 (L) 5 - 13 %    Eosinophils % 0 0 - 7 %    Basophils % 0 0 - 1 %    Immature Granulocytes % 0 0.0 - 0.5 %    Neutrophils Absolute 4.8 1.8 - 8.0 K/UL    Lymphocytes Absolute 0.6 (L) 0.8 - 3.5 K/UL    Monocytes Absolute 0.2 0.0 - 1.0 K/UL    Eosinophils Absolute 0.0 0.0 - 0.4 K/UL    Basophils Absolute 0.0 0.0 - 0.1 K/UL    Immature Granulocytes Absolute 0.0 0.00 - 0.04 K/UL    Differential Type SMEAR SCANNED      RBC Comment NORMOCYTIC, NORMOCHROMIC     Basic Metabolic Panel    Collection Time: 07/11/24  4:16 AM   Result Value Ref Range    Sodium 139 136 - 145 mmol/L    Potassium 4.0 3.5 - 5.1 mmol/L    Chloride 108 97 - 108 mmol/L    CO2 23 21 - 32 mmol/L    Anion Gap 8 5 - 15 mmol/L    Glucose 312 (H) 65 - 100 mg/dL    BUN 25 (H) 6 - 20 MG/DL    Creatinine 1.80 (H) 0.55 - 1.02 MG/DL    BUN/Creatinine Ratio 14 12 - 20      Est, Glom Filt Rate 26 (L) >60 ml/min/1.73m2    Calcium 9.0 8.5 - 10.1 MG/DL   Brain Natriuretic Peptide

## 2024-07-11 NOTE — PROGRESS NOTES
Patient meets criteria to change doxycycline 100 mg IV Q12H to doxycycline 100 mg PO Q12H per protocol    Thank you,  Shamar Hoffmann, Pharm D, BCPS  [phone]

## 2024-07-11 NOTE — PROGRESS NOTES
Rapid Called at 2126    Responded to RRT at 2127 for Hypertension and Chest Pain    Provider at bedside: YES  Interventions ordered: Labs and EKG  Sepsis Suspected: No  Transfer to Higher Level of Care: no  Blood Glucose: 363     Vitals:    07/10/24 2130   BP: (!) 184/70   Pulse:    Resp:    Temp:    SpO2:       Rapid with CP called for sternal CP non radiating occurring prior to hydralazine administration. Pt reports some SOB. Pt has trouble qualifying CP symptoms when asked and trouble quantifying pain level. Pt alert, oriented, skin warm and dry, good color, tremulous and tearful. Daughter to bedside during rapid.EKG done and labs drawn, provider at bedside. Pt bp improving at this time.    0130-rounded with pt, pt CP improved, denies previous complaints. Primary nurse preparing pt for BSC toileting,encouraged primary to reach out for further concerns.    Rapid Ended at 2145  RRT RN assisted with transport to accepting unit No.    Peg Pineda RN

## 2024-07-11 NOTE — PROGRESS NOTES
Spiritual Care Assessment/Progress Note  [facility]    Name: Janis Chadwick MRN: [id number]    Age: 89 y.o.     Sex: female   Language: English     Date: 7/11/2024            Total Time Calculated: 10 min              Spiritual Assessment begun in [facility] A3 MULTI-SPECIALTY TELEMETRY  Service Provided For: Patient  Referral/Consult From: Rounding  Encounter Overview/Reason: Initial Encounter    Spiritual beliefs:      [x] Involved in a rufus tradition/spiritual practice: Scientologist     [x] Supported by a rufus community: [employer]     [] Claims no spiritual orientation:      [] Seeking spiritual identity:           [] Adheres to an individual form of spirituality:      [] Not able to assess:                Identified resources for coping and support system:   Support System: Children       [x] Prayer                  [] Devotional reading               [] Music                  [] Guided Imagery     [] Pet visits                                        [] Other: (COMMENT)     Specific area/focus of visit   Encounter:    Crisis:    Spiritual/Emotional needs: Type: Spiritual Support  Ritual, Rites and Sacraments: Type:  (Prayer)  Grief, Loss, and Adjustments:    Ethics/Mediation:    Behavioral Health:    Palliative Care:    Advance Care Planning:      Plan/Referrals: Other (Comment) (Contact spiritual health services for further assistance needed.)    Narrative: Chart review. I rounded on Med Surg 3 and provided direct patient care. Patient is of the Scientologist Jain and attends [employer]. Patient is , has four children and eighteen great grand children. Patient daughter was briefly in the room. Her name is Gege.   When patient goes through difficult times, she prays a lot. Patient is a spiritual person and prays regularly. Patient finds his inner strength from her belief in the Lord. The patient is aware that the Lord is always present with her. Today the patient feels good. Per RN Nurse  patient possibly discharging today. Patient shared medical issues and historical records. The patient enjoys watching the news and her game shows. Provided prayers of peace/comfort for the patient. Provided ministry of presence, compassion, and empathy. Please contact spiritual health services for further assistance needed.    Mickey Mejia MDiv  Staff    Spiritual Health Services  Paging Service [phone] ([place])

## 2024-07-11 NOTE — PROGRESS NOTES
Around 2100 this RN walked into pt room to give hydralizine for pt's bp that was in the 180s. Upon walking into room this RN noticed pt was tremoring and complaining of chest pain. BP was taken and read into 200s. This RN immediately called a Rapid response. Hydralizine was administered. Rapid response nurse, NP, charge nurse and supervisor came to bedside. EKG was done and showed to NP. EKG showed new abnormalities. BP was rechecked a few minutes after the first reading and was lower into the 160s. BG was was checked and read in 300s. NP was ok with this and no new orders were given. Labs were drawn and troponin was 9. No new orders at this time. Pt reported of decreased chest pain. Daughter also came inside room in the midst of rapid and was causing additional anxiety to mother. Daughter was advised to help keep mother calm by not bringing up topics that are causing her anxiety.

## 2024-07-11 NOTE — PROGRESS NOTES
Patient received a discharge order. The patient and her daughter were made aware of the discharge and agreed to going home. The patient's IV was removed without any bleeding or complications. The patient and her daughter were given discharge instructions which included new, changed, and discontinued medication, where they can  their prescriptions, side effects of new medications, follow up appointments, signs and symptoms of heart attack and stroke and when to seek emergency care, and how to access Mychart. The patient's belongings were all packed up and the patient verified they had everything that belongs to them. The patient was wheeled down to the discharge area via wheelchair. Vital signs were all stable at the time of discharge.

## 2024-07-11 NOTE — CARE COORDINATION
3:29 PM  07/11/24    Care Management Progress Note    Reason for Admission:   Shortness of breath [R06.02]  Dyspnea [R06.00]  SOB (shortness of breath) [R06.02]  Dyspnea, unspecified type [R06.00]         Patient Admission Status: Inpatient  RUR: Readmission Risk Score: 17.1    Hospitalization in the last 30 days (Readmission):  No        Transition of care plan:  Pt discussed during IDR; anticipated discharge home today.  Anticipated discharge plan is home with family assistance. Pt is on services with [facility] for SN/wound care- CM sent LUANN referral to agency.  Outpatient follow-up.  Pt's family to transport.      DEBORAH Johns

## 2024-07-11 NOTE — PLAN OF CARE
Problem: Safety - Adult  Goal: Free from fall injury  7/11/2024 1111 by Rachell Clark RN  Outcome: Progressing  7/11/2024 0833 by Mery Luong RN  Outcome: Progressing     Problem: Discharge Planning  Goal: Discharge to home or other facility with appropriate resources  7/11/2024 1111 by Rachell Clark RN  Outcome: Progressing  7/11/2024 0833 by Mery Luong RN  Outcome: Progressing     Problem: Chronic Conditions and Co-morbidities  Goal: Patient's chronic conditions and co-morbidity symptoms are monitored and maintained or improved  7/11/2024 1111 by Rachell Clark RN  Outcome: Progressing  7/11/2024 0833 by Mery Luong RN  Outcome: Progressing     Problem: Skin/Tissue Integrity  Goal: Absence of new skin breakdown  Description: 1.  Monitor for areas of redness and/or skin breakdown  2.  Assess vascular access sites hourly  3.  Every 4-6 hours minimum:  Change oxygen saturation probe site  4.  Every 4-6 hours:  If on nasal continuous positive airway pressure, respiratory therapy assess nares and determine need for appliance change or resting period.  Outcome: Progressing     Problem: Respiratory - Adult  Goal: Achieves optimal ventilation and oxygenation  7/11/2024 1111 by Rachell Clark RN  Outcome: Progressing  Flowsheets (Taken 7/11/2024 0113 by Regina Perry RCP)  Achieves optimal ventilation and oxygenation: Respiratory therapy support as indicated  7/11/2024 0112 by Regina Perry RCP  Outcome: Progressing

## 2024-07-11 NOTE — CASE COMMUNICATION
At approximately 1:10 AM daughter confronted technician about having a bladder scan for the patient. Technician relayed the same information that the nurse that informed the technician. The daughter was not happy about it and told the technician to tell the nurse that she wanted to see her. Soon after the nurse told the technician to go ahead and give the patient a bladder scan, a bladder scan was done and the patient had about approximately 375ml of urine in her bladder. The technician then relayed that information to the nurse.

## 2024-07-11 NOTE — DISCHARGE INSTRUCTIONS
HOSPITALIST DISCHARGE INSTRUCTIONS  NAME:  Janis Chadwick   :  1934   MRN:  [id number]     Date/Time:  2024 1:59 PM    ADMIT DATE: 2024     DISCHARGE DATE: 2024     DISCHARGE DIAGNOSIS:  Cellulitis   Bronchitis/reactive airway disease leading to wheezing     DISCHARGE INSTRUCTIONS:  Thank you for allowing us to participate in your care. Your discharging Hospitalist is Darlene Trinidad MD. You were admitted for evaluation and treatment of the above.       MEDICATIONS:    It is important that you take the medication exactly as they are prescribed.   Keep your medication in the bottles provided by the pharmacist and keep a list of the medication names, dosages, and times to be taken in your wallet.   Do not take other medications without consulting your doctor.             If you experience any of the following symptoms then please call your primary care physician or return to the emergency room if you cannot get hold of your doctor:  Fever, chills, nausea, vomiting, diarrhea, change in mentation, falling, bleeding, shortness of breath    Follow Up:  Please call the below provider to arrange hospital follow up appointment      Jil Garcia MD  [id number] [address] [address]  [phone]    Follow up on 2024  2:20pm      For questions regarding your Hospitalization or to contact the Hospital Medicine team, please call [phone].      Information obtained by :  I understand that if any problems occur once I am at home I am to contact my physician.    I understand and acknowledge receipt of the instructions indicated above.                                                                                                                                           Physician's or R.N.'s Signature                                                                  Date/Time

## 2024-07-13 NOTE — DISCHARGE SUMMARY
Physician Discharge Summary     Patient ID:  Janis Chadwick  [id number]  89 y.o.  2/21/1934    Admit date: 7/9/2024    Discharge date and time:7/11/2024    Admission Diagnoses:Dyspnea     Discharge Diagnoses/Hospital Course   Ms. Chadwick is a pleasant 89 yo female with PMH of CAD. CKD, GI bleed, HTN, DM admitted for dyspnea and RLE cellulitis. CXR without acute process. Suspect 2/2 RAD in the setting of possible viral infection. Pt improved with steroids, antibiotics and nebulizers.     RLE dopplers negative for DVT. Cellulitis improved with antibiotics    PCP: Paradise Rubio APRN - NP     Consults: cardiology    Discharge Exam:  Vitals:    07/11/24 0757 07/11/24 0840 07/11/24 0854 07/11/24 1121   BP:  (!) 152/53 (!) 153/53 (!) 148/58   Pulse:  94  88   Resp:  20  18   Temp:  98.1 °F (36.7 °C)  98.1 °F (36.7 °C)   TempSrc:  Oral  Oral   SpO2: 98% 100%  99%   Weight:   61.7 kg (136 lb)    Height:   1.575 m (5' 2\")       Gen:  Well-developed, well-nourished, in no acute distress  HEENT:  Pink conjunctivae, PERRL, hearing intact to voice, moist mucous membranes  Neck:  Supple, without masses, thyroid non-tender  Resp: scattered expiratory wheezing improved   Card:  No murmurs, normal S1, S2 without thrills, bruits   Abd:  Soft, non-tender, non-distended, normoactive bowel sounds are present  Musc:  No cyanosis or clubbing  Skin:  No rashes or ulcers, skin turgor is good  Neuro:  Cranial nerves 3-12 are grossly intact,  strength is 5/5 bilaterally and dorsi / plantarflexion is 5/5 bilaterally, follows commands appropriately  Psych:  Good insight, oriented to person, place and time, alert  RLE erythema improved     Disposition: home    Patient Instructions:   Discharge Medication List as of 7/11/2024  3:07 PM        START taking these medications    Details   predniSONE (DELTASONE) 20 MG tablet Take 2 tablets by mouth daily for 3 days FIRST DOSE ON 7/12 AM, Disp-6 tablet, R-0Normal           CONTINUE these

## 2024-07-27 NOTE — PROGRESS NOTES
Physician Progress Note      PATIENT:               YANN GONZALES  CSN #:                  [id number]  :                       1934  ADMIT DATE:       2024 2:55 PM  DISCH DATE:        2024 4:00 PM  RESPONDING  PROVIDER #:        Darlene Ivey MD          QUERY TEXT:    89yoF pt admitted for dyspnea and RLE cellulitis. Pt noted to also have acute   on chronic diastolic CHF. treated IV bumex, in ED with good response. If   possible, please document in progress notes and discharge summary the etiology   of CHF, if able to be determined.    The medical record reflects the following:  Risk Factors: hx Inferior STEMI 2021, CAD s/p DALY ischemic cardiomyopathy,   HTN, CKD stage 4 , HFpEF, ? RAD    Clinical Indicators: noted on admission for 'Dyspnea/chest tightness/history   of coronary disease status post DALY POA: suspect RAD as wheezing with dry   cough.'    Cardiology consultedon   notes: SOB, CP, fatigue may be multifactorial ->   CHF  +/- Viral process/bronchitis  CAD - hx Inferior STEMI   - Since , she has had multiple admission for CP and SOB  HTN  - -170/50's on arrival  - from today increase norvasc to 10 mg  - Echo 23 - LVEF 50-55%, LAE    7/10 Rapid response called for HTN and chest pain - /70  per Nursing BP retaken read into 200s (200/83) , IV hydralazine given on   07/10/24 2123 O2 sats noted 94% on RA    Treatment: IV Bumex x2, continue oral Lasix, Norvasc, statin, IV Hydralazine,   EKG, Cardiology consult.    thank you,  Valerie Epps RN, CDI  Options provided:  -- CHF due to Hypertensive Heart Disease  -- CHF due to Hypertensive Heart Disease and CAD  -- CHF due to Hypertensive Heart Disease and ICMP  -- CHF not due to Hypertension but due to ICMP  -- CHF due to HTN, CAD, ICMP  -- Other - I will add my own diagnosis  -- Disagree - Not applicable / Not valid  -- Disagree - Clinically unable to determine / Unknown  -- Refer to Clinical

## 2024-08-01 ENCOUNTER — OFFICE VISIT (OUTPATIENT)
Age: 89
End: 2024-08-01
Payer: MEDICARE

## 2024-08-01 VITALS
SYSTOLIC BLOOD PRESSURE: 134 MMHG | OXYGEN SATURATION: 98 % | DIASTOLIC BLOOD PRESSURE: 50 MMHG | WEIGHT: 131 LBS | HEIGHT: 62 IN | BODY MASS INDEX: 24.11 KG/M2 | HEART RATE: 78 BPM

## 2024-08-01 DIAGNOSIS — I50.32 CHRONIC DIASTOLIC HEART FAILURE (HCC): ICD-10-CM

## 2024-08-01 DIAGNOSIS — N18.32 STAGE 3B CHRONIC KIDNEY DISEASE (HCC): ICD-10-CM

## 2024-08-01 DIAGNOSIS — I25.118 CORONARY ARTERY DISEASE OF NATIVE ARTERY OF NATIVE HEART WITH STABLE ANGINA PECTORIS (HCC): Primary | ICD-10-CM

## 2024-08-01 PROCEDURE — 1090F PRES/ABSN URINE INCON ASSESS: CPT | Performed by: SPECIALIST

## 2024-08-01 PROCEDURE — 1123F ACP DISCUSS/DSCN MKR DOCD: CPT | Performed by: SPECIALIST

## 2024-08-01 PROCEDURE — 99214 OFFICE O/P EST MOD 30 MIN: CPT | Performed by: SPECIALIST

## 2024-08-01 PROCEDURE — 1036F TOBACCO NON-USER: CPT | Performed by: SPECIALIST

## 2024-08-01 PROCEDURE — 1111F DSCHRG MED/CURRENT MED MERGE: CPT | Performed by: SPECIALIST

## 2024-08-01 PROCEDURE — G8427 DOCREV CUR MEDS BY ELIG CLIN: HCPCS | Performed by: SPECIALIST

## 2024-08-01 PROCEDURE — G8420 CALC BMI NORM PARAMETERS: HCPCS | Performed by: SPECIALIST

## 2024-08-01 RX ORDER — ASPIRIN 81 MG/1
81 TABLET ORAL DAILY
COMMUNITY

## 2024-08-01 RX ORDER — AMLODIPINE BESYLATE 5 MG/1
5 TABLET ORAL DAILY
COMMUNITY

## 2024-08-01 NOTE — PROGRESS NOTES
Jil Garcia MD. [facility]          Patient: Janis Chadwick  : 1934      Today's Date: 2024        HISTORY OF PRESENT ILLNESS:     History of Present Illness:    Recent hospital admission   DC summary noted - Ms. Chadwick is a pleasant 89 yo female with PMH of CAD. CKD, GI bleed, HTN, DM admitted for dyspnea and RLE cellulitis. CXR without acute process. Suspect 2/2 RAD in the setting of possible viral infection. Pt improved with steroids, antibiotics and nebulizers.     Feeling better - breathing is OK       PAST MEDICAL HISTORY:     Past Medical History:   Diagnosis Date    CAD (coronary artery disease)     CHF (congestive heart failure), NYHA class I, chronic, systolic (HCC)     CKD (chronic kidney disease) stage 3, GFR 30-59 ml/min ([facility])     DM type 2 causing renal disease (HCC)     DM type 2 causing vascular disease (HCC)     Hypertension     Iron deficiency anemia     Ischemic cardiomyopathy     Peripheral vascular disease (HCC)        Past Surgical History:   Procedure Laterality Date    APPENDECTOMY      COLONOSCOPY N/A 2022    COLONOSCOPY performed by Rad Tineo MD at [facility] ENDOSCOPY    CORONARY ANGIOPLASTY WITH STENT PLACEMENT  2021    stents x 3 to mid-distal right coronary artery going into PDA for 100% occlusion of RCA; and also had nonobstructive disease in 30% LAD stenosis, 70% diagonal 1 stenosis.  She had proximal 30% stenosis in the codominant left circumflex artery     HYSTERECTOMY (CERVIX STATUS UNKNOWN)      OTHER SURGICAL HISTORY      Back surgery x 2    OTHER SURGICAL HISTORY      head sx x 2 nerve related    TONSILLECTOMY      TOTAL KNEE ARTHROPLASTY Left              CURRENT MEDICATIONS:    .  Current Outpatient Medications   Medication Sig Dispense Refill    aspirin 81 MG EC tablet Take 1 tablet by mouth daily      amLODIPine (NORVASC) 5 MG tablet Take 1 tablet by mouth daily      pantoprazole (PROTONIX) 40 MG tablet Take 1 tablet by mouth 2 times daily (before meals)

## 2024-08-01 NOTE — PROGRESS NOTES
Chief Complaint   Patient presents with    Follow-Up from Hospital    Shortness of Breath    Other     DYSPNEA     Vitals:    08/01/24 1413   BP: (!) 134/50   Site: Left Upper Arm   Position: Sitting   Cuff Size: Medium Adult   Pulse: 78   SpO2: 98%   Weight: 59.4 kg (131 lb)   Height: 1.575 m (5' 2\")      BP (!) 134/50 (Site: Left Upper Arm, Position: Sitting, Cuff Size: Medium Adult)   Pulse 78   Ht 1.575 m (5' 2\")   Wt 59.4 kg (131 lb)   SpO2 98%   BMI 23.96 kg/m²

## 2024-09-29 ENCOUNTER — HOSPITAL ENCOUNTER (INPATIENT)
Facility: HOSPITAL | Age: 89
LOS: 4 days | Discharge: HOME | DRG: 291 | End: 2024-10-03
Attending: STUDENT IN AN ORGANIZED HEALTH CARE EDUCATION/TRAINING PROGRAM | Admitting: INTERNAL MEDICINE
Payer: MEDICARE

## 2024-09-29 ENCOUNTER — APPOINTMENT (OUTPATIENT)
Dept: VASCULAR SURGERY | Facility: HOSPITAL | Age: 89
DRG: 291 | End: 2024-09-29
Attending: STUDENT IN AN ORGANIZED HEALTH CARE EDUCATION/TRAINING PROGRAM
Payer: MEDICARE

## 2024-09-29 ENCOUNTER — APPOINTMENT (OUTPATIENT)
Facility: HOSPITAL | Age: 89
DRG: 291 | End: 2024-09-29
Payer: MEDICARE

## 2024-09-29 DIAGNOSIS — D64.9 ANEMIA, UNSPECIFIED TYPE: Primary | ICD-10-CM

## 2024-09-29 DIAGNOSIS — L03.116 CELLULITIS OF LEFT LOWER EXTREMITY: ICD-10-CM

## 2024-09-29 DIAGNOSIS — R60.9 SWELLING: ICD-10-CM

## 2024-09-29 DIAGNOSIS — I50.9 ACUTE ON CHRONIC CONGESTIVE HEART FAILURE, UNSPECIFIED HEART FAILURE TYPE (HCC): ICD-10-CM

## 2024-09-29 LAB
ALBUMIN SERPL-MCNC: 3.2 G/DL (ref 3.5–5)
ALBUMIN/GLOB SERPL: 1 (ref 1.1–2.2)
ALP SERPL-CCNC: 71 U/L (ref 45–117)
ALT SERPL-CCNC: 18 U/L (ref 12–78)
ANION GAP SERPL CALC-SCNC: 5 MMOL/L (ref 2–12)
AST SERPL-CCNC: 13 U/L (ref 15–37)
BASOPHILS # BLD: 0 K/UL (ref 0–0.1)
BASOPHILS NFR BLD: 1 % (ref 0–1)
BILIRUB SERPL-MCNC: 0.2 MG/DL (ref 0.2–1)
BUN SERPL-MCNC: 25 MG/DL (ref 6–20)
BUN/CREAT SERPL: 15 (ref 12–20)
CALCIUM SERPL-MCNC: 8.7 MG/DL (ref 8.5–10.1)
CHLORIDE SERPL-SCNC: 116 MMOL/L (ref 97–108)
CO2 SERPL-SCNC: 22 MMOL/L (ref 21–32)
COMMENT:: NORMAL
CREAT SERPL-MCNC: 1.69 MG/DL (ref 0.55–1.02)
DIFFERENTIAL METHOD BLD: ABNORMAL
EOSINOPHIL # BLD: 0.3 K/UL (ref 0–0.4)
EOSINOPHIL NFR BLD: 7 % (ref 0–7)
ERYTHROCYTE [DISTWIDTH] IN BLOOD BY AUTOMATED COUNT: 15.2 % (ref 11.5–14.5)
GLOBULIN SER CALC-MCNC: 3.1 G/DL (ref 2–4)
GLUCOSE BLD STRIP.AUTO-MCNC: 189 MG/DL (ref 65–117)
GLUCOSE SERPL-MCNC: 212 MG/DL (ref 65–100)
HCT VFR BLD AUTO: 20.5 % (ref 35–47)
HGB BLD-MCNC: 6.2 G/DL (ref 11.5–16)
HISTORY CHECK: NORMAL
IMM GRANULOCYTES # BLD AUTO: 0 K/UL (ref 0–0.04)
IMM GRANULOCYTES NFR BLD AUTO: 0 % (ref 0–0.5)
LYMPHOCYTES # BLD: 1 K/UL (ref 0.8–3.5)
LYMPHOCYTES NFR BLD: 25 % (ref 12–49)
MCH RBC QN AUTO: 24.3 PG (ref 26–34)
MCHC RBC AUTO-ENTMCNC: 30.2 G/DL (ref 30–36.5)
MCV RBC AUTO: 80.4 FL (ref 80–99)
MONOCYTES # BLD: 0.4 K/UL (ref 0–1)
MONOCYTES NFR BLD: 10 % (ref 5–13)
NEUTS SEG # BLD: 2.4 K/UL (ref 1.8–8)
NEUTS SEG NFR BLD: 57 % (ref 32–75)
NRBC # BLD: 0 K/UL (ref 0–0.01)
NRBC BLD-RTO: 0 PER 100 WBC
NT PRO BNP: 800 PG/ML
PLATELET # BLD AUTO: 195 K/UL (ref 150–400)
PMV BLD AUTO: 11 FL (ref 8.9–12.9)
POTASSIUM SERPL-SCNC: 4.5 MMOL/L (ref 3.5–5.1)
PROT SERPL-MCNC: 6.3 G/DL (ref 6.4–8.2)
RBC # BLD AUTO: 2.55 M/UL (ref 3.8–5.2)
RBC MORPH BLD: ABNORMAL
RBC MORPH BLD: ABNORMAL
SERVICE CMNT-IMP: ABNORMAL
SODIUM SERPL-SCNC: 143 MMOL/L (ref 136–145)
SPECIMEN HOLD: NORMAL
TROPONIN I SERPL HS-MCNC: 12 NG/L (ref 0–51)
WBC # BLD AUTO: 4.1 K/UL (ref 3.6–11)

## 2024-09-29 PROCEDURE — 85025 COMPLETE CBC W/AUTO DIFF WBC: CPT

## 2024-09-29 PROCEDURE — 84484 ASSAY OF TROPONIN QUANT: CPT

## 2024-09-29 PROCEDURE — 36415 COLL VENOUS BLD VENIPUNCTURE: CPT

## 2024-09-29 PROCEDURE — 80053 COMPREHEN METABOLIC PANEL: CPT

## 2024-09-29 PROCEDURE — 1100000000 HC RM PRIVATE

## 2024-09-29 PROCEDURE — 93005 ELECTROCARDIOGRAM TRACING: CPT | Performed by: STUDENT IN AN ORGANIZED HEALTH CARE EDUCATION/TRAINING PROGRAM

## 2024-09-29 PROCEDURE — 2580000003 HC RX 258: Performed by: STUDENT IN AN ORGANIZED HEALTH CARE EDUCATION/TRAINING PROGRAM

## 2024-09-29 PROCEDURE — 86850 RBC ANTIBODY SCREEN: CPT

## 2024-09-29 PROCEDURE — 6370000000 HC RX 637 (ALT 250 FOR IP): Performed by: INTERNAL MEDICINE

## 2024-09-29 PROCEDURE — 86901 BLOOD TYPING SEROLOGIC RH(D): CPT

## 2024-09-29 PROCEDURE — 2580000003 HC RX 258: Performed by: INTERNAL MEDICINE

## 2024-09-29 PROCEDURE — 93971 EXTREMITY STUDY: CPT

## 2024-09-29 PROCEDURE — 6360000002 HC RX W HCPCS: Performed by: STUDENT IN AN ORGANIZED HEALTH CARE EDUCATION/TRAINING PROGRAM

## 2024-09-29 PROCEDURE — 83880 ASSAY OF NATRIURETIC PEPTIDE: CPT

## 2024-09-29 PROCEDURE — 86923 COMPATIBILITY TEST ELECTRIC: CPT

## 2024-09-29 PROCEDURE — 71045 X-RAY EXAM CHEST 1 VIEW: CPT

## 2024-09-29 PROCEDURE — 86900 BLOOD TYPING SEROLOGIC ABO: CPT

## 2024-09-29 PROCEDURE — 6360000002 HC RX W HCPCS: Performed by: INTERNAL MEDICINE

## 2024-09-29 PROCEDURE — 82962 GLUCOSE BLOOD TEST: CPT

## 2024-09-29 PROCEDURE — 99285 EMERGENCY DEPT VISIT HI MDM: CPT

## 2024-09-29 RX ORDER — ONDANSETRON 2 MG/ML
4 INJECTION INTRAMUSCULAR; INTRAVENOUS EVERY 6 HOURS PRN
Status: DISCONTINUED | OUTPATIENT
Start: 2024-09-29 | End: 2024-10-03 | Stop reason: HOSPADM

## 2024-09-29 RX ORDER — ONDANSETRON 4 MG/1
4 TABLET, ORALLY DISINTEGRATING ORAL EVERY 8 HOURS PRN
Status: DISCONTINUED | OUTPATIENT
Start: 2024-09-29 | End: 2024-10-03 | Stop reason: HOSPADM

## 2024-09-29 RX ORDER — FERROUS SULFATE 325(65) MG
325 TABLET ORAL
Status: DISCONTINUED | OUTPATIENT
Start: 2024-09-30 | End: 2024-10-03 | Stop reason: HOSPADM

## 2024-09-29 RX ORDER — ATORVASTATIN CALCIUM 20 MG/1
20 TABLET, FILM COATED ORAL DAILY
Status: DISCONTINUED | OUTPATIENT
Start: 2024-09-30 | End: 2024-10-03 | Stop reason: HOSPADM

## 2024-09-29 RX ORDER — HYDRALAZINE HYDROCHLORIDE 20 MG/ML
20 INJECTION INTRAMUSCULAR; INTRAVENOUS EVERY 6 HOURS PRN
Status: DISCONTINUED | OUTPATIENT
Start: 2024-09-29 | End: 2024-09-30

## 2024-09-29 RX ORDER — SODIUM CHLORIDE 9 MG/ML
INJECTION, SOLUTION INTRAVENOUS PRN
Status: DISCONTINUED | OUTPATIENT
Start: 2024-09-29 | End: 2024-10-03 | Stop reason: HOSPADM

## 2024-09-29 RX ORDER — ACETAMINOPHEN 650 MG/1
650 SUPPOSITORY RECTAL EVERY 6 HOURS PRN
Status: DISCONTINUED | OUTPATIENT
Start: 2024-09-29 | End: 2024-10-03 | Stop reason: HOSPADM

## 2024-09-29 RX ORDER — ACETAMINOPHEN 325 MG/1
650 TABLET ORAL EVERY 6 HOURS PRN
Status: DISCONTINUED | OUTPATIENT
Start: 2024-09-29 | End: 2024-10-03 | Stop reason: HOSPADM

## 2024-09-29 RX ORDER — SODIUM CHLORIDE 0.9 % (FLUSH) 0.9 %
5-40 SYRINGE (ML) INJECTION PRN
Status: DISCONTINUED | OUTPATIENT
Start: 2024-09-29 | End: 2024-10-03 | Stop reason: HOSPADM

## 2024-09-29 RX ORDER — INSULIN LISPRO 100 [IU]/ML
0-8 INJECTION, SOLUTION INTRAVENOUS; SUBCUTANEOUS
Status: DISCONTINUED | OUTPATIENT
Start: 2024-09-30 | End: 2024-10-03 | Stop reason: HOSPADM

## 2024-09-29 RX ORDER — INSULIN LISPRO 100 [IU]/ML
0-4 INJECTION, SOLUTION INTRAVENOUS; SUBCUTANEOUS NIGHTLY
Status: DISCONTINUED | OUTPATIENT
Start: 2024-09-29 | End: 2024-10-03 | Stop reason: HOSPADM

## 2024-09-29 RX ORDER — AMLODIPINE BESYLATE 5 MG/1
5 TABLET ORAL DAILY
Status: DISCONTINUED | OUTPATIENT
Start: 2024-09-30 | End: 2024-09-30

## 2024-09-29 RX ORDER — SODIUM CHLORIDE 0.9 % (FLUSH) 0.9 %
5-40 SYRINGE (ML) INJECTION EVERY 12 HOURS SCHEDULED
Status: DISCONTINUED | OUTPATIENT
Start: 2024-09-29 | End: 2024-10-03 | Stop reason: HOSPADM

## 2024-09-29 RX ORDER — FUROSEMIDE 10 MG/ML
40 INJECTION INTRAMUSCULAR; INTRAVENOUS 2 TIMES DAILY
Status: DISCONTINUED | OUTPATIENT
Start: 2024-09-30 | End: 2024-10-01

## 2024-09-29 RX ORDER — INSULIN GLARGINE 100 [IU]/ML
19 INJECTION, SOLUTION SUBCUTANEOUS NIGHTLY
Status: DISCONTINUED | OUTPATIENT
Start: 2024-09-29 | End: 2024-10-03 | Stop reason: HOSPADM

## 2024-09-29 RX ORDER — FUROSEMIDE 10 MG/ML
40 INJECTION INTRAMUSCULAR; INTRAVENOUS
Status: COMPLETED | OUTPATIENT
Start: 2024-09-29 | End: 2024-09-29

## 2024-09-29 RX ORDER — POLYETHYLENE GLYCOL 3350 17 G/17G
17 POWDER, FOR SOLUTION ORAL DAILY PRN
Status: DISCONTINUED | OUTPATIENT
Start: 2024-09-29 | End: 2024-10-03 | Stop reason: HOSPADM

## 2024-09-29 RX ADMIN — HYDRALAZINE HYDROCHLORIDE 20 MG: 20 INJECTION INTRAMUSCULAR; INTRAVENOUS at 23:05

## 2024-09-29 RX ADMIN — WATER 2000 MG: 1 INJECTION INTRAMUSCULAR; INTRAVENOUS; SUBCUTANEOUS at 22:07

## 2024-09-29 RX ADMIN — INSULIN GLARGINE 19 UNITS: 100 INJECTION, SOLUTION SUBCUTANEOUS at 22:14

## 2024-09-29 RX ADMIN — FUROSEMIDE 40 MG: 10 INJECTION, SOLUTION INTRAVENOUS at 22:22

## 2024-09-29 RX ADMIN — PANTOPRAZOLE SODIUM 40 MG: 40 INJECTION, POWDER, FOR SOLUTION INTRAVENOUS at 22:17

## 2024-09-29 RX ADMIN — SODIUM CHLORIDE, PRESERVATIVE FREE 10 ML: 5 INJECTION INTRAVENOUS at 22:18

## 2024-09-29 ASSESSMENT — LIFESTYLE VARIABLES
HOW MANY STANDARD DRINKS CONTAINING ALCOHOL DO YOU HAVE ON A TYPICAL DAY: PATIENT DOES NOT DRINK
HOW OFTEN DO YOU HAVE A DRINK CONTAINING ALCOHOL: NEVER

## 2024-09-29 ASSESSMENT — PAIN DESCRIPTION - DESCRIPTORS: DESCRIPTORS: ACHING

## 2024-09-29 ASSESSMENT — PAIN DESCRIPTION - LOCATION: LOCATION: LEG

## 2024-09-29 ASSESSMENT — PAIN - FUNCTIONAL ASSESSMENT: PAIN_FUNCTIONAL_ASSESSMENT: 0-10

## 2024-09-29 ASSESSMENT — PAIN DESCRIPTION - ORIENTATION: ORIENTATION: LEFT

## 2024-09-29 ASSESSMENT — PAIN SCALES - GENERAL: PAINLEVEL_OUTOF10: 8

## 2024-09-29 NOTE — ED TRIAGE NOTES
Patient arrives to ED via EMS with c/o SOB and peripheral edema in the lower extremities.     Patient reports that she has been SOB for the past 24 hours, but has been increasingly getting worse.     Patient does not normally wear O2, EMS states that she was around 90% on RA. Patient placed on 2L with O2 saturation improvement to 98%    Patient reports that she recently switched from Lasix to Bumex, states that she has been taking it for the past 3 days.     Denies any CP, states that her chest feels tight.     Patient has a hx of CHF, CKD, DM and HTN

## 2024-09-30 ENCOUNTER — APPOINTMENT (OUTPATIENT)
Dept: VASCULAR SURGERY | Facility: HOSPITAL | Age: 89
DRG: 291 | End: 2024-09-30
Attending: INTERNAL MEDICINE
Payer: MEDICARE

## 2024-09-30 LAB
ANION GAP SERPL CALC-SCNC: 5 MMOL/L (ref 2–12)
BASOPHILS # BLD: 0.1 K/UL (ref 0–0.1)
BASOPHILS NFR BLD: 1 % (ref 0–1)
BUN SERPL-MCNC: 23 MG/DL (ref 6–20)
BUN/CREAT SERPL: 13 (ref 12–20)
CALCIUM SERPL-MCNC: 8.6 MG/DL (ref 8.5–10.1)
CHLORIDE SERPL-SCNC: 116 MMOL/L (ref 97–108)
CHOLEST SERPL-MCNC: 101 MG/DL
CO2 SERPL-SCNC: 23 MMOL/L (ref 21–32)
CREAT SERPL-MCNC: 1.71 MG/DL (ref 0.55–1.02)
DIFFERENTIAL METHOD BLD: ABNORMAL
ECHO BSA: 1.74 M2
EKG ATRIAL RATE: 74 BPM
EKG DIAGNOSIS: NORMAL
EKG P AXIS: 88 DEGREES
EKG P-R INTERVAL: 196 MS
EKG Q-T INTERVAL: 412 MS
EKG QRS DURATION: 90 MS
EKG QTC CALCULATION (BAZETT): 457 MS
EKG R AXIS: 35 DEGREES
EKG T AXIS: 53 DEGREES
EKG VENTRICULAR RATE: 74 BPM
EOSINOPHIL # BLD: 0.3 K/UL (ref 0–0.4)
EOSINOPHIL NFR BLD: 5 % (ref 0–7)
ERYTHROCYTE [DISTWIDTH] IN BLOOD BY AUTOMATED COUNT: 15.7 % (ref 11.5–14.5)
EST. AVERAGE GLUCOSE BLD GHB EST-MCNC: 137 MG/DL
GLUCOSE BLD STRIP.AUTO-MCNC: 144 MG/DL (ref 65–117)
GLUCOSE BLD STRIP.AUTO-MCNC: 181 MG/DL (ref 65–117)
GLUCOSE BLD STRIP.AUTO-MCNC: 207 MG/DL (ref 65–117)
GLUCOSE BLD STRIP.AUTO-MCNC: 219 MG/DL (ref 65–117)
GLUCOSE SERPL-MCNC: 149 MG/DL (ref 65–100)
HBA1C MFR BLD: 6.4 % (ref 4–5.6)
HCT VFR BLD AUTO: 23.9 % (ref 35–47)
HCT VFR BLD AUTO: 24.3 % (ref 35–47)
HDLC SERPL-MCNC: 57 MG/DL
HDLC SERPL: 1.8 (ref 0–5)
HGB BLD-MCNC: 7.3 G/DL (ref 11.5–16)
HGB BLD-MCNC: 7.5 G/DL (ref 11.5–16)
IMM GRANULOCYTES # BLD AUTO: 0 K/UL (ref 0–0.04)
IMM GRANULOCYTES NFR BLD AUTO: 0 % (ref 0–0.5)
LDLC SERPL CALC-MCNC: 37.4 MG/DL (ref 0–100)
LYMPHOCYTES # BLD: 1.2 K/UL (ref 0.8–3.5)
LYMPHOCYTES NFR BLD: 23 % (ref 12–49)
MAGNESIUM SERPL-MCNC: 2.3 MG/DL (ref 1.6–2.4)
MCH RBC QN AUTO: 24.8 PG (ref 26–34)
MCHC RBC AUTO-ENTMCNC: 30.5 G/DL (ref 30–36.5)
MCV RBC AUTO: 81.3 FL (ref 80–99)
MONOCYTES # BLD: 0.4 K/UL (ref 0–1)
MONOCYTES NFR BLD: 8 % (ref 5–13)
NEUTS SEG # BLD: 3.4 K/UL (ref 1.8–8)
NEUTS SEG NFR BLD: 63 % (ref 32–75)
NRBC # BLD: 0 K/UL (ref 0–0.01)
NRBC BLD-RTO: 0 PER 100 WBC
PLATELET # BLD AUTO: 187 K/UL (ref 150–400)
PMV BLD AUTO: 10.6 FL (ref 8.9–12.9)
POTASSIUM SERPL-SCNC: 4.2 MMOL/L (ref 3.5–5.1)
RBC # BLD AUTO: 2.94 M/UL (ref 3.8–5.2)
SERVICE CMNT-IMP: ABNORMAL
SODIUM SERPL-SCNC: 144 MMOL/L (ref 136–145)
TRIGL SERPL-MCNC: 33 MG/DL
TROPONIN I SERPL HS-MCNC: 16 NG/L (ref 0–51)
VLDLC SERPL CALC-MCNC: 6.6 MG/DL
WBC # BLD AUTO: 5.3 K/UL (ref 3.6–11)

## 2024-09-30 PROCEDURE — 30233N1 TRANSFUSION OF NONAUTOLOGOUS RED BLOOD CELLS INTO PERIPHERAL VEIN, PERCUTANEOUS APPROACH: ICD-10-PCS

## 2024-09-30 PROCEDURE — 2580000003 HC RX 258: Performed by: INTERNAL MEDICINE

## 2024-09-30 PROCEDURE — 80048 BASIC METABOLIC PNL TOTAL CA: CPT

## 2024-09-30 PROCEDURE — 2700000000 HC OXYGEN THERAPY PER DAY

## 2024-09-30 PROCEDURE — 93970 EXTREMITY STUDY: CPT

## 2024-09-30 PROCEDURE — 83735 ASSAY OF MAGNESIUM: CPT

## 2024-09-30 PROCEDURE — 99223 1ST HOSP IP/OBS HIGH 75: CPT | Performed by: STUDENT IN AN ORGANIZED HEALTH CARE EDUCATION/TRAINING PROGRAM

## 2024-09-30 PROCEDURE — 82962 GLUCOSE BLOOD TEST: CPT

## 2024-09-30 PROCEDURE — 83036 HEMOGLOBIN GLYCOSYLATED A1C: CPT

## 2024-09-30 PROCEDURE — 85025 COMPLETE CBC W/AUTO DIFF WBC: CPT

## 2024-09-30 PROCEDURE — 80061 LIPID PANEL: CPT

## 2024-09-30 PROCEDURE — 6360000002 HC RX W HCPCS: Performed by: INTERNAL MEDICINE

## 2024-09-30 PROCEDURE — 85018 HEMOGLOBIN: CPT

## 2024-09-30 PROCEDURE — P9016 RBC LEUKOCYTES REDUCED: HCPCS

## 2024-09-30 PROCEDURE — APPSS30 APP SPLIT SHARED TIME 16-30 MINUTES: Performed by: NURSE PRACTITIONER

## 2024-09-30 PROCEDURE — 36415 COLL VENOUS BLD VENIPUNCTURE: CPT

## 2024-09-30 PROCEDURE — 93010 ELECTROCARDIOGRAM REPORT: CPT | Performed by: SPECIALIST

## 2024-09-30 PROCEDURE — 6370000000 HC RX 637 (ALT 250 FOR IP): Performed by: NURSE PRACTITIONER

## 2024-09-30 PROCEDURE — 84484 ASSAY OF TROPONIN QUANT: CPT

## 2024-09-30 PROCEDURE — 6370000000 HC RX 637 (ALT 250 FOR IP): Performed by: INTERNAL MEDICINE

## 2024-09-30 PROCEDURE — 2060000000 HC ICU INTERMEDIATE R&B

## 2024-09-30 PROCEDURE — 36430 TRANSFUSION BLD/BLD COMPNT: CPT

## 2024-09-30 PROCEDURE — 85014 HEMATOCRIT: CPT

## 2024-09-30 RX ORDER — AMLODIPINE BESYLATE 5 MG/1
10 TABLET ORAL DAILY
Status: DISCONTINUED | OUTPATIENT
Start: 2024-10-01 | End: 2024-10-03 | Stop reason: HOSPADM

## 2024-09-30 RX ORDER — METOPROLOL TARTRATE 25 MG/1
25 TABLET, FILM COATED ORAL 2 TIMES DAILY
Status: DISCONTINUED | OUTPATIENT
Start: 2024-09-30 | End: 2024-10-01

## 2024-09-30 RX ORDER — BUMETANIDE 0.5 MG/1
0.5 TABLET ORAL DAILY
Status: ON HOLD | COMMUNITY
End: 2024-10-03 | Stop reason: HOSPADM

## 2024-09-30 RX ORDER — LIDOCAINE 4 G/G
1 PATCH TOPICAL DAILY
Status: DISCONTINUED | OUTPATIENT
Start: 2024-09-30 | End: 2024-10-03 | Stop reason: HOSPADM

## 2024-09-30 RX ORDER — ATORVASTATIN CALCIUM 20 MG/1
20 TABLET, FILM COATED ORAL DAILY
COMMUNITY

## 2024-09-30 RX ORDER — HYDRALAZINE HYDROCHLORIDE 20 MG/ML
10 INJECTION INTRAMUSCULAR; INTRAVENOUS EVERY 6 HOURS PRN
Status: DISCONTINUED | OUTPATIENT
Start: 2024-09-30 | End: 2024-10-03 | Stop reason: HOSPADM

## 2024-09-30 RX ADMIN — METOPROLOL TARTRATE 25 MG: 25 TABLET, FILM COATED ORAL at 10:52

## 2024-09-30 RX ADMIN — INSULIN LISPRO 2 UNITS: 100 INJECTION, SOLUTION INTRAVENOUS; SUBCUTANEOUS at 17:09

## 2024-09-30 RX ADMIN — FUROSEMIDE 40 MG: 10 INJECTION, SOLUTION INTRAVENOUS at 18:29

## 2024-09-30 RX ADMIN — PANTOPRAZOLE SODIUM 40 MG: 40 INJECTION, POWDER, FOR SOLUTION INTRAVENOUS at 20:36

## 2024-09-30 RX ADMIN — INSULIN GLARGINE 19 UNITS: 100 INJECTION, SOLUTION SUBCUTANEOUS at 21:17

## 2024-09-30 RX ADMIN — ATORVASTATIN CALCIUM 20 MG: 20 TABLET, FILM COATED ORAL at 09:05

## 2024-09-30 RX ADMIN — FERROUS SULFATE TAB 325 MG (65 MG ELEMENTAL FE) 325 MG: 325 (65 FE) TAB at 06:26

## 2024-09-30 RX ADMIN — FUROSEMIDE 40 MG: 10 INJECTION, SOLUTION INTRAVENOUS at 09:06

## 2024-09-30 RX ADMIN — INSULIN LISPRO 1 UNITS: 100 INJECTION, SOLUTION INTRAVENOUS; SUBCUTANEOUS at 12:05

## 2024-09-30 RX ADMIN — METOPROLOL TARTRATE 25 MG: 25 TABLET, FILM COATED ORAL at 20:36

## 2024-09-30 RX ADMIN — SODIUM CHLORIDE, PRESERVATIVE FREE 10 ML: 5 INJECTION INTRAVENOUS at 10:55

## 2024-09-30 RX ADMIN — ONDANSETRON 4 MG: 4 TABLET, ORALLY DISINTEGRATING ORAL at 09:05

## 2024-09-30 RX ADMIN — PANTOPRAZOLE SODIUM 40 MG: 40 INJECTION, POWDER, FOR SOLUTION INTRAVENOUS at 09:06

## 2024-09-30 RX ADMIN — SODIUM CHLORIDE, PRESERVATIVE FREE 10 ML: 5 INJECTION INTRAVENOUS at 20:36

## 2024-09-30 NOTE — CONSULTS
[facility]  ISA Castillo  [phone]                    GASTROENTEROLOGY CONSULTATION NOTE              NAME:  Janis Chadwick   :   1934   MRN:   [id number]       Referring Physician:    Evan Castro      Consult Date:   2024 2:21 PM    Chief Complaint:    Shortness of breath     History of Present Illness:    Patient is a 89 y.o. with hx of CHF EF 65%, CKD, HTN, DM, CAD, JAC, GI bleeding, jejunal AVMs, presenting to the ER for SOB and left lower leg edema.    GI consulted for GI bleed and anemia    Pt here with her daughter at bedside. She reports that over the last 1-2 days she has been having SOB and worsening L leg swelling, which prompted her to come to the hospital. On arrival, Hgb was noted to be 6.2 and was given 1 unit. She reports that she takes iron supplementation daily, so is unsure if she has melena. Reports that her frequency of Bms have not changed from her baseline. Denies NSAID use or anticoagulant use.    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      PMH:  Past Medical History:   Diagnosis Date    CAD (coronary artery disease)     CHF (congestive heart failure), NYHA class I, chronic, systolic (HCC)     CKD (chronic kidney disease) stage 3, GFR 30-59 ml/min (HCC)     DM type 2 causing renal disease (HCC)     DM type 2 causing vascular disease (HCC)     Hypertension     Iron deficiency anemia     Ischemic cardiomyopathy     Peripheral vascular disease (HCC)        PSH:  Past Surgical History:   Procedure Laterality Date    APPENDECTOMY      COLONOSCOPY N/A 2022    COLONOSCOPY performed by Rad Tineo MD at [facility] ENDOSCOPY    CORONARY ANGIOPLASTY WITH STENT PLACEMENT  2021    stents x 3 to mid-distal right coronary artery going into PDA for 100% occlusion of RCA; and

## 2024-09-30 NOTE — H&P
XR CHEST PORTABLE   Final Result   Mild pulmonary edema. This is likely superimposed on emphysema.      Electronically signed by Jaylen Prescott              HOME MEDICATIONS:  Prior to Admission medications    Medication Sig Start Date End Date Taking? Authorizing Provider   aspirin 81 MG EC tablet Take 1 tablet by mouth daily    Rosa Maria Burgess MD   amLODIPine (NORVASC) 5 MG tablet Take 1 tablet by mouth daily    Rosa Maria Burgess MD   pantoprazole (PROTONIX) 40 MG tablet Take 1 tablet by mouth 2 times daily (before meals)  Patient taking differently: Take 1 tablet by mouth daily 1/17/24   Hugo Castro MD   insulin glargine (LANTUS) 100 UNIT/ML injection vial Inject 19 Units into the skin nightly    Rosa Maria Burgess MD   furosemide (LASIX) 40 MG tablet Take 1 tablet by mouth daily 6/27/23   Annita Castillo MD   Lancets MISC Check blood sugar AC & HS 2/9/23   Automatic Reconciliation, Ar   atorvastatin (LIPITOR) 20 MG tablet Take 1 tablet by mouth daily 12/26/21   Automatic Reconciliation, Ar   ferrous sulfate (IRON 325) 325 (65 Fe) MG tablet Take 1 tablet by mouth every morning (before breakfast) 2/5/22   Automatic Reconciliation, Ar         CURRENT MEDICATION ORDERS:  Current Facility-Administered Medications   Medication Dose Route Frequency Provider Last Rate Last Admin    0.9 % sodium chloride infusion   IntraVENous PRN Khurram Michaels MD        [START ON 9/30/2024] ceFAZolin (ANCEF) 1,000 mg in sterile water 10 mL IV syringe  1,000 mg IntraVENous Q12H Hugo Castro MD        sodium chloride flush 0.9 % injection 5-40 mL  5-40 mL IntraVENous 2 times per day Hugo Castro MD   10 mL at 09/29/24 2218    sodium chloride flush 0.9 % injection 5-40 mL  5-40 mL IntraVENous PRN Hugo Castro MD        0.9 % sodium chloride infusion   IntraVENous PRN Hugo Castro MD        ondansetron (ZOFRAN-ODT) disintegrating tablet 4 mg  4 mg Oral Q8H PRN Hugo Castro MD

## 2024-09-30 NOTE — ED NOTES
TRANSFER - OUT REPORT:    Verbal report given to LAM Ruvalcaba on Janis Chadwick  being transferred to [facility] for routine progression of patient care       Report consisted of patient's Situation, Background, Assessment and   Recommendations(SBAR).     Information from the following report(s) Nurse Handoff Report, ED Encounter Summary, ED SBAR, Adult Overview, MAR, Quality Measures, and Neuro Assessment was reviewed with the receiving nurse.    [place] Fall Assessment:    Presents to emergency department  because of falls (Syncope, seizure, or loss of consciousness): No  Age > 70: Yes  Altered Mental Status, Intoxication with alcohol or substance confusion (Disorientation, impaired judgment, poor safety awaremess, or inability to follow instructions): No  Impaired Mobility: Ambulates or transfers with assistive devices or assistance; Unable to ambulate or transer.: Yes  Nursing Judgement: Yes          Lines:   Peripheral IV 09/29/24 Distal;Left;Anterior Cephalic (Active)        Opportunity for questions and clarification was provided.

## 2024-09-30 NOTE — CONSENT
Informed Consent for Blood Component Transfusion Note    I have discussed with the patient the rationale for blood component transfusion; its benefits in treating or preventing fatigue, organ damage, or death; and its risk which includes mild transfusion reactions, rare risk of blood borne infection, or more serious but rare reactions. I have discussed the alternatives to transfusion, including the risk and consequences of not receiving transfusion. The patient had an opportunity to ask questions and had agreed to proceed with transfusion of blood components.    Electronically signed by Khurram Michaels MD on 9/29/24 at 9:25 PM EDT

## 2024-09-30 NOTE — ED PROVIDER NOTES
Cellulitis of left lower extremity        COVID-19 Suspicion:  no    Code Status:  Full Code  Readmission: no  Isolation Requirements:  no  Recommended Level of Care:  telemetry  Department: [facility]  Other: History of CHF, chronic GI bleed with frequent transfusions.  Recently switched from Lasix to Bumex.  Increasing lower extremity swelling and pain, worse on the left with erythema and warmth on the left.  Also with shortness of breath.  Some edema on her chest x-ray, hemoglobin is 6.2, otherwise stable labs.  No DVT on duplex.  1 unit of blood ordered in addition to Lasix and antibiotics.  Not on O2.    Signed By: Khurram Michaels MD     September 29, 2024        (Please note that portions of this note were completed with a voice recognition program.  Efforts were made to edit the dictations but occasionally words are mis-transcribed.)         Khurram Michaels MD  09/29/24 1029

## 2024-09-30 NOTE — CONSULTS
[facility] CARDIOLOGY                    Cardiology Care Note     [x]Initial Encounter     []Follow-up    Patient Name: Janis Chadwick - :1934 - MRN:[id number]  Primary Cardiologist: Jil Garcia MD  Consulting Cardiologist: Zeb Vilchis DO     Reason for encounter: CHF    HPI:       Janis Chadwick is a 89 y.o. female with PMH significant for CHF, CAD s/p prior PCI, HTN, DM, GI bleed, anemia and CKD stage IV.     Pt presented to the ED with complaints of dyspnea and worsening lower ext edema, up to her thighs. She had weeping of her legs about a week ago, PCP switched her diuretics to bumex from lasix. Denies any CP. On admission, noted to be more anemic than her baseline as well. She takes iron and stools are typically dark, no change to her baseline.     Subjective:      Janis Chadwick reports nausea.      Assessment and Plan     Acute on chronic HFpEF  - echo in July w/ EF 65%, no need to repeat   - pBNP 800, less than most recent labs   - cont IV lasix  - needs better BP control   - more anemic on admission     2. Acute on chronic anemia, hx of prior GI bleed d/t AVM's   - Hgb 6.2 on admission, trend   - on PO iron   - GI consulted     3. CAD   - Inferior STEMI  ---> mRCA 100% occluded ---> DALY x3 in mRCA to PDA   - treating medically, cont ASA, statin     4. HTN  - BP uncontrolled  - pt only takes norvasc, daughter reports several meds stopped in past when she has had GI bleeds   - will trial metoprolol     5. DM    6. CKD stage IV   - trend Cr w/ diuresis        ____________________________________________________________    Cardiac testing  24    ECHO (TTE) COMPLETE (PRN CONTRAST/BUBBLE/STRAIN/3D) 2024 12:28 PM (Final)    Interpretation Summary    Left Ventricle: Normal left ventricular systolic function. EF by 2D Simpsons Biplane is 65%. Left ventricle size is normal. Normal wall thickness. Normal wall motion. Abnormal diastolic function.    Left Atrium: Left atrium is

## 2024-10-01 LAB
ABO + RH BLD: NORMAL
ANION GAP SERPL CALC-SCNC: 3 MMOL/L (ref 2–12)
BASOPHILS # BLD: 0.1 K/UL (ref 0–0.1)
BASOPHILS NFR BLD: 1 % (ref 0–1)
BLD PROD TYP BPU: NORMAL
BLOOD BANK BLOOD PRODUCT EXPIRATION DATE: NORMAL
BLOOD BANK DISPENSE STATUS: NORMAL
BLOOD BANK ISBT PRODUCT BLOOD TYPE: 6200
BLOOD BANK PRODUCT CODE: NORMAL
BLOOD BANK UNIT TYPE AND RH: NORMAL
BLOOD GROUP ANTIBODIES SERPL: NORMAL
BPU ID: NORMAL
BUN SERPL-MCNC: 26 MG/DL (ref 6–20)
BUN/CREAT SERPL: 13 (ref 12–20)
CALCIUM SERPL-MCNC: 8.5 MG/DL (ref 8.5–10.1)
CHLORIDE SERPL-SCNC: 111 MMOL/L (ref 97–108)
CO2 SERPL-SCNC: 28 MMOL/L (ref 21–32)
CREAT SERPL-MCNC: 1.97 MG/DL (ref 0.55–1.02)
CREAT UR-MCNC: 25 MG/DL
CROSSMATCH RESULT: NORMAL
DIFFERENTIAL METHOD BLD: ABNORMAL
ECHO BSA: 1.74 M2
EOSINOPHIL # BLD: 0.2 K/UL (ref 0–0.4)
EOSINOPHIL NFR BLD: 2 % (ref 0–7)
ERYTHROCYTE [DISTWIDTH] IN BLOOD BY AUTOMATED COUNT: 15.9 % (ref 11.5–14.5)
GLUCOSE BLD STRIP.AUTO-MCNC: 167 MG/DL (ref 65–117)
GLUCOSE BLD STRIP.AUTO-MCNC: 219 MG/DL (ref 65–117)
GLUCOSE BLD STRIP.AUTO-MCNC: 240 MG/DL (ref 65–117)
GLUCOSE BLD STRIP.AUTO-MCNC: 284 MG/DL (ref 65–117)
GLUCOSE BLD STRIP.AUTO-MCNC: 77 MG/DL (ref 65–117)
GLUCOSE SERPL-MCNC: 96 MG/DL (ref 65–100)
HCT VFR BLD AUTO: 23.9 % (ref 35–47)
HCT VFR BLD AUTO: 26.6 % (ref 35–47)
HEMOCCULT STL QL: POSITIVE
HGB BLD-MCNC: 7.4 G/DL (ref 11.5–16)
HGB BLD-MCNC: 8 G/DL (ref 11.5–16)
IMM GRANULOCYTES # BLD AUTO: 0 K/UL (ref 0–0.04)
IMM GRANULOCYTES NFR BLD AUTO: 0 % (ref 0–0.5)
LYMPHOCYTES # BLD: 1 K/UL (ref 0.8–3.5)
LYMPHOCYTES NFR BLD: 12 % (ref 12–49)
MCH RBC QN AUTO: 24.7 PG (ref 26–34)
MCHC RBC AUTO-ENTMCNC: 30.1 G/DL (ref 30–36.5)
MCV RBC AUTO: 82.1 FL (ref 80–99)
MONOCYTES # BLD: 0.6 K/UL (ref 0–1)
MONOCYTES NFR BLD: 7 % (ref 5–13)
NEUTS SEG # BLD: 6.3 K/UL (ref 1.8–8)
NEUTS SEG NFR BLD: 78 % (ref 32–75)
NRBC # BLD: 0 K/UL (ref 0–0.01)
NRBC BLD-RTO: 0 PER 100 WBC
PLATELET # BLD AUTO: 222 K/UL (ref 150–400)
PMV BLD AUTO: 11.6 FL (ref 8.9–12.9)
POTASSIUM SERPL-SCNC: 4.4 MMOL/L (ref 3.5–5.1)
PROT UR-MCNC: 7 MG/DL (ref 0–11.9)
PROT/CREAT UR-RTO: 0.3
RBC # BLD AUTO: 3.24 M/UL (ref 3.8–5.2)
SERVICE CMNT-IMP: ABNORMAL
SERVICE CMNT-IMP: NORMAL
SODIUM SERPL-SCNC: 142 MMOL/L (ref 136–145)
SPECIMEN EXP DATE BLD: NORMAL
UNIT DIVISION: 0
UNIT ISSUE DATE/TIME: NORMAL
WBC # BLD AUTO: 8.1 K/UL (ref 3.6–11)

## 2024-10-01 PROCEDURE — 6360000002 HC RX W HCPCS: Performed by: INTERNAL MEDICINE

## 2024-10-01 PROCEDURE — 2580000003 HC RX 258: Performed by: INTERNAL MEDICINE

## 2024-10-01 PROCEDURE — 85018 HEMOGLOBIN: CPT

## 2024-10-01 PROCEDURE — 36415 COLL VENOUS BLD VENIPUNCTURE: CPT

## 2024-10-01 PROCEDURE — 6370000000 HC RX 637 (ALT 250 FOR IP): Performed by: INTERNAL MEDICINE

## 2024-10-01 PROCEDURE — 85014 HEMATOCRIT: CPT

## 2024-10-01 PROCEDURE — 97161 PT EVAL LOW COMPLEX 20 MIN: CPT

## 2024-10-01 PROCEDURE — 82570 ASSAY OF URINE CREATININE: CPT

## 2024-10-01 PROCEDURE — 6370000000 HC RX 637 (ALT 250 FOR IP): Performed by: STUDENT IN AN ORGANIZED HEALTH CARE EDUCATION/TRAINING PROGRAM

## 2024-10-01 PROCEDURE — 82962 GLUCOSE BLOOD TEST: CPT

## 2024-10-01 PROCEDURE — 82272 OCCULT BLD FECES 1-3 TESTS: CPT

## 2024-10-01 PROCEDURE — 80048 BASIC METABOLIC PNL TOTAL CA: CPT

## 2024-10-01 PROCEDURE — 1100000000 HC RM PRIVATE

## 2024-10-01 PROCEDURE — 84156 ASSAY OF PROTEIN URINE: CPT

## 2024-10-01 PROCEDURE — 6360000002 HC RX W HCPCS

## 2024-10-01 PROCEDURE — 85025 COMPLETE CBC W/AUTO DIFF WBC: CPT

## 2024-10-01 PROCEDURE — 97116 GAIT TRAINING THERAPY: CPT

## 2024-10-01 RX ORDER — FUROSEMIDE 10 MG/ML
40 INJECTION INTRAMUSCULAR; INTRAVENOUS DAILY
Status: DISCONTINUED | OUTPATIENT
Start: 2024-10-01 | End: 2024-10-01

## 2024-10-01 RX ORDER — BUMETANIDE 1 MG/1
1 TABLET ORAL DAILY
Status: DISCONTINUED | OUTPATIENT
Start: 2024-10-02 | End: 2024-10-03 | Stop reason: HOSPADM

## 2024-10-01 RX ORDER — METOPROLOL SUCCINATE 25 MG/1
25 TABLET, EXTENDED RELEASE ORAL
Status: DISCONTINUED | OUTPATIENT
Start: 2024-10-01 | End: 2024-10-03 | Stop reason: HOSPADM

## 2024-10-01 RX ADMIN — PANTOPRAZOLE SODIUM 40 MG: 40 INJECTION, POWDER, FOR SOLUTION INTRAVENOUS at 08:52

## 2024-10-01 RX ADMIN — AMLODIPINE BESYLATE 10 MG: 5 TABLET ORAL at 08:47

## 2024-10-01 RX ADMIN — PANTOPRAZOLE SODIUM 40 MG: 40 INJECTION, POWDER, FOR SOLUTION INTRAVENOUS at 21:06

## 2024-10-01 RX ADMIN — ATORVASTATIN CALCIUM 20 MG: 20 TABLET, FILM COATED ORAL at 08:47

## 2024-10-01 RX ADMIN — SODIUM CHLORIDE, PRESERVATIVE FREE 10 ML: 5 INJECTION INTRAVENOUS at 08:56

## 2024-10-01 RX ADMIN — INSULIN GLARGINE 19 UNITS: 100 INJECTION, SOLUTION SUBCUTANEOUS at 21:06

## 2024-10-01 RX ADMIN — FERROUS SULFATE TAB 325 MG (65 MG ELEMENTAL FE) 325 MG: 325 (65 FE) TAB at 08:43

## 2024-10-01 RX ADMIN — INSULIN LISPRO 2 UNITS: 100 INJECTION, SOLUTION INTRAVENOUS; SUBCUTANEOUS at 12:59

## 2024-10-01 RX ADMIN — METOPROLOL SUCCINATE 25 MG: 25 TABLET, EXTENDED RELEASE ORAL at 21:06

## 2024-10-01 RX ADMIN — SODIUM CHLORIDE, PRESERVATIVE FREE 10 ML: 5 INJECTION INTRAVENOUS at 21:09

## 2024-10-01 RX ADMIN — FUROSEMIDE 40 MG: 10 INJECTION, SOLUTION INTRAVENOUS at 08:51

## 2024-10-01 ASSESSMENT — PAIN SCALES - GENERAL
PAINLEVEL_OUTOF10: 0
PAINLEVEL_OUTOF10: 0
PAINLEVEL_OUTOF10: 4
PAINLEVEL_OUTOF10: 0

## 2024-10-01 ASSESSMENT — PAIN DESCRIPTION - ORIENTATION: ORIENTATION: RIGHT

## 2024-10-01 ASSESSMENT — PAIN DESCRIPTION - DESCRIPTORS: DESCRIPTORS: ACHING

## 2024-10-01 ASSESSMENT — PAIN DESCRIPTION - LOCATION: LOCATION: KNEE

## 2024-10-01 NOTE — PLAN OF CARE
Problem: Physical Therapy - Adult  Goal: By Discharge: Performs mobility at highest level of function for planned discharge setting.  See evaluation for individualized goals.  Description: FUNCTIONAL STATUS PRIOR TO ADMISSION: Patient was modified independent using a rolling walker for functional mobility.    HOME SUPPORT PRIOR TO ADMISSION: The patient lived with her supportive daughter. The patient typically did not require assist with ADLs. Patient fixes her own meals except for dinner.    Physical Therapy Goals  Initiated 10/1/2024  1.  Patient will move from supine to sit and sit to supine in bed with modified independence within 7 day(s).    2.  Patient will perform sit to stand with modified independence within 7 day(s).  3.  Patient will transfer from bed to chair and chair to bed with modified independence using the least restrictive device within 7 day(s).  4.  Patient will ambulate with modified independence for 100 feet with the least restrictive device within 7 day(s).   5.  Patient will ascend/descend 1 stairs with 1 handrail(s) with modified independence within 7 day(s).    Outcome: Progressing   PHYSICAL THERAPY EVALUATION    Patient: Janis Chadwick (89 y.o. female)  Date: 10/1/2024  Primary Diagnosis: Anemia [D64.9]  Cellulitis of left lower extremity [L03.116]  Anemia, unspecified type [D64.9]  Acute on chronic congestive heart failure, unspecified heart failure type (HCC) [I50.9]       Precautions: Restrictions/Precautions: Fall Risk                      ASSESSMENT :   DEFICITS/IMPAIRMENTS:   The patient is limited by decreased functional mobility, activity tolerance, balance     Based on the impairments listed above, the patient presents near her functional baseline following admission for left LE cellulitis, anemia, and CHF. She was anemic upon admission but now post transfusion and last HGB was 8. The patient lives with a supportive daughter who was in the room during session. The patient

## 2024-10-01 NOTE — WOUND CARE
Wound Care Note:     New consult for \" LE edema, right ankle ulcer\"  Seen in B336/01    89 y.o. y/o female admitted on 9/29/2024   Admitted for  Anemia [D64.9]  Cellulitis of left lower extremity [L03.116]  Anemia, unspecified type [D64.9]  Acute on chronic congestive heart failure, unspecified heart failure type (HCC) [I50.9]     History of CAD, CHF, CKD, DM2, HTN, iron deficiency anemia, ischemic cardiomyopathy, PVD   WBC = 8.1  Diet: ADULT DIET; Regular; 4 carb choices (60 gm/meal); Low Sodium (2 gm)           Assessment:   Patient is alert, cooperative and reports no pain.   Requires 1 assist to reposition for assessment.    Continent.    Surface: [facility] bed with foam mattress, patient to be transferred to different unit and onto a [place] bed with DAY mattress.     Bilateral legs edematous(+1) with blanchable erythema. Small (0.2 x 0.2cm) abrasion on lateral right ankle, no open area with dried serosanguinous exudate, attached edges.  Tx: Cleansed with NSS and applied hydrocolloid dressing. Does not require further dressing changes.     Bilateral heels intact and without erythema.  Heels offloaded with pillows.    Recommendations:    Turn/reposition approximately every 2 hours  Offload heels with heels hanging off end of pillow at all times while in bed.  Sacral Foam dressing: lift to assess regularly; change as needed. Discontinue if incontinence is frequently soiling dressing.     Z-guard cream to buttocks and sacrum daily and as needed with incontinence care  Low Air Loss mattress: Use only flat sheet and one incontinence pad.     No concerns to relay to provider.    Transition of Care: Please re-consult if needed.    Maya Balderas RN  [facility] Inpatient Wound Care Department  Office [phone]  Available via [employer]

## 2024-10-02 LAB
ANION GAP SERPL CALC-SCNC: 7 MMOL/L (ref 2–12)
BASOPHILS # BLD: 0.1 K/UL (ref 0–0.1)
BASOPHILS NFR BLD: 1 % (ref 0–1)
BUN SERPL-MCNC: 30 MG/DL (ref 6–20)
BUN/CREAT SERPL: 14 (ref 12–20)
CALCIUM SERPL-MCNC: 8.6 MG/DL (ref 8.5–10.1)
CHLORIDE SERPL-SCNC: 108 MMOL/L (ref 97–108)
CO2 SERPL-SCNC: 25 MMOL/L (ref 21–32)
CREAT SERPL-MCNC: 2.07 MG/DL (ref 0.55–1.02)
DIFFERENTIAL METHOD BLD: ABNORMAL
EOSINOPHIL # BLD: 0.5 K/UL (ref 0–0.4)
EOSINOPHIL NFR BLD: 9 % (ref 0–7)
ERYTHROCYTE [DISTWIDTH] IN BLOOD BY AUTOMATED COUNT: 16.2 % (ref 11.5–14.5)
GLUCOSE BLD STRIP.AUTO-MCNC: 114 MG/DL (ref 65–117)
GLUCOSE BLD STRIP.AUTO-MCNC: 115 MG/DL (ref 65–117)
GLUCOSE BLD STRIP.AUTO-MCNC: 228 MG/DL (ref 65–117)
GLUCOSE BLD STRIP.AUTO-MCNC: 82 MG/DL (ref 65–117)
GLUCOSE SERPL-MCNC: 86 MG/DL (ref 65–100)
HCT VFR BLD AUTO: 24.5 % (ref 35–47)
HCT VFR BLD AUTO: 27.1 % (ref 35–47)
HGB BLD-MCNC: 7.5 G/DL (ref 11.5–16)
HGB BLD-MCNC: 8.3 G/DL (ref 11.5–16)
IMM GRANULOCYTES # BLD AUTO: 0 K/UL (ref 0–0.04)
IMM GRANULOCYTES NFR BLD AUTO: 0 % (ref 0–0.5)
LYMPHOCYTES # BLD: 1.3 K/UL (ref 0.8–3.5)
LYMPHOCYTES NFR BLD: 21 % (ref 12–49)
MCH RBC QN AUTO: 24.9 PG (ref 26–34)
MCHC RBC AUTO-ENTMCNC: 30.6 G/DL (ref 30–36.5)
MCV RBC AUTO: 81.4 FL (ref 80–99)
MONOCYTES # BLD: 0.7 K/UL (ref 0–1)
MONOCYTES NFR BLD: 11 % (ref 5–13)
NEUTS SEG # BLD: 3.4 K/UL (ref 1.8–8)
NEUTS SEG NFR BLD: 58 % (ref 32–75)
NRBC # BLD: 0 K/UL (ref 0–0.01)
NRBC BLD-RTO: 0 PER 100 WBC
PLATELET # BLD AUTO: 204 K/UL (ref 150–400)
PMV BLD AUTO: 11.1 FL (ref 8.9–12.9)
POTASSIUM SERPL-SCNC: 3.9 MMOL/L (ref 3.5–5.1)
RBC # BLD AUTO: 3.01 M/UL (ref 3.8–5.2)
SERVICE CMNT-IMP: ABNORMAL
SERVICE CMNT-IMP: NORMAL
SODIUM SERPL-SCNC: 140 MMOL/L (ref 136–145)
WBC # BLD AUTO: 6 K/UL (ref 3.6–11)

## 2024-10-02 PROCEDURE — 6360000002 HC RX W HCPCS: Performed by: INTERNAL MEDICINE

## 2024-10-02 PROCEDURE — 2580000003 HC RX 258: Performed by: INTERNAL MEDICINE

## 2024-10-02 PROCEDURE — 82962 GLUCOSE BLOOD TEST: CPT

## 2024-10-02 PROCEDURE — 85025 COMPLETE CBC W/AUTO DIFF WBC: CPT

## 2024-10-02 PROCEDURE — 6370000000 HC RX 637 (ALT 250 FOR IP): Performed by: INTERNAL MEDICINE

## 2024-10-02 PROCEDURE — 85018 HEMOGLOBIN: CPT

## 2024-10-02 PROCEDURE — 85014 HEMATOCRIT: CPT

## 2024-10-02 PROCEDURE — 80048 BASIC METABOLIC PNL TOTAL CA: CPT

## 2024-10-02 PROCEDURE — 1100000000 HC RM PRIVATE

## 2024-10-02 PROCEDURE — 6370000000 HC RX 637 (ALT 250 FOR IP): Performed by: STUDENT IN AN ORGANIZED HEALTH CARE EDUCATION/TRAINING PROGRAM

## 2024-10-02 PROCEDURE — 94761 N-INVAS EAR/PLS OXIMETRY MLT: CPT

## 2024-10-02 PROCEDURE — 36415 COLL VENOUS BLD VENIPUNCTURE: CPT

## 2024-10-02 RX ADMIN — PANTOPRAZOLE SODIUM 40 MG: 40 INJECTION, POWDER, FOR SOLUTION INTRAVENOUS at 09:50

## 2024-10-02 RX ADMIN — PANTOPRAZOLE SODIUM 40 MG: 40 INJECTION, POWDER, FOR SOLUTION INTRAVENOUS at 21:16

## 2024-10-02 RX ADMIN — METOPROLOL SUCCINATE 25 MG: 25 TABLET, EXTENDED RELEASE ORAL at 21:16

## 2024-10-02 RX ADMIN — ATORVASTATIN CALCIUM 20 MG: 20 TABLET, FILM COATED ORAL at 09:49

## 2024-10-02 RX ADMIN — AMLODIPINE BESYLATE 10 MG: 5 TABLET ORAL at 09:49

## 2024-10-02 RX ADMIN — SODIUM CHLORIDE, PRESERVATIVE FREE 10 ML: 5 INJECTION INTRAVENOUS at 21:19

## 2024-10-02 RX ADMIN — FERROUS SULFATE TAB 325 MG (65 MG ELEMENTAL FE) 325 MG: 325 (65 FE) TAB at 06:49

## 2024-10-02 RX ADMIN — SODIUM CHLORIDE, PRESERVATIVE FREE 10 ML: 5 INJECTION INTRAVENOUS at 09:50

## 2024-10-02 RX ADMIN — INSULIN GLARGINE 19 UNITS: 100 INJECTION, SOLUTION SUBCUTANEOUS at 21:16

## 2024-10-02 ASSESSMENT — PAIN SCALES - GENERAL
PAINLEVEL_OUTOF10: 0
PAINLEVEL_OUTOF10: 0
PAINLEVEL_OUTOF10: 8

## 2024-10-02 ASSESSMENT — PAIN DESCRIPTION - LOCATION: LOCATION: SHOULDER

## 2024-10-02 ASSESSMENT — PAIN DESCRIPTION - ONSET: ONSET: ON-GOING

## 2024-10-02 ASSESSMENT — PAIN DESCRIPTION - FREQUENCY: FREQUENCY: CONTINUOUS

## 2024-10-02 ASSESSMENT — PAIN DESCRIPTION - ORIENTATION: ORIENTATION: LEFT;POSTERIOR

## 2024-10-02 ASSESSMENT — PAIN DESCRIPTION - PAIN TYPE: TYPE: CHRONIC PAIN

## 2024-10-02 NOTE — PLAN OF CARE
Problem: Safety - Adult  Goal: Free from fall injury  10/1/2024 2352 by Opal Ellington RN  Outcome: Progressing  10/1/2024 2347 by Opal Ellington RN  Outcome: Progressing  Flowsheets (Taken 10/1/2024 2036)  Free From Fall Injury: Instruct family/caregiver on patient safety     Problem: Skin/Tissue Integrity  Goal: Absence of new skin breakdown  Description: 1.  Monitor for areas of redness and/or skin breakdown  2.  Assess vascular access sites hourly  3.  Every 4-6 hours minimum:  Change oxygen saturation probe site  4.  Every 4-6 hours:  If on nasal continuous positive airway pressure, respiratory therapy assess nares and determine need for appliance change or resting period.  10/1/2024 2352 by Opal Ellington RN  Outcome: Progressing  10/1/2024 2347 by Opal Ellington RN  Outcome: Progressing     Problem: ABCDS Injury Assessment  Goal: Absence of physical injury  10/1/2024 2352 by Opal Ellington RN  Outcome: Progressing  10/1/2024 2347 by Opal Ellington RN  Outcome: Progressing

## 2024-10-02 NOTE — PROGRESS NOTES
Hospitalist Progress Note      NAME:  Janis Chadwick   :  1934  MRM:  [id number]    Date/Time: 10/1/2024  5:41 PM           Assessment / Plan:     AHRF/ Acute on chronic HF pEF/ Dyspnea/ LE swelling POA: duplex neg noel. Echo EF  with EF 65%  - On room air, euvolemic  - No repeat echo per cards   - IV lasix 40 bid to daily; to PO tmr (had been recently switched to bumex 0.5mg daily as outpt, will dc on 1mg daily)  - Per cards, avoiding ACE inhibitor/ARB/Arni/spironolactone with chronic kidney disease   - UPC with minimal proteinuria so ACEi/ARB not indicated for CKD     Acute on chronic blood loss anemia/ HX of AVMs: Stable On oral iron and has dark stools but no dixie melena or hematochezia  S/p 1 unit packed red blood cells  - IV PPI BID (Had been on daily as outpt) plan to dc on ppi BID tmr  - GI signed off, patient refused EGD, will monitor Hgb , if trend down , will reconsult    Diabetic foot ulcer, right lateral malleolus  - No sign of superinfection or cellulitis  - Local wound care     Diabetes mellitus A1c 6.4   - Lantus 19 qhs  - ISS      HTN   - Continue norvasc; dc metop tartrate 25 mg bid, change to succinate 25mg daily  - Lasix as above      Coronary artery disease  - Continue statin    #BMI (Calculated): 28.06    I have personally reviewed the radiographs, laboratory data in Epic and decisions and statements above are based partially on this personal interpretation.Needs continued hospitalization for further management                 Care Plan discussed with: Patient and Family    Discussed:  Care Plan and D/C Planning    Prophylaxis:  SCD's    Disposition: Home 10/2           ___________________________________________________    Attending Physician: Brady Latham MD        Subjective:     Chief Complaint:  Feels better today no blood in stools, breathing better    ROS:  (bold if positive, if negative)    Tolerating PT  Tolerating Diet          Objective:       Vitals:          Last 
    Hospitalist Progress Note      NAME:  Janis Chadwick   :  1934  MRM:  [id number]    Date/Time: 10/2/2024  1:11 PM           Assessment / Plan:     AHRF/ Acute on chronic HF pEF/ Dyspnea/ LE swelling POA: duplex neg noel. Echo EF  with EF 65%  - On room air, euvolemic  - No repeat echo per cards   - IV lasix 40 bid inpt; confirmed she had been switched from furosemide 40mg daily to bumex 0.5mg po daily just prior to admission with some improvement; anticipate resuming bumex, but at 1mg on dc  - Per cards, avoiding ACE inhibitor/ARB/Arni/spironolactone with chronic kidney disease   - UPC with minimal proteinuria so ACEi/ARB not indicated for CKD     Acute on chronic blood loss anemia/ HX of AVMs: Stable On oral iron and has dark stools but no dixie melena or hematochezia  S/p 1 unit packed red blood cells  - IV PPI BID (Had been on daily as outpt) plan to dc on ppi BID tmr  - GI signed off, patient refused EGD, will monitor Hgb   - Given her co morbidities, age, and lack of scope, recommend keeping overnight for another check in AM  - Should consider outpt IV Iron infusions    Diabetic foot ulcer, right lateral malleolus  - No sign of superinfection or cellulitis  - Local wound care     Diabetes mellitus A1c 6.4   - Lantus 19 qhs  - ISS      HTN   - Continue norvasc; dc'd metop tartrate 25 mg bid, changed to succinate 25mg daily  - Lasix as above      Coronary artery disease  - Continue statin    #BMI (Calculated): 28.06    I have personally reviewed the radiographs, laboratory data in Epic and decisions and statements above are based partially on this personal interpretation.Needs continued hospitalization for further management                 Care Plan discussed with: Patient and Family    Discussed:  Care Plan and D/C Planning    Prophylaxis:  SCD's    Disposition: Home 10/2           ___________________________________________________    Attending Physician: Brady Latham MD        Subjective: 
0810 Per Dr Latham, trend H&H q 12 hours today.    0830 HR low 60's to upper 50's. Dr Latham notified. Per Dr Latham, hold metoprolol this morning.  
Chart access to assist primary nurse.  
Left LE venous duplex completed. Preliminary results given to LAM Mendez.  
Pharmacy Dosing Services: 9/29/2024    Pharmacist Renal Dosing  Note for Cefazolin   Physician Dr. Castro    Indication: Cellulitis    Cefazolin dose adjusted to 1 g Q12 hrs (50% of usual dose every 12 hrs) for CrCl 10-29 ml/min per renal dosing protocol    Previous Regimen 2 g Q12   Serum Creatinine Lab Results   Component Value Date/Time    CREATININE 1.69 09/29/2024 07:50 PM      Creatinine Clearance Estimated Creatinine Clearance: 20 mL/min (A) (based on SCr of 1.69 mg/dL (H)).   BUN Lab Results   Component Value Date/Time    BUN 25 09/29/2024 07:50 PM         Thanks,   Oren Coffey, PharmD    
Spiritual Health History and Assessment/Progress Note  [facility]    Initial Encounter,  ,  ,      Name: Janis Chadwick MRN: [id number]    Age: 89 y.o.     Sex: female   Language: English   Judaism: Temple   Anemia     Date: 10/1/2024            Total Time Calculated: 15 min              Spiritual Assessment began in [facility] A3 MULTI-SPECIALTY TELEMETRY        Referral/Consult From: Rounding   Encounter Overview/Reason: Initial Encounter  Service Provided For: Patient, Family    Ana, Belief, Meaning:   Patient is connected with a ana tradition or spiritual practice  Family/Friends are connected with a ana tradition or spiritual practice      Importance and Influence:  Patient has spiritual/personal beliefs that influence decisions regarding their health  Family/Friends have spiritual/personal beliefs that influence decisions regarding the patient's health    Community:  Patient feels well-supported. Support system includes: Children and Ana Community  Family/Friends Unknown    Assessment and Plan of Care:   Patient Interventions include: Facilitated expression of thoughts and feelings and Provided sacramental/Islam ritual  Family/Friends Interventions include: Facilitated expression of thoughts and feelings and Provided sacramental/Islam ritual    Patient Plan of Care: Spiritual Care available upon further referral  Family/Friends Plan of Care: Spiritual Care available upon further referral    Chart review. I rounded on Med Surg 3. I met and conversed with patient and patient's daughter. Patient prefers to be addressed as Katrinae. Patient's daughter named Rosa is present at bedside. Patient is of the Temple Jain and attends [employer]. Patient's Jain is important to her. Patient's Jain influences her morals, values, and ethics. Patient was  to a  who passed away. Patient's  was the first  for [employer]. Judaism is very 
melena or hematochezia  Hemoglobin was down to 6.2 on admission; sp 1 1 unit packed red blood cells  Check stool occult   IV PPI   Follow CBC, continue to transfuse to keep hemoglobin greater than 7  GI consult      Diabetic foot ulcer, right lateral malleolus  No sign of superinfection or cellulitis  Local wound care  Monitoring off abx      Diabetes mellitus   A1c 6.4    Lantus  ISS      HTN   Continue norvasc   Lasix as above      Coronary artery disease  Continue statin         Total time spent with patient: 35 minutes **I personally saw and examined the patient during this time period**                 Care Plan discussed with: Patient, Family, and Nursing Staff    Discussed:  Care Plan    Prophylaxis:  SCD's    Disposition:  [facility] PT, OT, RN           ___________________________________________________    Attending Physician: Rosalina Cohn DO      
mg Oral Q8H PRN    Or    ondansetron (ZOFRAN) injection 4 mg  4 mg IntraVENous Q6H PRN    polyethylene glycol (GLYCOLAX) packet 17 g  17 g Oral Daily PRN    acetaminophen (TYLENOL) tablet 650 mg  650 mg Oral Q6H PRN    Or    acetaminophen (TYLENOL) suppository 650 mg  650 mg Rectal Q6H PRN    atorvastatin (LIPITOR) tablet 20 mg  20 mg Oral Daily    ferrous sulfate (IRON 325) tablet 325 mg  325 mg Oral QAM AC    insulin glargine (LANTUS) injection vial 19 Units  19 Units SubCUTAneous Nightly    pantoprazole (PROTONIX) 40 mg in sodium chloride (PF) 0.9 % 10 mL injection  40 mg IntraVENous Q12H    insulin lispro (HUMALOG,ADMELOG) injection vial 0-8 Units  0-8 Units SubCUTAneous TID WC    insulin lispro (HUMALOG,ADMELOG) injection vial 0-4 Units  0-4 Units SubCUTAneous Nightly            Lab Review:     Recent Labs     09/29/24  1950 09/30/24  0522 09/30/24  0624 10/01/24  0102 10/01/24  0835   WBC 4.1  --  5.3  --  8.1   HGB 6.2*   < > 7.3* 7.4* 8.0*   HCT 20.5*   < > 23.9* 23.9* 26.6*     --  187  --  222    < > = values in this interval not displayed.     Recent Labs     09/29/24  1950 09/30/24  0624 10/01/24  0102    144 142   K 4.5 4.2 4.4   * 116* 111*   CO2 22 23 28   BUN 25* 23* 26*   MG  --  2.3  --    ALT 18  --   --      No components found for: \"GLPOC\"

## 2024-10-03 VITALS
RESPIRATION RATE: 18 BRPM | DIASTOLIC BLOOD PRESSURE: 48 MMHG | WEIGHT: 151 LBS | OXYGEN SATURATION: 99 % | BODY MASS INDEX: 27.79 KG/M2 | HEART RATE: 72 BPM | SYSTOLIC BLOOD PRESSURE: 127 MMHG | TEMPERATURE: 98.6 F | HEIGHT: 62 IN

## 2024-10-03 LAB
ANION GAP SERPL CALC-SCNC: 4 MMOL/L (ref 2–12)
BASOPHILS # BLD: 0.1 K/UL (ref 0–0.1)
BASOPHILS NFR BLD: 1 % (ref 0–1)
BUN SERPL-MCNC: 31 MG/DL (ref 6–20)
BUN/CREAT SERPL: 16 (ref 12–20)
CALCIUM SERPL-MCNC: 8.5 MG/DL (ref 8.5–10.1)
CHLORIDE SERPL-SCNC: 109 MMOL/L (ref 97–108)
CO2 SERPL-SCNC: 26 MMOL/L (ref 21–32)
CREAT SERPL-MCNC: 1.9 MG/DL (ref 0.55–1.02)
DIFFERENTIAL METHOD BLD: ABNORMAL
EOSINOPHIL # BLD: 0.4 K/UL (ref 0–0.4)
EOSINOPHIL NFR BLD: 6 % (ref 0–7)
ERYTHROCYTE [DISTWIDTH] IN BLOOD BY AUTOMATED COUNT: 16.2 % (ref 11.5–14.5)
GLUCOSE BLD STRIP.AUTO-MCNC: 89 MG/DL (ref 65–117)
GLUCOSE BLD STRIP.AUTO-MCNC: 97 MG/DL (ref 65–117)
GLUCOSE SERPL-MCNC: 92 MG/DL (ref 65–100)
HCT VFR BLD AUTO: 26.8 % (ref 35–47)
HGB BLD-MCNC: 8.1 G/DL (ref 11.5–16)
IMM GRANULOCYTES # BLD AUTO: 0 K/UL (ref 0–0.04)
IMM GRANULOCYTES NFR BLD AUTO: 0 % (ref 0–0.5)
LYMPHOCYTES # BLD: 0.9 K/UL (ref 0.8–3.5)
LYMPHOCYTES NFR BLD: 13 % (ref 12–49)
MCH RBC QN AUTO: 24.8 PG (ref 26–34)
MCHC RBC AUTO-ENTMCNC: 30.2 G/DL (ref 30–36.5)
MCV RBC AUTO: 82.2 FL (ref 80–99)
MONOCYTES # BLD: 0.8 K/UL (ref 0–1)
MONOCYTES NFR BLD: 11 % (ref 5–13)
NEUTS SEG # BLD: 5 K/UL (ref 1.8–8)
NEUTS SEG NFR BLD: 69 % (ref 32–75)
NRBC # BLD: 0 K/UL (ref 0–0.01)
NRBC BLD-RTO: 0 PER 100 WBC
PLATELET # BLD AUTO: 211 K/UL (ref 150–400)
PMV BLD AUTO: 11.6 FL (ref 8.9–12.9)
POTASSIUM SERPL-SCNC: 4.2 MMOL/L (ref 3.5–5.1)
RBC # BLD AUTO: 3.26 M/UL (ref 3.8–5.2)
SERVICE CMNT-IMP: NORMAL
SERVICE CMNT-IMP: NORMAL
SODIUM SERPL-SCNC: 139 MMOL/L (ref 136–145)
WBC # BLD AUTO: 7.2 K/UL (ref 3.6–11)

## 2024-10-03 PROCEDURE — 2580000003 HC RX 258: Performed by: INTERNAL MEDICINE

## 2024-10-03 PROCEDURE — 6370000000 HC RX 637 (ALT 250 FOR IP)

## 2024-10-03 PROCEDURE — 97110 THERAPEUTIC EXERCISES: CPT

## 2024-10-03 PROCEDURE — 82962 GLUCOSE BLOOD TEST: CPT

## 2024-10-03 PROCEDURE — 85025 COMPLETE CBC W/AUTO DIFF WBC: CPT

## 2024-10-03 PROCEDURE — 94761 N-INVAS EAR/PLS OXIMETRY MLT: CPT

## 2024-10-03 PROCEDURE — 80048 BASIC METABOLIC PNL TOTAL CA: CPT

## 2024-10-03 PROCEDURE — 6360000002 HC RX W HCPCS: Performed by: INTERNAL MEDICINE

## 2024-10-03 PROCEDURE — 6370000000 HC RX 637 (ALT 250 FOR IP): Performed by: STUDENT IN AN ORGANIZED HEALTH CARE EDUCATION/TRAINING PROGRAM

## 2024-10-03 PROCEDURE — 97116 GAIT TRAINING THERAPY: CPT

## 2024-10-03 PROCEDURE — 36415 COLL VENOUS BLD VENIPUNCTURE: CPT

## 2024-10-03 PROCEDURE — 6370000000 HC RX 637 (ALT 250 FOR IP): Performed by: INTERNAL MEDICINE

## 2024-10-03 RX ORDER — AMLODIPINE BESYLATE 5 MG/1
10 TABLET ORAL DAILY
Qty: 90 TABLET | Refills: 0 | Status: SHIPPED | OUTPATIENT
Start: 2024-10-03

## 2024-10-03 RX ORDER — FUROSEMIDE 40 MG
40 TABLET ORAL DAILY
Status: COMPLETED | OUTPATIENT
Start: 2024-10-03 | End: 2024-10-03

## 2024-10-03 RX ORDER — METOPROLOL SUCCINATE 25 MG/1
25 TABLET, EXTENDED RELEASE ORAL
Qty: 90 TABLET | Refills: 0 | Status: SHIPPED | OUTPATIENT
Start: 2024-10-03

## 2024-10-03 RX ORDER — FUROSEMIDE 40 MG
40 TABLET ORAL DAILY
Qty: 90 TABLET | Refills: 0 | Status: SHIPPED | OUTPATIENT
Start: 2024-10-03

## 2024-10-03 RX ADMIN — FERROUS SULFATE TAB 325 MG (65 MG ELEMENTAL FE) 325 MG: 325 (65 FE) TAB at 06:15

## 2024-10-03 RX ADMIN — FUROSEMIDE 40 MG: 40 TABLET ORAL at 08:49

## 2024-10-03 RX ADMIN — ATORVASTATIN CALCIUM 20 MG: 20 TABLET, FILM COATED ORAL at 08:46

## 2024-10-03 RX ADMIN — AMLODIPINE BESYLATE 10 MG: 5 TABLET ORAL at 08:49

## 2024-10-03 RX ADMIN — PANTOPRAZOLE SODIUM 40 MG: 40 INJECTION, POWDER, FOR SOLUTION INTRAVENOUS at 08:46

## 2024-10-03 RX ADMIN — SODIUM CHLORIDE, PRESERVATIVE FREE 10 ML: 5 INJECTION INTRAVENOUS at 08:47

## 2024-10-03 ASSESSMENT — PAIN SCALES - GENERAL: PAINLEVEL_OUTOF10: 0

## 2024-10-03 NOTE — DISCHARGE SUMMARY
Hospitalist Discharge Summary     Patient ID:  Janis Chadwick  [id number]  89 y.o.  2/21/1934    Admit date: 9/29/2024    Discharge date and time: 10/3/2024    Admission Diagnoses: Anemia [D64.9]  Cellulitis of left lower extremity [L03.116]  Anemia, unspecified type [D64.9]  Acute on chronic congestive heart failure, unspecified heart failure type (HCC) [I50.9]    Discharge Diagnoses:    Principal Problem:    Anemia  Resolved Problems:    * No resolved hospital problems. *       Hospital Course:     AHRF iso Acute on chronic HF pEF/ Dyspnea/ LE swelling POA:   Duplex neg noel. Echo EF 7/24 with EF 65%. No repeat echo per cards   - On room air at discharge, euvolemic, swelling of legs minimal.  - Cont lasix 40 mg oral daily  - Dc home Bumex 0.5 mg  - Per cards, avoiding ACE inhibitor/ARB/Arni/spironolactone with chronic kidney disease   - UPC with minimal proteinuria so ACEi/ARB not indicated for CKD     Acute on chronic blood loss anemia/ HX of AVMs: Stable Hemoglobin. Patient was seen by GI and she refused endoscopy. No dixie melena or hematochezia. S/p 1 unit packed red blood cells  - Cont pantoprazole BID and reassess outpatient  - PCP: can consider outpt IV Iron infusions     Diabetic foot ulcer, right lateral malleolus  - No sign of superinfection or cellulitis  - Local wound care     Diabetes mellitus A1c 6.4   - Cont home Lantus 19 qhs     HTN   - Continue norvasc; dc'd metop tartrate 25 mg bid, changed to succinate 25mg daily  - Cont Lasix 40 mg daily      Coronary artery disease  - Continue statin    Imaging  XR CHEST PORTABLE    Result Date: 9/29/2024  Mild pulmonary edema. This is likely superimposed on emphysema. Electronically signed by Jaylen Prescott       PCP: Paradise Rubio APRN - NP     Consults: cardiology and GI    Condition of patient at discharge: Stable    Discharge Exam:    Physical Exam:    Gen: Well-developed, well-nourished, in no acute distress  HEENT:  Pink conjunctivae, PERRL,

## 2024-10-03 NOTE — CARE COORDINATION
10/03/24 9562   Condition of Participation: Discharge Planning   The Plan for Transition of Care is related to the following treatment goals: Home with home health SN and PT   The Patient and/or Patient Representative was provided with a Choice of Provider? Patient;Patient Representative   Name of the Patient Representative who was provided with the Choice of Provider and agrees with the Discharge Plan?  Devi Lee   The Patient and/Or Patient Representative agree with the Discharge Plan? Yes   Freedom of Choice list was provided with basic dialogue that supports the patient's individualized plan of care/goals, treatment preferences, and shares the quality data associated with the providers?  Yes     Loren Armstrong, MS  [phone]  
Care Management Progress Note    Reason for Admission:   Anemia [D64.9]  Cellulitis of left lower extremity [L03.116]  Anemia, unspecified type [D64.9]  Acute on chronic congestive heart failure, unspecified heart failure type (HCC) [I50.9]         Patient Admission Status: Inpatient  RUR:   Hospitalization in the last 30 days (Readmission):  No        Transition of care plan:  Patient cleared for DC home today.  Home with home health SN and PT  Discharge plan communicated with patient and/or discharge caregiver: Yes    Date 1st IMM letter given: 10/1/24  Outpatient follow-up: PCP/Specialist  Transport at discharge: Daughter at bedside  *CM met with patient and daughter who have given choice for [facility]. Per daughter, patient was receiving services with [facility] but they have been brought out. CM placed referral and will update AVS when acceptance has been confirmed.*    Loren Armstrong, MS  [phone]  
Transport:  TBD       Transition of care plan:  [x] Home with Home Health   - [place] of Choice offered? [] Yes, Preference:   [] NA    Pt's daughter reported that pt was on services with [facility] for SN and PT until recently. Daughter is open to HH as needed; she will talk to other family and let CM know agency preferences. She does not want to use [facility] again.    CM will continue to follow and assist with discharge needs.    Christy Correa  Case Management Department  For questions or concerns, please [employer]

## 2024-10-03 NOTE — PLAN OF CARE
Problem: Physical Therapy - Adult  Goal: By Discharge: Performs mobility at highest level of function for planned discharge setting.  See evaluation for individualized goals.  Description: FUNCTIONAL STATUS PRIOR TO ADMISSION: Patient was modified independent using a rolling walker for functional mobility. No falls reported.    HOME SUPPORT PRIOR TO ADMISSION: The patient lived with her supportive daughter. The patient typically did not require assist with ADLs. Patient fixes her own meals except for dinner.    Physical Therapy Goals  Initiated 10/1/2024  1.  Patient will move from supine to sit and sit to supine in bed with modified independence within 7 day(s).    2.  Patient will perform sit to stand with modified independence within 7 day(s).  3.  Patient will transfer from bed to chair and chair to bed with modified independence using the least restrictive device within 7 day(s).  4.  Patient will ambulate with modified independence for 100 feet with the least restrictive device within 7 day(s).   5.  Patient will ascend/descend 1 stairs with 1 handrail(s) with modified independence within 7 day(s).    Outcome: Adequate for Discharge     PHYSICAL THERAPY TREATMENT    Patient: Janis Chadwick (89 y.o. female)  Date: 10/3/2024  Diagnosis:   Anemia [D64.9]  Cellulitis of left lower extremity [L03.116]  Anemia, unspecified type [D64.9]  Acute on chronic congestive heart failure, unspecified heart failure type (HCC) [I50.9] Anemia      Precautions: Fall Risk                      ASSESSMENT:  Pt tolerated PT services well and has essentially met PT POC goals. Pt was received in bed. Reporting DPT introduced self/role and Pt amenable to therapy services. Pt confirms Tia with rw at baseline and resides with her daughter in a multi level home with first level suite. Pt's daughter present to provide support, prn. Most tasks performed with sup/sba including bed mobility, multiple functional sit <-> stand transfers from

## 2024-10-03 NOTE — PLAN OF CARE
Problem: Safety - Adult  Goal: Free from fall injury  10/3/2024 1339 by Migdalia Zamarripa RN  Outcome: Progressing  10/3/2024 0028 by Silvestre Vera RN  Outcome: Progressing     Problem: Discharge Planning  Goal: Discharge to home or other facility with appropriate resources  10/3/2024 1339 by Migdalia Zamarripa RN  Outcome: Progressing  10/3/2024 0028 by Silvestre Vera RN  Outcome: Progressing     Problem: Pain  Goal: Verbalizes/displays adequate comfort level or baseline comfort level  10/3/2024 1339 by Migdalia Zamarripa RN  Outcome: Progressing  10/3/2024 0028 by Silvestre Vera RN  Outcome: Progressing     Problem: Skin/Tissue Integrity  Goal: Absence of new skin breakdown  Description: 1.  Monitor for areas of redness and/or skin breakdown  2.  Assess vascular access sites hourly  3.  Every 4-6 hours minimum:  Change oxygen saturation probe site  4.  Every 4-6 hours:  If on nasal continuous positive airway pressure, respiratory therapy assess nares and determine need for appliance change or resting period.  10/3/2024 1339 by Migdalia Zamarripa RN  Outcome: Progressing  10/3/2024 0028 by Silvestre Vera RN  Outcome: Progressing     Problem: ABCDS Injury Assessment  Goal: Absence of physical injury  10/3/2024 1339 by Migdalia Zamarripa RN  Outcome: Progressing  10/3/2024 0028 by Silvestre Vera RN  Outcome: Progressing     Problem: Chronic Conditions and Co-morbidities  Goal: Patient's chronic conditions and co-morbidity symptoms are monitored and maintained or improved  10/3/2024 1339 by Migdalia Zamarripa RN  Outcome: Progressing  10/3/2024 0028 by Silvestre Vera RN  Outcome: Progressing

## 2024-10-03 NOTE — DISCHARGE INSTRUCTIONS
HOSPITALIST DISCHARGE INSTRUCTIONS  NAME:  Janis Chadwick   :  1934   MRN:  [id number]     Date/Time:  10/3/2024 11:36 AM    ADMIT DATE: 2024     DISCHARGE DATE: 10/3/2024     DISCHARGE DIAGNOSIS:  Anemia  Acute on chronic heart failure    DISCHARGE INSTRUCTIONS:  Thank you for allowing us to participate in your care. Your discharging Hospitalist is Gennaro Muse MD. You were admitted for evaluation and treatment of the above.  You were admitted for anemia, you were evaluated for GI who advised endoscopies and monitoring hemoglobin.  Your hemoglobin was stable and refused endoscopies at this time.  We gave you have fluid pills for your heart failure and it got better.  Please continue your Protonix daily and follow-up with your PCP and GI doctor.    Repeat hemoglobin  Repeat kidney function and electrolytes.      Paradise Rubio APRN - NP  [id number] [facility] [address] [phone]    Follow up      Paradise Rubio APRN - NP  [id number] [facility] [address] [phone]          Rad Tineo MD  [id number] [facility]  [address] [place] [id number] [phone]    Follow up        MEDICATIONS:    It is important that you take the medication exactly as they are prescribed.   Keep your medication in the bottles provided by the pharmacist and keep a list of the medication names, dosages, and times to be taken in your wallet.   Do not take other medications without consulting your doctor.             If you experience any of the following symptoms then please call your primary care physician or return to the emergency room if you cannot get hold of your doctor:  Fever, chills, nausea, vomiting, diarrhea, change in mentation, falling, bleeding, shortness of breath    Follow Up:  Please call the below provider to arrange hospital follow up appointment      Paradise Rubio APRN - NP  [id number] [facility] [address] [phone]    Follow up      Paradise Rubio  Please click on link to see image.

## 2024-10-30 ENCOUNTER — TELEPHONE (OUTPATIENT)
Age: 89
End: 2024-10-30

## 2024-10-30 NOTE — TELEPHONE ENCOUNTER
Patient is having swelling amongst other symptoms, tried calling and secure chatting nurse team, please follow up with nurse when you get a opportunity

## 2024-10-30 NOTE — TELEPHONE ENCOUNTER
R/t call to  nurse, Aleena--  \"Weight has been creeping up recently\"  137# 6 days ago; 142# today.    Confirmed taking Lasix 40 mg; x2 dose today (per ER MD).    No sob, lungs clear.  Edema in lower legs.  Daughter manages meds.  Struggling with heart healthy diet (eats fast food a lot).      Called pt's daughter (HIPAA approved),  Weight was 130's in hospital.  D/c of hospital had told her she can take extra lasix.      Scheduled labs w PCP in a week or two to have drawn with .     Encouraged no salt, elevation, compression socks.  She stated Edema L>R.    Will have  draw labs and send to us via [employer].  Declined making an apt at this time; will see how labs come back first.

## 2025-04-12 ENCOUNTER — APPOINTMENT (OUTPATIENT)
Facility: HOSPITAL | Age: 89
End: 2025-04-12
Payer: MEDICARE

## 2025-04-12 ENCOUNTER — HOSPITAL ENCOUNTER (EMERGENCY)
Facility: HOSPITAL | Age: 89
Discharge: HOME | End: 2025-04-12
Attending: EMERGENCY MEDICINE
Payer: MEDICARE

## 2025-04-12 VITALS
TEMPERATURE: 98 F | HEIGHT: 62 IN | BODY MASS INDEX: 27.79 KG/M2 | DIASTOLIC BLOOD PRESSURE: 64 MMHG | WEIGHT: 151 LBS | RESPIRATION RATE: 15 BRPM | SYSTOLIC BLOOD PRESSURE: 148 MMHG | HEART RATE: 70 BPM | OXYGEN SATURATION: 94 %

## 2025-04-12 DIAGNOSIS — J06.9 ACUTE UPPER RESPIRATORY INFECTION: Primary | ICD-10-CM

## 2025-04-12 LAB
ALBUMIN SERPL-MCNC: 3.3 G/DL (ref 3.5–5)
ALBUMIN/GLOB SERPL: 1 (ref 1.1–2.2)
ALP SERPL-CCNC: 80 U/L (ref 45–117)
ALT SERPL-CCNC: 27 U/L (ref 12–78)
ANION GAP SERPL CALC-SCNC: 7 MMOL/L (ref 2–12)
AST SERPL-CCNC: 20 U/L (ref 15–37)
BASOPHILS # BLD: 0.05 K/UL (ref 0–0.1)
BASOPHILS NFR BLD: 0.8 % (ref 0–1)
BILIRUB SERPL-MCNC: 1.1 MG/DL (ref 0.2–1)
BUN SERPL-MCNC: 22 MG/DL (ref 6–20)
BUN/CREAT SERPL: 11 (ref 12–20)
CALCIUM SERPL-MCNC: 9.2 MG/DL (ref 8.5–10.1)
CHLORIDE SERPL-SCNC: 108 MMOL/L (ref 97–108)
CO2 SERPL-SCNC: 26 MMOL/L (ref 21–32)
COMMENT:: NORMAL
CREAT SERPL-MCNC: 2.02 MG/DL (ref 0.55–1.02)
DIFFERENTIAL METHOD BLD: ABNORMAL
EOSINOPHIL # BLD: 0.28 K/UL (ref 0–0.4)
EOSINOPHIL NFR BLD: 4.7 % (ref 0–7)
ERYTHROCYTE [DISTWIDTH] IN BLOOD BY AUTOMATED COUNT: 13.4 % (ref 11.5–14.5)
FLUAV RNA SPEC QL NAA+PROBE: NOT DETECTED
FLUBV RNA SPEC QL NAA+PROBE: NOT DETECTED
GLOBULIN SER CALC-MCNC: 3.4 G/DL (ref 2–4)
GLUCOSE SERPL-MCNC: 217 MG/DL (ref 65–100)
HCT VFR BLD AUTO: 32.5 % (ref 35–47)
HGB BLD-MCNC: 10.8 G/DL (ref 11.5–16)
IMM GRANULOCYTES # BLD AUTO: 0.01 K/UL (ref 0–0.04)
IMM GRANULOCYTES NFR BLD AUTO: 0.2 % (ref 0–0.5)
LYMPHOCYTES # BLD: 0.83 K/UL (ref 0.8–3.5)
LYMPHOCYTES NFR BLD: 13.9 % (ref 12–49)
MCH RBC QN AUTO: 30 PG (ref 26–34)
MCHC RBC AUTO-ENTMCNC: 33.2 G/DL (ref 30–36.5)
MCV RBC AUTO: 90.3 FL (ref 80–99)
MONOCYTES # BLD: 0.47 K/UL (ref 0–1)
MONOCYTES NFR BLD: 7.9 % (ref 5–13)
NEUTS SEG # BLD: 4.34 K/UL (ref 1.8–8)
NEUTS SEG NFR BLD: 72.5 % (ref 32–75)
NRBC # BLD: 0 K/UL (ref 0–0.01)
NRBC BLD-RTO: 0 PER 100 WBC
NT PRO BNP: 1737 PG/ML
PLATELET # BLD AUTO: 120 K/UL (ref 150–400)
PMV BLD AUTO: 11.8 FL (ref 8.9–12.9)
POTASSIUM SERPL-SCNC: 3.5 MMOL/L (ref 3.5–5.1)
PROT SERPL-MCNC: 6.7 G/DL (ref 6.4–8.2)
RBC # BLD AUTO: 3.6 M/UL (ref 3.8–5.2)
SARS-COV-2 RNA RESP QL NAA+PROBE: NOT DETECTED
SODIUM SERPL-SCNC: 141 MMOL/L (ref 136–145)
SOURCE: NORMAL
SPECIMEN HOLD: NORMAL
WBC # BLD AUTO: 6 K/UL (ref 3.6–11)

## 2025-04-12 PROCEDURE — 99285 EMERGENCY DEPT VISIT HI MDM: CPT

## 2025-04-12 PROCEDURE — 93005 ELECTROCARDIOGRAM TRACING: CPT | Performed by: EMERGENCY MEDICINE

## 2025-04-12 PROCEDURE — 71045 X-RAY EXAM CHEST 1 VIEW: CPT

## 2025-04-12 PROCEDURE — 36415 COLL VENOUS BLD VENIPUNCTURE: CPT

## 2025-04-12 PROCEDURE — 80053 COMPREHEN METABOLIC PANEL: CPT

## 2025-04-12 PROCEDURE — 71250 CT THORAX DX C-: CPT

## 2025-04-12 PROCEDURE — 87636 SARSCOV2 & INF A&B AMP PRB: CPT

## 2025-04-12 PROCEDURE — 83880 ASSAY OF NATRIURETIC PEPTIDE: CPT

## 2025-04-12 PROCEDURE — 85025 COMPLETE CBC W/AUTO DIFF WBC: CPT

## 2025-04-12 PROCEDURE — 94761 N-INVAS EAR/PLS OXIMETRY MLT: CPT

## 2025-04-12 ASSESSMENT — PAIN SCALES - GENERAL: PAINLEVEL_OUTOF10: 0

## 2025-04-12 ASSESSMENT — PAIN - FUNCTIONAL ASSESSMENT: PAIN_FUNCTIONAL_ASSESSMENT: 0-10

## 2025-04-12 NOTE — ED TRIAGE NOTES
Pt arrives [facility] c/o SOB x1 week.  Pt arrived home from [place] and started feeling SOB.    Pt was seen at another facility and was negative for covid/flu.   Pt arrives tachypnic, A&O x4 on RA.

## 2025-04-12 NOTE — ED PROVIDER NOTES
[facility] EMERGENCY DEPARTMENT  EMERGENCY DEPARTMENT ENCOUNTER      Pt Name: Janis Chadwick  MRN: [id number]  Birthdate 2/21/1934  Date of evaluation: 4/12/2025  Provider: Christofer Burnett MD      HISTORY OF PRESENT ILLNESS      89-year-old female history of CHF, CAD, diabetes, hypertension presents by EMS to the emergency department chief complaint shortness of breath and cough for about 3 days.  Recent travel to [place].  No fever.    The history is provided by the patient and medical records.           Nursing Notes were reviewed.    REVIEW OF SYSTEMS         Review of Systems        PAST MEDICAL HISTORY     Past Medical History:   Diagnosis Date    CAD (coronary artery disease)     CHF (congestive heart failure), NYHA class I, chronic, systolic (HCC)     CKD (chronic kidney disease) stage 3, GFR 30-59 ml/min ([facility])     DM type 2 causing renal disease ([facility])     DM type 2 causing vascular disease ([facility])     Hypertension     Iron deficiency anemia     Ischemic cardiomyopathy     Peripheral vascular disease          SURGICAL HISTORY       Past Surgical History:   Procedure Laterality Date    APPENDECTOMY      COLONOSCOPY N/A 02/04/2022    COLONOSCOPY performed by Rad Tineo MD at [facility] ENDOSCOPY    CORONARY ANGIOPLASTY WITH STENT PLACEMENT  12/24/2021    stents x 3 to mid-distal right coronary artery going into PDA for 100% occlusion of RCA; and also had nonobstructive disease in 30% LAD stenosis, 70% diagonal 1 stenosis.  She had proximal 30% stenosis in the codominant left circumflex artery     HYSTERECTOMY (CERVIX STATUS UNKNOWN)      OTHER SURGICAL HISTORY      Back surgery x 2    OTHER SURGICAL HISTORY      head sx x 2 nerve related    TONSILLECTOMY      TOTAL KNEE ARTHROPLASTY Left          CURRENT MEDICATIONS       Previous Medications    AMLODIPINE (NORVASC) 5 MG TABLET    Take 2 tablets by mouth daily    ATORVASTATIN (LIPITOR) 20 MG TABLET    Take 1 tablet by mouth daily    FUROSEMIDE  (LASIX) 40 MG TABLET    Take 1 tablet by mouth daily    INSULIN GLARGINE (LANTUS) 100 UNIT/ML INJECTION VIAL    Inject 19 Units into the skin nightly    LANCETS MISC    Check blood sugar AC & HS    METOPROLOL SUCCINATE (TOPROL XL) 25 MG EXTENDED RELEASE TABLET    Take 1 tablet by mouth nightly    PANTOPRAZOLE (PROTONIX) 40 MG TABLET    Take 1 tablet by mouth 2 times daily (before meals)       ALLERGIES     Prochlorperazine, Codeine, Hydromorphone, Morphine, and Sulfa antibiotics    FAMILY HISTORY       Family History   Problem Relation Age of Onset    Heart Disease Mother           SOCIAL HISTORY       Social History     Socioeconomic History    Marital status:    Tobacco Use    Smoking status: Former    Smokeless tobacco: Never   Substance and Sexual Activity    Alcohol use: Yes    Drug use: Never     Social Drivers of Health     Food Insecurity: No Food Insecurity (9/30/2024)    Hunger Vital Sign     Worried About Running Out of Food in the Last Year: Never true     Ran Out of Food in the Last Year: Never true   Transportation Needs: No Transportation Needs (9/30/2024)    PRAPARE - Transportation     Lack of Transportation (Medical): No     Lack of Transportation (Non-Medical): No   Housing Stability: High Risk (9/30/2024)    Housing Stability Vital Sign     Unable to Pay for Housing in the Last Year: Yes     Number of Times Moved in the Last Year: 1     Homeless in the Last Year: No         PHYSICAL EXAM       ED Triage Vitals   BP Systolic BP Percentile Diastolic BP Percentile Temp Temp src Pulse Resp SpO2   -- -- -- -- -- -- -- --      Height Weight         -- --             There is no height or weight on file to calculate BMI.    Physical Exam  Vitals reviewed.   Constitutional:       Appearance: She is not ill-appearing.   Cardiovascular:      Rate and Rhythm: Normal rate.   Pulmonary:      Comments: Some bilateral upper coarse lung sounds, tachypneic  Neurological:      Mental Status: She is alert.

## 2025-04-13 LAB
EKG ATRIAL RATE: 74 BPM
EKG DIAGNOSIS: NORMAL
EKG P AXIS: 52 DEGREES
EKG P-R INTERVAL: 170 MS
EKG Q-T INTERVAL: 424 MS
EKG QRS DURATION: 100 MS
EKG QTC CALCULATION (BAZETT): 470 MS
EKG R AXIS: 1 DEGREES
EKG T AXIS: 65 DEGREES
EKG VENTRICULAR RATE: 74 BPM

## 2025-04-13 PROCEDURE — 93010 ELECTROCARDIOGRAM REPORT: CPT | Performed by: SPECIALIST

## 2025-05-27 NOTE — Clinical Note
Balloon catheter removed over the wire. activity and needs redirected during session.     Areas for Improvement: edema, impaired cognition, impaired coordination, impaired endurance, impaired motor control, impaired ROM, and impaired strength  Prognosis: fair    GOALS:  Patient Goal: get use of her left hand so she can return sewing, cut food up, grasp onto objects    Short Term Goals:  Time Frame: 5 weeks   Pt to increase her strength of left UE to allow her to hold her arm at 90 degrees of flexion against gravity in prep for returning dishes into cupboard.  Pt to be able to educate on compensatory tech/adaptive equipment to use at home to increase ease of completing her simple meal prep and other ADL tasks  Pt to be able to grasp onto a object between 1-2 inches and place to onto counter with mod assistance to release in prep for holding onto a cup.   Pt to be able to increase coordination of left hand to allow her to oppose thumb to L2 to complete a tip pinch.     Long Term Goals:  Time Frame: 12 weeks   Pt to be able to cook a simple meal using left UE and other tech/equipment to assist with no difficulty  Pt to be able to complete her ADL Tasks including grooming of hair with no difficulty     Patient Education:   []  HEP/Education Completed: Plan of Care, Goals,   Scap ex, AAROM digit flex/ext with towel on table top  5/9: Foam tubing for knife- squeeze for grasp and push down into putty   5/13/25: putty pulls with key pinch grasp  5/13/25 wrist/digit extension against edge of table  5/15/25:  adaptive equipment education, rocker knife, one handed cutting board, over the door pulley  []  No new Education completed  [x]  Reviewed Prior HEP      [x]  Patient verbalized and/or demonstrated understanding of education provided.  []  Patient unable to verbalize and/or demonstrate understanding of education provided.  Will continue education.  [x]  Barriers to learning: decreased insight    PLAN:  Treatment Recommendations: Strengthening, Range of Motion,

## 2025-09-02 ENCOUNTER — OFFICE VISIT (OUTPATIENT)
Age: 89
End: 2025-09-02
Payer: MEDICARE

## 2025-09-02 VITALS
HEIGHT: 62 IN | BODY MASS INDEX: 24.77 KG/M2 | HEART RATE: 64 BPM | SYSTOLIC BLOOD PRESSURE: 134 MMHG | RESPIRATION RATE: 16 BRPM | OXYGEN SATURATION: 97 % | DIASTOLIC BLOOD PRESSURE: 62 MMHG | WEIGHT: 134.6 LBS

## 2025-09-02 DIAGNOSIS — E11.29 TYPE 2 DIABETES MELLITUS WITH OTHER KIDNEY COMPLICATION, UNSPECIFIED WHETHER LONG TERM INSULIN USE (HCC): ICD-10-CM

## 2025-09-02 DIAGNOSIS — I25.118 CORONARY ARTERY DISEASE OF NATIVE ARTERY OF NATIVE HEART WITH STABLE ANGINA PECTORIS: ICD-10-CM

## 2025-09-02 DIAGNOSIS — D64.9 ANEMIA, UNSPECIFIED TYPE: ICD-10-CM

## 2025-09-02 DIAGNOSIS — K92.2 CHRONIC GI BLEEDING: ICD-10-CM

## 2025-09-02 DIAGNOSIS — I10 PRIMARY HYPERTENSION: ICD-10-CM

## 2025-09-02 DIAGNOSIS — I50.32 CHRONIC HEART FAILURE WITH PRESERVED EJECTION FRACTION (HCC): Primary | ICD-10-CM

## 2025-09-02 DIAGNOSIS — N18.4 CKD (CHRONIC KIDNEY DISEASE) STAGE 4, GFR 15-29 ML/MIN (HCC): ICD-10-CM

## 2025-09-02 PROCEDURE — G8420 CALC BMI NORM PARAMETERS: HCPCS

## 2025-09-02 PROCEDURE — 1090F PRES/ABSN URINE INCON ASSESS: CPT

## 2025-09-02 PROCEDURE — 99214 OFFICE O/P EST MOD 30 MIN: CPT

## 2025-09-02 PROCEDURE — 1126F AMNT PAIN NOTED NONE PRSNT: CPT

## 2025-09-02 PROCEDURE — 1123F ACP DISCUSS/DSCN MKR DOCD: CPT

## 2025-09-02 PROCEDURE — 1159F MED LIST DOCD IN RCRD: CPT

## 2025-09-02 PROCEDURE — 1160F RVW MEDS BY RX/DR IN RCRD: CPT

## 2025-09-02 PROCEDURE — G8427 DOCREV CUR MEDS BY ELIG CLIN: HCPCS

## 2025-09-02 PROCEDURE — 1036F TOBACCO NON-USER: CPT

## 2025-09-02 RX ORDER — ACETAMINOPHEN 500 MG
500 TABLET ORAL 2 TIMES DAILY
COMMUNITY

## (undated) DEVICE — TUBING SUCT 10FR MAL ALUM SHFT FN CAP VENT UNIV CONN W/ OBT

## (undated) DEVICE — 450 ML BOTTLE OF 0.05% CHLORHEXIDINE GLUCONATE IN 99.95% STERILE WATER FOR IRRIGATION, USP AND APPLICATOR.: Brand: IRRISEPT ANTIMICROBIAL WOUND LAVAGE

## (undated) DEVICE — GLOVE SURG SZ 7 L12IN FNGR THK79MIL GRN LTX FREE

## (undated) DEVICE — DRAPE,REIN 53X77,STERILE: Brand: MEDLINE

## (undated) DEVICE — 3M™ IOBAN™ 2 ANTIMICROBIAL INCISE DRAPE 6651EZ: Brand: IOBAN™ 2

## (undated) DEVICE — SET GRAV CK VLV NEEDLESS ST 3 GANGED 4WAY STPCOCK HI FLO 10

## (undated) DEVICE — BAG SPEC BIOHZRD 10 X 10 IN --

## (undated) DEVICE — PLASMABLADE PS210-030S 3.0S LOCK: Brand: PLASMABLADE™

## (undated) DEVICE — COVER LT HNDL PLAS RIG 1 PER PK

## (undated) DEVICE — Z DISCONTINUIED USE 2173699 ENDOSCOPE CAPSULE PILLCAM SB2

## (undated) DEVICE — 3M™ CUROS™ DISINFECTING CAP FOR NEEDLELESS CONNECTORS 270/CARTON 20 CARTONS/CASE CFF1-270: Brand: CUROS™

## (undated) DEVICE — BITEBLOCK ENDOSCP 60FR MAXI WHT POLYETH STURDY W/ VELC WVN

## (undated) DEVICE — STRYKER PERFORMANCE SERIES SAGITTAL BLADE: Brand: STRYKER PERFORMANCE SERIES

## (undated) DEVICE — TUBING PRSS MON L6IN PVC M FEM CONN

## (undated) DEVICE — SIMPLICITY FLUFF UNDERPAD 23X36, MODERATE: Brand: SIMPLICITY

## (undated) DEVICE — SUTURE MCRYL SZ 3-0 L27IN ABSRB UD L19MM PS-2 3/8 CIR PRIM Y427H

## (undated) DEVICE — CATH ANGI BLLN DIL 3.0X08MM -- NC EUPHORA

## (undated) DEVICE — CIL FLEX HP 72 FLL-MLL W/DRAPE CLIP: Brand: NAMIC

## (undated) DEVICE — 4-PORT MANIFOLD: Brand: NEPTUNE 2

## (undated) DEVICE — SUTURE VCRL SZ 2-0 L27IN ABSRB UD L36MM CP-1 1/2 CIR REV J266H

## (undated) DEVICE — COVER,MAYO STAND,STERILE: Brand: MEDLINE

## (undated) DEVICE — BASIN EMSIS 16OZ GRAPHITE PLAS KID SHP MOLD GRAD FOR ORAL

## (undated) DEVICE — SUTURE FIBERWIRE SZ 2 W/ TAPERED NEEDLE BLUE L38IN NONABSORB BLU L26.5MM 1/2 CIRCLE AR7200

## (undated) DEVICE — SNARE ENDOSCP M L240CM W27MM SHTH DIA2.4MM CHN 2.8MM OVL

## (undated) DEVICE — SUTURE ABSRB BRAID COAT UD CT NO 1 36IN VCRL J959H

## (undated) DEVICE — GOWN,BREATHABLE,IMP SLV 3XL AURORA: Brand: MEDLINE

## (undated) DEVICE — NEEDLE ANGIO 18GA L9CM NRML 1 WALL SMOOTH FINISH CLR HUB FOR

## (undated) DEVICE — Device: Brand: ASAHI SION BLUE

## (undated) DEVICE — CATH GUID COR JR4.0 6FR 100CM -- LAUNCHER

## (undated) DEVICE — CATH DIAG-D 6F MULTI PIG 155 5 -- IMPULSE 16599-302

## (undated) DEVICE — SOLIDIFIER MEDC 1200ML -- CONVERT TO 356117

## (undated) DEVICE — DRAPE,U/ SHT,SPLIT,PLAS,STERIL: Brand: MEDLINE

## (undated) DEVICE — DEVICE INFL 20ML 30ATM DGT FLD DISPNS SYR W ACCESSPLUS BLU

## (undated) DEVICE — T-MAX DISPOSABLE FACE MASK 8 PER BOX

## (undated) DEVICE — CUFF RMFG BP INF SZ 11 DISP -- LAWSON OEM ITEM 238915

## (undated) DEVICE — BINDER ABD H12IN FOR 30-45IN WAIST UNIV 4 PNL PREM DSGN E

## (undated) DEVICE — (D)SENSOR RMFG 02 PULS OXMTR -- DISC BY MFR USE ITEM 133445

## (undated) DEVICE — CANNULA CUSH AD W/ 14FT TBG

## (undated) DEVICE — POLYP TRAP: Brand: TRAPEASE®

## (undated) DEVICE — PRESSURE MONITORING SET: Brand: TRUWAVE

## (undated) DEVICE — SUTURE NICELOOP SZ 5 L24IN NONABSORBABLE WHT KAC-25 L49MM SMSL50201

## (undated) DEVICE — HI-TORQUE VERSACORE MODIFIED J GUIDE WIRE SYSTEM 145 CM: Brand: HI-TORQUE VERSACORE

## (undated) DEVICE — SHEET, DRAPE, SPLIT, STERILE: Brand: MEDLINE

## (undated) DEVICE — ELECTRODE,RADIOTRANSLUCENT,FOAM,3PK: Brand: MEDLINE

## (undated) DEVICE — DEVICE DEL CAP ADV 2.5MMX180CM --

## (undated) DEVICE — TREK CORONARY DILATATION CATHETER 2.50 MM X 12 MM / RAPID-EXCHANGE: Brand: TREK

## (undated) DEVICE — PAD,NON-ADHERENT,3X8,STERILE,LF,1/PK: Brand: MEDLINE

## (undated) DEVICE — PINNACLE INTRODUCER SHEATH: Brand: PINNACLE

## (undated) DEVICE — CATH BLLN ANGIO 2.75X27MM NC EUPHORIA RX

## (undated) DEVICE — GLOVE SURG SZ 65 L12IN FNGR THK126MIL CRM LTX FREE

## (undated) DEVICE — PAD,ABDOMINAL,5"X9",ST,LF,25/BX: Brand: MEDLINE INDUSTRIES, INC.

## (undated) DEVICE — BIT DRL SCR PERIPH 2.7MM DISP --

## (undated) DEVICE — GLOVE SURG SZ 9 L12IN FNGR THK79MIL GRN LTX FREE

## (undated) DEVICE — SOLUTION IRRIG 3000ML 0.9% SOD CHL USP UROMATIC PLAS CONT

## (undated) DEVICE — KIT COLON W/ 1.1OZ LUB AND 2 END

## (undated) DEVICE — TOTAL JOINT-SFMC: Brand: MEDLINE INDUSTRIES, INC.

## (undated) DEVICE — HEART CATH-MRMC: Brand: MEDLINE INDUSTRIES, INC.

## (undated) DEVICE — YANKAUER,OPEN TIP,W/O VENT,STERILE: Brand: MEDLINE INDUSTRIES, INC.

## (undated) DEVICE — T4 HOOD

## (undated) DEVICE — 3M™ TEGADERM™ TRANSPARENT FILM DRESSING FRAME STYLE, 1628, 6 IN X 8 IN (15 CM X 20 CM), 10/CT 8CT/CASE: Brand: 3M™ TEGADERM™

## (undated) DEVICE — BOOT ORTH XL KNEE GRN TYVEK HI CVR SKID RESIST HEAT SEAL E

## (undated) DEVICE — 1200 GUARD II KIT W/5MM TUBE W/O VAC TUBE: Brand: GUARDIAN

## (undated) DEVICE — CONTAINER SPEC 20 ML LID NEUT BUFF FORMALIN 10 % POLYPR STS

## (undated) DEVICE — GLOVE SURG SZ 9 L12IN FNGR THK126MIL CRM LTX FREE

## (undated) DEVICE — Device

## (undated) DEVICE — SUTURE N ABSRB BRAIDED 5-0 24 IN 49 MM GRN NICELOOP SMSL50101